# Patient Record
Sex: FEMALE | Race: WHITE | NOT HISPANIC OR LATINO | Employment: OTHER
[De-identification: names, ages, dates, MRNs, and addresses within clinical notes are randomized per-mention and may not be internally consistent; named-entity substitution may affect disease eponyms.]

---

## 2017-06-03 ENCOUNTER — HEALTH MAINTENANCE LETTER (OUTPATIENT)
Age: 37
End: 2017-06-03

## 2021-09-01 ENCOUNTER — HOSPITAL ENCOUNTER (EMERGENCY)
Facility: CLINIC | Age: 41
Discharge: HOME | End: 2021-09-01
Attending: EMERGENCY MEDICINE | Admitting: EMERGENCY MEDICINE
Payer: COMMERCIAL

## 2021-09-01 ENCOUNTER — APPOINTMENT (OUTPATIENT)
Dept: GENERAL RADIOLOGY | Facility: CLINIC | Age: 41
End: 2021-09-01
Attending: EMERGENCY MEDICINE
Payer: COMMERCIAL

## 2021-09-01 ENCOUNTER — APPOINTMENT (OUTPATIENT)
Dept: CT IMAGING | Facility: CLINIC | Age: 41
End: 2021-09-01
Attending: EMERGENCY MEDICINE
Payer: COMMERCIAL

## 2021-09-01 VITALS
OXYGEN SATURATION: 99 % | RESPIRATION RATE: 15 BRPM | SYSTOLIC BLOOD PRESSURE: 124 MMHG | DIASTOLIC BLOOD PRESSURE: 71 MMHG | HEART RATE: 64 BPM | TEMPERATURE: 98.1 F

## 2021-09-01 DIAGNOSIS — R51.9 ACUTE NONINTRACTABLE HEADACHE, UNSPECIFIED HEADACHE TYPE: ICD-10-CM

## 2021-09-01 DIAGNOSIS — R07.9 CHEST PAIN, UNSPECIFIED TYPE: ICD-10-CM

## 2021-09-01 LAB
ANION GAP SERPL CALCULATED.3IONS-SCNC: 4 MMOL/L (ref 3–14)
B-HCG FREE SERPL-ACNC: <5 IU/L (ref 0–5)
BASOPHILS # BLD AUTO: 0 10E3/UL (ref 0–0.2)
BASOPHILS NFR BLD AUTO: 0 %
BUN SERPL-MCNC: 9 MG/DL (ref 7–30)
CALCIUM SERPL-MCNC: 8.8 MG/DL (ref 8.5–10.1)
CHLORIDE BLD-SCNC: 110 MMOL/L (ref 94–109)
CO2 SERPL-SCNC: 26 MMOL/L (ref 20–32)
CREAT SERPL-MCNC: 0.93 MG/DL (ref 0.52–1.04)
D DIMER PPP FEU-MCNC: <0.27 UG/ML FEU (ref 0–0.5)
EOSINOPHIL # BLD AUTO: 0.2 10E3/UL (ref 0–0.7)
EOSINOPHIL NFR BLD AUTO: 2 %
ERYTHROCYTE [DISTWIDTH] IN BLOOD BY AUTOMATED COUNT: 15.9 % (ref 10–15)
GFR SERPL CREATININE-BSD FRML MDRD: 77 ML/MIN/1.73M2
GLUCOSE BLD-MCNC: 93 MG/DL (ref 70–99)
HCT VFR BLD AUTO: 40.8 % (ref 35–47)
HGB BLD-MCNC: 12.4 G/DL (ref 11.7–15.7)
IMM GRANULOCYTES # BLD: 0 10E3/UL
IMM GRANULOCYTES NFR BLD: 0 %
LYMPHOCYTES # BLD AUTO: 1.8 10E3/UL (ref 0.8–5.3)
LYMPHOCYTES NFR BLD AUTO: 17 %
MCH RBC QN AUTO: 25.9 PG (ref 26.5–33)
MCHC RBC AUTO-ENTMCNC: 30.4 G/DL (ref 31.5–36.5)
MCV RBC AUTO: 85 FL (ref 78–100)
MONOCYTES # BLD AUTO: 0.8 10E3/UL (ref 0–1.3)
MONOCYTES NFR BLD AUTO: 7 %
NEUTROPHILS # BLD AUTO: 7.9 10E3/UL (ref 1.6–8.3)
NEUTROPHILS NFR BLD AUTO: 74 %
NRBC # BLD AUTO: 0 10E3/UL
NRBC BLD AUTO-RTO: 0 /100
PLATELET # BLD AUTO: 269 10E3/UL (ref 150–450)
POTASSIUM BLD-SCNC: 3.6 MMOL/L (ref 3.4–5.3)
RBC # BLD AUTO: 4.79 10E6/UL (ref 3.8–5.2)
SODIUM SERPL-SCNC: 140 MMOL/L (ref 133–144)
TROPONIN I SERPL-MCNC: <0.015 UG/L (ref 0–0.04)
WBC # BLD AUTO: 10.8 10E3/UL (ref 4–11)

## 2021-09-01 PROCEDURE — 36415 COLL VENOUS BLD VENIPUNCTURE: CPT | Performed by: EMERGENCY MEDICINE

## 2021-09-01 PROCEDURE — 70450 CT HEAD/BRAIN W/O DYE: CPT

## 2021-09-01 PROCEDURE — 250N000011 HC RX IP 250 OP 636: Performed by: EMERGENCY MEDICINE

## 2021-09-01 PROCEDURE — 84484 ASSAY OF TROPONIN QUANT: CPT | Performed by: EMERGENCY MEDICINE

## 2021-09-01 PROCEDURE — 99285 EMERGENCY DEPT VISIT HI MDM: CPT | Mod: 25

## 2021-09-01 PROCEDURE — 96361 HYDRATE IV INFUSION ADD-ON: CPT

## 2021-09-01 PROCEDURE — 84702 CHORIONIC GONADOTROPIN TEST: CPT

## 2021-09-01 PROCEDURE — 96375 TX/PRO/DX INJ NEW DRUG ADDON: CPT

## 2021-09-01 PROCEDURE — 258N000003 HC RX IP 258 OP 636: Performed by: EMERGENCY MEDICINE

## 2021-09-01 PROCEDURE — 71046 X-RAY EXAM CHEST 2 VIEWS: CPT

## 2021-09-01 PROCEDURE — 93005 ELECTROCARDIOGRAM TRACING: CPT

## 2021-09-01 PROCEDURE — 85025 COMPLETE CBC W/AUTO DIFF WBC: CPT | Performed by: EMERGENCY MEDICINE

## 2021-09-01 PROCEDURE — 96374 THER/PROPH/DIAG INJ IV PUSH: CPT

## 2021-09-01 PROCEDURE — 85379 FIBRIN DEGRADATION QUANT: CPT | Performed by: EMERGENCY MEDICINE

## 2021-09-01 PROCEDURE — 80048 BASIC METABOLIC PNL TOTAL CA: CPT | Performed by: EMERGENCY MEDICINE

## 2021-09-01 RX ORDER — DIPHENHYDRAMINE HYDROCHLORIDE 50 MG/ML
25 INJECTION INTRAMUSCULAR; INTRAVENOUS ONCE
Status: COMPLETED | OUTPATIENT
Start: 2021-09-01 | End: 2021-09-01

## 2021-09-01 RX ORDER — CYCLOBENZAPRINE HCL 10 MG
10 TABLET ORAL 3 TIMES DAILY PRN
Qty: 10 TABLET | Refills: 0 | Status: SHIPPED | OUTPATIENT
Start: 2021-09-01 | End: 2021-11-23

## 2021-09-01 RX ORDER — TETRACAINE HYDROCHLORIDE 5 MG/ML
SOLUTION OPHTHALMIC
Status: DISCONTINUED
Start: 2021-09-01 | End: 2021-09-02 | Stop reason: HOSPADM

## 2021-09-01 RX ORDER — METOCLOPRAMIDE HYDROCHLORIDE 5 MG/ML
10 INJECTION INTRAMUSCULAR; INTRAVENOUS ONCE
Status: COMPLETED | OUTPATIENT
Start: 2021-09-01 | End: 2021-09-01

## 2021-09-01 RX ADMIN — METOCLOPRAMIDE HYDROCHLORIDE 10 MG: 5 INJECTION INTRAMUSCULAR; INTRAVENOUS at 19:29

## 2021-09-01 RX ADMIN — SODIUM CHLORIDE 1000 ML: 9 INJECTION, SOLUTION INTRAVENOUS at 19:28

## 2021-09-01 RX ADMIN — DIPHENHYDRAMINE HYDROCHLORIDE 25 MG: 50 INJECTION, SOLUTION INTRAMUSCULAR; INTRAVENOUS at 19:29

## 2021-09-01 ASSESSMENT — ENCOUNTER SYMPTOMS
HEADACHES: 1
NUMBNESS: 0
NAUSEA: 0
WEAKNESS: 0
VOMITING: 1
SHORTNESS OF BREATH: 1
SORE THROAT: 1
FEVER: 0

## 2021-09-01 NOTE — ED NOTES
Pt is a harder IV stick, placed hot packs on arms. Will check back soon.  in room, pt tearful. Call light within reach.

## 2021-09-01 NOTE — ED TRIAGE NOTES
Worked out at the gym yesterday and thinks she pushed it a little too hard because she developed neck pain and a headache. When she layed down to ice her neck, that is when her chest pain developed.Chest pain worse with breathing that started yesterday. Worse with movement or when she is laying flat. Also c/o sore throat.   ABCs intact.     Does have history of PEs.

## 2021-09-01 NOTE — ED PROVIDER NOTES
History   Chief Complaint:  Chest Pain     The history is provided by the patient.      Leah Yanez is a 41 year old female who presents with chest pain. Patient was at the gym yesterday doing a cardio exercise. She states she pushed herself too hard and developed a sharp frontal headache followed by transient nausea and vomiting. She rested the remainder of the day and took ibuprofen. Last night she developed the sharp pain in her forehead again which remains persistent but less significant than the chest pain she is now experiencing. During the night she developed chest pain that radiates up her throat and shoulders. She felt like she couldn't breath. She states it hurts to lay down and to take a deep breath along with certain position changes. She is no longer nauseous. She denies fever or coughing blood. No numbness or weakness. She has history of a PE with was due to a side effect of birth control. She is no longer on birth control. No tobacco use. No personal history of cancer. States she traveled to [place] in early August.    Review of Systems   Constitutional: Negative for fever.   HENT: Positive for sore throat.    Respiratory: Positive for shortness of breath.    Cardiovascular: Positive for chest pain.   Gastrointestinal: Positive for vomiting. Negative for nausea.   Neurological: Positive for headaches. Negative for weakness and numbness.   All other systems reviewed and are negative.    Allergies:  Azithromycin  Erythromycin    Medications:  The patient denies use of medications.     Past Medical History:    The patient denies past medical history.       Social History:  Presents to ED alone    Physical Exam     Patient Vitals for the past 24 hrs:   BP Temp Temp src Pulse Resp SpO2   09/01/21 2300 124/71 -- -- 64 15 99 %   09/01/21 2235 -- -- -- 66 -- 94 %   09/01/21 2230 129/68 -- -- 68 -- --   09/01/21 2130 112/66 -- -- 71 21 --   09/01/21 2000 111/65 -- -- 56 13 --   09/01/21 1935 102/61  -- -- 74 22 --   09/01/21 1502 123/77 98.1  F (36.7  C) Oral 74 18 99 %       Physical Exam    HEENT:   External ears are normal.     Temporal arteries are non-tender.      Oropharynx is moist, without lesions or trismus.  Eyes:   PERRL.  EOMs intact.      No corneal clouding.   NECK:   No meningismus but tenderness to the paraspinal musculature.       Negative Brudzinski's sign.  CV:    Regular rate and rhythm.    No murmurs, rubs or gallops.    No peripheral edema or unilateral leg swelling  PULM:   Clear to auscultation bilateral.      No respiratory distress.      No stridor or wheezing.    Tenderness to left upper chest  ABD:  Soft, non-tender, non-distended.      No rebound or guarding.  MSK:    No gross deformity to all four extremities.      No significant joint effusions.  LYMPH:  No cervical lymphadenopathy.  NEURO:  A & O x 3    CN II-XII intact, speech is clear with no aphasia.      Finger to nose within normal limits.  No pronator drift.      Strength is 5/5 in all 4 extremities.  Sensation is intact.      Normal muscular tone, no tremor.  SKIN:   Warm, dry and intact.    PSYCH:   Mood is good and affect is appropriate.      Emergency Department Course   ECG  ECG taken at 1515, ECG read at 1831  Normal sinus rhythm  Nonspecific T wave abnormality  Abnormal ECG   Rate 65 bpm. ID interval 150 ms. QRS duration 96 ms. QT/QTc 418/434 ms. P-R-T axes 42 72 48.     Imaging:  Head CT w/o contrast  1.  No acute intracranial process.  As per radiology.     Chest XR, PA & LAT  Heart size is normal. Minimal lingular atelectasis or scarring. Lungs otherwise clear. No pneumothorax. No pleural effusion.  As per radiology.     Laboratory:  iStat HCG Quantitative Pregnancy, POCT: <5.0  BMP: Chloride 110(H) o/w WNL (Creatinine 0.93)   Ddimer: <0.27  Troponin (Collected 1927): <0.015  CBC: WBC 10.8, HGB 12.4,      Emergency Department Course:    Reviewed:  I reviewed nursing notes, vitals, past medical history and  care everywhere    Assessments:   I obtained history and examined the patient as noted above.    I rechecked the patient and explained findings.     Interventions:   NS, 1L, IV   Benadryl, 25 mg, IV   Reglan, 10 mg, IV    Disposition:  The patient was discharged to home.       Impression & Plan     Medical Decision Makin-year-old female presented to the ED with primary complaints of chest pain since last night.  EKG without ischemic changes.  Troponin within normal limits despite greater than 8 hours of constant symptoms thus ruling out MI.  She has a prior history of pulmonary embolism on estrogen for which she is no longer on.  D-dimer within normal limits thus no indication for CT scan of the chest.  The remainder of her evaluation for chest pain was unremarkable.  She had tenderness to palpation to the upper chest raising suspicion for musculoskeletal source.  Differential diagnosis would also include atypical GERD, esophageal spasm, costochondritis or intercostal muscle strain.    She also reported a intermittent headache but is less concerning to her. She would not have presented to the ED if it were not for the chest pain.  CT scan done due to the atypical nature of headache.  Head CT unremarkable.  She has no features concerning for dural sinus thrombosis, meningitis, temporal arteritis, glaucoma.  Low suspicion for occult subarachnoid hemorrhage.  We discussed the risk and benefits of lumbar puncture to definitively rule out SAH but patient agrees on withholding any further investigations at this time.  Patient safe for discharge home with close follow-up with a primary care physician and return to the ED for any worsening symptoms.    Covid-19  Leah Yanez was evaluated during a global COVID-19 pandemic, which necessitated consideration that the patient might be at risk for infection with the SARS-CoV-2 virus that causes COVID-19.   Applicable protocols for evaluation  were followed during the patient's care.      Diagnosis:    ICD-10-CM    1. Acute nonintractable headache, unspecified headache type  R51.9    2. Chest pain, unspecified type  R07.9        Discharge Medications:  New Prescriptions    CYCLOBENZAPRINE (FLEXERIL) 10 MG TABLET    Take 1 tablet (10 mg) by mouth 3 times daily as needed for muscle spasms       Scribe Disclosure:  I, Mauricio Estrada, am serving as a scribe at 6:23 PM on 9/1/2021 to document services personally performed by Marbin Rogers MD based on my observations and the provider's statements to me.            Marbin Rogers MD  09/01/21 [id number]

## 2021-09-02 LAB
ATRIAL RATE - MUSE: 65 BPM
DIASTOLIC BLOOD PRESSURE - MUSE: NORMAL MMHG
INTERPRETATION ECG - MUSE: NORMAL
P AXIS - MUSE: 42 DEGREES
PR INTERVAL - MUSE: 150 MS
QRS DURATION - MUSE: 96 MS
QT - MUSE: 418 MS
QTC - MUSE: 434 MS
R AXIS - MUSE: 72 DEGREES
SYSTOLIC BLOOD PRESSURE - MUSE: NORMAL MMHG
T AXIS - MUSE: 48 DEGREES
VENTRICULAR RATE- MUSE: 65 BPM

## 2021-09-02 NOTE — DISCHARGE INSTRUCTIONS
Please make an appointment to follow up with your primary care provider in 2-3 days even if entirely better.    Discharge Instructions  Headache    You were seen today for a headache. Headaches may be caused by many different things such as muscle tension, sinus inflammation, anxiety and stress, having too little sleep, too much alcohol, some medical conditions or injury. You may have a migraine, which is caused by changes in the blood vessels in your head.  At this time your provider does not find that your headache is a sign of anything dangerous or life-threatening.  However, sometimes the signs of serious illness do not show up right away.      Generally, every Emergency Department visit should have a follow-up clinic visit with either a primary or a specialty clinic/provider. Please follow-up as instructed by your emergency provider today.    Return to the Emergency Department if:  You get a new fever of 100.4 F or higher.  Your headache gets much worse.  You get a stiff neck with your headache.  You get a new headache that is significantly different or worse than headaches you have had before.  You are vomiting (throwing up) and cannot keep food or water down.  You have blurry or double vision or other problems with your eyes.  You have a new weakness on one side of your body.  You have difficulty with balance which is new.  You or your family thinks you are confused.  You have a seizure.    What can I do to help myself?  Pain medications - You may take a pain medication such as Tylenol  (acetaminophen), Advil , Motrin  (ibuprofen) or Aleve  (naproxen).  Take a pain reliever as soon as you notice symptoms.  Starting medications as soon as you start to have symptoms may lessen the amount of pain you have.  Relaxing in a quiet, dark room may help.  Get enough sleep and eat meals regularly.  You may need to watch for certain foods or other things which may trigger your headaches.  Keeping a journal of your  headaches and possible triggers may help you and your primary provider to identify things which you should avoid which may be causing your headaches.  If you were given a prescription for medicine here today, be sure to read all of the information (including the package insert) that comes with your prescription.  This will include important information about the medicine, its side effects, and any warnings that you need to know about.  The pharmacist who fills the prescription can provide more information and answer questions you may have about the medicine.  If you have questions or concerns that the pharmacist cannot address, please call or return to the Emergency Department.   Remember that you can always come back to the Emergency Department if you are not able to see your regular provider in the amount of time listed above, if you get any new symptoms, or if there is anything that worries you.  Discharge Instructions  Chest Pain    You have been seen today for chest pain or discomfort.  At this time, your provider has found no signs that your chest pain is due to a serious or life-threatening condition, (or you have declined more testing and/or admission to the hospital). However, sometimes there is a serious problem that does not show up right away. Your evaluation today may not be complete and you may need further testing and evaluation.     Generally, every Emergency Department visit should have a follow-up clinic visit with either a primary or a specialty clinic/provider. Please follow-up as instructed by your emergency provider today.  Return to the Emergency Department if:  Your chest pain changes, gets worse, starts to happen more often, or comes with less activity.  You are newly short of breath.  You get very weak or tired.  You pass out or faint.  You have any new symptoms, like fever, cough, numb legs, or you cough up blood.  You have anything else that worries you.    Until you follow-up with your  regular provider, please do the following:  Take one aspirin daily unless you have an allergy or are told not to by your provider.  If a stress test appointment has been made, go to the appointment.  If you have questions, contact your regular provider.  Follow-up with your regular provider/clinic as directed; this is very important.    If you were given a prescription for medicine here today, be sure to read all of the information (including the package insert) that comes with your prescription.  This will include important information about the medicine, its side effects, and any warnings that you need to know about.  The pharmacist who fills the prescription can provide more information and answer questions you may have about the medicine.  If you have questions or concerns that the pharmacist cannot address, please call or return to the Emergency Department.       Remember that you can always come back to the Emergency Department if you are not able to see your regular provider in the amount of time listed above, if you get any new symptoms, or if there is anything that worries you.

## 2021-09-09 ENCOUNTER — HOSPITAL ENCOUNTER (EMERGENCY)
Facility: CLINIC | Age: 41
Discharge: HOME | End: 2021-09-09
Attending: EMERGENCY MEDICINE | Admitting: EMERGENCY MEDICINE
Payer: COMMERCIAL

## 2021-09-09 VITALS
HEIGHT: 72 IN | DIASTOLIC BLOOD PRESSURE: 84 MMHG | BODY MASS INDEX: 35.21 KG/M2 | WEIGHT: 260 LBS | SYSTOLIC BLOOD PRESSURE: 126 MMHG | OXYGEN SATURATION: 98 % | RESPIRATION RATE: 16 BRPM | HEART RATE: 70 BPM | TEMPERATURE: 97.8 F

## 2021-09-09 DIAGNOSIS — T18.128A FOOD IMPACTION OF ESOPHAGUS, INITIAL ENCOUNTER: ICD-10-CM
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
## 2021-09-09 PROCEDURE — 99283 EMERGENCY DEPT VISIT LOW MDM: CPT

## 2021-09-09 ASSESSMENT — MIFFLIN-ST. JEOR: SCORE: 1956.35

## 2021-09-09 ASSESSMENT — ENCOUNTER SYMPTOMS
NAUSEA: 1
VOMITING: 1
TROUBLE SWALLOWING: 1

## 2021-09-09 NOTE — ED TRIAGE NOTES
Pt presents for complaint of an episode of choking on a piece of chicken approx 2 hours ago. Pt states since then she has been unable to swallow her saliva and is feeling nauseated. Pt appears able to handle secretions during triage and is not actively vomiting. No signs of airway disturbance and respirations are regularly and unlabored. ABC intact, A&Ox4.

## 2021-09-09 NOTE — ED PROVIDER NOTES
History   Chief Complaint:  Throat Pain       The history is provided by the patient.      Leah Yanez is a 41 year old female with a history of gastroesophageal reflux disease who presents with difficulty swallowing after choking on a piece of chicken four hours ago.  She explained that after choking, she vomited most of the chicken up, but she has still been having difficulty swallowing, with vomiting following shortly after swallowing. She explained that she drank water and has been swallowing her saliva, but it has felt stuck. In the emergency department, she denied current nausea but noted she last vomited five minutes ago.  She reported that she has had problems swallowing at baseline but noted this is much worse. She used to take prilosec for reflux and has recently stopped taking it. Denied history of endoscopy. Of note, she had a tonsillectomy and adenoidectomy, and her father has Barretts disease.      Review of Systems   HENT: Positive for trouble swallowing.    Gastrointestinal: Positive for nausea and vomiting.   All other systems reviewed and are negative.      Allergies:  Azithromycin  Erythromycin    Medications:  The patient denies use of medications.    Past Medical History:    Depression    Past Surgical History:    T and A  Tubes in ears    Family History:    Father: MI, lipids, Griggs's  Mother: HTN, arthritis    Social History:  Presents with .    Physical Exam     Patient Vitals for the past 24 hrs:   BP Temp Pulse Resp SpO2 Height Weight   09/09/21 1854 126/84 -- 70 -- 98 % -- --   09/09/21 1656 (!) 130/91 97.8  F (36.6  C) 77 16 96 % 1.829 m (6') 117.9 kg (260 lb)       Physical Exam  Constitutional: Vital signs reviewed as above.   Head: No external signs of trauma noted.  Eyes: Pupils are equal, round, and reactive to light.   Oropharynx: MMM. Uvula midline  Neck: No JVD noted. No stridor noted.  Cardiovascular: Normal rate, regular rhythm and normal heart sounds.  No murmur  "heard. Equal B/L peripheral pulses.  Pulmonary/Chest: Effort normal and breath sounds normal. No respiratory distress. Patient has no wheezes. Patient has no rales.   Gastrointestinal: Soft. There is no tenderness.   Musculoskeletal/Extremities: No edema noted. Normal tone.  Neurological: Patient is alert and oriented to person, place, and time.   Skin: Skin is warm and dry. There is no diaphoresis noted.   Psychiatric: The patient appears calm.      Emergency Department Course     Emergency Department Course:  ED Course as of Sep 09 2212   Thu Sep 09, 2021   1835 rechecked        Reviewed:  I reviewed nursing notes, vitals, past medical history and care everywhere    Assessments:  1802 I obtained history and examined the patient as noted above.   1829 I rechecked the patient and explained findings.     Interventions:  1815 EZ Gas 1 pack PO    Disposition:  The patient was discharged to home.       Impression & Plan     CMS Diagnoses:       Medical Decision Making:  This 41-year-old female patient presents to the ED due to concerns for a food bolus in her esophagus.  Please see the HPI and exam for specifics.  Patient has remained stable in the ED.  She noted that she has vomited and feels that she is still spitting up phlegm.  She states that she seems to initially be able to swallow but after period of time she has to cough or spit things back up.    We trialed \"EZ gas\" with the patient which she tolerated well and noted resolution of symptoms.  I think the patient can be safely discharged.  She should restart a proton pump inhibitor and follow-up with GI for consideration of endoscopy.  Anticipatory guidance given prior to discharge.      Diagnosis:    ICD-10-CM    1. Food impaction of esophagus, initial encounter  T18.128A Primary Care Referral     GASTROENTEROLOGY ADULT REF CONSULT ONLY       Discharge Medications:  Discharge Medication List as of 9/9/2021  6:49 PM      START taking these medications    Details "   omeprazole (PRILOSEC) 20 MG DR capsule Take 2 capsules (40 mg) by mouth daily, Disp-60 capsule, R-0, E-Prescribe             Scribe Disclosure:  I, Yanet Barajas, am serving as a scribe at 6:02 PM on 9/9/2021 to document services personally performed by Eric Lewis DO based on my observations and the provider's statements to me.              Eric Lewis DO  09/09/21 [id number]

## 2021-09-09 NOTE — DISCHARGE INSTRUCTIONS
What do you do next:   Continue your home medications unless we have specifically changed them  Restart taking Prilosec.  I will write a prescription for this.  When you see the gastroenterologist in the clinic, they can discuss changes in dosing.  Make sure you cut food and very small pieces and chew it thoroughly.  Follow up as indicated below    When do you return: If you have trouble breathing, perception that food is stuck, inability to swallow saliva, any shortness of breath, or any other symptoms that concern you, please return to the ED for reevaluation.    Thank you for allowing us to care for you today.

## 2021-09-13 NOTE — TELEPHONE ENCOUNTER
REFERRAL INFORMATION:    Referring Provider:  Dr. Eric Lewis     Referring Clinic:  [facility]    Reason for Visit/Diagnosis: GERD or Reflux      FUTURE VISIT INFORMATION:    Appointment Date: 12/28/2021    Appointment Time: 10:20 AM      NOTES STATUS DETAILS   OFFICE NOTE from Referring Provider Internal 9/9/2021 Office visit with Dr. Lewis   OFFICE NOTE from Other Specialist Care Everywhere 9/9/2021 Office visit with Dr. Deni Bella ([facility] DISCHARGE SUMMARY/  ED VISITS Internal  9/9/2021 ([facility])   OPERATIVE REPORT N/A    MEDICATION LIST Internal         ENDOSCOPY  N/A    COLONOSCOPY N/A    ERCP N/A    EUS N/A    STOOL TESTING N/A    PERTINENT LABS Internal/ Care Everywhere    PATHOLOGY REPORTS (RELATED) N/A    IMAGING (CT, MRI, EGD, MRCP, Small Bowel Follow Through/SBT, MR/CT Enterography) N/A

## 2021-10-24 ENCOUNTER — HEALTH MAINTENANCE LETTER (OUTPATIENT)
Age: 41
End: 2021-10-24

## 2021-11-23 ENCOUNTER — VIRTUAL VISIT (OUTPATIENT)
Dept: GASTROENTEROLOGY | Facility: CLINIC | Age: 41
End: 2021-11-23
Attending: EMERGENCY MEDICINE
Payer: COMMERCIAL
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
## 2021-11-23 DIAGNOSIS — R12 HEARTBURN: ICD-10-CM

## 2021-11-23 DIAGNOSIS — R13.19 ESOPHAGEAL DYSPHAGIA: Primary | ICD-10-CM

## 2021-11-23 DIAGNOSIS — T18.128A FOOD IMPACTION OF ESOPHAGUS, INITIAL ENCOUNTER: ICD-10-CM

## 2021-11-23 PROCEDURE — 99205 OFFICE O/P NEW HI 60 MIN: CPT | Mod: 95 | Performed by: INTERNAL MEDICINE

## 2021-11-23 NOTE — PROGRESS NOTES
Leah is a 41 year old who is being evaluated via a billable video visit.      How would you like to obtain your AVS? MyChart  If the video visit is dropped, the invitation should be resent by: Text to cell phone: [phone]  Will anyone else be joining your video visit? No      Video Start Time: 9:00 AM  Video-Visit Details    Type of service:  Video Visit    Video End Time:9:34 AM    Originating Location (pt. Location): Home    Distant Location (provider location):  [facility] [place]     Platform used for Video Visit: [employer] Planet     Gastroenterology Visit for: Leah Yanez 1980   MRN: [id number]     Reason for Visit:  chief complaint    Referred by: Debbie  / [facility] [id number] / [place] [id number]  Patient Care Team:  No Ref-Primary, Physician as PCP - General  Ajay Cain MD Sloan, Joshua, DO as MD (Gastroenterology)  Eric Lewis DO as MD (Emergency Medicine)    History of Present Illness:   Leah Yanez is a 41 year old female with heartburn, dysphagia, food impactions who is presenting as a new patient in consultation at the request of Dr. Lewis with a chief complaint of dysphagia and food impaction.  ---------------------------------------------------------------  Leah Yanez states she has had 10 years of bad acid reflux. She was previously on omeprazole for 8 years. She wanted to get off the medication and felt that even the omeprazole wasn't doing the trick. She was able to target specific foods that trigger her reflux. She has taken pills to increase her acid rather than decrease. She takes tumeric at night.     She does state that she has a hard time swallowing-specifically food at least 5 years. She feels that there is a lump in her esophagus. She feels that she has dry mouth as well contributing. She has to drink water with every bite to help pass the bolus. She has noticed that this has become second nature.  Recently went to the ED because she had an impaction where food/water/saliva would not go down. States there is something wrong with her esophagus. Regurgitation once a month. Generally can force down the bolus with water.    Occasionally when drinking hot liquid she has a discomfort that is in her back.     Meats she will cut into tiny bites, chew extensively and drink with water.     Father and aunt with Griggs's esophagus .     Has never had an upper endoscopy.     Mild seasonal allergies in spring and fall- no meds required.      has EoE  ---------------------------------------------------------------     Leah Yanez denies odynophagia, nausea, vomiting, early satiety, abdominal pain, abdominal distension/bloating, diarrhea, constipation, hematochezia, or melena. No unintentional weight loss.     Family history of colon cancer: grandmother in her 80s.  Wt Readings from Last 5 Encounters:   09/09/21 117.9 kg (260 lb)   08/06/07 96.2 kg (212 lb)   06/26/06 106.6 kg (235 lb)   06/16/06 110.9 kg (244 lb 6.4 oz)   02/27/06 113.3 kg (249 lb 12.8 oz)        Esophageal Questionnaire(s)    BEDQ Questionnaire  BEDQ Questionnaire: How Often Have You Had the Following? 11/23/2021   Trouble eating solid food (meat, bread, vegetables) 5   Trouble eating soft foods (yogurt, jello, pudding) 0   Trouble swallowing liquids 0   Pain while swallowing 0   Coughing or choking while swallowing foods or liquids 0   Total Score: 5     BEDQ Questionnaire: Discomfort/Pain Ratings 11/23/2021   Eating solid food (meat, bread, vegetables) 4   Eating soft foods (yogurt, jello, pudding) 0   Drinking liquid 0   Total Score: 4       Eckardt Questionnaire  Eckardt Questionnaire 11/23/2021   Dysphagia 3   Regurgitation 1   Retrosternal Pain 0   Weight Loss (kg) 0   Total Score:  4       Promis 10 Questionnaire  PROMIS 10 FLOWSHEET DATA 11/23/2021   In general, would you say your health is: 3   In general, would you say your quality of  life is: 4   In general, how would you rate your physical health? 3   In general, how would you rate your mental health, including your mood and your ability to think? 3   In general, how would you rate your satisfaction with your social activities and relationships? 4   In general, please rate how well you carry out your usual social activities and roles. (This includes activities at home, at work and in your community, and responsibilities as a parent, child, spouse, employee, friend, etc.) 4   To what extent are you able to carry out your everyday physical activities such as walking, climbing stairs, carrying groceries, or moving a chair? 4   In the past 7 days, how often have you been bothered by emotional problems such as feeling anxious, depressed, or irritable? 3   In the past 7 days, how would you rate your fatigue on average? 3   In the past 7 days, how would you rate your pain on average, where 0 means no pain, and 10 means worst imaginable pain? 1   Mental health question re-calculation - no clinical value 3   Physical health question re-calculation - no clinical value 3   Pain question re-calculation - no clinical value 4   Global Mental Health Score 14   Global Physical Health Score 14   PROMIS TOTAL - SUBSCORES 28     STUDIES & PROCEDURES:    EGD:   Date:  Impression:  Pathology Report:    Colonoscopy:  Date:  Impression:  Pathology Report:     EndoFLIP directed at the UES or LES (8cm (EF-325) balloon length or 16cm (EF-322) balloon length):   Date:  8cm balloon  Balloon inflation Balloon pressure CSA (mm^2) DI (mm^2/mmHg) Dmin (mm) Compliance   20 (ladmark ID)        30        40        50           16cm balloon  Balloon inflation Balloon pressure CSA (mm^2) DI (mm^2/mmHg) Dmin (mm) Compliance   30 (ladmark ID)        40        50        60        70           High Resolution Manometry:  Date:  Impression:    PH/Impedance:  Date:  Impression:     Bravo:  48 or  96hr  Date:  Impression:    CT:  Date:  Impression:    Esophagram:  Date:  Impression:     Prior medical records were reviewed including, but not limited to, notes from referring providers, lab work, radiographic tests, and other diagnostic tests. Pertinent results were summarized above.     History     Past Medical History:   Diagnosis Date     Depressive disorder, not elsewhere classified 3/06    Resolved 8/07       Past Surgical History:   Procedure Laterality Date     TONSILLECTOMY & ADENOIDECTOMY       ZZC NONSPECIFIC PROCEDURE      T&A as a child     ZZC NONSPECIFIC PROCEDURE      Tubes in ears bilaterally       Social History     Socioeconomic History     Marital status:      Spouse name: Not on file     Number of children: Not on file     Years of education: Not on file     Highest education level: Not on file   Occupational History     Not on file   Tobacco Use     Smoking status: Never Smoker     Smokeless tobacco: Never Used   Substance and Sexual Activity     Alcohol use: Yes     Comment: Alcoholic Drinks/day: social     Drug use: No     Sexual activity: Not Currently   Other Topics Concern     Not on file   Social History Narrative     Not on file     Social Determinants of Health     Financial Resource Strain: Not on file   Food Insecurity: Not on file   Transportation Needs: Not on file   Physical Activity: Not on file   Stress: Not on file   Social Connections: Not on file   Intimate Partner Violence: Not on file   Housing Stability: Not on file       Family History   Problem Relation Age of Onset     Heart Disease Father         MI, Lipids     Hypertension Mother         arthritis     Hypertension Maternal Grandfather         arthritis, macular degeneration     Cancer - colorectal Maternal Grandmother         arthritis     Alzheimer Disease Paternal Grandfather      Acute Myocardial Infarction Father      Colon Cancer Paternal Grandmother      Family history reviewed and edited as  "appropriate    Medications and Allergies:     Outpatient Encounter Medications as of 11/23/2021   Medication Sig Dispense Refill     MULTIVITAMINS OR TABS 2 tablets bid       ANTIOXIDANTS OR CAPS 1 capsule bid       [DISCONTINUED] cyclobenzaprine (FLEXERIL) 10 MG tablet Take 1 tablet (10 mg) by mouth 3 times daily as needed for muscle spasms (Patient not taking: Reported on 11/23/2021) 10 tablet 0     No facility-administered encounter medications on file as of 11/23/2021.        Allergies   Allergen Reactions     Azithromycin      Erythromycin GI Disturbance     When \"very young\"        Review of systems:  A full 10 point review of systems was obtained and was negative except for the pertinent positives and negatives stated within the HPI.    Objective Findings:   Physical Exam:    Constitutional: There were no vitals taken for this visit.  General: Alert, cooperative, no distress, well-appearing  Head: Atraumatic, normocephalic, no obvious abnormalities   Eyes: EOMI, Sclera anicteric, no obvious conjunctival hemorrhage   Nose: Nares normal, no obvious malformation, no obvious rhinorrhea   Respiratory: normal appearing respirations  Musculoskeletal: Range of motion intact, no obvious strength deficit  Skin: No jaundice, no obvious rash  Neurologic: AAOx3, no obvious neurologic abnormality  Psychiatric: Normal Affect, appropriate mood  Extremities: No obvious edema, no obvious malformation     Labs, Radiology, Pathology     Lab Results   Component Value Date    WBC 10.8 09/01/2021    WBC 5.0 06/26/2006    HGB 12.4 09/01/2021    HGB 14.9 06/26/2006    HGB 13.6 06/16/2006     09/01/2021     06/26/2006     09/01/2021     06/16/2006     07/24/2003    BUN 9 09/01/2021    BUN 9 06/16/2006    BUN 14 07/24/2003    CO2 26 09/01/2021    CO2 22 06/16/2006    CO2 24 07/24/2003    TSH 0.85 07/24/2003        Liver Function Studies - No results for input(s): PROTTOTAL, ALBUMIN, BILITOTAL, ALKPHOS, " AST, ALT, BILIDIRECT in the last 02595 hours.       Patient Active Problem List    Diagnosis Date Noted     Food impaction of esophagus, initial encounter 11/23/2021     Priority: Medium     Esophageal dysphagia 11/23/2021     Priority: Medium     Heartburn 11/23/2021     Priority: Medium      Assessment and Plan   Assessment:    Leah Yanez is a 41 year old female with heartburn, dysphagia, food impactions who is presenting as a new patient in consultation at the request of Dr. Lewis with a chief complaint of dysphagia and food impaction.      The patient was seen in video telemedicine consultation regarding her symptoms of dysphagia and recent ED visit with food impaction that resolved without endoscopy.     The patient's symptoms are consistent with eosinophilic esophagitis however it is possible that she has erosive esophagitis or a peptic stricture. While a motility problem is possible, the most likely is a mechanical obstruction based on her symptoms.     I have asked that she hold off initiating any treatment until she is able to undergo upper endoscopy with possible dilation. This has been ordered.     She does have a grandmother with colon cancer in her 80s. She is not at increased risk based on this and should undergo colonoscopy at age 45.     1. Esophageal dysphagia    2. Food impaction of esophagus, initial encounter    3. Heartburn       Orders Placed This Encounter   Procedures     Adult Gastro Ref - Procedure Only      Plan:  1. Please schedule upper endoscopy to evaluate your difficulty swallowing and food impactions. It is possible you have eosinophilic esophagitis, reflux esophagitis, peptic stricture, or a motility problem  2. You will require colonoscopy at age 45  3. If you are diagnosed with eosinophilic esophagitis, please review the treatments below:  Eosinophilic Esophagitis Treatment options include:   - Proton Pump Inhibitors (PPI) twice daily  - Fluticasone 440 to 880mcg twice  daily  - Budesonide 1 to 2mg twice daily mixed with (Each 1mg mixed with 10 packets of splenda to create a slurry)  - Six food elimination diet: Avoid wheat, milk, egg, tree nuts/peanuts, seafood, and soy  - Following swallowed steroid do not eat or drink anything for at least 30 minutes    Follow up plan:   Return to clinic 3 months and as needed.    The risks and benefits of my recommendations, as well as other treatment options were discussed with the patient and any available family today. All questions were answered.     o Follow up: As planned above. Today, I personally spent 34 minutes in direct face to face time with the patient, of which greater than 50% of the time was spent in patient education and counseling as described above. Approximately 15 minutes were spent on indirect care associated with the patient's consultation including but not limited to review of: patient medical records to date, clinic visits, hospital records, lab results, imaging studies, procedural documentation, and coordinating care with other providers. The findings from this review are summarized in the above note.    The patient verbalized understanding of the plan and was appreciative for the time spent and information provided during the office visit.     Author:   Esteban Rose DO   of Medicine  Division of Gastroenterology, Hepatology, and Nutrition  [facility]     Documentation assisted by voice recognition and documentation system.

## 2021-11-23 NOTE — PATIENT INSTRUCTIONS
It was a pleasure taking care of you today.  I've included a brief summary of our discussion and care plan from today's visit below.  Please review this information with your primary care provider.  _______________________________________________________________________    My recommendations are summarized as follows:    1. Please schedule upper endoscopy to evaluate your difficulty swallowing and food impactions. It is possible you have eosinophilic esophagitis, reflux esophagitis, peptic stricture, or a motility problem  2. You will require colonoscopy at age 45  3. If you are diagnosed with eosinophilic esophagitis, please review the treatments below:  Eosinophilic Esophagitis Treatment options include:   - Proton Pump Inhibitors (PPI) twice daily  - Fluticasone 440 to 880mcg twice daily  - Budesonide 1 to 2mg twice daily mixed with (Each 1mg mixed with 10 packets of splenda to create a slurry)  - Six food elimination diet: Avoid wheat, milk, egg, tree nuts/peanuts, seafood, and soy  - Following swallowed steroid do not eat or drink anything for at least 30 minutes      To schedule endoscopic procedures you may call: [phone]  To schedule radiology tests you may call: [phone]  To schedule an ENT appointment you may call: [phone]    Please call my nurse Renee ([phone]), Miroslava ([phone]) with any questions or concerns.  If you were seen through the [facility] please feel free to reach out to Catalina at [phone]   --    Return to GI Clinic in 3 months to review your progress.    _______________________________________________________________________    Who do I call with any questions after my visit?  Please be in touch if there are any further questions that arise following today's visit.  There are multiple ways to contact your gastroenterology care team.        During business hours, you may reach a Gastroenterology nurse at [phone] and choose option 3.         To schedule or  reschedule an appointment, please call [phone].       You can always send a secure message through Jive Bike.  J[employer] messages are answered by your nurse or doctor typically within 24 hours.  Please allow extra time on weekends and holidays.        For urgent/emergent questions after business hours, you may reach the on-call GI Fellow by contacting the [facility]  at [phone].     How will I get the results of any tests ordered?    You will receive all of your results.  If you have signed up for [employer], any tests ordered at your visit will be available to you after your physician reviews them.  Typically this takes 1-2 weeks.  If there are urgent results that require a change in your care plan, your physician or nurse will call you to discuss the next steps.      What is Jive Bike?  J[employer] Bike is a secure way for you to access all of your healthcare records from the [facility].  It is a web based computer program, so you can sign on to it from any location.  It also allows you to send secure messages to your care team.  I recommend signing up for Jive Bike access if you have not already done so and are comfortable with using a computer.      How to I schedule a follow-up visit?  If you did not schedule a follow-up visit today, please call [phone] to schedule a follow-up office visit.        Sincerely,    Esteban Rose DO   of Medicine  Division of Gastroenterology, Hepatology, and Nutrition  [facility]

## 2021-11-23 NOTE — NURSING NOTE
Patient verified meds and allergies are correct via patients echeck-in.    Tammy Hernandez, Virtual Facilitator

## 2021-11-23 NOTE — LETTER
11/23/2021         RE: Leah Yanez  [address] [place] [address]        Dear Colleague,    Thank you for referring your patient, Leah Yanez, to the [facility] [place]. Please see a copy of my visit note below.    Gastroenterology Visit for: Leah Yanez 1980   MRN: [id number]     Reason for Visit:  chief complaint    Referred by: Debbie  / [facility] [id number] [place] [id number]  Patient Care Team:  No Ref-Primary, Physician as PCP - General  Ajay Cain MD Sloan, Joshua, DO as MD (Gastroenterology)  Eric Lewis DO as MD (Emergency Medicine)    History of Present Illness:   Leah Yanez is a 41 year old female with heartburn, dysphagia, food impactions who is presenting as a new patient in consultation at the request of Dr. Lewis with a chief complaint of dysphagia and food impaction.  ---------------------------------------------------------------  Leah Yanez states she has had 10 years of bad acid reflux. She was previously on omeprazole for 8 years. She wanted to get off the medication and felt that even the omeprazole wasn't doing the trick. She was able to target specific foods that trigger her reflux. She has taken pills to increase her acid rather than decrease. She takes tumeric at night.     She does state that she has a hard time swallowing-specifically food at least 5 years. She feels that there is a lump in her esophagus. She feels that she has dry mouth as well contributing. She has to drink water with every bite to help pass the bolus. She has noticed that this has become second nature. Recently went to the ED because she had an impaction where food/water/saliva would not go down. States there is something wrong with her esophagus. Regurgitation once a month. Generally can force down the bolus with water.    Occasionally when drinking hot liquid she has a discomfort that is in her back.      Meats she will cut into tiny bites, chew extensively and drink with water.     Father and aunt with Griggs's esophagus .     Has never had an upper endoscopy.     Mild seasonal allergies in spring and fall- no meds required.      has EoE  ---------------------------------------------------------------     Leah Yanez denies odynophagia, nausea, vomiting, early satiety, abdominal pain, abdominal distension/bloating, diarrhea, constipation, hematochezia, or melena. No unintentional weight loss.     Family history of colon cancer: grandmother in her 80s.  Wt Readings from Last 5 Encounters:   09/09/21 117.9 kg (260 lb)   08/06/07 96.2 kg (212 lb)   06/26/06 106.6 kg (235 lb)   06/16/06 110.9 kg (244 lb 6.4 oz)   02/27/06 113.3 kg (249 lb 12.8 oz)        Esophageal Questionnaire(s)    BEDQ Questionnaire  BEDQ Questionnaire: How Often Have You Had the Following? 11/23/2021   Trouble eating solid food (meat, bread, vegetables) 5   Trouble eating soft foods (yogurt, jello, pudding) 0   Trouble swallowing liquids 0   Pain while swallowing 0   Coughing or choking while swallowing foods or liquids 0   Total Score: 5     BEDQ Questionnaire: Discomfort/Pain Ratings 11/23/2021   Eating solid food (meat, bread, vegetables) 4   Eating soft foods (yogurt, jello, pudding) 0   Drinking liquid 0   Total Score: 4       Eckardt Questionnaire  Eckardt Questionnaire 11/23/2021   Dysphagia 3   Regurgitation 1   Retrosternal Pain 0   Weight Loss (kg) 0   Total Score:  4       Promis 10 Questionnaire  PROMIS 10 FLOWSHEET DATA 11/23/2021   In general, would you say your health is: 3   In general, would you say your quality of life is: 4   In general, how would you rate your physical health? 3   In general, how would you rate your mental health, including your mood and your ability to think? 3   In general, how would you rate your satisfaction with your social activities and relationships? 4   In general, please rate how well  you carry out your usual social activities and roles. (This includes activities at home, at work and in your community, and responsibilities as a parent, child, spouse, employee, friend, etc.) 4   To what extent are you able to carry out your everyday physical activities such as walking, climbing stairs, carrying groceries, or moving a chair? 4   In the past 7 days, how often have you been bothered by emotional problems such as feeling anxious, depressed, or irritable? 3   In the past 7 days, how would you rate your fatigue on average? 3   In the past 7 days, how would you rate your pain on average, where 0 means no pain, and 10 means worst imaginable pain? 1   Mental health question re-calculation - no clinical value 3   Physical health question re-calculation - no clinical value 3   Pain question re-calculation - no clinical value 4   Global Mental Health Score 14   Global Physical Health Score 14   PROMIS TOTAL - SUBSCORES 28     STUDIES & PROCEDURES:    EGD:   Date:  Impression:  Pathology Report:    Colonoscopy:  Date:  Impression:  Pathology Report:     EndoFLIP directed at the UES or LES (8cm (EF-325) balloon length or 16cm (EF-322) balloon length):   Date:  8cm balloon  Balloon inflation Balloon pressure CSA (mm^2) DI (mm^2/mmHg) Dmin (mm) Compliance   20 (ladmark ID)        30        40        50           16cm balloon  Balloon inflation Balloon pressure CSA (mm^2) DI (mm^2/mmHg) Dmin (mm) Compliance   30 (ladmark ID)        40        50        60        70           High Resolution Manometry:  Date:  Impression:    PH/Impedance:  Date:  Impression:     Bravo:  48 or 96hr  Date:  Impression:    CT:  Date:  Impression:    Esophagram:  Date:  Impression:     Prior medical records were reviewed including, but not limited to, notes from referring providers, lab work, radiographic tests, and other diagnostic tests. Pertinent results were summarized above.     History     Past Medical History:   Diagnosis Date      Depressive disorder, not elsewhere classified 3/06    Resolved 8/07       Past Surgical History:   Procedure Laterality Date     TONSILLECTOMY & ADENOIDECTOMY       ZZC NONSPECIFIC PROCEDURE      T&A as a child     ZZC NONSPECIFIC PROCEDURE      Tubes in ears bilaterally       Social History     Socioeconomic History     Marital status:      Spouse name: Not on file     Number of children: Not on file     Years of education: Not on file     Highest education level: Not on file   Occupational History     Not on file   Tobacco Use     Smoking status: Never Smoker     Smokeless tobacco: Never Used   Substance and Sexual Activity     Alcohol use: Yes     Comment: Alcoholic Drinks/day: social     Drug use: No     Sexual activity: Not Currently   Other Topics Concern     Not on file   Social History Narrative     Not on file     Social Determinants of Health     Financial Resource Strain: Not on file   Food Insecurity: Not on file   Transportation Needs: Not on file   Physical Activity: Not on file   Stress: Not on file   Social Connections: Not on file   Intimate Partner Violence: Not on file   Housing Stability: Not on file       Family History   Problem Relation Age of Onset     Heart Disease Father         MI, Lipids     Hypertension Mother         arthritis     Hypertension Maternal Grandfather         arthritis, macular degeneration     Cancer - colorectal Maternal Grandmother         arthritis     Alzheimer Disease Paternal Grandfather      Acute Myocardial Infarction Father      Colon Cancer Paternal Grandmother      Family history reviewed and edited as appropriate    Medications and Allergies:     Outpatient Encounter Medications as of 11/23/2021   Medication Sig Dispense Refill     MULTIVITAMINS OR TABS 2 tablets bid       ANTIOXIDANTS OR CAPS 1 capsule bid       [DISCONTINUED] cyclobenzaprine (FLEXERIL) 10 MG tablet Take 1 tablet (10 mg) by mouth 3 times daily as needed for muscle spasms (Patient  "not taking: Reported on 11/23/2021) 10 tablet 0     No facility-administered encounter medications on file as of 11/23/2021.        Allergies   Allergen Reactions     Azithromycin      Erythromycin GI Disturbance     When \"very young\"        Review of systems:  A full 10 point review of systems was obtained and was negative except for the pertinent positives and negatives stated within the HPI.    Objective Findings:   Physical Exam:    Constitutional: There were no vitals taken for this visit.  General: Alert, cooperative, no distress, well-appearing  Head: Atraumatic, normocephalic, no obvious abnormalities   Eyes: EOMI, Sclera anicteric, no obvious conjunctival hemorrhage   Nose: Nares normal, no obvious malformation, no obvious rhinorrhea   Respiratory: normal appearing respirations  Musculoskeletal: Range of motion intact, no obvious strength deficit  Skin: No jaundice, no obvious rash  Neurologic: AAOx3, no obvious neurologic abnormality  Psychiatric: Normal Affect, appropriate mood  Extremities: No obvious edema, no obvious malformation     Labs, Radiology, Pathology     Lab Results   Component Value Date    WBC 10.8 09/01/2021    WBC 5.0 06/26/2006    HGB 12.4 09/01/2021    HGB 14.9 06/26/2006    HGB 13.6 06/16/2006     09/01/2021     06/26/2006     09/01/2021     06/16/2006     07/24/2003    BUN 9 09/01/2021    BUN 9 06/16/2006    BUN 14 07/24/2003    CO2 26 09/01/2021    CO2 22 06/16/2006    CO2 24 07/24/2003    TSH 0.85 07/24/2003        Liver Function Studies - No results for input(s): PROTTOTAL, ALBUMIN, BILITOTAL, ALKPHOS, AST, ALT, BILIDIRECT in the last 60634 hours.       Patient Active Problem List    Diagnosis Date Noted     Food impaction of esophagus, initial encounter 11/23/2021     Priority: Medium     Esophageal dysphagia 11/23/2021     Priority: Medium     Heartburn 11/23/2021     Priority: Medium      Assessment and Plan   Assessment:    Leah Yanez is a " 41 year old female with heartburn, dysphagia, food impactions who is presenting as a new patient in consultation at the request of Dr. Lewis with a chief complaint of dysphagia and food impaction.      The patient was seen in video telemedicine consultation regarding her symptoms of dysphagia and recent ED visit with food impaction that resolved without endoscopy.     The patient's symptoms are consistent with eosinophilic esophagitis however it is possible that she has erosive esophagitis or a peptic stricture. While a motility problem is possible, the most likely is a mechanical obstruction based on her symptoms.     I have asked that she hold off initiating any treatment until she is able to undergo upper endoscopy with possible dilation. This has been ordered.     She does have a grandmother with colon cancer in her 80s. She is not at increased risk based on this and should undergo colonoscopy at age 45.     1. Esophageal dysphagia    2. Food impaction of esophagus, initial encounter    3. Heartburn       Orders Placed This Encounter   Procedures     Adult Gastro Ref - Procedure Only      Plan:  1. Please schedule upper endoscopy to evaluate your difficulty swallowing and food impactions. It is possible you have eosinophilic esophagitis, reflux esophagitis, peptic stricture, or a motility problem  2. You will require colonoscopy at age 45  3. If you are diagnosed with eosinophilic esophagitis, please review the treatments below:  Eosinophilic Esophagitis Treatment options include:   - Proton Pump Inhibitors (PPI) twice daily  - Fluticasone 440 to 880mcg twice daily  - Budesonide 1 to 2mg twice daily mixed with (Each 1mg mixed with 10 packets of splenda to create a slurry)  - Six food elimination diet: Avoid wheat, milk, egg, tree nuts/peanuts, seafood, and soy  - Following swallowed steroid do not eat or drink anything for at least 30 minutes    Follow up plan:   Return to clinic 3 months and as needed.    The  risks and benefits of my recommendations, as well as other treatment options were discussed with the patient and any available family today. All questions were answered.     o Follow up: As planned above. Today, I personally spent 34 minutes in direct face to face time with the patient, of which greater than 50% of the time was spent in patient education and counseling as described above. Approximately 15 minutes were spent on indirect care associated with the patient's consultation including but not limited to review of: patient medical records to date, clinic visits, hospital records, lab results, imaging studies, procedural documentation, and coordinating care with other providers. The findings from this review are summarized in the above note.    The patient verbalized understanding of the plan and was appreciative for the time spent and information provided during the office visit.     Author:   Esteban Rose DO   of Medicine  Division of Gastroenterology, Hepatology, and Nutrition  [facility]     Documentation assisted by voice recognition and documentation system.        Again, thank you for allowing me to participate in the care of your patient.      Sincerely,    Esteban Rose DO

## 2021-12-14 ENCOUNTER — TELEPHONE (OUTPATIENT)
Dept: GASTROENTEROLOGY | Facility: CLINIC | Age: 41
End: 2021-12-14
Payer: COMMERCIAL

## 2021-12-14 DIAGNOSIS — Z11.59 ENCOUNTER FOR SCREENING FOR OTHER VIRAL DISEASES: ICD-10-CM

## 2021-12-14 NOTE — TELEPHONE ENCOUNTER
Screening Questions  1. Are you active on mychart? Y    2. What insurance is in the chart? MEDICA    2.  Ordering/Referring Provider: Esteban Rose DO     3. BMI 35.3, If greater than 40 review exclusion criteria    4.  Respiratory Screening (If yes to any of the following Hospital setting only):     Do you use daily home oxygen? N  Do you have mod to severe Obstructive Sleep Apnea? N   Do you have Pulmonary Hypertension? N   Do you have UNCONTROLLED asthma? N    5. Have you had a heart or lung transplant (If yes, please review exclusion criteria) ? N    6. Are you currently on dialysis or have chronic kidney disease? N    7. Have you had a stroke or Transient ischemic attack (TIA) within 6 months? N    8. In the past 6 months, have you had any heart related issues including cardiomyopathy or heart attack? N                 If yes, did it require cardiac stenting or other implantable device?      9. Do you have any implantable devices in your body (pacemaker, defib, LVAD)? N    10. Do you take nitroglycerin? If yes, how often? N    11. Are you currently taking any blood thinners?N    12. Are you a diabetic? N    13. (Females) Are you currently pregnant? N  If yes, how many weeks?      15. Are you taking any prescription pain medications on a routine schedule? N If yes, MAC sedation.    16. Do you have any chemical dependencies such as alcohol, street drugs, or methadone? NIf yes, MAC sedation.    17. Do you have any history of post-traumatic stress syndrome, severe anxiety or history of psychosis? N    18. Do you transfer independently? Y    19.  Do you have any issues with constipation?     20. Preferred Pharmacy for Pre Prescription [facility]    Scheduling Details    Which Colonoscopy Prep was Sent?:   Procedure Scheduled: EGD  Surgeon: LIZZIE  Date of [place]: 12/19  [place] [facility] [place] (Please ask for phone number if not scheduled by patient):       Sedation Type: MAC  Conscious Sedation- Needs   for 6 hours after the procedure  MAC/General-Needs  for 24 hours after procedure    Pre-op Required at [facility] [place], [place] and OR for MAC sedation:   (if yes advise patient they will need a pre-op prior to procedure)      Is patient on blood thinners? -N (If yes- inform patient to follow up with PCP or provider for follow up instructions)     Informed patient they will need an adult  Y  Cannot take any type of public or medical transportation alone    Pre-Procedure Covid test to be completed at [facility] or Externally: 12/27      Confirmed Nurse will call to complete assessment Y    Additional comments:

## 2021-12-17 ENCOUNTER — TELEPHONE (OUTPATIENT)
Dept: GASTROENTEROLOGY | Facility: CLINIC | Age: 41
End: 2021-12-17
Payer: COMMERCIAL

## 2021-12-27 ENCOUNTER — LAB (OUTPATIENT)
Dept: LAB | Facility: CLINIC | Age: 41
End: 2021-12-27
Payer: COMMERCIAL

## 2021-12-27 DIAGNOSIS — Z11.59 ENCOUNTER FOR SCREENING FOR OTHER VIRAL DISEASES: ICD-10-CM

## 2021-12-27 LAB — SARS-COV-2 RNA RESP QL NAA+PROBE: NEGATIVE

## 2021-12-27 PROCEDURE — U0003 INFECTIOUS AGENT DETECTION BY NUCLEIC ACID (DNA OR RNA); SEVERE ACUTE RESPIRATORY SYNDROME CORONAVIRUS 2 (SARS-COV-2) (CORONAVIRUS DISEASE [COVID-19]), AMPLIFIED PROBE TECHNIQUE, MAKING USE OF HIGH THROUGHPUT TECHNOLOGIES AS DESCRIBED BY CMS-2020-01-R: HCPCS

## 2021-12-27 PROCEDURE — U0005 INFEC AGEN DETEC AMPLI PROBE: HCPCS

## 2021-12-28 ENCOUNTER — PRE VISIT (OUTPATIENT)
Dept: GASTROENTEROLOGY | Facility: CLINIC | Age: 41
End: 2021-12-28

## 2021-12-29 ENCOUNTER — ANESTHESIA (OUTPATIENT)
Dept: SURGERY | Facility: AMBULATORY SURGERY CENTER | Age: 41
End: 2021-12-29
Payer: COMMERCIAL

## 2021-12-29 ENCOUNTER — HOSPITAL ENCOUNTER (OUTPATIENT)
Facility: AMBULATORY SURGERY CENTER | Age: 41
End: 2021-12-29
Attending: INTERNAL MEDICINE
Payer: COMMERCIAL

## 2021-12-29 ENCOUNTER — TELEPHONE (OUTPATIENT)
Dept: MULTI SPECIALTY CLINIC | Facility: CLINIC | Age: 41
End: 2021-12-29

## 2021-12-29 ENCOUNTER — ANESTHESIA EVENT (OUTPATIENT)
Dept: SURGERY | Facility: AMBULATORY SURGERY CENTER | Age: 41
End: 2021-12-29
Payer: COMMERCIAL

## 2021-12-29 VITALS
WEIGHT: 270 LBS | HEART RATE: 68 BPM | BODY MASS INDEX: 36.57 KG/M2 | OXYGEN SATURATION: 97 % | SYSTOLIC BLOOD PRESSURE: 118 MMHG | TEMPERATURE: 97.4 F | RESPIRATION RATE: 14 BRPM | DIASTOLIC BLOOD PRESSURE: 75 MMHG | HEIGHT: 72 IN

## 2021-12-29 VITALS — HEART RATE: 81 BPM

## 2021-12-29 DIAGNOSIS — R13.19 ESOPHAGEAL DYSPHAGIA: Primary | ICD-10-CM

## 2021-12-29 DIAGNOSIS — K22.2 ESOPHAGEAL STRICTURE: ICD-10-CM

## 2021-12-29 LAB
HCG UR QL: NEGATIVE
INTERNAL QC OK POCT: NORMAL
POCT KIT EXPIRATION DATE: NORMAL
POCT KIT LOT NUMBER: NORMAL
UPPER GI ENDOSCOPY: NORMAL

## 2021-12-29 PROCEDURE — 88305 TISSUE EXAM BY PATHOLOGIST: CPT | Mod: 26 | Performed by: PATHOLOGY

## 2021-12-29 PROCEDURE — 43239 EGD BIOPSY SINGLE/MULTIPLE: CPT

## 2021-12-29 PROCEDURE — 88305 TISSUE EXAM BY PATHOLOGIST: CPT | Mod: TC | Performed by: INTERNAL MEDICINE

## 2021-12-29 PROCEDURE — 88342 IMHCHEM/IMCYTCHM 1ST ANTB: CPT | Mod: 26 | Performed by: PATHOLOGY

## 2021-12-29 RX ORDER — FLUMAZENIL 0.1 MG/ML
0.2 INJECTION, SOLUTION INTRAVENOUS
Status: DISCONTINUED | OUTPATIENT
Start: 2021-12-29 | End: 2021-12-30 | Stop reason: HOSPADM

## 2021-12-29 RX ORDER — ONDANSETRON 2 MG/ML
4 INJECTION INTRAMUSCULAR; INTRAVENOUS EVERY 6 HOURS PRN
Status: DISCONTINUED | OUTPATIENT
Start: 2021-12-29 | End: 2021-12-30 | Stop reason: HOSPADM

## 2021-12-29 RX ORDER — ONDANSETRON 4 MG/1
4 TABLET, ORALLY DISINTEGRATING ORAL EVERY 6 HOURS PRN
Status: DISCONTINUED | OUTPATIENT
Start: 2021-12-29 | End: 2021-12-30 | Stop reason: HOSPADM

## 2021-12-29 RX ORDER — NALOXONE HYDROCHLORIDE 0.4 MG/ML
0.2 INJECTION, SOLUTION INTRAMUSCULAR; INTRAVENOUS; SUBCUTANEOUS
Status: DISCONTINUED | OUTPATIENT
Start: 2021-12-29 | End: 2021-12-30 | Stop reason: HOSPADM

## 2021-12-29 RX ORDER — LIDOCAINE HYDROCHLORIDE 20 MG/ML
INJECTION, SOLUTION INFILTRATION; PERINEURAL PRN
Status: DISCONTINUED | OUTPATIENT
Start: 2021-12-29 | End: 2021-12-29

## 2021-12-29 RX ORDER — ONDANSETRON 2 MG/ML
4 INJECTION INTRAMUSCULAR; INTRAVENOUS
Status: DISCONTINUED | OUTPATIENT
Start: 2021-12-29 | End: 2021-12-29 | Stop reason: HOSPADM

## 2021-12-29 RX ORDER — NALOXONE HYDROCHLORIDE 0.4 MG/ML
0.4 INJECTION, SOLUTION INTRAMUSCULAR; INTRAVENOUS; SUBCUTANEOUS
Status: DISCONTINUED | OUTPATIENT
Start: 2021-12-29 | End: 2021-12-30 | Stop reason: HOSPADM

## 2021-12-29 RX ORDER — SODIUM CHLORIDE, SODIUM LACTATE, POTASSIUM CHLORIDE, CALCIUM CHLORIDE 600; 310; 30; 20 MG/100ML; MG/100ML; MG/100ML; MG/100ML
INJECTION, SOLUTION INTRAVENOUS CONTINUOUS PRN
Status: DISCONTINUED | OUTPATIENT
Start: 2021-12-29 | End: 2021-12-29

## 2021-12-29 RX ORDER — GLYCOPYRROLATE 0.2 MG/ML
INJECTION, SOLUTION INTRAMUSCULAR; INTRAVENOUS PRN
Status: DISCONTINUED | OUTPATIENT
Start: 2021-12-29 | End: 2021-12-29

## 2021-12-29 RX ORDER — LIDOCAINE HYDROCHLORIDE 20 MG/ML
15 SOLUTION OROPHARYNGEAL EVERY 8 HOURS PRN
Qty: 15 ML | Refills: 0 | Status: SHIPPED | OUTPATIENT
Start: 2021-12-29 | End: 2022-08-10

## 2021-12-29 RX ORDER — OMEPRAZOLE 40 MG/1
40 CAPSULE, DELAYED RELEASE ORAL 2 TIMES DAILY
Qty: 180 CAPSULE | Refills: 3 | Status: ON HOLD | OUTPATIENT
Start: 2021-12-29 | End: 2022-12-15

## 2021-12-29 RX ORDER — PROCHLORPERAZINE MALEATE 10 MG
10 TABLET ORAL EVERY 6 HOURS PRN
Status: DISCONTINUED | OUTPATIENT
Start: 2021-12-29 | End: 2021-12-30 | Stop reason: HOSPADM

## 2021-12-29 RX ORDER — LIDOCAINE 40 MG/G
CREAM TOPICAL
Status: DISCONTINUED | OUTPATIENT
Start: 2021-12-29 | End: 2021-12-29 | Stop reason: HOSPADM

## 2021-12-29 RX ORDER — PROPOFOL 10 MG/ML
INJECTION, EMULSION INTRAVENOUS PRN
Status: DISCONTINUED | OUTPATIENT
Start: 2021-12-29 | End: 2021-12-29

## 2021-12-29 RX ORDER — BUPROPION HYDROCHLORIDE 75 MG/1
150 TABLET ORAL EVERY MORNING
COMMUNITY
End: 2022-11-01

## 2021-12-29 RX ADMIN — LIDOCAINE HYDROCHLORIDE 100 MG: 20 INJECTION, SOLUTION INFILTRATION; PERINEURAL at 14:40

## 2021-12-29 RX ADMIN — GLYCOPYRROLATE 0.2 MG: 0.2 INJECTION, SOLUTION INTRAMUSCULAR; INTRAVENOUS at 14:37

## 2021-12-29 RX ADMIN — PROPOFOL 100 MG: 10 INJECTION, EMULSION INTRAVENOUS at 14:40

## 2021-12-29 RX ADMIN — SODIUM CHLORIDE, SODIUM LACTATE, POTASSIUM CHLORIDE, CALCIUM CHLORIDE: 600; 310; 30; 20 INJECTION, SOLUTION INTRAVENOUS at 14:36

## 2021-12-29 RX ADMIN — PROPOFOL 175 MCG/KG/MIN: 10 INJECTION, EMULSION INTRAVENOUS at 14:40

## 2021-12-29 ASSESSMENT — LIFESTYLE VARIABLES: TOBACCO_USE: 0

## 2021-12-29 ASSESSMENT — MIFFLIN-ST. JEOR: SCORE: 2001.71

## 2021-12-29 ASSESSMENT — ENCOUNTER SYMPTOMS: SEIZURES: 0

## 2021-12-29 ASSESSMENT — COPD QUESTIONNAIRES: COPD: 0

## 2021-12-29 NOTE — ANESTHESIA CARE TRANSFER NOTE
Patient: Leah Yanez    Procedure: Procedure(s):  ESOPHAGOGASTRODUODENOSCOPY, WITH BIOPSY       Diagnosis: Esophageal dysphagia [R13.19]  Diagnosis Additional Information: No value filed.    Anesthesia Type:   No value filed.     Note:      Level of Consciousness: awake  Oxygen Supplementation: room air    Independent Airway: airway patency satisfactory and stable        Patient transferred to: Phase II    Handoff Report: Identifed the Patient, Identified the Reponsible Provider, Reviewed the pertinent medical history, Discussed the surgical course, Reviewed Intra-OP anesthesia mangement and issues during anesthesia, Set expectations for post-procedure period and Allowed opportunity for questions and acknowledgement of understanding      Vitals:  Vitals Value Taken Time   BP     Temp     Pulse     Resp     SpO2         Electronically Signed By: ELISA Torres CRNA  December 29, 2021  3:12 PM

## 2021-12-29 NOTE — ANESTHESIA POSTPROCEDURE EVALUATION
Patient: Leah Yanez    Procedure: Procedure(s):  ESOPHAGOGASTRODUODENOSCOPY, WITH BIOPSY       Diagnosis:Esophageal dysphagia [R13.19]  Diagnosis Additional Information: No value filed.    Anesthesia Type:  MAC    Note:  Disposition: Outpatient   Postop Pain Control: Uneventful            Sign Out: Well controlled pain   PONV: No   Neuro/Psych: Uneventful            Sign Out: Acceptable/Baseline neuro status   Airway/Respiratory: Uneventful            Sign Out: Acceptable/Baseline resp. status   CV/Hemodynamics: Uneventful            Sign Out: Acceptable CV status; No obvious hypovolemia; No obvious fluid overload   Other NRE: NONE   DID A NON-ROUTINE EVENT OCCUR? No           Last vitals:  Vitals Value Taken Time   /61 12/29/21 1510   Temp 36.3  C (97.4  F) 12/29/21 1510   Pulse 83 12/29/21 1510   Resp 14 12/29/21 1510   SpO2 96 % 12/29/21 1510       Electronically Signed By: Johnathon Edward MD  December 29, 2021  3:17 PM

## 2021-12-29 NOTE — H&P
"Leah AVILES [place]  [id number]  female  41 year old      Reason for procedure/surgery: dysphagia solid>liquid for 5 years, gradually progressive.      Patient Active Problem List   Diagnosis     Food impaction of esophagus, initial encounter     Esophageal dysphagia     Heartburn       Past Surgical History:    Past Surgical History:   Procedure Laterality Date     TONSILLECTOMY & ADENOIDECTOMY       Z NONSPECIFIC PROCEDURE      T&A as a child     Z NONSPECIFIC PROCEDURE      Tubes in ears bilaterally       Past Medical History:   Past Medical History:   Diagnosis Date     Depressive disorder, not elsewhere classified 3/06    Resolved 8/07       Social History:   Social History     Tobacco Use     Smoking status: Never Smoker     Smokeless tobacco: Never Used   Substance Use Topics     Alcohol use: Yes     Comment: Alcoholic Drinks/day: social       Family History:   Family History   Problem Relation Age of Onset     Heart Disease Father         MI, Lipids     Hypertension Mother         arthritis     Hypertension Maternal Grandfather         arthritis, macular degeneration     Cancer - colorectal Maternal Grandmother         arthritis     Alzheimer Disease Paternal Grandfather      Acute Myocardial Infarction Father      Colon Cancer Paternal Grandmother        Allergies:   Allergies   Allergen Reactions     Azithromycin      Erythromycin GI Disturbance     When \"very young\"       Active Medications:   Current Outpatient Medications   Medication Sig Dispense Refill     ANTIOXIDANTS OR CAPS 1 capsule bid       buPROPion (WELLBUTRIN) 75 MG tablet Take 50 mg by mouth daily Pt not sure of dose       MULTIVITAMINS OR TABS 2 tablets bid         Systemic Review:   CONSTITUTIONAL: NEGATIVE for fever, chills, change in weight  ENT/MOUTH: NEGATIVE for ear, mouth and throat problems  RESP: NEGATIVE for significant cough or SOB  CV: NEGATIVE for chest pain, palpitations or peripheral edema    Physical Examination:   Vital " Signs: /86   Pulse 73   Temp 97.7  F (36.5  C) (Temporal)   Resp 16   Ht 1.829 m (6')   Wt 122.5 kg (270 lb)   BMI 36.62 kg/m    GENERAL: healthy, alert and no distress  NECK: no adenopathy, no asymmetry, masses, or scars  RESP: lungs clear to auscultation - no rales, rhonchi or wheezes  CV: regular rate and rhythm, normal S1 S2, no S3 or S4, no murmur, click or rub, no peripheral edema and peripheral pulses strong  ABDOMEN: soft, nontender, no hepatosplenomegaly, no masses and bowel sounds normal  MS: no gross musculoskeletal defects noted, no edema    Plan: Appropriate to proceed as scheduled.      Gaetano Ramírez MD  12/29/2021    PCP:  No Ref-Primary, Physician    Agree with above.   Esteban Rose DO   of Medicine  Program Head, Esophageal Disorders Program  Division of Gastroenterology, Hepatology, and Nutrition  [facility]

## 2021-12-29 NOTE — ANESTHESIA PREPROCEDURE EVALUATION
"Anesthesia Pre-Procedure Evaluation    Patient: Leah Yanez   MRN: [id number] : 1980        Preoperative Diagnosis: Esophageal dysphagia [R13.19]    Procedure : Procedure(s):  ESOPHAGOGASTRODUODENOSCOPY, WITH BIOPSY          Past Medical History:   Diagnosis Date     Depressive disorder, not elsewhere classified 3/06    Resolved       Past Surgical History:   Procedure Laterality Date     TONSILLECTOMY & ADENOIDECTOMY       ZZC NONSPECIFIC PROCEDURE      T&A as a child     ZZC NONSPECIFIC PROCEDURE      Tubes in ears bilaterally      Allergies   Allergen Reactions     Azithromycin      Erythromycin GI Disturbance     When \"very young\"      Social History     Tobacco Use     Smoking status: Never Smoker     Smokeless tobacco: Never Used   Substance Use Topics     Alcohol use: Yes     Comment: Alcoholic Drinks/day: social      Wt Readings from Last 1 Encounters:   21 122.5 kg (270 lb)        Anesthesia Evaluation   Pt has had prior anesthetic.     No history of anesthetic complications       ROS/MED HX  ENT/Pulmonary:    (-) tobacco use, asthma and COPD   Neurologic:    (-) no seizures and no CVA   Cardiovascular:       METS/Exercise Tolerance: >4 METS    Hematologic:       Musculoskeletal:       GI/Hepatic:     (+) GERD, Symptomatic,     Renal/Genitourinary:       Endo:     (+) Obesity,     Psychiatric/Substance Use:       Infectious Disease:       Malignancy:       Other:            Physical Exam    Airway  airway exam normal           Respiratory Devices and Support         Dental       (+) lower retainer      Cardiovascular   cardiovascular exam normal          Pulmonary   pulmonary exam normal                OUTSIDE LABS:  CBC:   Lab Results   Component Value Date    WBC 10.8 2021    WBC 5.0 2006    HGB 12.4 2021    HGB 14.9 2006    HCT 40.8 2021    HCT 44.6 2006     2021     2006     BMP:   Lab Results   Component Value Date    "  09/01/2021     06/16/2006    POTASSIUM 3.6 09/01/2021    POTASSIUM 3.5 06/16/2006    CHLORIDE 110 (H) 09/01/2021    CHLORIDE 107 06/16/2006    CO2 26 09/01/2021    CO2 22 06/16/2006    BUN 9 09/01/2021    BUN 9 06/16/2006    CR 0.93 09/01/2021    CR 1.00 06/16/2006    GLC 93 09/01/2021    GLC 87 06/16/2006     COAGS: No results found for: PTT, INR, FIBR  POC:   Lab Results   Component Value Date    HCG Negative 12/29/2021     HEPATIC: No results found for: ALBUMIN, PROTTOTAL, ALT, AST, GGT, ALKPHOS, BILITOTAL, BILIDIRECT, DANNY  OTHER:   Lab Results   Component Value Date    KIMBERLYN 8.8 09/01/2021    TSH 0.85 07/24/2003    SED 9 07/24/2003       Anesthesia Plan    ASA Status:  2   NPO Status:  NPO Appropriate    Anesthesia Type: MAC.     - Reason for MAC: straight local not clinically adequate   Induction: N/a.   Maintenance: N/A.        Consents    Anesthesia Plan(s) and associated risks, benefits, and realistic alternatives discussed. Questions answered and patient/representative(s) expressed understanding.    - Discussed:     - Discussed with:  Patient         Postoperative Care       PONV prophylaxis: Ondansetron (or other 5HT-3)     Comments:                Johnathon Edward MD

## 2021-12-30 NOTE — TELEPHONE ENCOUNTER
Received call as GI fellow on kathy from Ms. Yanez, who had an EGD performed today by Dr. Rose for acid reflux. Numerous esophageal biopsies were taken and are pending. She calls because she is experiencing some abdominal pressure/distention since the procedure which has worsened a bit over the past hour. Describes as in the epigastrium and radiating towards the right side of her abdomen. Her abdomen is soft when she presses on it. She denies fevers/chills. No nausea/vomiting, no melena/hematochezia.  She was also having some reflux type symptoms, for which she took a PPI and Pepcid and is now feeling better. She was able to eat a smoothie earlier this afternoon which went down just fine.    I reassured her that this sounds most like some residual distention related to insufflation during the procedure, and that it should dissipate over the next several hours. Alternatively it may be related to some of the biopsies, although she is not experiencing retrosternal discomfort. I recommended heating pad and Tylenol to help with the discomfort. She should also avoid spicy and acidic foods. If she develops fevers/chills, sudden or persistent worsening of her abdominal pain, she can call me back and if need be may need to present to the ED, however I suspect these symptoms will improve overnight. If not, she can call and talk to Dr. Rose's nurses tomorrow for further direction.     Lisandro Robertson MD  GI Fellow

## 2021-12-31 ENCOUNTER — TELEPHONE (OUTPATIENT)
Dept: GASTROENTEROLOGY | Facility: CLINIC | Age: 41
End: 2021-12-31
Payer: COMMERCIAL

## 2021-12-31 ENCOUNTER — HOSPITAL ENCOUNTER (OUTPATIENT)
Facility: AMBULATORY SURGERY CENTER | Age: 41
End: 2021-12-31
Attending: INTERNAL MEDICINE
Payer: COMMERCIAL

## 2021-12-31 DIAGNOSIS — K22.70 BARRETT'S ESOPHAGUS WITHOUT DYSPLASIA: Primary | ICD-10-CM

## 2021-12-31 DIAGNOSIS — K22.2 ESOPHAGEAL STRICTURE: ICD-10-CM

## 2021-12-31 NOTE — TELEPHONE ENCOUNTER
Screening Questions  1. Are you active on mychart? Y    2. What insurance is in the chart? [employer]    2.  Ordering/Referring Provider: Esteban Rose DO     3. BMI 35.3, If greater than 40 review exclusion criteria also will need EXTENDED PREP    4.  Respiratory Screening (If yes to any of the following Hospital setting only):     Do you use daily home oxygen? N  Do you have mod to severe Obstructive Sleep Apnea? N   Do you have Pulmonary Hypertension? N   Do you have UNCONTROLLED asthma? N    5. Have you had a heart or lung transplant (If yes, please review exclusion criteria) ? N    6. Are you currently on dialysis or have chronic kidney disease? N    7. Have you had a stroke or Transient ischemic attack (TIA) within 6 months? N    8. In the past 6 months, have you had any heart related issues including cardiomyopathy or heart attack? N                 If yes, did it require cardiac stenting or other implantable device?      9. Do you have any implantable devices in your body (pacemaker, defib, LVAD)? N    10. Do you take nitroglycerin? If yes, how often? N    11. Are you currently taking any blood thinners?N    12. Are you a diabetic? N    13. (Females) Are you currently pregnant? N  If yes, how many weeks?      15. Are you taking any prescription pain medications on a routine schedule? N If yes, MAC sedation and patient will need EXTENDED PREP.    16. Do you have any chemical dependencies such as alcohol, street drugs, or methadone? NIf yes, MAC sedation.    17. Do you have any history of post-traumatic stress syndrome, severe anxiety or history of psychosis? N    18. Do you transfer independently? Y    19.  Do you have any issues with constipation?    If yes, pt will need EXTENDED PREP     20. Preferred Pharmacy for Pre Prescription [facility]    Scheduling Details    Which Colonoscopy Prep was Sent?:   Type of Procedure Scheduled: EGD X 3  Surgeon: LIZZIE  Date of Procedure: 2/23, 3/10, 3/30  [place]  [facility] [place]er (Please ask for phone number if not scheduled by patient):       Sedation Type: MAC  Conscious Sedation- Needs  for 6 hours after the procedure  MAC/General-Needs  for 24 hours after procedure    Pre-op Required at [facility] [place] [place] and OR for MAC sedation: N  (if yes advise patient they will need a pre-op prior to procedure)      Is patient on blood thinners? -N (If yes- inform patient to follow up with PCP or provider for follow up instructions)     Informed patient they will need an adult  Y  Cannot take any type of public or medical transportation alone    Pre-Procedure Covid test to be completed at [facility] or Externally: 2/21 [facility] [place], 3/7 [place], 3/27 OX    Confirmed Nurse will call to complete assessment Y    Additional comments:

## 2022-02-05 DIAGNOSIS — Z11.59 ENCOUNTER FOR SCREENING FOR OTHER VIRAL DISEASES: Primary | ICD-10-CM

## 2022-02-09 DIAGNOSIS — Z11.59 ENCOUNTER FOR SCREENING FOR OTHER VIRAL DISEASES: Primary | ICD-10-CM

## 2022-02-16 ENCOUNTER — TELEPHONE (OUTPATIENT)
Dept: GASTROENTEROLOGY | Facility: CLINIC | Age: 42
End: 2022-02-16
Payer: COMMERCIAL

## 2022-02-17 ENCOUNTER — TELEPHONE (OUTPATIENT)
Dept: GASTROENTEROLOGY | Facility: CLINIC | Age: 42
End: 2022-02-17
Payer: COMMERCIAL

## 2022-02-17 NOTE — TELEPHONE ENCOUNTER
Attempted to contact patient regarding upcoming EGD procedure on 2.23.2022 for pre assessment questions. No answer.     Left message to return call to [phone] #2; True Office[employer] message sent.    Covid test scheduled: 2.21.2022    Arrival time: 1030    Facility location: [facility]    Sedation type: MAC    Indication for procedure: esophageal stricture; 1 of 3 scheduled EGDs    Rosey Vivas RN

## 2022-02-21 ENCOUNTER — LAB (OUTPATIENT)
Dept: URGENT CARE | Facility: URGENT CARE | Age: 42
End: 2022-02-21
Attending: INTERNAL MEDICINE
Payer: COMMERCIAL

## 2022-02-21 DIAGNOSIS — Z11.59 ENCOUNTER FOR SCREENING FOR OTHER VIRAL DISEASES: ICD-10-CM

## 2022-02-21 DIAGNOSIS — Z11.59 ENCOUNTER FOR SCREENING FOR OTHER VIRAL DISEASES: Primary | ICD-10-CM

## 2022-02-21 LAB — SARS-COV-2 RNA RESP QL NAA+PROBE: NEGATIVE

## 2022-02-21 PROCEDURE — U0005 INFEC AGEN DETEC AMPLI PROBE: HCPCS

## 2022-02-21 PROCEDURE — U0003 INFECTIOUS AGENT DETECTION BY NUCLEIC ACID (DNA OR RNA); SEVERE ACUTE RESPIRATORY SYNDROME CORONAVIRUS 2 (SARS-COV-2) (CORONAVIRUS DISEASE [COVID-19]), AMPLIFIED PROBE TECHNIQUE, MAKING USE OF HIGH THROUGHPUT TECHNOLOGIES AS DESCRIBED BY CMS-2020-01-R: HCPCS

## 2022-02-21 NOTE — TELEPHONE ENCOUNTER
Second attempt for pre-assessment prior to upcoming EGD.    No answer.  Left message to return call [phone] #2    Rosey Vivas RN

## 2022-02-21 NOTE — TELEPHONE ENCOUNTER
Patient returned call.     Pre assessment questions completed for upcoming EGD procedure scheduled on 2/23/22    COVID test scheduled 2/21/22 - in process    Reviewed procedural arrival time 1030 and facility location [facility].    Designated  policy reviewed. Instructed to have someone stay 24 hours post procedure.     Procedure indication: esophageal stricture    Reviewed EGD prep instructions with patient. NPO six hours prior to procedure.      Anticoagulation/blood thinners? no    Electronic implanted devices? no    Patient verbalized understanding and had no questions or concerns at this time.    Elizabeth Hutson RN

## 2022-02-23 ENCOUNTER — ANESTHESIA EVENT (OUTPATIENT)
Dept: SURGERY | Facility: AMBULATORY SURGERY CENTER | Age: 42
End: 2022-02-23
Payer: COMMERCIAL

## 2022-02-23 ENCOUNTER — ANESTHESIA (OUTPATIENT)
Dept: SURGERY | Facility: AMBULATORY SURGERY CENTER | Age: 42
End: 2022-02-23
Payer: COMMERCIAL

## 2022-02-23 ENCOUNTER — HOSPITAL ENCOUNTER (OUTPATIENT)
Facility: AMBULATORY SURGERY CENTER | Age: 42
End: 2022-02-23
Attending: INTERNAL MEDICINE
Payer: COMMERCIAL

## 2022-02-23 VITALS
DIASTOLIC BLOOD PRESSURE: 63 MMHG | HEART RATE: 85 BPM | BODY MASS INDEX: 36.57 KG/M2 | WEIGHT: 270 LBS | HEIGHT: 72 IN | SYSTOLIC BLOOD PRESSURE: 108 MMHG | RESPIRATION RATE: 14 BRPM | OXYGEN SATURATION: 96 % | TEMPERATURE: 97.5 F

## 2022-02-23 VITALS — HEART RATE: 70 BPM

## 2022-02-23 PROCEDURE — 43249 ESOPH EGD DILATION <30 MM: CPT

## 2022-02-23 RX ORDER — LIDOCAINE 40 MG/G
CREAM TOPICAL
Status: DISCONTINUED | OUTPATIENT
Start: 2022-02-23 | End: 2022-02-24 | Stop reason: HOSPADM

## 2022-02-23 RX ORDER — LIDOCAINE HYDROCHLORIDE 20 MG/ML
INJECTION, SOLUTION INFILTRATION; PERINEURAL PRN
Status: DISCONTINUED | OUTPATIENT
Start: 2022-02-23 | End: 2022-02-23

## 2022-02-23 RX ORDER — PROPOFOL 10 MG/ML
INJECTION, EMULSION INTRAVENOUS PRN
Status: DISCONTINUED | OUTPATIENT
Start: 2022-02-23 | End: 2022-02-23

## 2022-02-23 RX ORDER — ONDANSETRON 2 MG/ML
4 INJECTION INTRAMUSCULAR; INTRAVENOUS
Status: DISCONTINUED | OUTPATIENT
Start: 2022-02-23 | End: 2022-02-24 | Stop reason: HOSPADM

## 2022-02-23 RX ORDER — SIMETHICONE
LIQUID (ML) MISCELLANEOUS PRN
Status: DISCONTINUED | OUTPATIENT
Start: 2022-02-23 | End: 2022-02-23 | Stop reason: HOSPADM

## 2022-02-23 RX ORDER — PROPOFOL 10 MG/ML
INJECTION, EMULSION INTRAVENOUS CONTINUOUS PRN
Status: DISCONTINUED | OUTPATIENT
Start: 2022-02-23 | End: 2022-02-23

## 2022-02-23 RX ORDER — GLYCOPYRROLATE 0.2 MG/ML
INJECTION, SOLUTION INTRAMUSCULAR; INTRAVENOUS PRN
Status: DISCONTINUED | OUTPATIENT
Start: 2022-02-23 | End: 2022-02-23

## 2022-02-23 RX ORDER — SODIUM CHLORIDE, SODIUM LACTATE, POTASSIUM CHLORIDE, CALCIUM CHLORIDE 600; 310; 30; 20 MG/100ML; MG/100ML; MG/100ML; MG/100ML
INJECTION, SOLUTION INTRAVENOUS CONTINUOUS PRN
Status: DISCONTINUED | OUTPATIENT
Start: 2022-02-23 | End: 2022-02-23

## 2022-02-23 RX ADMIN — PROPOFOL 30 MG: 10 INJECTION, EMULSION INTRAVENOUS at 11:42

## 2022-02-23 RX ADMIN — PROPOFOL 125 MCG/KG/MIN: 10 INJECTION, EMULSION INTRAVENOUS at 11:33

## 2022-02-23 RX ADMIN — LIDOCAINE HYDROCHLORIDE 100 MG: 20 INJECTION, SOLUTION INFILTRATION; PERINEURAL at 11:31

## 2022-02-23 RX ADMIN — GLYCOPYRROLATE 0.2 MG: 0.2 INJECTION, SOLUTION INTRAMUSCULAR; INTRAVENOUS at 11:31

## 2022-02-23 RX ADMIN — PROPOFOL 30 MG: 10 INJECTION, EMULSION INTRAVENOUS at 11:36

## 2022-02-23 RX ADMIN — SODIUM CHLORIDE, SODIUM LACTATE, POTASSIUM CHLORIDE, CALCIUM CHLORIDE: 600; 310; 30; 20 INJECTION, SOLUTION INTRAVENOUS at 11:29

## 2022-02-23 RX ADMIN — PROPOFOL 30 MG: 10 INJECTION, EMULSION INTRAVENOUS at 11:37

## 2022-02-23 RX ADMIN — PROPOFOL 30 MG: 10 INJECTION, EMULSION INTRAVENOUS at 11:34

## 2022-02-23 RX ADMIN — PROPOFOL 30 MG: 10 INJECTION, EMULSION INTRAVENOUS at 11:48

## 2022-02-23 NOTE — ANESTHESIA PREPROCEDURE EVALUATION
"Anesthesia Pre-Procedure Evaluation    Patient: Leah Yanez   MRN: [id number] : 1980        Procedure : Procedure(s):  ESOPHAGOGASTRODUODENOSCOPY, WITH BALLOON DILATION OF LESS THAN 30 MILLIMETERS          Past Medical History:   Diagnosis Date     Depressive disorder, not elsewhere classified 3/06    Resolved       Past Surgical History:   Procedure Laterality Date     ESOPHAGOSCOPY, GASTROSCOPY, DUODENOSCOPY (EGD), COMBINED N/A 2021    Procedure: ESOPHAGOGASTRODUODENOSCOPY, WITH BIOPSY;  Surgeon: Esteban Rose DO;  Location: [facility] OR     TONSILLECTOMY & ADENOIDECTOMY       ZZC NONSPECIFIC PROCEDURE      T&A as a child     ZZC NONSPECIFIC PROCEDURE      Tubes in ears bilaterally      Allergies   Allergen Reactions     Azithromycin      Erythromycin GI Disturbance     When \"very young\"      Social History     Tobacco Use     Smoking status: Never Smoker     Smokeless tobacco: Never Used   Substance Use Topics     Alcohol use: Yes     Comment: Alcoholic Drinks/day: social      Wt Readings from Last 1 Encounters:   22 122.5 kg (270 lb)        Anesthesia Evaluation   Pt has had prior anesthetic. Type: General.        ROS/MED HX  ENT/Pulmonary:  - neg pulmonary ROS     Neurologic:  - neg neurologic ROS     Cardiovascular:  - neg cardiovascular ROS     METS/Exercise Tolerance:     Hematologic:  - neg hematologic  ROS     Musculoskeletal:  - neg musculoskeletal ROS     GI/Hepatic:  - neg GI/hepatic ROS     Renal/Genitourinary:  - neg Renal ROS     Endo:  - neg endo ROS     Psychiatric/Substance Use:  - neg psychiatric ROS     Infectious Disease:  - neg infectious disease ROS     Malignancy:       Other:            Physical Exam    Airway  airway exam normal           Respiratory Devices and Support         Dental  no notable dental history         Cardiovascular   cardiovascular exam normal          Pulmonary   pulmonary exam normal                OUTSIDE LABS:  CBC:   Lab Results   Component " Value Date    WBC 10.8 09/01/2021    WBC 5.0 06/26/2006    HGB 12.4 09/01/2021    HGB 14.9 06/26/2006    HCT 40.8 09/01/2021    HCT 44.6 06/26/2006     09/01/2021     06/26/2006     BMP:   Lab Results   Component Value Date     09/01/2021     06/16/2006    POTASSIUM 3.6 09/01/2021    POTASSIUM 3.5 06/16/2006    CHLORIDE 110 (H) 09/01/2021    CHLORIDE 107 06/16/2006    CO2 26 09/01/2021    CO2 22 06/16/2006    BUN 9 09/01/2021    BUN 9 06/16/2006    CR 0.93 09/01/2021    CR 1.00 06/16/2006    GLC 93 09/01/2021    GLC 87 06/16/2006     COAGS: No results found for: PTT, INR, FIBR  POC:   Lab Results   Component Value Date    HCG Negative 02/23/2022     HEPATIC: No results found for: ALBUMIN, PROTTOTAL, ALT, AST, GGT, ALKPHOS, BILITOTAL, BILIDIRECT, DANNY  OTHER:   Lab Results   Component Value Date    KIMBERLYN 8.8 09/01/2021    TSH 0.85 07/24/2003    SED 9 07/24/2003       Anesthesia Plan    ASA Status:  1   NPO Status:  NPO Appropriate    Anesthesia Type: MAC.     - Reason for MAC: straight local not clinically adequate   Induction: Intravenous.   Maintenance: TIVA.        Consents    Anesthesia Plan(s) and associated risks, benefits, and realistic alternatives discussed. Questions answered and patient/representative(s) expressed understanding.     - Discussed: Risks, Benefits and Alternatives for BOTH SEDATION and the PROCEDURE were discussed     - Discussed with:  Patient         Postoperative Care    Pain management: Oral pain medications.   PONV prophylaxis: Ondansetron (or other 5HT-3), Dexamethasone or Solumedrol     Comments:                Stevenson Mcarthur MD, MD

## 2022-02-23 NOTE — ANESTHESIA CARE TRANSFER NOTE
Patient: Leah Yanez    Procedure: Procedure(s):  ESOPHAGOGASTRODUODENOSCOPY, WITH BALLOON DILATION OF LESS THAN 30 MILLIMETERS       Diagnosis: Griggs's esophagus without dysplasia [K22.70]  Diagnosis Additional Information: No value filed.    Anesthesia Type:   No value filed.     Note:    Oropharynx: oropharynx clear of all foreign objects and spontaneously breathing  Level of Consciousness: awake  Oxygen Supplementation: room air    Independent Airway: airway patency satisfactory and stable  Dentition: dentition unchanged  Vital Signs Stable: post-procedure vital signs reviewed and stable  Report to RN Given: handoff report given  Patient transferred to: Phase II    Handoff Report: Identifed the Patient, Identified the Reponsible Provider, Reviewed the pertinent medical history, Discussed the surgical course, Reviewed Intra-OP anesthesia mangement and issues during anesthesia, Set expectations for post-procedure period and Allowed opportunity for questions and acknowledgement of understanding      Vitals:  Vitals Value Taken Time   /61    Temp 97.3    Pulse 82    Resp 16    SpO2 95        Electronically Signed By: ELISA Rogers CRNA  February 23, 2022  11:57 AM

## 2022-02-23 NOTE — H&P
"Leah AVILES [place]  [id number]  female  41 year old      Reason for procedure/surgery: egd, stricture dilation    Patient Active Problem List   Diagnosis     Food impaction of esophagus, initial encounter     Esophageal dysphagia     Heartburn       Past Surgical History:    Past Surgical History:   Procedure Laterality Date     ESOPHAGOSCOPY, GASTROSCOPY, DUODENOSCOPY (EGD), COMBINED N/A 12/29/2021    Procedure: ESOPHAGOGASTRODUODENOSCOPY, WITH BIOPSY;  Surgeon: Esteban Rose DO;  [place] [facility] [place]     TONSILLECTOMY & ADENOIDECTOMY       ZZ NONSPECIFIC PROCEDURE      T&A as a child     Z NONSPECIFIC PROCEDURE      Tubes in ears bilaterally       Past Medical History:   Past Medical History:   Diagnosis Date     Depressive disorder, not elsewhere classified 3/06    Resolved 8/07       Social History:   Social History     Tobacco Use     Smoking status: Never Smoker     Smokeless tobacco: Never Used   Substance Use Topics     Alcohol use: Yes     Comment: Alcoholic Drinks/day: social       Family History:   Family History   Problem Relation Age of Onset     Heart Disease Father         MI, Lipids     Hypertension Mother         arthritis     Hypertension Maternal Grandfather         arthritis, macular degeneration     Cancer - colorectal Maternal Grandmother         arthritis     Alzheimer Disease Paternal Grandfather      Acute Myocardial Infarction Father      Colon Cancer Paternal Grandmother        Allergies:   Allergies   Allergen Reactions     Azithromycin      Erythromycin GI Disturbance     When \"very young\"       Active Medications:   Current Outpatient Medications   Medication Sig Dispense Refill     buPROPion (WELLBUTRIN) 75 MG tablet Take 50 mg by mouth daily Pt not sure of dose       MULTIVITAMINS OR TABS 2 tablets bid       omeprazole (PRILOSEC) 40 MG DR capsule Take 1 capsule (40 mg) by mouth 2 times daily 180 capsule 3     ANTIOXIDANTS OR CAPS 1 capsule bid       lidocaine, viscous, (XYLOCAINE) 2 % " solution Swish and swallow 15 mLs in mouth every 8 hours as needed for moderate pain (post procedure pain on swallowing) ; Max 8 doses/24 hour period. 15 mL 0       Systemic Review:   CONSTITUTIONAL: NEGATIVE for fever, chills, change in weight  ENT/MOUTH: NEGATIVE for ear, mouth and throat problems  RESP: NEGATIVE for significant cough or SOB  CV: NEGATIVE for chest pain, palpitations or peripheral edema    Physical Examination:   Vital Signs: /80 (BP Location: Right arm)   Pulse 85   Temp 97.8  F (36.6  C) (Temporal)   Resp 18   Ht 1.829 m (6')   Wt 122.5 kg (270 lb)   SpO2 99%   BMI 36.62 kg/m    GENERAL: healthy, alert and no distress  NECK: no adenopathy, no asymmetry, masses, or scars  RESP: lungs clear to auscultation - no rales, rhonchi or wheezes  CV: regular rate and rhythm, normal S1 S2, no S3 or S4, no murmur, click or rub, no peripheral edema and peripheral pulses strong  ABDOMEN: soft, nontender, no hepatosplenomegaly, no masses and bowel sounds normal  MS: no gross musculoskeletal defects noted, no edema    Plan: Appropriate to proceed as scheduled.      Esteban Rose DO  2/23/2022    PCP:  No Ref-Primary, Physician

## 2022-03-05 ENCOUNTER — TELEPHONE (OUTPATIENT)
Dept: GASTROENTEROLOGY | Facility: CLINIC | Age: 42
End: 2022-03-05
Payer: COMMERCIAL

## 2022-03-05 NOTE — TELEPHONE ENCOUNTER
Attempted to contact patient regarding upcoming EGD procedure on 3/10/22 for pre assessment questions. No answer.     Left message to return call to [phone] #2    Covid test scheduled: 3/7/22    Arrival time: 0600    Facility location: [facility]    Sedation type: MAC    Indication for procedure: esophageal stricture (2/3)    Elizabeth Hutson RN

## 2022-03-07 ENCOUNTER — LAB (OUTPATIENT)
Dept: LAB | Facility: CLINIC | Age: 42
End: 2022-03-07
Payer: COMMERCIAL

## 2022-03-07 DIAGNOSIS — Z11.59 ENCOUNTER FOR SCREENING FOR OTHER VIRAL DISEASES: ICD-10-CM

## 2022-03-07 LAB — SARS-COV-2 RNA RESP QL NAA+PROBE: NEGATIVE

## 2022-03-07 PROCEDURE — U0005 INFEC AGEN DETEC AMPLI PROBE: HCPCS

## 2022-03-07 PROCEDURE — U0003 INFECTIOUS AGENT DETECTION BY NUCLEIC ACID (DNA OR RNA); SEVERE ACUTE RESPIRATORY SYNDROME CORONAVIRUS 2 (SARS-COV-2) (CORONAVIRUS DISEASE [COVID-19]), AMPLIFIED PROBE TECHNIQUE, MAKING USE OF HIGH THROUGHPUT TECHNOLOGIES AS DESCRIBED BY CMS-2020-01-R: HCPCS

## 2022-03-09 NOTE — TELEPHONE ENCOUNTER
Pre assessment questions completed for upcoming EGD procedure scheduled on 3.10.2022    COVID test 3.7.2022-negative    Reviewed procedural arrival time 0600 and facility location [facility].    Designated  policy reviewed. Instructed to have someone stay 24 hours post procedure.     Anticoagulation/blood thinners? no    Electronic implanted devices? no    Reviewed EGD prep instructions with patient.     Patient verbalized understanding and had no questions or concerns at this time.    Rosey Vivas RN

## 2022-03-10 ENCOUNTER — HOSPITAL ENCOUNTER (OUTPATIENT)
Facility: AMBULATORY SURGERY CENTER | Age: 42
Discharge: HOME | End: 2022-03-10
Attending: INTERNAL MEDICINE
Payer: COMMERCIAL

## 2022-03-10 ENCOUNTER — ANESTHESIA (OUTPATIENT)
Dept: SURGERY | Facility: AMBULATORY SURGERY CENTER | Age: 42
End: 2022-03-10
Payer: COMMERCIAL

## 2022-03-10 ENCOUNTER — ANESTHESIA EVENT (OUTPATIENT)
Dept: SURGERY | Facility: AMBULATORY SURGERY CENTER | Age: 42
End: 2022-03-10
Payer: COMMERCIAL

## 2022-03-10 VITALS
HEART RATE: 62 BPM | RESPIRATION RATE: 15 BRPM | DIASTOLIC BLOOD PRESSURE: 62 MMHG | SYSTOLIC BLOOD PRESSURE: 108 MMHG | WEIGHT: 269.84 LBS | OXYGEN SATURATION: 97 % | HEIGHT: 72 IN | BODY MASS INDEX: 36.55 KG/M2 | TEMPERATURE: 96.7 F

## 2022-03-10 VITALS — HEART RATE: 85 BPM

## 2022-03-10 LAB
HCG UR QL: NEGATIVE
INTERNAL QC OK POCT: NORMAL
POCT KIT EXPIRATION DATE: 2023
POCT KIT LOT NUMBER: NORMAL
UPPER GI ENDOSCOPY: NORMAL

## 2022-03-10 PROCEDURE — 88305 TISSUE EXAM BY PATHOLOGIST: CPT | Mod: TC | Performed by: INTERNAL MEDICINE

## 2022-03-10 PROCEDURE — 43239 EGD BIOPSY SINGLE/MULTIPLE: CPT | Mod: 59

## 2022-03-10 PROCEDURE — 43249 ESOPH EGD DILATION <30 MM: CPT

## 2022-03-10 RX ORDER — NALOXONE HYDROCHLORIDE 0.4 MG/ML
0.2 INJECTION, SOLUTION INTRAMUSCULAR; INTRAVENOUS; SUBCUTANEOUS
Status: CANCELLED | OUTPATIENT
Start: 2022-03-10

## 2022-03-10 RX ORDER — LIDOCAINE HYDROCHLORIDE 20 MG/ML
INJECTION, SOLUTION INFILTRATION; PERINEURAL PRN
Status: DISCONTINUED | OUTPATIENT
Start: 2022-03-10 | End: 2022-03-10

## 2022-03-10 RX ORDER — NALOXONE HYDROCHLORIDE 0.4 MG/ML
0.4 INJECTION, SOLUTION INTRAMUSCULAR; INTRAVENOUS; SUBCUTANEOUS
Status: CANCELLED | OUTPATIENT
Start: 2022-03-10

## 2022-03-10 RX ORDER — SODIUM CHLORIDE, SODIUM LACTATE, POTASSIUM CHLORIDE, CALCIUM CHLORIDE 600; 310; 30; 20 MG/100ML; MG/100ML; MG/100ML; MG/100ML
INJECTION, SOLUTION INTRAVENOUS CONTINUOUS PRN
Status: DISCONTINUED | OUTPATIENT
Start: 2022-03-10 | End: 2022-03-10

## 2022-03-10 RX ORDER — GLYCOPYRROLATE 0.2 MG/ML
INJECTION, SOLUTION INTRAMUSCULAR; INTRAVENOUS PRN
Status: DISCONTINUED | OUTPATIENT
Start: 2022-03-10 | End: 2022-03-10

## 2022-03-10 RX ORDER — ONDANSETRON 4 MG/1
4 TABLET, ORALLY DISINTEGRATING ORAL EVERY 6 HOURS PRN
Status: CANCELLED | OUTPATIENT
Start: 2022-03-10

## 2022-03-10 RX ORDER — PROPOFOL 10 MG/ML
INJECTION, EMULSION INTRAVENOUS CONTINUOUS PRN
Status: DISCONTINUED | OUTPATIENT
Start: 2022-03-10 | End: 2022-03-10

## 2022-03-10 RX ORDER — ONDANSETRON 2 MG/ML
4 INJECTION INTRAMUSCULAR; INTRAVENOUS
Status: DISCONTINUED | OUTPATIENT
Start: 2022-03-10 | End: 2022-03-11 | Stop reason: HOSPADM

## 2022-03-10 RX ORDER — FLUMAZENIL 0.1 MG/ML
0.2 INJECTION, SOLUTION INTRAVENOUS
Status: CANCELLED | OUTPATIENT
Start: 2022-03-10 | End: 2022-03-10

## 2022-03-10 RX ORDER — PROPOFOL 10 MG/ML
INJECTION, EMULSION INTRAVENOUS PRN
Status: DISCONTINUED | OUTPATIENT
Start: 2022-03-10 | End: 2022-03-10

## 2022-03-10 RX ORDER — PROCHLORPERAZINE MALEATE 10 MG
10 TABLET ORAL EVERY 6 HOURS PRN
Status: CANCELLED | OUTPATIENT
Start: 2022-03-10

## 2022-03-10 RX ORDER — ONDANSETRON 2 MG/ML
4 INJECTION INTRAMUSCULAR; INTRAVENOUS EVERY 6 HOURS PRN
Status: CANCELLED | OUTPATIENT
Start: 2022-03-10

## 2022-03-10 RX ORDER — LIDOCAINE 40 MG/G
CREAM TOPICAL
Status: DISCONTINUED | OUTPATIENT
Start: 2022-03-10 | End: 2022-03-11 | Stop reason: HOSPADM

## 2022-03-10 RX ORDER — SIMETHICONE
LIQUID (ML) MISCELLANEOUS PRN
Status: DISCONTINUED | OUTPATIENT
Start: 2022-03-10 | End: 2022-03-10 | Stop reason: HOSPADM

## 2022-03-10 RX ADMIN — PROPOFOL 50 MG: 10 INJECTION, EMULSION INTRAVENOUS at 07:37

## 2022-03-10 RX ADMIN — PROPOFOL 50 MG: 10 INJECTION, EMULSION INTRAVENOUS at 07:12

## 2022-03-10 RX ADMIN — PROPOFOL 50 MG: 10 INJECTION, EMULSION INTRAVENOUS at 07:17

## 2022-03-10 RX ADMIN — GLYCOPYRROLATE 0.4 MG: 0.2 INJECTION, SOLUTION INTRAMUSCULAR; INTRAVENOUS at 07:11

## 2022-03-10 RX ADMIN — PROPOFOL 30 MG: 10 INJECTION, EMULSION INTRAVENOUS at 07:20

## 2022-03-10 RX ADMIN — PROPOFOL 50 MG: 10 INJECTION, EMULSION INTRAVENOUS at 07:14

## 2022-03-10 RX ADMIN — SODIUM CHLORIDE, SODIUM LACTATE, POTASSIUM CHLORIDE, CALCIUM CHLORIDE: 600; 310; 30; 20 INJECTION, SOLUTION INTRAVENOUS at 06:20

## 2022-03-10 RX ADMIN — PROPOFOL 225 MCG/KG/MIN: 10 INJECTION, EMULSION INTRAVENOUS at 07:33

## 2022-03-10 RX ADMIN — LIDOCAINE HYDROCHLORIDE 60 MG: 20 INJECTION, SOLUTION INFILTRATION; PERINEURAL at 07:11

## 2022-03-10 RX ADMIN — PROPOFOL 150 MCG/KG/MIN: 10 INJECTION, EMULSION INTRAVENOUS at 07:12

## 2022-03-10 ASSESSMENT — LIFESTYLE VARIABLES: TOBACCO_USE: 0

## 2022-03-10 ASSESSMENT — COPD QUESTIONNAIRES: COPD: 0

## 2022-03-10 ASSESSMENT — ENCOUNTER SYMPTOMS: SEIZURES: 0

## 2022-03-10 NOTE — ANESTHESIA PREPROCEDURE EVALUATION
"Anesthesia Pre-Procedure Evaluation    Patient: Leah Yanez   MRN: [id number] : 1980        Procedure : Procedure(s):  ESOPHAGOGASTRODUODENOSCOPY, WITH BALLOON DILATION AND BIOPSY          Past Medical History:   Diagnosis Date     Depressive disorder, not elsewhere classified 3/06    Resolved       Past Surgical History:   Procedure Laterality Date     ESOPHAGOSCOPY, GASTROSCOPY, DUODENOSCOPY (EGD), COMBINED N/A 2021    Procedure: ESOPHAGOGASTRODUODENOSCOPY, WITH BIOPSY;  Surgeon: Esteban Rose DO;  [place]: [facility] [place]     TONSILLECTOMY & ADENOIDECTOMY       ZZC NONSPECIFIC PROCEDURE      T&A as a child     ZZC NONSPECIFIC PROCEDURE      Tubes in ears bilaterally      Allergies   Allergen Reactions     Azithromycin      Erythromycin GI Disturbance     When \"very young\"      Social History     Tobacco Use     Smoking status: Never Smoker     Smokeless tobacco: Never Used   Substance Use Topics     Alcohol use: Yes     Comment: Alcoholic Drinks/day: social      Wt Readings from Last 1 Encounters:   03/10/22 122.4 kg (269 lb 13.5 oz)        Anesthesia Evaluation   Pt has had prior anesthetic.     No history of anesthetic complications       ROS/MED HX  ENT/Pulmonary:    (-) tobacco use, asthma and COPD   Neurologic:    (-) no seizures and no CVA   Cardiovascular:       METS/Exercise Tolerance: >4 METS    Hematologic:       Musculoskeletal:       GI/Hepatic:     (+) GERD, Symptomatic,     Renal/Genitourinary:       Endo:     (+) Obesity,     Psychiatric/Substance Use:       Infectious Disease:       Malignancy:       Other:            Physical Exam    Airway  airway exam normal           Respiratory Devices and Support         Dental  no notable dental history         Cardiovascular   cardiovascular exam normal          Pulmonary   pulmonary exam normal                OUTSIDE LABS:  CBC:   Lab Results   Component Value Date    WBC 10.8 2021    WBC 5.0 2006    HGB 12.4 2021    HGB " 14.9 06/26/2006    HCT 40.8 09/01/2021    HCT 44.6 06/26/2006     09/01/2021     06/26/2006     BMP:   Lab Results   Component Value Date     09/01/2021     06/16/2006    POTASSIUM 3.6 09/01/2021    POTASSIUM 3.5 06/16/2006    CHLORIDE 110 (H) 09/01/2021    CHLORIDE 107 06/16/2006    CO2 26 09/01/2021    CO2 22 06/16/2006    BUN 9 09/01/2021    BUN 9 06/16/2006    CR 0.93 09/01/2021    CR 1.00 06/16/2006    GLC 93 09/01/2021    GLC 87 06/16/2006     COAGS: No results found for: PTT, INR, FIBR  POC:   Lab Results   Component Value Date    HCG Negative 03/10/2022     HEPATIC: No results found for: ALBUMIN, PROTTOTAL, ALT, AST, GGT, ALKPHOS, BILITOTAL, BILIDIRECT, DANNY  OTHER:   Lab Results   Component Value Date    KIMBERLYN 8.8 09/01/2021    TSH 0.85 07/24/2003    SED 9 07/24/2003       Anesthesia Plan    ASA Status:  2   NPO Status:  NPO Appropriate    Anesthesia Type: MAC.     - Reason for MAC: straight local not clinically adequate   Induction: Intravenous.   Maintenance: TIVA.        Consents    Anesthesia Plan(s) and associated risks, benefits, and realistic alternatives discussed. Questions answered and patient/representative(s) expressed understanding.    - Discussed:     - Discussed with:  Patient      - Extended Intubation/Ventilatory Support Discussed: No.      - Patient is DNR/DNI Status: No    Use of blood products discussed: No .     Postoperative Care    Pain management: Multi-modal analgesia.   PONV prophylaxis: Ondansetron (or other 5HT-3), Background Propofol Infusion     Comments:           H&P reviewed: Unable to attach H&P to encounter due to EHR limitations. H&P Update: appropriate H&P reviewed, patient examined. No interval changes since H&P (within 30 days).         Kelvin Parker MD

## 2022-03-10 NOTE — H&P
"Leah AVILES [place]  [id number]  female  41 year old      Reason for procedure/surgery: egd dysphagia, barretts    Patient Active Problem List   Diagnosis     Food impaction of esophagus, initial encounter     Esophageal dysphagia     Heartburn       Past Surgical History:    Past Surgical History:   Procedure Laterality Date     ESOPHAGOSCOPY, GASTROSCOPY, DUODENOSCOPY (EGD), COMBINED N/A 12/29/2021    Procedure: ESOPHAGOGASTRODUODENOSCOPY, WITH BIOPSY;  Surgeon: Esteban Rose DO;  Location: [facility] OR     TONSILLECTOMY & ADENOIDECTOMY       ZZ NONSPECIFIC PROCEDURE      T&A as a child     Z NONSPECIFIC PROCEDURE      Tubes in ears bilaterally       Past Medical History:   Past Medical History:   Diagnosis Date     Depressive disorder, not elsewhere classified 3/06    Resolved 8/07       Social History:   Social History     Tobacco Use     Smoking status: Never Smoker     Smokeless tobacco: Never Used   Substance Use Topics     Alcohol use: Yes     Comment: Alcoholic Drinks/day: social       Family History:   Family History   Problem Relation Age of Onset     Heart Disease Father         MI, Lipids     Hypertension Mother         arthritis     Hypertension Maternal Grandfather         arthritis, macular degeneration     Cancer - colorectal Maternal Grandmother         arthritis     Alzheimer Disease Paternal Grandfather      Acute Myocardial Infarction Father      Colon Cancer Paternal Grandmother        Allergies:   Allergies   Allergen Reactions     Azithromycin      Erythromycin GI Disturbance     When \"very young\"       Active Medications:   Current Outpatient Medications   Medication Sig Dispense Refill     ANTIOXIDANTS OR CAPS 1 capsule bid       buPROPion (WELLBUTRIN) 75 MG tablet Take 50 mg by mouth daily Pt not sure of dose       MULTIVITAMINS OR TABS 2 tablets bid       omeprazole (PRILOSEC) 40 MG DR capsule Take 1 capsule (40 mg) by mouth 2 times daily 180 capsule 3     lidocaine, viscous, (XYLOCAINE) 2 % " "solution Swish and swallow 15 mLs in mouth every 8 hours as needed for moderate pain (post procedure pain on swallowing) ; Max 8 doses/24 hour period. 15 mL 0       Systemic Review:   CONSTITUTIONAL: NEGATIVE for fever, chills, change in weight  ENT/MOUTH: NEGATIVE for ear, mouth and throat problems  RESP: NEGATIVE for significant cough or SOB  CV: NEGATIVE for chest pain, palpitations or peripheral edema    Physical Examination:   Vital Signs: /70   Pulse 62   Temp 97  F (36.1  C) (Temporal)   Resp 16   Ht 1.829 m (6' 0.01\")   Wt 122.4 kg (269 lb 13.5 oz)   SpO2 99%   BMI 36.59 kg/m    GENERAL: healthy, alert and no distress  NECK: no adenopathy, no asymmetry, masses, or scars  RESP: lungs clear to auscultation - no rales, rhonchi or wheezes  CV: regular rate and rhythm, normal S1 S2, no S3 or S4, no murmur, click or rub, no peripheral edema and peripheral pulses strong  ABDOMEN: soft, nontender, no hepatosplenomegaly, no masses and bowel sounds normal  MS: no gross musculoskeletal defects noted, no edema    Plan: Appropriate to proceed as scheduled.      Esteban Rose DO  3/10/2022    PCP:  No Ref-Primary, Physician    "

## 2022-03-10 NOTE — ANESTHESIA POSTPROCEDURE EVALUATION
Patient: Leah Yanez    Procedure: Procedure(s):  ESOPHAGOGASTRODUODENOSCOPY, WITH BALLOON DILATION AND BIOPSY       Anesthesia Type:  MAC    Note:  Disposition: Outpatient   Postop Pain Control: Uneventful            Sign Out: Well controlled pain   PONV: No   Neuro/Psych: Uneventful            Sign Out: Acceptable/Baseline neuro status   Airway/Respiratory: Uneventful            Sign Out: Acceptable/Baseline resp. status   CV/Hemodynamics: Uneventful            Sign Out: Acceptable CV status; No obvious hypovolemia; No obvious fluid overload   Other NRE: NONE   DID A NON-ROUTINE EVENT OCCUR? No           Last vitals:  Vitals Value Taken Time   /62 03/10/22 0757   Temp 35.9  C (96.7  F) 03/10/22 0757   Pulse     Resp 15 03/10/22 0757   SpO2 97 % 03/10/22 0757       Electronically Signed By: Kelvin Parker MD  March 10, 2022  8:14 AM

## 2022-03-10 NOTE — ANESTHESIA CARE TRANSFER NOTE
Patient: Leah Yanez    Procedure: Procedure(s):  ESOPHAGOGASTRODUODENOSCOPY, WITH BALLOON DILATION AND BIOPSY       Diagnosis: Esophageal stricture [K22.2]  Diagnosis Additional Information: No value filed.    Anesthesia Type:   No value filed.     Note:    Oropharynx: oropharynx clear of all foreign objects and spontaneously breathing  Level of Consciousness: awake and drowsy  Oxygen Supplementation: room air    Independent Airway: airway patency satisfactory and stable  Dentition: dentition unchanged  Vital Signs Stable: post-procedure vital signs reviewed and stable  Report to RN Given: handoff report given  Patient transferred to: Phase II    Handoff Report: Identifed the Patient, Identified the Reponsible Provider, Reviewed the pertinent medical history, Discussed the surgical course, Reviewed Intra-OP anesthesia mangement and issues during anesthesia, Set expectations for post-procedure period and Allowed opportunity for questions and acknowledgement of understanding      Vitals:  Vitals Value Taken Time   BP     Temp     Pulse 85 03/10/22 0740   Resp     SpO2         Electronically Signed By: ELISA Kim CRNA  March 10, 2022  7:44 AM

## 2022-03-13 LAB
PATH REPORT.COMMENTS IMP SPEC: NORMAL
PATH REPORT.COMMENTS IMP SPEC: NORMAL
PATH REPORT.FINAL DX SPEC: NORMAL
PATH REPORT.GROSS SPEC: NORMAL
PATH REPORT.MICROSCOPIC SPEC OTHER STN: NORMAL
PATH REPORT.RELEVANT HX SPEC: NORMAL
PHOTO IMAGE: NORMAL

## 2022-03-13 PROCEDURE — 88305 TISSUE EXAM BY PATHOLOGIST: CPT | Mod: 26 | Performed by: PATHOLOGY

## 2022-03-22 ENCOUNTER — TELEPHONE (OUTPATIENT)
Dept: GASTROENTEROLOGY | Facility: CLINIC | Age: 42
End: 2022-03-22
Payer: COMMERCIAL

## 2022-03-22 NOTE — TELEPHONE ENCOUNTER
"Caller: To Leah    Procedure: EGD    Date, Location, and Surgeon of Procedure Cancelled: 3/30, LUCIEN Rose    Ordering Provider: Esteban Rose, DO     Reason for cancel (please be detailed, any staff messages or encounters to note?): See staff message below:    From: Rosey Vivas RN    To: P Endoscopy Scheduling Pool  Date/time: 3/22 1:49 PM  Subject: CXL 3/30 EGD    \"Endo sched,     Please cxl pt's EGD for 3/30.  Per pt Dr. Rose told her she does not need to have another EGD for 3 years.     Thank you,   Rosey Vivas RN   Pre-Assessment Endoscopy\"              Rescheduled: N     If rescheduled:    Date:    Location:    Note any change or update to original order/sedation:                  "

## 2022-03-23 ENCOUNTER — PATIENT OUTREACH (OUTPATIENT)
Dept: GASTROENTEROLOGY | Facility: CLINIC | Age: 42
End: 2022-03-23
Payer: COMMERCIAL

## 2022-03-23 ENCOUNTER — TELEPHONE (OUTPATIENT)
Dept: GASTROENTEROLOGY | Facility: CLINIC | Age: 42
End: 2022-03-23
Payer: COMMERCIAL

## 2022-03-23 DIAGNOSIS — K22.89 ESOPHAGEAL PAIN: Primary | ICD-10-CM

## 2022-03-23 NOTE — TELEPHONE ENCOUNTER
"Following both dilations the patient felt that there was some increased dysphagia which has now morphed into pain at the sternal notch. Different positions hurt. She no longer has difficulty with swallowing. \"It almost feels as if it is out of place.\" It is made worse by movement. The pain is in the chest to the sternal notch. It does not feel like it is in the esophagus. She feels that it has not felt \"right\" since the first endoscopy on 2/23/22. This has been an unchanged presence. In the last three to four days there is a \"pain\".  There was no abnormality noted on the cope 3/10/22 and symptoms have persisted.     This is an atypical symptom post procedure and this far out from procedure. It is possible that dilation resulted in some pain which then became amplified because she is focusing on it. Because of the symptoms I will order a CT neck and chest to evaluate for post procedural pain nearly 2 weeks from the second endoscopy. If these are normal we will target the pain with neuromodulation.     Esteban Rose DO   of Medicine  Director, Esophageal Disorders Program  Division of Gastroenterology, Hepatology, and Nutrition  [facility]    "

## 2022-03-29 ENCOUNTER — TRANSCRIBE ORDERS (OUTPATIENT)
Dept: OTHER | Age: 42
End: 2022-03-29
Payer: COMMERCIAL

## 2022-03-29 ENCOUNTER — DOCUMENTATION ONLY (OUTPATIENT)
Dept: ONCOLOGY | Facility: CLINIC | Age: 42
End: 2022-03-29
Payer: COMMERCIAL

## 2022-03-29 ENCOUNTER — TELEPHONE (OUTPATIENT)
Dept: GASTROENTEROLOGY | Facility: CLINIC | Age: 42
End: 2022-03-29
Payer: COMMERCIAL

## 2022-03-29 DIAGNOSIS — K44.9 HIATAL HERNIA: ICD-10-CM

## 2022-03-29 DIAGNOSIS — K22.70 BARRETT'S ESOPHAGUS: Primary | ICD-10-CM

## 2022-03-29 NOTE — TELEPHONE ENCOUNTER
"RN spoke to pt on 3.22.2022.  \"Message to endo scheduling to cancel per 3.10.2022 surg path Dr. Rose \"Below are your results. Your biopsies confirmed non-dysplastic cavanaugh's esophagus and will require a repeat endoscopy in 3 years.\"  Pt also confirmed at this time she did not need to have the EGD scheduled on 3.30.2022.    Pt is still on the schedule as of today for an EGD on 3.30.2022.  RN attempted to reach out to pt to inquire if pt is planning on having EGD 3.30.2022.  No COVID test within 4 days of procedure.  Per 3.23.2022 pt has f/u with Dr. Rose who has ordered a CT neck and chest which is scheduled for 3.30.2022.    Left message to return call [phone] #2    Rosey Vivas RN  "

## 2022-03-29 NOTE — TELEPHONE ENCOUNTER
Caller: ASIYA    Procedure: EGD    Date, Location, and Surgeon of Procedure Cancelled: 3/30 LUCIEN SAMUEL    Ordering Provider:LIZZIE  Reason for cancel (please be detailed, any staff messages or encounters to note?): NOT NEEDED...  Rosey Vivas, RN   Registered Nurse      Telephone Encounter   Signed   Encounter Date:  3/5/2022                             Rescheduled: NO

## 2022-03-30 DIAGNOSIS — K44.9 HIATAL HERNIA: Primary | ICD-10-CM

## 2022-04-11 ENCOUNTER — ANCILLARY PROCEDURE (OUTPATIENT)
Dept: CT IMAGING | Facility: CLINIC | Age: 42
End: 2022-04-11
Attending: CLINICAL NURSE SPECIALIST
Payer: COMMERCIAL

## 2022-04-11 DIAGNOSIS — K44.9 HIATAL HERNIA: ICD-10-CM

## 2022-04-11 PROCEDURE — 71250 CT THORAX DX C-: CPT | Performed by: RADIOLOGY

## 2022-04-11 PROCEDURE — 74176 CT ABD & PELVIS W/O CONTRAST: CPT | Performed by: RADIOLOGY

## 2022-04-12 NOTE — PROGRESS NOTES
THORACIC SURGERY - NEW PATIENT OFFICE VISIT        I saw Leah Yanez at Dr. 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 She has chronic heartburn that has responded to PPI and regurgitation. Additionally, she required a dilation for a benign peptic stricture.    Previsit Tests   CT chest (4/11/2022):          Upper endoscopy (3/10/2022): 4 cm hiatal hernia, esophageal mucosal changes secondary to established long-segment Griggs's disease (C9M10), benign appearing esophageal stenosis at 30 cm, z-line at 28 cm, TGF at 38 cm, hiatus at 42 cm, dilated. Normal stomach, normal duodenal bulb.     Esophageal biopsy (3/10/2022): Griggs's esophagus (28 cm to 38 cm, q 2 cm, 4-quadrant), negative for dysplasia.     PMH  Reviewed, as below    Past Medical History:   Diagnosis Date     Depressive disorder, not elsewhere classified 3/06    Resolved 8/07        PSH  Reviewed, as below    Past Surgical History:   Procedure Laterality Date     ESOPHAGOSCOPY, GASTROSCOPY, DUODENOSCOPY (EGD), COMBINED N/A 12/29/2021    Procedure: ESOPHAGOGASTRODUODENOSCOPY, WITH BIOPSY;  Surgeon: Esteban Rose DO;  [place] [facility] [place]     TONSILLECTOMY & ADENOIDECTOMY       [facility] NONSPECIFIC PROCEDURE      T&A as a child     [facility] NONSPECIFIC PROCEDURE      Tubes in ears bilaterally        Current Outpatient Medications   Medication Sig Dispense Refill     ANTIOXIDANTS OR CAPS 1 capsule bid         buPROPion (WELLBUTRIN) 75 MG tablet Take 50 mg by mouth daily Pt not sure of dose         MULTIVITAMINS OR TABS 2 tablets bid         omeprazole (PRILOSEC) 40 MG DR capsule Take 1 capsule (40 mg) by mouth 2 times daily 180 capsule 3     lidocaine, viscous, (XYLOCAINE) 2 % solution Swish and swallow 15 mLs in mouth every 8 hours as needed for  "moderate pain (post procedure pain on swallowing) ; Max 8 doses/24 hour period. 15 m        ETOH occasional  TOB negative    Physical examination  /84 (BP Location: Right arm, Patient Position: Sitting, Cuff Size: Adult Large)   Pulse 84   Temp 97.8  F (36.6  C) (Oral)   Ht 1.831 m (6' 0.07\")   Wt 128 kg (282 lb 3.2 oz)   LMP 04/04/2022 (Exact Date)   SpO2 98%   Breastfeeding No   BMI 38.20 kg/m      Non-contributory.    From a personal perspective, she lives at home with her family.    IMPRESSION   (K44.9) Hiatal hernia  (primary encounter diagnosis)  (K21.9) Gastroesophageal reflux disease without esophagitis  (K22.70) Griggs's esophagus without dysplasia  (K22.2) Benign esophageal stricture    This person is a 41 year old female with long-segment non-dysplastic Griggs's esophagus (C9M10) and a 4 cm hiatal hernia  Benign esophageal stricture    PLAN  I spent 30 min on the date of the encounter in chart review, patient visit, review of tests, documentation and/or discussion with other providers about the issues documented above. I reviewed the plan as follows:    We had a long discussion about her complicated GERD. She has indications for surgery (hiatal hernia, long-segment non-dysplastic Griggs's esophagus, and a esophageal peptic stricture) in combination with morbid obesity. I brought up the option of a Nissen fundoplication and of a Didier-en-Y gastric bypass, and reviewed in broad terms the pros and cons of each. She is interested to hear more about gastric bypass, and we have referred her to see Dr. Mike Aragon.     I appreciate the opportunity to participate in the care of your patient and will keep you updated.  Sincerely,      "

## 2022-04-13 ENCOUNTER — OFFICE VISIT (OUTPATIENT)
Dept: SURGERY | Facility: CLINIC | Age: 42
End: 2022-04-13
Attending: INTERNAL MEDICINE
Payer: COMMERCIAL

## 2022-04-13 VITALS
HEART RATE: 84 BPM | TEMPERATURE: 97.8 F | HEIGHT: 72 IN | BODY MASS INDEX: 38.22 KG/M2 | OXYGEN SATURATION: 98 % | WEIGHT: 282.2 LBS | SYSTOLIC BLOOD PRESSURE: 134 MMHG | DIASTOLIC BLOOD PRESSURE: 84 MMHG

## 2022-04-13 DIAGNOSIS — K22.2 BENIGN ESOPHAGEAL STRICTURE: ICD-10-CM

## 2022-04-13 DIAGNOSIS — K21.9 GASTROESOPHAGEAL REFLUX DISEASE WITHOUT ESOPHAGITIS: ICD-10-CM

## 2022-04-13 DIAGNOSIS — K44.9 HIATAL HERNIA: Primary | ICD-10-CM

## 2022-04-13 DIAGNOSIS — K22.70 BARRETT'S ESOPHAGUS WITHOUT DYSPLASIA: ICD-10-CM

## 2022-04-13 PROCEDURE — 99203 OFFICE O/P NEW LOW 30 MIN: CPT | Performed by: THORACIC SURGERY (CARDIOTHORACIC VASCULAR SURGERY)

## 2022-04-13 PROCEDURE — G0463 HOSPITAL OUTPT CLINIC VISIT: HCPCS

## 2022-04-13 ASSESSMENT — PAIN SCALES - GENERAL: PAINLEVEL: NO PAIN (0)

## 2022-04-13 NOTE — LETTER
4/13/2022         RE: Leah Yanez  [address] [address]        Dear Colleague,    Thank you for referring your patient, Leah Yanez, to the [facility]. Please see a copy of my visit note below.    THORACIC SURGERY - NEW PATIENT OFFICE VISIT        I saw Leah Yanez at Dr. 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 She has chronic heartburn that has responded to PPI and regurgitation. Additionally, she required a dilation for a benign peptic stricture.    Previsit Tests   CT chest (4/11/2022):          Upper endoscopy (3/10/2022): 4 cm hiatal hernia, esophageal mucosal changes secondary to established long-segment Griggs's disease (C9M10), benign appearing esophageal stenosis at 30 cm, z-line at 28 cm, TGF at 38 cm, hiatus at 42 cm, dilated. Normal stomach, normal duodenal bulb.     Esophageal biopsy (3/10/2022): Griggs's esophagus (28 cm to 38 cm, q 2 cm, 4-quadrant), negative for dysplasia.     PMH  Reviewed, as below    Past Medical History:   Diagnosis Date     Depressive disorder, not elsewhere classified 3/06    Resolved 8/07        PSH  Reviewed, as below    Past Surgical History:   Procedure Laterality Date     ESOPHAGOSCOPY, GASTROSCOPY, DUODENOSCOPY (EGD), COMBINED N/A 12/29/2021    Procedure: ESOPHAGOGASTRODUODENOSCOPY, WITH BIOPSY;  Surgeon: Esteban Rose DO;  [place] [facility] [place]     TONSILLECTOMY & ADENOIDECTOMY       [facility] NONSPECIFIC PROCEDURE      T&A as a child     Z NONSPECIFIC PROCEDURE      Tubes in ears bilaterally        Current Outpatient Medications   Medication Sig Dispense Refill     ANTIOXIDANTS OR CAPS 1 capsule bid         buPROPion (WELLBUTRIN) 75 MG tablet Take 50 mg by mouth daily Pt not sure of dose          "MULTIVITAMINS OR TABS 2 tablets bid         omeprazole (PRILOSEC) 40 MG DR capsule Take 1 capsule (40 mg) by mouth 2 times daily 180 capsule 3     lidocaine, viscous, (XYLOCAINE) 2 % solution Swish and swallow 15 mLs in mouth every 8 hours as needed for moderate pain (post procedure pain on swallowing) ; Max 8 doses/24 hour period. 15 m        ETOH occasional  TOB negative    Physical examination  /84 (BP Location: Right arm, Patient Position: Sitting, Cuff Size: Adult Large)   Pulse 84   Temp 97.8  F (36.6  C) (Oral)   Ht 1.831 m (6' 0.07\")   Wt 128 kg (282 lb 3.2 oz)   LMP 04/04/2022 (Exact Date)   SpO2 98%   Breastfeeding No   BMI 38.20 kg/m      Non-contributory.    From a personal perspective, she lives at home with her family.    IMPRESSION   (K44.9) Hiatal hernia  (primary encounter diagnosis)  (K21.9) Gastroesophageal reflux disease without esophagitis  (K22.70) Griggs's esophagus without dysplasia  (K22.2) Benign esophageal stricture    This person is a 41 year old female with long-segment non-dysplastic Griggs's esophagus (C9M10) and a 4 cm hiatal hernia  Benign esophageal stricture    PLAN  I spent 30 min on the date of the encounter in chart review, patient visit, review of tests, documentation and/or discussion with other providers about the issues documented above. I reviewed the plan as follows:    We had a long discussion about her complicated GERD. She has indications for surgery (hiatal hernia, long-segment non-dysplastic Griggs's esophagus, and a esophageal peptic stricture) in combination with morbid obesity. I brought up the option of a Nissen fundoplication and of a Didier-en-Y gastric bypass, and reviewed in broad terms the pros and cons of each. She is interested to hear more about gastric bypass, and we have referred her to see Dr. Mike Aragon.     I appreciate the opportunity to participate in the care of your patient and will keep you updated.        Again, thank you for " allowing me to participate in the care of your patient.      Sincerely,    Luis Alfredo White MD

## 2022-04-13 NOTE — NURSING NOTE
"Oncology Rooming Note    April 13, 2022 2:41 PM   Leah Yanez is a 41 year old female who presents for:    Chief Complaint   Patient presents with     Oncology Clinic Visit     Hiatal hernia, Griggs's esophagus      Initial Vitals: /84 (BP Location: Right arm, Patient Position: Sitting, Cuff Size: Adult Large)   Pulse 84   Temp 97.8  F (36.6  C) (Oral)   Ht 1.831 m (6' 0.07\")   Wt 128 kg (282 lb 3.2 oz)   LMP 04/04/2022 (Exact Date)   SpO2 98%   Breastfeeding No   BMI 38.20 kg/m   Estimated body mass index is 38.2 kg/m  as calculated from the following:    Height as of this encounter: 1.831 m (6' 0.07\").    Weight as of this encounter: 128 kg (282 lb 3.2 oz). Body surface area is 2.55 meters squared.  No Pain (0) Comment: Data Unavailable   Patient's last menstrual period was 04/04/2022 (exact date).  Allergies reviewed: Yes  Medications reviewed: Yes    Medications: Medication refills not needed today.  Pharmacy name entered into [employer]:    [id number] - [place], [place] - [address]  [id number] - [place], [place] - [address] AT [address]    Clinical concerns: None    Misael Walker            "

## 2022-04-21 ENCOUNTER — PATIENT OUTREACH (OUTPATIENT)
Dept: SURGERY | Facility: CLINIC | Age: 42
End: 2022-04-21
Payer: COMMERCIAL

## 2022-04-21 DIAGNOSIS — K44.9 HIATAL HERNIA: Primary | ICD-10-CM

## 2022-04-21 NOTE — PROGRESS NOTES
Received voicemail from patient inquiring about the Bariatric referral Dr White had spoken about at her most recent clinic visit with him. Dr White's clinic note not complete. Spoke with Dr White. Bariatric referral placed including a request for patient to see Dr Aragon. Dr White verbalized that he will be reaching out to Dr Aragon to discuss patient's case in more detail with him.  Epic message sent to Dr Aragon's RN-CC.   Called patient to inform her that a referral was placed and someone from their department should be contacting her to schedule an appointment. Will communicate with her via [employer] if I receive any additional updates on the status of the referral. Patient appreciated the call.

## 2022-05-05 ENCOUNTER — PATIENT OUTREACH (OUTPATIENT)
Dept: SURGERY | Facility: CLINIC | Age: 42
End: 2022-05-05
Payer: COMMERCIAL

## 2022-05-05 NOTE — PROGRESS NOTES
Called patient to confirm that she was aware of her appointment with Dr Mike Aragon, within the Bariatric Department, scheduled for May 19th. Patient verbalized that she was and appreciated writer's call.

## 2022-05-19 ENCOUNTER — OFFICE VISIT (OUTPATIENT)
Dept: ENDOCRINOLOGY | Facility: CLINIC | Age: 42
End: 2022-05-19
Payer: COMMERCIAL

## 2022-05-19 VITALS
SYSTOLIC BLOOD PRESSURE: 142 MMHG | HEART RATE: 74 BPM | DIASTOLIC BLOOD PRESSURE: 80 MMHG | OXYGEN SATURATION: 98 % | WEIGHT: 284.3 LBS | BODY MASS INDEX: 38.51 KG/M2 | HEIGHT: 72 IN

## 2022-05-19 DIAGNOSIS — E66.01 MORBID OBESITY (H): Primary | ICD-10-CM

## 2022-05-19 PROCEDURE — 99024 POSTOP FOLLOW-UP VISIT: CPT | Performed by: SURGERY

## 2022-05-19 ASSESSMENT — PAIN SCALES - GENERAL: PAINLEVEL: NO PAIN (0)

## 2022-05-19 NOTE — LETTER
5/19/2022       RE: Leah Yanez  [address] [address]     Dear Colleague,    Thank you for referring your patient, Leah Yanez, to the [facility]. Please see a copy of my visit note below.    Erroneous encounter      Again, thank you for allowing me to participate in the care of your patient.      Sincerely,    Daniel Aragon MD

## 2022-05-19 NOTE — NURSING NOTE
Chief Complaint   Patient presents with     Consult     Gastric bypass vs hiatal hernia       Vitals:    05/19/22 1028   BP: (!) 142/80   Pulse: 74   SpO2: 98%   Weight: 129 kg (284 lb 4.8 oz)   Height: 1.829 m (6')       Body mass index is 38.56 kg/m .          [facility] EMT

## 2022-05-19 NOTE — PROGRESS NOTES
"  Leah Yanez is a 42 year old female who is being evaluated via a billable video visit.      The patient has been notified of following:     \"This video visit will be conducted via a call between you and your physician/provider. We have found that certain health care needs can be provided without the need for an in-person physical exam.  This service lets us provide the care you need with a video conversation.  If a prescription is necessary we can send it directly to your pharmacy.  If lab work is needed we can place an order for that and you can then stop by our lab to have the test done at a later time.    Video visits are billed at different rates depending on your insurance coverage.  Please reach out to your insurance provider with any questions.    If during the course of the call the physician/provider feels a video visit is not appropriate, you will not be charged for this service.\"    Patient has given verbal consent for Video visit? Yes  How would you like to obtain your AVS? MyChart  If you are dropped from the video visit, the video invite should be resent to: Send to e-mail at: [email or web address]  Will anyone else be joining your video visit? No  {If patient encounters technical issues they should call [phone]      Video-Visit Details    Type of service:  Video Visit    Video Start Time: 12:00 PM  Video End Time: 12:23 PM    Originating Location (pt. Location): Home    Distant Location (provider location):  [facility] [place]     Platform used for Video Visit: [employer]    During this virtual visit the patient is located in [place], patient verifies this as the location during the entirety of this visit.     New Weight Management Nutrition Consultation    Leah Yanez is a 42 year old female presents today for new weight management nutrition consultation.  Patient referred by Dr. Aragon on May 19, 2022.    Patient with Co-morbidities of obesity " including:  Type II DM no  Renal Failure no  Sleep apnea no  Hypertension yes   Dyslipidemia no  Joint pain no  Back pain no  GERD no     Anthropometrics:  Estimated body mass index is 38.56 kg/m  as calculated from the following:    Height as of 5/19/22: 1.829 m (6').    Weight as of 5/19/22: 129 kg (284 lb 4.8 oz).     Current weight (5/20/22): 284 lbs per pt    Medications for Weight Loss:  none    NUTRITION HISTORY  Food allergies: none  Food intolerances: none  Supplements: MVI, vitamin b12, vitamin D  Previous methods of diet modification for weight loss: WW, keto, atkins, calorie reduction    Pt saw Dr. Aragon d/t hiatal hernia and for possible weight loss surgery. Pt has been trying to lose weight since high school. Has been trying to practice intuitive eating. Feels overwhelmed when on a diet so prefers to focus on hunger cues and balance. Does not feel hunger cues and sometimes feels overwhelmed when deciding what to eat. Snacks often at night.     Recent food recall:  Breakfast: usually skips  Lunch: salad or sandwich  Dinner: HelloFresh, EveryPlate meal   Snacks: pretzels, apple with cheese or pb, mangoes, grapes  Beverages: water, tea  Alcohol: rarely- social drinking  Dining out: occasionally     Physical Activity:  Volleyball once per week (in Fall and Winter)  Walks dog     Nutrition Prescription  Recommended energy/nutrient modification.    Nutrition Diagnosis  Obesity r/t long history of positive energy balance aeb BMI >30.    Nutrition Intervention  Materials/education provided on hypocaloric diet for weight loss. Discussed Volumetric eating to help satiety level on fewer calories; portion control and healthy food choices (Plate Method and Volumetrics handouts), 100 calorie snack choices, meal and snack planning and websites, sample meal plans     Patient demonstrates understanding.    Expected Engagement: good    Nutrition Goals  1) Review intuitive eating  "resources   -https://www.intuitiveeating.org/10-principles-of-intuitive-eating/  2) See hunger scale: [email or web address].[facility]/sites/default/files/wellness-hungersatietyscale.pdf  3) 9\" Plate method (1/2 plate non-starchy vegetables/fruit, 1/4 plate lean protein, 1/4 plate whole grain starch - no more than 1/2 cup carb/meal)  4) Eat 3 meals per day   5) Find easy options for quick, healthy meals  Low Calorie Frozen Meal:  Healthy Choice Power Bowls  Lean Cuisine  Smart Ones  [place]n Mello  [employer] Family Meals    6) Meal prep tips: https://www.6fusion.com/healthyeats/healthy-tips/2019/10/meal-prep-tips-hacks-experts  7) Avoid snacking as able. If snack is needed use lean protein and/or fruit/vegetable. Examples:   - 2 cup popcorn   - 1 cup mixed berries   - 15 almonds, walnuts, cashews   - carrot/celery sticks and 2 tbsp low-fat ranch   - 1 hard boiled egg   - Part-skim mozzarella cheese stick   - Low-fat, low-sugar greek yogurt with 1/2 cup berries   - Med apple or pear   - sliced bell peppers with 1/2 cup salsa   - 1/2 cup roasted chickpeas   - sliced cucumbers with vinegar    100 calorie sweets: Smart Sweets, Fiber One desserts, Fit and Active 100 calorie snack sweets at [employer]; [employer] 100 calorie pre-portioned cookies, sugar-free pudding, sugar-free jello.    Snack Recipes:  - Banana and creamy PB dip (https://www.diabetesfoodhub.org/recipes/sweet-peanut-buttery-dip.html?home-category_id=23)  - Roasted chickpeas (https://www.diabetesfoodhub.org/recipes/roasted-and-spiced-chickpeas.html?home-category_id=23)  - Lemon Raspberry eliazar seed pudding (https://www.diabetesfoodhub.org/recipes/lemon-raspberry- eliazar-seed-pudding.html?home-category_id=23)  - Black bean hummus with carrot and celery sticks (https://www.diabetesfoodhub.org/recipes/black-bean-hummus.html?home-category_id=23)  - Greek yogurt chocolate mouse " (https://www.diabetesfoodhub.org/recipes/greek-yogurt-chocolate-mousse.html?home-category_id=23)   - Broccoli Cheese Bites  (https://www.diabetesfoodDubMeNow.org/recipes/broccoli-cheese-bites.html?home-category_id=20)  - Chicken Satay with peanut sauce  (https://www.diabetesfoProteopure.org/recipes/blueberry-almond-chicken-salad-lettuce-wraps.html?home-category_id=20)  - Deviled Eggs  (https://www.[employer]i.org/recipes/devilled-eggs.html?home-category_id=20)      Protein Sources   http://[employer]/668349.pdf     Carbohydrates  http://[email or web address].pdf     Mindful Eating  http://Second Genome/350447.pdf     Summary of Volumetrics Eating Plan  http://[email or web address]     Follow-Up:  1 month, PRN    Time spent with patient: 23 minutes.  ANGELIKA AKERS RD, LD

## 2022-05-20 ENCOUNTER — VIRTUAL VISIT (OUTPATIENT)
Dept: ENDOCRINOLOGY | Facility: CLINIC | Age: 42
End: 2022-05-20
Payer: COMMERCIAL

## 2022-05-20 DIAGNOSIS — Z71.3 NUTRITIONAL COUNSELING: Primary | ICD-10-CM

## 2022-05-20 DIAGNOSIS — E66.9 OBESITY: ICD-10-CM

## 2022-05-20 PROCEDURE — 97802 MEDICAL NUTRITION INDIV IN: CPT | Mod: GT | Performed by: DIETITIAN, REGISTERED

## 2022-05-20 NOTE — LETTER
"5/20/2022       RE: Leah Yanez  [address] [place] [address]     Dear Colleague,    Thank you for referring your patient, Leah Yanez, to the [facility] [place] at [facility]. Please see a copy of my visit note below.      Leah Yanez is a 42 year old female who is being evaluated via a billable video visit.      The patient has been notified of following:     \"This video visit will be conducted via a call between you and your physician/provider. We have found that certain health care needs can be provided without the need for an in-person physical exam.  This service lets us provide the care you need with a video conversation.  If a prescription is necessary we can send it directly to your pharmacy.  If lab work is needed we can place an order for that and you can then stop by our lab to have the test done at a later time.    Video visits are billed at different rates depending on your insurance coverage.  Please reach out to your insurance provider with any questions.    If during the course of the call the physician/provider feels a video visit is not appropriate, you will not be charged for this service.\"    Patient has given verbal consent for Video visit? Yes  How would you like to obtain your AVS? MyChart  If you are dropped from the video visit, the video invite should be resent to: Send to e-mail at: [email or web address]  Will anyone else be joining your video visit? No  {If patient encounters technical issues they should call [phone]      Video-Visit Details    Type of service:  Video Visit    Video Start Time: 12:00 PM  Video End Time: 12:23 PM    Originating Location (pt. Location): Home    Distant Location (provider location):  [facility]     Platform used for Video Visit: [employer]    During this virtual visit the patient is located in [place], patient verifies " this as the location during the entirety of this visit.     New Weight Management Nutrition Consultation    Leah Yanez is a 42 year old female presents today for new weight management nutrition consultation.  Patient referred by Dr. Aragon on May 19, 2022.    Patient with Co-morbidities of obesity including:  Type II DM no  Renal Failure no  Sleep apnea no  Hypertension yes   Dyslipidemia no  Joint pain no  Back pain no  GERD no     Anthropometrics:  Estimated body mass index is 38.56 kg/m  as calculated from the following:    Height as of 5/19/22: 1.829 m (6').    Weight as of 5/19/22: 129 kg (284 lb 4.8 oz).     Current weight (5/20/22): 284 lbs per pt    Medications for Weight Loss:  none    NUTRITION HISTORY  Food allergies: none  Food intolerances: none  Supplements: MVI, vitamin b12, vitamin D  Previous methods of diet modification for weight loss: WW, keto, atkins, calorie reduction    Pt saw Dr. Aragon d/t hiatal hernia and for possible weight loss surgery. Pt has been trying to lose weight since high school. Has been trying to practice intuitive eating. Feels overwhelmed when on a diet so prefers to focus on hunger cues and balance. Does not feel hunger cues and sometimes feels overwhelmed when deciding what to eat. Snacks often at night.     Recent food recall:  Breakfast: usually skips  Lunch: salad or sandwich  Dinner: HelloFresh, EveryPlate meal   Snacks: pretzels, apple with cheese or pb, mangoes, grapes  Beverages: water, tea  Alcohol: rarely- social drinking  Dining out: occasionally     Physical Activity:  Volleyball once per week (in Fall and Winter)  Walks dog     Nutrition Prescription  Recommended energy/nutrient modification.    Nutrition Diagnosis  Obesity r/t long history of positive energy balance aeb BMI >30.    Nutrition Intervention  Materials/education provided on hypocaloric diet for weight loss. Discussed Volumetric eating to help satiety level on fewer calories; portion control  "and healthy food choices (Plate Method and Volumetrics handouts), 100 calorie snack choices, meal and snack planning and websites, sample meal plans     Patient demonstrates understanding.    Expected Engagement: good    Nutrition Goals  1) Review intuitive eating resources   -https://www.intuitiveeating.org/10-principles-of-intuitive-eating/  2) See hunger scale: https://[facility]/sites/default/files/wellness-hungersatietyscale.pdf  3) 9\" Plate method (1/2 plate non-starchy vegetables/fruit, 1/4 plate lean protein, 1/4 plate whole grain starch - no more than 1/2 cup carb/meal)  4) Eat 3 meals per day   5) Find easy options for quick, healthy meals  Low Calorie Frozen Meal:  Healthy Choice Power Bowls  Lean Cuisine  Smart Ones  Navarro Baljinder Mello  Hythiam Family Meals    6) Meal prep tips: https://www.foodAurora [employer] & Accessories.Fannect/healthyeats/healthy-tips/2019/10/meal-prep-tips-hacks-experts  7) Avoid snacking as able. If snack is needed use lean protein and/or fruit/vegetable. Examples:   - 2 cup popcorn   - 1 cup mixed berries   - 15 almonds, walnuts, cashews   - carrot/celery sticks and 2 tbsp low-fat ranch   - 1 hard boiled egg   - Part-skim mozzarella cheese stick   - Low-fat, low-sugar greek yogurt with 1/2 cup berries   - Med apple or pear   - sliced bell peppers with 1/2 cup salsa   - 1/2 cup roasted chickpeas   - sliced cucumbers with vinegar    100 calorie sweets: Smart Sweets, Fiber One desserts, Fit and Active 100 calorie snack sweets at [employer]; Nabisco 100 calorie pre-portioned cookies, sugar-free pudding, sugar-free jello.    Snack Recipes:  - Banana and creamy PB dip (https://www.diabetesfoodhub.org/recipes/sweet-peanut-buttery-dip.html?home-category_id=23)  - Roasted chickpeas (https://www.diabetesfoodhub.org/recipes/roasted-and-spiced-chickpeas.html?home-category_id=23)  - Lemon Raspberry eliazar seed pudding (https://www.diabetesfoodhub.org/recipes/lemon-raspberry- " eliazar-seed-pudding.html?home-category_id=23)  - Black bean hummus with carrot and celery sticks (https://www.diabetesfoMobile Health Consumer.org/recipes/black-bean-hummus.html?home-category_id=23)  - Greek yogurt chocolate mouse (https://www."eConscribi[employer] Inc."foMobile Health Consumer.org/recipes/greek-yogurt-chocolate-mousse.html?home-category_id=23)   - Broccoli Cheese Bites  (https://www.[employer].org/recipes/broccoli-cheese-bites.html?home-category_id=20)  - Chicken Satay with peanut sauce  (https://www.[employer].org/recipes/blueberry-almond-chicken-salad-lettuce-wraps.html?home-category_id=20)  - Deviled Eggs  (https://www.[employer].org/recipes/devilled-eggs.html?home-category_id=20)      Protein Sources   http://Seclore/[email or web address]     Carbohydrates  http://[email or web address]     Mindful Eating  http://Seclore/[email or web address]     Summary of Volumetrics Eating Plan  [email or web address]     Follow-Up:  1 month, PRN    Time spent with patient: 23 minutes.  ANGELIKA AKERS RD, LD

## 2022-05-20 NOTE — PATIENT INSTRUCTIONS
"Hi Leah!    Follow-up with RD in 1 month    Thank you,    Vika Mar, ALEXANDER, LD  If you would like to schedule or reschedule an appointment with the RD, please call [phone]    Nutrition Goals  1) Review intuitive eating resources   -https://www.intuitiveeating.org/10-principles-of-intuitive-eating/  2) See hunger scale: https://[facility].[employer].[facility]/sites/default/files/wellness-hungersatietyscale.pdf  3) 9\" Plate method (1/2 plate non-starchy vegetables/fruit, 1/4 plate lean protein, 1/4 plate whole grain starch - no more than 1/2 cup carb/meal)  4) Eat 3 meals per day   5) Find easy options for quick, healthy meals  Low Calorie Frozen Meal:  Healthy Choice Power Bowls  Lean Cuisine  Smart Ones  Navarro[employer]ca Salinasn Mello  SnapLayout Family Meals    6) Meal prep tips: https://www.foodHealth Global Connect.com/healthyeats/healthy-tips/2019/10/meal-prep-tips-hacks-experts  7) Avoid snacking as able. If snack is needed use lean protein and/or fruit/vegetable. Examples:   - 2 cup popcorn   - 1 cup mixed berries   - 15 almonds, walnuts, cashews   - carrot/celery sticks and 2 tbsp low-fat ranch   - 1 hard boiled egg   - Part-skim mozzarella cheese stick   - Low-fat, low-sugar greek yogurt with 1/2 cup berries   - Med apple or pear   - sliced bell peppers with 1/2 cup salsa   - 1/2 cup roasted chickpeas   - sliced cucumbers with vinegar    100 calorie sweets: Smart Sweets, Fiber One desserts, Fit and Active 100 calorie snack sweets at [employer]; Nabis[employer] 100 calorie pre-portioned cookies, sugar-free pudding, sugar-free jello.    Snack Recipes:  - Banana and creamy PB dip (https://www.diabetesfoodhub.org/recipes/sweet-peanut-buttery-dip.html?home-category_id=23)  - Roasted chickpeas (https://www.diabetesfoodhub.org/recipes/roasted-and-spiced-chickpeas.html?home-category_id=23)  - Lemon Raspberry eliazar seed pudding (https://www.diabetesfoodhub.org/recipes/lemon-raspberry- eliazar-seed-pudding.html?home-category_id=23)  - Black bean hummus with carrot and " celery sticks (https://www.ConnectSolutions.org/recipes/black-bean-hummus.html?home-category_id=23)  - Greek yogurt chocolate mouse (https://www.ConnectSolutions.org/recipes/greek-yogurt-chocolate-mousse.html?home-category_id=23)   - Broccoli Cheese Bites  (https://www.ConnectSolutions.org/recipes/broccoli-cheese-bites.html?home-category_id=20)  - Chicken Satay with peanut sauce  (https://www.ConnectSolutions.Pymetrics/recipes/blueberry-almond-chicken-salad-lettuce-wraps.html?home-category_id=20)  - Deviled Eggs  (https://www.Redfin/recipes/devilled-eggs.html?home-category_id=20)      Protein Sources   http://Foundation Software/214218.pdf     Carbohydrates  http://[email or web address]     Mindful Eating  http://Foundation Software/545735.pdf     Summary of Volumetrics Eating Plan  http://[email or web address]     Interested in working with a health ? Health coaches work with you to improve your overall health and wellbeing. They look at the whole person, and may involve discussion of different areas of life, including, but not limited to the four pillars of health (sleep, exercise, nutrition, and stress management). Discuss with your care team if you would like to start working a health .    Health Coaching-3 Pack:    $99 for three health coaching visits    Visits may be done in person or via phone    Coaching is a partnership between the  and the client; Coaches do not prescribe or diagnose    Coaching helps inspire the client to reach his/her personal goals    COMPREHENSIVE WEIGHT MANAGEMENT PROGRAM  VIRTUAL SUPPORT GROUPS    For Support Group Information:      We offer support groups for patients who are working on weight loss and considering, preparing for or have had weight loss surgery.   There is no cost for this opportunity.  You are invited to attend the?Virtual Support Groups?provided by any of the following locations:    [facility] via Microsoft Teams with Jen Herrmann RN  2.   [place] via  "Jotvine.com Teams with Jeovanny Cunningham, PhD, LP  3.   [place] via Jotvine.com Teams with Vanna Hernandez RN  4.   [facility] via Jotvine.com Teams with Vanna Mcdonnell [facility]    The following Support Group information can also be found on our website:  https://www.[facility].org/treatments/weight-loss-surgery-support-groups      [facility] Weight Loss Surgery Support Group    [facility] Weight Loss Surgery Support Group  The support group is a patient-lead forum that meets monthly to share experiences, encouragement and education. It is open to those who have had weight loss surgery, are scheduled for surgery, and those who are considering surgery.   WHEN: This group meets on the 3rd Wednesday of each month from 5:00PM - 6:00PM virtually using Microsoft Teams.   FACILITATOR: Led by Jen Herrmann RD, LD, RN, the program's Clinical Coordinator.   TO REGISTER: Please contact the clinic via Warply or call the nurse line directly at [phone] to inform our staff that you would like an invite sent to you and to let us know the email you would like the invite sent to. Prior to the meeting, a link with directions on how to join the meeting will be sent to you.    2022 Meetings  January 19: \"Let's Talk\" a time for the group to share.  February 16: \"Let's Talk\" a time for the group to share.  March 16: Guest Speakers: Psychologists, Lamar Samaniego, PhD,LP and Baylee Lind PsKathi,LP  April 20: Guest Speaker: Health Annalee, [facility],CHES, CPT  May 18: Guest Speaker: DietitianCarlo, ALEXANDER, LP  Vangie 15: \"Let's Talk\" a time for the group to share.  July 20: \"Let's Talk\" a time for the group to share.  August 17: TBA  September 21: TBA  October 19: Guest Speaker: Dr Celso West MD Pulmonologist and Sleep Medicine Physician, \"Getting a Good Night's Sleep\".  November 16: TBA  December 21: TBA    [facility] Support Groups    Connections: " "Bariatric Care Support Group?  This is open to all [facility] (and those external to this program) pre- and post- operative bariatric surgery patients as well as their support system.   WHEN: This group meets the 2nd Tuesday of each month from 6:30 PM - 8:00 PM virtually using Microsoft Teams.   FACILITATOR: Led by Jeovanny Cunningham, Ph.D who is a Licensed Psychologist with the [facility] Comprehensive Weight Management Program.   TO REGISTER: Please send an email to Jeovanny Cunningham, Ph.D., LP at?[email or web address]?if you would like an invitation to the group and to learn about using Microsoft Teams.    2022 Meetings  January 11: Christianne Matias, PharmD, Pharmacy Resident at [facility], \"Medications and Bariatric Surgery\".  February 8: Open Forum  March 8  April 12  May 10  Vangie 14    Connections: Post-Operative Bariatric Surgery Support Group  This is a support group for [facility] bariatric patients (and those external to [facility]) who have had bariatric surgery and are at least 3 months post-surgery.  WHEN: This support group meets the 4th Wednesday of the month from 11:00 AM - 12:00 PM virtually using Microsoft Teams.   FACILITATOR: Led by Certified Bariatric Nurse, Vanna Hernandez RN.   TO REGISTER: Please send an email to Vanna at [email or web address] if you would like an invitation to the group and to learn about using Microsoft Teams.    2022 Meetings  January 26  February 23  March 23  April 27  May 25  Vangie 22    [facility] Healthy Lifestyle Virtual Support Group    Healthy Lifestyle Virtual Support Group?  This is 60 minutes of small group guided discussion, support and resources. All are welcome who want a healthy lifestyle.  WHEN: This group meets monthly on a Friday from 12:30 PM - 1:30 PM virtually using Microsoft Teams.   FACILITATOR: Led by National Board Certified Health and , STACY [facility].   TO " "REGISTER: Please send an email to Vanna [email or web address] to receive monthly invites to the group or if you have any questions about having a health .  Prior to the meeting, a link with directions on how to join the meeting will be sent to you.    2022 Meetings  January 21: Huma Cardoza MS, RN, CIC, CBN, \"Healthy Habits\"  February 25: Open Forum  March 18: \"Setting Limits and Boundaries\"  April 29: Heather Heredia RD, \"Meal Planning Made Easy\"  May 20: Open Forum  June: To be determined                "

## 2022-06-17 NOTE — PROGRESS NOTES
"  Leah Yanez is a 42 year old female who is being evaluated via a billable video visit.      The patient has been notified of following:     \"This video visit will be conducted via a call between you and your physician/provider. We have found that certain health care needs can be provided without the need for an in-person physical exam.  This service lets us provide the care you need with a video conversation.  If a prescription is necessary we can send it directly to your pharmacy.  If lab work is needed we can place an order for that and you can then stop by our lab to have the test done at a later time.    Video visits are billed at different rates depending on your insurance coverage.  Please reach out to your insurance provider with any questions.    If during the course of the call the physician/provider feels a video visit is not appropriate, you will not be charged for this service.\"    Patient has given verbal consent for Video visit? Yes  How would you like to obtain your AVS? MyChart  If you are dropped from the video visit, the video invite should be resent to: Send to e-mail at: [email or web address]  Will anyone else be joining your video visit? No  {If patient encounters technical issues they should call [phone]      Video-Visit Details    Type of service:  Video Visit    Video Start Time: 10:30 AM  Video End Time: 10:48 PM    Originating Location (pt. Location): Home    Distant Location (provider location):  [facility] [place]     Platform used for Video Visit: [employer]    During this virtual visit the patient is located in [place], patient verifies this as the location during the entirety of this visit.     New Bariatric Nutrition Consultation Note    Reason For Visit: Nutrition Assessment    Leah Yanez is a 42 year old presenting today for new bariatric nutrition consult.   Pt is interested in laparoscopic sleeve gastrectomy with Dr. Aragon expected " surgery in TBD.  Patient is accompanied by self.  This is pt's second of 3 required nutrition visits prior to surgery. Pt was previously seen as a [facility] patient.    Pt referred by RAIMUNDO Lovelace on June 21, 2022.  CO-MORBIDITIES OF OBESITY INCLUDE:       6/20/2022   I have the following health issues associated with obesity: Infertility, GERD (Reflux)       SUPPORT:  Support System Reviewed With Patient 6/20/2022   Who do you have in your support network that can be available to help you for the first 2 weeks after surgery?    Who can you count on for support throughout your weight loss surgery journey? , family       ANTHROPOMETRICS:  Estimated body mass index is 38.92 kg/m  as calculated from the following:    Height as of 6/20/22: 1.829 m (6').    Weight as of 6/20/22: 130.2 kg (287 lb).    Required weight loss goal pre-op: -10 lbs from initial consult weight (goal weight 277 lbs or less before surgery)       6/20/2022   I have tried the following methods to lose weight Watching portions or calories, Exercise, Weight Watchers, Atkins type diet (low carb/high protein), Teena Seun, Pre packaged meals ex: Nutrisystem, Slimfast, OTC Medications       Weight Loss Questions Reviewed With Patient 6/20/2022   How long have you been overweight? Since late 20's to early 40's       SUPPLEMENT INFORMATION:  MVI, vitamin b12, vitamin D    NUTRITION HISTORY:  Food allergies: none  Food intolerances: none  Previous methods of diet modification for weight loss: WW, keto, atkins, calorie reduction    Pt saw Dr. Aragon d/t hiatal hernia and for possible weight loss surgery. Pt has been trying to lose weight since high school. Has been trying to practice intuitive eating. Feels overwhelmed when on a diet so prefers to focus on hunger cues and balance. Does not feel hunger cues and sometimes feels overwhelmed when deciding what to eat. Snacks often at night.     6/21/22: Pt has decided that she would like to pursue  weight loss surgery. Has not weighed herself recently but feels like she is doing well with mindful eating and balancing meals.    Recent food recall:  Breakfast: usually skips  Lunch: salad or sandwich  Dinner: HelloFresh, EveryPlate meal   Snacks: pretzels, apple with cheese or pb, mangoes, grapes  Beverages: water, tea  Alcohol: rarely- social drinking  Dining out: occasionally       Recall Diet Questions Reviewed With Patient 6/20/2022   Describe what you typically consume for breakfast (typical or most recent): Nothing, protein shake, eggs, bagel   Describe what you typically consume for lunch (typical or most recent): Salad, sandwich, eggs   Describe what you typically consume for supper (typical or most recent): Varies   Describe what you typically consume as snacks (typical or most recent): Apple, pretzels, cookie, banana, grapes, chips, applesauce, cheese   How many ounces of water, or other low calorie drinks, do you drink daily (8 oz=1 glass)? 64 oz or more   How many ounces of caffeine (coffee, tea, pop) do you drink daily (8 oz=1 glass)? 0 oz   How many ounces of carbonated (pop, beer, sparkling water) drinks do you drinky daily (8 oz=1 glass)? 0 oz   How many ounces of juice, pop, sweet tea, sports drinks, protein drinks, other sweetened drinks, do you drink daily (8 oz=1 glass)? 0 oz   How many ounces of milk do you drink daily (8 oz=1 glass) 0 oz   How often do you drink alcohol? 2-4 times a month   If you do drink alcohol, how many drinks might you have in a day? (one drink = 5 oz. wine, 1 can/bottle of beer, 1 shot liquor) 1 or 2       Eating Habits 6/20/2022   Do you have any dietary restrictions? No   Do you currently binge eat (eat a large amount of food in a short time)? No   Are you an emotional eater? Yes   Do you get up to eat after falling asleep? No   What foods do you crave? Sweets       Dining Out History Reviewed With Patient 6/20/2022   How often do you dine out? Around once a week.  "  Where do you dine out? (select all that apply) sit-down restaurants, fast food chains, take out   What types of food do you order when you dine out? Hamburger, sandwich, salad         EXERCISE:       6/20/2022   How often do you exercise? 3 to 4 times per week   What is the duration of your exercise (in minutes)? 30 Minutes   What types of exercise do you do? walking, swimming, home gym   What keeps you from being more active?  Shortness of breath, Lack of Time, Too tired     Progress on Previous Goals   1) Review intuitive eating resources   -https://www.intuitiveeating.org/10-principles-of-intuitive-eating/  2) See hunger scale: https://s.Carlos.[facility]/sites/default/files/wellness-hungersatietyscale.pdf  3) 9\" Plate method (1/2 plate non-starchy vegetables/fruit, 1/4 plate lean protein, 1/4 plate whole grain starch - no more than 1/2 cup carb/meal)  4) Eat 3 meals per day   5) Find easy options for quick, healthy meals  Low Calorie Frozen Meal:  Healthy Choice Power Bowls  Lean Cuisine  Smart Ones  Navarro Baljinder Mello  Plasmonixe Family Meals    6) Meal prep tips: https://www.foodGeron.com/healthyeats/healthy-tips/2019/10/meal-prep-tips-hacks-experts  7) Avoid snacking as able. If snack is needed use lean protein and/or fruit/vegetable. Examples:   - 2 cup popcorn   - 1 cup mixed berries   - 15 almonds, walnuts, cashews   - carrot/celery sticks and 2 tbsp low-fat ranch   - 1 hard boiled egg   - Part-skim mozzarella cheese stick   - Low-fat, low-sugar greek yogurt with 1/2 cup berries   - Med apple or pear   - sliced bell peppers with 1/2 cup salsa   - 1/2 cup roasted chickpeas   - sliced cucumbers with vinegar    100 calorie sweets: Smart Sweets, Fiber One desserts, Fit and Active 100 calorie snack sweets at [employer]; [employer] 100 calorie pre-portioned cookies, sugar-free pudding, sugar-free jello.    Snack Recipes:  - Banana and creamy PB dip " "(https://www.diabetesfoodInterface Security Systemsb.org/recipes/sweet-peanut-buttery-dip.html?home-category_id=23)  - Roasted chickpeas (https://www.diabetesfoodU Grok It - Smartphone RFID.org/recipes/roasted-and-spiced-chickpeas.html?home-category_id=23)  - Lemon Raspberry eliazar seed pudding (https://www.diabetesfoodU Grok It - Smartphone RFID.org/recipes/lemon-raspberry- eliazar-seed-pudding.html?home-category_id=23)  - Black bean hummus with carrot and celery sticks (https://www.diabetesfoodU Grok It - Smartphone RFID.org/recipes/black-bean-hummus.html?home-category_id=23)  - Greek yogurt chocolate mouse (https://www.diabetesfoOslo Software.org/recipes/greek-yogurt-chocolate-mousse.html?home-category_id=23)   - Broccoli Cheese Bites  (https://www.diabetesfoodU Grok It - Smartphone RFID.org/recipes/broccoli-cheese-bites.html?home-category_id=20)  - Chicken Satay with peanut sauce  (https://www.diabetesfoOslo Software.org/recipes/blueberry-almond-chicken-salad-lettuce-wraps.html?home-category_id=20)  - Deviled Eggs  (https://www.diabetesfoOslo Software.org/recipes/devilled-eggs.html?home-category_id=20)      NUTRITION DIAGNOSIS:  Obesity r/t long history of positive energy balance aeb BMI >30 kg/m2.    INTERVENTION:  Intervention Provided/Education Provided on post-op diet guidelines, vitamins/minerals essential post-operatively, GI anatomy of bariatric surgeries, ways to help prepare for post-op diet guidelines pre-operatively, portion/calorie-control, mindful eating and sources of protein.  Patient demonstrates understanding. Provided pt with list of goals RD contact information.      Questions Reviewed With Patient 2022   How ready are you to make changes regarding your weight? Number 1 = Not ready at all to make changes up to 10 = very ready. 6   How confident are you that you can change? 1 = Not confident that you will be successful making changes up to 10 = very confident. 5       Expected Engagement: good    GOALS:  Relating To Eatin) Eat slowly (20-30 minutes per meal), chewing foods well (25 chews per bite/applesauce consistency)  2) 9\" " Plate method (1/2 plate non-starchy vegetables/fruit, 1/4 plate lean protein, 1/4 plate whole grain starch - no more than 1/2 cup carb/meal)  3) Eat 3 meals per day   4) Review handouts below    Relating to beverages:  1) Separate fluids from meals by 30 minutes before, during, and after eating  2) Drink 48-64 ounces of fluid per day    Relating to activity:  1) Continue activity as able    Surgery Related Nutrition Handouts:    Diet Guidelines after Weight Loss Surgery  [email or web address]     Lifestyle Changes Before and After Weight Loss Surgery: [place] for Success  [email or web address]     Modified Liquid Diet (2 liquid meals, 1 meal, 2 snacks)  [email or web address]     Your Stage 1 Diet: Clear Liquids  [email or web address]     Your Stage 2 Diet: Low-fat Full Liquids  [email or web address]     Your Stage 3 Diet: Pureed Foods  [email or web address]     Pureed Pleasures  [email or web address]    Your Stage 4 Diet: Soft Foods  [email or web address]    Your Stage 5 Diet: Regular Foods  [email or web address]    Supplements after Weight Loss Surgery  [email or web address]       The Plate Method  [email or web address]    Protein Sources for Weight Loss  [email or web address]     Carbohydrates  [email or web address]     Mindful Eating  [email or web address]     Summary of Volumetrics Eating Plan  [email or web address]     Diet Guidelines after Weight Loss Surgery  [email or web address]     Seated Exercises for Arms and Legs (can be done before or after surgery)  [email or web address]      Follow up: 1 month, prn    Time spent with patient: 18 minutes.  ANGELIKA AKERS RD, LD

## 2022-06-20 ENCOUNTER — OFFICE VISIT (OUTPATIENT)
Dept: ENDOCRINOLOGY | Facility: CLINIC | Age: 42
End: 2022-06-20
Payer: COMMERCIAL

## 2022-06-20 VITALS
OXYGEN SATURATION: 96 % | HEART RATE: 72 BPM | WEIGHT: 287 LBS | TEMPERATURE: 98.2 F | BODY MASS INDEX: 38.87 KG/M2 | HEIGHT: 72 IN | SYSTOLIC BLOOD PRESSURE: 124 MMHG | DIASTOLIC BLOOD PRESSURE: 78 MMHG

## 2022-06-20 DIAGNOSIS — K44.9 HIATAL HERNIA: ICD-10-CM

## 2022-06-20 DIAGNOSIS — R13.19 ESOPHAGEAL DYSPHAGIA: Primary | ICD-10-CM

## 2022-06-20 DIAGNOSIS — E66.812 CLASS 2 SEVERE OBESITY WITH SERIOUS COMORBIDITY AND BODY MASS INDEX (BMI) OF 38.0 TO 38.9 IN ADULT, UNSPECIFIED OBESITY TYPE (H): ICD-10-CM

## 2022-06-20 DIAGNOSIS — K22.70 BARRETT'S ESOPHAGUS WITHOUT DYSPLASIA: ICD-10-CM

## 2022-06-20 DIAGNOSIS — K21.00 GASTROESOPHAGEAL REFLUX DISEASE WITH ESOPHAGITIS WITHOUT HEMORRHAGE: ICD-10-CM

## 2022-06-20 DIAGNOSIS — Z86.711 HISTORY OF PULMONARY EMBOLISM: ICD-10-CM

## 2022-06-20 DIAGNOSIS — E66.01 CLASS 2 SEVERE OBESITY WITH SERIOUS COMORBIDITY AND BODY MASS INDEX (BMI) OF 38.0 TO 38.9 IN ADULT, UNSPECIFIED OBESITY TYPE (H): ICD-10-CM

## 2022-06-20 PROBLEM — R76.0 ANTI-CARDIOLIPIN ANTIBODY POSITIVE: Status: ACTIVE | Noted: 2022-06-20

## 2022-06-20 PROCEDURE — 99215 OFFICE O/P EST HI 40 MIN: CPT | Performed by: PHYSICIAN ASSISTANT

## 2022-06-20 RX ORDER — CHOLECALCIFEROL (VITAMIN D3) 50 MCG
1 TABLET ORAL EVERY MORNING
Status: ON HOLD | COMMUNITY
End: 2022-10-26

## 2022-06-20 RX ORDER — CHLORAL HYDRATE 500 MG
2 CAPSULE ORAL EVERY MORNING
Status: ON HOLD | COMMUNITY
End: 2022-12-11

## 2022-06-20 RX ORDER — UBIDECARENONE 75 MG
100 CAPSULE ORAL EVERY MORNING
Status: ON HOLD | COMMUNITY
End: 2022-10-26

## 2022-06-20 ASSESSMENT — PAIN SCALES - GENERAL: PAINLEVEL: NO PAIN (0)

## 2022-06-20 NOTE — LETTER
2022    Dear Psychiatrist/Psychologist/Therapist:    We have a mutual patient, Leah Yanez, : 1980, with morbid obesity who is considering undergoing bariatric surgery.  A psychological evaluation is being done to see if this patient is cleared from a psychological perspective to undergo the elective surgery.  However, we need your assistance with a letter of support as outlined below. Your input is valuable and will affect our decision to hold or proceed with bariatric surgery.    This letter of support should outline:  1. Summary of treatment to date.  2. Treatment goals for before and after surgery that indicate mental health support and continuation of care.  3. Any concerns regarding this patient s ability to follow through with treatment recommendations.    4. If known, documentation of cessation of illicit drug use (our policy requires patients to be drug free of illicit drugs for at least one year prior to surgery).   5.        A statement that indicates if you support or do not support this patient for weight loss surgery.    If you have any questions, feel free to contact us via our Call Center at [phone].  Please fax the evaluation to [phone] or route to Huma Cardoza RN via Epic.    Sincerely,    MD Ramone Werner MD Eric Wise, MD Kristi Kopacz, PA-C Kayla Gish, CNP    Mailing Address:  [facility]  [address], [facility] [address], [place] [address]  [email or web address]

## 2022-06-20 NOTE — PROGRESS NOTES
"40 minutes spent on the date of the encounter doing chart review, history and exam, documentation and further activities per the note    New Bariatric Surgery Consultation Note    2022    RE: Leah Yanez  MR#: [id number]  : 1980      Referring provider:       2022   Who referred you? Dr Aragon       Chief Complaint/Reason for visit: evaluation for possible weight loss surgery    Dear PCP,    I had the pleasure of seeing your patient, Leah Yanez, to evaluate her obesity and consider her for possible weight loss surgery. As you know, Leah Yanez is 42 year old.  She has a height of 6' 0\", a weight of 287 lbs 0 oz, and calculated Body mass index is 38.92 kg/m .     Referred by Dr White for possible bariatric surgery. \"We had a long discussion about her complicated GERD. She has indications for surgery (hiatal hernia, long-segment non-dysplastic Griggs's esophagus, and a esophageal peptic stricture) in combination with morbid obesity. I brought up the option of a Nissen fundoplication and of a Didier-en-Y gastric bypass, and reviewed in broad terms the pros and cons of each. She is interested to hear more about gastric bypass, and we have referred her to see Dr. Mike Aragon. \"    Saw Dr Aragon 22 and discussed sleeve and RYGB    Phentermine/topiramate discussed and agreed to avoid due to already having severe dry mouth  GLP1 Saxenda or Wegovy discussed and side effect risk of GI side effects. No hx of pancreatitis.  She is not interested in weight loss medication at this time due to side effect profile.    Hx of PE  when taking birth control, Hcg for weight loss    PreDM 5.8    Very active. Depression has affected some lack of motivation. Stress with mom with Alzheimers     Assessment & Plan   Problem List Items Addressed This Visit        Endocrine Diagnoses    Class 2 severe obesity with serious comorbidity and body mass index (BMI) of 38.0 to 38.9 in adult (H)       " Other    Esophageal dysphagia - Primary    Gastroesophageal reflux disease with esophagitis    Hiatal hernia    Griggs's esophagus        Plan for possible bariatric surgery sleeve gastrectomy  Tasklist given today  Follow up RD monthly  Follow up Dr Aragon again to discuss if planning for surgery.        HISTORY OF PRESENT ILLNESS:  Weight Loss History Reviewed with Patient 6/20/2022   How long have you been overweight? Since late 20's to early 40's   What is the most that you have ever weighed? 285   What is the most weight you have lost? 50   I have tried the following methods to lose weight Watching portions or calories, Exercise, Weight Watchers, Atkins type diet (low carb/high protein), Teena Seun, Pre packaged meals ex: Nutrisystem, Slimfast, OTC Medications   I have tried the following weight loss medications? (Check all that apply) None   Have you ever had weight loss surgery? No       CO-MORBIDITIES OF OBESITY INCLUDE:     6/20/2022   I have the following health issues associated with obesity: Infertility, GERD (Reflux)       PAST MEDICAL HISTORY:  Past Medical History:   Diagnosis Date     Depressive disorder, not elsewhere classified 3/06    Resolved 8/07       PAST SURGICAL HISTORY:  Past Surgical History:   Procedure Laterality Date     ESOPHAGOSCOPY, GASTROSCOPY, DUODENOSCOPY (EGD), COMBINED N/A 12/29/2021    Procedure: ESOPHAGOGASTRODUODENOSCOPY, WITH BIOPSY;  Surgeon: Esteban Rose DO;  [place] [facility] [place]     TONSILLECTOMY & ADENOIDECTOMY       Z NONSPECIFIC PROCEDURE      T&A as a child     ZZ NONSPECIFIC PROCEDURE      Tubes in ears bilaterally       FAMILY HISTORY:   Family History   Problem Relation Age of Onset     Heart Disease Father         MI, Lipids     Hypertension Mother         arthritis     Hypertension Maternal Grandfather         arthritis, macular degeneration     Cancer - colorectal Maternal Grandmother         arthritis     Alzheimer Disease Paternal Grandfather      Acute  Myocardial Infarction Father      Colon Cancer Paternal Grandmother        SOCIAL HISTORY:   Social History Questions Reviewed With Patient 6/20/2022   Which best describes your employment status (select all that apply) I work full-time   If you work, what is your occupation?    Which best describes your marital status:    Do you have children? No   Who do you have in your support network that can be available to help you for the first 2 weeks after surgery?    Who can you count on for support throughout your weight loss surgery journey? , family   Can you afford 3 meals a day?  Yes   Can you afford 50-60 dollars a month for vitamins? Yes       HABITS:     6/20/2022   How often do you drink alcohol? 2-4 times a month   If you do drink alcohol, how many drinks might you have in a day? (one drink = 5 oz. wine, 1 can/bottle of beer, 1 shot liquor) 1 or 2   Have you ever used any of the following nicotine products? No   Have you or are you currently using street drugs or prescription strength medication for which you do not have a prescription for? No   Do you have a history of chemical dependency (alcohol or drug abuse)? No     Currently taking narcotic/opioids No    PSYCHOLOGICAL HISTORY:   Psychological History Reviewed With Patient 6/20/2022   Have you ever attempted suicide? Never.   Have you had thoughts of suicide in the past year? No   Have you ever been hospitalized for mental illness or a suicide attempt? Never.   Do you have a history of chronic pain? No   Have you ever been diagnosed with fibromyalgia? No   Are you currently being treated for any of the following? (select all that apply) Depression   Are you currently seeing a therapist or counselor?  Yes   Are you currently seeing a psychiatrist? No       ROS:     6/20/2022   Skin:  None of the above   HEENT: None of these   Musculoskeletal: Joint Pain, Back pain   Cardiovascular: Shortness of breath with activity  "  Pulmonary: None of the above   Gastrointestinal: Heartburn, Reflux, Difficulty swallowing (food gets stuck)   Genitourinary: Stress incontinence (losing urine when coughing, sneezing, etc.)   Hematological: Pulmonary embolism (PE)   Neurological: None of the above   Female only: Irregular menstrual cycles       EATING BEHAVIORS:     6/20/2022   Have you or anyone else thought that you had an eating disorder? No   Do you currently binge eat (eat a large amount of food in a short time)? No   Are you an emotional eater? Yes   Do you get up to eat after falling asleep? No       EXERCISE:     6/20/2022   How often do you exercise? 3 to 4 times per week   What is the duration of your exercise (in minutes)? 30 Minutes   What types of exercise do you do? walking, swimming, home gym   What keeps you from being more active?  Shortness of breath, Lack of Time, Too tired       MEDICATIONS:  Current Outpatient Medications   Medication Sig Dispense Refill     buPROPion (WELLBUTRIN) 75 MG tablet Take 50 mg by mouth daily Pt not sure of dose       cyanocobalamin (VITAMIN B-12) 100 MCG tablet Take 100 mcg by mouth daily       fish oil-omega-3 fatty acids 1000 MG capsule Take 2 g by mouth daily       MULTIVITAMINS OR TABS 2 tablets bid       omeprazole (PRILOSEC) 40 MG DR capsule Take 1 capsule (40 mg) by mouth 2 times daily 180 capsule 3     vitamin D3 (CHOLECALCIFEROL) 50 mcg (2000 units) tablet Take 1 tablet by mouth daily       ANTIOXIDANTS OR CAPS 1 capsule bid (Patient not taking: No sig reported)       lidocaine, viscous, (XYLOCAINE) 2 % solution Swish and swallow 15 mLs in mouth every 8 hours as needed for moderate pain (post procedure pain on swallowing) ; Max 8 doses/24 hour period. (Patient not taking: No sig reported) 15 mL 0       ALLERGIES:  Allergies   Allergen Reactions     Azithromycin      Erythromycin GI Disturbance     When \"very young\"        Upper endoscopy (3/10/2022): 4 cm hiatal hernia, esophageal mucosal " changes secondary to established long-segment Griggs's disease (C9M10), benign appearing esophageal stenosis at 30 cm, z-line at 28 cm, TGF at 38 cm, hiatus at 42 cm, dilated. Normal stomach, normal duodenal bulb.      Esophageal biopsy (3/10/2022): Griggs's esophagus (28 cm to 38 cm, q 2 cm, 4-quadrant), negative for dysplasia      PHYSICAL EXAM:  Objective    /78 (BP Location: Left arm, Patient Position: Chair, Cuff Size: Adult Large)   Pulse 72   Temp 98.2  F (36.8  C) (Oral)   Ht 1.829 m (6')   Wt 130.2 kg (287 lb)   SpO2 96%   BMI 38.92 kg/m    Body mass index is 38.92 kg/m .  Physical Exam   GENERAL: Healthy, alert and no distress  EYES: Eyes grossly normal to inspection.  No discharge or erythema, or obvious scleral/conjunctival abnormalities.  RESP: No audible wheeze, cough, or visible cyanosis.  No visible retractions or increased work of breathing.    SKIN: Visible skin clear. No significant rash, abnormal pigmentation or lesions.  NEURO: Cranial nerves grossly intact.  Mentation and speech appropriate for age.  PSYCH: Mentation appears normal, affect normal/bright, judgement and insight intact, normal speech and appearance well-groomed.      In summary, Leah Yanez has Class III obesity with a body mass index of Body mass index is 38.92 kg/m . kg/m2 and the comorbidities stated above. She completed an informational seminar and is a possible candidate for the laparoscopic gastric sleeve.  She will have to complete the following pre-requisites:    If you have not already watched our online seminar please go to [email or web address]    Weight loss requirement: 10 lbs prior to surgery. Will have final weight check 2-3 weeks prior to surgery at anesthesia or nurse pre-op teaching visit.    -Need current weight confirmation at primary clinic or weight management clinic No    Bariatric labs ordered, call for a lab only appointment at any [facility] lab. To find a lab location  near you, please call [phone]. Please let us know if orders need to be faxed to a [facility] lab.    Schedule bariatric psych eval as soon as possible.  List of psychologists will be sent to you via moneymeets or given to you in clinic.     Call Geovanny Noriega at [phone] to discuss insurance coverage for bariatric surgery.  Please check with your insurance regarding bariatric surgery coverage also. Geovanny can also help you with scheduling psych eval if you are having difficulties.    The following clearance letters are needed: Letters will be sent to you via moneymeets or given to you in clinic  - Letter of support from primary care provider. Provider can submit through electronic medical record or fax to [phone]  - hematology clearance due to hx of PE  - letter of clearance from therapist    Smoking cessation and nicotine test needed: No    Birth control after surgery discussed. Patient instructed that 2 forms of birth control required after surgery and to avoid pregnancy for at least 18 months after surgery: N/A    NEXT VISITS: A  should reach out to you to schedule the following appointments.  If they do not reach you please call [phone] to schedule the following appointments:    -See dietitian in 1 month and monthly for 3 months    -See Dr Aragon by video prior to surgery to discuss again.    Today in the office we discussed gastric sleeve surgery. Preoperative, perioperative, and postoperative processes, management, and follow up were addressed.  Risks and benefits were outlined including the risk of death, staple line leak (1-2%), PE, DVT, ulcer, worsening GERD, N/V, stricture, hernia, wound infection, weight regain, and vitamin deficiencies. I emphasized exercise and activity along with appropriate food choice as the main foundation for weight loss with surgery providing surgical reinforcement of this.  All questions were answered.  A goal sheet and support group handout were  given to the patient.      Once the patient has completed the requirements in their task list and there are no further recommendations, the pt will be allowed to see the surgeon of her choice for consultation on the laparoscopic gastric sleeve surgery. Patient verbalizes understanding of the process to surgery and expectations for the postoperative period including the need for lifelong lifestyle changes, vitamin supplementation, and laboratory monitoring.    Sincerely,     Vanessa Agustin PA-C

## 2022-06-20 NOTE — LETTER
2022      Dear Hematologist:    Thank you for taking the time to see our mutual patient, Leah Yanez, : 1980, for a pre-operative hematology clearance evaluation for bariatric surgery.  Please assist us with the following during your evaluation of our mutual patient:      Initial patient evaluation    Arrange and expedite necessary testing    Guide and facilitate follow-up treatment    Provide a letter stating that the patient is cleared to proceed with bariatric surgery from a hematology standpoint    Indicate what treatment is needed pre-surgery (if any), during inpatient hospitalization and after surgery    The evaluation must confirm that the patient is stable from a hematology standpoint to proceed with the surgery. If you have any questions, feel free to contact us via our Call Center at [phone].  Please fax the evaluation to [phone] or route to Huma Cardoza RN via Epic.    Sincerely,    MD Ramone Werner MD Eric Wise, MD Kristi Kopacz, MICHEAL Phipps, CNP    Mailing Address:  [facility]  [address], [facility] [address], [place] [address]  [email or web address]

## 2022-06-20 NOTE — NURSING NOTE
Chief Complaint   Patient presents with     New Patient     Leah, is being seen today for a consult.       Vitals:    06/20/22 1051   BP: 124/78   BP Location: Left arm   Patient Position: Chair   Cuff Size: Adult Large   Pulse: 72   Temp: 98.2  F (36.8  C)   TempSrc: Oral   SpO2: 96%   Weight: 130.2 kg (287 lb)   Height: 1.829 m (6')       Body mass index is 38.92 kg/m .      Diana Rush LPN

## 2022-06-20 NOTE — LETTER
"2022       RE: Leah Yanez  [address] [facility] [address]     Dear Colleague,    Thank you for referring your patient, Leah Yanez, to the [facility] [place] [facility]. Please see a copy of my visit note below.    40 minutes spent on the date of the encounter doing chart review, history and exam, documentation and further activities per the note    New Bariatric Surgery Consultation Note    2022    RE: Leah Yanez  MR#: [id number]  : 1980      Referring provider:       2022   Who referred you? Dr Aragon       Chief Complaint/Reason for visit: evaluation for possible weight loss surgery    Dear PCP,    I had the pleasure of seeing your patient, Leah Yanez, to evaluate her obesity and consider her for possible weight loss surgery. As you know, Leah Yanez is 42 year old.  She has a height of 6' 0\", a weight of 287 lbs 0 oz, and calculated Body mass index is 38.92 kg/m .     Referred by Dr White for possible bariatric surgery. \"We had a long discussion about her complicated GERD. She has indications for surgery (hiatal hernia, long-segment non-dysplastic Griggs's esophagus, and a esophageal peptic stricture) in combination with morbid obesity. I brought up the option of a Nissen fundoplication and of a Didier-en-Y gastric bypass, and reviewed in broad terms the pros and cons of each. She is interested to hear more about gastric bypass, and we have referred her to see Dr. Mike Aragon. \"    Saw Dr Aragon 22 and discussed sleeve and RYGB    Phentermine/topiramate discussed and agreed to avoid due to already having severe dry mouth  GLP1 Saxenda or Wegovy discussed and side effect risk of GI side effects. No hx of pancreatitis.  She is not interested in weight loss medication at this time due to side effect profile.    Hx of PE  when taking birth control, Hcg for weight " loss    PreDM 5.8    Very active. Depression has affected some lack of motivation. Stress with mom with Alzheimers     Assessment & Plan   Problem List Items Addressed This Visit        Endocrine Diagnoses    Class 2 severe obesity with serious comorbidity and body mass index (BMI) of 38.0 to 38.9 in adult (H)       Other    Esophageal dysphagia - Primary    Gastroesophageal reflux disease with esophagitis    Hiatal hernia    Griggs's esophagus        Plan for possible bariatric surgery sleeve gastrectomy  Tasklist given today  Follow up RD monthly  Follow up Dr Aragon again to discuss if planning for surgery.        HISTORY OF PRESENT ILLNESS:  Weight Loss History Reviewed with Patient 6/20/2022   How long have you been overweight? Since late 20's to early 40's   What is the most that you have ever weighed? 285   What is the most weight you have lost? 50   I have tried the following methods to lose weight Watching portions or calories, Exercise, Weight Watchers, Atkins type diet (low carb/high protein), Teena Seun, Pre packaged meals ex: Nutrisystem, Slimfast, OTC Medications   I have tried the following weight loss medications? (Check all that apply) None   Have you ever had weight loss surgery? No       CO-MORBIDITIES OF OBESITY INCLUDE:     6/20/2022   I have the following health issues associated with obesity: Infertility, GERD (Reflux)       PAST MEDICAL HISTORY:  Past Medical History:   Diagnosis Date     Depressive disorder, not elsewhere classified 3/06    Resolved 8/07       PAST SURGICAL HISTORY:  Past Surgical History:   Procedure Laterality Date     ESOPHAGOSCOPY, GASTROSCOPY, DUODENOSCOPY (EGD), COMBINED N/A 12/29/2021    Procedure: ESOPHAGOGASTRODUODENOSCOPY, WITH BIOPSY;  Surgeon: Esteban Rose DO;  Location: [facility] [place] OR     TONSILLECTOMY & ADENOIDECTOMY       Z NONSPECIFIC PROCEDURE      T&A as a child     ZZC NONSPECIFIC PROCEDURE      Tubes in ears bilaterally       FAMILY HISTORY:   Family  History   Problem Relation Age of Onset     Heart Disease Father         MI, Lipids     Hypertension Mother         arthritis     Hypertension Maternal Grandfather         arthritis, macular degeneration     Cancer - colorectal Maternal Grandmother         arthritis     Alzheimer Disease Paternal Grandfather      Acute Myocardial Infarction Father      Colon Cancer Paternal Grandmother        SOCIAL HISTORY:   Social History Questions Reviewed With Patient 6/20/2022   Which best describes your employment status (select all that apply) I work full-time   If you work, what is your occupation?    Which best describes your marital status:    Do you have children? No   Who do you have in your support network that can be available to help you for the first 2 weeks after surgery?    Who can you count on for support throughout your weight loss surgery journey? , family   Can you afford 3 meals a day?  Yes   Can you afford 50-60 dollars a month for vitamins? Yes       HABITS:     6/20/2022   How often do you drink alcohol? 2-4 times a month   If you do drink alcohol, how many drinks might you have in a day? (one drink = 5 oz. wine, 1 can/bottle of beer, 1 shot liquor) 1 or 2   Have you ever used any of the following nicotine products? No   Have you or are you currently using street drugs or prescription strength medication for which you do not have a prescription for? No   Do you have a history of chemical dependency (alcohol or drug abuse)? No     Currently taking narcotic/opioids No    PSYCHOLOGICAL HISTORY:   Psychological History Reviewed With Patient 6/20/2022   Have you ever attempted suicide? Never.   Have you had thoughts of suicide in the past year? No   Have you ever been hospitalized for mental illness or a suicide attempt? Never.   Do you have a history of chronic pain? No   Have you ever been diagnosed with fibromyalgia? No   Are you currently being treated for any of the  following? (select all that apply) Depression   Are you currently seeing a therapist or counselor?  Yes   Are you currently seeing a psychiatrist? No       ROS:     6/20/2022   Skin:  None of the above   HEENT: None of these   Musculoskeletal: Joint Pain, Back pain   Cardiovascular: Shortness of breath with activity   Pulmonary: None of the above   Gastrointestinal: Heartburn, Reflux, Difficulty swallowing (food gets stuck)   Genitourinary: Stress incontinence (losing urine when coughing, sneezing, etc.)   Hematological: Pulmonary embolism (PE)   Neurological: None of the above   Female only: Irregular menstrual cycles       EATING BEHAVIORS:     6/20/2022   Have you or anyone else thought that you had an eating disorder? No   Do you currently binge eat (eat a large amount of food in a short time)? No   Are you an emotional eater? Yes   Do you get up to eat after falling asleep? No       EXERCISE:     6/20/2022   How often do you exercise? 3 to 4 times per week   What is the duration of your exercise (in minutes)? 30 Minutes   What types of exercise do you do? walking, swimming, home gym   What keeps you from being more active?  Shortness of breath, Lack of Time, Too tired       MEDICATIONS:  Current Outpatient Medications   Medication Sig Dispense Refill     buPROPion (WELLBUTRIN) 75 MG tablet Take 50 mg by mouth daily Pt not sure of dose       cyanocobalamin (VITAMIN B-12) 100 MCG tablet Take 100 mcg by mouth daily       fish oil-omega-3 fatty acids 1000 MG capsule Take 2 g by mouth daily       MULTIVITAMINS OR TABS 2 tablets bid       omeprazole (PRILOSEC) 40 MG DR capsule Take 1 capsule (40 mg) by mouth 2 times daily 180 capsule 3     vitamin D3 (CHOLECALCIFEROL) 50 mcg (2000 units) tablet Take 1 tablet by mouth daily       ANTIOXIDANTS OR CAPS 1 capsule bid (Patient not taking: No sig reported)       lidocaine, viscous, (XYLOCAINE) 2 % solution Swish and swallow 15 mLs in mouth every 8 hours as needed for  "moderate pain (post procedure pain on swallowing) ; Max 8 doses/24 hour period. (Patient not taking: No sig reported) 15 mL 0       ALLERGIES:  Allergies   Allergen Reactions     Azithromycin      Erythromycin GI Disturbance     When \"very young\"        Upper endoscopy (3/10/2022): 4 cm hiatal hernia, esophageal mucosal changes secondary to established long-segment Griggs's disease (C9M10), benign appearing esophageal stenosis at 30 cm, z-line at 28 cm, TGF at 38 cm, hiatus at 42 cm, dilated. Normal stomach, normal duodenal bulb.      Esophageal biopsy (3/10/2022): Griggs's esophagus (28 cm to 38 cm, q 2 cm, 4-quadrant), negative for dysplasia      PHYSICAL EXAM:  Objective    /78 (BP Location: Left arm, Patient Position: Chair, Cuff Size: Adult Large)   Pulse 72   Temp 98.2  F (36.8  C) (Oral)   Ht 1.829 m (6')   Wt 130.2 kg (287 lb)   SpO2 96%   BMI 38.92 kg/m    Body mass index is 38.92 kg/m .  Physical Exam   GENERAL: Healthy, alert and no distress  EYES: Eyes grossly normal to inspection.  No discharge or erythema, or obvious scleral/conjunctival abnormalities.  RESP: No audible wheeze, cough, or visible cyanosis.  No visible retractions or increased work of breathing.    SKIN: Visible skin clear. No significant rash, abnormal pigmentation or lesions.  NEURO: Cranial nerves grossly intact.  Mentation and speech appropriate for age.  PSYCH: Mentation appears normal, affect normal/bright, judgement and insight intact, normal speech and appearance well-groomed.      In summary, eLah Yanez has Class III obesity with a body mass index of Body mass index is 38.92 kg/m . kg/m2 and the comorbidities stated above. She completed an informational seminar and is a possible candidate for the laparoscopic gastric sleeve.  She will have to complete the following pre-requisites:    If you have not already watched our online seminar please go to [email or web address]    Weight loss requirement: 10 " lbs prior to surgery. Will have final weight check 2-3 weeks prior to surgery at anesthesia or nurse pre-op teaching visit.    -Need current weight confirmation at primary clinic or weight management clinic No    Bariatric labs ordered, call for a lab only appointment at any [facility] lab. To find a lab location near you, please call [phone]. Please let us know if orders need to be faxed to a non [facility] lab.    Schedule bariatric psych eval as soon as possible.  List of psychologists will be sent to you via [employer] or given to you in clinic.     Call Geovanny Noriega at [phone] to discuss insurance coverage for bariatric surgery.  Please check with your insurance regarding bariatric surgery coverage also. Geovanny can also help you with scheduling psych eval if you are having difficulties.    The following clearance letters are needed: Letters will be sent to you via [employer] or given to you in clinic  - Letter of support from primary care provider. Provider can submit through electronic medical record or fax to [phone]  - hematology clearance due to hx of PE  - letter of clearance from therapist    Smoking cessation and nicotine test needed: No    Birth control after surgery discussed. Patient instructed that 2 forms of birth control required after surgery and to avoid pregnancy for at least 18 months after surgery: N/A    NEXT VISITS: A  should reach out to you to schedule the following appointments.  If they do not reach you please call [phone] to schedule the following appointments:    -See dietitian in 1 month and monthly for 3 months    -See Dr Aragon by video prior to surgery to discuss again.    Today in the office we discussed gastric sleeve surgery. Preoperative, perioperative, and postoperative processes, management, and follow up were addressed.  Risks and benefits were outlined including the risk of death, staple line leak (1-2%), PE, DVT, ulcer, worsening GERD,  N/V, stricture, hernia, wound infection, weight regain, and vitamin deficiencies. I emphasized exercise and activity along with appropriate food choice as the main foundation for weight loss with surgery providing surgical reinforcement of this.  All questions were answered.  A goal sheet and support group handout were given to the patient.      Once the patient has completed the requirements in their task list and there are no further recommendations, the pt will be allowed to see the surgeon of her choice for consultation on the laparoscopic gastric sleeve surgery. Patient verbalizes understanding of the process to surgery and expectations for the postoperative period including the need for lifelong lifestyle changes, vitamin supplementation, and laboratory monitoring.    Sincerely,     Vanessa Agustin PA-C

## 2022-06-20 NOTE — PATIENT INSTRUCTIONS
If you have not already watched our online seminar please go to [email or web address]    Weight loss requirement: 10 lbs prior to surgery. Will have final weight check 2-3 weeks prior to surgery at anesthesia or nurse pre-op teaching visit.    -Need current weight confirmation at primary clinic or weight management clinic No    Bariatric labs ordered, call for a lab only appointment at any [facility] lab. To find a lab location near you, please call [phone]. Please let us know if orders need to be faxed to a non [facility] lab.    Schedule bariatric psych eval as soon as possible.  List of psychologists will be sent to you via [employer] or given to you in clinic.     Call Geovanny Noriega at [phone] to discuss insurance coverage for bariatric surgery.  Please check with your insurance regarding bariatric surgery coverage also. Geovanny can also help you with scheduling psych eval if you are having difficulties.    The following clearance letters are needed: Letters will be sent to you via [employer] or given to you in clinic  - Letter of support from primary care provider. Provider can submit through electronic medical record or fax to [phone]  - hematology clearance due to hx of PE  - letter of clearance from therapist    Smoking cessation and nicotine test needed: No    Birth control after surgery discussed. Patient instructed that 2 forms of birth control required after surgery and to avoid pregnancy for at least 18 months after surgery: N/A    NEXT VISITS: A  should reach out to you to schedule the following appointments.  If they do not reach you please call [phone] to schedule the following appointments:    -See dietitian in 1 month and monthly for 3 months    -See Dr Aragon by video prior to surgery to discuss again.    SAXENDA (liralutide)    We are considering starting a GLP-1 (Glucagon-like Peptide-1) medication called Saxenda. One of the ways it works is by slowing  down the rate that food leaves your stomach. You feel soto and will eat less. It also helps regulate hormones that can help improve your blood sugars.    If you are a patient that checks blood sugars, continue to check as instructed by your doctor. Low blood sugars are rare but can happen if patients are on insulin or other oral agents. If you notice consistent low sugars or high sugars, your medication may need to be adjusted after your appointment. If this is the case, please call RN and provide her your blood sugar record from the last 3-4 days. The RN will get in touch with the doctor and call you back/Mychart message with recommendations. We tolerate high sugars for a bit, so if sugars are running 180-200, this is ok. As weight starts dropping the blood sugars should too. If readings are consistently over 200 for 1-2 weeks, then you should call the doctor/nurse.    Dosing for this medication:   Week 1- Inject 0.6 mg daily  Week 2- Inject 1.2 mg daily  Week 3- Inject 1.8 mg daily  Week 4- Inject 2.4 mg daily  Week 5 and thereafter- Inject 3.0 mg daily    Side effects of GLP- Medications include: The most common side effects are all GI related and consist of: nausea, constipation, diarrhea, burping, or gassiness. Patients are advised to eat slowly and less, and nausea typically passes if people can stick it out.     The risk of pancreatitis (inflammation of the pancreas) has been associated with this type of medication, but is very rare.  If you have had pancreatitis in the past, this medication may not be for you. Please let us know about any past history of pancreas problems.    Symptoms of pancreatitis include: Pain in your upper stomach area which may travel to your back and be worse after eating. Your stomach area may be tender to the touch.  You may have vomiting or nausea and/or have a fever. If you should develop any of these symptoms, stop the medication and contact your primary care doctor. They will  do a blood test to check for pancreatitis.         There is a small chance you may have some low blood sugar after taking the medication.   The signs of low blood sugar are:  Weakness  Shaky   Hungry  Sweating  Confusion      See below for ways to treat low blood sugar without adding in lots of extra calories.      Treating Low Blood Sugar    If you have symptoms of low blood sugar (sweating, shaking, dizzy, confused) eat 15 grams of carbs and wait 15 minutes:    Glucose Tabs are best for sugars under 70 -  Dex4 or BD Glucose tablets are good, you will need to take 3-4 of these to equal 15 grams.     One small box of raisins  4 oz fruit juice box or   cup fruit juice  1 small apple  1 small banana    cup canned fruit in water    English muffin or a slice of bread with jelly   1 low fat frozen waffle with sugar-free syrup    cup cottage cheese with   cup frozen or fresh blueberries  1 cup skim or low-fat milk    cup whole grain cereal  4-6 crackers such as Triscuits      This medication is usually not covered by insurance and can be quite expensive. Sometimes a prior authorization is required, which may take up to 1-2 weeks for an insurance company to make a decision if they will cover the medication. Please be patient, you will be notified after a decision has been made.    For any questions or concerns please send a Goodoc message to our team or call our weight management call center at [phone] during regular business hours. For questions during evenings or weekends your messages will be addressed during the next business day.  For emergencies please call 911 or seek immediate medical care.      (Do not stop taking it if you don't think it's working. For some people it works without them knowing it.)     In order to get refills of this or any medication we prescribe you must be seen in the medical weight mgmt clinic every 2-4 months. Please have your pharmacy fax a refill request to [phone].      NOE  (semaglutide)    What is Wegovy?    Wegovy (semaglutide) injection 2.4 mg is an injectable prescription medicine used for adults with obesity (BMI ?30) or overweight (excess weight) (BMI ?27) who also have weight-related medical problems to help them lose weight and keep the weight off.    1.  Start Wegovy (semaglutide) 0.25 mg once weekly for 4 weeks, then if tolerating increase to 0.5 mg weekly for 4 weeks, then if tolerating increase to 1 mg weekly for 4 weeks, then if tolerating increase to 1.7 mg weekly for 4 weeks, then if tolerating increase to 2.4 mg weekly thereafter.    -Each Wegovy pen is a once weekly single-dose prefilled pen with a pen injector already built within the pen. Discard the Wegovy pen after use in sharps container.     2. Storage: make sure that when you get the prescription that you store the prescription in the refrigerator until it is time to use the Wegovy pen.  Once it is time to use the Wegovy pen, you can keep the pen at room temperature and it is good for up to 28 days at room temperature.     3.  Potential common side effects: nausea, headache, diarrhea, stomach upset.  If these become unmanageable or concerning symptoms, please make sure to call or Berry Whitehart.      Go to site: Wegovy video to learn more and watch instruction videos.      For any questions or concerns please send a [employer] message to our team or call our weight management call center at [phone] during regular business hours. For questions during evenings or weekends your messages will be addressed during the next business day.  For emergencies please call 911 or seek immediate medical care.     Bariatric Task List    Fax:  Please fax all paperwork to: [phone] -     Status:  Is patient a candidate for bariatric surgery?:    -     Cleared to schedule surgeon consult?:    -     Status:  surgery evaluation in process -     Surgeon: Margo -     Tentative surgery month/year: TBD -        Insurance: Insurance:   [facility] -      Contact insurance to discuss coverage: Needed -       Cigna: PCP Recommendation and Medical Clearance:    -     HP Referral:    -      Advanced beneficiary notification (ABN) for Medicare patients for RD visits   and surgery:   -      Weight history:   -     Other:    -        Patient Info: Initial Weight:  287 -     Date of Initial Weight/Height:  6/20/2022 -     Goal Weight (lbs):  277 -     Required Weight Loss:  10 -     Surgery Type:  sleeve gastrectomy -     Multidisciplinary Meeting:    -        Dietician Visits: Structured weight loss required by insurance?:  structured weight loss required -     Dietician Visit 1:  Completed -     Dietician Visit 2:  Needed -     Dietician Visit 3:  Needed -     Dietician Visit 4:    -     Dietician Visit 5:    -     Dietician Visit 6:    -     Dietician Visit additional:    -     Clearance from dietician to see surgeon?:    -     Dietician Notes:    -        Psychological Evaluation: Psych eval:  Needed -     Therapist letter of support:  Needed -     Psychiatrist letter of support:    -     Establish care with therapist:    -     Complete eating disorder evaluation:    -     Letter of clearance from therapist/eating disorder program:    -     Other:    -        Lab Work: Complete Blood Count:    -     Comprehensive Metabolic Panel:    -     Vitamin D:    -     PTH:    -     Hgb A1c:    -      Lipids:   -      TSH ([facility], [facility], [place]):   -       Ferritin:   -       Folate:   -       Testosterone, Total and Free:   -     Thiamine:   -     Vitamin A:   -     Vitamin B12:   -     Zinc:   -     C-peptide:   -     H. pylori:    -     MRSA (2 swabs, minimum 48 hours apart):   -     Nicotine Testing:    -     Recheck Vitamin D:   -     Other:    -        Consults/ Clearance Sleep Medicine:    -     Cardiac:    -     Pain:   -     Dental:    -     Endocrine:    -     Gastroenterology:    -     Vascular Medicine:    -     Hematology:  Needed - hx of PE   Medical Weight  "Management:   -     Physical Therapy/Exercise:    -     Nephrology:    -     Neurology:    -     Pulmonology:    -     Rheumatology:    -     Other:    -     Other:    -     Other:    -        Testing: UGI:    -     EGD:    -     Sleep Study:   -     Other:   -     Other:    -        PCP: Establish care with PCP:    -     Follow up with PCP:    -     PCP letter of support:    -        Stopping Smoking/ Alcohol Use: Quit tobacco use (3 months smoke free)?:    -     Quit date:    -     Quit alcohol use:   -     Quit date:   -     Other:   -     Quit date:   -        Patient Education:  Information Session:    -     Given \"Making your decision\" handout?:    -     Given \"A Roadmap to you Weight Loss Surgery\" handout?:   -     Given \"Get Well Loop\" information?:   -     Given support group information?:    -     Attended support group?:    -     Support plan in place?:    -     Research consents signed?:    -     Avoid NSAIDS/ Alternate Plan for Pain:   -        Additional Surgery Requirements: Review Coag plan:    -     HgA1c <8:    -     Inpatient pain consult:    -     Final nicotine screen:    -     Dental work complete:    -     Birth control plan:    -     Gallstone prevention plan (Actigall for 6 months postop):   -     Other:   -     Other:   -        Final Tasks:  Before surgery online class:    -     Before surgery online class website link:  [email or web address]   After surgery online class:    -     After surgery online class website link:  [email or web address]   Nurse visit per clinic:    -     History and Physical per clinic:   -     Final labs per clinic:   -     Chest xray per clinic:   -     Electrocardiogram (ECG) per clinic:   -     Other:   -        Notes:   -            "

## 2022-06-21 ENCOUNTER — TELEPHONE (OUTPATIENT)
Dept: ENDOCRINOLOGY | Facility: CLINIC | Age: 42
End: 2022-06-21
Payer: COMMERCIAL

## 2022-06-21 ENCOUNTER — VIRTUAL VISIT (OUTPATIENT)
Dept: ENDOCRINOLOGY | Facility: CLINIC | Age: 42
End: 2022-06-21
Payer: COMMERCIAL

## 2022-06-21 DIAGNOSIS — E66.01 CLASS 2 SEVERE OBESITY WITH SERIOUS COMORBIDITY AND BODY MASS INDEX (BMI) OF 38.0 TO 38.9 IN ADULT, UNSPECIFIED OBESITY TYPE (H): ICD-10-CM

## 2022-06-21 DIAGNOSIS — E66.812 CLASS 2 SEVERE OBESITY WITH SERIOUS COMORBIDITY AND BODY MASS INDEX (BMI) OF 38.0 TO 38.9 IN ADULT, UNSPECIFIED OBESITY TYPE (H): ICD-10-CM

## 2022-06-21 DIAGNOSIS — Z71.3 NUTRITIONAL COUNSELING: Primary | ICD-10-CM

## 2022-06-21 PROCEDURE — 97803 MED NUTRITION INDIV SUBSEQ: CPT | Mod: GT | Performed by: DIETITIAN, REGISTERED

## 2022-06-21 NOTE — TELEPHONE ENCOUNTER
Patient called requesting I check with her insurance. She has [facility], her  works for [employer]. Will call.   Discussed scheduling psychological evaluation sooner than later. Scheduled with Dr. Romano on September 19 at 8:30 a.m. for a video visit, plan on 2 hours to complete interview followed by MMPI testing.

## 2022-06-21 NOTE — LETTER
"6/21/2022       RE: Leah Yanez  [address]  [place] [address]     Dear Colleague,    Thank you for referring your patient, Leah Yanez, to the [facility]. Please see a copy of my visit note below.      Leah Yanez is a 42 year old female who is being evaluated via a billable video visit.      The patient has been notified of following:     \"This video visit will be conducted via a call between you and your physician/provider. We have found that certain health care needs can be provided without the need for an in-person physical exam.  This service lets us provide the care you need with a video conversation.  If a prescription is necessary we can send it directly to your pharmacy.  If lab work is needed we can place an order for that and you can then stop by our lab to have the test done at a later time.    Video visits are billed at different rates depending on your insurance coverage.  Please reach out to your insurance provider with any questions.    If during the course of the call the physician/provider feels a video visit is not appropriate, you will not be charged for this service.\"    Patient has given verbal consent for Video visit? Yes  How would you like to obtain your AVS? MyChart  If you are dropped from the video visit, the video invite should be resent to: Send to e-mail at: [email or web address]  Will anyone else be joining your video visit? No  {If patient encounters technical issues they should call [phone]      Video-Visit Details    Type of service:  Video Visit    Video Start Time: 10:30 AM  Video End Time: 10:48 PM    Originating Location (pt. Location): Home    Distant Location (provider location):  [facility]     Platform used for Video Visit: GENELINK    During this virtual visit the patient is located in [place], patient verifies " this as the location during the entirety of this visit.     New Bariatric Nutrition Consultation Note    Reason For Visit: Nutrition Assessment    Leah Yanez is a 42 year old presenting today for new bariatric nutrition consult.   Pt is interested in laparoscopic sleeve gastrectomy with Dr. Aragon expected surgery in [facility].  Patient is accompanied by self.  This is pt's second of 3 required nutrition visits prior to surgery. Pt was previously seen as a [facility] patient.    Pt referred by RAIMUNDO Lovelace on June 21, 2022.  CO-MORBIDITIES OF OBESITY INCLUDE:       6/20/2022   I have the following health issues associated with obesity: Infertility, GERD (Reflux)       SUPPORT:  Support System Reviewed With Patient 6/20/2022   Who do you have in your support network that can be available to help you for the first 2 weeks after surgery?    Who can you count on for support throughout your weight loss surgery journey? , family       ANTHROPOMETRICS:  Estimated body mass index is 38.92 kg/m  as calculated from the following:    Height as of 6/20/22: 1.829 m (6').    Weight as of 6/20/22: 130.2 kg (287 lb).    Required weight loss goal pre-op: -10 lbs from initial consult weight (goal weight 277 lbs or less before surgery)       6/20/2022   I have tried the following methods to lose weight Watching portions or calories, Exercise, Weight Watchers, Atkins type diet (low carb/high protein), Teena Seun, Pre packaged meals ex: Nutrisystem, Slimfast, OTC Medications       Weight Loss Questions Reviewed With Patient 6/20/2022   How long have you been overweight? Since late 20's to early 40's       SUPPLEMENT INFORMATION:  MVI, vitamin b12, vitamin D    NUTRITION HISTORY:  Food allergies: none  Food intolerances: none  Previous methods of diet modification for weight loss: WW, keto, atkins, calorie reduction    Pt saw Dr. Aragon d/t hiatal hernia and for possible weight loss surgery. Pt has been trying to lose weight  since high school. Has been trying to practice intuitive eating. Feels overwhelmed when on a diet so prefers to focus on hunger cues and balance. Does not feel hunger cues and sometimes feels overwhelmed when deciding what to eat. Snacks often at night.     6/21/22: Pt has decided that she would like to pursue weight loss surgery. Has not weighed herself recently but feels like she is doing well with mindful eating and balancing meals.    Recent food recall:  Breakfast: usually skips  Lunch: salad or sandwich  Dinner: HelloFresh, EveryPlate meal   Snacks: pretzels, apple with cheese or pb, mangoes, grapes  Beverages: water, tea  Alcohol: rarely- social drinking  Dining out: occasionally       Recall Diet Questions Reviewed With Patient 6/20/2022   Describe what you typically consume for breakfast (typical or most recent): Nothing, protein shake, eggs, bagel   Describe what you typically consume for lunch (typical or most recent): Salad, sandwich, eggs   Describe what you typically consume for supper (typical or most recent): Varies   Describe what you typically consume as snacks (typical or most recent): Apple, pretzels, cookie, banana, grapes, chips, applesauce, cheese   How many ounces of water, or other low calorie drinks, do you drink daily (8 oz=1 glass)? 64 oz or more   How many ounces of caffeine (coffee, tea, pop) do you drink daily (8 oz=1 glass)? 0 oz   How many ounces of carbonated (pop, beer, sparkling water) drinks do you drinky daily (8 oz=1 glass)? 0 oz   How many ounces of juice, pop, sweet tea, sports drinks, protein drinks, other sweetened drinks, do you drink daily (8 oz=1 glass)? 0 oz   How many ounces of milk do you drink daily (8 oz=1 glass) 0 oz   How often do you drink alcohol? 2-4 times a month   If you do drink alcohol, how many drinks might you have in a day? (one drink = 5 oz. wine, 1 can/bottle of beer, 1 shot liquor) 1 or 2       Eating Habits 6/20/2022   Do you have any dietary  "restrictions? No   Do you currently binge eat (eat a large amount of food in a short time)? No   Are you an emotional eater? Yes   Do you get up to eat after falling asleep? No   What foods do you crave? Sweets       Dining Out History Reviewed With Patient 6/20/2022   How often do you dine out? Around once a week.   Where do you dine out? (select all that apply) sit-down restaurants, fast food chains, take out   What types of food do you order when you dine out? Hamburger, sandwich, salad         EXERCISE:       6/20/2022   How often do you exercise? 3 to 4 times per week   What is the duration of your exercise (in minutes)? 30 Minutes   What types of exercise do you do? walking, swimming, home gym   What keeps you from being more active?  Shortness of breath, Lack of Time, Too tired     Progress on Previous Goals   1) Review intuitive eating resources   -https://www.intuitiveeating.org/10-principles-of-intuitive-eating/  2) See hunger scale: https://s.[facility]oster.[facility]/sites/default/files/wellness-hungersatietyscale.pdf  3) 9\" Plate method (1/2 plate non-starchy vegetables/fruit, 1/4 plate lean protein, 1/4 plate whole grain starch - no more than 1/2 cup carb/meal)  4) Eat 3 meals per day   5) Find easy options for quick, healthy meals  Low Calorie Frozen Meal:  Healthy Choice Power Bowls  Lean Cuisine  Smart Ones  Navarro Sarkar  Loda Family Meals    6) Meal prep tips: https://www.foodnetwork.com/healthyeats/healthy-tips/2019/10/meal-prep-tips-hacks-experts  7) Avoid snacking as able. If snack is needed use lean protein and/or fruit/vegetable. Examples:   - 2 cup popcorn   - 1 cup mixed berries   - 15 almonds, walnuts, cashews   - carrot/celery sticks and 2 tbsp low-fat ranch   - 1 hard boiled egg   - Part-skim mozzarella cheese stick   - Low-fat, low-sugar greek yogurt with 1/2 cup berries   - Med apple or pear   - sliced bell peppers with 1/2 cup salsa   - 1/2 cup roasted chickpeas   - sliced cucumbers " with vinegar    100 calorie sweets: Smart Sweets, Fiber One desserts, Fit and Active 100 calorie snack sweets at [employer]; N[employer] 100 calorie pre-portioned cookies, sugar-free pudding, sugar-free jello.    Snack Recipes:  - Banana and creamy PB dip (https://www.diabetesfoodhub.org/recipes/sweet-peanut-buttery-dip.html?home-category_id=23)  - Roasted chickpeas (https://www.diabetesfoodOne Inc.b.org/recipes/roasted-and-spiced-chickpeas.html?home-category_id=23)  - Lemon Raspberry eliazar seed pudding (https://www.diabetesfoodVibeSec.org/recipes/lemon-raspberry- eliazar-seed-pudding.html?home-category_id=23)  - Black bean hummus with carrot and celery sticks (https://www.diabetesfoodOne Inc.b.org/recipes/black-bean-hummus.html?home-category_id=23)  - Greek yogurt chocolate mouse (https://www.diabetesfoodOne Inc.b.org/recipes/greek-yogurt-chocolate-mousse.html?home-category_id=23)   - Broccoli Cheese Bites  (https://www.diabetesfoodOne Inc.b.org/recipes/broccoli-cheese-bites.html?home-category_id=20)  - Chicken Satay with peanut sauce  (https://www.diabetesfoodOne Inc.b.org/recipes/blueberry-almond-chicken-salad-lettuce-wraps.html?home-category_id=20)  - Deviled Eggs  (https://www.diabetesfoodOne Inc.b.org/recipes/devilled-eggs.html?home-category_id=20)      NUTRITION DIAGNOSIS:  Obesity r/t long history of positive energy balance aeb BMI >30 kg/m2.    INTERVENTION:  Intervention Provided/Education Provided on post-op diet guidelines, vitamins/minerals essential post-operatively, GI anatomy of bariatric surgeries, ways to help prepare for post-op diet guidelines pre-operatively, portion/calorie-control, mindful eating and sources of protein.  Patient demonstrates understanding. Provided pt with list of goals RD contact information.      Questions Reviewed With Patient 6/20/2022   How ready are you to make changes regarding your weight? Number 1 = Not ready at all to make changes up to 10 = very ready. 6   How confident are you that you can change? 1 = Not confident  "that you will be successful making changes up to 10 = very confident. 5       Expected Engagement: good    GOALS:  Relating To Eatin) Eat slowly (20-30 minutes per meal), chewing foods well (25 chews per bite/applesauce consistency)  2) 9\" Plate method (1/2 plate non-starchy vegetables/fruit, 1/4 plate lean protein, 1/4 plate whole grain starch - no more than 1/2 cup carb/meal)  3) Eat 3 meals per day   4) Review handouts below    Relating to beverages:  1) Separate fluids from meals by 30 minutes before, during, and after eating  2) Drink 48-64 ounces of fluid per day    Relating to activity:  1) Continue activity as able    Surgery Related Nutrition Handouts:    Diet Guidelines after Weight Loss Surgery  [email or web address]     Lifestyle Changes Before and After Weight Loss Surgery: [place] for Success  [email or web address]     Modified Liquid Diet (2 liquid meals, 1 meal, 2 snacks)  [email or web address]     Your Stage 1 Diet: Clear Liquids  [email or web address]     Your Stage 2 Diet: Low-fat Full Liquids  [email or web address]     Your Stage 3 Diet: Pureed Foods  [email or web address]     Pureed Pleasures  [email or web address]    Your Stage 4 Diet: Soft Foods  [email or web address]    Your Stage 5 Diet: Regular Foods  [email or web address]    Supplements after Weight Loss Surgery  [email or web address]       The Plate Method  [email or web address]    Protein Sources for Weight Loss  [email or web address]     Carbohydrates  [email or web address]     Mindful Eating  [email or web address]     Summary of Volumetrics Eating Plan  [email or web address]     Diet Guidelines after Weight Loss Surgery  [email or web address]     Seated Exercises for Arms and Legs (can be done before or after surgery)  [email or web address]      Follow up: 1 month, prn    Time spent with " patient: 18 minutes.  ANGELIKA AKERS RD, LD      Again, thank you for allowing me to participate in the care of your patient.      Sincerely,    ANGELIKA AKERS RD

## 2022-06-21 NOTE — PATIENT INSTRUCTIONS
"Hi Leah!    Follow-up with RD in 1 month    Thank you,    Vika Mar, ALEXANDER, LD  If you would like to schedule or reschedule an appointment with the RD, please call [phone]    Nutrition Goals  Relating To Eatin) Eat slowly (20-30 minutes per meal), chewing foods well (25 chews per bite/applesauce consistency)  2) 9\" Plate method (1/2 plate non-starchy vegetables/fruit, 1/4 plate lean protein, 1/4 plate whole grain starch - no more than 1/2 cup carb/meal)  3) Eat 3 meals per day   4) Review handouts below    Relating to beverages:  1) Separate fluids from meals by 30 minutes before, during, and after eating  2) Drink 48-64 ounces of fluid per day    Relating to activity:  1) Continue activity as able    Surgery Related Nutrition Handouts:    Diet Guidelines after Weight Loss Surgery  [email or web address]     Lifestyle Changes Before and After Weight Loss Surgery: [place] for Success  [email or web address]     Modified Liquid Diet (2 liquid meals, 1 meal, 2 snacks)  [email or web address]     Your Stage 1 Diet: Clear Liquids  [email or web address]     Your Stage 2 Diet: Low-fat Full Liquids  [email or web address]     Your Stage 3 Diet: Pureed Foods  [email or web address]     Pureed Pleasures  [email or web address]://[email or web address]    Your Stage 4 Diet: Soft Foods  [email or web address]    Your Stage 5 Diet: Regular Foods  [email or web address]    Supplements after Weight Loss Surgery  [email or web address]       The Plate Method  [email or web address]    Protein Sources for Weight Loss  [email or web address]     Carbohydrates  [email or web address]     Mindful Eating  [email or web address]     Summary of Volumetrics Eating Plan  [email or web address]     Diet Guidelines after Weight Loss Surgery  [email or web address]     Seated Exercises for Arms and Legs (can be done before or after " surgery)  [email or web address]      Interested in working with a health ? Health coaches work with you to improve your overall health and wellbeing. They look at the whole person, and may involve discussion of different areas of life, including, but not limited to the four pillars of health (sleep, exercise, nutrition, and stress management). Discuss with your care team if you would like to start working a health .    Health Coaching-3 Pack:    $99 for three health coaching visits    Visits may be done in person or via phone    Coaching is a partnership between the  and the client; Coaches do not prescribe or diagnose    Coaching helps inspire the client to reach his/her personal goals    COMPREHENSIVE WEIGHT MANAGEMENT PROGRAM  VIRTUAL SUPPORT GROUPS    For Support Group Information:      We offer support groups for patients who are working on weight loss and considering, preparing for or have had weight loss surgery.   There is no cost for this opportunity.  You are invited to attend the?Virtual Support Groups?provided by any of the following locations:    [facility] via [employer] Teams with eJn Herrmann RN  2.   [place] via [employer] Teams with Jeovanny Cunningham, PhD, LP  3.   [place] [employer] with Vanna Hernandez RN  4.   [facility] via [employer] Teams with Vanna Mcdonnell [facility]    The following Support Group information can also be found on our website:  [email or web address]      [facility] Weight Loss Surgery Support Group    [facility] Weight Loss Surgery Support Group  The support group is a patient-lead forum that meets monthly to share experiences, encouragement and education. It is open to those who have had weight loss surgery, are scheduled for surgery, and those who are considering surgery.   WHEN: This group meets on the 3rd Wednesday of each month from 5:00PM - 6:00PM  "virtually using Microsoft Teams.   FACILITATOR: Led by Jen Herrmann, ALEXANDER, LD, RN, the program's Clinical Coordinator.   TO REGISTER: Please contact the clinic via FiberLightt or call the nurse line directly at [phone] to inform our staff that you would like an invite sent to you and to let us know the email you would like the invite sent to. Prior to the meeting, a link with directions on how to join the meeting will be sent to you.    2022 Meetings  January 19: \"Let's Talk\" a time for the group to share.  February 16: \"Let's Talk\" a time for the group to share.  March 16: Guest Speakers: Psychologists, Lamar Samaniego, PhD,LP and Baylee Lind PsyD,  April 20: Guest Speaker: Health , Annalee Blanco, [facility], [facility]  May 18: Guest Speaker: Dietitian, Carlo Carreon, ALEXANDER, LP  Vangie 15: \"Let's Talk\" a time for the group to share.  July 20: \"Let's Talk\" a time for the group to share.  August 17: TBA  September 21: TBA  October 19: Guest Speaker: Dr Celso West MD Pulmonologist and Sleep Medicine Physician, \"Getting a Good Night's Sleep\".  November 16: TBA  December 21: TBA    [facility] Support Groups    Connections: Bariatric Care Support Group?  This is open to all [facility] (and those external to this program) pre- and post- operative bariatric surgery patients as well as their support system.   WHEN: This group meets the 2nd Tuesday of each month from 6:30 PM - 8:00 PM virtually using Microsoft Teams.   FACILITATOR: Led by Jeovanny Cunningham, Ph.D who is a Licensed Psychologist with the [facility] Comprehensive Weight Management Program.   TO REGISTER: Please send an email to Jeovanny Cunningham, Ph.D., LP at?[email or web address]?if you would like an invitation to the group and to learn about using Microsoft Teams.    2022 Meetings  January 11: Christianne Matias, Cinthia, Pharmacy Resident at [facility], \"Medications and Bariatric Surgery\".  February 8: Open " "Forum  March 8  April 12  May 10  Vangie 14    Connections: Post-Operative Bariatric Surgery Support Group  This is a support group for [facility] bariatric patients (and those external to [facility]) who have had bariatric surgery and are at least 3 months post-surgery.  WHEN: This support group meets the 4th Wednesday of the month from 11:00 AM - 12:00 PM virtually using Microsoft Teams.   FACILITATOR: Led by Certified Bariatric Nurse, Vanna Hernandez RN.   TO REGISTER: Please send an email to Vanna at [email or web address] if you would like an invitation to the group and to learn about using Microsoft Teams.    2022 Meetings  January 26  February 23  March 23  April 27  May 25  Vangie 22    [facility] Healthy Lifestyle Virtual Support Group    Healthy Lifestyle Virtual Support Group?  This is 60 minutes of small group guided discussion, support and resources. All are welcome who want a healthy lifestyle.  WHEN: This group meets monthly on a Friday from 12:30 PM - 1:30 PM virtually using Microsoft Teams.   FACILITATOR: Led by National Board Certified Health and , Vanna Mcdonnell [facility].   TO REGISTER: Please send an email to Vanna at?[email or web address] to receive monthly invites to the group or if you have any questions about having a health .  Prior to the meeting, a link with directions on how to join the meeting will be sent to you.    2022 Meetings  January 21: Huma Cardoza MS, RN, CIC, CBN, \"Healthy Habits\"  February 25: Open Forum  March 18: \"Setting Limits and Boundaries\"  April 29: Heather Heredia RD, \"Meal Planning Made Easy\"  May 20: Open Forum  June: To be determined                "

## 2022-06-21 NOTE — LETTER
Date:June 22, 2022      Provider requested that no letter be sent. Do not send.       [facility]

## 2022-06-27 ENCOUNTER — TELEPHONE (OUTPATIENT)
Dept: ENDOCRINOLOGY | Facility: CLINIC | Age: 42
End: 2022-06-27

## 2022-06-27 NOTE — TELEPHONE ENCOUNTER
Called [facility] to check if policy has coverage for bariatric surgery, it does. Patient needs to register with [facility] ([facility]) [phone] option 5, patient notified.

## 2022-07-21 NOTE — PROGRESS NOTES
"  Leah Yanez is a 42 year old female who is being evaluated via a billable video visit.      The patient has been notified of following:     \"This video visit will be conducted via a call between you and your physician/provider. We have found that certain health care needs can be provided without the need for an in-person physical exam.  This service lets us provide the care you need with a video conversation.  If a prescription is necessary we can send it directly to your pharmacy.  If lab work is needed we can place an order for that and you can then stop by our lab to have the test done at a later time.    Video visits are billed at different rates depending on your insurance coverage.  Please reach out to your insurance provider with any questions.    If during the course of the call the physician/provider feels a video visit is not appropriate, you will not be charged for this service.\"    Patient has given verbal consent for Video visit? Yes  How would you like to obtain your AVS? MyChart  If you are dropped from the video visit, the video invite should be resent to: Send to e-mail at: [email or web address]  Will anyone else be joining your video visit? No  {If patient encounters technical issues they should call [phone]      Video-Visit Details    Type of service:  Video Visit    Video Start Time: 9:30 AM  Video End Time: 9:43 AM    Originating Location (pt. Location): Home    Distant Location (provider location):  [facility] [place]     Platform used for Video Visit: Mobile System 7    During this virtual visit the patient is located in [place], patient verifies this as the location during the entirety of this visit.     Return Bariatric Nutrition Consultation Note    Reason For Visit: Nutrition Assessment    Leah Yanez is a 42 year old presenting today for a return bariatric nutrition consult.   Pt is interested in laparoscopic sleeve gastrectomy with Dr. Aragon expected " surgery in TBD.  Patient is accompanied by self.  This is pt's second of 3 required nutrition visits prior to surgery. Pt was previously seen as a [facility] patient.    Pt referred by RAIMUNDO Lovelace on June 21, 2022.  CO-MORBIDITIES OF OBESITY INCLUDE:       6/20/2022   I have the following health issues associated with obesity: Infertility, GERD (Reflux)       SUPPORT:  Support System Reviewed With Patient 6/20/2022   Who do you have in your support network that can be available to help you for the first 2 weeks after surgery?    Who can you count on for support throughout your weight loss surgery journey? , family       ANTHROPOMETRICS:  Estimated body mass index is 38.92 kg/m  as calculated from the following:    Height as of 6/20/22: 1.829 m (6').    Weight as of 6/20/22: 130.2 kg (287 lb).    Current weight: 274 lbs per pt     Required weight loss goal pre-op: -10 lbs from initial consult weight (goal weight 277 lbs or less before surgery)       6/20/2022   I have tried the following methods to lose weight Watching portions or calories, Exercise, Weight Watchers, Atkins type diet (low carb/high protein), Teena Seun, Pre packaged meals ex: Nutrisystem, Slimfast, OTC Medications       Weight Loss Questions Reviewed With Patient 6/20/2022   How long have you been overweight? Since late 20's to early 40's       SUPPLEMENT INFORMATION:  MVI, vitamin b12, vitamin D    NUTRITION HISTORY:  Food allergies: none  Food intolerances: none  Previous methods of diet modification for weight loss: WW, keto, atkins, calorie reduction    Pt saw Dr. Aragon d/t hiatal hernia and for possible weight loss surgery. Pt has been trying to lose weight since high school. Has been trying to practice intuitive eating. Feels overwhelmed when on a diet so prefers to focus on hunger cues and balance. Does not feel hunger cues and sometimes feels overwhelmed when deciding what to eat. Snacks often at night.     6/21/22: Pt has  decided that she would like to pursue weight loss surgery. Has not weighed herself recently but feels like she is doing well with mindful eating and balancing meals.    7/21/22: Pt was recently on vacation in [place]. Was able to focus on portion sizes and had more healthy meals available to her. Looking forward to continue focus on whole foods and exercise.    Recent food recall:  Breakfast: usually skips  Lunch: salad or sandwich  Dinner: HelloFresh, EveryPlate meal   Snacks: pretzels, apple with cheese or pb, mangoes, grapes  Beverages: water, tea  Alcohol: rarely- social drinking  Dining out: occasionally       Recall Diet Questions Reviewed With Patient 6/20/2022   Describe what you typically consume for breakfast (typical or most recent): Nothing, protein shake, eggs, bagel   Describe what you typically consume for lunch (typical or most recent): Salad, sandwich, eggs   Describe what you typically consume for supper (typical or most recent): Varies   Describe what you typically consume as snacks (typical or most recent): Apple, pretzels, cookie, banana, grapes, chips, applesauce, cheese   How many ounces of water, or other low calorie drinks, do you drink daily (8 oz=1 glass)? 64 oz or more   How many ounces of caffeine (coffee, tea, pop) do you drink daily (8 oz=1 glass)? 0 oz   How many ounces of carbonated (pop, beer, sparkling water) drinks do you drinky daily (8 oz=1 glass)? 0 oz   How many ounces of juice, pop, sweet tea, sports drinks, protein drinks, other sweetened drinks, do you drink daily (8 oz=1 glass)? 0 oz   How many ounces of milk do you drink daily (8 oz=1 glass) 0 oz   How often do you drink alcohol? 2-4 times a month   If you do drink alcohol, how many drinks might you have in a day? (one drink = 5 oz. wine, 1 can/bottle of beer, 1 shot liquor) 1 or 2       Eating Habits 6/20/2022   Do you have any dietary restrictions? No   Do you currently binge eat (eat a large amount of food in a  "short time)? No   Are you an emotional eater? Yes   Do you get up to eat after falling asleep? No   What foods do you crave? Sweets       Dining Out History Reviewed With Patient 2022   How often do you dine out? Around once a week.   Where do you dine out? (select all that apply) sit-down restaurants, fast food chains, take out   What types of food do you order when you dine out? Hamburger, sandwich, salad         EXERCISE:       2022   How often do you exercise? 3 to 4 times per week   What is the duration of your exercise (in minutes)? 30 Minutes   What types of exercise do you do? walking, swimming, home gym   What keeps you from being more active?  Shortness of breath, Lack of Time, Too tired     Progress on Previous Goals   Relating To Eatin) Eat slowly (20-30 minutes per meal), chewing foods well (25 chews per bite/applesauce consistency) improving, continues  2) 9\" Plate method (1/2 plate non-starchy vegetables/fruit, 1/4 plate lean protein, 1/4 plate whole grain starch - no more than 1/2 cup carb/meal) met, continues  3) Eat 3 meals per day met, continues  4) Review handouts below met    Relating to beverages:  1) Separate fluids from meals by 30 minutes before, during, and after eating improving, continues  2) Drink 48-64 ounces of fluid per day met, continues    Relating to activity:  1) Continue activity as able met, continues      NUTRITION DIAGNOSIS:  Obesity r/t long history of positive energy balance aeb BMI >30 kg/m2.    INTERVENTION:  Intervention Provided/Education Provided on post-op diet guidelines, vitamins/minerals essential post-operatively, GI anatomy of bariatric surgeries, ways to help prepare for post-op diet guidelines pre-operatively, portion/calorie-control, mindful eating and sources of protein.  Patient demonstrates understanding. Provided pt with list of goals RD contact information.      Questions Reviewed With Patient 2022   How ready are you to make changes " regarding your weight? Number 1 = Not ready at all to make changes up to 10 = very ready. 6   How confident are you that you can change? 1 = Not confident that you will be successful making changes up to 10 = very confident. 5       Expected Engagement: good    GOALS:  1) Continue physical activity as able  2) Continue focusing on lean proteins and non-starchy veggies    Surgery Related Nutrition Handouts:    Diet Guidelines after Weight Loss Surgery  [email or web address]     Lifestyle Changes Before and After Weight Loss Surgery: [place] for Success  [email or web address]     Modified Liquid Diet (2 liquid meals, 1 meal, 2 snacks)  [email or web address]     Your Stage 1 Diet: Clear Liquids  [email or web address]     Your Stage 2 Diet: Low-fat Full Liquids  [email or web address]     Your Stage 3 Diet: Pureed Foods  [email or web address]     Pureed Pleasures  [email or web address]    Your Stage 4 Diet: Soft Foods  [email or web address]://[email or web address]    Your Stage 5 Diet: Regular Foods  [email or web address]    Supplements after Weight Loss Surgery  [email or web address]     The Plate Method  [email or web address]    Protein Sources for Weight Loss  [email or web address]     Carbohydrates  [email or web address]     Mindful Eating  [email or web address]://[email or web address]/[email or web address]     Summary of Volumetrics Eating Plan  [email or web address]     Diet Guidelines after Weight Loss Surgery  [email or web address]     Seated Exercises for Arms and Legs (can be done before or after surgery)  [email or web address]      Follow up: 1 month, prn    Time spent with patient: 13 minutes.  ANGELIKA AKERS RD, STEFANY

## 2022-07-22 ENCOUNTER — VIRTUAL VISIT (OUTPATIENT)
Dept: ENDOCRINOLOGY | Facility: CLINIC | Age: 42
End: 2022-07-22
Payer: COMMERCIAL

## 2022-07-22 DIAGNOSIS — E66.01 CLASS 2 SEVERE OBESITY WITH SERIOUS COMORBIDITY AND BODY MASS INDEX (BMI) OF 38.0 TO 38.9 IN ADULT, UNSPECIFIED OBESITY TYPE (H): ICD-10-CM

## 2022-07-22 DIAGNOSIS — E66.812 CLASS 2 SEVERE OBESITY WITH SERIOUS COMORBIDITY AND BODY MASS INDEX (BMI) OF 38.0 TO 38.9 IN ADULT, UNSPECIFIED OBESITY TYPE (H): ICD-10-CM

## 2022-07-22 DIAGNOSIS — Z71.3 NUTRITIONAL COUNSELING: Primary | ICD-10-CM

## 2022-07-22 PROCEDURE — 97803 MED NUTRITION INDIV SUBSEQ: CPT | Mod: GT | Performed by: DIETITIAN, REGISTERED

## 2022-07-22 NOTE — PATIENT INSTRUCTIONS
Tom Lynn!    Follow-up with RD in 1 month    Thank you,    Vika Mar, ALEXANDER, LD  If you would like to schedule or reschedule an appointment with the RD, please call [phone]    Nutrition Goals  1) Continue physical activity as able  2) Continue focusing on lean proteins and non-starchy veggies    Interested in working with a health ? Health coaches work with you to improve your overall health and wellbeing. They look at the whole person, and may involve discussion of different areas of life, including, but not limited to the four pillars of health (sleep, exercise, nutrition, and stress management). Discuss with your care team if you would like to start working a health .    Health Coaching-3 Pack:    $99 for three health coaching visits    Visits may be done in person or via phone    Coaching is a partnership between the  and the client; Coaches do not prescribe or diagnose    Coaching helps inspire the client to reach his/her personal goals    COMPREHENSIVE WEIGHT MANAGEMENT PROGRAM  VIRTUAL SUPPORT GROUPS    For Support Group Information:      We offer support groups for patients who are working on weight loss and considering, preparing for or have had weight loss surgery.   There is no cost for this opportunity.  You are invited to attend the?Virtual Support Groups?provided by any of the following locations:    [facility] via [employer] Teams with Jen Herrmann RN  2.   [place] via [employer] with Jeovanny Cunningham, PhD, LP  3.   [place] via [employer] with Vanna Hernandez RN  4.   [facility] via [employer] Teams with Vanna Mcdonnell, [facility]    The following Support Group information can also be found on our website:  [email or web address]      [facility] Weight Loss Surgery Support Group    [facility] Weight Loss Surgery Support Group  The support group is a patient-lead forum that meets monthly to  "share experiences, encouragement and education. It is open to those who have had weight loss surgery, are scheduled for surgery, and those who are considering surgery.   WHEN: This group meets on the 3rd Wednesday of each month from 5:00PM - 6:00PM virtually using Microsoft Teams.   FACILITATOR: Led by Jen Herrmann RD, LD, RN, the program's Clinical Coordinator.   TO REGISTER: Please contact the clinic via M2TECH or call the nurse line directly at [phone] to inform our staff that you would like an invite sent to you and to let us know the email you would like the invite sent to. Prior to the meeting, a link with directions on how to join the meeting will be sent to you.    2022 Meetings  January 19: \"Let's Talk\" a time for the group to share.  February 16: \"Let's Talk\" a time for the group to share.  March 16: Guest Speakers: Psychologists, Lamar Samaniego, PhD,LP and Baylee Lind PsyD,  April 20: Guest Speaker: Health , Annalee Blanco, [facility],CHES, CPT  May 18: Guest Speaker: Dietitian, Carlo Carreon RD, LP  Vangie 15: \"Let's Talk\" a time for the group to share.  July 20: \"Let's Talk\" a time for the group to share.  August 17: TBA  September 21: TBA  October 19: Guest Speaker: Dr Celso West MD Pulmonologist and Sleep Medicine Physician, \"Getting a Good Night's Sleep\".  November 16: TBA  December 21: TBA    [facility] Support Groups    Connections: Bariatric Care Support Group?  This is open to all [facility] (and those external to this program) pre- and post- operative bariatric surgery patients as well as their support system.   WHEN: This group meets the 2nd Tuesday of each month from 6:30 PM - 8:00 PM virtually using Microsoft Teams.   FACILITATOR: Led by Jeovanny Cunningham, Ph.D who is a Licensed Psychologist with the [facility] Comprehensive Weight Management Program.   TO REGISTER: Please send an email to Jeovanny Cunningham, Ph.D., LP " "[email or web address]?if you would like an invitation to the group and to learn about using Microsoft Teams.    2022 Meetings January 11: Christianne Matias, PharmD, Pharmacy Resident at [facility], \"Medications and Bariatric Surgery\".  February 8: Open Forum  March 8  April 12  May 10  Vangie 14    Connections: Post-Operative Bariatric Surgery Support Group  This is a support group for [facility] bariatric patients (and those external to [facility]) who have had bariatric surgery and are at least 3 months post-surgery.  WHEN: This support group meets the 4th Wednesday of the month from 11:00 AM - 12:00 PM virtually using Microsoft Teams.   FACILITATOR: Led by Certified Bariatric Nurse, Vanna Hernandez RN.   TO REGISTER: Please send an email to Vanna at [email or web address] if you would like an invitation to the group and to learn about using Microsoft Teams.    2022 Meetings  January 26  February 23  March 23  April 27  May 25  Vangie 22    [facility] Healthy Lifestyle Virtual Support Group    Healthy Lifestyle Virtual Support Group?  This is 60 minutes of small group guided discussion, support and resources. All are welcome who want a healthy lifestyle.  WHEN: This group meets monthly on a Friday from 12:30 PM - 1:30 PM virtually using Microsoft Teams.   FACILITATOR: Led by National Board Certified Health and , Vanna Mcdonnell, [facility].   TO REGISTER: Please send an email to Vanna [email or web address] to receive monthly invites to the group or if you have any questions about having a health .  Prior to the meeting, a link with directions on how to join the meeting will be sent to you.    2022 Meetings January 21: Huma Cardoza MS, RN, CIC, CBN, \"Healthy Habits\"  February 25: Open Forum  March 18: \"Setting Limits and Boundaries\"  April 29: Heather Heredia RD, \"Meal Planning Made Easy\"  May 20: Open Forum  June: To be " determined

## 2022-07-22 NOTE — LETTER
Date:July 25, 2022      Patient was self referred, no letter generated. Do not send.        [facility] Information

## 2022-07-22 NOTE — LETTER
"7/22/2022       RE: Leah Yanez  [address]  [place] [address]     Dear Colleague,    Thank you for referring your patient, Leah Yanez, to the [facility]. Please see a copy of my visit note below.      Leah Yanez is a 42 year old female who is being evaluated via a billable video visit.      The patient has been notified of following:     \"This video visit will be conducted via a call between you and your physician/provider. We have found that certain health care needs can be provided without the need for an in-person physical exam.  This service lets us provide the care you need with a video conversation.  If a prescription is necessary we can send it directly to your pharmacy.  If lab work is needed we can place an order for that and you can then stop by our lab to have the test done at a later time.    Video visits are billed at different rates depending on your insurance coverage.  Please reach out to your insurance provider with any questions.    If during the course of the call the physician/provider feels a video visit is not appropriate, you will not be charged for this service.\"    Patient has given verbal consent for Video visit? Yes  How would you like to obtain your AVS? MyChart  If you are dropped from the video visit, the video invite should be resent to: Send to e-mail at: [email or web address]  Will anyone else be joining your video visit? No  {If patient encounters technical issues they should call [phone]      Video-Visit Details    Type of service:  Video Visit    Video Start Time: 9:30 AM  Video End Time: 9:43 AM    Originating Location (pt. Location): Home    Distant Location (provider location):  [facility]     Platform used for Video Visit: [employer]    During this virtual visit the patient is located in [place], patient verifies " this as the location during the entirety of this visit.     Return Bariatric Nutrition Consultation Note    Reason For Visit: Nutrition Assessment    Leah Yanez is a 42 year old presenting today for a return bariatric nutrition consult.   Pt is interested in laparoscopic sleeve gastrectomy with Dr. Aragon expected surgery in [facility].  Patient is accompanied by self.  This is pt's second of 3 required nutrition visits prior to surgery. Pt was previously seen as a [facility] patient.    Pt referred by RAIMUNDO Lovelace on June 21, 2022.  CO-MORBIDITIES OF OBESITY INCLUDE:       6/20/2022   I have the following health issues associated with obesity: Infertility, GERD (Reflux)       SUPPORT:  Support System Reviewed With Patient 6/20/2022   Who do you have in your support network that can be available to help you for the first 2 weeks after surgery?    Who can you count on for support throughout your weight loss surgery journey? , family       ANTHROPOMETRICS:  Estimated body mass index is 38.92 kg/m  as calculated from the following:    Height as of 6/20/22: 1.829 m (6').    Weight as of 6/20/22: 130.2 kg (287 lb).    Current weight: 274 lbs per pt     Required weight loss goal pre-op: -10 lbs from initial consult weight (goal weight 277 lbs or less before surgery)       6/20/2022   I have tried the following methods to lose weight Watching portions or calories, Exercise, Weight Watchers, Atkins type diet (low carb/high protein), Teena Seun, Pre packaged meals ex: Nutrisystem, Slimfast, OTC Medications       Weight Loss Questions Reviewed With Patient 6/20/2022   How long have you been overweight? Since late 20's to early 40's       SUPPLEMENT INFORMATION:  MVI, vitamin b12, vitamin D    NUTRITION HISTORY:  Food allergies: none  Food intolerances: none  Previous methods of diet modification for weight loss: KYA, keto, atkins, calorie reduction    Pt saw Dr. Aragon d/t hiatal hernia and for possible weight loss  surgery. Pt has been trying to lose weight since high school. Has been trying to practice intuitive eating. Feels overwhelmed when on a diet so prefers to focus on hunger cues and balance. Does not feel hunger cues and sometimes feels overwhelmed when deciding what to eat. Snacks often at night.     6/21/22: Pt has decided that she would like to pursue weight loss surgery. Has not weighed herself recently but feels like she is doing well with mindful eating and balancing meals.    7/21/22: Pt was recently on vacation in [place]. Was able to focus on portion sizes and had more healthy meals available to her. Looking forward to continue focus on whole foods and exercise.    Recent food recall:  Breakfast: usually skips  Lunch: salad or sandwich  Dinner: HelloFresh, EveryPlate meal   Snacks: pretzels, apple with cheese or pb, mangoes, grapes  Beverages: water, tea  Alcohol: rarely- social drinking  Dining out: occasionally       Recall Diet Questions Reviewed With Patient 6/20/2022   Describe what you typically consume for breakfast (typical or most recent): Nothing, protein shake, eggs, bagel   Describe what you typically consume for lunch (typical or most recent): Salad, sandwich, eggs   Describe what you typically consume for supper (typical or most recent): Varies   Describe what you typically consume as snacks (typical or most recent): Apple, pretzels, cookie, banana, grapes, chips, applesauce, cheese   How many ounces of water, or other low calorie drinks, do you drink daily (8 oz=1 glass)? 64 oz or more   How many ounces of caffeine (coffee, tea, pop) do you drink daily (8 oz=1 glass)? 0 oz   How many ounces of carbonated (pop, beer, sparkling water) drinks do you drinky daily (8 oz=1 glass)? 0 oz   How many ounces of juice, pop, sweet tea, sports drinks, protein drinks, other sweetened drinks, do you drink daily (8 oz=1 glass)? 0 oz   How many ounces of milk do you drink daily (8 oz=1 glass) 0 oz   How often  "do you drink alcohol? 2-4 times a month   If you do drink alcohol, how many drinks might you have in a day? (one drink = 5 oz. wine, 1 can/bottle of beer, 1 shot liquor) 1 or 2       Eating Habits 2022   Do you have any dietary restrictions? No   Do you currently binge eat (eat a large amount of food in a short time)? No   Are you an emotional eater? Yes   Do you get up to eat after falling asleep? No   What foods do you crave? Sweets       Dining Out History Reviewed With Patient 2022   How often do you dine out? Around once a week.   Where do you dine out? (select all that apply) sit-down restaurants, fast food chains, take out   What types of food do you order when you dine out? Hamburger, sandwich, salad         EXERCISE:       2022   How often do you exercise? 3 to 4 times per week   What is the duration of your exercise (in minutes)? 30 Minutes   What types of exercise do you do? walking, swimming, home gym   What keeps you from being more active?  Shortness of breath, Lack of Time, Too tired     Progress on Previous Goals   Relating To Eatin) Eat slowly (20-30 minutes per meal), chewing foods well (25 chews per bite/applesauce consistency) improving, continues  2) 9\" Plate method (1/2 plate non-starchy vegetables/fruit, 1/4 plate lean protein, 1/4 plate whole grain starch - no more than 1/2 cup carb/meal) met, continues  3) Eat 3 meals per day met, continues  4) Review handouts below met    Relating to beverages:  1) Separate fluids from meals by 30 minutes before, during, and after eating improving, continues  2) Drink 48-64 ounces of fluid per day met, continues    Relating to activity:  1) Continue activity as able met, continues      NUTRITION DIAGNOSIS:  Obesity r/t long history of positive energy balance aeb BMI >30 kg/m2.    INTERVENTION:  Intervention Provided/Education Provided on post-op diet guidelines, vitamins/minerals essential post-operatively, GI anatomy of bariatric " surgeries, ways to help prepare for post-op diet guidelines pre-operatively, portion/calorie-control, mindful eating and sources of protein.  Patient demonstrates understanding. Provided pt with list of goals RD contact information.      Questions Reviewed With Patient 6/20/2022   How ready are you to make changes regarding your weight? Number 1 = Not ready at all to make changes up to 10 = very ready. 6   How confident are you that you can change? 1 = Not confident that you will be successful making changes up to 10 = very confident. 5       Expected Engagement: good    GOALS:  1) Continue physical activity as able  2) Continue focusing on lean proteins and non-starchy veggies    Surgery Related Nutrition Handouts:    Diet Guidelines after Weight Loss Surgery  [email or web address]     Lifestyle Changes Before and After Weight Loss Surgery: [employer] for Success  [email or web address]     Modified Liquid Diet (2 liquid meals, 1 meal, 2 snacks)  [email or web address]     Your Stage 1 Diet: Clear Liquids  [email or web address]     Your Stage 2 Diet: Low-fat Full Liquids  [email or web address]     Your Stage 3 Diet: Pureed Foods  [email or web address]     Pureed Pleasures  [email or web address]    Your Stage 4 Diet: Soft Foods  [email or web address]    Your Stage 5 Diet: Regular Foods  [email or web address]    Supplements after Weight Loss Surgery  [email or web address]     The Plate Method  [email or web address]    Protein Sources for Weight Loss  [email or web address]     Carbohydrates  [email or web address]     Mindful Eating  [email or web address]     Summary of Volumetrics Eating Plan  [email or web address]     Diet Guidelines after Weight Loss Surgery  [email or web address]     Seated Exercises for Arms and Legs (can be done before or after  surgery)  [email or web address]      Follow up: 1 month, prn    Time spent with patient: 13 minutes.  ANGELIKA AKERS RD, LD      Again, thank you for allowing me to participate in the care of your patient.      Sincerely,    ANGELIKA AKERS RD

## 2022-08-09 NOTE — PROGRESS NOTES
[facility]  [facility] [place] [address]  Phone: [phone], Fax: [phone]    Outpatient Visit Note:    Patient: Leah Yanez  MRN: [id number]  : 1980  JEN: Aug 10, 2022    Reason for Consultation:  Leah Yanez is a referred by RAIMUNDO Agustin for history of PE, perioperative management prior to weight loss surgery.    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 She did have weak positive anticardiolipin antibody that was transient with repeat testing that was negative. Remainder of hypercoagulable work up was negative. She has not had any recurrent thrombosis. No family history of venous thromboembolism. She is planning laparoscopic sleeve gastrectomy and hiatal hernia repair with Dr. Aragon. Date has not yet been determined. She has not had any prior surgical challenges but has tolerated long distance travel without any recurrent venous thromboembolism.     Plan:  Majority of today's visit was spent counseling the patient regarding estrogen provoked venous thromboembolism with pulmonary infarct. We discussed that she is likely low risk for recurrent venous thromboembolism as she is no longer on estrogen therapy. We did discuss option for prophylactic enoxaparin postoperatively out of an abundance of caution as she has not had any other surgical challenges. Gastric bypass procedure is considered higher risk procedure for venous thromboembolism. She is of low bleeding risk. She has elected to proceed with postoperative venous thromboembolism prophylaxis. I recommend discharge supply of enoxaparin 40mg once daily x 3 weeks as long as no bleeding complications. She will call when she has a date for the procedure.     The patient is given our center's contact information and is instructed to call if she should have  any further questions or concerns.  Otherwise, we will plan on seeing her back as needed.    Patient understands and agrees with the above plan and recommendation.      Kiana Adams, MPH, PA-C  [facility]    45 minutes spent on the date of the encounter doing chart review, review of outside records, review of test results, interpretation of tests, patient visit and documentation     ----------------------------------------------------------------------------------------------------------------------  History of Present Illness:  Leah Yanez is a 42 year old female with past medical history significant for GERD, Griggs's Raynaud's and PE on OCPs.     In July 2014, Leah developed right flank pain. It persisted for several days thus she was seen in the ER for evaluation. CT abdomen and pelvis showed inferior right lobe pulmonary infarct. CT PE showed acute PE of right lower lobe posterior basal segmental pulmonary artery extending into the segmental branches with small nonocclusive embolus in the left lower lobe posterior basal segment. There was evidence of pulmonary infarct of right lower lobe. She was admitted and started on anticoagulation. This was felt to be an estrogen provoked PE 7/31/2014 while on oral contraceptive pills. She had been on OCPs for four years at that point in time. Her OCPs were discontinued. Warfarin was discontinued in 1/2015 after 6 months of therapy.     Hypercoagulable work up at that time showed negative lupus inhibitor, beta 2 glycoprotein antibodies. Her anticardiolipin IgM antibodies were weakly positive at 13.5 which did not reach value of clinical significance (>40). Her protein C, protein S and antithrombin III levels were normal. She was negative for factor V leiden and prothrombin genetic mutations. She had repeat lupus inhibitor, beta 2 glycoprotein and cardiolipin antibody testing in 2/2019 that was  "negative.     Leah denies any other episodes of venous thromboembolism. She has had occasional symptoms of dyspnea or RUQ pain that she was evaluated for due to concern of possible venous thromboembolism but has not had documented recurrence. She has traveled internationally and not had any venous thromboembolism symptoms. She has not had any surgically challenges however. She does not smoke. She tolerated enoxaparin and warfarin in the past without any bleeding issues.       Past Medical History:  Past Medical History:   Diagnosis Date     Depressive disorder, not elsewhere classified 3/06    Resolved 8/07       Past Surgical History:  Past Surgical History:   Procedure Laterality Date     ESOPHAGOSCOPY, GASTROSCOPY, DUODENOSCOPY (EGD), COMBINED N/A 12/29/2021    Procedure: ESOPHAGOGASTRODUODENOSCOPY, WITH BIOPSY;  Surgeon: Esteban Rose DO;  Location: [facility] OR     TONSILLECTOMY & ADENOIDECTOMY       ZZ NONSPECIFIC PROCEDURE      T&A as a child     ZZ NONSPECIFIC PROCEDURE      Tubes in ears bilaterally       Medications:  Current Outpatient Medications   Medication Sig Dispense Refill     buPROPion (WELLBUTRIN) 75 MG tablet Take 50 mg by mouth daily Pt not sure of dose       cyanocobalamin (VITAMIN B-12) 100 MCG tablet Take 100 mcg by mouth daily       fish oil-omega-3 fatty acids 1000 MG capsule Take 2 g by mouth daily       MULTIVITAMINS OR TABS 2 tablets bid       omeprazole (PRILOSEC) 40 MG DR capsule Take 1 capsule (40 mg) by mouth 2 times daily 180 capsule 3     vitamin D3 (CHOLECALCIFEROL) 50 mcg (2000 units) tablet Take 1 tablet by mouth daily          Allergies:  Allergies   Allergen Reactions     Azithromycin      Erythromycin GI Disturbance     When \"very young\"       ROS:  Remainder of a comprehensive 14 point ROS is negative unless noted above.    Social History:  Denies any tobacco use. No significant alcohol use. Denies any illicit drug use.     Family History:  Denies any family history of " bleeding or clotting disorders.     Objectives:  Vitals: /85 (BP Location: Right arm, Patient Position: Sitting, Cuff Size: Adult Large)   Pulse 66   Temp 97.9  F (36.6  C) (Oral)   Ht 1.829 m (6')   Wt 127.8 kg (281 lb 11.2 oz)   SpO2 97%   BMI 38.21 kg/m     Exam:   Constitutional: Appears well, no distress  HEENT: Pupils equal and round. No scleral icterus or hemorrhage.   Respiratory: no increased work of breathing.   Mus/Skele: no edema of lower extremities  Skin: no petechiae, no ecchymosis on exposed dermis.  Neuro: CN II-XII intact. Normal gait. AOx3      Labs:  CBC RESULTS:   Recent Labs   Lab Test 09/01/21 1927   WBC 10.8   RBC 4.79   HGB 12.4   HCT 40.8   MCV 85   MCH 25.9*   MCHC 30.4*   RDW 15.9*        Last Comprehensive Metabolic Panel:  Sodium   Date Value Ref Range Status   09/01/2021 140 133 - 144 mmol/L Final   06/16/2006 140 133 - 144 mmol/L Final     Potassium   Date Value Ref Range Status   09/01/2021 3.6 3.4 - 5.3 mmol/L Final   06/16/2006 3.5 3.4 - 5.3 mmol/L Final     Chloride   Date Value Ref Range Status   09/01/2021 110 (H) 94 - 109 mmol/L Final   06/16/2006 107 94 - 109 mmol/L Final     Carbon Dioxide   Date Value Ref Range Status   06/16/2006 22 20 - 32 mmol/L Final     Carbon Dioxide (CO2)   Date Value Ref Range Status   09/01/2021 26 20 - 32 mmol/L Final     Anion Gap   Date Value Ref Range Status   09/01/2021 4 3 - 14 mmol/L Final   06/16/2006 11 6 - 17 mmol/L Final     Glucose   Date Value Ref Range Status   09/01/2021 93 70 - 99 mg/dL Final   06/16/2006 87 60 - 110 mg/dL Final     Urea Nitrogen   Date Value Ref Range Status   09/01/2021 9 7 - 30 mg/dL Final   06/16/2006 9 5 - 24 mg/dL Final     Creatinine   Date Value Ref Range Status   09/01/2021 0.93 0.52 - 1.04 mg/dL Final   06/16/2006 1.00 0.60 - 1.30 mg/dL Final     GFR Estimate   Date Value Ref Range Status   09/01/2021 77 >60 mL/min/1.73m2 Final     Comment:     As of July 11, 2021, eGFR is calculated by  the CKD-EPI creatinine equation, without race adjustment. eGFR can be influenced by muscle mass, exercise, and diet. The reported eGFR is an estimation only and is only applicable if the renal function is stable.   2006 71 >60 mL/min/1.7m2 Final     Calcium   Date Value Ref Range Status   2021 8.8 8.5 - 10.1 mg/dL Final   2006 9.1 8.5 - 10.4 mg/dL Final     Hyperocoa/2014: negative lupus inhibitor, beta 2 glycoprotein antibodies. Anticardiolipin IgM antibodies were weakly positive at 13.5  Protein C, protein S and antithrombin III levels were normal. Negative for factor V leiden and prothrombin genetic mutations.     2019: lupus inhibitor, beta 2 glycoprotein and cardiolipin antibody testing all negative.       Imagin/2017 Chest CT PE   CONCLUSION:   1.  Negative for PE   2.  Mild mosaic attenuation of the pulmonary parenchyma has developed. This is a nonspecific finding which typically reflects small airways disease such as asthma or bronchitis, though can be seen in pulmonary venoocclusive disease. If clinically necessary, expiratory imaging can clarify.    2014 Chest CT PE   There is acute pulmonary embolus within the right lower lobe posterior basal segmental pulmonary artery extending into subsegmental branches, with a small nonocclusive embolus also present within the left lower lobe posterior basal segment series 3 slice 137. No additional pulmonary emboli. No thoracic aortic aneurysm or dissection.     2014 CT abdomen/pelvis  CONCLUSION:   1.  Somewhat wedge-shaped consolidation posteriorly at the right lung base. Infiltrate/infection could have this appearance. Differential includes pulmonary infarct.   2.  Nonenhanced CT of the abdomen and pelvis is otherwise negative.

## 2022-08-10 ENCOUNTER — OFFICE VISIT (OUTPATIENT)
Dept: HEMATOLOGY | Facility: CLINIC | Age: 42
End: 2022-08-10
Attending: PHYSICIAN ASSISTANT
Payer: COMMERCIAL

## 2022-08-10 VITALS
HEIGHT: 72 IN | HEART RATE: 66 BPM | BODY MASS INDEX: 38.16 KG/M2 | SYSTOLIC BLOOD PRESSURE: 124 MMHG | DIASTOLIC BLOOD PRESSURE: 85 MMHG | OXYGEN SATURATION: 97 % | WEIGHT: 281.7 LBS | TEMPERATURE: 97.9 F

## 2022-08-10 DIAGNOSIS — Z86.711 HISTORY OF PULMONARY EMBOLISM: Primary | ICD-10-CM

## 2022-08-10 DIAGNOSIS — K21.00 GASTROESOPHAGEAL REFLUX DISEASE WITH ESOPHAGITIS WITHOUT HEMORRHAGE: ICD-10-CM

## 2022-08-10 DIAGNOSIS — E66.01 CLASS 2 SEVERE OBESITY WITH SERIOUS COMORBIDITY AND BODY MASS INDEX (BMI) OF 38.0 TO 38.9 IN ADULT, UNSPECIFIED OBESITY TYPE (H): ICD-10-CM

## 2022-08-10 DIAGNOSIS — E66.812 CLASS 2 SEVERE OBESITY WITH SERIOUS COMORBIDITY AND BODY MASS INDEX (BMI) OF 38.0 TO 38.9 IN ADULT, UNSPECIFIED OBESITY TYPE (H): ICD-10-CM

## 2022-08-10 LAB
ANION GAP SERPL CALCULATED.3IONS-SCNC: 6 MMOL/L (ref 3–14)
BUN SERPL-MCNC: 11 MG/DL (ref 7–30)
CALCIUM SERPL-MCNC: 9.6 MG/DL (ref 8.5–10.1)
CHLORIDE BLD-SCNC: 111 MMOL/L (ref 94–109)
CO2 SERPL-SCNC: 25 MMOL/L (ref 20–32)
CREAT SERPL-MCNC: 0.91 MG/DL (ref 0.52–1.04)
GFR SERPL CREATININE-BSD FRML MDRD: 80 ML/MIN/1.73M2
GLUCOSE BLD-MCNC: 92 MG/DL (ref 70–99)
POTASSIUM BLD-SCNC: 4.2 MMOL/L (ref 3.4–5.3)
SODIUM SERPL-SCNC: 142 MMOL/L (ref 133–144)

## 2022-08-10 PROCEDURE — 82310 ASSAY OF CALCIUM: CPT | Performed by: PHYSICIAN ASSISTANT

## 2022-08-10 PROCEDURE — G0463 HOSPITAL OUTPT CLINIC VISIT: HCPCS

## 2022-08-10 PROCEDURE — 99204 OFFICE O/P NEW MOD 45 MIN: CPT | Performed by: PHYSICIAN ASSISTANT

## 2022-08-10 PROCEDURE — 36415 COLL VENOUS BLD VENIPUNCTURE: CPT | Performed by: PHYSICIAN ASSISTANT

## 2022-08-10 NOTE — NURSING NOTE
Completed vital signs, reviewed allergies and medications for Leah Yanez.  Contact card provided for follow up questions or concerns.    Gina SCHROEDERN, RN   Nurse Clinician  [facility]  [facility]  Office: [phone]  Main Office: [phone] (ask to speak with a nurse)  Fax: [phone]

## 2022-08-18 ENCOUNTER — VIRTUAL VISIT (OUTPATIENT)
Dept: ENDOCRINOLOGY | Facility: CLINIC | Age: 42
End: 2022-08-18
Payer: COMMERCIAL

## 2022-08-18 VITALS — BODY MASS INDEX: 37.65 KG/M2 | WEIGHT: 278 LBS | HEIGHT: 72 IN

## 2022-08-18 DIAGNOSIS — E66.01 CLASS 2 SEVERE OBESITY WITH SERIOUS COMORBIDITY AND BODY MASS INDEX (BMI) OF 38.0 TO 38.9 IN ADULT, UNSPECIFIED OBESITY TYPE (H): Primary | ICD-10-CM

## 2022-08-18 DIAGNOSIS — E66.812 CLASS 2 SEVERE OBESITY WITH SERIOUS COMORBIDITY AND BODY MASS INDEX (BMI) OF 38.0 TO 38.9 IN ADULT, UNSPECIFIED OBESITY TYPE (H): Primary | ICD-10-CM

## 2022-08-18 PROCEDURE — 99214 OFFICE O/P EST MOD 30 MIN: CPT | Mod: GT | Performed by: SURGERY

## 2022-08-18 ASSESSMENT — PAIN SCALES - GENERAL: PAINLEVEL: NO PAIN (0)

## 2022-08-18 NOTE — NURSING NOTE
(   Chief Complaint   Patient presents with     RECHECK     Meet and greet    )    ( Weight: 126.1 kg (278 lb) (Patient reported) )  ( Height: 182.9 cm (6') )  ( BMI (Calculated): 37.7 )  (   )  (   )  (   )  (   )  (   )  (   )    (   )  (   )  (   )  (   )  (   )  (   )  (   )    (   Patient Active Problem List   Diagnosis     Food impaction of esophagus, initial encounter     Esophageal dysphagia     Heartburn     History of pulmonary embolism     Anti-cardiolipin antibody positive     Raynaud's phenomenon     Gastroesophageal reflux disease with esophagitis     Hiatal hernia     Griggs's esophagus     Class 2 severe obesity with serious comorbidity and body mass index (BMI) of 38.0 to 38.9 in adult (H)    )  (   Current Outpatient Medications   Medication Sig Dispense Refill     buPROPion (WELLBUTRIN) 75 MG tablet Take 50 mg by mouth daily Pt not sure of dose       cyanocobalamin (VITAMIN B-12) 100 MCG tablet Take 100 mcg by mouth daily       fish oil-omega-3 fatty acids 1000 MG capsule Take 2 g by mouth daily       MULTIVITAMINS OR TABS 2 tablets bid       omeprazole (PRILOSEC) 40 MG DR capsule Take 1 capsule (40 mg) by mouth 2 times daily 180 capsule 3     vitamin D3 (CHOLECALCIFEROL) 50 mcg (2000 units) tablet Take 1 tablet by mouth daily      )  ( Diabetes Eval:    )    ( Pain Eval:  No Pain (0) )    ( Wound Eval:       )    (   History   Smoking Status     Never Smoker   Smokeless Tobacco     Never Used    )    ( Signed By:  Kimi Motley, EMT; August 18, 2022; 10:54 AM )

## 2022-08-18 NOTE — LETTER
"8/18/2022       RE: Leah Yanez  [address] [facility] [address]     Dear Colleague,    Thank you for referring your patient, Leah Yanez, to the [facility]NAGEMENT [facility]INIC [place] at [facility]. Please see a copy of my visit note below.    Leah is a 42 year old who is being evaluated via a billable video visit.      How would you like to obtain your AVS? MyChart  If the video visit is dropped, the invitation should be resent by: [phone]  Will anyone else be joining your video visit? No  During this virtual visit the patient is located in [place], patient verifies this as the location during the entirety of this visit.     Video-Visit Details    Video Start Time: 1241    Type of service:  Video Visit    Video End Time:1:12 PM    Originating Location (pt. Location): Home    Distant Location (provider location):  [facility] WEIGHT MANAGEMENT [facility]     Platform used for Video Visit: Zaira         I had the pleasure of meeting with your patient Leah Yanez in our weight loss surgery office.  This patient is a 42 year old female who has been undergoing our thorough preoperative screening process in anticipation of potential bariatric surgery.    I first met Leah as a consult to consider treating a very large hiatal hernia.  She has had associated esophagitis and even esophageal narrowing which has required dilatation.    I spoke with her at some length about options for treating this.  There are no perfect options; however, performing the hiatal hernia repair as part of a weight loss operation makes a lot of sense given her history of     At initial evaluation we recorded Leah Yanez's height of 6' 0\", a weight of 287 lbs 0 oz, and calculated Body mass index is 38.92 kg/m .     Associated with this Leah has GERD, hiatal hernia, and some joint pains; she has also had infertility.    The patient has been " unsuccessful with other methods of permanent weight loss and suffers from multiple weight related medical conditions.  Due to lack of success in achieving weight loss through other methods, she is interested in undergoing bariatric surgery to also treat her large hiatal hernia.    PREVIOUS WEIGHT LOSS ATTEMPTS:     6/20/2022   I have tried the following methods to lose weight Watching portions or calories, Exercise, Weight Watchers, Atkins type diet (low carb/high protein), Teena Seun, Pre packaged meals ex: Nutrisystem, Slimfast, OTC Medications       CO-MORBIDITIES OF OBESITY INCLUDE:     6/20/2022   I have the following health issues associated with obesity: Infertility, GERD (Reflux)       VITALS:  Ht 1.829 m (6')   Wt 126.1 kg (278 lb)   BMI 37.70 kg/m      PE:  GENERAL: Alert and oriented x3. NAD    RESPIRATORY: Breathing unlabored      In summary, she has undergone an appropriate medical evaluation, dietitian evaluation, as well as psychologic screening. The patient appears to be an appropriate candidate for bariatric surgery.    In the office today, I discussed the laparoscopic gastric sleeve surgery.  Risks, benefits and anticipated outcomes were outlined including the risk of death, staple line leak (1-2%), PE, DVT, ulcer, worsening GERD, N/V, stricture, hernia, wound infection, weight regain, and vitamin deficiencies. This patient has a good chance of sustaining permanent weight loss due to this procedure.  This should also allow improvement if not resolution of his/her weight related medical conditions.    At present we are going to present your patient's file for prior authorization to insurance.  Pending prior authorization, I anticipate a surgical date in the near future.  We will keep you updated on any progress.  If you have questions regarding care please feel free to contact me.  Total time spent in the clinic was 30 minutes with greater than 50% in face-to-face consultation.    Has had PE and  will require anticoagulation after surgery to prevent DVT/PE.    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 We discussed that she is likely low risk for recurrent venous thromboembolism as she is no longer on estrogen therapy. We did discuss option for prophylactic enoxaparin postoperatively out of an abundance of caution as she has not had any other surgical challenges. Gastric bypass procedure is considered higher risk procedure for venous thromboembolism. She is of low bleeding risk. She has elected to proceed with postoperative venous thromboembolism prophylaxis. I recommend discharge supply of enoxaparin 40mg once daily x 3 weeks as long as no bleeding complications. She will call when she has a date for the procedure.     Patient will call Geovanny for approximate surgery date, etc.    Call Geovanny at [phone] for scheduling.        Sincerely,    Daniel Aragon MD    Please route or send letter to:  Primary Care Provider (PCP) and Referring Provider      Again, thank you for allowing me to participate in the care of your patient.      Sincerely,    Daniel Aragon MD

## 2022-08-18 NOTE — PROGRESS NOTES
"Leah is a 42 year old who is being evaluated via a billable video visit.      How would you like to obtain your AVS? MyChart  If the video visit is dropped, the invitation should be resent by: [phone]  Will anyone else be joining your video visit? No  During this virtual visit the patient is located in [place], patient verifies this as the location during the entirety of this visit.     Video-Visit Details    Video Start Time: 1241    Type of service:  Video Visit    Video End Time:1:12 PM    Originating Location (pt. Location): Home    Distant Location (provider location):  [facility] WEIGHT MANAGEMENT CLINIC [place]     Platform used for Video Visit: MedPlexus         I had the pleasure of meeting with your patient Leah Yanez in our weight loss surgery office.  This patient is a 42 year old female who has been undergoing our thorough preoperative screening process in anticipation of potential bariatric surgery.    I first met Leah as a consult to consider treating a very large hiatal hernia.  She has had associated esophagitis and even esophageal narrowing which has required dilatation.    I spoke with her at some length about options for treating this.  There are no perfect options; however, performing the hiatal hernia repair as part of a weight loss operation makes a lot of sense given her history of     At initial evaluation we recorded Leah Yanez's height of 6' 0\", a weight of 287 lbs 0 oz, and calculated Body mass index is 38.92 kg/m .     Associated with this Leah has GERD, hiatal hernia, and some joint pains; she has also had infertility.    The patient has been unsuccessful with other methods of permanent weight loss and suffers from multiple weight related medical conditions.  Due to lack of success in achieving weight loss through other methods, she is interested in undergoing bariatric surgery to also treat her large hiatal hernia.    PREVIOUS WEIGHT LOSS ATTEMPTS:     6/20/2022 "   I have tried the following methods to lose weight Watching portions or calories, Exercise, Weight Watchers, Atkins type diet (low carb/high protein), Teena Kohli, Pre packaged meals ex: Nutrisystem, Slimfast, OTC Medications       CO-MORBIDITIES OF OBESITY INCLUDE:     6/20/2022   I have the following health issues associated with obesity: Infertility, GERD (Reflux)       VITALS:  Ht 1.829 m (6')   Wt 126.1 kg (278 lb)   BMI 37.70 kg/m      PE:  GENERAL: Alert and oriented x3. NAD    RESPIRATORY: Breathing unlabored      In summary, she has undergone an appropriate medical evaluation, dietitian evaluation, as well as psychologic screening. The patient appears to be an appropriate candidate for bariatric surgery.    In the office today, I discussed the laparoscopic gastric sleeve surgery.  Risks, benefits and anticipated outcomes were outlined including the risk of death, staple line leak (1-2%), PE, DVT, ulcer, worsening GERD, N/V, stricture, hernia, wound infection, weight regain, and vitamin deficiencies. This patient has a good chance of sustaining permanent weight loss due to this procedure.  This should also allow improvement if not resolution of his/her weight related medical conditions.    At present we are going to present your patient's file for prior authorization to insurance.  Pending prior authorization, I anticipate a surgical date in the near future.  We will keep you updated on any progress.  If you have questions regarding care please feel free to contact me.  Total time spent in the clinic was 30 minutes with greater than 50% in face-to-face consultation.    Has had PE and will require anticoagulation after surgery to prevent DVT/PE.    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 We discussed that she is likely low risk for recurrent venous  thromboembolism as she is no longer on estrogen therapy. We did discuss option for prophylactic enoxaparin postoperatively out of an abundance of caution as she has not had any other surgical challenges. Gastric bypass procedure is considered higher risk procedure for venous thromboembolism. She is of low bleeding risk. She has elected to proceed with postoperative venous thromboembolism prophylaxis. I recommend discharge supply of enoxaparin 40mg once daily x 3 weeks as long as no bleeding complications. She will call when she has a date for the procedure.     Patient will call Geovanny for approximate surgery date, etc.    Call Geovanny at [phone] for scheduling.        Sincerely,    Daniel Aragon MD    Please route or send letter to:  Primary Care Provider (PCP) and Referring Provider

## 2022-08-18 NOTE — LETTER
Date:August 19, 2022      Patient was self referred, no letter generated. Do not send.        [facility] [place] Health Information

## 2022-08-22 ENCOUNTER — TELEPHONE (OUTPATIENT)
Dept: ENDOCRINOLOGY | Facility: CLINIC | Age: 42
End: 2022-08-22

## 2022-08-22 NOTE — TELEPHONE ENCOUNTER
Called [facility] to check if policy has coverage for bariatric surgery, it does.  holds the policy and he works for [employer].

## 2022-08-25 ENCOUNTER — VIRTUAL VISIT (OUTPATIENT)
Dept: ENDOCRINOLOGY | Facility: CLINIC | Age: 42
End: 2022-08-25
Payer: COMMERCIAL

## 2022-08-25 DIAGNOSIS — E66.01 CLASS 2 SEVERE OBESITY WITH SERIOUS COMORBIDITY AND BODY MASS INDEX (BMI) OF 38.0 TO 38.9 IN ADULT, UNSPECIFIED OBESITY TYPE (H): ICD-10-CM

## 2022-08-25 DIAGNOSIS — E66.812 CLASS 2 SEVERE OBESITY WITH SERIOUS COMORBIDITY AND BODY MASS INDEX (BMI) OF 38.0 TO 38.9 IN ADULT, UNSPECIFIED OBESITY TYPE (H): ICD-10-CM

## 2022-08-25 DIAGNOSIS — Z71.3 NUTRITIONAL COUNSELING: Primary | ICD-10-CM

## 2022-08-25 PROCEDURE — 97803 MED NUTRITION INDIV SUBSEQ: CPT | Mod: GT | Performed by: DIETITIAN, REGISTERED

## 2022-08-25 NOTE — PROGRESS NOTES
"  Leah Yanez is a 42 year old female who is being evaluated via a billable video visit.      The patient has been notified of following:     \"This video visit will be conducted via a call between you and your physician/provider. We have found that certain health care needs can be provided without the need for an in-person physical exam.  This service lets us provide the care you need with a video conversation.  If a prescription is necessary we can send it directly to your pharmacy.  If lab work is needed we can place an order for that and you can then stop by our lab to have the test done at a later time.    Video visits are billed at different rates depending on your insurance coverage.  Please reach out to your insurance provider with any questions.    If during the course of the call the physician/provider feels a video visit is not appropriate, you will not be charged for this service.\"    Patient has given verbal consent for Video visit? Yes  How would you like to obtain your AVS? MyChart  If you are dropped from the video visit, the video invite should be resent to: Send to e-mail at: [email or web address]  Will anyone else be joining your video visit? No  {If patient encounters technical issues they should call [phone]      Video-Visit Details    Type of service:  Video Visit    Video Start Time: 10:30 AM  Video End Time: 10:50 AM    Originating Location (pt. Location): Home    Distant Location (provider location):  [facility] WE[facility] MANAGEMENT CLINIC [place]     Platform used for Video Visit: BrandBeau    During this virtual visit the patient is located in [place], patient verifies this as the location during the entirety of this visit.     Return Bariatric Nutrition Consultation Note    Reason For Visit: Nutrition Assessment    Leah Yanez is a 42 year old presenting today for a return bariatric nutrition consult.   Pt is interested in laparoscopic sleeve gastrectomy with Dr. Aragon " expected surgery in TBD.  Patient is accompanied by self.  This is pt's third of 3 required nutrition visits prior to surgery. Pt was previously seen as a [facility] patient.    Pt referred by RAIMUNDO Lovelace on June 21, 2022.  CO-MORBIDITIES OF OBESITY INCLUDE:       6/20/2022   I have the following health issues associated with obesity: Infertility, GERD (Reflux)       SUPPORT:  Support System Reviewed With Patient 6/20/2022   Who do you have in your support network that can be available to help you for the first 2 weeks after surgery?    Who can you count on for support throughout your weight loss surgery journey? , family       ANTHROPOMETRICS:  Estimated body mass index is 37.7 kg/m  as calculated from the following:    Height as of 8/18/22: 1.829 m (6').    Weight as of 8/18/22: 126.1 kg (278 lb).    Current weight: 274 lbs per pt     Required weight loss goal pre-op: -10 lbs from initial consult weight (goal weight 277 lbs or less before surgery)       6/20/2022   I have tried the following methods to lose weight Watching portions or calories, Exercise, Weight Watchers, Atkins type diet (low carb/high protein), Teena Seun, Pre packaged meals ex: Nutrisystem, Slimfast, OTC Medications       Weight Loss Questions Reviewed With Patient 6/20/2022   How long have you been overweight? Since late 20's to early 40's       SUPPLEMENT INFORMATION:  MVI, vitamin b12, vitamin D    NUTRITION HISTORY:  Food allergies: none  Food intolerances: none  Previous methods of diet modification for weight loss: KYA, keto, atkins, calorie reduction    Pt saw Dr. Aragon d/t hiatal hernia and for possible weight loss surgery. Pt has been trying to lose weight since high school. Has been trying to practice intuitive eating. Feels overwhelmed when on a diet so prefers to focus on hunger cues and balance. Does not feel hunger cues and sometimes feels overwhelmed when deciding what to eat. Snacks often at night.     6/21/22: Pt  has decided that she would like to pursue weight loss surgery. Has not weighed herself recently but feels like she is doing well with mindful eating and balancing meals.    7/21/22: Pt was recently on vacation in [place]. Was able to focus on portion sizes and had more healthy meals available to her. Looking forward to continue focus on whole foods and exercise.    8/25/22: Continues to practice mindful eating and re-learning hunger cues. Has been walking daily. Tentative surgery date for October.     Recent food recall:  Breakfast: usually skips  Lunch: salad or sandwich  Dinner: HelloFresh, EveryPlate meal   Snacks: pretzels, apple with cheese or pb, mangoes, grapes  Beverages: water, tea  Alcohol: rarely- social drinking  Dining out: occasionally       Recall Diet Questions Reviewed With Patient 6/20/2022   Describe what you typically consume for breakfast (typical or most recent): Nothing, protein shake, eggs, bagel   Describe what you typically consume for lunch (typical or most recent): Salad, sandwich, eggs   Describe what you typically consume for supper (typical or most recent): Varies   Describe what you typically consume as snacks (typical or most recent): Apple, pretzels, cookie, banana, grapes, chips, applesauce, cheese   How many ounces of water, or other low calorie drinks, do you drink daily (8 oz=1 glass)? 64 oz or more   How many ounces of caffeine (coffee, tea, pop) do you drink daily (8 oz=1 glass)? 0 oz   How many ounces of carbonated (pop, beer, sparkling water) drinks do you drinky daily (8 oz=1 glass)? 0 oz   How many ounces of juice, pop, sweet tea, sports drinks, protein drinks, other sweetened drinks, do you drink daily (8 oz=1 glass)? 0 oz   How many ounces of milk do you drink daily (8 oz=1 glass) 0 oz   How often do you drink alcohol? 2-4 times a month   If you do drink alcohol, how many drinks might you have in a day? (one drink = 5 oz. wine, 1 can/bottle of beer, 1 shot liquor) 1  or 2       Eating Habits 6/20/2022   Do you have any dietary restrictions? No   Do you currently binge eat (eat a large amount of food in a short time)? No   Are you an emotional eater? Yes   Do you get up to eat after falling asleep? No   What foods do you crave? Sweets       Dining Out History Reviewed With Patient 6/20/2022   How often do you dine out? Around once a week.   Where do you dine out? (select all that apply) sit-down restaurants, fast food chains, take out   What types of food do you order when you dine out? Hamburger, sandwich, salad         EXERCISE:     6/20/2022   How often do you exercise? 3 to 4 times per week   What is the duration of your exercise (in minutes)? 30 Minutes   What types of exercise do you do? walking, swimming, home gym   What keeps you from being more active?  Shortness of breath, Lack of Time, Too tired     Progress on Previous Goals   1) Continue physical activity as able met, continues- walking daily  2) Continue focusing on lean proteins and non-starchy veggies met, continues    NUTRITION DIAGNOSIS:  Obesity r/t long history of positive energy balance aeb BMI >30 kg/m2.    INTERVENTION:  Intervention Provided/Education Provided on post-op diet guidelines, vitamins/minerals essential post-operatively, GI anatomy of bariatric surgeries, ways to help prepare for post-op diet guidelines pre-operatively, portion/calorie-control, mindful eating and sources of protein.  Patient demonstrates understanding. Provided pt with list of goals RD contact information.      Questions Reviewed With Patient 6/20/2022   How ready are you to make changes regarding your weight? Number 1 = Not ready at all to make changes up to 10 = very ready. 6   How confident are you that you can change? 1 = Not confident that you will be successful making changes up to 10 = very confident. 5       Expected Engagement: good    GOALS:  1) Continue focusing on mindful eating and hunger cues  2) Continue physical  activity as able  3) Review supplements below    Take the following after a Sleeve Gastrectomy:  - Multivitamin/minerals: adult dose 1-2 times daily  Ideally want one that provides:   5,000 - 10,000 international units vitamin A daily   800 mg oral folate daily  8 - 22 mg zinc and 1 - 2 mg copper daily  12 mg Thiamin  - Iron: 45-60 mg elemental (18-36 mg if low risk) - may partly or fully be covered in multivitamin   - Calcium Citrate containing vitamin D: 500 mg 3 times daily or 600 mg 2 times daily     - Separate the calcium from your multivitamin or iron by at least 2 hours.     - Must be a chewable calcium citrate until post-op 3 months     - Options for calcium citrate: Velasquez calcium citrate chewable, bariatric advantage calcium citrate chewable, Celebrate vitamins calcium citrate chewable, Bariatric Fusion calcium citrate chewable  - Vitamin D - at least 3,000 international units/day between all supplements  - Vitamin B12: sublingual form of at least 500 mcg daily or injection of 1000 mcg monthly       Chewable Multivitamin Options for After Surgery:  Bariatric Advantage Advanced Multi EA chewable: https://www.RemitPro.[employer]/item/chewable-advanced-multi-ea  Take 2 chews daily. Additional calcium citrate supplement needed.  - Discount code for Bariatric Advantage vitamin/mineral supplements: UOFMINN (for 15% off order)     Celebrate Multi Complete 45: https://[email or web address]/products/hjggq-irmvobyr-12?[email or web address]  Take 2 chews daily. Additional calcium citrate supplement needed.    Laconia's Complete, or generic (with 10 mg iron per chew)   (https://www.Shipu.[employer]/p/kids-39-complete-multivitamin-chewable-tablets-orange-grape-38-cherry-150ct-up-38-up-8482/-/[email or web address]#lnk=sametab)   Take 2 chews per day. Additional B12 and calcium citrate supplements needed. Potential need for additional iron supplement (if needing more than 20 mg iron per day)    Bariatric Fusion:  https://www.bariatricfusion.com/collections/bariatric-multivitamins    Take 2 chews twice per day.    Optifast Bariatric Multivitamin  https://www.[employer].[place]/optifastr-post-bariatric-chewable-vitamin-and-mineral-supplement.html  Take 2 chews twice per day. Additional iron supplement needed (take at separate time from multivitamins)       Surgery Related Nutrition Handouts:    Diet Guidelines after Weight Loss Surgery  [email or web address]     Lifestyle Changes Before and After Weight Loss Surgery: Vista for Success  [email or web address]     Modified Liquid Diet (2 liquid meals, 1 meal, 2 snacks)  [email or web address]     Your Stage 1 Diet: Clear Liquids  [email or web address]     Your Stage 2 Diet: Low-fat Full Liquids  [email or web address]     Your Stage 3 Diet: Pureed Foods  [email or web address]     Pureed Pleasures  [email or web address]    Your Stage 4 Diet: Soft Foods  [email or web address]    Your Stage 5 Diet: Regular Foods  [email or web address]    Supplements after Weight Loss Surgery  [email or web address]     The Plate Method  [email or web address]    Protein Sources for Weight Loss  [email or web address]     Carbohydrates  [email or web address]     Mindful Eating  [email or web address]     Summary of Volumetrics Eating Plan  [email or web address]     Diet Guidelines after Weight Loss Surgery  [email or web address]     Seated Exercises for Arms and Legs (can be done before or after surgery)  [email or web address]      Follow up: 1 month, prn    Time spent with patient: 20 minutes.  ANGELIKA AKERS RD, STEFANY

## 2022-08-25 NOTE — PATIENT INSTRUCTIONS
Tom Lynn!    Follow-up with RD in 1 month    Thank you,    Vika Mar, RD, LD  If you would like to schedule or reschedule an appointment with the RD, please call [phone]    Nutrition Goals  1) Continue focusing on mindful eating and hunger cues  2) Continue physical activity as able  3) Review supplements below    Take the following after a Sleeve Gastrectomy:  - Multivitamin/minerals: adult dose 1-2 times daily  Ideally want one that provides:   5,000 - 10,000 international units vitamin A daily   800 mg oral folate daily  8 - 22 mg zinc and 1 - 2 mg copper daily  12 mg Thiamin  - Iron: 45-60 mg elemental (18-36 mg if low risk) - may partly or fully be covered in multivitamin   - Calcium Citrate containing vitamin D: 500 mg 3 times daily or 600 mg 2 times daily     - Separate the calcium from your multivitamin or iron by at least 2 hours.     - Must be a chewable calcium citrate until post-op 3 months     - Options for calcium citrate: Velasquez calcium citrate chewable, bariatric advantage calcium citrate chewable, Celebrate vitamins calcium citrate chewable, Bariatric Fusion calcium citrate chewable  - Vitamin D - at least 3,000 international units/day between all supplements  - Vitamin B12: sublingual form of at least 500 mcg daily or injection of 1000 mcg monthly       Chewable Multivitamin Options for After Surgery:  Bariatric Advantage Advanced Multi EA chewable: https://www.[place]the [employer].ImpulseSave/item/chewable-advanced-multi-ea  Take 2 chews daily. Additional calcium citrate supplement needed.  - Discount code for Bariatric Advantage vitamin/mineral supplements: UOFMINN (for 15% off order)     Celebrate Multi Complete 45: https://Walker [employer] Brands.ImpulseSave/products/jpodc-pwjhxtoh-65?cyuiwfa=[email or web address]  Take 2 chews daily. Additional calcium citrate supplement needed.    Wayland's Complete, or generic (with 10 mg iron per chew)    (https://www.MailMeNetwork.com/p/kids-39-complete-multivitamin-chewable-tablets-orange-grape-38-cherry-150ct-up-38-up-8482/-/A-38956495#lnk=sametab)   Take 2 chews per day. Additional B12 and calcium citrate supplements needed. Potential need for additional iron supplement (if needing more than 20 mg iron per day)    Bariatric Fusion: https://www.bariatricfusion.com/collections/bariatric-multivitamins    Take 2 chews twice per day.    Optifast Bariatric Multivitamin  https://www.[employer]pects Software.Fruition Partners/optifastr-post-bariatric-chewable-vitamin-and-mineral-supplement.html  Take 2 chews twice per day. Additional iron supplement needed (take at separate time from multivitamins)       Interested in working with a health ? Health coaches work with you to improve your overall health and wellbeing. They look at the whole person, and may involve discussion of different areas of life, including, but not limited to the four pillars of health (sleep, exercise, nutrition, and stress management). Discuss with your care team if you would like to start working a health .    Health Coaching-3 Pack:    $99 for three health coaching visits    Visits may be done in person or via phone    Coaching is a partnership between the  and the client; Coaches do not prescribe or diagnose    Coaching helps inspire the client to reach his/her personal goals    COMPREHENSIVE WEIGHT MANAGEMENT PROGRAM  VIRTUAL SUPPORT GROUPS    For Support Group Information:      We offer support groups for patients who are working on weight loss and considering, preparing for or have had weight loss surgery.   There is no cost for this opportunity.  You are invited to attend the?Virtual Support Groups?provided by any of the following locations:    [facility] via [employer] Teams with Jen Herrmann RN  2.   [place] via [place] with Jeovanny Cunningham, PhD, LP  3.   [place] via [place] with Vanna Hernandez RN  4.   [facility] via  "Microsoft Teams with Vanna Mcdonnell [facility]    The following Support Group information can also be found on our website:  [email or web address]      [facility] Weight Loss Surgery Support Group    [facility] Weight Loss Surgery Support Group  The support group is a patient-lead forum that meets monthly to share experiences, encouragement and education. It is open to those who have had weight loss surgery, are scheduled for surgery, and those who are considering surgery.   WHEN: This group meets on the 3rd Wednesday of each month from 5:00PM - 6:00PM virtually using Microsoft Teams.   FACILITATOR: Led by Jen Herrmann RD, LD, RN, the program's Clinical Coordinator.   TO REGISTER: Please contact the clinic via Eximia or call the nurse line directly at [phone] to inform our staff that you would like an invite sent to you and to let us know the email you would like the invite sent to. Prior to the meeting, a link with directions on how to join the meeting will be sent to you.    2022 Meetings  January 19: \"Let's Talk\" a time for the group to share.  February 16: \"Let's Talk\" a time for the group to share.  March 16: Guest Speakers: Psychologists, Lamar Samaniego, PhD,LP and Baylee Lind, PsKathi,  April 20: Guest Speaker: Health , Annalee Blanco, [facility],CHES, CPT  May 18: Guest Speaker: DietitianCarlo, ALEXANDER, LP  Vangie 15: \"Let's Talk\" a time for the group to share.  July 20: \"Let's Talk\" a time for the group to share.  August 17: TBA  September 21: TBA  October 19: Guest Speaker: Dr Celso West MD Pulmonologist and Sleep Medicine Physician, \"Getting a Good Night's Sleep\".  November 16: TBA  December 21: TBA    [facility] Support Groups    Connections: Bariatric Care Support Group?  This is open to all [facility] (and those external to this program) pre- and post- operative " "bariatric surgery patients as well as their support system.   WHEN: This group meets the 2nd Tuesday of each month from 6:30 PM - 8:00 PM virtually using Microsoft Teams.   FACILITATOR: Led by Jeovanny Cunningham, Ph.D who is a Licensed Psychologist with the [facility] Comprehensive Weight Management Program.   TO REGISTER: Please send an email to Jeovanny Cunningham, Ph.D., LP at?[email or web address]?if you would like an invitation to the group and to learn about using Microsoft Teams.    2022 Meetings  January 11: Christianne Matias, PharmD, Pharmacy Resident at [facility], \"Medications and Bariatric Surgery\".  February 8: Open Forum  March 8  April 12  May 10  Vangie 14    Connections: Post-Operative Bariatric Surgery Support Group  This is a support group for [facility] bariatric patients (and those external to [facility]) who have had bariatric surgery and are at least 3 months post-surgery.  WHEN: This support group meets the 4th Wednesday of the month from 11:00 AM - 12:00 PM virtually using Microsoft Teams.   FACILITATOR: Led by Certified Bariatric Nurse, Vanna Hernandez RN.   TO REGISTER: Please send an email to Vanna at [email or web address] if you would like an invitation to the group and to learn about using Microsoft Teams.    2022 Meetings  January 26  February 23  March 23  April 27  May 25  Vangie 22    [facility] Healthy Lifestyle Virtual Support Group    Healthy Lifestyle Virtual Support Group?  This is 60 minutes of small group guided discussion, support and resources. All are welcome who want a healthy lifestyle.  WHEN: This group meets monthly on a Friday from 12:30 PM - 1:30 PM virtually using Microsoft Teams.   FACILITATOR: Led by National Board Certified Health and , Vanna Mcdonnell [facility].   TO REGISTER: Please send an email to Vanna [email or web address] to receive monthly invites to the group or if you have any " "questions about having a health .  Prior to the meeting, a link with directions on how to join the meeting will be sent to you.    2022 Meetings  January 21: Huma Cardoza MS, RN, CIC, CBN, \"Healthy Habits\"  February 25: Open Forum  March 18: \"Setting Limits and Boundaries\"  April 29: Heather Heredia RD, \"Meal Planning Made Easy\"  May 20: Open Forum  June: To be determined                "

## 2022-08-25 NOTE — LETTER
"8/25/2022       RE: Leah Yanez  [address]  [place] [address]     Dear Colleague,    Thank you for referring your patient, Leah Yanez, to the [facility] WEIGHT MANAGEMENT CLINIC [place] at [facility]. Please see a copy of my visit note below.      Leah Yanez is a 42 year old female who is being evaluated via a billable video visit.      The patient has been notified of following:     \"This video visit will be conducted via a call between you and your physician/provider. We have found that certain health care needs can be provided without the need for an in-person physical exam.  This service lets us provide the care you need with a video conversation.  If a prescription is necessary we can send it directly to your pharmacy.  If lab work is needed we can place an order for that and you can then stop by our lab to have the test done at a later time.    Video visits are billed at different rates depending on your insurance coverage.  Please reach out to your insurance provider with any questions.    If during the course of the call the physician/provider feels a video visit is not appropriate, you will not be charged for this service.\"    Patient has given verbal consent for Video visit? Yes  How would you like to obtain your AVS? MyChart  If you are dropped from the video visit, the video invite should be resent to: Send to e-mail at: [email or web address]  Will anyone else be joining your video visit? No  {If patient encounters technical issues they should call [phone]      Video-Visit Details    Type of service:  Video Visit    Video Start Time: 10:30 AM  Video End Time: 10:50 AM    Originating Location (pt. Location): Home    Distant Location (provider location):  [facility] WEIGHT MANAGEMENT [facility]     Platform used for Video Visit: Insight Direct (ServiceCEO)    During this virtual visit the patient is located in [place], patient verifies " this as the location during the entirety of this visit.     Return Bariatric Nutrition Consultation Note    Reason For Visit: Nutrition Assessment    Leah Yanez is a 42 year old presenting today for a return bariatric nutrition consult.   Pt is interested in laparoscopic sleeve gastrectomy with Dr. Aragon expected surgery in [facility].  Patient is accompanied by self.  This is pt's third of 3 required nutrition visits prior to surgery. Pt was previously seen as a [facility] patient.    Pt referred by RAIMUNDO Lovelace on June 21, 2022.  CO-MORBIDITIES OF OBESITY INCLUDE:       6/20/2022   I have the following health issues associated with obesity: Infertility, GERD (Reflux)       SUPPORT:  Support System Reviewed With Patient 6/20/2022   Who do you have in your support network that can be available to help you for the first 2 weeks after surgery?    Who can you count on for support throughout your weight loss surgery journey? , family       ANTHROPOMETRICS:  Estimated body mass index is 37.7 kg/m  as calculated from the following:    Height as of 8/18/22: 1.829 m (6').    Weight as of 8/18/22: 126.1 kg (278 lb).    Current weight: 274 lbs per pt     Required weight loss goal pre-op: -10 lbs from initial consult weight (goal weight 277 lbs or less before surgery)       6/20/2022   I have tried the following methods to lose weight Watching portions or calories, Exercise, Weight Watchers, Atkins type diet (low carb/high protein), Teena Seun, Pre packaged meals ex: Nutrisystem, Slimfast, OTC Medications       Weight Loss Questions Reviewed With Patient 6/20/2022   How long have you been overweight? Since late 20's to early 40's       SUPPLEMENT INFORMATION:  MVI, vitamin b12, vitamin D    NUTRITION HISTORY:  Food allergies: none  Food intolerances: none  Previous methods of diet modification for weight loss: KYA, keto, atkins, calorie reduction    Pt saw Dr. Aragon d/t hiatal hernia and for possible weight loss  surgery. Pt has been trying to lose weight since high school. Has been trying to practice intuitive eating. Feels overwhelmed when on a diet so prefers to focus on hunger cues and balance. Does not feel hunger cues and sometimes feels overwhelmed when deciding what to eat. Snacks often at night.     6/21/22: Pt has decided that she would like to pursue weight loss surgery. Has not weighed herself recently but feels like she is doing well with mindful eating and balancing meals.    7/21/22: Pt was recently on vacation in [place]. Was able to focus on portion sizes and had more healthy meals available to her. Looking forward to continue focus on whole foods and exercise.    8/25/22: Continues to practice mindful eating and re-learning hunger cues. Has been walking daily. Tentative surgery date for October.     Recent food recall:  Breakfast: usually skips  Lunch: salad or sandwich  Dinner: HelloFresh, EveryPlate meal   Snacks: pretzels, apple with cheese or pb, mangoes, grapes  Beverages: water, tea  Alcohol: rarely- social drinking  Dining out: occasionally       Recall Diet Questions Reviewed With Patient 6/20/2022   Describe what you typically consume for breakfast (typical or most recent): Nothing, protein shake, eggs, bagel   Describe what you typically consume for lunch (typical or most recent): Salad, sandwich, eggs   Describe what you typically consume for supper (typical or most recent): Varies   Describe what you typically consume as snacks (typical or most recent): Apple, pretzels, cookie, banana, grapes, chips, applesauce, cheese   How many ounces of water, or other low calorie drinks, do you drink daily (8 oz=1 glass)? 64 oz or more   How many ounces of caffeine (coffee, tea, pop) do you drink daily (8 oz=1 glass)? 0 oz   How many ounces of carbonated (pop, beer, sparkling water) drinks do you drinky daily (8 oz=1 glass)? 0 oz   How many ounces of juice, pop, sweet tea, sports drinks, protein drinks,  other sweetened drinks, do you drink daily (8 oz=1 glass)? 0 oz   How many ounces of milk do you drink daily (8 oz=1 glass) 0 oz   How often do you drink alcohol? 2-4 times a month   If you do drink alcohol, how many drinks might you have in a day? (one drink = 5 oz. wine, 1 can/bottle of beer, 1 shot liquor) 1 or 2       Eating Habits 6/20/2022   Do you have any dietary restrictions? No   Do you currently binge eat (eat a large amount of food in a short time)? No   Are you an emotional eater? Yes   Do you get up to eat after falling asleep? No   What foods do you crave? Sweets       Dining Out History Reviewed With Patient 6/20/2022   How often do you dine out? Around once a week.   Where do you dine out? (select all that apply) sit-down restaurants, fast food chains, take out   What types of food do you order when you dine out? Hamburger, sandwich, salad         EXERCISE:     6/20/2022   How often do you exercise? 3 to 4 times per week   What is the duration of your exercise (in minutes)? 30 Minutes   What types of exercise do you do? walking, swimming, home gym   What keeps you from being more active?  Shortness of breath, Lack of Time, Too tired     Progress on Previous Goals   1) Continue physical activity as able met, continues- walking daily  2) Continue focusing on lean proteins and non-starchy veggies met, continues    NUTRITION DIAGNOSIS:  Obesity r/t long history of positive energy balance aeb BMI >30 kg/m2.    INTERVENTION:  Intervention Provided/Education Provided on post-op diet guidelines, vitamins/minerals essential post-operatively, GI anatomy of bariatric surgeries, ways to help prepare for post-op diet guidelines pre-operatively, portion/calorie-control, mindful eating and sources of protein.  Patient demonstrates understanding. Provided pt with list of goals RD contact information.      Questions Reviewed With Patient 6/20/2022   How ready are you to make changes regarding your weight? Number 1 =  Not ready at all to make changes up to 10 = very ready. 6   How confident are you that you can change? 1 = Not confident that you will be successful making changes up to 10 = very confident. 5       Expected Engagement: good    GOALS:  1) Continue focusing on mindful eating and hunger cues  2) Continue physical activity as able  3) Review supplements below    Take the following after a Sleeve Gastrectomy:  - Multivitamin/minerals: adult dose 1-2 times daily  Ideally want one that provides:   5,000 - 10,000 international units vitamin A daily   800 mg oral folate daily  8 - 22 mg zinc and 1 - 2 mg copper daily  12 mg Thiamin  - Iron: 45-60 mg elemental (18-36 mg if low risk) - may partly or fully be covered in multivitamin   - Calcium Citrate containing vitamin D: 500 mg 3 times daily or 600 mg 2 times daily     - Separate the calcium from your multivitamin or iron by at least 2 hours.     - Must be a chewable calcium citrate until post-op 3 months     - Options for calcium citrate: Velasquez calcium citrate chewable, bariatric advantage calcium citrate chewable, Celebrate vitamins calcium citrate chewable, Bariatric Fusion calcium citrate chewable  - Vitamin D - at least 3,000 international units/day between all supplements  - Vitamin B12: sublingual form of at least 500 mcg daily or injection of 1000 mcg monthly       Chewable Multivitamin Options for After Surgery:  Bariatric Advantage Advanced Multi EA chewable: https://www.Xtellus.Criptext/item/chewable-advanced-multi-ea  Take 2 chews daily. Additional calcium citrate supplement needed.  - Discount code for Bariatric Advantage vitamin/mineral supplements: UOFMINN (for 15% off order)     Nefsiste Multi Complete 45: https://Night Up/products/bzbuh-tnnvorkl-43?[email or web address]  Take 2 chews daily. Additional calcium citrate supplement needed.    Many's Complete, or generic (with 10 mg iron per chew)    (https://www.Second Genome.com/p/kids-39-complete-multivitamin-chewable-tablets-orange-grape-38-cherry-150ct-up-38-up-8482/-/A-[id number]#lnk=sametab)   Take 2 chews per day. Additional B12 and calcium citrate supplements needed. Potential need for additional iron supplement (if needing more than 20 mg iron per day)    Bariatric Fusion: https://www.bariatricfusion.com/collections/bariatric-multivitamins    Take 2 chews twice per day.    Optifast Bariatric Multivitamin  https://www.Hackermeter/optifastr-post-bariatric-chewable-vitamin-and-mineral-supplement.html  Take 2 chews twice per day. Additional iron supplement needed (take at separate time from multivitamins)       Surgery Related Nutrition Handouts:    Diet Guidelines after Weight Loss Surgery  [email or web address]     Lifestyle Changes Before and After Weight Loss Surgery: [place] for Success  [email or web address]://[email or web address]     Modified Liquid Diet (2 liquid meals, 1 meal, 2 snacks)  [email or web address]://[email or web address]     Your Stage 1 Diet: Clear Liquids  [email or web address]://[email or web address]     Your Stage 2 Diet: Low-fat Full Liquids  [email or web address]://[email or web address]     Your Stage 3 Diet: Pureed Foods  [email or web address]://[email or web address]     Pureed Pleasures  http://Atrum Coal/711069.pdf    Your Stage 4 Diet: Soft Foods  [email or web address]://[email or web address]/[email or web address]    Your Stage 5 Diet: Regular Foods  [email or web address]://[email or web address]/[email or web address]    Supplements after Weight Loss Surgery  http://A[employer]/553516.pdf     The Plate Method  [email or web address]://[email or web address]    Protein Sources for Weight Loss  [email or web address]://[email or web address]     Carbohydrates  [email or web address]://[email or web address]     Mindful Eating  http://A[employer]um [employer]/372560.pdf     Summary of Volumetrics Eating Plan  [email or web address]://[email or web address]     Diet Guidelines after Weight Loss Surgery  [email or web address]://[email or web address]     Seated Exercises for Arms and Legs (can be done before or after  surgery)  [email or web address]://www.[employer]/[email or web address]      Follow up: 1 month, prn    Time spent with patient: 20 minutes.  ANGELIKA AKERS RD, LD          Again, thank you for allowing me to participate in the care of your patient.      Sincerely,    ANGELIKA AKERS RD

## 2022-08-25 NOTE — LETTER
Date:August 25, 2022      Provider requested that no letter be sent. Do not send.       [facility]

## 2022-08-29 ENCOUNTER — CARE COORDINATION (OUTPATIENT)
Dept: ENDOCRINOLOGY | Facility: CLINIC | Age: 42
End: 2022-08-29

## 2022-08-29 NOTE — PROGRESS NOTES
"Tasklist updated and sent to patient via Done..    Bariatric Task List  Fax:  Please fax all paperwork to: [phone] -     Status:  Is patient a candidate for bariatric surgery?:    -     Cleared to schedule surgeon consult?:    -     Status:  surgery evaluation in process -     Surgeon: Margo -     Tentative surgery month/year: 10/25/22 -        Insurance: Insurance:  [facility] -      Contact insurance to discuss coverage: Needed -          Patient Info: Initial Weight:  287 -     Date of Initial Weight/Height:  6/20/2022 -     Goal Weight (lbs):  277 -     Required Weight Loss:  10 -     Surgery Type:  sleeve gastrectomy -        Dietician Visits: Structured weight loss required by insurance?:  structured weight loss required -     Dietician Visit 1:  Completed - 5/20/22 done. [facility]   Dietician Visit 2:  Completed - 6/21/22 appt. s   Dietician Visit 3:  Completed - 7/22/22 appt. [facility]   Dietician Visit 4:  Completed - 8/25/22 done. [facility]   Dietician Visit 5:    -     Dietician Visit 6:    -     Dietician Visit additional:    -  Monthly until surgery. [facility]      Psychological Evaluation: Psych eval:  Needed - 9/19/22 Dr Romano appt. [facility]   Therapist letter of support:  Completed - 8/25/22 SONG Costello ltr attached to [employer] message. [facility]      Lab Work: Complete Blood Count:  Needed -     Comprehensive Metabolic Panel:  Needed -     Vitamin D:  Needed -     PTH:  Needed -     Hgb A1c:  Needed -      Lipids: Needed -        Consults/ Clearance Hematology:  Completed - hx of PE; 8/10/22 Kiana Adams[facility] M[facility] cleared. [facility]      PCP: PCP letter of support:  Completed - 8/29/22 HAYDEE Nye -letter attached to Done. message. [facility]      Patient Education:  Information Session:  Needed -     Given \"Making your decision\" handout?:  Yes -     Given \"A Roadmap to you Weight Loss Surgery\" handout?: Yes -     Given \"Get Well Loop\" information?: Yes -     Given support group information?:    -     Attended support group?: "  Needed -     Support plan in place?:  Needed -        Additional Surgery Requirements: Other: covid test 4 days before surgery date -     Other: Call Center to schedule the 1 week and 31+ days postop appts. -        Final Tasks:  Before surgery online class:  Needed -     Before surgery online class website link:  [email or web address]   After surgery online class:  Needed -     After surgery online class website link:  [email or web address]   Nurse visit per clinic:  Needed -     History and Physical per clinic:   -  Pre-Assessment Clinic   Final labs per clinic: Needed -        Notes: Please register for the Sleeve Gastrectomy Get Well Loop when you get an email invitation after you get a surgery date.   The Get Well Loop will give you information via email or text messages that can help you be more successful before and after surgery for up to one year after surgery.  It can also help answer any questions you may have.   Get Well Loop Handout - [email or web address]    -

## 2022-09-01 ENCOUNTER — LAB (OUTPATIENT)
Dept: LAB | Facility: CLINIC | Age: 42
End: 2022-09-01
Payer: COMMERCIAL

## 2022-09-01 DIAGNOSIS — E66.01 CLASS 2 SEVERE OBESITY WITH SERIOUS COMORBIDITY AND BODY MASS INDEX (BMI) OF 38.0 TO 38.9 IN ADULT, UNSPECIFIED OBESITY TYPE (H): ICD-10-CM

## 2022-09-01 DIAGNOSIS — E66.812 CLASS 2 SEVERE OBESITY WITH SERIOUS COMORBIDITY AND BODY MASS INDEX (BMI) OF 38.0 TO 38.9 IN ADULT, UNSPECIFIED OBESITY TYPE (H): ICD-10-CM

## 2022-09-01 LAB
ERYTHROCYTE [DISTWIDTH] IN BLOOD BY AUTOMATED COUNT: 16.1 % (ref 10–15)
HBA1C MFR BLD: 5.7 % (ref 0–5.6)
HCT VFR BLD AUTO: 40.4 % (ref 35–47)
HGB BLD-MCNC: 12.5 G/DL (ref 11.7–15.7)
MCH RBC QN AUTO: 25.2 PG (ref 26.5–33)
MCHC RBC AUTO-ENTMCNC: 30.9 G/DL (ref 31.5–36.5)
MCV RBC AUTO: 82 FL (ref 78–100)
PLATELET # BLD AUTO: 256 10E3/UL (ref 150–450)
PTH-INTACT SERPL-MCNC: 34 PG/ML (ref 15–65)
RBC # BLD AUTO: 4.96 10E6/UL (ref 3.8–5.2)
WBC # BLD AUTO: 6.8 10E3/UL (ref 4–11)

## 2022-09-01 PROCEDURE — 36415 COLL VENOUS BLD VENIPUNCTURE: CPT

## 2022-09-01 PROCEDURE — 83036 HEMOGLOBIN GLYCOSYLATED A1C: CPT

## 2022-09-01 PROCEDURE — 80061 LIPID PANEL: CPT

## 2022-09-01 PROCEDURE — 80053 COMPREHEN METABOLIC PANEL: CPT

## 2022-09-01 PROCEDURE — 82306 VITAMIN D 25 HYDROXY: CPT

## 2022-09-01 PROCEDURE — 85027 COMPLETE CBC AUTOMATED: CPT

## 2022-09-01 PROCEDURE — 83970 ASSAY OF PARATHORMONE: CPT

## 2022-09-02 ENCOUNTER — MYC MEDICAL ADVICE (OUTPATIENT)
Dept: ENDOCRINOLOGY | Facility: CLINIC | Age: 42
End: 2022-09-02

## 2022-09-02 ENCOUNTER — PREP FOR PROCEDURE (OUTPATIENT)
Dept: ENDOCRINOLOGY | Facility: CLINIC | Age: 42
End: 2022-09-02

## 2022-09-02 DIAGNOSIS — Z01.818 PREOPERATIVE TESTING: ICD-10-CM

## 2022-09-02 DIAGNOSIS — E66.01 MORBID OBESITY (H): Primary | ICD-10-CM

## 2022-09-02 LAB
ALBUMIN SERPL-MCNC: 3.4 G/DL (ref 3.4–5)
ALP SERPL-CCNC: 105 U/L (ref 40–150)
ALT SERPL W P-5'-P-CCNC: 24 U/L (ref 0–50)
ANION GAP SERPL CALCULATED.3IONS-SCNC: 10 MMOL/L (ref 3–14)
AST SERPL W P-5'-P-CCNC: 21 U/L (ref 0–45)
BILIRUB SERPL-MCNC: 0.3 MG/DL (ref 0.2–1.3)
BUN SERPL-MCNC: 11 MG/DL (ref 7–30)
CALCIUM SERPL-MCNC: 9.3 MG/DL (ref 8.5–10.1)
CHLORIDE BLD-SCNC: 108 MMOL/L (ref 94–109)
CHOLEST SERPL-MCNC: 186 MG/DL
CO2 SERPL-SCNC: 20 MMOL/L (ref 20–32)
CREAT SERPL-MCNC: 0.86 MG/DL (ref 0.52–1.04)
DEPRECATED CALCIDIOL+CALCIFEROL SERPL-MC: 34 UG/L (ref 20–75)
FASTING STATUS PATIENT QL REPORTED: ABNORMAL
GFR SERPL CREATININE-BSD FRML MDRD: 86 ML/MIN/1.73M2
GLUCOSE BLD-MCNC: 103 MG/DL (ref 70–99)
HDLC SERPL-MCNC: 37 MG/DL
LDLC SERPL CALC-MCNC: 106 MG/DL
NONHDLC SERPL-MCNC: 149 MG/DL
POTASSIUM BLD-SCNC: 3.8 MMOL/L (ref 3.4–5.3)
PROT SERPL-MCNC: 6.8 G/DL (ref 6.8–8.8)
SODIUM SERPL-SCNC: 138 MMOL/L (ref 133–144)
TRIGL SERPL-MCNC: 216 MG/DL

## 2022-09-02 RX ORDER — CEFAZOLIN SODIUM IN 0.9 % NACL 3 G/100 ML
3 INTRAVENOUS SOLUTION, PIGGYBACK (ML) INTRAVENOUS
Status: CANCELLED | OUTPATIENT
Start: 2022-09-02

## 2022-09-02 RX ORDER — ONDANSETRON 2 MG/ML
4 INJECTION INTRAMUSCULAR; INTRAVENOUS
Status: CANCELLED | OUTPATIENT
Start: 2022-09-02

## 2022-09-02 RX ORDER — ENOXAPARIN SODIUM 100 MG/ML
40 INJECTION SUBCUTANEOUS
Status: CANCELLED | OUTPATIENT
Start: 2022-09-02

## 2022-09-02 RX ORDER — CEFAZOLIN SODIUM IN 0.9 % NACL 3 G/100 ML
3 INTRAVENOUS SOLUTION, PIGGYBACK (ML) INTRAVENOUS SEE ADMIN INSTRUCTIONS
Status: CANCELLED | OUTPATIENT
Start: 2022-09-02

## 2022-09-02 RX ORDER — ACETAMINOPHEN 325 MG/1
975 TABLET ORAL ONCE
Status: CANCELLED | OUTPATIENT
Start: 2022-09-02 | End: 2022-09-02

## 2022-09-02 NOTE — TELEPHONE ENCOUNTER
"----- Message from Huma Cardoza RN sent at 2022  9:58 AM CDT -----  Regarding: Need appts  Hi AgentsLeah is having a sleeve gastrectomy with Dr Aragon on 10/25/22.    Please call to schedule:     __Post-op appointments with their dietitian for 1 week and 31+ days after surgery.  __Post-op appointments for 1 week and 31+ days after surgery with a provider.  Please schedule the 1 week provider appointment as an in-person visit with Rosanna Phipps CNP or Vanessa Agustin PA-C.  If they request a video visit (due to distance), they must have a weight check and vital signs checked at their PCP clinic and faxed to us at [phone] prior to the visit.    PLEASE CONFIRM with patient over the phone.   We are getting complaints that patients are having their postop appts scheduled without them knowing about them.    Feel free to contact me if you have any questions or updates.    Sincerely,  BENY Woo, MS, RN  Surgical Weight Management Nurse Coordinator   MyChart messages to \"[facility] - \"  Fax: [phone]  Contact Center Nurse Line and Clinic [phone]        "

## 2022-09-12 NOTE — TELEPHONE ENCOUNTER
FUTURE VISIT INFORMATION      SURGERY INFORMATION:    Date: 10/25/22    Location: [facility] or    Surgeon:  Daniel Aragon MD    Anesthesia Type:  general    Procedure: GASTRECTOMY, SLEEVE, LAPAROSCOPIC HERNIORRHAPHY, HIATAL, LAPAROSCOPIC    Consult: virtual visit     RECORDS REQUESTED FROM:       Primary Care Provider: [facility]    Most recent EKG+ Tracin21

## 2022-09-16 ENCOUNTER — TELEPHONE (OUTPATIENT)
Dept: HEMATOLOGY | Facility: CLINIC | Age: 42
End: 2022-09-16

## 2022-09-19 ENCOUNTER — VIRTUAL VISIT (OUTPATIENT)
Dept: NEUROPSYCHOLOGY | Facility: CLINIC | Age: 42
End: 2022-09-19
Payer: COMMERCIAL

## 2022-09-19 DIAGNOSIS — E66.01 MORBID OBESITY (H): ICD-10-CM

## 2022-09-19 DIAGNOSIS — Z01.818 PREPROCEDURAL EXAMINATION: Primary | ICD-10-CM

## 2022-09-19 DIAGNOSIS — F54 PSYCHOLOGICAL FACTORS AFFECTING MEDICAL CONDITION: ICD-10-CM

## 2022-09-19 PROCEDURE — 96130 PSYCL TST EVAL PHYS/QHP 1ST: CPT | Mod: GT

## 2022-09-19 PROCEDURE — 96138 PSYCL/NRPSYC TECH 1ST: CPT | Mod: GT

## 2022-09-19 PROCEDURE — 90791 PSYCH DIAGNOSTIC EVALUATION: CPT | Mod: GT

## 2022-09-19 PROCEDURE — 96139 PSYCL/NRPSYC TST TECH EA: CPT | Mod: GT

## 2022-09-19 NOTE — LETTER
9/19/2022      RE: Leah Yanez  [address] [facility] [address]       Leah Yanez is a 42 year old who is being evaluated via a billable video visit.      How would you like to obtain your AVS? MyChart  If the video visit is dropped, the invitation should be resent by: Send to e-mail at: [email or web address]  Will anyone else be joining your video visit? No    PSYCHOLOGICAL EVALUATION    RELEVANT HISTORY AND REASON FOR REFERRAL    Leah Yanez is a 42 year old . Information was obtained via video interview with the patient and review of her medical records. Ms. Yanez has a history of a large hiatal hernia, associated esophagitis, and esophageal narrowing, as well as obesity. She is interested in hiatal hernia repair as part of a weight loss operation. This psychological evaluation was undertaken at the request of Daniel Aragon M.D., as part of the presurgical protocol, to assess personality traits and emotional functioning, as they pertain to her ability to make well-reasoned medical decisions and follow through with treatment recommendations.     A letter of support from her psychotherapist, PÉREZ De La Fuente dated August 25, 2022, was reviewed. Ms. Ornelas indicated that she had been working with Ms. Yanez since June 2021, for help related to depression and body image concerns. She stated that she has demonstrated regular and consistent follow through with treatment recommendations and has made significant improvement in symptoms in the past year. Ms. Ornelas has no concerns about her ability to follow through on treatment before or after weight loss surgery, and will continue to provide mental health care for her for as long as needed.     Precautions are in place related to COVID-19. The entire evaluation took place over the [employer] platform, including proctoring of the MMPI-3 and PHQ-9.    EVALUATION FINDINGS    Behavioral observations were limited as the evaluation was conducted  over video. Mood was euthymic. Speech was fluent, with normal articulation, volume, and rate. She presented her thoughts in a clear, logical manner. Memory and attention appeared to be adequate. Judgment and insight appeared to be good.    Upon interview, Ms. Yanez stated that she had not considered bariatric surgery until meeting with her surgeon to discuss repair of her hiatal hernia. She has Griggs's esophagus, and she understands that the typical surgical treatment would not work for her because she is overweight. Her surgeon suggested a gastric sleeve at the time of hernia repair. Initially, this was disconcerting for her, as she had never thought about weight loss surgery. In fact, over the last two years in particular, she has been working hard on her self-image, and learning intuitive eating.  She stated that 10 years ago she would have done anything to be thinner.  Now, however, she understands how the diet culture has treated her mentally.  She has made changes such as not having a scale in her house, focusing on hunger cues, and being more aware of her body.  She stated that she has been in that place for the last couple of years, so the idea of surgery was going against what she had been thinking about.  Over the last 6 months, however, she has been able to give that a lot of thought and feels that surgery is the right decision for her because of her health and she feels mentally good about it.  She understands that there is a risk of leakage, that it may not heal well, and that there could be blood clots.  She does have a history of a blood clot in her lung so she understands that she will need to take blood thinners for a while after the surgery.  She has spoken with her family and friends about the procedure and they are supportive.  She lives with her  and her 2 stepchildren, ages 5 and 11, live with them half of the time.  Her  will be able to assist with her cares following the  surgery as necessary.    Ms. Yanez stated that she has struggled with her weight since childhood.  Currently, she weighs 275 pounds.  The most she ever weighed was about 286 pounds.  She was also asked to lose 10 pounds in preparation for surgery, and has lost at least 7 or 8 pounds so far by making some changes in her diet and lifestyle.  She first tried to lose weight when she was in the fourth grade.  She remembers lying in her bed and feeling that she was fat.  She told her mother who got her on a diet.  She has tried Weight Watchers, a keto diet, [place] diet, Atkins, and many different diets.  She has always been able to lose weight, but then she tends to gain more than that back.  On a typical day now, for breakfast she may have a shake made of banana, protein powder, and almond milk, or Greek yogurt with granola and banana, or sometimes she has toast, eggs, and avocado.  For lunch she may have a sandwich or salad, or leftovers.  For dinner, when her stepchildren are there half of the time they have planned meals that include a protein, starch, and vegetable.  The other half of the week they do something simple, or they each make something of their own.  On those nights she is likely to have eggs and avocado toast, leftovers, sandwich or salad, or a bowl for dinner.  She endorsed overeating at times but denied gentry binging, and denied any history of purging.  She has never been diagnosed with an eating disorder.  She drinks soda only on rare occasions and does not drink coffee.  For exercise, she walks daily.  She is training for a hike to the [employer] next year, and she uses hand weights and kettle balls, and also plays volleyball in the league.    Ms. Yanez reported a history of depression.  In 2006 she had lived in [place] and had just moved back to [place].  She was trying to figure out her life and she talk with her doctor and was prescribed Wellbutrin.  She then moved and  went to [employer] school and made new friends, and ultimately was able to discontinue the Wellbutrin after about a year and a half.  In the last couple of years she has had some depression after struggling with a variety of life circumstances including infertility, IVF which did not work, and a mother who has Alzheimer's disease.  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  About a year ago she started therapy and continues to work with that person.  She chose her therapist because she is knowledgeable about intuitive eating, and they have been addressing these issues in therapy.  Then in December she talk with her doctor about going back on Wellbutrin, which has been very helpful.    When asked to describe her mood, Ms. Yanez stated that she is not in a dark place and generally is feeling better.  She has more days with normal mood, and does not feel like she has a weight of the world on her.  She stated that 85% of the time she feels good.  She rated her level of stress as a 4 on a scale of 1-10.  In order to manage stress she goes on a walk in the morning, she talks with her , friends, and therapist, and she maintains a connection with her friends all over the world using the delores [employer], which allows her to film videos talking to her friends, and is a helpful way for her to process things.  She has not been sleeping as well since she turned 40.  She does have a history of narcolepsy that was diagnosed years ago and was treated briefly with Ritalin although she did not feel that it helps.  She notices now that if she wakes up at night she has trouble falling back to sleep.  She sleeps 7 to 9 hours a night.  She will occasionally nap on weekends but does not feel very good after a nap so she tries to just rest.  Her interest level and motivation are good.  She denied suicidal ideation or any history of suicide attempts.  She reported no childhood history of physical or  sexual abuse, but stated that she had a bad experience on a date and struggles with knowing what to call that experience.    Ms. Yanez consumes 1 or 2 alcoholic beverages on rare occasions, usually before playing volleyball socially.  Her score on the CAGE questionnaire was 0.  She denied illicit drug use or tobacco use and has never been in any chemical dependency treatment programs.  She denied gambling or excessive spending or shopping.  She has never been arrested.    In school, Ms. Yanez was never in any special education classes, nor was she held back any grades.  She graduated from high school.  She moved to [place] and worked for a [employer] and did very a 6-month courses through that job.  She also completed a one year photography school program.  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  She has been  for over 4 years.    Ms. Yanez reported a history of a head injury when she was thrown from a horse.  She was diagnosed with a concussion and has recovered from that.  She is not bothered by headaches.  She sometimes has some pain in her neck.  She was last in the emergency department about a year ago when she choked on chicken.  She was able to breathe but the chicken was stuck in her throat and she required an endoscopy, ultimately leading to the diagnosis of Griggs's esophagus and the ultimate recommendation that she undergo bariatric surgery as well as hiatal hernia repair.    As noted, the MMPI-3, a self-report measure of mood and personality, was administered remotely using  [employer] and video proctoring.  She responded to the items in a consistent and valid manner.  Her level of emotional distress was low. She denied significant psychopathology, including depressed mood or ideation, anxiety, or psychosis. On the PHQ-9, she did not endorse significant depressive symptomatology.    PAST MEDICAL HISTORY: Medical records indicate a history of anti-cardiolipin antibody  positive, gastroesophageal reflux disease with esophagitis, hiatal hernia, Griggs's esophagus, class 2 severe obesity, history of pulmonary embolism, Raynaud's phenomenon.    CURRENT MEDICATIONS:  Include bupropion, cyanocobalamin, fish oil-omega-3 fatty acids, multivitamins, omeprazole, vitamin D3.    FAMILY MEDICAL HISTORY:  Significant for a mother with depression and anxiety who now has Alzheimer's disease.  She has no reported family history of obesity.    CONCLUSIONS    Leah Yanez is a 42 year old woman with a history of obesity, who is interested in undergoing bariatric surgery. Due to precautions related to COVID-19, this evaluation was undertaken remotely, using a video platform. She appears to be capable of comprehending medical information and making well reasoned decisions for herself. She has a good understanding of the surgical procedure and the risks involved.    Ms. Yanez reports a history of depression which was first treated with medications around 2006 when she was going through some life changes.  In the last couple of years she has had a number of stressors related to infertility and illness in her family, and she has had depression during that time.  She began psychotherapy about a year ago, and they have addressed eating behaviors. She continues to work with her psychotherapist, who has provided a letter of support.  She was started again on Wellbutrin in December, and feels that it has been helpful.  Assessment of mood and personality falls within normal limits.  She does not appear to be experiencing emotional problems that might interfere with her judgment or ability to follow through treatment recommendations, and she has demonstrated a consistent ability to seek support when needed.  She denied disordered eating behaviors such as binging or purging.  She had not been considering bariatric surgery for weight loss, and in fact in the last few years has been focusing on intuitive  eating and being aware of her hunger cues rather than any sort of formal dieting or weight loss expectations.  She initially saw her surgeon for recommendations regarding hernia repair, but understands that a combination of sleeve gastrectomy and hernia repair is being recommended for her based on her weight.  She is feeling prepared to proceed.  She was asked to lose 10 pounds in preparation for surgery, and by her report, has lost 7 or 8 pounds so far by making changes in her diet and lifestyle.  She has a very good support system in her family, friends, and psychotherapist.  She will likely be able to tolerate the emotional stress and physical discomfort associated with the surgery, as well as the changes in her lifestyle once the surgery is complete. There are no psychosocial contraindications to her undergoing bariatric surgery.    Kamilah Romano, Ph.D., ABPP  Licensed Psychologist, LP [id number]  Board Certified in Clinical Neuropsychology    Time spent:  One hour professional time, including interview and biopsychosocial assessment (CPT 91880); One hour psychological testing evaluation services by a licensed and board-certified neuropsychologist, including integration of patient data, interpretation of standardized test results and clinical data, clinical decision making, treatment planning and report, first hour (CPT 64837); 1 unit psychological test administration and scoring by technician (CPT 78194). An additional 20 minutes (1 unit)  psychological test administration and scoring by technician (CPT 85089). ICD-10 diagnosis: Z01.818; E66.01; F54.          Video-Visit Details    Video Start Time: 8:36 AM    Type of service:  Video Visit    Video End Time:9:01 AM    Originating Location (pt. Location): Home    Distant Location (provider location):  [facility] [place]     Platform used for Video Visit: [employer]

## 2022-09-19 NOTE — PROGRESS NOTES
Leah Yanez is a 42 year old who is being evaluated via a billable video visit.      How would you like to obtain your AVS? MyChart  If the video visit is dropped, the invitation should be resent by: Send to e-mail at: [email or web address]  Will anyone else be joining your video visit? No    PSYCHOLOGICAL EVALUATION    RELEVANT HISTORY AND REASON FOR REFERRAL    Leah Yanez is a 42 year old . Information was obtained via video interview with the patient and review of her medical records. Ms. Yanez has a history of a large hiatal hernia, associated esophagitis, and esophageal narrowing, as well as obesity. She is interested in hiatal hernia repair as part of a weight loss operation. This psychological evaluation was undertaken at the request of Daniel Aragon M.D., as part of the presurgical protocol, to assess personality traits and emotional functioning, as they pertain to her ability to make well-reasoned medical decisions and follow through with treatment recommendations.     A letter of support from her psychotherapist, PÉREZ De La Fuente dated August 25, 2022, was reviewed. Ms. Ornelas indicated that she had been working with Ms. Yanez since June 2021, for help related to depression and body image concerns. She stated that she has demonstrated regular and consistent follow through with treatment recommendations and has made significant improvement in symptoms in the past year. Ms. Ornelas has no concerns about her ability to follow through on treatment before or after weight loss surgery, and will continue to provide mental health care for her for as long as needed.     Precautions are in place related to COVID-19. The entire evaluation took place over the [employer] platform, including proctoring of the MMPI-3 and PHQ-9.    EVALUATION FINDINGS    Behavioral observations were limited as the evaluation was conducted over video. Mood was euthymic. Speech was fluent, with normal articulation,  volume, and rate. She presented her thoughts in a clear, logical manner. Memory and attention appeared to be adequate. Judgment and insight appeared to be good.    Upon interview, Ms. Yanez stated that she had not considered bariatric surgery until meeting with her surgeon to discuss repair of her hiatal hernia. She has Griggs's esophagus, and she understands that the typical surgical treatment would not work for her because she is overweight. Her surgeon suggested a gastric sleeve at the time of hernia repair. Initially, this was disconcerting for her, as she had never thought about weight loss surgery. In fact, over the last two years in particular, she has been working hard on her self-image, and learning intuitive eating.  She stated that 10 years ago she would have done anything to be thinner.  Now, however, she understands how the diet culture has treated her mentally.  She has made changes such as not having a scale in her house, focusing on hunger cues, and being more aware of her body.  She stated that she has been in that place for the last couple of years, so the idea of surgery was going against what she had been thinking about.  Over the last 6 months, however, she has been able to give that a lot of thought and feels that surgery is the right decision for her because of her health and she feels mentally good about it.  She understands that there is a risk of leakage, that it may not heal well, and that there could be blood clots.  She does have a history of a blood clot in her lung so she understands that she will need to take blood thinners for a while after the surgery.  She has spoken with her family and friends about the procedure and they are supportive.  She lives with her  and her 2 stepchildren, ages 5 and 11, live with them half of the time.  Her  will be able to assist with her cares following the surgery as necessary.    Ms. Yanez stated that she has struggled with her  weight since childhood.  Currently, she weighs 275 pounds.  The most she ever weighed was about 286 pounds.  She was also asked to lose 10 pounds in preparation for surgery, and has lost at least 7 or 8 pounds so far by making some changes in her diet and lifestyle.  She first tried to lose weight when she was in the fourth grade.  She remembers lying in her bed and feeling that she was fat.  She told her mother who got her on a diet.  She has tried Weight Watchers, a keto diet, [place] diet, Atkins, and many different diets.  She has always been able to lose weight, but then she tends to gain more than that back.  On a typical day now, for breakfast she may have a shake made of banana, protein powder, and almond milk, or Greek yogurt with granola and banana, or sometimes she has toast, eggs, and avocado.  For lunch she may have a sandwich or salad, or leftovers.  For dinner, when her stepchildren are there half of the time they have planned meals that include a protein, starch, and vegetable.  The other half of the week they do something simple, or they each make something of their own.  On those nights she is likely to have eggs and avocado toast, leftovers, sandwich or salad, or a bowl for dinner.  She endorsed overeating at times but denied gentry binging, and denied any history of purging.  She has never been diagnosed with an eating disorder.  She drinks soda only on rare occasions and does not drink coffee.  For exercise, she walks daily.  She is training for a hike to the [employer] next year, and she uses hand weights and kettle balls, and also plays volleyball in the league.    Ms. Yanez reported a history of depression.  In 2006 she had lived in [place] and had just moved back to [place].  She was trying to figure out her life and she talk with her doctor and was prescribed Wellbutrin.  She then moved and went to [employer] school and made new friends, and ultimately was able to  discontinue the Wellbutrin after about a year and a half.  In the last couple of years she has had some depression after struggling with a variety of life circumstances including infertility, IVF which did not work, and a mother who has Alzheimer's disease.  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  About a year ago she started therapy and continues to work with that person.  She chose her therapist because she is knowledgeable about intuitive eating, and they have been addressing these issues in therapy.  Then in December she talk with her doctor about going back on Wellbutrin, which has been very helpful.    When asked to describe her mood, Ms. Yanez stated that she is not in a dark place and generally is feeling better.  She has more days with normal mood, and does not feel like she has a weight of the world on her.  She stated that 85% of the time she feels good.  She rated her level of stress as a 4 on a scale of 1-10.  In order to manage stress she goes on a walk in the morning, she talks with her , friends, and therapist, and she maintains a connection with her friends all over the world using the delores [employer], which allows her to film videos talking to her friends, and is a helpful way for her to process things.  She has not been sleeping as well since she turned 40.  She does have a history of narcolepsy that was diagnosed years ago and was treated briefly with Ritalin although she did not feel that it helps.  She notices now that if she wakes up at night she has trouble falling back to sleep.  She sleeps 7 to 9 hours a night.  She will occasionally nap on weekends but does not feel very good after a nap so she tries to just rest.  Her interest level and motivation are good.  She denied suicidal ideation or any history of suicide attempts.  She reported no childhood history of physical or sexual abuse, but stated that she had a bad experience on a date and  struggles with knowing what to call that experience.    Ms. Yanez consumes 1 or 2 alcoholic beverages on rare occasions, usually before playing volleyball socially.  Her score on the CAGE questionnaire was 0.  She denied illicit drug use or tobacco use and has never been in any chemical dependency treatment programs.  She denied gambling or excessive spending or shopping.  She has never been arrested.    In school, Ms. Yanez was never in any special education classes, nor was she held back any grades.  She graduated from high school.  She moved to [place] and worked for a [employer] organization and did very a 6-month courses through that job.  She also completed a one year photography school program.  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  She has been  for over 4 years.    Ms. Yanez reported a history of a head injury when she was thrown from a horse.  She was diagnosed with a concussion and has recovered from that.  She is not bothered by headaches.  She sometimes has some pain in her neck.  She was last in the emergency department about a year ago when she choked on chicken.  She was able to breathe but the chicken was stuck in her throat and she required an endoscopy, ultimately leading to the diagnosis of Griggs's esophagus and the ultimate recommendation that she undergo bariatric surgery as well as hiatal hernia repair.    As noted, the MMPI-3, a self-report measure of mood and personality, was administered remotely using  [employer] and video proctoring.  She responded to the items in a consistent and valid manner.  Her level of emotional distress was low. She denied significant psychopathology, including depressed mood or ideation, anxiety, or psychosis. On the PHQ-9, she did not endorse significant depressive symptomatology.    PAST MEDICAL HISTORY: Medical records indicate a history of anti-cardiolipin antibody positive, gastroesophageal reflux disease with esophagitis, hiatal hernia,  Griggs's esophagus, class 2 severe obesity, history of pulmonary embolism, Raynaud's phenomenon.    CURRENT MEDICATIONS:  Include bupropion, cyanocobalamin, fish oil-omega-3 fatty acids, multivitamins, omeprazole, vitamin D3.    FAMILY MEDICAL HISTORY:  Significant for a mother with depression and anxiety who now has Alzheimer's disease.  She has no reported family history of obesity.    CONCLUSIONS    Leah Yanez is a 42 year old woman with a history of obesity, who is interested in undergoing bariatric surgery. Due to precautions related to COVID-19, this evaluation was undertaken remotely, using a video platform. She appears to be capable of comprehending medical information and making well reasoned decisions for herself. She has a good understanding of the surgical procedure and the risks involved.    Ms. Yanez reports a history of depression which was first treated with medications around 2006 when she was going through some life changes.  In the last couple of years she has had a number of stressors related to infertility and illness in her family, and she has had depression during that time.  She began psychotherapy about a year ago, and they have addressed eating behaviors. She continues to work with her psychotherapist, who has provided a letter of support.  She was started again on Wellbutrin in December, and feels that it has been helpful.  Assessment of mood and personality falls within normal limits.  She does not appear to be experiencing emotional problems that might interfere with her judgment or ability to follow through treatment recommendations, and she has demonstrated a consistent ability to seek support when needed.  She denied disordered eating behaviors such as binging or purging.  She had not been considering bariatric surgery for weight loss, and in fact in the last few years has been focusing on intuitive eating and being aware of her hunger cues rather than any sort of formal  dieting or weight loss expectations.  She initially saw her surgeon for recommendations regarding hernia repair, but understands that a combination of sleeve gastrectomy and hernia repair is being recommended for her based on her weight.  She is feeling prepared to proceed.  She was asked to lose 10 pounds in preparation for surgery, and by her report, has lost 7 or 8 pounds so far by making changes in her diet and lifestyle.  She has a very good support system in her family, friends, and psychotherapist.  She will likely be able to tolerate the emotional stress and physical discomfort associated with the surgery, as well as the changes in her lifestyle once the surgery is complete. There are no psychosocial contraindications to her undergoing bariatric surgery.    Kamilah Romano, Ph.D., ABPP  Licensed Psychologist, LP [id number]  Board Certified in Clinical Neuropsychology    Time spent:  One hour professional time, including interview and biopsychosocial assessment (CPT 26498); One hour psychological testing evaluation services by a licensed and board-certified neuropsychologist, including integration of patient data, interpretation of standardized test results and clinical data, clinical decision making, treatment planning and report, first hour (CPT 36760); 1 unit psychological test administration and scoring by technician (CPT 96587). An additional 20 minutes (1 unit)  psychological test administration and scoring by technician (CPT 84329). ICD-10 diagnosis: Z01.818; E66.01; F54.          Video-Visit Details    Video Start Time: 8:36 AM    Type of service:  Video Visit    Video End Time:9:01 AM    Originating Location (pt. Location): Home    Distant Location (provider location):  [facility] [place]     Platform used for Video Visit: [employer]

## 2022-09-19 NOTE — NURSING NOTE
The patient was seen for neuropsychological evaluation via telehealth at the request of Vanessa Agustin PA-C for the purposes of diagnostic clarification and treatment planning. 50 minutes of video test administration and scoring were provided by this writer. Please see Dr. Kamilah Romano's report for a full interpretation of the findings.    Video Start Time 1: 8:26  Video End Time 1: 8:28    Video Start Time 2: 9:19  Video End Time 2: 10:04    Arabella Suero  Psychometrist

## 2022-09-21 NOTE — TELEPHONE ENCOUNTER
RN received a phone call from Leah regarding upcoming surgery date- she is scheduled for a gastric sleeve on 10/25. For her surgery, Kiana is recommending enoxaparin 40mg once daily x 3 weeks. RN has communicated this to Leah and has asked that she call if she has any difficulty obtaining the medication. She is added to our surgery and procedure list for us to track for any bleeding issues.     Verónica DICKSON, RN, PHN  [facility]  Office: [phone]  Fax: [phone]

## 2022-09-26 ENCOUNTER — CARE COORDINATION (OUTPATIENT)
Dept: ENDOCRINOLOGY | Facility: CLINIC | Age: 42
End: 2022-09-26

## 2022-09-26 NOTE — PROGRESS NOTES
Called patient to schedule a pre-op nurse visit with Elissa. She has a laparoscopic sleeve gastrectomy on 10/25/22. Her goal weight is 277 pounds. Her current weight is 276 pounds. Her pre-op visit is on 10/11/22 at 10:00 am. She is interested in the study.    Kimi Motley, EMT

## 2022-09-26 NOTE — PROGRESS NOTES
"Leah Yanez is a 42 year old female who is being evaluated via a billable video visit.      The patient has been notified of following:     \"This video visit will be conducted via a call between you and your physician/provider. We have found that certain health care needs can be provided without the need for an in-person physical exam.  This service lets us provide the care you need with a video conversation.  If a prescription is necessary we can send it directly to your pharmacy.  If lab work is needed we can place an order for that and you can then stop by our lab to have the test done at a later time.    Video visits are billed at different rates depending on your insurance coverage.  Please reach out to your insurance provider with any questions.    If during the course of the call the physician/provider feels a video visit is not appropriate, you will not be charged for this service.\"    Patient has given verbal consent for Video visit? Yes  How would you like to obtain your AVS? MyChart  If you are dropped from the video visit, the video invite should be resent to: Send to e-mail at: [email or web address]  Will anyone else be joining your video visit? No  {If patient encounters technical issues they should call [phone]      Video-Visit Details    Type of service:  Video Visit    Video Start Time: 10:00 PM  Video End Time: 10:23 PM    Originating Location (pt. Location): Home    Distant Location (provider location):  [facility] [place]     Platform used for Video Visit: [employer]    During this virtual visit the patient is located in [place], patient verifies this as the location during the entirety of this visit.       Bariatric Nutrition Consultation Note    Reason For Visit: Nutrition Assessment    Leah Yanez is a 42 year old presenting today for bariatric nutrition consult and nutrition education regarding clear and low-fat full liquid diet for post-bariatric " surgery (10/25/22).   Pt is interested in laparoscopic sleeve gastrectomy with Dr. Aragon.  Patient is accompanied by self.  Pt has met required nutrition visits prior to surgery. Pt referred by RAIMUNDO Lovelace.     Patient with Co-morbidities of obesity including:  GERD    ANTHROPOMETRICS:  Initial Consult Weight: 287 lbs   Current Weight: 280 lbs per pt    Required weight loss goal pre-op: -10 lbs from initial consult weight (goal weight 277 lbs or less before surgery)    Diet History:  Pt reports no history of receiving clear and low-fat full liquid diet education after bariatric surgery in the past. Feels she has not been as thoughtful around her diet as she usually has. Has been walking or playing volleyball daily.  SUPPLEMENT INFORMATION:  MVI, vitamin b12, vitamin D, fish oil, iron    PROGRESS WITH PREVIOUS GOALS:  1) Continue focusing on mindful eating and hunger cues  2) Continue physical activity as able  3) Review supplements below    NUTRITION DIAGNOSIS:  Obesity r/t positive energy balance aeb BMI >30 kg/m2.  And  Food- and Nutrition-related knowledge deficit r/t lack of prior exposure to information AEB pt scheduled for upcoming bariatric surgery and pt interest in diet education/review    INTERVENTION:  Intervention Provided/Education Provided/Reviewed previous goals and encouraged patient to continue goals prior to surgery.     Provided instruction on bariatric clear and low-fat full liquid diets.  Provided the following handouts: Diet Guidelines for Bariatric Surgery, Your Stage 1-5 Diet, Keeping Track of Your Fluids, list of recommended vitamin/mineral supplementation after sleeve gastrectomy surgery and RD contact information.       Patient Engagement: good    Goals:  1) Practice chewing thoroughly and taking 20-30 minutes per meal  2) Practice  fluids from meals and for 30 minutes after    Goals after surgery:   1. Follow the bariatric post-op diet advancement schedule (see below)  2.  Sip on 48-64 oz (or greater) fluids daily, recording intake to help stay on-track.  - Drink at least 1-2 oz of fluid every 15-30 min.  3. Stop vitamins/minerals 1 week before surgery. We will discuss starting a chewable multivitamin at the one week post-op visit.   4. Work towards consuming 60 gm protein daily.   Chewable Multivitamin Options for After Surgery:  Bariatric Advantage Advanced Multi EA chewable: https://www.bariatricEndoStim.SimplyCast/item/chewable-advanced-multi-ea  Take 2 chews daily. Additional calcium citrate supplement needed.  - Discount code for Bariatric Advantage vitamin/mineral supplements: UOFMINN (for 15% off order)     Reunion.com Multi Complete 45: [email or web address]/products/akdal-smjalovd-39?[email or web address]  Take 2 chews daily. Additional calcium citrate supplement needed.    Parmelee's Complete, or generic (with 10 mg iron per chew)   (https://www.[employer] Giant.SimplyCast/p/kids-39-complete-multivitamin-chewable-tablets-orange-grape-38-cherry-150ct-up-38-up-8482/-/[email or web address]#lnk=sametab)   Take 2 chews per day. Additional B12 and calcium citrate supplements needed. Potential need for additional iron supplement (if needing more than 20 mg iron per day)    Bariatric Fusion: https://www.bariatricfusion.com/collections/bariatric-multivitamins    Take 2 chews twice per day.    Optifast Bariatric Multivitamin  https://[email or web address].SimplyCast/optifastr-post-bariatric-chewable-vitamin-and-mineral-supplement.html  Take 2 chews twice per day. Additional iron supplement needed (take at separate time from multivitamins)     Post-op Diet Advancement Schedule:  Clear Liquid Diet (stage 1): 10/24-10/25  Low-Fat Full Liquid Diet (stage 2): 10/26-11/9  Pureed Diet (stage 3): 11/10-11/24  Soft Diet (stage 4): 11/25-12/23  Regular Diet (stage 5): 12/24     Post-op Diet Handouts:  Diet Guidelines after Weight-loss Surgery  [email or web address]     Your Stage 1 Diet: Clear  Liquids  [email or web address]     Your Stage 2 Diet: Low-fat Full Liquids  [email or web address]     Your Stage 3 Diet: Pureed Foods  [email or web address]     Pureed Recipes  [email or web address]    Your Stage 4 Diet: Soft Foods  [email or web address]    Your Stage 5 Diet: Regular Foods  [email or web address]    Supplements after Sleeve Gastrectomy, Gastric Bypass or Single Anastomosis Duodenal Switch  [email or web address]    Keeping Track of Fluids  [email or web address]    Follow Up: 1 week post op, prn    Time spent with patient: 23 minutes.  ANGELIKA AKERS RD, LD

## 2022-09-27 ENCOUNTER — VIRTUAL VISIT (OUTPATIENT)
Dept: ENDOCRINOLOGY | Facility: CLINIC | Age: 42
End: 2022-09-27

## 2022-09-27 DIAGNOSIS — E66.01 CLASS 2 SEVERE OBESITY WITH SERIOUS COMORBIDITY AND BODY MASS INDEX (BMI) OF 38.0 TO 38.9 IN ADULT, UNSPECIFIED OBESITY TYPE (H): ICD-10-CM

## 2022-09-27 DIAGNOSIS — Z71.3 NUTRITIONAL COUNSELING: Primary | ICD-10-CM

## 2022-09-27 DIAGNOSIS — E66.812 CLASS 2 SEVERE OBESITY WITH SERIOUS COMORBIDITY AND BODY MASS INDEX (BMI) OF 38.0 TO 38.9 IN ADULT, UNSPECIFIED OBESITY TYPE (H): ICD-10-CM

## 2022-09-27 PROCEDURE — 99207 PR NO CHARGE LOS: CPT | Mod: GT | Performed by: DIETITIAN, REGISTERED

## 2022-09-27 PROCEDURE — 97803 MED NUTRITION INDIV SUBSEQ: CPT | Mod: GT | Performed by: DIETITIAN, REGISTERED

## 2022-09-27 NOTE — PATIENT INSTRUCTIONS
Tom Lynn!    Follow-up with RD 1 week post op    Thank you,    Vika Mar, RD, LD  If you would like to schedule or reschedule an appointment with the RD, please call [phone]    Nutrition Goals  1) Practice chewing thoroughly and taking 20-30 minutes per meal  2) Practice  fluids from meals and for 30 minutes after    Goals after surgery:   1. Follow the bariatric post-op diet advancement schedule (see below)  2. Sip on 48-64 oz (or greater) fluids daily, recording intake to help stay on-track.  - Drink at least 1-2 oz of fluid every 15-30 min.  3. Stop vitamins/minerals 1 week before surgery. We will discuss starting a chewable multivitamin at the one week post-op visit.   4. Work towards consuming 60 gm protein daily.   Chewable Multivitamin Options for After Surgery:  Bariatric Advantage Advanced Multi EA chewable: https://www.Dizzywood.Anavex/item/chewable-advanced-multi-ea  Take 2 chews daily. Additional calcium citrate supplement needed.  - Discount code for Bariatric Advantage vitamin/mineral supplements: UOFMINN (for 15% off order)     Photometics Multi Complete 45: https://MyLorry/products/fwlwh-dfenwldr-71?[id number]  Take 2 chews daily. Additional calcium citrate supplement needed.    [place]'s Complete, or generic (with 10 mg iron per chew)   (https://www.Bracketz.Anavex/p/kids-39-complete-multivitamin-chewable-tablets-orange-grape-38-cherry-150ct-up-38-up-8482/-/A-61488427#lnk=sametab)   Take 2 chews per day. Additional B12 and calcium citrate supplements needed. Potential need for additional iron supplement (if needing more than 20 mg iron per day)    Bariatric Fusion: https://www.bariatricfusion.com/collections/bariatric-multivitamins    Take 2 chews twice per day.    Optifast Bariatric Multivitamin  https://www.ReferMe.Anavex/optifastr-post-bariatric-chewable-vitamin-and-mineral-supplement.html  Take 2 chews twice per day. Additional iron  supplement needed (take at separate time from multivitamins)     Post-op Diet Advancement Schedule:  Clear Liquid Diet (stage 1): 10/24-10/25  Low-Fat Full Liquid Diet (stage 2): 10/26-11/9  Pureed Diet (stage 3): 11/10-11/24  Soft Diet (stage 4): 11/25-12/23  Regular Diet (stage 5): 12/24     Post-op Diet Handouts:  Diet Guidelines after Weight-loss Surgery  [email or web address]     Your Stage 1 Diet: Clear Liquids  [email or web address]://[email or web address]     Your Stage 2 Diet: Low-fat Full Liquids  [email or web address]://[email or web address]     Your Stage 3 Diet: Pureed Foods  [email or web address]://[email or web address]     Pureed Recipes  [email or web address]://[email or web address]    Your Stage 4 Diet: Soft Foods  [email or web address]://[email or web address]/598946.[email or web address]    Your Stage 5 Diet: Regular Foods  [email or web address]://[email or web address]/[email or web address]    Supplements after Sleeve Gastrectomy, Gastric Bypass or Single Anastomosis Duodenal Switch  https://[employer]/868477.pdf    Keeping Track of Fluids  [email or web address]://www.[email or web address].[email or web address]      Interested in working with a health ? Health coaches work with you to improve your overall health and wellbeing. They look at the whole person, and may involve discussion of different areas of life, including, but not limited to the four pillars of health (sleep, exercise, nutrition, and stress management). Discuss with your care team if you would like to start working a health .    Health Coaching-3 Pack:    $99 for three health coaching visits    Visits may be done in person or via phone    Coaching is a partnership between the  and the client; Coaches do not prescribe or diagnose    Coaching helps inspire the client to reach his/her personal goals    COMPREHENSIVE WEIGHT MANAGEMENT PROGRAM  VIRTUAL SUPPORT GROUPS    For Support Group Information:      We offer support groups for patients who are working on weight loss and considering, preparing for or have had weight loss surgery.   There is no cost for this opportunity.  You are invited to  "attend the?Virtual Support Groups?provided by any of the following locations:    [facility] via HooftyMatch Teams with Jen Herrmann RN  2.   [place] via Financeit with Jeovanny Cunningham, PhD, LP  3.   [place] via HooftyMatch Teams with Vanna Hernandez RN  4.   [facility] via HooftyMatch Teams with Vanna Mcdonnell [facility]    The following Support Group information can also be found on our website:  [email or web address]      [facility] Weight Loss Surgery Support Group    [facility] Weight Loss Surgery Support Group  The support group is a patient-lead forum that meets monthly to share experiences, encouragement and education. It is open to those who have had weight loss surgery, are scheduled for surgery, and those who are considering surgery.   WHEN: This group meets on the 3rd Wednesday of each month from 5:00PM - 6:00PM virtually using Microsoft Teams.   FACILITATOR: Led by Jen Herrmann RD, STEFANY, RN, the program's Clinical Coordinator.   TO REGISTER: Please contact the clinic via Dermira or call the nurse line directly at [phone] to inform our staff that you would like an invite sent to you and to let us know the email you would like the invite sent to. Prior to the meeting, a link with directions on how to join the meeting will be sent to you.    2022 Meetings  January 19: \"Let's Talk\" a time for the group to share.  February 16: \"Let's Talk\" a time for the group to share.  March 16: Guest Speakers: Psychologists, Lamar Samaniego, PhD,LP and Baylee Lind, PsyD,LP  April 20: Guest Speaker: Health , Annalee Blanco, CHC,CHES, CPT  May 18: Guest Speaker: DietitianCarlo, ALEXANDER, LP  Vangie 15: \"Let's Talk\" a time for the group to share.  July 20: \"Let's Talk\" a time for the group to share.  August 17: TBA  September 21: TBA  October 19: Guest Speaker: Dr Celso West MD Pulmonologist and Sleep Medicine Physician, \"Getting a " "Good Night's Sleep\".  November 16: TBA  December 21: TBA    [facility] Support Groups    Connections: Bariatric Care Support Group?  This is open to all [facility] (and those external to this program) pre- and post- operative bariatric surgery patients as well as their support system.   WHEN: This group meets the 2nd Tuesday of each month from 6:30 PM - 8:00 PM virtually using Microsoft Teams.   FACILITATOR: Led by Jeovanny Cunningham, Ph.D who is a Licensed Psychologist with the [facility] Comprehensive Weight Management Program.   TO REGISTER: Please send an email to Jeovanny Cunningham, Ph.D., LP at?[email or web address]?if you would like an invitation to the group and to learn about using Microsoft Teams.    2022 Meetings  January 11: Christianne Matias, PharmD, Pharmacy Resident at [facility], \"Medications and Bariatric Surgery\".  February 8: Open Forum  March 8  April 12  May 10  Vangie 14    Connections: Post-Operative Bariatric Surgery Support Group  This is a support group for [facility] bariatric patients (and those external to [facility]) who have had bariatric surgery and are at least 3 months post-surgery.  WHEN: This support group meets the 4th Wednesday of the month from 11:00 AM - 12:00 PM virtually using Microsoft Teams.   FACILITATOR: Led by Certified Bariatric Nurse, Vanna Hernnadez RN.   TO REGISTER: Please send an email to Vanna at [email or web address] if you would like an invitation to the group and to learn about using Microsoft Teams.    2022 Meetings  January 26  February 23  March 23  April 27  May 25  Vangie 22    [facility] Healthy Lifestyle Virtual Support Group    Healthy Lifestyle Virtual Support Group?  This is 60 minutes of small group guided discussion, support and resources. All are welcome who want a healthy lifestyle.  WHEN: This group meets monthly on a Friday from 12:30 " "PM - 1:30 PM virtually using Microsoft Teams.   FACILITATOR: Led by National Board Certified Health and , Vanna Mcdonnell, [facility].   TO REGISTER: Please send an email to Vanna [email or web address] to receive monthly invites to the group or if you have any questions about having a health .  Prior to the meeting, a link with directions on how to join the meeting will be sent to you.    2022 Meetings  January 21: Huma Cardoza MS, RN, CIC, CBN, \"Healthy Habits\"  February 25: Open Forum  March 18: \"Setting Limits and Boundaries\"  April 29: Heather Heredia RD, \"Meal Planning Made Easy\"  May 20: Open Forum  June: To be determined                "

## 2022-09-27 NOTE — LETTER
Date:September 28, 2022      Patient was self referred, no letter generated. Do not send.        [facility] Health Information

## 2022-09-27 NOTE — LETTER
"9/27/2022       RE: Leah Yanez  [address] [place] [address]     Dear Colleague,    Thank you for referring your patient, Leah Yanez, to the [facility] [place] at [facility]. Please see a copy of my visit note below.    Leah Yanez is a 42 year old female who is being evaluated via a billable video visit.      The patient has been notified of following:     \"This video visit will be conducted via a call between you and your physician/provider. We have found that certain health care needs can be provided without the need for an in-person physical exam.  This service lets us provide the care you need with a video conversation.  If a prescription is necessary we can send it directly to your pharmacy.  If lab work is needed we can place an order for that and you can then stop by our lab to have the test done at a later time.    Video visits are billed at different rates depending on your insurance coverage.  Please reach out to your insurance provider with any questions.    If during the course of the call the physician/provider feels a video visit is not appropriate, you will not be charged for this service.\"    Patient has given verbal consent for Video visit? Yes  How would you like to obtain your AVS? MyChart  If you are dropped from the video visit, the video invite should be resent to: Send to e-mail at: [email or web address]  Will anyone else be joining your video visit? No  {If patient encounters technical issues they should call [phone]      Video-Visit Details    Type of service:  Video Visit    Video Start Time: 10:00 PM  Video End Time: 10:23 PM    Originating Location (pt. Location): Home    Distant Location (provider location):  [facility]     Platform used for Video Visit: The Rounds    During this virtual visit the patient is located in [place], patient verifies " this as the location during the entirety of this visit.       Bariatric Nutrition Consultation Note    Reason For Visit: Nutrition Assessment    Leah Yanez is a 42 year old presenting today for bariatric nutrition consult and nutrition education regarding clear and low-fat full liquid diet for post-bariatric surgery (10/25/22).   Pt is interested in laparoscopic sleeve gastrectomy with Dr. Aragon.  Patient is accompanied by self.  Pt has met required nutrition visits prior to surgery. Pt referred by RAIMUNDO Lovelace.     Patient with Co-morbidities of obesity including:  GERD    ANTHROPOMETRICS:  Initial Consult Weight: 287 lbs   Current Weight: 280 lbs per pt    Required weight loss goal pre-op: -10 lbs from initial consult weight (goal weight 277 lbs or less before surgery)    Diet History:  Pt reports no history of receiving clear and low-fat full liquid diet education after bariatric surgery in the past. Feels she has not been as thoughtful around her diet as she usually has. Has been walking or playing volleyball daily.  SUPPLEMENT INFORMATION:  MVI, vitamin b12, vitamin D, fish oil, iron    PROGRESS WITH PREVIOUS GOALS:  1) Continue focusing on mindful eating and hunger cues  2) Continue physical activity as able  3) Review supplements below    NUTRITION DIAGNOSIS:  Obesity r/t positive energy balance aeb BMI >30 kg/m2.  And  Food- and Nutrition-related knowledge deficit r/t lack of prior exposure to information AEB pt scheduled for upcoming bariatric surgery and pt interest in diet education/review    INTERVENTION:  Intervention Provided/Education Provided/Reviewed previous goals and encouraged patient to continue goals prior to surgery.     Provided instruction on bariatric clear and low-fat full liquid diets.  Provided the following handouts: Diet Guidelines for Bariatric Surgery, Your Stage 1-5 Diet, Keeping Track of Your Fluids, list of recommended vitamin/mineral supplementation after sleeve  gastrectomy surgery and RD contact information.       Patient Engagement: good    Goals:  1) Practice chewing thoroughly and taking 20-30 minutes per meal  2) Practice  fluids from meals and for 30 minutes after    Goals after surgery:   1. Follow the bariatric post-op diet advancement schedule (see below)  2. Sip on 48-64 oz (or greater) fluids daily, recording intake to help stay on-track.  - Drink at least 1-2 oz of fluid every 15-30 min.  3. Stop vitamins/minerals 1 week before surgery. We will discuss starting a chewable multivitamin at the one week post-op visit.   4. Work towards consuming 60 gm protein daily.   Chewable Multivitamin Options for After Surgery:  Bariatric Advantage Advanced Multi EA chewable: https://www.Shoptiques.Liztic/item/chewable-advanced-multi-ea  Take 2 chews daily. Additional calcium citrate supplement needed.  - Discount code for Bariatric Advantage vitamin/mineral supplements: UOFMINN (for 15% off order)     [place] Multi Complete 45: https://Covercake/products/pvlpp-wzymkfcn-94?[id number]  Take 2 chews daily. Additional calcium citrate supplement needed.    [place]'s Complete, or generic (with 10 mg iron per chew)   (https://www.3Play Media.Liztic/p/kids-39-complete-multivitamin-chewable-tablets-orange-grape-38-cherry-150ct-up-38-up-8482/-/A-[id number]#lnk=sametab)   Take 2 chews per day. Additional B12 and calcium citrate supplements needed. Potential need for additional iron supplement (if needing more than 20 mg iron per day)    Bariatric Fusion: https://www.bariatricfusion.com/collections/bariatric-multivitamins    Take 2 chews twice per day.    Optifast Bariatric Multivitamin  https://www.[employer].Liztic/optifastr-post-bariatric-chewable-vitamin-and-mineral-supplement.html  Take 2 chews twice per day. Additional iron supplement needed (take at separate time from multivitamins)     Post-op Diet Advancement Schedule:  Clear Liquid Diet  (stage 1): 10/24-10/25  Low-Fat Full Liquid Diet (stage 2): 10/26-11/9  Pureed Diet (stage 3): 11/10-11/24  Soft Diet (stage 4): 11/25-12/23  Regular Diet (stage 5): 12/24     Post-op Diet Handouts:  Diet Guidelines after Weight-loss Surgery  [email or web address]://[email or web address]     Your Stage 1 Diet: Clear Liquids  [email or web address]://[email or web address]/752889.[email or web address]     Your Stage 2 Diet: Low-fat Full Liquids  [email or web address]://[email or web address]/598942.[email or web address]     Your Stage 3 Diet: Pureed Foods  [email or web address]://[email or web address]/866926.[email or web address]     Pureed Recipes  [email or web address]://[email or web address]/586987.[email or web address]    Your Stage 4 Diet: Soft Foods  [email or web address]://[email or web address]/[email or web address]    Your Stage 5 Diet: Regular Foods  [email or web address]://[email or web address].[email or web address]    Supplements after Sleeve Gastrectomy, Gastric Bypass or Single Anastomosis Duodenal Switch  https://[employer]/546016.pdf    Keeping Track of Fluids  [email or web address]://www.[email or web address]    Follow Up: 1 week post op, prn    Time spent with patient: 23 minutes.  ANGELIKA AKERS RD, LD          Again, thank you for allowing me to participate in the care of your patient.      Sincerely,    ANGELIKA AKERS RD

## 2022-10-03 ENCOUNTER — ALLIED HEALTH/NURSE VISIT (OUTPATIENT)
Dept: RESEARCH | Facility: CLINIC | Age: 42
End: 2022-10-03
Payer: COMMERCIAL

## 2022-10-03 DIAGNOSIS — Z00.6 EXAMINATION OF PARTICIPANT IN CLINICAL TRIAL: Primary | ICD-10-CM

## 2022-10-03 NOTE — PROGRESS NOTES
Pt Name: Leah REDMOND Beau  Surgical Clinical Trials Office Informed Consent Process    Study Name: Liver Collection Study, IRB [id number]  Version: 2.0  IRB Approval date: 8/12/2022    Per our IRB approved recruitment process, the patient was approached in clinic on 09/29/2022 by clinic staff. Patient reviewed the consent form with coordinator and agreed to participate in the study.    Date Consent was Signed:   10/03/2022    The subject was screened and meets all of the inclusion criteria and none of the exclusion criteria is met.    The subject was told:  -that the study involves research   -the purpose of the research study  -the expected duration of the study and the approximate number of subject sought  -of procedures that are identified as experimental  -of reasonably foreseeable risks or discomforts to the subject  -of any benefits to the subject or others that may be expected from the research  -of alternative procedures and/or treatment  -how the confidentiality of records would be maintained  -whether or not compensation and medical treatments are available should injury occur as a result of the study  -who to contact if they have questions related to the research study or questions regarding research subjects' rights  -that participation is completely voluntary and that their decision to or not to participate will have no impact on their relationships with the N and the staff    No study procedures were completed prior to the consent being obtained.  The use of historical information (lab or assessments) used for the purpose of the study was approved by subject.  The subject was fully aware that we would be reviewing their medical record for the study.  The subject demonstrated an understanding of what the study involved.  Specifically, how this study differed from standard of care at our center and what was required of the subject as part of the study.  The subject reviewed the consent form and was  given the opportunity to ask questions before signing.  Questions and concerns were answered by the study staff and/or study physician.  A copy of the signed informed consent document was provided to the subject.  [x] Yes [] No  The subject was offered a copy of the signed informed consent but declined. [] Yes [x] No  The consent require the use of a :   [] Yes [x]  No     A 'short form' consent was used:     [] Yes [x] No         If yes, provide name of witness:   The subject required a legally-authorized representative (LAR) to sign on their behalf:                                                    [] Yes  [x] No   If yes, please record name of LAR:     Comprehension Questions for Consent:    [x] What concerns do you have regarding participation in this study?  [x] Please explain the risks of participation in this study?  [x] Please describe how you participate in this study?      Notes:  Pt verbalized understanding.  All questions were answered.  Consent was conducted remotely over the phone and via REDCap e-consent.      Signature of Consenter:   Clinical Research Coordinator:   Bella Tolliver, MS, ALEXANDERN, LDN, CLT, CCRP  office phone: [phone]

## 2022-10-03 NOTE — PROGRESS NOTES
Pt. Name:   Leah Yanez  Informed Consent Process Documentation  Study Name and Protocol Number:  Profiling of Adipose Tissue Depots and Immune Correlates  [id number]  ICF Version Date / IRB Effective Date: Version: 1.5  /  IRB Effective Date: 12/10/2020    Per our IRB approved recruitment process, the patient was approached in clinic on 9/29 by clinic staff. Patient reviewed the consent form with coordinator and agreed to participate in the study.    Date Consent was Signed:   10/03/2022    The subject was screened and meets all of the inclusion criteria and none of the exclusion criteria is met.    The subject was told:  -that the study involves research   -the purpose of the research study  -the expected duration of the study and the approximate number of subject sought  -of procedures that are identified as experimental  -of reasonably foreseeable risks or discomforts to the subject  -of any benefits to the subject or others that may be expected from the research  -of alternative procedures and/or treatment  -how the confidentiality of records would be maintained  -whether or not compensation and medical treatments are available should injury occur as a result of the study  -who to contact if they have questions related to the research study or questions regarding research subjects' rights  -that participation is completely voluntary and that their decision to or not to participate will have no impact on their relationships with the [facility] and the staff    No study procedures were completed prior to the consent being obtained.  The use of historical information (lab or assessments) used for the purpose of the study was approved by subject.  The subject was fully aware that we would be reviewing their medical record for the study.  The subject demonstrated an understanding of what the study involved.  Specifically, how this study differed  from standard of care at our center and what was required of the subject as part of the study.  The subject reviewed the consent form and was given the opportunity to ask questions before signing.  Questions and concerns were answered by the study staff and/or study physician.  A copy of the signed informed consent document was provided to the subject.  [x] Yes [] No  The subject was offered a copy of the signed informed consent but declined. [] Yes [x] No  The consent require the use of a :   [] Yes [x]  No     A 'short form' consent was used:     [] Yes [x] No         If yes, provide name of witness:   The subject required a legally-authorized representative (LAR) to sign on their behalf:                                                    [] Yes  [x] No   If yes, please record name of LAR:     Comprehension Questions for Consent:    [x] What concerns do you have regarding participation in this study?  [x] Please explain the risks of participation in this study?  [x] Please describe how you participate in this study?      Notes:   Consent was conducted remotely over the phone, utilizing Duolingo e-consent.  All questions were answered.  Pt verbalized understanding.      Signature of Consenter:   Clinical Research Coordinator:   Bella Tolliver, MS, RDN, LDN, CLT, CCRP  office phone: [phone]

## 2022-10-06 ENCOUNTER — VIRTUAL VISIT (OUTPATIENT)
Dept: SURGERY | Facility: CLINIC | Age: 42
End: 2022-10-06
Payer: COMMERCIAL

## 2022-10-06 ENCOUNTER — PRE VISIT (OUTPATIENT)
Dept: SURGERY | Facility: CLINIC | Age: 42
End: 2022-10-06

## 2022-10-06 ENCOUNTER — ANESTHESIA EVENT (OUTPATIENT)
Dept: SURGERY | Facility: CLINIC | Age: 42
End: 2022-10-06

## 2022-10-06 DIAGNOSIS — E66.01 MORBID OBESITY (H): ICD-10-CM

## 2022-10-06 DIAGNOSIS — K44.9 HIATAL HERNIA: ICD-10-CM

## 2022-10-06 DIAGNOSIS — Z01.818 PREOP EXAMINATION: Primary | ICD-10-CM

## 2022-10-06 DIAGNOSIS — Z98.84 BARIATRIC SURGERY STATUS: ICD-10-CM

## 2022-10-06 PROCEDURE — 99204 OFFICE O/P NEW MOD 45 MIN: CPT | Mod: GT | Performed by: CLINICAL NURSE SPECIALIST

## 2022-10-06 RX ORDER — ACETAMINOPHEN 325 MG/1
325-650 TABLET ORAL EVERY 6 HOURS PRN
Status: ON HOLD | COMMUNITY
End: 2022-10-26

## 2022-10-06 ASSESSMENT — ENCOUNTER SYMPTOMS
DYSRHYTHMIAS: 0
SEIZURES: 0

## 2022-10-06 ASSESSMENT — PAIN SCALES - GENERAL: PAINLEVEL: MODERATE PAIN (4)

## 2022-10-06 ASSESSMENT — LIFESTYLE VARIABLES: TOBACCO_USE: 0

## 2022-10-06 NOTE — H&P
"  Pre-Operative H & P     CC:  Preoperative exam to assess for increased cardiopulmonary risk while undergoing surgery and anesthesia.    Date of Encounter: 10/6/2022  Primary Care Physician:  No Ref-Primary, Physician     Reason for visit:   Encounter Diagnoses   Name Primary?     Bariatric surgery status      Preop examination Yes     Hiatal hernia      Morbid obesity (H)        ILIR Yanez is a 42 year old female who presents for pre-operative H & P in preparation for  Procedure Information     Date/Time: 10/25/22     Procedure: GASTRECTOMY, SLEEVE, LAPAROSCOPIC, HERNIORRHAPHY, HIATAL, LAPAROSCOPIC    Anesthesia type: General     Pre-op diagnosis: Morbid obesity; Large hiatal hernia    Location: [facility]    Providers: Dr. Aragon        History is obtained from the patient and chart review    Patient who has been recently followed by Dr. Aragon and the bariatric team in consideration for weight loss surgery. She also has a very large hiatal hernia that has been symptomatic with esophagitis and even esophageal narrowing which has required dilatation. She also has Griggs's esophagus being followed. She has been counseled for above procedures including the hiatal hernia repair.     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 She was followed by [facility], last seen on 8/10/22 with notes:    \"She did have weak positive anticardiolipin antibody that was transient with repeat testing that was negative. Remainder of hypercoagulable work up was negative. She has not had any recurrent thrombosis. No family history of venous thromboembolism.     Likely low risk for recurrent venous thromboembolism as she is no longer on estrogen therapy. We did discuss option for prophylactic enoxaparin postoperatively out of an abundance of caution as she has not had any other surgical challenges. Gastric bypass procedure is considered higher risk " "procedure for venous thromboembolism. She is of low bleeding risk. She has elected to proceed with postoperative venous thromboembolism prophylaxis. I recommend discharge supply of enoxaparin 40mg once daily x 3 weeks as long as no bleeding complications.\" Plan for prescription provided by [facility].    She has history of negative sleep study, concussion in 2016 with resolution of symptoms and significant Raynauds primarily involving fingers. In 2004 she had an admission for gangrene of one finger related to this which was treated and resolved. She does not take any medications but uses strategies to keep fingers, toes and core warm.      Hx of abnormal bleeding or anti-platelet use: Denies.     Menstrual history: Patient's last menstrual period was 09/04/2022.     Past Medical History  Past Medical History:   Diagnosis Date     Anti-cardiolipin antibody positive      Griggs esophagus      Depressive disorder, not elsewhere classified 03/01/2006    Resolved 8/07     Gastroesophageal reflux disease with esophagitis      Hiatal hernia      History of pulmonary embolism      Obesity      Raynaud's syndrome        Past Surgical History  Past Surgical History:   Procedure Laterality Date     ESOPHAGOSCOPY, GASTROSCOPY, DUODENOSCOPY (EGD), COMBINED N/A 12/29/2021    Procedure: ESOPHAGOGASTRODUODENOSCOPY, WITH BIOPSY;  Surgeon: Esteban Rose DO;  Location: [facility] OR     TONSILLECTOMY & ADENOIDECTOMY       Z NONSPECIFIC PROCEDURE      T&A as a child     ZZ NONSPECIFIC PROCEDURE      Tubes in ears bilaterally       Prior to Admission Medications  Current Outpatient Medications   Medication Sig Dispense Refill     acetaminophen (TYLENOL) 325 MG tablet Take 325-650 mg by mouth every 6 hours as needed for mild pain       buPROPion (WELLBUTRIN) 75 MG tablet Take 150 mg by mouth every morning Pt not sure of dose       cyanocobalamin (VITAMIN B-12) 100 MCG tablet Take 100 mcg by mouth every morning       fish oil-omega-3 fatty acids " "1000 MG capsule Take 2 g by mouth every morning       Iron-Vitamin C (IRON 100/C PO) 1 tablet daily before breakfast       MULTIVITAMINS OR TABS Take 1 tablet by mouth every morning       omeprazole (PRILOSEC) 40 MG DR capsule Take 1 capsule (40 mg) by mouth 2 times daily 180 capsule 3     vitamin D3 (CHOLECALCIFEROL) 50 mcg (2000 units) tablet Take 1 tablet by mouth every morning         Allergies  Allergies   Allergen Reactions     Azithromycin      Erythromycin GI Disturbance     When \"very young\"       Social History  Social History     Socioeconomic History     Marital status:      Spouse name: Not on file     Number of children: Not on file     Years of education: Not on file     Highest education level: Not on file   Occupational History     Not on file   Tobacco Use     Smoking status: Never Smoker     Smokeless tobacco: Never Used   Substance and Sexual Activity     Alcohol use: Yes     Comment: Alcoholic Drinks/day: social     Drug use: No     Sexual activity: Not Currently   Other Topics Concern     Not on file   Social History Narrative     Not on file     Social Determinants of Health     Financial Resource Strain: Not on file   Food Insecurity: Not on file   Transportation Needs: Not on file   Physical Activity: Not on file   Stress: Not on file   Social Connections: Not on file   Intimate Partner Violence: Not on file   Housing Stability: Not on file       Family History  Family History   Problem Relation Age of Onset     Heart Disease Father         MI, Lipids     Hypertension Mother         arthritis     Hypertension Maternal Grandfather         arthritis, macular degeneration     Cancer - colorectal Maternal Grandmother         arthritis     Alzheimer Disease Paternal Grandfather      Acute Myocardial Infarction Father      Colon Cancer Paternal Grandmother        Review of Systems  The complete review of systems is negative other than noted in the HPI or here.   Anesthesia Evaluation   Pt " has had prior anesthetic. Type: General and MAC.    No history of anesthetic complications       ROS/MED HX  ENT/Pulmonary: Comment: History of neg sleep study    (+) LOUIS risk factors, obese,  (-) tobacco use and recent URI   Neurologic: Comment: Past history of concussion after falling off horse 2016. Symptoms resolved   (-) no seizures   Cardiovascular: Comment: Significant history of Raynauds with past admission for gangrene of one finger. Manages by keeping hands and core warm. No meds. Fingers turn white and then black.    (+) -----Previous cardiac testing   Echo: Date: Results:    Stress Test: Date: Results:    ECG Reviewed: Date: 9/1/21 Results:  SR, nonspecific T wave abnormality  Cath: Date: Results:   (-) taking anticoagulants/antiplatelets, TONY and arrhythmias   METS/Exercise Tolerance: >4 METS    Hematologic: Comments: Estrogen provoked PE while on OCPs  Plan for enoxaparin for 3 weeks after surgery    (+) History of blood clots, pt is not anticoagulated,  (-) history of blood transfusion   Musculoskeletal:  - neg musculoskeletal ROS     GI/Hepatic: Comment: History of dilation   Large hiatal hernia  Some dysphagia    (+) GERD, Symptomatic, esophageal disease, hiatal hernia,     Renal/Genitourinary:  - neg Renal ROS     Endo:     (+) Obesity,     Psychiatric/Substance Use:     (+) psychiatric history depression     Infectious Disease:  - neg infectious disease ROS     Malignancy:  - neg malignancy ROS     Other:  - neg other ROS          Virtual visit -  No vitals were obtained    Physical Exam  Constitutional: Awake, alert, no apparent distress, and appears stated age.  HENT: Normocephalic  Respiratory: non labored breathing; no cough   Neurologic: Oriented to name, place and time.   Neuropsychiatric: Calm, cooperative. Normal affect.      Prior Labs/Diagnostic Studies   All labs and imaging personally reviewed   Lab Results   Component Value Date    WBC 6.8 09/01/2022    WBC 5.0 06/26/2006     Lab  Results   Component Value Date    RBC 4.96 09/01/2022    RBC 5.30 06/26/2006     Lab Results   Component Value Date    HGB 12.5 09/01/2022    HGB 14.9 06/26/2006     Lab Results   Component Value Date    HCT 40.4 09/01/2022    HCT 44.6 06/26/2006     Lab Results   Component Value Date    MCV 82 09/01/2022    MCV 84 06/26/2006     Lab Results   Component Value Date    MCH 25.2 09/01/2022    MCH 28.0 06/26/2006     Lab Results   Component Value Date    MCHC 30.9 09/01/2022    MCHC 33.4 06/26/2006     Lab Results   Component Value Date    RDW 16.1 09/01/2022    RDW 12.7 06/26/2006     Lab Results   Component Value Date     09/01/2022     06/26/2006     Last Comprehensive Metabolic Panel:  Sodium   Date Value Ref Range Status   09/01/2022 138 133 - 144 mmol/L Final   06/16/2006 140 133 - 144 mmol/L Final     Potassium   Date Value Ref Range Status   09/01/2022 3.8 3.4 - 5.3 mmol/L Final   06/16/2006 3.5 3.4 - 5.3 mmol/L Final     Chloride   Date Value Ref Range Status   09/01/2022 108 94 - 109 mmol/L Final   06/16/2006 107 94 - 109 mmol/L Final     Carbon Dioxide   Date Value Ref Range Status   06/16/2006 22 20 - 32 mmol/L Final     Carbon Dioxide (CO2)   Date Value Ref Range Status   09/01/2022 20 20 - 32 mmol/L Final     Anion Gap   Date Value Ref Range Status   09/01/2022 10 3 - 14 mmol/L Final   06/16/2006 11 6 - 17 mmol/L Final     Glucose   Date Value Ref Range Status   09/01/2022 103 (H) 70 - 99 mg/dL Final   06/16/2006 87 60 - 110 mg/dL Final     Urea Nitrogen   Date Value Ref Range Status   09/01/2022 11 7 - 30 mg/dL Final   06/16/2006 9 5 - 24 mg/dL Final     Creatinine   Date Value Ref Range Status   09/01/2022 0.86 0.52 - 1.04 mg/dL Final   06/16/2006 1.00 0.60 - 1.30 mg/dL Final     GFR Estimate   Date Value Ref Range Status   09/01/2022 86 >60 mL/min/1.73m2 Final     Comment:     Effective December 21, 2021 eGFRcr in adults is calculated using the 2021 CKD-EPI creatinine equation which  includes age and gender (Rohan clay al., NEJ, DOI: 10.1056/TWUCnv2710086)   2006 71 >60 mL/min/1.7m2 Final     Calcium   Date Value Ref Range Status   2022 9.3 8.5 - 10.1 mg/dL Final   2006 9.1 8.5 - 10.4 mg/dL Final     Bilirubin Total   Date Value Ref Range Status   2022 0.3 0.2 - 1.3 mg/dL Final     Alkaline Phosphatase   Date Value Ref Range Status   2022 105 40 - 150 U/L Final     ALT   Date Value Ref Range Status   2022 24 0 - 50 U/L Final     AST   Date Value Ref Range Status   2022 21 0 - 45 U/L Final       A1C 5.7    EK21 Sinus rhythm, non specific T wave abnormality    CT Chest/abdomen 22                                                       IMPRESSION:  1.  No acute findings in the chest, abdomen and pelvis.  2.  Mild right lower lobe groundglass and patchy opacity, likely  atelectasis although this may be infectious or inflammatory. Small  left upper lobe groundglass nodule measuring 5 mm, also may be  inflammatory. A follow-up chest CT in 3 months can be considered.  3.  Small hiatal hernia.    The patient's records and results personally reviewed by this provider.     Outside records reviewed from: Care Everywhere      Assessment      Leah Yanez is a 42 year old female seen as a PAC referral for risk assessment and optimization for anesthesia.    Plan/Recommendations  Pt will be optimized for the proposed procedure.  See below for details on the assessment, risk, and preoperative recommendations    NEUROLOGY  - No history of TIA, CVA or seizure   - History of concussion after fall from a horse in 2016. No residual.   -Post Op delirium risk factors:  No risk identified    ENT  - No current airway concerns.  Will need to be reassessed day of surgery.  Mallampati: Unable to assess  TM: Unable to assess   Lower permanent retainer    CARDIAC  No cardiac history, symptoms or meds. BP range in clinic has been 120-140s/ 70-80s. Good activity tolerance.      Significant history of Raynauds with gangrene of finger in 2004. No meds. Is managed by keeping fingers, toes and core warm. She reports that fingers turn white and then black.    - METS (Metabolic Equivalents)>4    RCRI: 0.9% risk of serious cardiac events    PULMONARY  Denies asthma, cough or shortness of breath.     Low risk for LOUIS. History of negative sleep study.    - Tobacco History      History   Smoking Status     Never Smoker   Smokeless Tobacco     Never Used       GI: Large hiatal hernia with complications of heartburn, reflux, esophagitis, narrowing requiring dilation, dysphagia, and Griggs's. She will take omeprazole on DOS.     PONV Medium Risk  Total Score: 2           1 AN PONV: Pt is Female    1 AN PONV: Patient is not a current smoker        /RENAL  - Baseline Creatinine 0.86    ENDOCRINE  - BMI: Estimated body mass index is 37.7 kg/m  as calculated from the following:    Height as of 8/18/22: 1.829 m (6').    Weight as of 8/18/22: 126.1 kg (278 lb).  Obesity (BMI >30)  - No history of Diabetes Mellitus A1C 5.7    HEME: VTE risk: 1.8%.   History of PE while on BCPs, followed by Heme with plan for enoxaparin for 3 weeks after surgery.   Denies history of blood transfusion.    PSYCH  - Depression. Will take bupropion on DOS.     The patient is optimized for their procedure. AVS with information on surgery time/arrival time, meds and NPO status given by nursing staff. No further diagnostic testing indicated.    Please refer to the physical examination documented by the anesthesiologist in the anesthesia record on the day of surgery.    Video-Visit Details    Type of service:  Video Visit    Patient verbally consented to video service today: YES    Video Start Time:  11:21am   Video End Time (time video stopped): 11:35am     Originating Location (pt. Location): Home    Distant Location (provider location):  [facility] PREOPERATIVE ASSESSMENT [facility]ENTER     Mode of Communication:  Video Conference via  [employer] after unable to connect with [email or web address]  On the day of service:     Prep time: 20 minutes  Visit time: 14 minutes  Documentation time: 23 minutes  ------------------------------------------  Total time: 57 minutes      ELISA Simmons CNS  Preoperative Assessment Center  [facility]  Clinic and Surgery Center  Phone: [phone]  Fax: [phone]

## 2022-10-06 NOTE — PATIENT INSTRUCTIONS
Preparing for Your Surgery      Name:  Leah Yanez   MRN:  [id number]   :  1980   Today's Date:  10/6/2022       Arriving for surgery:  Surgery date:  10/25/22  Arrival time:  6:50 am     Surgeries and procedures: Adult patients can have 2 visitors all through the surgery process.     Visiting hours: 8 a.m. to 8:30 p.m.     Hospital: Adult patients and children under age 18 can have 4 visitor at a time     No visitors under the age of 5 are allowed for hospital patients.  Double occupancy rooms: Patients can have only two visitors at a time.     Patients with disabilities: Can have a support person with them (family member, service provider     Or someone well informed about their needs) plus the allowed number of visitors     Patients confirmed or suspected to have symptoms of COVID 19 or flu:     No visitors allowed for adult patients.   Children (under age 18) can have 1 named visitor.     People who are sick or showing symptoms of COVID 19 or flu:    Are not allowed to visit patients--we can only make exceptions in special situations.       Please follow these guidelines for your visit:   Arrive wearing a mask over your mouth and nose; we will give you a medical mask to wear    If you arrive wearing a cloth mask.   Keep it on during your entire visit, even when in patient's room.   If you don't wear a mask we'll ask you to leave.     Clean your hands with alcohol hand . Do this when you arrive at and leave the building and patient room,    And again after you touch your mask or anything in the room.     You can t visit if you have a fever, cough, shortness of breath, muscle aches, headaches, sore throat    Or diarrhea      Stay 6 feet away from others during your visit and between visits     Go directly to and from the room you are visiting.     Stay in the patient s room during your visit. Limit going to other places in the hospital as much as possible     Leave bags and jackets at home or  in the car.     For everyone s health, please don t come and go during your visit. That includes for smoking   during your visit.     Please come to:     [facility] [address]  [address] [place]  [address]    -  Parking is available at the Patient Visitor Ramp on [address] and [facility].    -  When entering the hospital, you will be asked some Covid screening questions and directed to Patient Registration. Patient Registration will then direct you to the 3rd floor Surgery Waiting Room.  [phone]?     - ?If you are in need of directions, wheelchair or escort please stop at the Information Desk in the lobby.  Inform the information person that you are here for surgery; a wheelchair and escort to [address] will be provided.?     What can I eat or drink?  -  Follow Bariatric Clinic instructions for starting Clear Liquid prep.  -  You may have clear liquids until 12 midnight, the day of surgery.  -  Sips of water only with medications from 12 midnight until 5:50 am, the day of surgery.  -  Nothing by mouth after 5:50 am, the day of surgery.    Examples of clear liquids:  Water  Clear broth  Juices (apple, white grape, white cranberry  and cider) without pulp  Noncarbonated, powder based beverages  (lemonade and Ryne-Aid)  Coffee or tea (without milk or cream)  Gatorade- no red or purple    -  No Alcohol for at least 24 hours before surgery.     Which medicines can I take?  Hold Aspirin for 7 days before surgery.   Hold Multivitamins for 7 days before surgery.  Hold Supplements for 7 days before surgery. (Fish Oil-Omega 3 Fatty Acids)  Hold Ibuprofen (Advil, Motrin) for 1 day before surgery--unless otherwise directed by surgeon.  Hold Naproxen (Aleve) for 4 days before surgery.    -  DO NOT take these medications the day of surgery:  Iron with Vitamin C, Vitamin D3, Vitamin B-12,     -  PLEASE TAKE these medications the day of surgery:  Bupropion  (Wellbutrin), Omeprazole (Prilosec),  Acetaminophen (Tylenol) if needed    How do I prepare myself?  - Please take 2 showers before surgery using Scrubcare or Hibiclens soap.    Use this soap only from the neck to your toes.     Leave the soap on your skin for one minute--then rinse thoroughly.      You may use your own shampoo and conditioner. No other hair products.   - Please remove all jewelry and body piercings.  - No lotions, deodorants or fragrance.  - No makeup or fingernail polish.   - Bring your ID and insurance card.    -If you have a Deep Brain Stimulator, Spinal Cord Stimulator, or any Neuro Stimulator device---you must bring the remote control to the hospital.      ALL PATIENTS GOING HOME THE SAME DAY OF SURGERY ARE REQUIRED TO HAVE A RESPONSIBLE ADULT TO DRIVE AND BE IN ATTENDANCE WITH THEM FOR 24 HOURS FOLLOWING SURGERY.      Questions or Concerns:    - For any questions regarding the day of surgery or your hospital stay, please contact the Pre Admission Nursing Office at [phone].       - If you have health changes between today and your surgery, please call your surgeon.       - For questions after surgery, please call your surgeons office.

## 2022-10-06 NOTE — PROGRESS NOTES
Laeh is a 42 year old who is being evaluated via a billable video visit.      How would you like to obtain your AVS? MyChart    HPI         Review of Systems         Objective    Vitals - Patient Reported  Pain Score: Moderate Pain (4)        Physical Exam       SHINE New LPN

## 2022-10-14 ENCOUNTER — TELEPHONE (OUTPATIENT)
Dept: ENDOCRINOLOGY | Facility: CLINIC | Age: 42
End: 2022-10-14

## 2022-10-14 NOTE — TELEPHONE ENCOUNTER
Received the prior authorization from [facility] for a sleeve gastrectomy scheduled 10/25/22 with Dr. Aragon. Authorization # [id number] per fax dated 10/14/2022.

## 2022-10-15 ENCOUNTER — HEALTH MAINTENANCE LETTER (OUTPATIENT)
Age: 42
End: 2022-10-15

## 2022-10-19 ENCOUNTER — TRANSFERRED RECORDS (OUTPATIENT)
Dept: ENDOCRINOLOGY | Facility: CLINIC | Age: 42
End: 2022-10-19

## 2022-10-19 ENCOUNTER — ALLIED HEALTH/NURSE VISIT (OUTPATIENT)
Dept: ENDOCRINOLOGY | Facility: CLINIC | Age: 42
End: 2022-10-19
Payer: COMMERCIAL

## 2022-10-19 ENCOUNTER — DOCUMENTATION ONLY (OUTPATIENT)
Dept: ENDOCRINOLOGY | Facility: CLINIC | Age: 42
End: 2022-10-19

## 2022-10-19 VITALS
OXYGEN SATURATION: 98 % | BODY MASS INDEX: 37 KG/M2 | DIASTOLIC BLOOD PRESSURE: 91 MMHG | WEIGHT: 272.8 LBS | HEART RATE: 71 BPM | SYSTOLIC BLOOD PRESSURE: 126 MMHG

## 2022-10-19 DIAGNOSIS — E66.812 CLASS 2 SEVERE OBESITY WITH SERIOUS COMORBIDITY AND BODY MASS INDEX (BMI) OF 38.0 TO 38.9 IN ADULT, UNSPECIFIED OBESITY TYPE (H): Primary | ICD-10-CM

## 2022-10-19 DIAGNOSIS — E66.01 CLASS 2 SEVERE OBESITY WITH SERIOUS COMORBIDITY AND BODY MASS INDEX (BMI) OF 38.0 TO 38.9 IN ADULT, UNSPECIFIED OBESITY TYPE (H): Primary | ICD-10-CM

## 2022-10-19 DIAGNOSIS — Z91.89 AT HIGH RISK FOR POSTOPERATIVE COMPLICATIONS: ICD-10-CM

## 2022-10-19 PROCEDURE — 91200 LIVER ELASTOGRAPHY: CPT | Mod: 26 | Performed by: NURSE PRACTITIONER

## 2022-10-19 PROCEDURE — 99207 PR NO CHARGE NURSE ONLY: CPT

## 2022-10-19 RX ORDER — HYOSCYAMINE SULFATE 0.125 MG
0.12 TABLET ORAL EVERY 4 HOURS PRN
Qty: 30 TABLET | Refills: 1 | Status: ON HOLD | OUTPATIENT
Start: 2022-10-19 | End: 2022-11-07

## 2022-10-19 RX ORDER — ONDANSETRON 4 MG/1
4 TABLET, ORALLY DISINTEGRATING ORAL EVERY 8 HOURS PRN
Qty: 15 TABLET | Refills: 0 | Status: ON HOLD | OUTPATIENT
Start: 2022-10-19 | End: 2022-11-07

## 2022-10-19 RX ORDER — AMOXICILLIN 250 MG
2 CAPSULE ORAL DAILY PRN
Qty: 30 TABLET | Refills: 1 | Status: SHIPPED | OUTPATIENT
Start: 2022-10-19 | End: 2023-03-10

## 2022-10-19 NOTE — PATIENT INSTRUCTIONS
Leah Yanez,    Below is a recap of our conversation regarding your upcoming Sleeve Gastrectomy on 10/25/22  at 11:00 AM with Dr. Mike Aragon.    SURGERY CANCELLATION  If something occurs in which you need to cancel your surgery, please contact [facility] at [phone], as soon as possible.        BEFORE SURGERY    DAY BEFORE YOUR SURGERY  Starting in the morning, follow a clear liquid diet.  Stay away from red liquids and liquids with pulp.  Ensure you are well hydrated all day!  Strive for at least 64 ounces.  Nothing after midnight.  You may have sips of water up to 3 hours before your surgery.   Take your medications as directed from the PAC clinic.    SHOWER  You will take a shower the night before surgery and a shower the morning of surgery.  Follow instructions for pre-op shower using the required soap (4% CHG, Hibiclens, Exidine, chlorhexidine).  Let the soap sit on your skin for a minute and then rinse.  After your evening shower, dry off with a clean towel, put on clean pajamas and get into a bed with clean sheets.  After your morning shower, dry off with a clean towel and put on clean comfortable clothes.  The soap can be very drying.  Do not put any deodorant, lotion or powder on after your shower.    TRANSPORTATION & POSTOP CARE  You will need a  to take you to the hospital the day of surgery.  You will need a  to pick you up from the hospital the day of discharge (usually the afternoon/evening the day after surgery).  You will need someone to stay with you for the first couple of days after surgery.  If you live greater than an hour from the hospital, you will need to stop every 50-60 minutes to get out of the car and walk around.         PRESCRIPTIONS FROM YOUR PHARMACY:   Please plan to pick these up before surgery and have them at home for when you are discharged. Do not bring them to the hospital with you!    Prilosec (omeprazole) OR Alternative:   This medication is for control  of stomach acid.  You will take this medication daily for the first three months after surgery.  TO TAKE: Open the capsule and put the beads in applesauce.  Take every morning.  If you are currently on a medication for GERD or Reflux, you will just continue that medication.    Levsin (hyoscyamine) or Alternative:   This medication is to help control spasms/cramping in the stomach and intestines.    Take one tablet every 4 hours as needed for cramping.    It may be helpful to take in the morning before taking any other medications.    Zofran (ondansetron) or Alternative:    This medication is for nausea.    To Take: Place one tablet on the tongue and let it dissolve.  You can take this every 8 hours, as needed.    Senna:   This medication is a stool softener:  Take as directed to help avoid constipation.  It is important to take this medication if you are taking a narcotic pain medicine as the pain medication can be constipating.    If you experience diarrhea, please stop taking the Senna.    Pain Medication: This prescription will be given to you at the time of discharge.  Your pain medication prescription at discharge is a different dosage and timing than what you were taking in the hospital!  Follow the new directions.  Take the minimal amount as directed for severe pain. The pain medicine can cause constipation.  Do not drive while taking narcotic pain medication.    For mild to moderate pain, you can take Tylenol or Extra Strength Tylenol per the bottle instructions.  You will need someone to stay with you for the first 1-2 days after surgery, especially if you are taking prescription pain medicine.  Please share the information regarding fluid intake and activity with your caregiver so they can help support you in your efforts.      IN THE HOSPITAL  Use your Incentive Spirometer  You should be up walking at least 3 times (or more) each day while in the hospital  Wear your abdominal binder to help with tummy  support  Sip through your fluids!  Frequent sips - about 1 tsp every couple of minutes.  Drinking more than that at a time can contribute to pain and cramping.       AFTER SURGERY    REGULARLY SCHEDULED MEDICATIONS    Depending on the size and number of medications you take, you may need to space/change the time you take your medications, so you do not overfill your stomach.  Make sure you follow-up with your primary provider to make medication changes needed.  DIABETES: If you have diabetes, monitor your blood sugars more frequently after surgery.  Your blood sugars may normalize quickly.  Please contact your prescribing provider if you need your medication adjusted.  HIGH BLOOD PRESSURE: If you have high blood pressure, monitor your blood pressure after surgery.  Your blood pressure may normalize quickly.  Please contact your prescribing provider if you need your medication adjusted.      POSTOPERATIVE MEDICATIONS  Take your postop medications as prescribed.  You can start a chewable Multivitamin when you get home.  Do not start any additional vitamins until you meet with your provider and dietician to receive further instructions.        PAIN CONTROL  Your pain medication prescription at discharge is a different dosage and timing than what you were taking in the hospital!  Follow the new directions.Take your pain medicine as needed, as directed.  Start with the minimal amount prescribed and supplement with Tylenol or Extra Strength Tylenol.  You can use Tylenol or Tylenol Extra Strength if you are mild to moderate pain.  DO NOT TAKE NSAIDS: IBUPROFEN, ASPIRIN OR NAPROXEN.  ICE: Use an ice pack on the incisions for the first 24 - 48 hours:  Use a barrier between the ice pack and the skin.  Do not leave the ice on longer than 20 minutes at each time.    Change positions frequently.  Walking around once an hour will also help.  HEAT: You may also try a heating pad on your abdomen to help soothe cramping.  Use a  barrier between the heating pad and your skin.  Do not leave on longer than 20 minutes at each time.      DIET  For Dr. Aragon Patients:  You will be following the Stage 2 (Full Liquid Diet) - upon discharge.  Ensure you have a variety of proper full liquids at home to support you in taking in the proper nutrition.  Please refer to the Stage 2 - Full Liquid diet handout provided by the Dietician.  Stage 3 (Pureed) Diet Start Date: 11/8/22  Stage 4 (Soft Foods) Diet Start Date: 11/22/22  Stage 5 (Regular) Diet Start Date: 12/22/22       HYDRATION  Your goal is 48 to 64 ounces each day (4-6 ounces each hour).  This amount will help prevent dehydration.    It is important to measure and keep a tally sheet of your intake.  Keep your tally sheet next to you and romero each time you drink one ounce of fluid.  You should track your fluids for the first month after surgery and if you are ill.  All fluids count in your fluid intake!  Keep a water bottle or thermal bottle by your bed.  Drink a little before going to sleep, if you wake in the middle of the night, and in the morning before getting out of bed.  Keep an eye on your urine.  It is a good indicator of your hydration status.  Your urine should be clear yellow or clear light yellow.    You will learn about advancing to a pureed diet at your first postop Dietician appointment.     BOWELS/CONSTIPATION   Movement is important to keep your bowels working.  Get up and walk at least each hour while you are awake.  It is not unusual to not have a bowel movement for a few days after surgery.  You should be passing gas each day.   Keep taking the SENNA until you have had a regular bowel movement and are off the narcotic pain medication.   If you haven't had a bowel movement by the 4th day after surgery, take one dose of Miralax (according to package directions).  Repeat dose if no results.  If you still don't have any results, use a Fleet Enema.  If still no results, call your  provider's office.   It is normal to have a little blood in your first couple of bowel movements.  If the blood continues, please contact our office.  If you are having gas pains and bloating, you can try Gas-X.    BREATHING EXERCISES  Use your incentive spirometer each hour while awake.  Sitting up or standing, take 5 - 10 slow, deep breaths each time, focusing on expanding your lungs.  This should be done for the first couple of weeks after surgery.  These exercises will help eliminate gases from your system (anesthesia and surgical).      ACTIVITY & EXERCISE  Get up and walk around each hour while you are awake - at least 10 minutes.  Walking will help with circulation, bowel regulation and will help you feel better.  Do not lift anything greater than 10-15 lb for the first 4 weeks.  The only exercise you can start right after surgery is WALKING.  Walking is good for the gut, the circulation and your breathing!   Seated Exercises for Arms and Legs (can be done before or after surgery) [email or web address]  Exercise Guidelines after Weight Loss Surgery (1st 4-6 weeks): [email or web address]  Exercises after Weight Loss Surgery (strengthening, when no weightlifting restrictions - after 4-6 weeks) :  [email or web address]       WOUND CARE  You will have steri-strips over your incision after surgery. They will fall off over the first 7-10 days after surgery.  If the steri strips fall off before your incisions are healed, replace them with a bandaid to pull the incision together.   You may have bruising around your wounds. This is normal. It will go away on its own. The skin around your incisions may be a little red. This is normal, too.   Showering: you may shower the day you get home unless your surgeon tells you differently.  Wash gently around incisions with warm soapy water, rinse well, and gently pat dry.    DO NOT wear tight clothing that rubs against your incisions while  they heal.   DO NOT soak in a bathtub, swimming pool, or hot tub until your incisions are healed completely, usually around 7-14 days. No tub baths or swimming until all incisions are healed.      GET WELL LOOP  Track your fluid intake!  Reminders to set up follow up appointments.  Notifications of frequently asked questions.  Please use Inveni for any concerns regarding your health.    FOLLOW-UP CALL  You will receive a post-op phone call two to three days after surgery to check in and see how you are doing (If your surgery is on a Friday, your phone call will be on the Monday following your surgery).    You can always send us a message via Inveni if you have any questions or concerns.      CALL YOUR PROVIDER IF:      You have more redness, pain, warmth, swelling, or bleeding around your incision.     The wound is larger or deeper or looks dark or dried out.     The drainage from your incision does not decrease in 3 to 5 days or increases.     The drainage becomes thick, tan or yellow and has a bad smell (pus).     Your temperature is above 100 F (37.7 C) for more than 4 hours.     You have pain that your pain medicine is not helping.     You have chest and/or belly pain.     You have trouble breathing.     You have a cough that does not go away.     You cannot drink or eat.     You have a fast heartbeat.     You are dizzy or lightheaded.     You are not passing gas and have not had a bowel movement after following instructions above.     Your stools are loose, or you have diarrhea.     You are vomiting after eating.         Please let us know if you have any additional questions.    Sincerely,    Elissa Wilson RN, BSN  RN Care Coordinator  Bariatric Surgery and Comprehensive Weight Management  Phone: [phone]

## 2022-10-19 NOTE — PROGRESS NOTES
EXAMINATION:    Liver FibroScan    DATE OF EXAM:  10/19/22    INDICATION:    Liver fibrosis assessment      A series of at least 10 Vibration Controlled Transient Elastography (VCTETM) measurements was performed by  placing the probe M (M, XL) over the center of the liver parenchyma and mechanically inducing a 50 Hertz  shear wave.    Each resulting VCTETM measurement was analyzed to determine shear wave propagation speed and  calculate the equivalent liver stiffness.    All measurements were reviewed by the  and physician fortechnical accuracy. Data variability across the acquired measurements was quantified with IQR/Median Percentage.    FINDINGS:    The median liver stiffness was 6.3 kPa with IQR/Median percentage of 13 %.    The measure CAP ultrasound attenuation rate value was 257 dB/m.

## 2022-10-19 NOTE — PROGRESS NOTES
PREOP NURSE VISIT     This patient is having laparoscopic gastric sleeve with Dr. Mike Aragon on 10/25/22.    The following handouts were reviewed with the patient:  Before Your Surgery, Patient Checklist, Weight Loss Surgery Pre-operative Class, Preop Recommendations Quick Reference Guide, History and Physical, Medications to Avoid, Shower or Bathing Before Surgery, Bowel Preparation, Powerful Choices and [place] [facility] Directive.  Questions were addressed and understanding of content was verbalized.  Contact information was provided.    WEIGHT CHECK:  Patient goal weight: 277    Weight today: 272.8    Surgery Date and Time: 10/25/22 at 11:00 am    Call [phone] [facility] Noriega to confirm surgery date.     PAC Visit: 10/6/22    Call [phone] to schedule your pre-operative history and physical with our PAC clinic.    MEDICAL CONDITIONS REVIEW:  Diabetic? No.     Diabetics who are on medications instructed to monitor blood sugar levels closely after surgery and contact prescribing provider if levels run low as they may need their medications adjusted.    On high blood pressure medications? No.     Patients on high blood pressure medications instructed to monitor blood pressure levels closely after surgery and contact prescribing provider if pressure are running low as they may need their medications adjusted.    Patient currently taking opioid/narcotic pain medications? No.     Patient lives greater than 3 hours from hospital?  No.  Patient planning on staying in the area after surgery?  NA     Patients instructed to take breaks each hour on car ride home.  To be out of vehicle, stretch and walk for at least 10 minutes.  To do ankle pump exercises in car.      SUPPORT SYSTEM  , Noe, will be helping patient with postop care.    PAPERWORK  FMLA/Time OFF:  Patient is anticipating taking 2 weeks off after surgery.      Patient instructed to have paperwork faxed to [phone] at the [facility]  of the surgeon.

## 2022-10-21 ENCOUNTER — LAB (OUTPATIENT)
Dept: LAB | Facility: CLINIC | Age: 42
End: 2022-10-21
Attending: PHYSICIAN ASSISTANT
Payer: COMMERCIAL

## 2022-10-21 DIAGNOSIS — E66.01 MORBID OBESITY (H): ICD-10-CM

## 2022-10-21 DIAGNOSIS — Z01.818 PREOPERATIVE TESTING: ICD-10-CM

## 2022-10-21 LAB — SARS-COV-2 RNA RESP QL NAA+PROBE: NEGATIVE

## 2022-10-21 PROCEDURE — U0003 INFECTIOUS AGENT DETECTION BY NUCLEIC ACID (DNA OR RNA); SEVERE ACUTE RESPIRATORY SYNDROME CORONAVIRUS 2 (SARS-COV-2) (CORONAVIRUS DISEASE [COVID-19]), AMPLIFIED PROBE TECHNIQUE, MAKING USE OF HIGH THROUGHPUT TECHNOLOGIES AS DESCRIBED BY CMS-2020-01-R: HCPCS

## 2022-10-21 PROCEDURE — U0005 INFEC AGEN DETEC AMPLI PROBE: HCPCS

## 2022-10-24 ENCOUNTER — ANESTHESIA EVENT (OUTPATIENT)
Dept: SURGERY | Facility: CLINIC | Age: 42
DRG: 619 | End: 2022-10-24
Payer: COMMERCIAL

## 2022-10-24 ENCOUNTER — LAB (OUTPATIENT)
Dept: LAB | Facility: CLINIC | Age: 42
End: 2022-10-24
Payer: COMMERCIAL

## 2022-10-24 DIAGNOSIS — Z98.84 BARIATRIC SURGERY STATUS: ICD-10-CM

## 2022-10-24 LAB
ALBUMIN UR-MCNC: NEGATIVE MG/DL
APPEARANCE UR: CLEAR
BACTERIA #/AREA URNS HPF: ABNORMAL /HPF
BILIRUB UR QL STRIP: NEGATIVE
COLOR UR AUTO: YELLOW
GLUCOSE UR STRIP-MCNC: NEGATIVE MG/DL
HGB UR QL STRIP: NEGATIVE
KETONES UR STRIP-MCNC: NEGATIVE MG/DL
LEUKOCYTE ESTERASE UR QL STRIP: ABNORMAL
NITRATE UR QL: NEGATIVE
PH UR STRIP: 5 [PH] (ref 5–7)
RBC #/AREA URNS AUTO: ABNORMAL /HPF
SP GR UR STRIP: 1.01 (ref 1–1.03)
SQUAMOUS #/AREA URNS AUTO: ABNORMAL /LPF
UROBILINOGEN UR STRIP-ACNC: 0.2 E.U./DL
WBC #/AREA URNS AUTO: ABNORMAL /HPF

## 2022-10-24 PROCEDURE — 81001 URINALYSIS AUTO W/SCOPE: CPT

## 2022-10-24 NOTE — ANESTHESIA PREPROCEDURE EVALUATION
"Anesthesia Pre-Procedure Evaluation    Patient: Leah Yanez   MRN: [id number] : 1980        Procedure : Procedure(s):  GASTRECTOMY, SLEEVE, LAPAROSCOPIC  HERNIORRHAPHY, HIATAL, LAPAROSCOPIC          Past Medical History:   Diagnosis Date     Anti-cardiolipin antibody positive      Griggs esophagus      Concussion 2016     Depressive disorder, not elsewhere classified 2006    Resolved      Gastroesophageal reflux disease with esophagitis      Hiatal hernia      History of pulmonary embolism      Obesity      Raynaud's syndrome     past history of admission for gangrene of one finger      Past Surgical History:   Procedure Laterality Date     ESOPHAGOSCOPY, GASTROSCOPY, DUODENOSCOPY (EGD), COMBINED N/A 2021    Procedure: ESOPHAGOGASTRODUODENOSCOPY, WITH BIOPSY;  Surgeon: Esteban Rose DO;  Location: [facility] OR     TONSILLECTOMY & ADENOIDECTOMY       ZZ NONSPECIFIC PROCEDURE      T&A as a child     ZZ NONSPECIFIC PROCEDURE      Tubes in ears bilaterally      Allergies   Allergen Reactions     Azithromycin      Erythromycin GI Disturbance     When \"very young\"      Social History     Tobacco Use     Smoking status: Never     Smokeless tobacco: Never   Substance Use Topics     Alcohol use: Yes     Comment: Alcoholic Drinks/day: social      Wt Readings from Last 1 Encounters:   10/19/22 123.7 kg (272 lb 12.8 oz)        Anesthesia Evaluation            ROS/MED HX  ENT/Pulmonary: Comment: History of PE    (+) LOUIS risk factors, obese,     Neurologic:       Cardiovascular:       METS/Exercise Tolerance:     Hematologic:       Musculoskeletal: Comment: Raynaud's syndrome      GI/Hepatic: Comment: Griggs's esophagus    (+) hiatal hernia,     Renal/Genitourinary:       Endo:     (+) Obesity,     Psychiatric/Substance Use:     (+) psychiatric history depression     Infectious Disease:       Malignancy:       Other:            Physical Exam    Airway        Mallampati: III   TM distance: > 3 FB "   Neck ROM: full   Mouth opening: > 3 cm    Respiratory Devices and Support         Dental  no notable dental history         Cardiovascular   cardiovascular exam normal          Pulmonary               Other findings: Nose ring and left earring unable to be removed. Patient understands risks of possible abrasions or injury during surgery    OUTSIDE LABS:  CBC:   Lab Results   Component Value Date    WBC 6.8 09/01/2022    WBC 10.8 09/01/2021    HGB 12.5 09/01/2022    HGB 12.4 09/01/2021    HCT 40.4 09/01/2022    HCT 40.8 09/01/2021     09/01/2022     09/01/2021     BMP:   Lab Results   Component Value Date     09/01/2022     08/10/2022    POTASSIUM 3.8 09/01/2022    POTASSIUM 4.2 08/10/2022    CHLORIDE 108 09/01/2022    CHLORIDE 111 (H) 08/10/2022    CO2 20 09/01/2022    CO2 25 08/10/2022    BUN 11 09/01/2022    BUN 11 08/10/2022    CR 0.86 09/01/2022    CR 0.91 08/10/2022     (H) 09/01/2022    GLC 92 08/10/2022     COAGS: No results found for: PTT, INR, FIBR  POC:   Lab Results   Component Value Date    HCG Negative 03/10/2022     HEPATIC:   Lab Results   Component Value Date    ALBUMIN 3.4 09/01/2022    PROTTOTAL 6.8 09/01/2022    ALT 24 09/01/2022    AST 21 09/01/2022    ALKPHOS 105 09/01/2022    BILITOTAL 0.3 09/01/2022     OTHER:   Lab Results   Component Value Date    A1C 5.7 (H) 09/01/2022    KIMBERLYN 9.3 09/01/2022    TSH 0.85 07/24/2003    SED 9 07/24/2003       Anesthesia Plan    ASA Status:  3   NPO Status:  NPO Appropriate (since 10pm on 10/24/22)    Anesthesia Type: General.     - Airway: ETT   Induction: Intravenous.   Maintenance: Inhalation.        Consents    Anesthesia Plan(s) and associated risks, benefits, and realistic alternatives discussed. Questions answered and patient/representative(s) expressed understanding.     - Discussed: Risks, Benefits and Alternatives for BOTH SEDATION and the PROCEDURE were discussed     - Discussed with:  Patient         Postoperative  Care    Pain management: IV analgesics, Multi-modal analgesia.   PONV prophylaxis: Ondansetron (or other 5HT-3), Dexamethasone or Solumedrol     Comments:                Vimal Celis MD

## 2022-10-25 ENCOUNTER — HOSPITAL ENCOUNTER (INPATIENT)
Facility: CLINIC | Age: 42
LOS: 5 days | Discharge: HOME | DRG: 619 | End: 2022-10-30
Attending: SURGERY | Admitting: SURGERY
Payer: COMMERCIAL

## 2022-10-25 ENCOUNTER — ANESTHESIA (OUTPATIENT)
Dept: SURGERY | Facility: CLINIC | Age: 42
DRG: 619 | End: 2022-10-25
Payer: COMMERCIAL

## 2022-10-25 ENCOUNTER — APPOINTMENT (OUTPATIENT)
Dept: GENERAL RADIOLOGY | Facility: CLINIC | Age: 42
DRG: 619 | End: 2022-10-25
Attending: SURGERY
Payer: COMMERCIAL

## 2022-10-25 ENCOUNTER — ALLIED HEALTH/NURSE VISIT (OUTPATIENT)
Dept: RESEARCH | Facility: CLINIC | Age: 42
End: 2022-10-25

## 2022-10-25 DIAGNOSIS — Z86.711 HISTORY OF PULMONARY EMBOLISM: ICD-10-CM

## 2022-10-25 DIAGNOSIS — Z98.84 S/P LAPAROSCOPIC SLEEVE GASTRECTOMY: Primary | ICD-10-CM

## 2022-10-25 DIAGNOSIS — Z00.6 EXAMINATION OF PARTICIPANT IN CLINICAL TRIAL: Primary | ICD-10-CM

## 2022-10-25 DIAGNOSIS — E66.01 MORBID OBESITY (H): ICD-10-CM

## 2022-10-25 LAB
CREAT SERPL-MCNC: 1.06 MG/DL (ref 0.51–0.95)
ERYTHROCYTE [DISTWIDTH] IN BLOOD BY AUTOMATED COUNT: 16 % (ref 10–15)
GFR SERPL CREATININE-BSD FRML MDRD: 67 ML/MIN/1.73M2
GLUCOSE BLDC GLUCOMTR-MCNC: 177 MG/DL (ref 70–99)
GLUCOSE BLDC GLUCOMTR-MCNC: 99 MG/DL (ref 70–99)
HBA1C MFR BLD: 5.5 %
HCG UR QL: NEGATIVE
HCT VFR BLD AUTO: 32.6 % (ref 35–47)
HGB BLD-MCNC: 10.2 G/DL (ref 11.7–15.7)
INSULIN SERPL-ACNC: 14.3 UU/ML (ref 2.6–24.9)
MCH RBC QN AUTO: 25.6 PG (ref 26.5–33)
MCHC RBC AUTO-ENTMCNC: 31.3 G/DL (ref 31.5–36.5)
MCV RBC AUTO: 82 FL (ref 78–100)
PLATELET # BLD AUTO: 316 10E3/UL (ref 150–450)
PLATELET # BLD AUTO: 382 10E3/UL (ref 150–450)
RBC # BLD AUTO: 3.99 10E6/UL (ref 3.8–5.2)
WBC # BLD AUTO: 13.5 10E3/UL (ref 4–11)

## 2022-10-25 PROCEDURE — 83525 ASSAY OF INSULIN: CPT | Performed by: PHYSICIAN ASSISTANT

## 2022-10-25 PROCEDURE — 82565 ASSAY OF CREATININE: CPT | Performed by: SURGERY

## 2022-10-25 PROCEDURE — 250N000011 HC RX IP 250 OP 636: Performed by: ANESTHESIOLOGY

## 2022-10-25 PROCEDURE — 36415 COLL VENOUS BLD VENIPUNCTURE: CPT | Performed by: PHYSICIAN ASSISTANT

## 2022-10-25 PROCEDURE — 83036 HEMOGLOBIN GLYCOSYLATED A1C: CPT | Performed by: PHYSICIAN ASSISTANT

## 2022-10-25 PROCEDURE — 258N000003 HC RX IP 258 OP 636: Performed by: NURSE PRACTITIONER

## 2022-10-25 PROCEDURE — 43775 LAP SLEEVE GASTRECTOMY: CPT | Performed by: SURGERY

## 2022-10-25 PROCEDURE — 88305 TISSUE EXAM BY PATHOLOGIST: CPT | Mod: TC | Performed by: SURGERY

## 2022-10-25 PROCEDURE — 250N000011 HC RX IP 250 OP 636: Performed by: SURGERY

## 2022-10-25 PROCEDURE — 71045 X-RAY EXAM CHEST 1 VIEW: CPT | Mod: 26 | Performed by: RADIOLOGY

## 2022-10-25 PROCEDURE — 36415 COLL VENOUS BLD VENIPUNCTURE: CPT

## 2022-10-25 PROCEDURE — 36416 COLLJ CAPILLARY BLOOD SPEC: CPT

## 2022-10-25 PROCEDURE — 71045 X-RAY EXAM CHEST 1 VIEW: CPT

## 2022-10-25 PROCEDURE — 120N000002 HC R&B MED SURG/OB UMMC

## 2022-10-25 PROCEDURE — 250N000011 HC RX IP 250 OP 636: Performed by: NURSE ANESTHETIST, CERTIFIED REGISTERED

## 2022-10-25 PROCEDURE — 250N000009 HC RX 250: Performed by: NURSE ANESTHETIST, CERTIFIED REGISTERED

## 2022-10-25 PROCEDURE — 370N000017 HC ANESTHESIA TECHNICAL FEE, PER MIN: Performed by: SURGERY

## 2022-10-25 PROCEDURE — 250N000013 HC RX MED GY IP 250 OP 250 PS 637: Performed by: NURSE PRACTITIONER

## 2022-10-25 PROCEDURE — 360N000077 HC SURGERY LEVEL 4, PER MIN: Performed by: SURGERY

## 2022-10-25 PROCEDURE — 85049 AUTOMATED PLATELET COUNT: CPT

## 2022-10-25 PROCEDURE — 258N000003 HC RX IP 258 OP 636: Performed by: NURSE ANESTHETIST, CERTIFIED REGISTERED

## 2022-10-25 PROCEDURE — 250N000009 HC RX 250: Performed by: NURSE PRACTITIONER

## 2022-10-25 PROCEDURE — 272N000001 HC OR GENERAL SUPPLY STERILE: Performed by: SURGERY

## 2022-10-25 PROCEDURE — 0DB64Z3 EXCISION OF STOMACH, PERCUTANEOUS ENDOSCOPIC APPROACH, VERTICAL: ICD-10-PCS | Performed by: SURGERY

## 2022-10-25 PROCEDURE — 82725 ASSAY OF BLOOD FATTY ACIDS: CPT | Performed by: PHYSICIAN ASSISTANT

## 2022-10-25 PROCEDURE — 258N000003 HC RX IP 258 OP 636: Performed by: SURGERY

## 2022-10-25 PROCEDURE — 710N000010 HC RECOVERY PHASE 1, LEVEL 2, PER MIN: Performed by: SURGERY

## 2022-10-25 PROCEDURE — 250N000013 HC RX MED GY IP 250 OP 250 PS 637: Performed by: SURGERY

## 2022-10-25 PROCEDURE — 999N000141 HC STATISTIC PRE-PROCEDURE NURSING ASSESSMENT: Performed by: SURGERY

## 2022-10-25 PROCEDURE — 85027 COMPLETE CBC AUTOMATED: CPT | Performed by: SURGERY

## 2022-10-25 PROCEDURE — 0BQT4ZZ REPAIR DIAPHRAGM, PERCUTANEOUS ENDOSCOPIC APPROACH: ICD-10-PCS | Performed by: SURGERY

## 2022-10-25 PROCEDURE — 250N000011 HC RX IP 250 OP 636: Performed by: NURSE PRACTITIONER

## 2022-10-25 PROCEDURE — 85049 AUTOMATED PLATELET COUNT: CPT | Performed by: SURGERY

## 2022-10-25 PROCEDURE — 81025 URINE PREGNANCY TEST: CPT | Performed by: SURGERY

## 2022-10-25 PROCEDURE — 258N000003 HC RX IP 258 OP 636

## 2022-10-25 PROCEDURE — 250N000025 HC SEVOFLURANE, PER MIN: Performed by: SURGERY

## 2022-10-25 RX ORDER — OXYCODONE HYDROCHLORIDE 10 MG/1
10 TABLET ORAL
Status: DISCONTINUED | OUTPATIENT
Start: 2022-10-25 | End: 2022-10-30 | Stop reason: HOSPADM

## 2022-10-25 RX ORDER — NALOXONE HYDROCHLORIDE 0.4 MG/ML
0.2 INJECTION, SOLUTION INTRAMUSCULAR; INTRAVENOUS; SUBCUTANEOUS
Status: DISCONTINUED | OUTPATIENT
Start: 2022-10-25 | End: 2022-10-30 | Stop reason: HOSPADM

## 2022-10-25 RX ORDER — DIPHENHYDRAMINE HCL 25 MG
25 CAPSULE ORAL EVERY 6 HOURS PRN
Status: DISCONTINUED | OUTPATIENT
Start: 2022-10-25 | End: 2022-10-30 | Stop reason: HOSPADM

## 2022-10-25 RX ORDER — ENOXAPARIN SODIUM 100 MG/ML
40 INJECTION SUBCUTANEOUS
Status: COMPLETED | OUTPATIENT
Start: 2022-10-25 | End: 2022-10-25

## 2022-10-25 RX ORDER — FENTANYL CITRATE 50 UG/ML
INJECTION, SOLUTION INTRAMUSCULAR; INTRAVENOUS PRN
Status: DISCONTINUED | OUTPATIENT
Start: 2022-10-25 | End: 2022-10-25

## 2022-10-25 RX ORDER — ACETAMINOPHEN 325 MG/1
650 TABLET ORAL EVERY 4 HOURS PRN
Status: DISCONTINUED | OUTPATIENT
Start: 2022-10-25 | End: 2022-10-30 | Stop reason: HOSPADM

## 2022-10-25 RX ORDER — ENALAPRILAT 1.25 MG/ML
1.25 INJECTION INTRAVENOUS EVERY 6 HOURS PRN
Status: DISCONTINUED | OUTPATIENT
Start: 2022-10-25 | End: 2022-10-30 | Stop reason: HOSPADM

## 2022-10-25 RX ORDER — ACETAMINOPHEN 325 MG/1
975 TABLET ORAL ONCE
Status: COMPLETED | OUTPATIENT
Start: 2022-10-25 | End: 2022-10-25

## 2022-10-25 RX ORDER — ONDANSETRON 2 MG/ML
4 INJECTION INTRAMUSCULAR; INTRAVENOUS EVERY 30 MIN PRN
Status: DISCONTINUED | OUTPATIENT
Start: 2022-10-25 | End: 2022-10-25 | Stop reason: HOSPADM

## 2022-10-25 RX ORDER — LIDOCAINE HYDROCHLORIDE 20 MG/ML
INJECTION, SOLUTION INFILTRATION; PERINEURAL PRN
Status: DISCONTINUED | OUTPATIENT
Start: 2022-10-25 | End: 2022-10-25

## 2022-10-25 RX ORDER — BUPROPION HYDROCHLORIDE 75 MG/1
150 TABLET ORAL EVERY MORNING
Status: DISCONTINUED | OUTPATIENT
Start: 2022-10-26 | End: 2022-10-25

## 2022-10-25 RX ORDER — NALOXONE HYDROCHLORIDE 0.4 MG/ML
0.4 INJECTION, SOLUTION INTRAMUSCULAR; INTRAVENOUS; SUBCUTANEOUS
Status: DISCONTINUED | OUTPATIENT
Start: 2022-10-25 | End: 2022-10-30 | Stop reason: HOSPADM

## 2022-10-25 RX ORDER — AMOXICILLIN 250 MG
2 CAPSULE ORAL 2 TIMES DAILY
Status: DISCONTINUED | OUTPATIENT
Start: 2022-10-25 | End: 2022-10-30 | Stop reason: HOSPADM

## 2022-10-25 RX ORDER — DIPHENHYDRAMINE HYDROCHLORIDE 50 MG/ML
25 INJECTION INTRAMUSCULAR; INTRAVENOUS EVERY 6 HOURS PRN
Status: DISCONTINUED | OUTPATIENT
Start: 2022-10-25 | End: 2022-10-30 | Stop reason: HOSPADM

## 2022-10-25 RX ORDER — PROCHLORPERAZINE MALEATE 5 MG
10 TABLET ORAL EVERY 6 HOURS PRN
Status: DISCONTINUED | OUTPATIENT
Start: 2022-10-25 | End: 2022-10-29

## 2022-10-25 RX ORDER — HYDROMORPHONE HCL IN WATER/PF 6 MG/30 ML
0.2 PATIENT CONTROLLED ANALGESIA SYRINGE INTRAVENOUS
Status: DISCONTINUED | OUTPATIENT
Start: 2022-10-25 | End: 2022-10-29

## 2022-10-25 RX ORDER — PROPOFOL 10 MG/ML
INJECTION, EMULSION INTRAVENOUS PRN
Status: DISCONTINUED | OUTPATIENT
Start: 2022-10-25 | End: 2022-10-25

## 2022-10-25 RX ORDER — SCOLOPAMINE TRANSDERMAL SYSTEM 1 MG/1
1 PATCH, EXTENDED RELEASE TRANSDERMAL
Status: DISCONTINUED | OUTPATIENT
Start: 2022-10-25 | End: 2022-10-30 | Stop reason: HOSPADM

## 2022-10-25 RX ORDER — FENTANYL CITRATE 50 UG/ML
25 INJECTION, SOLUTION INTRAMUSCULAR; INTRAVENOUS EVERY 5 MIN PRN
Status: DISCONTINUED | OUTPATIENT
Start: 2022-10-25 | End: 2022-10-25 | Stop reason: HOSPADM

## 2022-10-25 RX ORDER — ONDANSETRON 2 MG/ML
INJECTION INTRAMUSCULAR; INTRAVENOUS PRN
Status: DISCONTINUED | OUTPATIENT
Start: 2022-10-25 | End: 2022-10-25

## 2022-10-25 RX ORDER — OXYCODONE HYDROCHLORIDE 5 MG/1
5 TABLET ORAL
Status: DISCONTINUED | OUTPATIENT
Start: 2022-10-25 | End: 2022-10-30 | Stop reason: HOSPADM

## 2022-10-25 RX ORDER — CEFAZOLIN SODIUM/WATER 3 G/30 ML
3 SYRINGE (ML) INTRAVENOUS SEE ADMIN INSTRUCTIONS
Status: DISCONTINUED | OUTPATIENT
Start: 2022-10-25 | End: 2022-10-25 | Stop reason: HOSPADM

## 2022-10-25 RX ORDER — ONDANSETRON 2 MG/ML
4 INJECTION INTRAMUSCULAR; INTRAVENOUS EVERY 6 HOURS PRN
Status: DISCONTINUED | OUTPATIENT
Start: 2022-10-25 | End: 2022-10-30 | Stop reason: HOSPADM

## 2022-10-25 RX ORDER — DEXAMETHASONE SODIUM PHOSPHATE 4 MG/ML
INJECTION, SOLUTION INTRA-ARTICULAR; INTRALESIONAL; INTRAMUSCULAR; INTRAVENOUS; SOFT TISSUE PRN
Status: DISCONTINUED | OUTPATIENT
Start: 2022-10-25 | End: 2022-10-25

## 2022-10-25 RX ORDER — ONDANSETRON 4 MG/1
4 TABLET, ORALLY DISINTEGRATING ORAL EVERY 30 MIN PRN
Status: DISCONTINUED | OUTPATIENT
Start: 2022-10-25 | End: 2022-10-25 | Stop reason: HOSPADM

## 2022-10-25 RX ORDER — HYDROMORPHONE HCL IN WATER/PF 6 MG/30 ML
0.2 PATIENT CONTROLLED ANALGESIA SYRINGE INTRAVENOUS EVERY 5 MIN PRN
Status: DISCONTINUED | OUTPATIENT
Start: 2022-10-25 | End: 2022-10-25 | Stop reason: HOSPADM

## 2022-10-25 RX ORDER — ENOXAPARIN SODIUM 100 MG/ML
40 INJECTION SUBCUTANEOUS EVERY 24 HOURS
Status: DISCONTINUED | OUTPATIENT
Start: 2022-10-26 | End: 2022-10-29

## 2022-10-25 RX ORDER — SODIUM CHLORIDE, SODIUM LACTATE, POTASSIUM CHLORIDE, CALCIUM CHLORIDE 600; 310; 30; 20 MG/100ML; MG/100ML; MG/100ML; MG/100ML
INJECTION, SOLUTION INTRAVENOUS CONTINUOUS PRN
Status: DISCONTINUED | OUTPATIENT
Start: 2022-10-25 | End: 2022-10-25

## 2022-10-25 RX ORDER — ONDANSETRON 2 MG/ML
4 INJECTION INTRAMUSCULAR; INTRAVENOUS
Status: DISCONTINUED | OUTPATIENT
Start: 2022-10-25 | End: 2022-10-25 | Stop reason: HOSPADM

## 2022-10-25 RX ORDER — CEFAZOLIN SODIUM/WATER 3 G/30 ML
3 SYRINGE (ML) INTRAVENOUS
Status: COMPLETED | OUTPATIENT
Start: 2022-10-25 | End: 2022-10-25

## 2022-10-25 RX ORDER — BUPROPION HYDROCHLORIDE 75 MG/1
75 TABLET ORAL 2 TIMES DAILY
Status: DISCONTINUED | OUTPATIENT
Start: 2022-10-25 | End: 2022-10-30 | Stop reason: HOSPADM

## 2022-10-25 RX ORDER — SODIUM CHLORIDE, SODIUM LACTATE, POTASSIUM CHLORIDE, CALCIUM CHLORIDE 600; 310; 30; 20 MG/100ML; MG/100ML; MG/100ML; MG/100ML
INJECTION, SOLUTION INTRAVENOUS CONTINUOUS
Status: DISCONTINUED | OUTPATIENT
Start: 2022-10-25 | End: 2022-10-25 | Stop reason: HOSPADM

## 2022-10-25 RX ORDER — LIDOCAINE 40 MG/G
CREAM TOPICAL
Status: DISCONTINUED | OUTPATIENT
Start: 2022-10-25 | End: 2022-10-30 | Stop reason: HOSPADM

## 2022-10-25 RX ORDER — ONDANSETRON 4 MG/1
4 TABLET, ORALLY DISINTEGRATING ORAL EVERY 6 HOURS PRN
Status: DISCONTINUED | OUTPATIENT
Start: 2022-10-25 | End: 2022-10-30 | Stop reason: HOSPADM

## 2022-10-25 RX ORDER — OXYCODONE HYDROCHLORIDE 5 MG/1
5 TABLET ORAL EVERY 4 HOURS PRN
Status: DISCONTINUED | OUTPATIENT
Start: 2022-10-25 | End: 2022-10-25 | Stop reason: HOSPADM

## 2022-10-25 RX ORDER — HYDROMORPHONE HYDROCHLORIDE 1 MG/ML
0.5 INJECTION, SOLUTION INTRAMUSCULAR; INTRAVENOUS; SUBCUTANEOUS EVERY 5 MIN PRN
Status: DISCONTINUED | OUTPATIENT
Start: 2022-10-25 | End: 2022-10-29

## 2022-10-25 RX ORDER — SODIUM CHLORIDE, SODIUM LACTATE, POTASSIUM CHLORIDE, CALCIUM CHLORIDE 600; 310; 30; 20 MG/100ML; MG/100ML; MG/100ML; MG/100ML
INJECTION, SOLUTION INTRAVENOUS CONTINUOUS
Status: DISCONTINUED | OUTPATIENT
Start: 2022-10-25 | End: 2022-10-27

## 2022-10-25 RX ADMIN — SUGAMMADEX 400 MG: 100 INJECTION, SOLUTION INTRAVENOUS at 12:00

## 2022-10-25 RX ADMIN — PHENYLEPHRINE HYDROCHLORIDE 100 MCG: 10 INJECTION INTRAVENOUS at 11:12

## 2022-10-25 RX ADMIN — FENTANYL CITRATE 25 MCG: 0.05 INJECTION, SOLUTION INTRAMUSCULAR; INTRAVENOUS at 12:20

## 2022-10-25 RX ADMIN — SODIUM CHLORIDE, POTASSIUM CHLORIDE, SODIUM LACTATE AND CALCIUM CHLORIDE: 600; 310; 30; 20 INJECTION, SOLUTION INTRAVENOUS at 10:23

## 2022-10-25 RX ADMIN — HYDROMORPHONE HYDROCHLORIDE 0.2 MG: 0.2 INJECTION, SOLUTION INTRAMUSCULAR; INTRAVENOUS; SUBCUTANEOUS at 12:40

## 2022-10-25 RX ADMIN — DEXAMETHASONE SODIUM PHOSPHATE 8 MG: 4 INJECTION, SOLUTION INTRA-ARTICULAR; INTRALESIONAL; INTRAMUSCULAR; INTRAVENOUS; SOFT TISSUE at 10:39

## 2022-10-25 RX ADMIN — ACETAMINOPHEN 975 MG: 325 TABLET, FILM COATED ORAL at 09:29

## 2022-10-25 RX ADMIN — SODIUM CHLORIDE, POTASSIUM CHLORIDE, SODIUM LACTATE AND CALCIUM CHLORIDE 1000 ML: 600; 310; 30; 20 INJECTION, SOLUTION INTRAVENOUS at 22:28

## 2022-10-25 RX ADMIN — FENTANYL CITRATE 50 MCG: 50 INJECTION, SOLUTION INTRAMUSCULAR; INTRAVENOUS at 11:03

## 2022-10-25 RX ADMIN — LIDOCAINE HYDROCHLORIDE 60 MG: 20 INJECTION, SOLUTION INFILTRATION; PERINEURAL at 10:32

## 2022-10-25 RX ADMIN — HYDROMORPHONE HYDROCHLORIDE 0.2 MG: 0.2 INJECTION, SOLUTION INTRAMUSCULAR; INTRAVENOUS; SUBCUTANEOUS at 23:40

## 2022-10-25 RX ADMIN — HYDROMORPHONE HYDROCHLORIDE 0.2 MG: 0.2 INJECTION, SOLUTION INTRAMUSCULAR; INTRAVENOUS; SUBCUTANEOUS at 13:00

## 2022-10-25 RX ADMIN — ONDANSETRON 4 MG: 2 INJECTION INTRAMUSCULAR; INTRAVENOUS at 12:24

## 2022-10-25 RX ADMIN — FENTANYL CITRATE 25 MCG: 0.05 INJECTION, SOLUTION INTRAMUSCULAR; INTRAVENOUS at 12:25

## 2022-10-25 RX ADMIN — ENOXAPARIN SODIUM 40 MG: 40 INJECTION SUBCUTANEOUS at 09:30

## 2022-10-25 RX ADMIN — FENTANYL CITRATE 50 MCG: 50 INJECTION, SOLUTION INTRAMUSCULAR; INTRAVENOUS at 10:44

## 2022-10-25 RX ADMIN — HYDROMORPHONE HYDROCHLORIDE 0.5 MG: 1 INJECTION, SOLUTION INTRAMUSCULAR; INTRAVENOUS; SUBCUTANEOUS at 12:03

## 2022-10-25 RX ADMIN — FAMOTIDINE 20 MG: 10 INJECTION, SOLUTION INTRAVENOUS at 09:30

## 2022-10-25 RX ADMIN — Medication 100 MG: at 10:32

## 2022-10-25 RX ADMIN — OXYCODONE HYDROCHLORIDE 10 MG: 10 TABLET ORAL at 22:28

## 2022-10-25 RX ADMIN — PROCHLORPERAZINE EDISYLATE 10 MG: 5 INJECTION INTRAMUSCULAR; INTRAVENOUS at 20:26

## 2022-10-25 RX ADMIN — HYOSCYAMINE SULFATE 125 MCG: 0.12 TABLET, ORALLY DISINTEGRATING ORAL at 20:45

## 2022-10-25 RX ADMIN — FENTANYL CITRATE 100 MCG: 50 INJECTION, SOLUTION INTRAMUSCULAR; INTRAVENOUS at 10:27

## 2022-10-25 RX ADMIN — SCOPALAMINE 1 PATCH: 1 PATCH, EXTENDED RELEASE TRANSDERMAL at 17:23

## 2022-10-25 RX ADMIN — PHENYLEPHRINE HYDROCHLORIDE 100 MCG: 10 INJECTION INTRAVENOUS at 11:37

## 2022-10-25 RX ADMIN — ONDANSETRON 4 MG: 2 INJECTION INTRAMUSCULAR; INTRAVENOUS at 10:41

## 2022-10-25 RX ADMIN — SODIUM CHLORIDE, POTASSIUM CHLORIDE, SODIUM LACTATE AND CALCIUM CHLORIDE: 600; 310; 30; 20 INJECTION, SOLUTION INTRAVENOUS at 17:23

## 2022-10-25 RX ADMIN — Medication 3 G: at 10:34

## 2022-10-25 RX ADMIN — OXYCODONE HYDROCHLORIDE 5 MG: 5 TABLET ORAL at 18:52

## 2022-10-25 RX ADMIN — ONDANSETRON 4 MG: 4 TABLET, ORALLY DISINTEGRATING ORAL at 18:54

## 2022-10-25 RX ADMIN — HYDROMORPHONE HYDROCHLORIDE 0.5 MG: 1 INJECTION, SOLUTION INTRAMUSCULAR; INTRAVENOUS; SUBCUTANEOUS at 13:11

## 2022-10-25 RX ADMIN — HYDROMORPHONE HYDROCHLORIDE 0.2 MG: 0.2 INJECTION, SOLUTION INTRAMUSCULAR; INTRAVENOUS; SUBCUTANEOUS at 12:45

## 2022-10-25 RX ADMIN — FENTANYL CITRATE 25 MCG: 0.05 INJECTION, SOLUTION INTRAMUSCULAR; INTRAVENOUS at 12:35

## 2022-10-25 RX ADMIN — SENNOSIDES AND DOCUSATE SODIUM 2 TABLET: 8.6; 5 TABLET ORAL at 20:45

## 2022-10-25 RX ADMIN — PROPOFOL 200 MG: 10 INJECTION, EMULSION INTRAVENOUS at 10:32

## 2022-10-25 RX ADMIN — FENTANYL CITRATE 25 MCG: 0.05 INJECTION, SOLUTION INTRAMUSCULAR; INTRAVENOUS at 12:30

## 2022-10-25 RX ADMIN — HYDROMORPHONE HYDROCHLORIDE 0.2 MG: 0.2 INJECTION, SOLUTION INTRAMUSCULAR; INTRAVENOUS; SUBCUTANEOUS at 16:42

## 2022-10-25 RX ADMIN — HYDROMORPHONE HYDROCHLORIDE 0.2 MG: 0.2 INJECTION, SOLUTION INTRAMUSCULAR; INTRAVENOUS; SUBCUTANEOUS at 12:50

## 2022-10-25 RX ADMIN — HYDROMORPHONE HYDROCHLORIDE 0.2 MG: 0.2 INJECTION, SOLUTION INTRAMUSCULAR; INTRAVENOUS; SUBCUTANEOUS at 21:01

## 2022-10-25 RX ADMIN — PHENYLEPHRINE HYDROCHLORIDE 50 MCG: 10 INJECTION INTRAVENOUS at 11:28

## 2022-10-25 RX ADMIN — BUPROPION HYDROCHLORIDE 75 MG: 75 TABLET, FILM COATED ORAL at 20:45

## 2022-10-25 RX ADMIN — HYDROMORPHONE HYDROCHLORIDE 0.2 MG: 0.2 INJECTION, SOLUTION INTRAMUSCULAR; INTRAVENOUS; SUBCUTANEOUS at 12:55

## 2022-10-25 RX ADMIN — MIDAZOLAM 2 MG: 1 INJECTION INTRAMUSCULAR; INTRAVENOUS at 10:19

## 2022-10-25 RX ADMIN — HYDROMORPHONE HYDROCHLORIDE 0.5 MG: 1 INJECTION, SOLUTION INTRAMUSCULAR; INTRAVENOUS; SUBCUTANEOUS at 13:16

## 2022-10-25 ASSESSMENT — ACTIVITIES OF DAILY LIVING (ADL)
ADLS_ACUITY_SCORE: 24
ADLS_ACUITY_SCORE: 30
ADLS_ACUITY_SCORE: 24

## 2022-10-25 NOTE — PHARMACY-CONSULT NOTE
Bariatric Consult    Medications evaluated as requested per Bariatric Consult. Patient may swallow tablets/capsules ONLY if less than   inch.  Larger tablets/capsules should be broken, crushed or split.    The following changes have been made based on the Bariatric Medication Management Policy. Bupropion 150 mg XL daily was changed to bupropion 75 mg immediate release BID    The pharmacist will continue to follow as new medications are ordered.    Jina Awad, DonaldD, BCPS

## 2022-10-25 NOTE — ANESTHESIA CARE TRANSFER NOTE
Patient: Leah Yanez    Procedure: Procedure(s):  GASTRECTOMY, SLEEVE, LAPAROSCOPIC  HERNIORRHAPHY, HIATAL, LAPAROSCOPIC       Diagnosis: Morbid obesity (H) [E66.01]  Diagnosis Additional Information: No value filed.    Anesthesia Type:   General     Note:    Oropharynx: oropharynx clear of all foreign objects and spontaneously breathing  Level of Consciousness: awake and drowsy  Oxygen Supplementation: nasal cannula  Level of Supplemental Oxygen (L/min / FiO2): 4  Independent Airway: airway patency satisfactory and stable  Dentition: dentition unchanged  Vital Signs Stable: post-procedure vital signs reviewed and stable  Report to RN Given: handoff report given  Patient transferred to: PACU    Handoff Report: Identifed the Patient, Identified the Reponsible Provider, Reviewed the pertinent medical history, Discussed the surgical course, Reviewed Intra-OP anesthesia mangement and issues during anesthesia, Set expectations for post-procedure period and Allowed opportunity for questions and acknowledgement of understanding      Vitals:  Vitals Value Taken Time   BP     Temp     Pulse 67 10/25/22 1213   Resp     SpO2 96 % 10/25/22 1213   Vitals shown include unvalidated device data.    Electronically Signed By: ELISA Larry CRNA  October 25, 2022  12:15 PM

## 2022-10-25 NOTE — ANESTHESIA POSTPROCEDURE EVALUATION
Patient: Leah Yanez    Procedure: Procedure(s):  GASTRECTOMY, SLEEVE, LAPAROSCOPIC  HERNIORRHAPHY, HIATAL, LAPAROSCOPIC       Anesthesia Type:  General    Note:  Disposition: Inpatient   Postop Pain Control: Uneventful            Sign Out: Well controlled pain   PONV: No   Neuro/Psych: Uneventful            Sign Out: Acceptable/Baseline neuro status   Airway/Respiratory: Uneventful            Sign Out: Acceptable/Baseline resp. status   CV/Hemodynamics: Uneventful            Sign Out: Acceptable CV status; No obvious hypovolemia; No obvious fluid overload   Other NRE: NONE   DID A NON-ROUTINE EVENT OCCUR? No           Last vitals:  Vitals Value Taken Time   /94 10/25/22 1320   Temp 37.3  C (99.2  F) 10/25/22 1215   Pulse 63 10/25/22 1325   Resp 17 10/25/22 1325   SpO2 92 % 10/25/22 1325   Vitals shown include unvalidated device data.    Electronically Signed By: Jeanie Jeffery MD  October 25, 2022  1:26 PM

## 2022-10-25 NOTE — PROGRESS NOTES
Pt Name: Leah Litchfield  Surgical Clinical Trials Office Informed Consent Process    Study Name: Liver Collection Study, IRB [id number]  Version: 3.0  IRB Approval date: 10/21/2022    CRC reviewed updates to the consent form with Pt on 10/24/2022 over the phone.  Patient reviewed the consent form with coordinator and agreed to participate in the study, signing in-person on 10/25/2022.    Date Consent was Signed:  10/25/2022    The subject was screened and meets all of the inclusion criteria and none of the exclusion criteria is met.    The subject was told:  -that the study involves research   -the purpose of the research study  -the expected duration of the study and the approximate number of subject sought  -of procedures that are identified as experimental  -of reasonably foreseeable risks or discomforts to the subject  -of any benefits to the subject or others that may be expected from the research  -of alternative procedures and/or treatment  -how the confidentiality of records would be maintained  -whether or not compensation and medical treatments are available should injury occur as a result of the study  -who to contact if they have questions related to the research study or questions regarding research subjects' rights  -that participation is completely voluntary and that their decision to or not to participate will have no impact on their relationships with the N and the staff    No study procedures were completed prior to the consent being obtained.  The use of historical information (lab or assessments) used for the purpose of the study was approved by subject.  The subject was fully aware that we would be reviewing their medical record for the study.  The subject demonstrated an understanding of what the study involved.  Specifically, how this study differed from standard of care at our center and what was required of the subject as part of the study.  The subject reviewed the consent form and was  given the opportunity to ask questions before signing.  Questions and concerns were answered by the study staff and/or study physician.  A copy of the signed informed consent document was provided to the subject.  [x] Yes [] No  The subject was offered a copy of the signed informed consent but declined. [] Yes [x] No  The consent require the use of a :   [] Yes [x]  No     A 'short form' consent was used:     [] Yes [x] No         If yes, provide name of witness:   The subject required a legally-authorized representative (LAR) to sign on their behalf:                                                    [] Yes  [x] No   If yes, please record name of LAR:     Comprehension Questions for Consent:    [x] What concerns do you have regarding participation in this study?  [x] Please explain the risks of participation in this study?  [x] Please describe how you participate in this study?      Notes:  MEAGAN discussed updated consent details with Leah on 10/24/2022, answering questions.  Leah verbalized understanding and agreed to sign the consent.  The remote consent was not yet able to be utilized as the update was pending [employer] approval, so CRC met Pt in pre-op to sign in-person on 10/25/2022.        Signature of Consenter:   Clinical Research Coordinator:   Bella Tolliver, MS, RDN, LDN, CLT, CCRP  office phone: [phone]

## 2022-10-25 NOTE — PROGRESS NOTES
Brief Post Operative Progress Note      Subjective:  10/25/2022    Leah Yanez is a 42 year old female with h/o anti-cardiolipin, cavanaugh esophagus, depression, GERD, hiatal hernia, PE, obesity now POD#0 s/p lap sleeve gastrectomy.    Pt reports mild pain. Denies SOB, chest pain, or dizziness, mild nausea without vomiting. Denies tachycardia or palpitations    Objective:  /89 (BP Location: Left arm)   Pulse 65   Temp (!) 96.3  F (35.7  C) (Oral)   Resp 17   Ht 1.829 m (6')   Wt 122.6 kg (270 lb 4.5 oz)   LMP 09/04/2022   SpO2 94%   BMI 36.66 kg/m      GEN: AAO*3, not in acute distress, lying comfortably  HEENT: atraumatic, mucosa moist  CVS: normal heart rate, perfusing extremeties  RESP: no resp distress, satting well on RA>  ABD: soft, nontender, nondistended, incisions are CDI  : normal female external genitalia, no siegel in place  EXT: distal pulses palpable, normal range of motion  NEURO/PSYCH: CN grossly normal, no focal weakness, normal mood and thought process       Assessment and Plans:     Leah Yanez is a 42 year old female POD#0 s/p lap gastric sleeve. Doing well post operatively. Continue plan of care.  -Pain control: continue current pain regimen  -Diet: Advance to bariatric CLD as tolerated  -Fluids: wean as tolerating CLD  -Activity: Ad raghav      Jas Deshpande MD  PGY-1 Urology  [email or web address]

## 2022-10-25 NOTE — PLAN OF CARE
Vital signs:  Temp: (!) 96.3  F (35.7  C) Temp src: Oral BP: 108/73 Pulse: 69   Resp: 15 SpO2: 97 % O2 Device: Nasal cannula Oxygen Delivery: 2 LPM Height: 182.9 cm (6') Weight: 122.6 kg (270 lb 4.5 oz)    Shift: [id number]  Activity: SBA/Ax1. Pivoted to bed from PACU.   Neuros: WDL, slightly lethargic. A&O x4, calls appropriately.   Cardiac: WDL, VSS  Respiratory: Capno on. On 2L-desats when sleeping. Pt denies SOB.   GI/: No BM, -flatus. Abd pain. Minimal nausea- scopolamine patch in place. Due to void.   Diet: Bariatric clears. Tolerating fairly.  Lines: PIV L hand infusing LR at 75 mL/hr  Incisions/Drains: Lap sites x5 staples with primapore- abd binder and ice packs in place.   Pain/nausea: PRN IV dilaudid and oxycodone. Scopolamine patch in place and ODT zofran given x1  Plan: Continue POC.     Bedside Emergency Equipment Present:  Suction Regulator: Yes  Suction Canister: Yes  Tubing between Regulator and Canister: Yes  O2 Regulator with Tree: Yes  Ambu Bag: Yes

## 2022-10-25 NOTE — OP NOTE
[facility]    Operative Note    Pre-operative diagnosis: Morbid obesity (H) [E66.01]   Post-operative diagnosis * No post-op diagnosis entered *   Procedure: Procedure(s):  GASTRECTOMY, SLEEVE, LAPAROSCOPIC  HERNIORRHAPHY, HIATAL, LAPAROSCOPIC   Surgeon: Daniel Aragon MD   Assistant Surgeon      Anesthesia: The assistance of Rosanna Phipps NP was required in this case due to advanced laparoscopy technique; she assisted by performing port assistance and providing exposure to patient bedside while I was dissecting.    I attest that no qualified resident or fellow was available to assist for this surgery due adequate training and skills to assist with this technically challenging case and instrumentation; as a consequence, I attest that Rosanna performed these needed skills.        General    Estimated blood loss: Less than 50 ml   Drains: None   Specimens: ID Type Source Tests Collected by Time Destination   1 : subcutaneous fat Tissue Abdomen RESEARCH SPECIMEN FOR BIONET TESTING Daniel Aragon MD 10/25/2022 11:05 AM    2 : Liver Biopsy 1 Tissue Liver RESEARCH SPECIMEN FOR BIONET TESTING Daniel Aragon MD 10/25/2022 11:50 AM    3 : Liver Biopsy 2 Tissue Liver RESEARCH SPECIMEN FOR BIONET TESTING Daniel Aragon MD 10/25/2022 11:52 AM    4 : Visceral Fat Tissue Abdomen RESEARCH SPECIMEN FOR BIONET TESTING Daniel Aragon MD 10/25/2022 11:52 AM    5 : Partial Gastrectomy Tissue Stomach SURGICAL PATHOLOGY EXAM Daniel Aragon MD 10/25/2022 11:53 AM       Findings: ~2cm hiatal hernia (there was a clear concavity at the anterior hiatal location).  Repaired with 2 figure of eights posteriorly and 1 anteriorly.   Complications: None.   Implants: None.         BOUGIE SIZE: 40 FR  DISTANCE FROM PYLORUS: 10 CM  STAPLE LINE REINFORCEMENT: NO  STAPLE LINE OVERSEW: NO  COMORBIDITIES:   Past Medical History:   Diagnosis Date     Anti-cardiolipin antibody  "positive      Griggs esophagus      Concussion 2016     Depressive disorder, not elsewhere classified 2006    Resolved      Gastroesophageal reflux disease with esophagitis      Hiatal hernia      History of pulmonary embolism      Obesity      Raynaud's syndrome     past history of admission for gangrene of one finger       INDICATIONS FOR PROCEDURE  Leah Yanez is a 42 year old female who is morbidly obese.  Numerous weight loss attempts without surgery have been without success.     After understanding the risks and benefits of proceeding with a laparoscopic vertical sleeve gastrectomy, she agreed to an operation as outlined by me.    I reviewed the risks of surgery with Leah Yanez.    These include, but are not limited to, death, myocardial infarction, pneumonia, urinary tract infection, deep venous thrombosis with or without pulmonary embolus, abdominal infection from bowel injury or abscess, bowel obstruction, wound infection, and bleeding.    More specific risks related to vertical sleeve gastrectomy were detailed at the bariatric informational seminar and include the followin.) leak at the vertical sleeve staple line, 2.) stricture in the sleeve, 3.) nausea, vomiting, and dehydration for several months, 4.) adhesions causing bowel obstruction, 5.) rapid weight loss causing a higher rate of gallstone formation during the first 6 months after surgery, 6.) decreased absorption of vitamins because of the reduced stomach size, 7.) weight regain if inappropriate food intake occurs.    The BMI that we are treating this patient for was measured at the initial consultation visit in our bariatric program and it was: 36.7 kg/m2 (as calculated just below).      The initial consult height, weight, and BMI are as follows:    Height: 6' 0\"  No flowsheet data found.    Our weight loss surgery program requires weight loss prior to bariatric surgery and currently the height, weight, and BMI are as " done follows:    Height: 182.9 cm (6'), Weight: 122.6 kg (270 lb 4.5 oz), and currently the Body mass index is 36.66 kg/m .    Due to the patient's comorbidity conditions of Griggs's, hiatal hernia, GERD, and class II obesity, in association with elevated body mass index, bariatric surgery has been recommended and is being performed today.    Moreover, as the surgeon performing this procedure, I certify that the following are true in regards to this patient at or prior to the day of surgery:    1. The patient's body mass index (BMI) is or has been greater than or equal to 35 kg/m2.  2. The patient has at least one co-morbidity related to obesity (as outlined above).  3. The patient has been previously unsuccessful with medical treatment for obesity.  Please note that some of this information has been documented as part of a comprehensive pre-bariatric surgery process in the outpatient clinic and is NOT immediately available in the inpatient encounter for this bariatric operation.      OPERATIVE PROCEDURE:     Leah Yanez was brought to the operating room and prepared in routine fashion. Under the benefits of general anesthesia, a left upper quadrant Veress needle was inserted and pneumoperitoneum was established using carbon dioxide gas to a maximum pressure of 15 mmHg. A total of five ports were placed into the abdomen.     A liver retractor was placed through the rightmost port and this provided a view of the upper stomach. The operation was started by dividing the short gastric vessels off the greater curvature of the stomach. This dissection was carried up to the angle of His, and ligasure dissector was used for hemostasis.     A hiatal hernia repair was performed by crural approximation with two sutures posteriorly and one suture anteriorly  (figure of eight of 2-0 surgidac was used to close the hiatus).    A bougie (size noted above) was passed into the stomach and I used 5 blue loads of the [employer]  flex linear cutter stapler device to create a vertical sleeve gastrectomy with the bougie as a template. The bougie was removed.    A transabdominal properitoneal anesthetic block was performed using 30 ml 0.5% bupivicaine diluted with 90 ml saline (120 mL total); this was injected using 22gauge spinal needle into the properitoneal planes around the ports and laterally along the anterior axillary line under direct optical guidance. Some of the mixture was used to inject local anesthetic at the skin of each incision as well.    The sleeve gastrectomy specimen (partial gastrectomy) was now removed from the abdomen through the 15 mm port.    Hemostasis was secured, and the liver retractor and all ports were removed from the abdomen under direct visualization.     All needle and sponge counts were correct x2 at the end of the operation, and I was present for all critical components of the procedure.     Skin incisions were closed using skin staples, and sterile dressings were placed.     Daniel Aragon MD  Surgery  [phone] (hospital )  [phone] (clinic nurses)

## 2022-10-25 NOTE — BRIEF OP NOTE
[facility]    Brief Operative Note    Pre-operative diagnosis: Morbid obesity (H) [E66.01]  Post-operative diagnosis Same as pre-operative diagnosis    Procedure: Procedure(s):  GASTRECTOMY, SLEEVE, LAPAROSCOPIC  HERNIORRHAPHY, HIATAL, LAPAROSCOPIC  Surgeon: Surgeon(s) and Role:     * Daniel Aragon MD - Primary     * Desean Van MD - Resident - Assisting  Anesthesia: General   Estimated Blood Loss: 20 mL from 10/25/2022 10:22 AM to 10/25/2022 12:11 PM      Drains: None  Specimens:   ID Type Source Tests Collected by Time Destination   1 : subcutaneous fat Tissue Abdomen RESEARCH SPECIMEN FOR BIONET TESTING Daniel Aragon MD 10/25/2022 11:05 AM    2 : Liver Biopsy 1 Tissue Liver RESEARCH SPECIMEN FOR BIONET TESTING Daniel Aragon MD 10/25/2022 11:50 AM    3 : Liver Biopsy 2 Tissue Liver RESEARCH SPECIMEN FOR BIONET TESTING Daniel Aragon MD 10/25/2022 11:52 AM    4 : Visceral Fat Tissue Abdomen RESEARCH SPECIMEN FOR BIONET TESTING Daniel Aragon MD 10/25/2022 11:52 AM    5 : Partial Gastrectomy Tissue Stomach SURGICAL PATHOLOGY EXAM Daniel Aragon MD 10/25/2022 11:53 AM      Findings:   None.  Complications: None.  Implants: * No implants in log *

## 2022-10-26 ENCOUNTER — APPOINTMENT (OUTPATIENT)
Dept: GENERAL RADIOLOGY | Facility: CLINIC | Age: 42
DRG: 619 | End: 2022-10-26
Attending: SURGERY
Payer: COMMERCIAL

## 2022-10-26 LAB
ANION GAP SERPL CALCULATED.3IONS-SCNC: 11 MMOL/L (ref 7–15)
BUN SERPL-MCNC: 19.4 MG/DL (ref 6–20)
CALCIUM SERPL-MCNC: 9.2 MG/DL (ref 8.6–10)
CHLORIDE SERPL-SCNC: 104 MMOL/L (ref 98–107)
CREAT SERPL-MCNC: 1.33 MG/DL (ref 0.51–0.95)
DEPRECATED HCO3 PLAS-SCNC: 22 MMOL/L (ref 22–29)
ERYTHROCYTE [DISTWIDTH] IN BLOOD BY AUTOMATED COUNT: 16.6 % (ref 10–15)
FLUAV RNA SPEC QL NAA+PROBE: NEGATIVE
FLUBV RNA RESP QL NAA+PROBE: NEGATIVE
GFR SERPL CREATININE-BSD FRML MDRD: 51 ML/MIN/1.73M2
GLUCOSE BLDC GLUCOMTR-MCNC: 154 MG/DL (ref 70–99)
GLUCOSE BLDC GLUCOMTR-MCNC: 155 MG/DL (ref 70–99)
GLUCOSE BLDC GLUCOMTR-MCNC: 59 MG/DL (ref 70–99)
GLUCOSE SERPL-MCNC: 177 MG/DL (ref 70–99)
HCT VFR BLD AUTO: 25 % (ref 35–47)
HGB BLD-MCNC: 7.7 G/DL (ref 11.7–15.7)
MCH RBC QN AUTO: 25.4 PG (ref 26.5–33)
MCHC RBC AUTO-ENTMCNC: 30.8 G/DL (ref 31.5–36.5)
MCV RBC AUTO: 83 FL (ref 78–100)
PLATELET # BLD AUTO: 344 10E3/UL (ref 150–450)
POTASSIUM SERPL-SCNC: 5.5 MMOL/L (ref 3.4–5.3)
RBC # BLD AUTO: 3.03 10E6/UL (ref 3.8–5.2)
RSV RNA SPEC NAA+PROBE: NEGATIVE
SARS-COV-2 RNA RESP QL NAA+PROBE: NEGATIVE
SODIUM SERPL-SCNC: 137 MMOL/L (ref 136–145)
WBC # BLD AUTO: 18.5 10E3/UL (ref 4–11)

## 2022-10-26 PROCEDURE — 71045 X-RAY EXAM CHEST 1 VIEW: CPT | Mod: 26 | Performed by: RADIOLOGY

## 2022-10-26 PROCEDURE — 258N000003 HC RX IP 258 OP 636

## 2022-10-26 PROCEDURE — 250N000013 HC RX MED GY IP 250 OP 250 PS 637: Performed by: STUDENT IN AN ORGANIZED HEALTH CARE EDUCATION/TRAINING PROGRAM

## 2022-10-26 PROCEDURE — 258N000003 HC RX IP 258 OP 636: Performed by: NURSE PRACTITIONER

## 2022-10-26 PROCEDURE — 120N000002 HC R&B MED SURG/OB UMMC

## 2022-10-26 PROCEDURE — 250N000013 HC RX MED GY IP 250 OP 250 PS 637: Performed by: NURSE PRACTITIONER

## 2022-10-26 PROCEDURE — 36415 COLL VENOUS BLD VENIPUNCTURE: CPT

## 2022-10-26 PROCEDURE — 250N000011 HC RX IP 250 OP 636: Performed by: ANESTHESIOLOGY

## 2022-10-26 PROCEDURE — 85027 COMPLETE CBC AUTOMATED: CPT

## 2022-10-26 PROCEDURE — 999N000248 HC STATISTIC IV INSERT WITH US BY RN

## 2022-10-26 PROCEDURE — 80048 BASIC METABOLIC PNL TOTAL CA: CPT

## 2022-10-26 PROCEDURE — 258N000001 HC RX 258

## 2022-10-26 PROCEDURE — 250N000011 HC RX IP 250 OP 636: Performed by: NURSE PRACTITIONER

## 2022-10-26 PROCEDURE — 71045 X-RAY EXAM CHEST 1 VIEW: CPT

## 2022-10-26 PROCEDURE — 250N000013 HC RX MED GY IP 250 OP 250 PS 637: Performed by: SURGERY

## 2022-10-26 PROCEDURE — 87637 SARSCOV2&INF A&B&RSV AMP PRB: CPT

## 2022-10-26 RX ORDER — CALCIUM CARBONATE 500 MG/1
500 TABLET, CHEWABLE ORAL DAILY PRN
Status: DISCONTINUED | OUTPATIENT
Start: 2022-10-26 | End: 2022-10-30 | Stop reason: HOSPADM

## 2022-10-26 RX ORDER — DEXTROSE MONOHYDRATE 25 G/50ML
25-50 INJECTION, SOLUTION INTRAVENOUS
Status: DISCONTINUED | OUTPATIENT
Start: 2022-10-26 | End: 2022-10-30 | Stop reason: HOSPADM

## 2022-10-26 RX ORDER — ENOXAPARIN SODIUM 100 MG/ML
40 INJECTION SUBCUTANEOUS EVERY 12 HOURS
Qty: 22.4 ML | Refills: 0 | Status: ON HOLD | OUTPATIENT
Start: 2022-10-26 | End: 2022-11-07

## 2022-10-26 RX ORDER — NICOTINE POLACRILEX 4 MG
15-30 LOZENGE BUCCAL
Status: DISCONTINUED | OUTPATIENT
Start: 2022-10-26 | End: 2022-10-30 | Stop reason: HOSPADM

## 2022-10-26 RX ORDER — ACETAMINOPHEN 325 MG/1
650 TABLET ORAL EVERY 4 HOURS PRN
Qty: 30 TABLET | Refills: 0 | Status: SHIPPED | OUTPATIENT
Start: 2022-10-26 | End: 2022-11-02

## 2022-10-26 RX ORDER — DEXTROSE MONOHYDRATE, SODIUM CHLORIDE, AND POTASSIUM CHLORIDE 50; 1.49; 9 G/1000ML; G/1000ML; G/1000ML
INJECTION, SOLUTION INTRAVENOUS CONTINUOUS
Status: CANCELLED | OUTPATIENT
Start: 2022-10-26

## 2022-10-26 RX ORDER — OXYCODONE HYDROCHLORIDE 5 MG/1
5 TABLET ORAL EVERY 6 HOURS PRN
Qty: 12 TABLET | Refills: 0 | Status: SHIPPED | OUTPATIENT
Start: 2022-10-26 | End: 2023-01-16

## 2022-10-26 RX ADMIN — CALCIUM CARBONATE (ANTACID) CHEW TAB 500 MG 500 MG: 500 CHEW TAB at 12:13

## 2022-10-26 RX ADMIN — SODIUM CHLORIDE, POTASSIUM CHLORIDE, SODIUM LACTATE AND CALCIUM CHLORIDE: 600; 310; 30; 20 INJECTION, SOLUTION INTRAVENOUS at 00:45

## 2022-10-26 RX ADMIN — HYOSCYAMINE SULFATE 125 MCG: 0.12 TABLET, ORALLY DISINTEGRATING ORAL at 09:42

## 2022-10-26 RX ADMIN — ENOXAPARIN SODIUM 40 MG: 40 INJECTION SUBCUTANEOUS at 00:56

## 2022-10-26 RX ADMIN — ONDANSETRON 4 MG: 2 INJECTION INTRAMUSCULAR; INTRAVENOUS at 09:40

## 2022-10-26 RX ADMIN — HYDROMORPHONE HYDROCHLORIDE 0.5 MG: 1 INJECTION, SOLUTION INTRAMUSCULAR; INTRAVENOUS; SUBCUTANEOUS at 12:13

## 2022-10-26 RX ADMIN — OXYCODONE HYDROCHLORIDE 10 MG: 10 TABLET ORAL at 08:28

## 2022-10-26 RX ADMIN — OXYCODONE HYDROCHLORIDE 10 MG: 10 TABLET ORAL at 03:30

## 2022-10-26 RX ADMIN — PROCHLORPERAZINE EDISYLATE 10 MG: 5 INJECTION INTRAMUSCULAR; INTRAVENOUS at 12:12

## 2022-10-26 RX ADMIN — HYOSCYAMINE SULFATE 125 MCG: 0.12 TABLET, ORALLY DISINTEGRATING ORAL at 18:31

## 2022-10-26 RX ADMIN — ONDANSETRON 4 MG: 2 INJECTION INTRAMUSCULAR; INTRAVENOUS at 16:57

## 2022-10-26 RX ADMIN — HYDROMORPHONE HYDROCHLORIDE 0.2 MG: 0.2 INJECTION, SOLUTION INTRAMUSCULAR; INTRAVENOUS; SUBCUTANEOUS at 19:00

## 2022-10-26 RX ADMIN — SODIUM CHLORIDE, POTASSIUM CHLORIDE, SODIUM LACTATE AND CALCIUM CHLORIDE 500 ML: 600; 310; 30; 20 INJECTION, SOLUTION INTRAVENOUS at 09:43

## 2022-10-26 RX ADMIN — OMEPRAZOLE 20 MG: 20 CAPSULE, DELAYED RELEASE ORAL at 08:17

## 2022-10-26 RX ADMIN — DEXTROSE MONOHYDRATE 25 ML: 25 INJECTION, SOLUTION INTRAVENOUS at 20:57

## 2022-10-26 RX ADMIN — PROCHLORPERAZINE EDISYLATE 10 MG: 5 INJECTION INTRAMUSCULAR; INTRAVENOUS at 19:00

## 2022-10-26 ASSESSMENT — ACTIVITIES OF DAILY LIVING (ADL)
ADLS_ACUITY_SCORE: 30
ADLS_ACUITY_SCORE: 30
ADLS_ACUITY_SCORE: 32
ADLS_ACUITY_SCORE: 30
ADLS_ACUITY_SCORE: 32

## 2022-10-26 NOTE — PROGRESS NOTES
Surgery Progress Note  10/26/2022       Subjective:  Doing okay this morning. Endorses shoulder pain bilaterally that worsens when taking a deep breath. Denies shortness of breath or chest pain. States abdominal pain is well controlled. Denies nausea or vomiting.      Objective:  Temp:  [96  F (35.6  C)-97.2  F (36.2  C)] 97.2  F (36.2  C)  Pulse:  [] 85  Resp:  [15-23] 18  BP: ()/() 151/51  SpO2:  [92 %-100 %] 95 %    I/O last 3 completed shifts:  In: 700 [I.V.:700]  Out: 20 [Blood:20]      Gen: Awake, alert, appears uncomfortable   Resp: NLB on RA  Abd: soft, nondistended, appropriately tender to palpation   Incision: c/d/I with dressings in place   Ext: WWP, no edema     Labs:  Recent Labs   Lab 10/25/22  2322 10/25/22  0906   WBC 13.5*  --    HGB 10.2*  --     316       Recent Labs   Lab 10/25/22  2022 10/25/22  0906 10/25/22  0846   CR  --  1.06*  --    *  --  99       Imaging:  No new imaging in last 24 hours.      Assessment/Plan:   42 year old female now POD1 s/p laparoscopic sleeve gastrectomy and hiatal herniorrhaphy. She continues to have shoulder pain and is intermittently tachycardic to the 120s, likely 2/2 pain given benign abdominal exam, patient remains afebrile. HR now 85.     - CLD for 1 week   - Multimodal pain control w/ prn tylenol, dilaudid and oxy   - Staples removed at bedside and replaced with steri strips this morning. Patient tolerated well.    - LR @ 75 ml/hr  - Lovenox for DVT ppx   - No NSAIDs or toradol for pain control given recent bariatric surgery   - Will continue to monitor tachycardia     Seen, examined, and discussed with chief resident, who will discuss with staff.  - - - - - - - - - - - - - - - - - -  Desean Van MD PGY 1  General Surgery - [facility]

## 2022-10-26 NOTE — PLAN OF CARE
/65 (BP Location: Right arm)   Pulse 80   Temp (!) 96  F (35.6  C) (Axillary)   Resp 18   Ht 1.829 m (6')   Wt 122.6 kg (270 lb 4.5 oz)   LMP 09/04/2022   SpO2 94%   BMI 36.66 kg/m        Shift: 0928-6122  Status: Morbid obesity. 10/25/2022-Gastrectomy sleeve.   Neuro: A&O x 4. Denies numbness and tingling.   Resp: 2 LPM NC oxygen. De-sating on room air.   Cardiac: Hypotensive. BP 83/54. Paged provider. 2 Liter LR bolus ordered and administered. BP at 0500 133/83.   GI/: Voiding spontaneously. Bariatric clear liquid diet.   Skin: Warm and dry.   Activity: SBA.   Incision & Drainage: PIV infusing LR at 75 mL/hr.   Pain: 8/10 on bilateral shoulders. Gave PRN Levsin, dilaudid  and oxycodone. Applied hot packs.   Labs: Reviewed. X-ray results pending.   Changes: Hypotension treated with IVF bolus.   Plan: Continue with POC.       Goal Outcome Evaluation: Improving.          Overall Patient Progress: no changeOverall Patient Progress: no change

## 2022-10-26 NOTE — PROGRESS NOTES
7B Leah Yanez, 35-2  Pt. is very pale and cool to touch. Reporting sharp shooting pain up both shoulders w/ deep breathes. BP on L arm: 83/54 and BP on R arm: 96/57. She states that she is lightheaded.   Daniela LOW RN #[id number]

## 2022-10-26 NOTE — PHARMACY-ADMISSION MEDICATION HISTORY
Pharmacy Admission Medication History    Admission medication history interview status for the 10/25/2022 admission is complete. See EPIC admission navigator for allergy information, prior to admission medications and immunization status.     Medication history interview source(s): Patient    Medication history resources (including written lists, pill bottles, clinic record): None    Medication history source reliability: Good    Actions taken by pharmacist (provider contacted, medication changes, etc):None     Changes made to medication history:None. Note some medications to be started post-surgery    Additional medication history information: None    Medication reconciliation/reorder completed by provider prior to medication history? Yes    Time spent in this activity: 15 minutes    Prior to Admission medications    Medication Sig Last Dose Taking? Auth Provider Long Term End Date   acetaminophen (TYLENOL) 325 MG tablet Take 2 tablets (650 mg) by mouth every 4 hours as needed for other (For optimal non-opioid multimodal pain management to improve pain control and physical function.)  Yes Rosanna Phipps NP No    buPROPion (WELLBUTRIN) 75 MG tablet Take 150 mg by mouth every morning Pt not sure of dose 10/24/2022 at 0700 Yes Reported, Patient              fish oil-omega-3 fatty acids 1000 MG capsule Take 2 g by mouth every morning Past Week Yes Reported, Patient     omeprazole (PRILOSEC) 40 MG DR capsule Take 1 capsule (40 mg) by mouth 2 times daily 10/25/2022 at 0700 Yes Esteban Rose DO  12/29/22   oxyCODONE (ROXICODONE) 5 MG tablet Take 1 tablet (5 mg) by mouth every 6 hours as needed for moderate to severe pain  Yes Rosanna Phipps NP     hyoscyamine (LEVSIN) 0.125 MG tablet Take 1 tablet (125 mcg) by mouth every 4 hours as needed for cramping  Patient taking differently: Take 0.125 mg by mouth every 4 hours as needed for cramping Not started yet   Daniel Aragon MD     ondansetron (ZOFRAN ODT) 4 MG ODT tab  Take 1 tablet (4 mg) by mouth every 8 hours as needed for nausea  Patient taking differently: Take 4 mg by mouth every 8 hours as needed for nausea Not started yet   Daniel Aragon MD     senna-docusate (SENOKOT-S/PERICOLACE) 8.6-50 MG tablet Take 2 tablets by mouth daily as needed for constipation (While taking narcotic pain medications.  Stop taking if having loose stools.)  Patient taking differently: Take 2 tablets by mouth daily as needed for constipation (While taking narcotic pain medications.  Stop taking if having loose stools.) Not started yet   Daniel Aragon MD

## 2022-10-26 NOTE — PROGRESS NOTES
Cross-cover note: 9:00 PM 10/25/2022     10/25/2022    General Surgery Cross Cover Note    Called by nursing regarding hypotension, nausea, lightheadedness.    Per patient she is experiencing lightheadedness, nausea, pain in between her shoulder blades.  This all started when she got out of bed to go to use the restroom.  When she stood up she became lightheaded, clammy.  When she was put back in bed the nurses took her blood pressure which was subsequently read as 83/54.  She is endorsing pain in between her shoulder blades with deep breathing.  Pain in her abdomen from the surgical sites.  Lightheadedness, nausea.  And urged to void but unable to.    B/P: 92/59, T: 96, P: 97, R: 23    PE:  A&O x3, uncomfortable  Breathing non-labored  Abd soft, non-tender, non-distended  Extr. Warm to touch  Incisions clean, dry, intact    Plan:  Likely vasovagal response to standing, work-up to rule out cardiac causes or bleeding after anesthesia and surgery    - CBC, BMP, mag, Phos  - Chest x-ray  - 1 L bolus  - Continue to monitor vital signs, BP every 5 minutes, monitor for tachycardia, altered mental status  - Bladder scan, if greater than 350 cc and patient is unable to void straight cath    Discussed with mike resident on-call.    Marc Oconnell MD  Integrated Plastic Surgery Resident PGY-1  Surgery Cross cover

## 2022-10-26 NOTE — DISCHARGE SUMMARY
[facility]   Minimally Invasive/Bariatric Surgery Discharge Summary    Date of Admission: 10/25/2022  Date of Discharge: 10/26/2022     Admission Diagnosis:  1. Morbid obesity      Discharge Diagnosis:  Same as above  1. S/p Laparoscopic sleeve gastrectomy  2. Laproscopic Hiatal Herniorrhaphy    Consultations:  Pharmacy    Procedures:  1. Laparoscopic sleeve gastrectomy by Dr. Aragon on 10/25/22    Brief HPI:  Leah Yanez is a 42 year old female with a history of morbid obesity and associated co-morbidities of hiatal hernia, acid reflux, esophagitis, esophageal narrowing, dysphagia, Griggs's. Pre-operatively, her BMI was 37.7 kg/m^2. Numerous weight loss attempts without surgery have been without success. She underwent extensive pre-operative screening and education and was found to be an appropriate candidate for weight loss surgery. After a discussion of the risks, benefits, and alternatives, the patient elected to proceed with surgery.     Hospital Course:  The patient was admitted and underwent the above procedure. She tolerated the procedure well. There were no complications. The patient's diet was slowly advanced. Pain was controlled with oral pain medication and the patient was able to ambulate and void without difficulty. She received appropriate education post operatively. On POD#1 the patient was discharged to home.    Discharge Physical Exam:  /83 (BP Location: Right arm)   Pulse 120   Temp (!) 96.5  F (35.8  C) (Oral)   Resp 20   Ht 1.829 m (6')   Wt 122.6 kg (270 lb 4.5 oz)   LMP 09/04/2022   SpO2 92%   BMI 36.66 kg/m       A&O x3, uncomfortable  Breathing non-labored  Abd soft, non-tender, non-distended  Extr. Warm to touch  Incisions clean, dry, intact    Meds:     Review of your medicines      UNREVIEWED medicines. Ask your doctor about these medicines      Dose / Directions   acetaminophen 325 MG tablet  Commonly known as: TYLENOL      Dose:  325-650 mg  Take 325-650 mg by mouth every 6 hours as needed for mild pain  Refills: 0     buPROPion 75 MG tablet  Commonly known as: WELLBUTRIN      Dose: 150 mg  Take 150 mg by mouth every morning Pt not sure of dose  Refills: 0     cyanocobalamin 100 MCG tablet  Commonly known as: VITAMIN B-12      Dose: 100 mcg  Take 100 mcg by mouth every morning  Refills: 0     hyoscyamine 0.125 MG tablet  Commonly known as: LEVSIN  Used for: Class 2 severe obesity with serious comorbidity and body mass index (BMI) of 38.0 to 38.9 in adult, unspecified obesity type (H), At high risk for postoperative complications      Dose: 125 mcg  Take 1 tablet (125 mcg) by mouth every 4 hours as needed for cramping  Quantity: 30 tablet  Refills: 1     IRON 100/C PO      Dose: 1 tablet  1 tablet daily before breakfast  Refills: 0     multivitamin per tablet      Dose: 1 tablet  Take 1 tablet by mouth every morning  Refills: 0     omeprazole 40 MG DR capsule  Commonly known as: priLOSEC  Used for: Esophageal dysphagia, Esophageal stricture      Dose: 40 mg  Take 1 capsule (40 mg) by mouth 2 times daily  Quantity: 180 capsule  Refills: 3     ondansetron 4 MG ODT tab  Commonly known as: ZOFRAN ODT  Used for: Class 2 severe obesity with serious comorbidity and body mass index (BMI) of 38.0 to 38.9 in adult, unspecified obesity type (H), At high risk for postoperative complications      Dose: 4 mg  Take 1 tablet (4 mg) by mouth every 8 hours as needed for nausea  Quantity: 15 tablet  Refills: 0     senna-docusate 8.6-50 MG tablet  Commonly known as: SENOKOT-S/PERICOLACE  Used for: Class 2 severe obesity with serious comorbidity and body mass index (BMI) of 38.0 to 38.9 in adult, unspecified obesity type (H), At high risk for postoperative complications      Dose: 2 tablet  Take 2 tablets by mouth daily as needed for constipation (While taking narcotic pain medications.  Stop taking if having loose stools.)  Quantity: 30 tablet  Refills: 1      vitamin D3 50 mcg (2000 units) tablet  Commonly known as: CHOLECALCIFEROL      Dose: 1 tablet  Take 1 tablet by mouth every morning  Refills: 0        CONTINUE these medicines which have NOT CHANGED      Dose / Directions   fish oil-omega-3 fatty acids 1000 MG capsule      Dose: 2 g  Take 2 g by mouth every morning  Refills: 0            Additional instructions:     Follow-up Care - Dietician    Follow-up with your bariatric dietitian in 1 week.     Discharge diet    Phase II: Full liquid diet, room temperature with one protein shake per day. Goal water intake: 40oz per day by post-operative day #4. Meet with Bariatric Dietitian in 1 week to discuss dietary changes.     Do not drive    Do NOT drive until after you have been completely off narcotics for a minimum of 24 hours.     Weight Restrictions    Do not lift over 20 pounds for 2 weeks.     Return to Work    May return to work in 2 weeks if cleared by Bariatric surgeon.     Wash your hands    Wash your hands with soap and warm water before you touch the site of surgery.     Surgical Site Care    If you have skin tapes on your surgical site(s), leave them on the surgical site(s) until they fall off on their own or 1 week after surgery date. May remove Band-Aid one day after surgery.     Shower/Bathing    Okay to shower. Do NOT soak in tub for 2-4 weeks.     Contact Surgeon    Contact your Surgeon IF:  ~  Your pain is not controlled by pain medication or pain that suddenly increases;  ~  You develop a fever greater than 101 and/or chills 24 hours after surgery;  ~  You notice redness or bad smelling drainage at the surgery site;  ~  You notice a large amount of bleeding from the surgery site that does not stop when moderate pressure is applied;  ~  You are unable to pass urine within 8-10 hours after surgery;  ~  You have an upset stomach or vomiting lasting more than a day;  ~  You have a skin reaction to tape or dressing such as redness, itching or rash at the  surgery site.     Reason for your hospital stay    Sleeve gastrectomy     Activity    Your activity upon discharge: activity as tolerated and no lifting of more than 20lb for 4 week     Follow Up and recommended labs and tests    Follow up with me,  Rosanna Phipps NP, 11/1/2022. to evaluate after surgery.  No follow up labs or test are needed.     Diet    Follow this diet upon discharge: bariatric full liquid       Discussed with Dr. Aragon.    Marc Oconnell MD PGY1  Integrated Plastic and Reconstructive Surgery  [facility]

## 2022-10-26 NOTE — DISCHARGE INSTRUCTIONS
After Bariatric Surgery Discharge Instructions  [facility]     Note: Ask your nurse to order your medications from the pharmacy. Be sure you have your medications with you when you leave.  Diet on discharge bariatric full liquid.    Lovenox twice daily for 4 weeks per Dr. Aragon   How much fluid should I drink?  Strive for 48-64 ounces daily.  Carry a water bottle with you without a straw or sports top. Drink from it often.  Keep track of how much fluid you drink in a day.  Remember:  -Do not use straws, chew gum or suck on hard candies. They may cause painful gas.    -Sip, don't slurp when you drink.    -Practice small sips using a medicine cup for the first week postop.   -No ice or cold drinks. This could cause gas or spasms.   -No coffee, soda pop or drinks with caffeine. These may cause stomach pain.   -No alcohol. It is bad for your liver and will cause stomach pain.    How often should I do my deep breathing and coughing?  Use your incentive spirometer (small plastic breathing device) every hour while awake after you get home. Using the incentive spirometer helps you deep breath. Continuing to cough and deep breath will help prevent fevers and pneumonia.   If you do not have a fever after one week, you can stop using the incentive spirometer and discard it.     You can continue to take deep breaths without the incentive spirometer every one to two hours while awake for the month after surgery.    What kinds of activity can I do?  Get plenty of rest the first few days after surgery and try to balance rest and activity. You will need some time to recover - you may be more tired than you realize at first. You'll feel better and heal faster if you take good care of yourself.  For 4 weeks after surgery (Please review restrictions at your one week visit, they could change based on how well you are doing):  -Don't lift more than 20 pounds.   -Take 4-5 short walks every  day.  -Don't jog, run, or do belly exercises.  Don't swim, bathe or use a hot tub until your cuts are healed (scabs are gone).  You may shower  Don't plan to fly or take a road trip within the first 1 to 3 months after surgery.  You could get a blood clot in your legs. If you must travel, get up and move around every hour for at least 5 minutes before continuing on your journey.  Your care team can help you decide when it's safe for you to travel.     What can I do for pain control?  You had major belly surgery that involves all layers of your belly muscles. Pain is expected, even for some as far out as 6-8 weeks after surgery. Moving, sneezing, coughing, and breathing will cause discomfort because these activities use your belly muscles.   Please see your after visit summary medication review for what pain medication will be continued, discontinued and newly started for you.    You can take opioid pain medicine if prescribed and if needed. Try to wean off from it as soon as you feel comfortable.   Do not drive while you are taking opioid pain medicine. This is dangerous.  You can take acetaminophen (Tylenol) between your prescribed pain medicine on a scheduled basis OR take it scheduled every 6 hours (check with your care team for specifics).  -Acetaminophen formulation options:    -Liquid   -Caplet (Cut caplet in half before taking)   -Do not take more than 3000 mg Tylenol in a 24 hour period.  -You may also take Tylenol for pain in place of the opioid pain medicine (check with your care team for specifics).   You can apply ice or heat to the affected area(s). Just remember to wrap the ice in something and limit icing sessions to 20 minutes. Excessive icing can irritate the skin or cause skin damage.  You can apply heat with a hot, wet towel or heating pad. Just like cold therapy, limit heat application to 20 minutes. Never sleep with a heating pad on. It could cause severe burns to your skin.  Wear your binder to  support your belly muscles if you have one.  Take this off a little more each day and try to be off completely by 2 weeks after surgery. If you don't need your binder for comfort or support, you don't need to wear it.   You may not be able to sleep in a comfortable position for a few weeks after surgery. This is normal. You may be more comfortable sleeping in a recliner or propped up with 3 pillows for the first couple of weeks after surgery.  Do not take NSAID's (Non-Steroidal Anti-Inflammatory Drugs) (examples: ibuprofen, Motrin, Advil, Aleve, and Naproxen), aspirin, or use pain patches with NSAID's. They will increase your risk of bleeding or getting an ulcer.    Please call the clinic for any of the following pain concerns, we would like to talk to you:  -pain that does not improve with rest  -pain that gets worse and worse  -pain that is not controlled by your pain medicine  -a sudden severe increase in pain    What medications will I need to take after surgery?  You may be discharged with the following types of medications: omeprazole 20 mg once daily for heartburn symptom prevention, zofran as needed for nausea and a medication called hyoscyamine (levsin) as needed for cramping.Please see your after visit summary medication review for what will be continued, discontinued and newly started.  It is important to reduce the amount of acid in your new stomach for a couple of months after surgery while it is still healing. We will prescribe an acid reducer or antacid. Take it as directed. This will help prevent ulcers, heartburn and acid reflux.  If you took an acid reducer before you had surgery, your care team will let you know what acid reducer you will take after surgery.   It is okay to swallow any medications smaller than   inch in size.   -If it is larger than   inch, it may need to be cut, crushed, or in a liquid form. Check with your care team about which way is most appropriate for you and your  medications.    What should I know about my incisions (cuts)?  Your incisions are covered with white steri strips or butterfly tape and have band aids or gauze over the top. If you have gauze or band aids, they can come off in the hospital.  Leave your steri strips on until they fall off on their own. If the steri strips don't fall off after 1 to 2 weeks, you can take them off. If they fall off earlier, replace them with clean band aids trying to avoid touching the incision itself.   If you have gauze covered by a clear dressing, remove 2-3 days after surgery or as directed by your care team.  You may shower in the hospital after surgery and can get your incision coverings wet.  Do not submerge in water (e.g. No baths, swimming pools, hot tubs) until your care team tells you it is okay and your incisions are completely healed.    Call the clinic if you have any of these signs or symptoms of infection:  -Redness around the site.  -Drainage that smells bad.  -Drainage that is thick yellow or green.  -An increase in pain around the incision site  -An increase in swelling around the incision site  -Heat or warmth around incision site   -Fever of 101.5  F (38.3  C) or higher when taken under the tongue.    -Chills    Will my urine or bowel movements change?   Your first bowel movements (stools) will likely be liquid. You may also notice old blood or a darker color (black or maroon color) in your bowel movements.  This is not unusual and usually goes away after the first week, if not sooner. You may not have a bowel movement for a week.   If you have not had a bowel movement for at least three days after your surgery date and are passing gas, you can use over the counter stool softeners.  Please stop taking the stool softeners and laxatives if your stools are loose.  Increasing fluids and activity as well as getting off narcotics will help prevent constipation.    Call the clinic if:   -You have stomach pain.   -You  "continue to have constipation.  -You have excessive bloating after walking and passing gas.    How can I prevent dehydration if I feel nauseated (sick to my stomach) and vomit (throw up)?   Vomiting is not normal after surgery. If you continue to have nausea and vomiting, call the clinic.   Nausea can be a sign of dehydration. That is why it is very important to track your fluids.  Do not nap more than one hour during the day. Set a timer to wake yourself up, if needed. Too much sleep will keep you from drinking enough fluid during the day and lead to dehydration.  No outside activity in hot, humid weather until you can drink 48 to 64 ounces of fluid in 24 hours. If you sweat a lot, your body may lose too much water.  Try to take a 1 ounce sip of water (one medicine cup) every 15 minutes.  Set a timer to remind yourself.    Call the clinic if you have any of these signs or symptoms of dehydration:  -Dark colored urine  -Urinating (pass water) less than 2-3 times per day  -Lack of energy  -Nausea  -Dizziness  -Headache    Call the clinic ANY TIME at [phone] if:  -Your pain medicine is not working.  -You have a fever ? 101.5 F.  -You have belly or left shoulder pain that gets worse and worse.  -You have a swollen leg with redness, warmth, or pain behind the knee or calf.  -You have chest pain   -You feel very short of breath.  -You have a sudden severe increase in heart rate.  -You have vomiting that gets worse and worse.  -You have constant nausea (feeling sick to your stomach) that does not go away with medication.  -You have trouble swallowing.  -You have an increasing feeling that \"something is not right\".  -You have hiccups that do not stop.  -You have any questions or concerns.    AFTER HOURS QUESTIONS OR CONCERNS: Call [phone] and ask to speak with surgery resident if you are having troubles in the evenings, at night, or on weekends. Please call if you experience increasing abdominal pain, nausea, " vomiting, increasing drainage from your wounds, chills, or fever >101.5    If you have to go to the Emergency Room, we prefer you go to the hospital that did your surgery. Please let them know that you had bariatric surgery and to notify your surgeon.    When should I go back to the clinic?  Follow up with your care team in 1-2 weeks.   If this appointment was not already made, please call: [phone]    Appointments located at [facility] clinic:  [facility]    [address]  [place] [address]

## 2022-10-27 LAB
ABO/RH(D): NORMAL
ANTIBODY SCREEN: NEGATIVE
BLD PROD TYP BPU: NORMAL
BLD PROD TYP BPU: NORMAL
BLOOD COMPONENT TYPE: NORMAL
BLOOD COMPONENT TYPE: NORMAL
CODING SYSTEM: NORMAL
CODING SYSTEM: NORMAL
CROSSMATCH: NORMAL
CROSSMATCH: NORMAL
ERYTHROCYTE [DISTWIDTH] IN BLOOD BY AUTOMATED COUNT: 16.8 % (ref 10–15)
GLUCOSE BLDC GLUCOMTR-MCNC: 100 MG/DL (ref 70–99)
GLUCOSE BLDC GLUCOMTR-MCNC: 112 MG/DL (ref 70–99)
GLUCOSE BLDC GLUCOMTR-MCNC: 113 MG/DL (ref 70–99)
GLUCOSE BLDC GLUCOMTR-MCNC: 122 MG/DL (ref 70–99)
GLUCOSE BLDC GLUCOMTR-MCNC: 51 MG/DL (ref 70–99)
GLUCOSE BLDC GLUCOMTR-MCNC: 70 MG/DL (ref 70–99)
GLUCOSE BLDC GLUCOMTR-MCNC: 93 MG/DL (ref 70–99)
HCT VFR BLD AUTO: 22.5 % (ref 35–47)
HGB BLD-MCNC: 6.9 G/DL (ref 11.7–15.7)
HGB BLD-MCNC: 8.2 G/DL (ref 11.7–15.7)
HOLD SPECIMEN: NORMAL
ISSUE DATE AND TIME: NORMAL
ISSUE DATE AND TIME: NORMAL
MCH RBC QN AUTO: 26 PG (ref 26.5–33)
MCHC RBC AUTO-ENTMCNC: 30.7 G/DL (ref 31.5–36.5)
MCV RBC AUTO: 85 FL (ref 78–100)
NEFA SERPL-SCNC: 0.8 MMOL/L
PATH REPORT.COMMENTS IMP SPEC: NORMAL
PATH REPORT.COMMENTS IMP SPEC: NORMAL
PATH REPORT.FINAL DX SPEC: NORMAL
PATH REPORT.GROSS SPEC: NORMAL
PATH REPORT.MICROSCOPIC SPEC OTHER STN: NORMAL
PATH REPORT.RELEVANT HX SPEC: NORMAL
PHOTO IMAGE: NORMAL
PLATELET # BLD AUTO: 233 10E3/UL (ref 150–450)
PLATELET # BLD AUTO: 253 10E3/UL (ref 150–450)
RBC # BLD AUTO: 2.65 10E6/UL (ref 3.8–5.2)
SPECIMEN EXPIRATION DATE: NORMAL
UNIT ABO/RH: NORMAL
UNIT ABO/RH: NORMAL
UNIT NUMBER: NORMAL
UNIT NUMBER: NORMAL
UNIT STATUS: NORMAL
UNIT STATUS: NORMAL
UNIT TYPE ISBT: 6200
UNIT TYPE ISBT: 6200
WBC # BLD AUTO: 13.8 10E3/UL (ref 4–11)

## 2022-10-27 PROCEDURE — 999N000128 HC STATISTIC PERIPHERAL IV START W/O US GUIDANCE

## 2022-10-27 PROCEDURE — 258N000003 HC RX IP 258 OP 636

## 2022-10-27 PROCEDURE — 85018 HEMOGLOBIN: CPT

## 2022-10-27 PROCEDURE — 36415 COLL VENOUS BLD VENIPUNCTURE: CPT

## 2022-10-27 PROCEDURE — P9016 RBC LEUKOCYTES REDUCED: HCPCS

## 2022-10-27 PROCEDURE — 258N000001 HC RX 258

## 2022-10-27 PROCEDURE — 85049 AUTOMATED PLATELET COUNT: CPT | Performed by: NURSE PRACTITIONER

## 2022-10-27 PROCEDURE — 120N000002 HC R&B MED SURG/OB UMMC

## 2022-10-27 PROCEDURE — 88305 TISSUE EXAM BY PATHOLOGIST: CPT | Mod: 26 | Performed by: PATHOLOGY

## 2022-10-27 PROCEDURE — 85027 COMPLETE CBC AUTOMATED: CPT

## 2022-10-27 PROCEDURE — 86850 RBC ANTIBODY SCREEN: CPT | Performed by: SURGERY

## 2022-10-27 PROCEDURE — 250N000013 HC RX MED GY IP 250 OP 250 PS 637: Performed by: SURGERY

## 2022-10-27 PROCEDURE — 250N000013 HC RX MED GY IP 250 OP 250 PS 637: Performed by: NURSE PRACTITIONER

## 2022-10-27 PROCEDURE — 36416 COLLJ CAPILLARY BLOOD SPEC: CPT

## 2022-10-27 PROCEDURE — 86901 BLOOD TYPING SEROLOGIC RH(D): CPT | Performed by: SURGERY

## 2022-10-27 PROCEDURE — 250N000011 HC RX IP 250 OP 636: Performed by: NURSE PRACTITIONER

## 2022-10-27 PROCEDURE — 86923 COMPATIBILITY TEST ELECTRIC: CPT

## 2022-10-27 RX ADMIN — BUPROPION HYDROCHLORIDE 75 MG: 75 TABLET, FILM COATED ORAL at 20:10

## 2022-10-27 RX ADMIN — HYDROMORPHONE HYDROCHLORIDE 0.2 MG: 0.2 INJECTION, SOLUTION INTRAMUSCULAR; INTRAVENOUS; SUBCUTANEOUS at 16:05

## 2022-10-27 RX ADMIN — HYDROMORPHONE HYDROCHLORIDE 0.2 MG: 0.2 INJECTION, SOLUTION INTRAMUSCULAR; INTRAVENOUS; SUBCUTANEOUS at 08:09

## 2022-10-27 RX ADMIN — ONDANSETRON 4 MG: 2 INJECTION INTRAMUSCULAR; INTRAVENOUS at 20:24

## 2022-10-27 RX ADMIN — HYDROMORPHONE HYDROCHLORIDE 0.2 MG: 0.2 INJECTION, SOLUTION INTRAMUSCULAR; INTRAVENOUS; SUBCUTANEOUS at 04:18

## 2022-10-27 RX ADMIN — ENOXAPARIN SODIUM 40 MG: 40 INJECTION SUBCUTANEOUS at 01:15

## 2022-10-27 RX ADMIN — DEXTROSE AND SODIUM CHLORIDE: 5; 900 INJECTION, SOLUTION INTRAVENOUS at 20:09

## 2022-10-27 RX ADMIN — PROCHLORPERAZINE EDISYLATE 10 MG: 5 INJECTION INTRAMUSCULAR; INTRAVENOUS at 16:12

## 2022-10-27 RX ADMIN — OXYCODONE HYDROCHLORIDE 10 MG: 10 TABLET ORAL at 01:28

## 2022-10-27 RX ADMIN — ONDANSETRON 4 MG: 2 INJECTION INTRAMUSCULAR; INTRAVENOUS at 02:04

## 2022-10-27 RX ADMIN — HYDROMORPHONE HYDROCHLORIDE 0.2 MG: 0.2 INJECTION, SOLUTION INTRAMUSCULAR; INTRAVENOUS; SUBCUTANEOUS at 12:15

## 2022-10-27 RX ADMIN — ONDANSETRON 4 MG: 4 TABLET, ORALLY DISINTEGRATING ORAL at 11:39

## 2022-10-27 RX ADMIN — DEXTROSE MONOHYDRATE 25 ML: 25 INJECTION, SOLUTION INTRAVENOUS at 20:34

## 2022-10-27 RX ADMIN — DEXTROSE AND SODIUM CHLORIDE: 5; 900 INJECTION, SOLUTION INTRAVENOUS at 01:14

## 2022-10-27 RX ADMIN — HYDROMORPHONE HYDROCHLORIDE 0.2 MG: 0.2 INJECTION, SOLUTION INTRAMUSCULAR; INTRAVENOUS; SUBCUTANEOUS at 20:27

## 2022-10-27 ASSESSMENT — ACTIVITIES OF DAILY LIVING (ADL)
ADLS_ACUITY_SCORE: 32

## 2022-10-27 NOTE — PLAN OF CARE
Goal Outcome Evaluation: Ongoing not progressing   Alert and oriented x 4. Between 2-4 L oxygen. Unable to keep liquids downs, nauseated. Zofran and compazine given. C/O shoulder pain, had PRN oxycodone and IV dilaudid. Bleeding from right upper abdominal incision site after staple removal. Pressure applied. Continue to monitor.

## 2022-10-27 NOTE — PROGRESS NOTES
7B 31-1 Leah Elgin  Can we re-check hemoglobin? Pt. is pale, lightheaded, increased O2 to 4 LPM. Unable to keep fluids down. Hypertensive, within range and tachycardic  earlier tonight. Thanks.    Daniela LOW RN #[id number]

## 2022-10-27 NOTE — PLAN OF CARE
Goal Outcome Evaluation:    Plan of Care Reviewed With: patient  /62   Pulse 79   Temp 98.5  F (36.9  C) (Oral)   Resp 18   Ht 1.829 m (6')   Wt 122.6 kg (270 lb 4.5 oz)   LMP 09/04/2022   SpO2 94%   BMI 36.66 kg/m      NEURO: A&O x4. Pt slightly drowsy, arouses to voice. Calls appropriately.   RESPIRATORY: Denies SOB, sating adequately on 3LPM.   CARDIAC: WDL, tachy when getting up. Denies chest pain.   GI/: Voiding adequately without difficulty. BS hypoactive, no BM.   DIET: CLD  PAIN: Pain well controlled with PRN Dilaudid.   LABS: Hemoglobin this AM of 6.9. 2 units of blood infused, no signs of reaction, recheck scheduled for 2130.  SKIN: no new deficits noted.   INCISION/DRAINS/IV ACCESS: Lap sites x5, CDI, abd binder. L PIV infusing 1 unit of blood at 100mL/hr.   ACTIVITY: SBA  PLAN: Recheck hemoglobin. Continue POC.

## 2022-10-27 NOTE — PLAN OF CARE
/61 (BP Location: Right arm)   Pulse 93   Temp 96.8  F (36  C) (Oral)   Resp 18   Ht 1.829 m (6')   Wt 122.6 kg (270 lb 4.5 oz)   LMP 2022   SpO2 93%   BMI 36.66 kg/m          Shift: 1356-9111  Status: Morbid obesity. 10/25/2022 Gastrectomy sleeve  Neuro: A&O x 4. Denies numbness and tingling.   Resp: 4 LPM oxygen NC. De-sating to upper 80's with attempted wean off oxygen.   Cardiac: Tachycardic and Hypertensive.   GI/: Bariatric clear liquid diet. Intermittent nausea and emesis with oral medication intake. No BM this shift. Hypoactive BS.    Skin: Pale. Warm and dry.   Activity: SBA.   Incision & Drainage: PIV infusing dextrose 5% and 0.9% NaCl 100 mL/hr. (5) abdominal lap sites.   Pain: 8/10 on bilateral shoulders. Gave PRN oxycodone.   Labs: Hemoglobin 7.7. WBC: 18.5. B.   Changes: Unable to keep any fluids down, hypertension, tachycardia and increase in oxygen use. Paged provider to request hemoglobin re-check.   Plan: Continue with POC.         Goal Outcome Evaluation: No change.         Overall Patient Progress: no changeOverall Patient Progress: no change

## 2022-10-27 NOTE — PROGRESS NOTES
Surgery Progress Note  10/27/2022       Subjective:  Now POD2 from lap sleeve. Yesterday afternoon struggled with dysphagia, feels like any sip of clear liquid gets caught in her throat and is regurgitated without making it to the stomach. Says that yesterday evening was better than the first night but had an episode of facial flushing and shivering.      Objective:  /67 (BP Location: Right arm)   Pulse 99   Temp (!) 96.7  F (35.9  C) (Oral)   Resp 18   Ht 1.829 m (6')   Wt 122.6 kg (270 lb 4.5 oz)   LMP 09/04/2022   SpO2 93%   BMI 36.66 kg/m     I/O:  UOP 3x unmeasured  Emesis 1x (patient reports every time she takes a sip)     Gen: Awake, alert, NAD, appears uncomfortable  HEENT: MMM, cheeks flushed  Resp: normal respiratory effort on 3L NC  Abd: obese, soft, appropriately tender, incisions closed with steri strips, some dry drainage from right lateral incision  Ext: WWP, no edema     Labs:  Hgb  6.9 (7.7 < 10.2 < 12.5 preop)  Wbc 18.5 ( 13.5)    Imaging:  XR Chest Port 1 View  IMPRESSION: Slightly increased bibasilar opacities likely represent atelectasis versus infection.     Assessment/Plan:   42 year old female with hx of morbid obesity who is s/p laparoscopic sleeve gastrectomy and hiatal hernia repair with Dr. Aragon on 10/25/22. She was kept in hospital yesterday for pain control and tachycardia which was fluid responsive. She developed worsened dysphagia throughout the afternoon yesterday and was made NPO overnight. She also had an episode of shivering and fascial flushing. HR has been 90s since midnight, normotensive. Hgb this morning is 6.9. Suspect she did have some bleeding post op but given her overall well clinical appearance and hemodynamic stability, will plan to transfuse 2u PRBC this morning and monitor clinically.    - hold Lovenox for now  - transfuse 2u PRBC, discussed with patient and consented  - recheck hgb this afternoon  - ok for CLD as tolerated  - IV protonix while not  tolerating PO  - continue mIVF  - encourage incentive spirometer and time spent out of bed  - PTA Wellbutrin  Dispo: inpatient 7B, anticipate dc home 1-2 days     Seen, examined, and discussed with chief resident and staff, Dr. Aragon.  - - - - - - - - - - - - - - - - - -  Danni Clifton MD  General Surgery Resident

## 2022-10-27 NOTE — PROGRESS NOTES
Patient unable to tolerate oral intake, intermittent nausea. Flushed skin, shivers. Abdominal binder on. Steri strips intact, one incision with bloody drainage (covered with gauze). Service notified and assessed patient at bedside.    Blood glucose checked 59, service notified. Dextrose administered, recheck 150s. Will continue to monitor patient.

## 2022-10-27 NOTE — PROGRESS NOTES
Bariatric Surgery Brief Note (Overnight)    PATIENT NAME: Leah Yanez  MRN: [id number]  DATE: 10/26/2022 10:01 PM  LAST PROCEDURE: GASTRECTOMY, SLEEVE, LAPAROSCOPIC:   HERNIORRHAPHY, HIATAL, LAPAROSCOPIC:  (10/25/2022)    Paged regarding facial flushing, dysphagia, Bl shoulder pain, and shivering. She is on POD1. On bedside assessment, Leah was alert and oriented, but endorses feeling unwell.  She endorsed BL shoulder pain since the OR but notes that is is improving since yesterday. She also endorsed severe nausea with emesis throughout the day. She also reported dysphagia localizing to her esophagus, limiting ability to take anything PO. On PE she was actively shivering and mildly hypothermic. Weak inspiratory ability d/t pain, but normal O2 sats on 2L NC. Abd soft/NT/ND. Port sites without active bleeding, hematoma, or signs of infection. Her face was mildly flushed with no edema. Reports no hx of anaphylaxis or concerning drug allergies.    Vital Signs: Most Recent  Ranges (24 hours)   Temp: 97.2  F (36.2  C)  Pulse: 76  BP: (!) 142/60  Resp: 18  SpO2: 95 % on Nasal cannula Temp  Min: 96  F (35.6  C)  Max: 97.2  F (36.2  C)  Pulse  Min: 76  Max: 124  BP  Min: 89/49  Max: 151/51  Resp  Min: 18  Max: 20  SpO2  Min: 92 %  Max: 95 %     Plan:  # Dysphagia  - NPO with ice chips  - mIVF  - Allow meds PO as able, otherwise utilize IV medications when possible.  - Consider speech consult in AM    # Shivering, Facial Flushing, Hypothermia  # O2 Requirements  - CBC, BMP, COVID and Flu PCR  - Can DC isolation precautions when PCR tests are resulted if negative  - Monitor for signs of worsening respiratory requirements, if so will order a STAT CXR  - Q4H glucose checks. Can consider changing mIVF to [id number] pending BMP results.    Jose Martin Wild MD   General Surgery Resident

## 2022-10-28 LAB
CREAT SERPL-MCNC: 1.1 MG/DL (ref 0.51–0.95)
ERYTHROCYTE [DISTWIDTH] IN BLOOD BY AUTOMATED COUNT: 16.4 % (ref 10–15)
GFR SERPL CREATININE-BSD FRML MDRD: 64 ML/MIN/1.73M2
GLUCOSE BLDC GLUCOMTR-MCNC: 114 MG/DL (ref 70–99)
GLUCOSE BLDC GLUCOMTR-MCNC: 116 MG/DL (ref 70–99)
GLUCOSE BLDC GLUCOMTR-MCNC: 121 MG/DL (ref 70–99)
GLUCOSE BLDC GLUCOMTR-MCNC: 166 MG/DL (ref 70–99)
HCT VFR BLD AUTO: 26 % (ref 35–47)
HGB BLD-MCNC: 7.9 G/DL (ref 11.7–15.7)
MCH RBC QN AUTO: 26.6 PG (ref 26.5–33)
MCHC RBC AUTO-ENTMCNC: 30.4 G/DL (ref 31.5–36.5)
MCV RBC AUTO: 88 FL (ref 78–100)
PLATELET # BLD AUTO: 230 10E3/UL (ref 150–450)
RBC # BLD AUTO: 2.97 10E6/UL (ref 3.8–5.2)
WBC # BLD AUTO: 9.9 10E3/UL (ref 4–11)

## 2022-10-28 PROCEDURE — 258N000003 HC RX IP 258 OP 636

## 2022-10-28 PROCEDURE — 120N000002 HC R&B MED SURG/OB UMMC

## 2022-10-28 PROCEDURE — 36415 COLL VENOUS BLD VENIPUNCTURE: CPT | Performed by: SURGERY

## 2022-10-28 PROCEDURE — 82565 ASSAY OF CREATININE: CPT | Performed by: SURGERY

## 2022-10-28 PROCEDURE — 250N000011 HC RX IP 250 OP 636: Performed by: NURSE PRACTITIONER

## 2022-10-28 PROCEDURE — 250N000013 HC RX MED GY IP 250 OP 250 PS 637: Performed by: SURGERY

## 2022-10-28 PROCEDURE — 250N000009 HC RX 250: Performed by: NURSE PRACTITIONER

## 2022-10-28 PROCEDURE — C9113 INJ PANTOPRAZOLE SODIUM, VIA: HCPCS

## 2022-10-28 PROCEDURE — 250N000013 HC RX MED GY IP 250 OP 250 PS 637: Performed by: NURSE PRACTITIONER

## 2022-10-28 PROCEDURE — 250N000013 HC RX MED GY IP 250 OP 250 PS 637: Performed by: STUDENT IN AN ORGANIZED HEALTH CARE EDUCATION/TRAINING PROGRAM

## 2022-10-28 PROCEDURE — 85027 COMPLETE CBC AUTOMATED: CPT

## 2022-10-28 PROCEDURE — 250N000011 HC RX IP 250 OP 636

## 2022-10-28 RX ADMIN — SENNOSIDES AND DOCUSATE SODIUM 2 TABLET: 8.6; 5 TABLET ORAL at 21:07

## 2022-10-28 RX ADMIN — ACETAMINOPHEN 650 MG: 325 TABLET, FILM COATED ORAL at 21:42

## 2022-10-28 RX ADMIN — SENNOSIDES AND DOCUSATE SODIUM 2 TABLET: 8.6; 5 TABLET ORAL at 09:20

## 2022-10-28 RX ADMIN — HYDROMORPHONE HYDROCHLORIDE 0.2 MG: 0.2 INJECTION, SOLUTION INTRAMUSCULAR; INTRAVENOUS; SUBCUTANEOUS at 13:59

## 2022-10-28 RX ADMIN — ONDANSETRON 4 MG: 4 TABLET, ORALLY DISINTEGRATING ORAL at 18:27

## 2022-10-28 RX ADMIN — HYOSCYAMINE SULFATE 125 MCG: 0.12 TABLET, ORALLY DISINTEGRATING ORAL at 11:30

## 2022-10-28 RX ADMIN — OXYCODONE HYDROCHLORIDE 5 MG: 5 TABLET ORAL at 18:26

## 2022-10-28 RX ADMIN — CALCIUM CARBONATE (ANTACID) CHEW TAB 500 MG 500 MG: 500 CHEW TAB at 21:42

## 2022-10-28 RX ADMIN — OXYCODONE HYDROCHLORIDE 5 MG: 5 TABLET ORAL at 01:50

## 2022-10-28 RX ADMIN — DEXTROSE AND SODIUM CHLORIDE: 5; 900 INJECTION, SOLUTION INTRAVENOUS at 05:18

## 2022-10-28 RX ADMIN — DEXTROSE AND SODIUM CHLORIDE: 5; 900 INJECTION, SOLUTION INTRAVENOUS at 16:06

## 2022-10-28 RX ADMIN — ONDANSETRON 4 MG: 4 TABLET, ORALLY DISINTEGRATING ORAL at 09:20

## 2022-10-28 RX ADMIN — BUPROPION HYDROCHLORIDE 75 MG: 75 TABLET, FILM COATED ORAL at 09:20

## 2022-10-28 RX ADMIN — SCOPALAMINE 1 PATCH: 1 PATCH, EXTENDED RELEASE TRANSDERMAL at 11:31

## 2022-10-28 RX ADMIN — PANTOPRAZOLE SODIUM 40 MG: 40 INJECTION, POWDER, FOR SOLUTION INTRAVENOUS at 09:27

## 2022-10-28 RX ADMIN — BUPROPION HYDROCHLORIDE 75 MG: 75 TABLET, FILM COATED ORAL at 21:07

## 2022-10-28 RX ADMIN — HYOSCYAMINE SULFATE 125 MCG: 0.12 TABLET, ORALLY DISINTEGRATING ORAL at 21:42

## 2022-10-28 RX ADMIN — OXYCODONE HYDROCHLORIDE 5 MG: 5 TABLET ORAL at 09:20

## 2022-10-28 ASSESSMENT — ACTIVITIES OF DAILY LIVING (ADL)
ADLS_ACUITY_SCORE: 32

## 2022-10-28 NOTE — PLAN OF CARE
Goal Outcome Evaluation:      Plan of Care Reviewed With: patient    Overall Patient Progress: improving    NEURO: A&Ox4, calls appropriately.   RESPIRATORY: Denies SOB, sating adequately on 4LPM. IS encouraged.   CARDIAC: WDL. Denies chest pain.   GI/: Voiding adequately without difficulty. BS hypoactive, no BM.   DIET: CLD  PAIN: Pain well controlled with PRN Dilaudid x1 and oxycodone x1 with relief.   SKIN/INCISION: Lap sites x5, RUQ lap site with small bloody drainage covered with gauze 2x2, abd binder changed.   IV ACCESS: New R PIV placed infusing D5% and 0.9% NaCl at 100 ml/hr.   Labs: Recheck hgb 8.2 (post 2 units PRBC). Bg 51 IV dextrose given. Recheck 70.  at bedtime and 121 overnight.   ACTIVITY: SBA  PLAN: Continue POC.

## 2022-10-28 NOTE — PROVIDER NOTIFICATION
Vitals: Temp: (!) 96.2  F (35.7  C) Temp src: Oral BP: 130/63 Pulse: 76   Resp: 18 SpO2: 94 % O2 Device: Nasal cannula Oxygen Delivery: 3 LPM  NEURO: A&O x4.Calls appropriately.   RESPIRATORY: Denies SOB, sating adequately on 3LPM.   CARDIAC: WDL. Denies chest pain.   GI/: Voiding adequately without difficulty. BS hypoactive, no BM.   DIET/Nausea: CLD. Intermittent nausea controlled with Zofran x2.  PAIN: Pain well controlled with PRN Dilaudid x2.   LABS: Reviewed. Hgb 7.9,   SKIN: no new deficits noted.   INCISION/DRAINS/IV ACCESS: Lap sites x5, 1 lap site bleeding, steri strips and dressing applied, abd binder. L PIV infusing D5% and 0.9% NaCl @ 100mL/hr.   ACTIVITY: Assist x1, ambulated to bathroom.  PLAN:  Continue POC.

## 2022-10-28 NOTE — PROGRESS NOTES
Surgery Progress Note  10/28/2022       Subjective:  Now POD3 from lap sleeve. Received 2u PRBC yesterday for Hgb  6.9, responded appropriately to hgb 8.2 with resolution of her tachycardia. Says that dysphagia is improving and she is now able to tolerate small sips. Hasn't been out of bed much, only ambulating to the bathroom. No nausea. Pain is controlled.    Objective:  /59 (BP Location: Right arm)   Pulse 77   Temp (!) 96.7  F (35.9  C) (Oral)   Resp 20   Ht 1.829 m (6')   Wt 122.6 kg (270 lb 4.5 oz)   LMP 09/04/2022   SpO2 93%   BMI 36.66 kg/m     I/O:  UOP 1x unmeasured     Gen: Awake, alert, NAD  HEENT: MMM  Resp: normal respiratory effort on 4L NC  Abd: obese, soft, appropriately tender, incisions closed with steri strips  Ext: WWP, no edema     Labs:  Hgb  AM labs pending (8.2 <6.9 < 7.7 < 10.2 < 12.5 preop)  Wbc AM labs pending    Imaging:  XR Chest Port 1 View  IMPRESSION: Slightly increased bibasilar opacities likely represent atelectasis versus infection.     Assessment/Plan:   42 year old female with hx of morbid obesity who is s/p laparoscopic sleeve gastrectomy and hiatal hernia repair with Dr. Aragon on 10/25/22. Received 2u PRBC yesterday for hgb 6.9, responded appropriately. Feeling better today. Dysphagia is improving. Has not spent much time ambulating.     - follow up morning labs, will restart lovenox if hgb >8 this AM  - ok for CLD as tolerated  - IV protonix while not tolerating PO  - continue mIVF  - needs to spend time out of bed today  - PTA Wellbutrin  Dispo: anticipate dc home later today vs tomorrow AM      Seen, examined, and discussed with chief resident and staff, Dr. Aragon.  - - - - - - - - - - - - - - - - - -  Danni Clifton MD  General Surgery Resident      /62 (BP Location: Right arm)   Pulse 74   Temp (!) 95.9  F (35.5  C) (Oral)   Resp 16   Ht 1.829 m (6')   Wt 122.6 kg (270 lb 4.5 oz)   LMP 09/04/2022   SpO2 95%   BMI 36.66 kg/m        Hb stable  now.  Bleeding likely stopped now.    Needs to ambulate.  Increase po intake.  Lovenox start Saturday 40 mg daily; 40 mg twice daily on Monday.

## 2022-10-29 LAB
GLUCOSE BLDC GLUCOMTR-MCNC: 113 MG/DL (ref 70–99)
GLUCOSE BLDC GLUCOMTR-MCNC: 79 MG/DL (ref 70–99)
HGB BLD-MCNC: 7.8 G/DL (ref 11.7–15.7)

## 2022-10-29 PROCEDURE — 250N000013 HC RX MED GY IP 250 OP 250 PS 637: Performed by: NURSE PRACTITIONER

## 2022-10-29 PROCEDURE — 250N000011 HC RX IP 250 OP 636: Performed by: SURGERY

## 2022-10-29 PROCEDURE — 120N000002 HC R&B MED SURG/OB UMMC

## 2022-10-29 PROCEDURE — 250N000013 HC RX MED GY IP 250 OP 250 PS 637: Performed by: SURGERY

## 2022-10-29 PROCEDURE — C9113 INJ PANTOPRAZOLE SODIUM, VIA: HCPCS

## 2022-10-29 PROCEDURE — 258N000003 HC RX IP 258 OP 636

## 2022-10-29 PROCEDURE — 250N000011 HC RX IP 250 OP 636

## 2022-10-29 PROCEDURE — 85018 HEMOGLOBIN: CPT

## 2022-10-29 PROCEDURE — 250N000011 HC RX IP 250 OP 636: Performed by: NURSE PRACTITIONER

## 2022-10-29 PROCEDURE — 36415 COLL VENOUS BLD VENIPUNCTURE: CPT

## 2022-10-29 PROCEDURE — 999N000248 HC STATISTIC IV INSERT WITH US BY RN

## 2022-10-29 PROCEDURE — 250N000013 HC RX MED GY IP 250 OP 250 PS 637

## 2022-10-29 RX ORDER — LIDOCAINE 40 MG/G
CREAM TOPICAL
Status: DISCONTINUED | OUTPATIENT
Start: 2022-10-29 | End: 2022-10-30 | Stop reason: HOSPADM

## 2022-10-29 RX ORDER — HYDROMORPHONE HCL IN WATER/PF 6 MG/30 ML
.2-.4 PATIENT CONTROLLED ANALGESIA SYRINGE INTRAVENOUS
Status: DISCONTINUED | OUTPATIENT
Start: 2022-10-29 | End: 2022-10-30 | Stop reason: HOSPADM

## 2022-10-29 RX ORDER — ENOXAPARIN SODIUM 100 MG/ML
40 INJECTION SUBCUTANEOUS EVERY 24 HOURS
Status: DISCONTINUED | OUTPATIENT
Start: 2022-10-29 | End: 2022-10-30 | Stop reason: HOSPADM

## 2022-10-29 RX ORDER — HYDROXYZINE HCL 10 MG/5 ML
25 SOLUTION, ORAL ORAL EVERY 6 HOURS PRN
Status: DISCONTINUED | OUTPATIENT
Start: 2022-10-29 | End: 2022-10-30 | Stop reason: HOSPADM

## 2022-10-29 RX ADMIN — HYDROMORPHONE HYDROCHLORIDE 0.2 MG: 0.2 INJECTION, SOLUTION INTRAMUSCULAR; INTRAVENOUS; SUBCUTANEOUS at 22:17

## 2022-10-29 RX ADMIN — ONDANSETRON 4 MG: 2 INJECTION INTRAMUSCULAR; INTRAVENOUS at 15:55

## 2022-10-29 RX ADMIN — DEXTROSE AND SODIUM CHLORIDE: 5; 900 INJECTION, SOLUTION INTRAVENOUS at 02:56

## 2022-10-29 RX ADMIN — PANTOPRAZOLE SODIUM 40 MG: 40 INJECTION, POWDER, FOR SOLUTION INTRAVENOUS at 10:00

## 2022-10-29 RX ADMIN — HYDROXYZINE HYDROCHLORIDE 25 MG: 10 SOLUTION ORAL at 22:17

## 2022-10-29 RX ADMIN — HYOSCYAMINE SULFATE 125 MCG: 0.12 TABLET, ORALLY DISINTEGRATING ORAL at 15:56

## 2022-10-29 RX ADMIN — DEXTROSE AND SODIUM CHLORIDE: 5; 900 INJECTION, SOLUTION INTRAVENOUS at 21:06

## 2022-10-29 RX ADMIN — HYDROMORPHONE HYDROCHLORIDE 0.2 MG: 0.2 INJECTION, SOLUTION INTRAMUSCULAR; INTRAVENOUS; SUBCUTANEOUS at 05:21

## 2022-10-29 RX ADMIN — DEXTROSE AND SODIUM CHLORIDE: 5; 900 INJECTION, SOLUTION INTRAVENOUS at 12:43

## 2022-10-29 RX ADMIN — BUPROPION HYDROCHLORIDE 75 MG: 75 TABLET, FILM COATED ORAL at 21:06

## 2022-10-29 RX ADMIN — ENOXAPARIN SODIUM 40 MG: 40 INJECTION SUBCUTANEOUS at 19:06

## 2022-10-29 RX ADMIN — HYDROMORPHONE HYDROCHLORIDE 0.2 MG: 0.2 INJECTION, SOLUTION INTRAMUSCULAR; INTRAVENOUS; SUBCUTANEOUS at 10:06

## 2022-10-29 RX ADMIN — HYDROMORPHONE HYDROCHLORIDE 0.2 MG: 0.2 INJECTION, SOLUTION INTRAMUSCULAR; INTRAVENOUS; SUBCUTANEOUS at 15:56

## 2022-10-29 RX ADMIN — SENNOSIDES AND DOCUSATE SODIUM 2 TABLET: 8.6; 5 TABLET ORAL at 21:06

## 2022-10-29 RX ADMIN — ONDANSETRON 4 MG: 4 TABLET, ORALLY DISINTEGRATING ORAL at 02:04

## 2022-10-29 ASSESSMENT — ACTIVITIES OF DAILY LIVING (ADL)
ADLS_ACUITY_SCORE: 32

## 2022-10-29 NOTE — PROGRESS NOTES
Surgery Progress Note  10/29/2022       Subjective:  Patient notes that she had difficulty sleeping throughout the night. She complains of dysphagia with swallowing liquids and vomits everything she swallows. Notes that the vomiting is more like regurgitation rather than gastric emesis.     Objective:  Temp:  [96  F (35.6  C)-96.6  F (35.9  C)] 96.6  F (35.9  C)  Pulse:  [75-77] 75  Resp:  [12-18] 12  BP: (118-139)/(57-66) 118/57  SpO2:  [91 %-96 %] 96 %    I/O last 3 completed shifts:  In: 170 [I.V.:170]  Out: -       Gen: Awake, alert, NAD  Resp: NLB on RA  Abd: soft, nondistended, nontender  Incision: c/d/I  Ext: WWP, no edema     Labs:  Recent Labs   Lab 10/28/22  0701 10/27/22  2150 10/27/22  0745 10/26/22  2252   WBC 9.9  --  13.8* 18.5*   HGB 7.9* 8.2* 6.9* 7.7*    233 253 344       Recent Labs   Lab 10/28/22  1845 10/28/22  1221 10/28/22  0935 10/28/22  0701 10/26/22  2118 10/26/22  2103 10/25/22  2022 10/25/22  0906   NA  --   --   --   --   --  137  --   --    POTASSIUM  --   --   --   --   --  5.5*  --   --    CHLORIDE  --   --   --   --   --  104  --   --    CO2  --   --   --   --   --  22  --   --    BUN  --   --   --   --   --  19.4  --   --    CR  --   --   --  1.10*  --  1.33*  --  1.06*   * 114* 116*  --    < > 177*   < >  --    KIMBERLYN  --   --   --   --   --  9.2  --   --     < > = values in this interval not displayed.       Imaging:  No new imaging in 24 hours.     Assessment/Plan:   42 year old female with hx of morbid obesity who is s/p laparoscopic sleeve gastrectomy and hiatal hernia repair with Dr. Aragon on 10/25/22. Hgb is now 7.9 after receiving 2 units of pRBCs on 10/28. Dysphagia is persistent. Ambulated on 10/28.     - Lovenox unheld  - ok for CLD as tolerated  - IV protonix while not tolerating PO  - continue mIVF  - encourage incentive spirometer and time OOB  - PTA Wellbutrin  - Surgery team will follow up with Dr. Aragon for recs  - PICC line placement for outpatient IV  fluids    Dispo: inpatient 7B, care coordination will work with her to set up home health so that she can receive her IV fluids at home    Seen, examined, and discussed with chief resident, who will discuss with staff.  - - - - - - - - - - - - - - - - - -  Jyoti Jones DO, MPH  PGY-1 Family Medicine Resident

## 2022-10-29 NOTE — PROVIDER NOTIFICATION
"\"Paged MD Davis \"7B Leah Yanez. RM 31-1  Pt states feeling dizziness and headache. She also looks pale. Can you place an order for hemoglobin check?  Thanks, Lakshmi\"  Time: 1317\"    HGB 7.8. recommend continuing prescribed regimen for nausea  "

## 2022-10-29 NOTE — PROGRESS NOTES
"/57 (BP Location: Right arm)   Pulse 75   Temp (!) 96.6  F (35.9  C) (Oral)   Resp 12   Ht 1.829 m (6')   Wt 122.6 kg (270 lb 4.5 oz)   LMP 09/04/2022   SpO2 96%   BMI 36.66 kg/m        NEURO: A&O x4.Calls appropriately.   RESPIRATORY: Denies SOB, sating adequately on RA.   CARDIAC: WDL. Denies chest pain.   GI/: Voiding adequately without difficulty. BS not audible, no BM. Team notified   DIET/Nausea: NPO pt not tolerating CLD overnight. Pt stated \"I just can't seem to    Intermittent nausea, Zofran administered.  PAIN: Pain controlled with .   LABS: Reviewed. Hgb 7.9,   SKIN: no new deficits noted.   INCISION/DRAINS/IV ACCESS: Lap sites x5,abd binder. L PIV infusing D5% and 0.9% NaCl @ 100mL/hr.   ACTIVITY: SBA, ambulated to bathroom x2.  PLAN:  Continue POC.          "

## 2022-10-29 NOTE — PROGRESS NOTES
I saw Leah today at 115pm.  I agree with the resident note.    She has had blood loss anemia responding to two units of packed red blood cells.    She has also had dysphagia with likely edema causing problems with swallowing and fluid intake.    Would restart lovenox 40 daily and place PICC for outpatient IVF therapy.    I discussed this plan also with Leah s  who was present today.

## 2022-10-30 VITALS
TEMPERATURE: 96.3 F | HEART RATE: 71 BPM | DIASTOLIC BLOOD PRESSURE: 40 MMHG | OXYGEN SATURATION: 94 % | BODY MASS INDEX: 36.61 KG/M2 | SYSTOLIC BLOOD PRESSURE: 105 MMHG | RESPIRATION RATE: 16 BRPM | WEIGHT: 270.28 LBS | HEIGHT: 72 IN

## 2022-10-30 PROCEDURE — C9113 INJ PANTOPRAZOLE SODIUM, VIA: HCPCS

## 2022-10-30 PROCEDURE — 999N000129 HC STATISTIC PICC/MID LINE PROC CANCELLED SUBQ WORKUP

## 2022-10-30 PROCEDURE — 250N000011 HC RX IP 250 OP 636

## 2022-10-30 PROCEDURE — 250N000013 HC RX MED GY IP 250 OP 250 PS 637: Performed by: NURSE PRACTITIONER

## 2022-10-30 PROCEDURE — 250N000013 HC RX MED GY IP 250 OP 250 PS 637: Performed by: SURGERY

## 2022-10-30 PROCEDURE — 258N000003 HC RX IP 258 OP 636

## 2022-10-30 RX ORDER — ENOXAPARIN SODIUM 100 MG/ML
40 INJECTION SUBCUTANEOUS EVERY 12 HOURS
Qty: 22.4 ML | Refills: 0 | Status: SHIPPED | OUTPATIENT
Start: 2022-10-30 | End: 2022-11-02

## 2022-10-30 RX ORDER — HYDROXYZINE HCL 10 MG/5 ML
25 SOLUTION, ORAL ORAL EVERY 6 HOURS PRN
Qty: 118 ML | Refills: 0 | Status: ON HOLD | OUTPATIENT
Start: 2022-10-30 | End: 2022-11-07

## 2022-10-30 RX ADMIN — DEXTROSE AND SODIUM CHLORIDE: 5; 900 INJECTION, SOLUTION INTRAVENOUS at 08:18

## 2022-10-30 RX ADMIN — PANTOPRAZOLE SODIUM 40 MG: 40 INJECTION, POWDER, FOR SOLUTION INTRAVENOUS at 08:16

## 2022-10-30 RX ADMIN — ACETAMINOPHEN 650 MG: 325 TABLET, FILM COATED ORAL at 04:34

## 2022-10-30 RX ADMIN — SENNOSIDES AND DOCUSATE SODIUM 2 TABLET: 8.6; 5 TABLET ORAL at 08:16

## 2022-10-30 RX ADMIN — BUPROPION HYDROCHLORIDE 75 MG: 75 TABLET, FILM COATED ORAL at 08:16

## 2022-10-30 ASSESSMENT — ACTIVITIES OF DAILY LIVING (ADL)
ADLS_ACUITY_SCORE: 32

## 2022-10-30 NOTE — DISCHARGE SUMMARY
[facility]  Discharge Summary  General Surgery     Leah Yanez MRN# [id number]   YOB: 1980 Age: 42 year old     Date of Admission:  10/25/2022  Date of Discharge::  10/30/2022  Admitting Physician:  Daniel Aragon MD  Discharge Physician:  Same as above  Primary Care Physician:        Clinic - [place]          Admission Diagnoses:   Morbid obesity (H) [E66.01]            Discharge Diagnosis:   There are no discharge diagnoses documented for the most recent discharge.   Same as above    Acute blood loss anemia; dysphagia.    Also atelectasis as seen on chest x-ray.    Acute respiratory failure.           Procedures:   10/25  Procedure(s):  GASTRECTOMY, SLEEVE, LAPAROSCOPIC  HERNIORRHAPHY, HIATAL, LAPAROSCOPIC          Consultations:   PHARMACY BARIATRIC IP CONSULT  NURSING TO CONSULT FOR VASCULAR ACCESS CARE IP CONSULT  NURSING TO CONSULT FOR VASCULAR ACCESS CARE IP CONSULT  NURSING TO CONSULT FOR VASCULAR ACCESS CARE IP CONSULT  VASCULAR ACCESS FOR PICC PLACEMENT ADULT IP CONSULT          Brief History of Illness:   Leah Yanez is a 42 year old female with h/o anti-cardiolipin, cavanaugh esophagus, depression, GERD, hiatal hernia, PE, obesity presents for scheduled sleeve gastrectomy           Hospital Course:   The patient underwent sleeve gastrectomy on 10/25, which she tolerated well without immediate complications. She was transferred to the PACU and then floor for routine postoperative care. She developed postoperative hemorrhage requiring blood transfusion  during this hospital stay and the blood loss was related to surgery.  She was also unable to tolerate adequate oral intake. She felt better and was tolerating full liquids diet and ambulating on day of discharge. She is discharged with 4 weeks of anticoagulation, and has close follow up to address potential needs for outpatient fluid resuscitation.    Had atelectasis on CXR; treated with  incentive spirometry; not much time ambulating in association with blood loss anemia.           Imaging Studies:     Results for orders placed or performed during the hospital encounter of 10/25/22   XR Chest Port 1 View    Narrative    EXAM: XR CHEST PORT 1 VIEW  10/25/2022 9:50 PM     HISTORY:  shortness of breath, chest pain   . Laparoscopic hiatal  hernia repair today.    COMPARISON:  CT 4/11/2022    TECHNIQUE: Portable AP semiupright view of the chest    FINDINGS:   The trachea is midline. The cardiomediastinal silhouette is within  normal limits. The pulmonary vasculature is distinct. No appreciable  pneumothorax or pleural effusion. Streaky bibasilar opacities, right  greater than left. No focal airspace opacity. No acute osseous  abnormality.      Impression    IMPRESSION:   Streaky bibasilar opacities likely represent atelectasis versus  infection.    I have personally reviewed the examination and initial interpretation  and I agree with the findings.    MACO RING MD         SYSTEM ID:  [id number]   XR Chest Port 1 View    Narrative    EXAM: XR CHEST PORT 1 VIEW  10/26/2022 11:54 PM     HISTORY:  Incr WBC count, shivering, dysphagia. POD1 from bariatric  surgery    COMPARISON:  10/25/2022    TECHNIQUE: Portable AP semiupright view of the chest    FINDINGS:   The trachea is midline. The cardiomediastinal silhouette is within  normal limits. The pulmonary vasculature is distinct. No appreciable  pneumothorax or pleural effusion. Bilateral low lung volumes. 1Streaky  bibasilar opacities, right greater than left, increased compared to  prior. No focal airspace opacity. No acute osseous abnormality.      Impression    IMPRESSION:   Slightly increased bibasilar opacities likely represent atelectasis  versus infection.    I have personally reviewed the examination and initial interpretation  and I agree with the findings.    FRANK TAVAREZ MD         SYSTEM ID:  [id number]              Medications Prior to  Admission:     Medications Prior to Admission   Medication Sig Dispense Refill Last Dose    buPROPion (WELLBUTRIN) 75 MG tablet Take 150 mg by mouth every morning Pt not sure of dose   10/24/2022 at 0700    fish oil-omega-3 fatty acids 1000 MG capsule Take 2 g by mouth every morning   Past Week    omeprazole (PRILOSEC) 40 MG DR capsule Take 1 capsule (40 mg) by mouth 2 times daily 180 capsule 3 10/25/2022 at 0700    hyoscyamine (LEVSIN) 0.125 MG tablet Take 1 tablet (125 mcg) by mouth every 4 hours as needed for cramping (Patient taking differently: Take 0.125 mg by mouth every 4 hours as needed for cramping Not started yet) 30 tablet 1     ondansetron (ZOFRAN ODT) 4 MG ODT tab Take 1 tablet (4 mg) by mouth every 8 hours as needed for nausea (Patient taking differently: Take 4 mg by mouth every 8 hours as needed for nausea Not started yet) 15 tablet 0     senna-docusate (SENOKOT-S/PERICOLACE) 8.6-50 MG tablet Take 2 tablets by mouth daily as needed for constipation (While taking narcotic pain medications.  Stop taking if having loose stools.) (Patient taking differently: Take 2 tablets by mouth daily as needed for constipation (While taking narcotic pain medications.  Stop taking if having loose stools.) Not started yet) 30 tablet 1     [DISCONTINUED] acetaminophen (TYLENOL) 325 MG tablet Take 325-650 mg by mouth every 6 hours as needed for mild pain   More than a month    [DISCONTINUED] cyanocobalamin (VITAMIN B-12) 100 MCG tablet Take 100 mcg by mouth every morning   10/24/2022    [DISCONTINUED] Iron-Vitamin C (IRON 100/C PO) 1 tablet daily before breakfast   Past Week    [DISCONTINUED] MULTIVITAMINS OR TABS Take 1 tablet by mouth every morning   Past Week    [DISCONTINUED] vitamin D3 (CHOLECALCIFEROL) 50 mcg (2000 units) tablet Take 1 tablet by mouth every morning   More than a month              Discharge Medications:     Current Discharge Medication List        START taking these medications    Details   !!  enoxaparin ANTICOAGULANT (LOVENOX) 40 MG/0.4ML syringe Inject 0.4 mLs (40 mg) Subcutaneous every 12 hours for 28 days  Qty: 22.4 mL, Refills: 0    Associated Diagnoses: S/P laparoscopic sleeve gastrectomy      !! enoxaparin ANTICOAGULANT (LOVENOX) 40 MG/0.4ML syringe Inject 0.4 mLs (40 mg) Subcutaneous every 12 hours for 28 days  Qty: 22.4 mL, Refills: 0    Associated Diagnoses: S/P laparoscopic sleeve gastrectomy; History of pulmonary embolism      hydrOXYzine (ATARAX) 10 MG/5ML syrup Take 12.5 mLs (25 mg) by mouth every 6 hours as needed for anxiety (adjuvant pain)  Qty: 118 mL, Refills: 0    Associated Diagnoses: S/P laparoscopic sleeve gastrectomy      oxyCODONE (ROXICODONE) 5 MG tablet Take 1 tablet (5 mg) by mouth every 6 hours as needed for moderate to severe pain  Qty: 12 tablet, Refills: 0    Associated Diagnoses: S/P laparoscopic sleeve gastrectomy       !! - Potential duplicate medications found. Please discuss with provider.        CONTINUE these medications which have CHANGED    Details   acetaminophen (TYLENOL) 325 MG tablet Take 2 tablets (650 mg) by mouth every 4 hours as needed for other (For optimal non-opioid multimodal pain management to improve pain control and physical function.)  Qty: 30 tablet, Refills: 0    Associated Diagnoses: S/P laparoscopic sleeve gastrectomy           CONTINUE these medications which have NOT CHANGED    Details   buPROPion (WELLBUTRIN) 75 MG tablet Take 150 mg by mouth every morning Pt not sure of dose      fish oil-omega-3 fatty acids 1000 MG capsule Take 2 g by mouth every morning      omeprazole (PRILOSEC) 40 MG DR capsule Take 1 capsule (40 mg) by mouth 2 times daily  Qty: 180 capsule, Refills: 3    Associated Diagnoses: Esophageal dysphagia; Esophageal stricture      hyoscyamine (LEVSIN) 0.125 MG tablet Take 1 tablet (125 mcg) by mouth every 4 hours as needed for cramping  Qty: 30 tablet, Refills: 1    Associated Diagnoses: Class 2 severe obesity with serious  comorbidity and body mass index (BMI) of 38.0 to 38.9 in adult, unspecified obesity type (H); At high risk for postoperative complications      ondansetron (ZOFRAN ODT) 4 MG ODT tab Take 1 tablet (4 mg) by mouth every 8 hours as needed for nausea  Qty: 15 tablet, Refills: 0    Associated Diagnoses: Class 2 severe obesity with serious comorbidity and body mass index (BMI) of 38.0 to 38.9 in adult, unspecified obesity type (H); At high risk for postoperative complications      senna-docusate (SENOKOT-S/PERICOLACE) 8.6-50 MG tablet Take 2 tablets by mouth daily as needed for constipation (While taking narcotic pain medications.  Stop taking if having loose stools.)  Qty: 30 tablet, Refills: 1    Associated Diagnoses: Class 2 severe obesity with serious comorbidity and body mass index (BMI) of 38.0 to 38.9 in adult, unspecified obesity type (H); At high risk for postoperative complications           STOP taking these medications       cyanocobalamin (VITAMIN B-12) 100 MCG tablet Comments:   Reason for Stopping:         Iron-Vitamin C (IRON 100/C PO) Comments:   Reason for Stopping:         MULTIVITAMINS OR TABS Comments:   Reason for Stopping:         vitamin D3 (CHOLECALCIFEROL) 50 mcg (2000 units) tablet Comments:   Reason for Stopping:                       Medications Discontinued or Adjusted During This Hospitalization:   See above           Antibiotics Prescribed at Discharge:   None prescribed           Day of Discharge Physical Exam:   Temp:  [95.8  F (35.4  C)-97.5  F (36.4  C)] 97.5  F (36.4  C)  Pulse:  [] 80  Resp:  [16] 16  BP: (117-128)/(57-76) 125/76  SpO2:  [77 %-94 %] 94 %    General: awake, alert, no acute distress, resting comfortably in bed   CV: warm, well perfused   Pulm: breathing comfortably on room air   Abdomen: soft, non-distended, appropriately tender, no rebound or guarding;  Incision C/D/I   Extremities: no edema, moving all extremities spontaneously and without apparent deficit             Final Pathology Result:   TBD           Discharge Instructions and Follow-Up:     Discharge Procedure Orders   Follow-up Care - Dietician   Order Comments: Follow-up with your bariatric dietitian in 1 week.     Discharge diet   Order Comments: Phase II: Full liquid diet, room temperature with one protein shake per day. Goal water intake: 40oz per day by post-operative day #4. Meet with Bariatric Dietitian in 1 week to discuss dietary changes.     Do not drive   Order Comments: Do NOT drive until after you have been completely off narcotics for a minimum of 24 hours.     Weight Restrictions   Order Comments: Do not lift over 20 pounds for 2 weeks.     Return to Work   Order Comments: May return to work in 2 weeks if cleared by Bariatric surgeon.     Wash your hands   Order Comments: Wash your hands with soap and warm water before you touch the site of surgery.     Surgical Site Care   Order Comments: If you have skin tapes on your surgical site(s), leave them on the surgical site(s) until they fall off on their own or 1 week after surgery date. May remove Band-Aid one day after surgery.     Shower/Bathing   Order Comments: Okay to shower. Do NOT soak in tub for 2-4 weeks.     Contact Surgeon   Order Comments: Contact your Surgeon IF:  ~  Your pain is not controlled by pain medication or pain that suddenly increases;  ~  You develop a fever greater than 101 and/or chills 24 hours after surgery;  ~  You notice redness or bad smelling drainage at the surgery site;  ~  You notice a large amount of bleeding from the surgery site that does not stop when moderate pressure is applied;  ~  You are unable to pass urine within 8-10 hours after surgery;  ~  You have an upset stomach or vomiting lasting more than a day;  ~  You have a skin reaction to tape or dressing such as redness, itching or rash at the surgery site.     Reason for your hospital stay   Order Comments: Sleeve gastrectomy     Activity   Order  Comments: Your activity upon discharge: activity as tolerated and no lifting of more than 20lb for 4 week     Order Specific Question Answer Comments   Is discharge order? Yes      Follow Up and recommended labs and tests   Order Comments: Follow up with me,  Rosanna Phipps NP, 11/1/2022. to evaluate after surgery.  No follow up labs or test are needed.     Diet   Order Comments: Follow this diet upon discharge: bariatric full liquid     Order Specific Question Answer Comments   Is discharge order? Yes               Home Health Care:     Not needed           Discharge Disposition:     Discharged to home      Condition at discharge: Stable    Patient was seen, examined, and discussed on day of discharge with chief resident, who discussed with staff surgeon.      Jas Deshpande MD  Urology PGY-1

## 2022-10-30 NOTE — PROGRESS NOTES
Surgery Progress Note  10/30/2022       Subjective:  NAEON. Reports feeling much better from yesterday. Having BMs, OOB.    Objective:  Temp:  [95.8  F (35.4  C)-97.5  F (36.4  C)] 97.5  F (36.4  C)  Pulse:  [] 80  Resp:  [16] 16  BP: (117-128)/(57-76) 125/76  SpO2:  [77 %-96 %] 94 %    No intake/output data recorded.      Gen: Awake, alert, NAD  Resp: NLB on RA  Abd: soft, nondistended, nontender  Incision: c/d/I  Ext: WWP, no edema     Labs:  Recent Labs   Lab 10/29/22  1332 10/28/22  0701 10/27/22  2150 10/27/22  0745 10/26/22  2252   WBC  --  9.9  --  13.8* 18.5*   HGB 7.8* 7.9* 8.2* 6.9* 7.7*   PLT  --  230 233 253 344       Recent Labs   Lab 10/29/22  1330 10/29/22  0902 10/28/22  1845 10/28/22  0935 10/28/22  0701 10/26/22  2118 10/26/22  2103 10/25/22  2022 10/25/22  0906   NA  --   --   --   --   --   --  137  --   --    POTASSIUM  --   --   --   --   --   --  5.5*  --   --    CHLORIDE  --   --   --   --   --   --  104  --   --    CO2  --   --   --   --   --   --  22  --   --    BUN  --   --   --   --   --   --  19.4  --   --    CR  --   --   --   --  1.10*  --  1.33*  --  1.06*   * 79 166*   < >  --    < > 177*   < >  --    KIMBERLYN  --   --   --   --   --   --  9.2  --   --     < > = values in this interval not displayed.       Imaging:  No new imaging in 24 hours.     Assessment/Plan:   42 year old female with hx of morbid obesity who is s/p laparoscopic sleeve gastrectomy and hiatal hernia repair with Dr. Aragon on 10/25/22. Hgb is now 7.9 after receiving 2 units of pRBCs on 10/28. Dysphagia is persistent. Ambulated on 10/28.     - Lovenox unheld, 40 QD  - ok for CLD as tolerated  - continue mIVF  - encourage incentive spirometer and time OOB  - Will hold off on PICC as tolerating PO    Dispo: possible discharge today vs tomorrow    Seen, examined, and discussed with chief resident, who will discuss with staff.  - - - - - - - - - - - - - - - - - -  Jas Deshpande  PGY1  [facility]

## 2022-10-30 NOTE — PROGRESS NOTES
Patient discharged home following hospital stay for:      Vitals stable.  Oxygen status: room air  Patient tolerating bariatric full liquid diet: Yes    Belongings sent with patient. IV site discontinued. Discharge meds to discharge pharmacy. AVS and education gone over with patient, signed copy in patient chart. Pt. To follow up as outpatient with PCP. Pt verbalized understanding of all education and information.

## 2022-10-30 NOTE — PROGRESS NOTES
Vitals: Temp: (!) 96.2  F (35.7  C) Temp src: Oral BP: 128/74 Pulse: 79   Resp: 16 SpO2: 94 % O2 Device: None (Room air) Oxygen Delivery: 1/2 LPM  NEURO: A&O x4.Calls appropriately.   RESPIRATORY: Denies SOB, sating adequately on LPM.   CARDIAC: WDL. Denies chest pain.   GI/: Voiding adequately without difficulty. BS hypoactive, no BM.   DIET/Nausea: CLD. Intermittent nausea controlled with Zofran x2.  PAIN: Pain well controlled with PRN Dilaudid x2.   LABS: Reviewed.  SKIN: no new deficits noted.   INCISION/DRAINS/IV ACCESS: Lap sites x5, 1 lap site bleeding, steri strips and dressing applied, abd binder. L PIV infusing D5% and 0.9% NaCl @ 100mL/hr.   ACTIVITY: Assist x1, ambulated to bathroom.  PLAN:  Continue POC

## 2022-10-30 NOTE — PROGRESS NOTES
"/76 (BP Location: Left arm)   Pulse 80   Temp 97.5  F (36.4  C) (Oral)   Resp 16   Ht 1.829 m (6')   Wt 122.6 kg (270 lb 4.5 oz)   LMP 09/04/2022   SpO2 94%   BMI 36.66 kg/m        NEURO: A&O x4.Calls appropriately.   RESPIRATORY: Denies SOB, sating adequately on RA.   CARDIAC: WDL. Denies chest pain.   GI/: Voiding adequately without difficulty. BS+, SM BM. Pt stated \"I feel like something is moving down there finally\".   DIET/Nausea: FLD no c/o of n/v   PAIN: Tylenol, dilaudid and hydroxyzine x1.   LABS: Reviewed.   SKIN: no new deficits noted.   INCISION/DRAINS/IV ACCESS: Lap sites x5,abd binder. L PIV infusing D5% and 0.9% NaCl @ 100mL/hr.   ACTIVITY: SBA, ambulated to bathroom x2.  PLAN:  Continue POC.  "

## 2022-10-31 ENCOUNTER — PATIENT OUTREACH (OUTPATIENT)
Dept: ENDOCRINOLOGY | Facility: CLINIC | Age: 42
End: 2022-10-31

## 2022-10-31 ENCOUNTER — INFUSION THERAPY VISIT (OUTPATIENT)
Dept: INFUSION THERAPY | Facility: CLINIC | Age: 42
End: 2022-10-31
Attending: SURGERY
Payer: COMMERCIAL

## 2022-10-31 ENCOUNTER — TELEPHONE (OUTPATIENT)
Dept: ENDOCRINOLOGY | Facility: CLINIC | Age: 42
End: 2022-10-31

## 2022-10-31 ENCOUNTER — PATIENT OUTREACH (OUTPATIENT)
Dept: CARE COORDINATION | Facility: CLINIC | Age: 42
End: 2022-10-31

## 2022-10-31 VITALS
DIASTOLIC BLOOD PRESSURE: 81 MMHG | RESPIRATION RATE: 20 BRPM | SYSTOLIC BLOOD PRESSURE: 128 MMHG | TEMPERATURE: 98.6 F | HEART RATE: 65 BPM | OXYGEN SATURATION: 95 %

## 2022-10-31 DIAGNOSIS — E86.0 DEHYDRATION: Primary | ICD-10-CM

## 2022-10-31 DIAGNOSIS — E86.0 DEHYDRATION: ICD-10-CM

## 2022-10-31 PROCEDURE — 250N000011 HC RX IP 250 OP 636: Performed by: SURGERY

## 2022-10-31 PROCEDURE — 96365 THER/PROPH/DIAG IV INF INIT: CPT

## 2022-10-31 PROCEDURE — 250N000009 HC RX 250: Performed by: SURGERY

## 2022-10-31 PROCEDURE — 258N000003 HC RX IP 258 OP 636: Performed by: SURGERY

## 2022-10-31 RX ORDER — METHYLPREDNISOLONE SODIUM SUCCINATE 125 MG/2ML
125 INJECTION, POWDER, LYOPHILIZED, FOR SOLUTION INTRAMUSCULAR; INTRAVENOUS
Status: CANCELLED
Start: 2022-10-31

## 2022-10-31 RX ORDER — EPINEPHRINE 1 MG/ML
0.3 INJECTION, SOLUTION, CONCENTRATE INTRAVENOUS EVERY 5 MIN PRN
Status: CANCELLED | OUTPATIENT
Start: 2022-10-31

## 2022-10-31 RX ORDER — HEPARIN SODIUM,PORCINE 10 UNIT/ML
5 VIAL (ML) INTRAVENOUS
Status: CANCELLED | OUTPATIENT
Start: 2022-10-31

## 2022-10-31 RX ORDER — ALBUTEROL SULFATE 0.83 MG/ML
2.5 SOLUTION RESPIRATORY (INHALATION)
Status: CANCELLED | OUTPATIENT
Start: 2022-10-31

## 2022-10-31 RX ORDER — DIPHENHYDRAMINE HYDROCHLORIDE 50 MG/ML
50 INJECTION INTRAMUSCULAR; INTRAVENOUS
Status: CANCELLED
Start: 2022-10-31

## 2022-10-31 RX ORDER — EPINEPHRINE 1 MG/ML
0.3 INJECTION, SOLUTION INTRAMUSCULAR; SUBCUTANEOUS EVERY 5 MIN PRN
Status: CANCELLED | OUTPATIENT
Start: 2022-10-31

## 2022-10-31 RX ORDER — HEPARIN SODIUM (PORCINE) LOCK FLUSH IV SOLN 100 UNIT/ML 100 UNIT/ML
5 SOLUTION INTRAVENOUS
Status: CANCELLED | OUTPATIENT
Start: 2022-10-31

## 2022-10-31 RX ORDER — ALBUTEROL SULFATE 90 UG/1
1-2 AEROSOL, METERED RESPIRATORY (INHALATION)
Status: CANCELLED
Start: 2022-10-31

## 2022-10-31 RX ORDER — MEPERIDINE HYDROCHLORIDE 25 MG/ML
25 INJECTION INTRAMUSCULAR; INTRAVENOUS; SUBCUTANEOUS EVERY 30 MIN PRN
Status: CANCELLED | OUTPATIENT
Start: 2022-10-31

## 2022-10-31 RX ADMIN — SODIUM CHLORIDE 1000 ML: 9 INJECTION, SOLUTION INTRAVENOUS at 13:00

## 2022-10-31 RX ADMIN — FOLIC ACID: 5 INJECTION, SOLUTION INTRAMUSCULAR; INTRAVENOUS; SUBCUTANEOUS at 12:09

## 2022-10-31 NOTE — PROGRESS NOTES
[facility]    Background: Transitional Care Management program identified per system criteria and reviewed by [facility] Resource [place] team for possible outreach.    Assessment:  Upon chart review, patient is in communication with a clinic team member, nurse Elissa Mack with [facility] Care Coordination or provider within [facility] for reason of discussing hospital follow up plan of care and answering questions patient may have related to discharge instructions. CCR[facility] Team member will update episode status of Transitional Care Management program to enrolled per system workflows.     Plan: [facility] will make no additional outreach attempts to minimize duplicative efforts and reduce potential confusion for patient.      Valencia Borrero MA  [facility] [place], [facility]    *Connected Care Resource Team does NOT follow patient ongoing. Referrals are identified based on internal discharge reports and the outreach is to ensure patient has an understanding of their discharge instructions.

## 2022-10-31 NOTE — TELEPHONE ENCOUNTER
Patient is getting fluids in today.   Encouraged her to try Protein water.  States she felt like yogurt got stuck yesterday and left her with a feeling like her throat was coated and sticky. Less nausea. Notified her that Dr. Aragon would like patient to have fluid infusions 2 days apart for the next week.  Patient is on Lovenox BID.  States she was outside this morning and stated it felt good to breath in the cold air.  Was able to walk to mailbox without difficulty and overall feels better today.  Reminded her of her follow up appointment tomorrow with Rosanna.  Stressed the importance of coming in to [facility] for appointment.    [facility] provided an appointment for 11:30 AM today to work patient in.  Patient states her  has a meeting today from  so she is not sure if she can get to the infusion appointment.  Patient encouraged to make it to infusion appointment.

## 2022-10-31 NOTE — PROGRESS NOTES
Infusion Nursing Note:  Leah Yanez presents today for IVF.    Patient seen by provider today: No   present during visit today: Not Applicable.    Note: discharged from hospital yesterday.  Today esophagus very painful, difficult to swallow, occ nausea    Intravenous Access:  Peripheral IV placed.    Treatment Conditions:  Not Applicable.    Post Infusion Assessment:  Patient tolerated infusion without incident.  Blood return noted pre and post infusion.  Site patent and intact, free from redness, edema or discomfort.  No evidence of extravasations.  Access discontinued per protocol.     Discharge Plan:   Discharge instructions reviewed with: Patient.  Patient and/or family verbalized understanding of discharge instructions and all questions answered.  Patient discharged in stable condition accompanied by: self.  Departure Mode: Ambulatory.      Jamila Olivas RN

## 2022-11-01 ENCOUNTER — HOSPITAL ENCOUNTER (INPATIENT)
Facility: CLINIC | Age: 42
LOS: 15 days | Discharge: HOME | DRG: 907 | End: 2022-11-16
Attending: EMERGENCY MEDICINE | Admitting: SURGERY
Payer: COMMERCIAL

## 2022-11-01 ENCOUNTER — APPOINTMENT (OUTPATIENT)
Dept: CT IMAGING | Facility: CLINIC | Age: 42
DRG: 907 | End: 2022-11-01
Attending: EMERGENCY MEDICINE
Payer: COMMERCIAL

## 2022-11-01 ENCOUNTER — OFFICE VISIT (OUTPATIENT)
Dept: ENDOCRINOLOGY | Facility: CLINIC | Age: 42
End: 2022-11-01
Payer: COMMERCIAL

## 2022-11-01 ENCOUNTER — APPOINTMENT (OUTPATIENT)
Dept: GENERAL RADIOLOGY | Facility: CLINIC | Age: 42
DRG: 907 | End: 2022-11-01
Attending: EMERGENCY MEDICINE
Payer: COMMERCIAL

## 2022-11-01 VITALS
HEART RATE: 75 BPM | HEIGHT: 72 IN | TEMPERATURE: 98.7 F | WEIGHT: 277.5 LBS | SYSTOLIC BLOOD PRESSURE: 136 MMHG | OXYGEN SATURATION: 96 % | BODY MASS INDEX: 37.59 KG/M2 | RESPIRATION RATE: 40 BRPM | DIASTOLIC BLOOD PRESSURE: 85 MMHG

## 2022-11-01 DIAGNOSIS — Z86.711 HISTORY OF PULMONARY EMBOLISM: ICD-10-CM

## 2022-11-01 DIAGNOSIS — R13.19 ESOPHAGEAL DYSPHAGIA: ICD-10-CM

## 2022-11-01 DIAGNOSIS — J18.9 PNEUMONIA OF BOTH LOWER LOBES DUE TO INFECTIOUS ORGANISM: ICD-10-CM

## 2022-11-01 DIAGNOSIS — E66.01 MORBID OBESITY (H): ICD-10-CM

## 2022-11-01 DIAGNOSIS — K44.9 HIATAL HERNIA: Primary | ICD-10-CM

## 2022-11-01 DIAGNOSIS — Z98.84 S/P LAPAROSCOPIC SLEEVE GASTRECTOMY: Primary | ICD-10-CM

## 2022-11-01 DIAGNOSIS — K91.870 POSTOPERATIVE HEMATOMA INVOLVING DIGESTIVE SYSTEM FOLLOWING DIGESTIVE SYSTEM PROCEDURE: ICD-10-CM

## 2022-11-01 DIAGNOSIS — Z11.52 ENCOUNTER FOR SCREENING LABORATORY TESTING FOR SEVERE ACUTE RESPIRATORY SYNDROME CORONAVIRUS 2 (SARS-COV-2): ICD-10-CM

## 2022-11-01 DIAGNOSIS — Z98.84 S/P LAPAROSCOPIC SLEEVE GASTRECTOMY: ICD-10-CM

## 2022-11-01 DIAGNOSIS — R09.02 HYPOXEMIA: ICD-10-CM

## 2022-11-01 DIAGNOSIS — R07.1 CHEST PAIN ON BREATHING: ICD-10-CM

## 2022-11-01 DIAGNOSIS — R09.02 HYPOXIA: ICD-10-CM

## 2022-11-01 DIAGNOSIS — R06.09 DYSPNEA ON EXERTION: ICD-10-CM

## 2022-11-01 DIAGNOSIS — E46 MALNUTRITION (H): ICD-10-CM

## 2022-11-01 DIAGNOSIS — J18.8 OTHER PNEUMONIA, UNSPECIFIED ORGANISM: ICD-10-CM

## 2022-11-01 LAB
ALBUMIN SERPL BCG-MCNC: 3.5 G/DL (ref 3.5–5.2)
ALP SERPL-CCNC: 131 U/L (ref 35–104)
ALT SERPL W P-5'-P-CCNC: 32 U/L (ref 10–35)
ANION GAP SERPL CALCULATED.3IONS-SCNC: 17 MMOL/L (ref 7–15)
AST SERPL W P-5'-P-CCNC: 41 U/L (ref 10–35)
BASOPHILS # BLD AUTO: 0 10E3/UL (ref 0–0.2)
BASOPHILS NFR BLD AUTO: 0 %
BILIRUB SERPL-MCNC: 1.5 MG/DL
BUN SERPL-MCNC: 7.6 MG/DL (ref 6–20)
CALCIUM SERPL-MCNC: 9 MG/DL (ref 8.6–10)
CHLORIDE SERPL-SCNC: 102 MMOL/L (ref 98–107)
CREAT SERPL-MCNC: 0.86 MG/DL (ref 0.51–0.95)
DEPRECATED HCO3 PLAS-SCNC: 17 MMOL/L (ref 22–29)
EOSINOPHIL # BLD AUTO: 0 10E3/UL (ref 0–0.7)
EOSINOPHIL NFR BLD AUTO: 0 %
ERYTHROCYTE [DISTWIDTH] IN BLOOD BY AUTOMATED COUNT: 17.1 % (ref 10–15)
FLUAV RNA SPEC QL NAA+PROBE: NEGATIVE
FLUBV RNA RESP QL NAA+PROBE: NEGATIVE
GFR SERPL CREATININE-BSD FRML MDRD: 86 ML/MIN/1.73M2
GLUCOSE SERPL-MCNC: 73 MG/DL (ref 70–99)
HCT VFR BLD AUTO: 29.1 % (ref 35–47)
HGB BLD-MCNC: 9.1 G/DL (ref 11.7–15.7)
HOLD SPECIMEN: NORMAL
HOLD SPECIMEN: NORMAL
IMM GRANULOCYTES # BLD: 0.2 10E3/UL
IMM GRANULOCYTES NFR BLD: 2 %
L PNEUMO1 AG UR QL IA: NEGATIVE
LACTATE SERPL-SCNC: 1.3 MMOL/L (ref 0.7–2)
LIPASE SERPL-CCNC: 68 U/L (ref 13–60)
LYMPHOCYTES # BLD AUTO: 1 10E3/UL (ref 0.8–5.3)
LYMPHOCYTES NFR BLD AUTO: 8 %
MCH RBC QN AUTO: 26.5 PG (ref 26.5–33)
MCHC RBC AUTO-ENTMCNC: 31.3 G/DL (ref 31.5–36.5)
MCV RBC AUTO: 85 FL (ref 78–100)
MONOCYTES # BLD AUTO: 0.8 10E3/UL (ref 0–1.3)
MONOCYTES NFR BLD AUTO: 6 %
NEUTROPHILS # BLD AUTO: 10.9 10E3/UL (ref 1.6–8.3)
NEUTROPHILS NFR BLD AUTO: 84 %
NRBC # BLD AUTO: 0 10E3/UL
NRBC BLD AUTO-RTO: 0 /100
PLATELET # BLD AUTO: 327 10E3/UL (ref 150–450)
POTASSIUM SERPL-SCNC: 3.3 MMOL/L (ref 3.4–5.3)
PROT SERPL-MCNC: 6.6 G/DL (ref 6.4–8.3)
RBC # BLD AUTO: 3.44 10E6/UL (ref 3.8–5.2)
RSV RNA SPEC NAA+PROBE: NEGATIVE
S PNEUM AG SPEC QL: NEGATIVE
SARS-COV-2 RNA RESP QL NAA+PROBE: NEGATIVE
SODIUM SERPL-SCNC: 136 MMOL/L (ref 136–145)
WBC # BLD AUTO: 13.1 10E3/UL (ref 4–11)

## 2022-11-01 PROCEDURE — 83605 ASSAY OF LACTIC ACID: CPT | Performed by: EMERGENCY MEDICINE

## 2022-11-01 PROCEDURE — 250N000011 HC RX IP 250 OP 636: Performed by: STUDENT IN AN ORGANIZED HEALTH CARE EDUCATION/TRAINING PROGRAM

## 2022-11-01 PROCEDURE — 120N000002 HC R&B MED SURG/OB UMMC

## 2022-11-01 PROCEDURE — 96375 TX/PRO/DX INJ NEW DRUG ADDON: CPT | Performed by: EMERGENCY MEDICINE

## 2022-11-01 PROCEDURE — 82310 ASSAY OF CALCIUM: CPT | Performed by: EMERGENCY MEDICINE

## 2022-11-01 PROCEDURE — 87641 MR-STAPH DNA AMP PROBE: CPT | Performed by: PHYSICIAN ASSISTANT

## 2022-11-01 PROCEDURE — 84145 PROCALCITONIN (PCT): CPT | Performed by: PHYSICIAN ASSISTANT

## 2022-11-01 PROCEDURE — 36415 COLL VENOUS BLD VENIPUNCTURE: CPT | Performed by: EMERGENCY MEDICINE

## 2022-11-01 PROCEDURE — G0378 HOSPITAL OBSERVATION PER HR: HCPCS

## 2022-11-01 PROCEDURE — 250N000011 HC RX IP 250 OP 636: Performed by: EMERGENCY MEDICINE

## 2022-11-01 PROCEDURE — 99024 POSTOP FOLLOW-UP VISIT: CPT | Performed by: NURSE PRACTITIONER

## 2022-11-01 PROCEDURE — 71275 CT ANGIOGRAPHY CHEST: CPT | Mod: 26 | Performed by: RADIOLOGY

## 2022-11-01 PROCEDURE — 96365 THER/PROPH/DIAG IV INF INIT: CPT | Mod: 59 | Performed by: EMERGENCY MEDICINE

## 2022-11-01 PROCEDURE — C9803 HOPD COVID-19 SPEC COLLECT: HCPCS | Performed by: EMERGENCY MEDICINE

## 2022-11-01 PROCEDURE — 87899 AGENT NOS ASSAY W/OPTIC: CPT | Performed by: PHYSICIAN ASSISTANT

## 2022-11-01 PROCEDURE — 87637 SARSCOV2&INF A&B&RSV AMP PRB: CPT | Performed by: PHYSICIAN ASSISTANT

## 2022-11-01 PROCEDURE — 36415 COLL VENOUS BLD VENIPUNCTURE: CPT | Performed by: PHYSICIAN ASSISTANT

## 2022-11-01 PROCEDURE — 96372 THER/PROPH/DIAG INJ SC/IM: CPT | Performed by: SURGERY

## 2022-11-01 PROCEDURE — 250N000011 HC RX IP 250 OP 636: Performed by: SURGERY

## 2022-11-01 PROCEDURE — 85025 COMPLETE CBC W/AUTO DIFF WBC: CPT | Performed by: EMERGENCY MEDICINE

## 2022-11-01 PROCEDURE — 87040 BLOOD CULTURE FOR BACTERIA: CPT | Performed by: PHYSICIAN ASSISTANT

## 2022-11-01 PROCEDURE — 86140 C-REACTIVE PROTEIN: CPT | Performed by: PHYSICIAN ASSISTANT

## 2022-11-01 PROCEDURE — 84484 ASSAY OF TROPONIN QUANT: CPT | Performed by: PHYSICIAN ASSISTANT

## 2022-11-01 PROCEDURE — 96366 THER/PROPH/DIAG IV INF ADDON: CPT

## 2022-11-01 PROCEDURE — 71046 X-RAY EXAM CHEST 2 VIEWS: CPT | Mod: 26 | Performed by: RADIOLOGY

## 2022-11-01 PROCEDURE — 99207 PR APP CREDIT; MD BILLING SHARED VISIT: CPT | Performed by: PHYSICIAN ASSISTANT

## 2022-11-01 PROCEDURE — 258N000003 HC RX IP 258 OP 636: Performed by: EMERGENCY MEDICINE

## 2022-11-01 PROCEDURE — 99222 1ST HOSP IP/OBS MODERATE 55: CPT | Performed by: STUDENT IN AN ORGANIZED HEALTH CARE EDUCATION/TRAINING PROGRAM

## 2022-11-01 PROCEDURE — 99285 EMERGENCY DEPT VISIT HI MDM: CPT | Mod: CS,25 | Performed by: EMERGENCY MEDICINE

## 2022-11-01 PROCEDURE — 83690 ASSAY OF LIPASE: CPT | Performed by: EMERGENCY MEDICINE

## 2022-11-01 PROCEDURE — 250N000013 HC RX MED GY IP 250 OP 250 PS 637: Performed by: SURGERY

## 2022-11-01 PROCEDURE — 71046 X-RAY EXAM CHEST 2 VIEWS: CPT

## 2022-11-01 PROCEDURE — 96361 HYDRATE IV INFUSION ADD-ON: CPT | Performed by: EMERGENCY MEDICINE

## 2022-11-01 PROCEDURE — 71275 CT ANGIOGRAPHY CHEST: CPT

## 2022-11-01 PROCEDURE — 99285 EMERGENCY DEPT VISIT HI MDM: CPT | Mod: CS | Performed by: EMERGENCY MEDICINE

## 2022-11-01 RX ORDER — PANTOPRAZOLE SODIUM 40 MG/1
40 TABLET, DELAYED RELEASE ORAL
Status: DISCONTINUED | OUTPATIENT
Start: 2022-11-02 | End: 2022-11-01

## 2022-11-01 RX ORDER — HYDROMORPHONE HYDROCHLORIDE 1 MG/ML
0.5 INJECTION, SOLUTION INTRAMUSCULAR; INTRAVENOUS; SUBCUTANEOUS
Status: COMPLETED | OUTPATIENT
Start: 2022-11-01 | End: 2022-11-01

## 2022-11-01 RX ORDER — ENOXAPARIN SODIUM 100 MG/ML
40 INJECTION SUBCUTANEOUS 2 TIMES DAILY
Status: DISCONTINUED | OUTPATIENT
Start: 2022-11-01 | End: 2022-11-16 | Stop reason: HOSPADM

## 2022-11-01 RX ORDER — HYDROXYZINE HCL 10 MG/5 ML
25 SOLUTION, ORAL ORAL EVERY 6 HOURS PRN
Status: DISCONTINUED | OUTPATIENT
Start: 2022-11-01 | End: 2022-11-03

## 2022-11-01 RX ORDER — ACETAMINOPHEN 325 MG/1
650 TABLET ORAL EVERY 4 HOURS PRN
Status: DISCONTINUED | OUTPATIENT
Start: 2022-11-01 | End: 2022-11-02

## 2022-11-01 RX ORDER — AMOXICILLIN 250 MG
2 CAPSULE ORAL DAILY PRN
Status: DISCONTINUED | OUTPATIENT
Start: 2022-11-01 | End: 2022-11-16 | Stop reason: HOSPADM

## 2022-11-01 RX ORDER — SODIUM CHLORIDE, SODIUM LACTATE, POTASSIUM CHLORIDE, CALCIUM CHLORIDE 600; 310; 30; 20 MG/100ML; MG/100ML; MG/100ML; MG/100ML
125 INJECTION, SOLUTION INTRAVENOUS CONTINUOUS
Status: DISCONTINUED | OUTPATIENT
Start: 2022-11-01 | End: 2022-11-03

## 2022-11-01 RX ORDER — ONDANSETRON 2 MG/ML
4 INJECTION INTRAMUSCULAR; INTRAVENOUS EVERY 6 HOURS PRN
Status: DISCONTINUED | OUTPATIENT
Start: 2022-11-01 | End: 2022-11-16 | Stop reason: HOSPADM

## 2022-11-01 RX ORDER — ENOXAPARIN SODIUM 100 MG/ML
40 INJECTION SUBCUTANEOUS EVERY 12 HOURS
Status: DISCONTINUED | OUTPATIENT
Start: 2022-11-02 | End: 2022-11-01

## 2022-11-01 RX ORDER — BUPROPION HYDROCHLORIDE 150 MG/1
150 TABLET ORAL EVERY MORNING
COMMUNITY
End: 2023-03-10

## 2022-11-01 RX ORDER — KETOROLAC TROMETHAMINE 15 MG/ML
10 INJECTION, SOLUTION INTRAMUSCULAR; INTRAVENOUS ONCE
Status: COMPLETED | OUTPATIENT
Start: 2022-11-01 | End: 2022-11-01

## 2022-11-01 RX ORDER — PIPERACILLIN SODIUM, TAZOBACTAM SODIUM 4; .5 G/20ML; G/20ML
4.5 INJECTION, POWDER, LYOPHILIZED, FOR SOLUTION INTRAVENOUS ONCE
Status: COMPLETED | OUTPATIENT
Start: 2022-11-01 | End: 2022-11-01

## 2022-11-01 RX ORDER — BUPROPION HYDROCHLORIDE 150 MG/1
150 TABLET ORAL EVERY MORNING
Status: DISCONTINUED | OUTPATIENT
Start: 2022-11-02 | End: 2022-11-02

## 2022-11-01 RX ORDER — LIDOCAINE 40 MG/G
CREAM TOPICAL
Status: DISCONTINUED | OUTPATIENT
Start: 2022-11-01 | End: 2022-11-06

## 2022-11-01 RX ORDER — ONDANSETRON 4 MG/1
4 TABLET, ORALLY DISINTEGRATING ORAL EVERY 6 HOURS PRN
Status: DISCONTINUED | OUTPATIENT
Start: 2022-11-01 | End: 2022-11-16 | Stop reason: HOSPADM

## 2022-11-01 RX ORDER — PANTOPRAZOLE SODIUM 40 MG/1
40 TABLET, DELAYED RELEASE ORAL
Status: DISCONTINUED | OUTPATIENT
Start: 2022-11-01 | End: 2022-11-04

## 2022-11-01 RX ORDER — OXYCODONE HYDROCHLORIDE 5 MG/1
5 TABLET ORAL EVERY 6 HOURS PRN
Status: DISCONTINUED | OUTPATIENT
Start: 2022-11-01 | End: 2022-11-03

## 2022-11-01 RX ORDER — IOPAMIDOL 755 MG/ML
85 INJECTION, SOLUTION INTRAVASCULAR ONCE
Status: COMPLETED | OUTPATIENT
Start: 2022-11-01 | End: 2022-11-01

## 2022-11-01 RX ORDER — PIPERACILLIN SODIUM, TAZOBACTAM SODIUM 4; .5 G/20ML; G/20ML
4.5 INJECTION, POWDER, LYOPHILIZED, FOR SOLUTION INTRAVENOUS EVERY 6 HOURS
Status: DISCONTINUED | OUTPATIENT
Start: 2022-11-02 | End: 2022-11-02

## 2022-11-01 RX ADMIN — SODIUM CHLORIDE, POTASSIUM CHLORIDE, SODIUM LACTATE AND CALCIUM CHLORIDE 1000 ML: 600; 310; 30; 20 INJECTION, SOLUTION INTRAVENOUS at 17:36

## 2022-11-01 RX ADMIN — PANTOPRAZOLE SODIUM 40 MG: 40 TABLET, DELAYED RELEASE ORAL at 20:54

## 2022-11-01 RX ADMIN — HYDROMORPHONE HYDROCHLORIDE 0.5 MG: 1 INJECTION, SOLUTION INTRAMUSCULAR; INTRAVENOUS; SUBCUTANEOUS at 17:35

## 2022-11-01 RX ADMIN — KETOROLAC TROMETHAMINE 10 MG: 15 INJECTION, SOLUTION INTRAMUSCULAR; INTRAVENOUS at 19:36

## 2022-11-01 RX ADMIN — PIPERACILLIN SODIUM AND TAZOBACTAM SODIUM 4.5 G: 4; .5 INJECTION, POWDER, LYOPHILIZED, FOR SOLUTION INTRAVENOUS at 19:42

## 2022-11-01 RX ADMIN — SODIUM CHLORIDE, POTASSIUM CHLORIDE, SODIUM LACTATE AND CALCIUM CHLORIDE 125 ML/HR: 600; 310; 30; 20 INJECTION, SOLUTION INTRAVENOUS at 20:58

## 2022-11-01 RX ADMIN — IOPAMIDOL 85 ML: 755 INJECTION, SOLUTION INTRAVENOUS at 18:01

## 2022-11-01 RX ADMIN — ENOXAPARIN SODIUM 40 MG: 40 INJECTION SUBCUTANEOUS at 21:44

## 2022-11-01 ASSESSMENT — ENCOUNTER SYMPTOMS
FATIGUE: 1
BACK PAIN: 1
COUGH: 1
SHORTNESS OF BREATH: 1
APPETITE CHANGE: 1
GASTROINTESTINAL NEGATIVE: 1

## 2022-11-01 ASSESSMENT — PAIN SCALES - GENERAL: PAINLEVEL: SEVERE PAIN (7)

## 2022-11-01 ASSESSMENT — ACTIVITIES OF DAILY LIVING (ADL)
ADLS_ACUITY_SCORE: 35

## 2022-11-01 NOTE — ED TRIAGE NOTES
Presents from clinic with referral, was at 1 week post op appointment, with SOB and increased left sided CP/abdominal pain.     Hx: PE, on lovenox.      Triage Assessment     Row Name 11/01/22 0852       Triage Assessment (Adult)    Airway WDL WDL       Respiratory WDL    Respiratory WDL WDL       Skin Circulation/Temperature WDL    Skin Circulation/Temperature WDL WDL       Cardiac WDL    Cardiac WDL WDL       Peripheral/Neurovascular WDL    Peripheral Neurovascular WDL WDL       Cognitive/Neuro/Behavioral WDL    Cognitive/Neuro/Behavioral WDL WDL

## 2022-11-01 NOTE — ED PROVIDER NOTES
[place] EMERGENCY DEPARTMENT ([facility])  November 1, 2022     History     Chief Complaint   Patient presents with     Shortness of Breath     Post-op Problem     HPI  Leah Yanez is a 42 year old female with a past medical history including s/p sleeve gastrectomy and hiatal hernia repair (10/25/2022), Raynaud's syndrome PE who was referred to the Emergency Department by [facility] after she presented there earlier today. She was directed to the ED for shortness of breath and left pleuritic chest pain as well as pain to the top of the left shoulder.  Symptoms have been going on for 2 days.  She has no leg or calf symptoms she has had a prior PE she is on Lovenox but it was held for 2days.  Room air saturations are about 88% but corrected with 2 L nasal cannula      Per chart review, the patient was admitted to [facility] 10/25 - 10/30 for a scheduled sleeve gastrectomy. She underwent sleeve gastrectomy on 10/25, which she tolerated well without immediate complications. She was transferred to the PACU and then floor for routine postoperative care. The remainder of her postoperative course was unremarkable. She had hemoglobin drop that necessitated transfusion. She felt better and was tolerating full liquids diet, and ambulating on day of discharge. She was discharged with 4 weeks of anticoagulation and close follow up to address potential needs for outpatient fluid resuscitation.       Past Medical History  Past Medical History:   Diagnosis Date     Anti-cardiolipin antibody positive      Griggs esophagus      Concussion 2016     Depressive disorder, not elsewhere classified 03/01/2006    Resolved 8/07     Gastroesophageal reflux disease with esophagitis      Hiatal hernia      History of pulmonary embolism      Obesity      Raynaud's syndrome     past history of admission for gangrene of one finger     Past Surgical History:   Procedure Laterality Date     ESOPHAGOSCOPY, GASTROSCOPY,  "DUODENOSCOPY (EGD), COMBINED N/A 12/29/2021    Procedure: ESOPHAGOGASTRODUODENOSCOPY, WITH BIOPSY;  Surgeon: Esteban Rose DO;  Location: [facility] OR     LAPAROSCOPIC GASTRIC SLEEVE N/A 10/25/2022    Procedure: GASTRECTOMY, SLEEVE, LAPAROSCOPIC;  Surgeon: Daniel Aragon MD;  Location: [facility] OR     LAPAROSCOPIC HERNIORRHAPHY HIATAL N/A 10/25/2022    Procedure: HERNIORRHAPHY, HIATAL, LAPAROSCOPIC;  Surgeon: Daniel Aragon MD;  Location: [facility] OR     TONSILLECTOMY & ADENOIDECTOMY       ZZC NONSPECIFIC PROCEDURE      T&A as a child     ZZC NONSPECIFIC PROCEDURE      Tubes in ears bilaterally     acetaminophen (TYLENOL) 325 MG tablet  buPROPion (WELLBUTRIN) 75 MG tablet  enoxaparin ANTICOAGULANT (LOVENOX) 40 MG/0.4ML syringe  enoxaparin ANTICOAGULANT (LOVENOX) 40 MG/0.4ML syringe  fish oil-omega-3 fatty acids 1000 MG capsule  hydrOXYzine (ATARAX) 10 MG/5ML syrup  hyoscyamine (LEVSIN) 0.125 MG tablet  omeprazole (PRILOSEC) 40 MG DR capsule  ondansetron (ZOFRAN ODT) 4 MG ODT tab  oxyCODONE (ROXICODONE) 5 MG tablet  senna-docusate (SENOKOT-S/PERICOLACE) 8.6-50 MG tablet      Allergies   Allergen Reactions     Azithromycin      Erythromycin GI Disturbance     When \"very young\"     Family History  Family History   Problem Relation Age of Onset     Hypertension Mother         arthritis     Heart Disease Father         MI, Lipids     Acute Myocardial Infarction Father      Cancer - colorectal Maternal Grandmother         arthritis     Hypertension Maternal Grandfather         arthritis, macular degeneration     Colon Cancer Paternal Grandmother      Alzheimer Disease Paternal Grandfather      Anesthesia Reaction No family hx of      Clotting Disorder No family hx of      Social History   Social History     Tobacco Use     Smoking status: Never     Smokeless tobacco: Never   Substance Use Topics     Alcohol use: Yes     Comment: Alcoholic Drinks/day: social     Drug use: No      Past medical history, past surgical history, " medications, allergies, family history, and social history were reviewed with the patient. No additional pertinent items.       Review of Systems   Constitutional: Positive for appetite change and fatigue.   HENT: Positive for congestion.    Respiratory: Positive for cough and shortness of breath.    Cardiovascular: Positive for chest pain. Negative for leg swelling.   Gastrointestinal: Negative.    Genitourinary: Negative.    Musculoskeletal: Positive for back pain.   All other systems reviewed and are negative.    A complete review of systems was performed with pertinent positives and negatives noted in the HPI, and all other systems negative.    Physical Exam   BP: (!) 152/69  Pulse: 82  Temp: 98.4  F (36.9  C)  Resp: 26  Height: 182.9 cm (6')  Weight: 125.9 kg (277 lb 8 oz)  SpO2: 96 %  Physical Exam  Vitals and nursing note reviewed.   Constitutional:       General: She is in acute distress.      Appearance: She is well-developed.   HENT:      Head: Normocephalic and atraumatic.      Mouth/Throat:      Mouth: Mucous membranes are dry.   Eyes:      General: No scleral icterus.     Conjunctiva/sclera: Conjunctivae normal.      Pupils: Pupils are equal, round, and reactive to light.   Cardiovascular:      Rate and Rhythm: Normal rate and regular rhythm.      Heart sounds: Normal heart sounds.   Pulmonary:      Effort: Pulmonary effort is normal. Tachypnea present. No respiratory distress.      Breath sounds: Normal breath sounds. No wheezing.   Abdominal:      General: Abdomen is flat.      Palpations: Abdomen is soft.   Musculoskeletal:      Cervical back: Neck supple.   Skin:     General: Skin is warm and dry.   Neurological:      General: No focal deficit present.      Mental Status: She is alert and oriented to person, place, and time.      Cranial Nerves: No cranial nerve deficit.   Psychiatric:         Mood and Affect: Mood normal.         Behavior: Behavior normal.         ED Course      Procedures        Medications   lactated ringers BOLUS 1,000 mL (1,000 mLs Intravenous New Bag 11/1/22 1736)     Followed by   lactated ringers infusion (has no administration in time range)   piperacillin-tazobactam (ZOSYN) 4.5 g vial to attach to  mL bag (4.5 g Intravenous New Bag 11/1/22 1942)   pharmacy alert - intermittent dosing (has no administration in time range)   HYDROmorphone (PF) (DILAUDID) injection 0.5 mg (0.5 mg Intravenous Given 11/1/22 1735)   iopamidol (ISOVUE-370) solution 85 mL (85 mLs Intravenous Given 11/1/22 1801)   sodium chloride (PF) 0.9% PF flush 100 mL (100 mLs Intravenous Given 11/1/22 1801)   ketorolac (TORADOL) injection 10 mg (10 mg Intravenous Given 11/1/22 1936)     CT Chest Pulmonary Embolism w Contrast   Preliminary Result   RESIDENT PRELIMINARY INTERPRETATION   Impression:   1. No pulmonary embolism identified.   2. Consolidative changes in the bilateral lower lobes with trace left   pleural effusion. Findings can represent atelectasis versus infection.   Given the patulous appearance of the esophagus with air-fluid level,   aspiration cannot be ruled out.      In the event of a positive result for acute pulmonary embolism   resulting in right heart strain, consider calling the    [facility] patient placement ([phone]) for PERT (Pulmonary Embolism   Response Team) Activation?      PERT -- Pulmonary Embolism Response Team (Multidisciplinary team   including cardiology, interventional radiology, critical care,   hematology)      XR Chest 2 Views   Final Result   IMPRESSION:    Similar to slightly increased perihilar and bibasilar streaky   opacities which may represent mild pulmonary edema, infection and/or   atelectasis.      I have personally reviewed the examination and initial interpretation   and I agree with the findings.      RAHEEM HCUNG MD            SYSTEM ID:  [id number]                 Results for orders placed or performed during the hospital encounter of 11/01/22   XR Chest  2 Views     Status: None    Narrative    EXAM: XR CHEST 2 VIEWS  11/1/2022 5:01 PM     HISTORY:  SOA       COMPARISON:  Chest radiograph 10/26/2022 and CT of chest abdomen and  pelvis 4/11/2022    FINDINGS:   PA and lateral views of the chest. Midline trachea. Cardiomediastinal  silhouette is stable. No pneumothorax or pleural effusion. Similar to  slightly increased perihilar and bibasilar streaky opacities.  Visualized upper abdomen is unremarkable. No acute osseous  abnormality.      Impression    IMPRESSION:   Similar to slightly increased perihilar and bibasilar streaky  opacities which may represent mild pulmonary edema, infection and/or  atelectasis.    I have personally reviewed the examination and initial interpretation  and I agree with the findings.    RAHEEM CHUNG MD         SYSTEM ID:  [id number]   CT Chest Pulmonary Embolism w Contrast     Status: None (Preliminary result)    Impression    RESIDENT PRELIMINARY INTERPRETATION  Impression:  1. No pulmonary embolism identified.  2. Consolidative changes in the bilateral lower lobes with trace left  pleural effusion. Findings can represent atelectasis versus infection.  Given the patulous appearance of the esophagus with air-fluid level,  aspiration cannot be ruled out.    In the event of a positive result for acute pulmonary embolism  resulting in right heart strain, consider calling the   [facility] patient placement ([phone]) for PERT (Pulmonary Embolism  Response Team) Activation?    PERT -- Pulmonary Embolism Response Team (Multidisciplinary team  including cardiology, interventional radiology, critical care,  hematology)   Comprehensive metabolic panel     Status: Abnormal   Result Value Ref Range    Sodium 136 136 - 145 mmol/L    Potassium 3.3 (L) 3.4 - 5.3 mmol/L    Chloride 102 98 - 107 mmol/L    Carbon Dioxide (CO2) 17 (L) 22 - 29 mmol/L    Anion Gap 17 (H) 7 - 15 mmol/L    Urea Nitrogen 7.6 6.0 - 20.0 mg/dL    Creatinine 0.86 0.51 - 0.95  mg/dL    Calcium 9.0 8.6 - 10.0 mg/dL    Glucose 73 70 - 99 mg/dL    Alkaline Phosphatase 131 (H) 35 - 104 U/L    AST 41 (H) 10 - 35 U/L    ALT 32 10 - 35 U/L    Protein Total 6.6 6.4 - 8.3 g/dL    Albumin 3.5 3.5 - 5.2 g/dL    Bilirubin Total 1.5 (H) <=1.2 mg/dL    GFR Estimate 86 >60 mL/min/1.73m2   Lipase     Status: Abnormal   Result Value Ref Range    Lipase 68 (H) 13 - 60 U/L   Lactic acid whole blood     Status: Normal   Result Value Ref Range    Lactic Acid 1.3 0.7 - 2.0 mmol/L   CBC with platelets and differential     Status: Abnormal   Result Value Ref Range    WBC Count 13.1 (H) 4.0 - 11.0 10e3/uL    RBC Count 3.44 (L) 3.80 - 5.20 10e6/uL    Hemoglobin 9.1 (L) 11.7 - 15.7 g/dL    Hematocrit 29.1 (L) 35.0 - 47.0 %    MCV 85 78 - 100 fL    MCH 26.5 26.5 - 33.0 pg    MCHC 31.3 (L) 31.5 - 36.5 g/dL    RDW 17.1 (H) 10.0 - 15.0 %    Platelet Count 327 150 - 450 10e3/uL    % Neutrophils 84 %    % Lymphocytes 8 %    % Monocytes 6 %    % Eosinophils 0 %    % Basophils 0 %    % Immature Granulocytes 2 %    NRBCs per 100 WBC 0 <1 /100    Absolute Neutrophils 10.9 (H) 1.6 - 8.3 10e3/uL    Absolute Lymphocytes 1.0 0.8 - 5.3 10e3/uL    Absolute Monocytes 0.8 0.0 - 1.3 10e3/uL    Absolute Eosinophils 0.0 0.0 - 0.7 10e3/uL    Absolute Basophils 0.0 0.0 - 0.2 10e3/uL    Absolute Immature Granulocytes 0.2 <=0.4 10e3/uL    Absolute NRBCs 0.0 10e3/uL   Extra Tube     Status: None    Narrative    The following orders were created for panel order Extra Tube.  Procedure                               Abnormality         Status                     ---------                               -----------         ------                     Extra Blue Top Tube[[id number]]                              Final result               Extra Red Top Tube[[id number]]                               Final result                 Please view results for these tests on the individual orders.   Extra Blue Top Tube     Status: None   Result Value Ref Range     Hold Specimen JIC    Extra Red Top Tube     Status: None   Result Value Ref Range    Hold Specimen JIC    CBC with platelets differential     Status: Abnormal    Narrative    The following orders were created for panel order CBC with platelets differential.  Procedure                               Abnormality         Status                     ---------                               -----------         ------                     CBC with platelets and d...[[id number]]  Abnormal            Final result                 Please view results for these tests on the individual orders.     Medications   lactated ringers BOLUS 1,000 mL (1,000 mLs Intravenous New Bag 11/1/22 1736)     Followed by   lactated ringers infusion (has no administration in time range)   piperacillin-tazobactam (ZOSYN) 4.5 g vial to attach to  mL bag (4.5 g Intravenous New Bag 11/1/22 1942)   pharmacy alert - intermittent dosing (has no administration in time range)   HYDROmorphone (PF) (DILAUDID) injection 0.5 mg (0.5 mg Intravenous Given 11/1/22 1735)   iopamidol (ISOVUE-370) solution 85 mL (85 mLs Intravenous Given 11/1/22 1801)   sodium chloride (PF) 0.9% PF flush 100 mL (100 mLs Intravenous Given 11/1/22 1801)   ketorolac (TORADOL) injection 10 mg (10 mg Intravenous Given 11/1/22 1936)        Assessments & Plan (with Medical Decision Making)   42-year-old female postop sleeve gastrectomy and repair of hiatal hernia now with shortness of breath hypoxia and chest pain CT is negative for PE she has bilateral lower lobe infiltrates left greater than right.  She requires admission to the hospital given her vital signs hypoxia and tachypnea of 26 breaths a minute.  She was started on antibiotics per hospital-acquired pneumonia protocol Case discussed with general surgery resident who would like the patient admitted to their service.  The findings and plan were explained to the patient.    I have reviewed the nursing notes. I have reviewed the  findings, diagnosis, plan and need for follow up with the patient.    New Prescriptions    No medications on file       Final diagnoses:   Pneumonia of both lower lobes due to infectious organism   Hypoxia       --  Geovany Caro MD  [facility] EMERGENCY DEPARTMENT  11/1/2022     Geovany Caro MD  11/01/22 1952

## 2022-11-01 NOTE — PROGRESS NOTES
Postoperative bariatric surgery visit.    Patient underwent sleeve gastrectomy and hiatal hernia repair1 week ago. 10/25/2022 Dr. Aragon    Discharged 10/28/2022     IVF today due to struggling with fluid intake over the weekend.     Tolerating liquids: - improving since this AM. Tolerating >30oz today   Lightheadedness: none  Abdominal pain: LUQ pain starting last night, radiating up to left lateral chest, worse with movement and deep breath  Bowel movements: no issues  Fevers/shakes/chills: no  GERD: no   Leg/calf pain: none    Hx of PE, taking lovenox bid since surgery     How many opioid pain medications used after surgery?  What did you do with extra pills?  Were any opioid pain medication refills provided after surgery?  Were any opioid pain medications needed after 30 days postop?    /85 (BP Location: Left arm, Patient Position: Sitting, Cuff Size: Adult Large)   Pulse 75   Temp 98.7  F (37.1  C) (Oral)   Resp (!) 40   Ht 1.829 m (6')   Wt 125.9 kg (277 lb 8 oz)   LMP 09/04/2022   SpO2 96%   BMI 37.64 kg/m       Wt Readings from Last 5 Encounters:   11/01/22 125.9 kg (277 lb 8 oz)   10/25/22 122.6 kg (270 lb 4.5 oz)   10/19/22 123.7 kg (272 lb 12.8 oz)   08/18/22 126.1 kg (278 lb)   08/10/22 127.8 kg (281 lb 11.2 oz)      NAD  Overall looks pale and shallow labored breathing   Incisions c/d/i; non-tender    Final Diagnosis   A.  STOMACH, PARTIAL GASTRECTOMY:  -Gastric mucosa with no significant histologic abnormality  -Negative for intestinal metaplasia and dysplasia  -No H. pylori-like organisms identified on routine stain         Plan:  1. RD visit today.  2. Start vitamin supplements per RD directions.  3. Advance diet per RD directions.  4. Follow-up:11/30/2-22  5. Actigall prescription?  6. B12 SL or injection **  7. Pathology reviewed normal   8. Weight loss medications none   9. Need to restart statin?       Rosanna Phipps, CNP  [facility] [place]     Spoke  to Dr. Aragon about symptoms about patient. Will send to ED by private care, with . Spoke to charge nurse at [facility] in regards to sending patient to ED for evaluation for PE. Rosanna Phipps NP

## 2022-11-01 NOTE — LETTER
11/1/2022       RE: Leah Yanez  [address] [address]     Dear Colleague,    Thank you for referring your patient, Leah Yanez, to the [facility] [place] at [facility]. Please see a copy of my visit note below.    Postoperative bariatric surgery visit.    Patient underwent sleeve gastrectomy and hiatal hernia repair1 week ago. 10/25/2022 Dr. Aragon    Discharged 10/28/2022     IVF today due to struggling with fluid intake over the weekend.     Tolerating liquids: - improving since this AM. Tolerating >30oz today   Lightheadedness: none  Abdominal pain: LUQ pain starting last night, radiating up to left lateral chest, worse with movement and deep breath  Bowel movements: no issues  Fevers/shakes/chills: no  GERD: no   Leg/calf pain: none    Hx of PE, taking lovenox bid since surgery     How many opioid pain medications used after surgery?  What did you do with extra pills?  Were any opioid pain medication refills provided after surgery?  Were any opioid pain medications needed after 30 days postop?    /85 (BP Location: Left arm, Patient Position: Sitting, Cuff Size: Adult Large)   Pulse 75   Temp 98.7  F (37.1  C) (Oral)   Resp (!) 40   Ht 1.829 m (6')   Wt 125.9 kg (277 lb 8 oz)   LMP 09/04/2022   SpO2 96%   BMI 37.64 kg/m       Wt Readings from Last 5 Encounters:   11/01/22 125.9 kg (277 lb 8 oz)   10/25/22 122.6 kg (270 lb 4.5 oz)   10/19/22 123.7 kg (272 lb 12.8 oz)   08/18/22 126.1 kg (278 lb)   08/10/22 127.8 kg (281 lb 11.2 oz)      NAD  Overall looks pale and shallow labored breathing   Incisions c/d/i; non-tender    Final Diagnosis   A.  STOMACH, PARTIAL GASTRECTOMY:  -Gastric mucosa with no significant histologic abnormality  -Negative for intestinal metaplasia and dysplasia  -No H. pylori-like organisms identified on routine stain         Plan:  1. RD visit today.  2. Start vitamin  supplements per RD directions.  3. Advance diet per RD directions.  4. Follow-up:11/30/2-22  5. Actigall prescription?  6. B12 SL or injection **  7. Pathology reviewed normal   8. Weight loss medications none   9. Need to restart statin?       Rosanna Phipps CNP  [facility] [place]     Spoke to Dr. Aragon about symptoms about patient. Will send to ED by private care, with . Spoke to charge nurse at [facility] in regards to sending patient to ED for evaluation for PE. Rosanna Phipps, SETH

## 2022-11-01 NOTE — NURSING NOTE
(   Chief Complaint   Patient presents with     Post-op Visit     Post op, DOS: 10/25/22    )    ( Weight: 125.9 kg (277 lb 8 oz) )  ( Height: 182.9 cm (6') )  ( BMI (Calculated): 37.64 )  (   )  (   )  (   )  (   )  (   )  (   )    ( BP: 136/85 )  (   )  ( Temp: 98.7  F (37.1  C) )  ( Temp src: Oral )  ( Pulse: 75 )  ( Resp: (!) 40 )  ( SpO2: 96 % )    (   Patient Active Problem List   Diagnosis     Food impaction of esophagus, initial encounter     Esophageal dysphagia     Heartburn     History of pulmonary embolism     Anti-cardiolipin antibody positive     Raynaud's phenomenon     Gastroesophageal reflux disease with esophagitis     Hiatal hernia     Griggs's esophagus     Class 2 severe obesity with serious comorbidity and body mass index (BMI) of 38.0 to 38.9 in adult (H)     Morbid obesity (H)     Dehydration    )  (   Current Outpatient Medications   Medication Sig Dispense Refill     acetaminophen (TYLENOL) 325 MG tablet Take 2 tablets (650 mg) by mouth every 4 hours as needed for other (For optimal non-opioid multimodal pain management to improve pain control and physical function.) 30 tablet 0     buPROPion (WELLBUTRIN) 75 MG tablet Take 150 mg by mouth every morning Pt not sure of dose       enoxaparin ANTICOAGULANT (LOVENOX) 40 MG/0.4ML syringe Inject 0.4 mLs (40 mg) Subcutaneous every 12 hours for 28 days 22.4 mL 0     enoxaparin ANTICOAGULANT (LOVENOX) 40 MG/0.4ML syringe Inject 0.4 mLs (40 mg) Subcutaneous every 12 hours for 28 days 22.4 mL 0     fish oil-omega-3 fatty acids 1000 MG capsule Take 2 g by mouth every morning       hydrOXYzine (ATARAX) 10 MG/5ML syrup Take 12.5 mLs (25 mg) by mouth every 6 hours as needed for anxiety (adjuvant pain) 118 mL 0     hyoscyamine (LEVSIN) 0.125 MG tablet Take 1 tablet (125 mcg) by mouth every 4 hours as needed for cramping (Patient taking differently: Take 0.125 mg by mouth every 4 hours as needed for cramping Not started yet) 30 tablet 1     omeprazole  (PRILOSEC) 40 MG DR capsule Take 1 capsule (40 mg) by mouth 2 times daily 180 capsule 3     ondansetron (ZOFRAN ODT) 4 MG ODT tab Take 1 tablet (4 mg) by mouth every 8 hours as needed for nausea (Patient taking differently: Take 4 mg by mouth every 8 hours as needed for nausea Not started yet) 15 tablet 0     oxyCODONE (ROXICODONE) 5 MG tablet Take 1 tablet (5 mg) by mouth every 6 hours as needed for moderate to severe pain 12 tablet 0     senna-docusate (SENOKOT-S/PERICOLACE) 8.6-50 MG tablet Take 2 tablets by mouth daily as needed for constipation (While taking narcotic pain medications.  Stop taking if having loose stools.) (Patient not taking: Reported on 11/1/2022) 30 tablet 1    )  ( Diabetes Eval:    )    ( Pain Eval:  Severe Pain (7) )    ( Wound Eval:       )    (   History   Smoking Status     Never   Smokeless Tobacco     Never    )    ( Signed By:  Kimi Motley, EMT; November 1, 2022; 2:29 PM )

## 2022-11-02 ENCOUNTER — APPOINTMENT (OUTPATIENT)
Dept: GENERAL RADIOLOGY | Facility: CLINIC | Age: 42
DRG: 907 | End: 2022-11-02
Payer: COMMERCIAL

## 2022-11-02 ENCOUNTER — APPOINTMENT (OUTPATIENT)
Dept: ULTRASOUND IMAGING | Facility: CLINIC | Age: 42
DRG: 907 | End: 2022-11-02
Payer: COMMERCIAL

## 2022-11-02 PROBLEM — Z11.52 ENCOUNTER FOR SCREENING LABORATORY TESTING FOR SEVERE ACUTE RESPIRATORY SYNDROME CORONAVIRUS 2 (SARS-COV-2): Status: ACTIVE | Noted: 2022-11-02

## 2022-11-02 PROBLEM — J18.8 OTHER PNEUMONIA, UNSPECIFIED ORGANISM: Status: ACTIVE | Noted: 2022-11-02

## 2022-11-02 PROBLEM — R09.02 HYPOXEMIA: Status: ACTIVE | Noted: 2022-11-02

## 2022-11-02 LAB
ALBUMIN SERPL BCG-MCNC: 3.2 G/DL (ref 3.5–5.2)
ALP SERPL-CCNC: 126 U/L (ref 35–104)
ALT SERPL W P-5'-P-CCNC: 23 U/L (ref 10–35)
ANION GAP SERPL CALCULATED.3IONS-SCNC: 16 MMOL/L (ref 7–15)
AST SERPL W P-5'-P-CCNC: 31 U/L (ref 10–35)
ATRIAL RATE - MUSE: 74 BPM
BILIRUB SERPL-MCNC: 1.3 MG/DL
BUN SERPL-MCNC: 6.7 MG/DL (ref 6–20)
CALCIUM SERPL-MCNC: 8.5 MG/DL (ref 8.6–10)
CHLORIDE SERPL-SCNC: 101 MMOL/L (ref 98–107)
CREAT SERPL-MCNC: 1 MG/DL (ref 0.51–0.95)
CRP SERPL-MCNC: 324 MG/L
DEPRECATED HCO3 PLAS-SCNC: 18 MMOL/L (ref 22–29)
DIASTOLIC BLOOD PRESSURE - MUSE: NORMAL MMHG
ERYTHROCYTE [DISTWIDTH] IN BLOOD BY AUTOMATED COUNT: 17.1 % (ref 10–15)
GFR SERPL CREATININE-BSD FRML MDRD: 72 ML/MIN/1.73M2
GLUCOSE SERPL-MCNC: 78 MG/DL (ref 70–99)
HCT VFR BLD AUTO: 27.8 % (ref 35–47)
HGB BLD-MCNC: 8.5 G/DL (ref 11.7–15.7)
INTERPRETATION ECG - MUSE: NORMAL
MCH RBC QN AUTO: 26.2 PG (ref 26.5–33)
MCHC RBC AUTO-ENTMCNC: 30.6 G/DL (ref 31.5–36.5)
MCV RBC AUTO: 86 FL (ref 78–100)
MRSA DNA SPEC QL NAA+PROBE: NEGATIVE
P AXIS - MUSE: 8 DEGREES
PLATELET # BLD AUTO: 299 10E3/UL (ref 150–450)
POTASSIUM SERPL-SCNC: 3 MMOL/L (ref 3.4–5.3)
POTASSIUM SERPL-SCNC: 4 MMOL/L (ref 3.4–5.3)
PR INTERVAL - MUSE: 140 MS
PROCALCITONIN SERPL IA-MCNC: 0.13 NG/ML
PROT SERPL-MCNC: 6.1 G/DL (ref 6.4–8.3)
QRS DURATION - MUSE: 92 MS
QT - MUSE: 390 MS
QTC - MUSE: 432 MS
R AXIS - MUSE: 40 DEGREES
RADIOLOGIST FLAGS: ABNORMAL
RBC # BLD AUTO: 3.25 10E6/UL (ref 3.8–5.2)
SA TARGET DNA: POSITIVE
SODIUM SERPL-SCNC: 135 MMOL/L (ref 136–145)
SYSTOLIC BLOOD PRESSURE - MUSE: NORMAL MMHG
T AXIS - MUSE: -11 DEGREES
TROPONIN T SERPL HS-MCNC: 7 NG/L
VENTRICULAR RATE- MUSE: 74 BPM
WBC # BLD AUTO: 12.1 10E3/UL (ref 4–11)

## 2022-11-02 PROCEDURE — 250N000011 HC RX IP 250 OP 636: Performed by: SURGERY

## 2022-11-02 PROCEDURE — 250N000011 HC RX IP 250 OP 636

## 2022-11-02 PROCEDURE — 84132 ASSAY OF SERUM POTASSIUM: CPT | Performed by: SURGERY

## 2022-11-02 PROCEDURE — G0378 HOSPITAL OBSERVATION PER HR: HCPCS

## 2022-11-02 PROCEDURE — 96366 THER/PROPH/DIAG IV INF ADDON: CPT

## 2022-11-02 PROCEDURE — 36415 COLL VENOUS BLD VENIPUNCTURE: CPT | Performed by: PHYSICIAN ASSISTANT

## 2022-11-02 PROCEDURE — 96372 THER/PROPH/DIAG INJ SC/IM: CPT | Performed by: SURGERY

## 2022-11-02 PROCEDURE — 250N000013 HC RX MED GY IP 250 OP 250 PS 637: Performed by: SURGERY

## 2022-11-02 PROCEDURE — 250N000013 HC RX MED GY IP 250 OP 250 PS 637

## 2022-11-02 PROCEDURE — 36415 COLL VENOUS BLD VENIPUNCTURE: CPT | Performed by: SURGERY

## 2022-11-02 PROCEDURE — 74240 X-RAY XM UPR GI TRC 1CNTRST: CPT

## 2022-11-02 PROCEDURE — 74240 X-RAY XM UPR GI TRC 1CNTRST: CPT | Mod: 26 | Performed by: RADIOLOGY

## 2022-11-02 PROCEDURE — 258N000003 HC RX IP 258 OP 636: Performed by: EMERGENCY MEDICINE

## 2022-11-02 PROCEDURE — 999N000248 HC STATISTIC IV INSERT WITH US BY RN

## 2022-11-02 PROCEDURE — 250N000013 HC RX MED GY IP 250 OP 250 PS 637: Performed by: STUDENT IN AN ORGANIZED HEALTH CARE EDUCATION/TRAINING PROGRAM

## 2022-11-02 PROCEDURE — 120N000002 HC R&B MED SURG/OB UMMC

## 2022-11-02 PROCEDURE — 76705 ECHO EXAM OF ABDOMEN: CPT | Mod: 26 | Performed by: RADIOLOGY

## 2022-11-02 PROCEDURE — 96376 TX/PRO/DX INJ SAME DRUG ADON: CPT

## 2022-11-02 PROCEDURE — 96375 TX/PRO/DX INJ NEW DRUG ADDON: CPT

## 2022-11-02 PROCEDURE — 76705 ECHO EXAM OF ABDOMEN: CPT

## 2022-11-02 PROCEDURE — 85027 COMPLETE CBC AUTOMATED: CPT | Performed by: PHYSICIAN ASSISTANT

## 2022-11-02 PROCEDURE — 250N000011 HC RX IP 250 OP 636: Performed by: EMERGENCY MEDICINE

## 2022-11-02 PROCEDURE — 250N000011 HC RX IP 250 OP 636: Performed by: STUDENT IN AN ORGANIZED HEALTH CARE EDUCATION/TRAINING PROGRAM

## 2022-11-02 PROCEDURE — 80053 COMPREHEN METABOLIC PANEL: CPT | Performed by: PHYSICIAN ASSISTANT

## 2022-11-02 PROCEDURE — 99232 SBSQ HOSP IP/OBS MODERATE 35: CPT | Performed by: STUDENT IN AN ORGANIZED HEALTH CARE EDUCATION/TRAINING PROGRAM

## 2022-11-02 PROCEDURE — 87040 BLOOD CULTURE FOR BACTERIA: CPT | Performed by: PHYSICIAN ASSISTANT

## 2022-11-02 RX ORDER — LIDOCAINE 4 G/G
1 PATCH TOPICAL
Status: DISCONTINUED | OUTPATIENT
Start: 2022-11-02 | End: 2022-11-08

## 2022-11-02 RX ORDER — FENTANYL CITRATE 50 UG/ML
25 INJECTION, SOLUTION INTRAMUSCULAR; INTRAVENOUS ONCE
Status: COMPLETED | OUTPATIENT
Start: 2022-11-02 | End: 2022-11-02

## 2022-11-02 RX ORDER — POTASSIUM CHLORIDE 7.45 MG/ML
10 INJECTION INTRAVENOUS
Status: COMPLETED | OUTPATIENT
Start: 2022-11-02 | End: 2022-11-02

## 2022-11-02 RX ORDER — ACETAMINOPHEN 325 MG/1
650 TABLET ORAL EVERY 4 HOURS
Qty: 360 TABLET | Refills: 0 | Status: SHIPPED | OUTPATIENT
Start: 2022-11-02 | End: 2022-12-02

## 2022-11-02 RX ORDER — ACETAMINOPHEN 325 MG/1
650 TABLET ORAL EVERY 4 HOURS
Status: DISCONTINUED | OUTPATIENT
Start: 2022-11-02 | End: 2022-11-07

## 2022-11-02 RX ORDER — POTASSIUM CHLORIDE 29.8 MG/ML
20 INJECTION INTRAVENOUS ONCE
Status: DISCONTINUED | OUTPATIENT
Start: 2022-11-02 | End: 2022-11-02

## 2022-11-02 RX ORDER — BUPROPION HYDROCHLORIDE 75 MG/1
75 TABLET ORAL 2 TIMES DAILY
Status: DISCONTINUED | OUTPATIENT
Start: 2022-11-03 | End: 2022-11-16 | Stop reason: HOSPADM

## 2022-11-02 RX ORDER — METHOCARBAMOL 500 MG/1
500 TABLET, FILM COATED ORAL 4 TIMES DAILY PRN
Qty: 15 TABLET | Refills: 0 | Status: ON HOLD | OUTPATIENT
Start: 2022-11-02 | End: 2022-12-15

## 2022-11-02 RX ORDER — AMOXICILLIN 500 MG/1
1000 CAPSULE ORAL 3 TIMES DAILY
Status: DISCONTINUED | OUTPATIENT
Start: 2022-11-02 | End: 2022-11-05

## 2022-11-02 RX ORDER — METHOCARBAMOL 500 MG/1
500 TABLET, FILM COATED ORAL 4 TIMES DAILY
Status: DISCONTINUED | OUTPATIENT
Start: 2022-11-02 | End: 2022-11-07

## 2022-11-02 RX ORDER — AMOXICILLIN 500 MG/1
1000 CAPSULE ORAL 3 TIMES DAILY
Qty: 30 CAPSULE | Refills: 0 | Status: SHIPPED | OUTPATIENT
Start: 2022-11-02 | End: 2022-11-16

## 2022-11-02 RX ADMIN — ACETAMINOPHEN 650 MG: 325 TABLET, FILM COATED ORAL at 20:01

## 2022-11-02 RX ADMIN — OXYCODONE HYDROCHLORIDE 5 MG: 5 TABLET ORAL at 22:55

## 2022-11-02 RX ADMIN — PANTOPRAZOLE SODIUM 40 MG: 40 TABLET, DELAYED RELEASE ORAL at 08:21

## 2022-11-02 RX ADMIN — PIPERACILLIN SODIUM AND TAZOBACTAM SODIUM 4.5 G: 4; .5 INJECTION, POWDER, LYOPHILIZED, FOR SOLUTION INTRAVENOUS at 08:20

## 2022-11-02 RX ADMIN — POTASSIUM CHLORIDE 10 MEQ: 7.46 INJECTION, SOLUTION INTRAVENOUS at 11:47

## 2022-11-02 RX ADMIN — PANTOPRAZOLE SODIUM 40 MG: 40 TABLET, DELAYED RELEASE ORAL at 16:22

## 2022-11-02 RX ADMIN — AMOXICILLIN 1000 MG: 500 CAPSULE ORAL at 11:48

## 2022-11-02 RX ADMIN — LIDOCAINE PATCH 4% 1 PATCH: 40 PATCH TOPICAL at 11:46

## 2022-11-02 RX ADMIN — ACETAMINOPHEN 650 MG: 325 TABLET, FILM COATED ORAL at 08:22

## 2022-11-02 RX ADMIN — PIPERACILLIN SODIUM AND TAZOBACTAM SODIUM 4.5 G: 4; .5 INJECTION, POWDER, LYOPHILIZED, FOR SOLUTION INTRAVENOUS at 01:40

## 2022-11-02 RX ADMIN — ENOXAPARIN SODIUM 40 MG: 40 INJECTION SUBCUTANEOUS at 20:00

## 2022-11-02 RX ADMIN — OXYCODONE HYDROCHLORIDE 5 MG: 5 TABLET ORAL at 01:18

## 2022-11-02 RX ADMIN — AMOXICILLIN 1000 MG: 500 CAPSULE ORAL at 20:00

## 2022-11-02 RX ADMIN — ACETAMINOPHEN 650 MG: 325 TABLET, FILM COATED ORAL at 11:47

## 2022-11-02 RX ADMIN — POTASSIUM CHLORIDE 10 MEQ: 7.46 INJECTION, SOLUTION INTRAVENOUS at 14:12

## 2022-11-02 RX ADMIN — ENOXAPARIN SODIUM 40 MG: 40 INJECTION SUBCUTANEOUS at 08:23

## 2022-11-02 RX ADMIN — OXYCODONE HYDROCHLORIDE 5 MG: 5 TABLET ORAL at 08:26

## 2022-11-02 RX ADMIN — OXYCODONE HYDROCHLORIDE 5 MG: 5 TABLET ORAL at 16:21

## 2022-11-02 RX ADMIN — ACETAMINOPHEN 650 MG: 325 TABLET, FILM COATED ORAL at 16:21

## 2022-11-02 RX ADMIN — POTASSIUM CHLORIDE 10 MEQ: 7.46 INJECTION, SOLUTION INTRAVENOUS at 10:42

## 2022-11-02 RX ADMIN — DIATRIZOATE MEGLUMINE AND DIATRIZOATE SODIUM 100 ML: 660; 100 SOLUTION ORAL; RECTAL at 13:43

## 2022-11-02 RX ADMIN — METHOCARBAMOL 500 MG: 500 TABLET ORAL at 22:55

## 2022-11-02 RX ADMIN — BUPROPION HYDROCHLORIDE 150 MG: 150 TABLET, FILM COATED, EXTENDED RELEASE ORAL at 08:22

## 2022-11-02 RX ADMIN — FENTANYL CITRATE 25 MCG: 50 INJECTION, SOLUTION INTRAMUSCULAR; INTRAVENOUS at 02:33

## 2022-11-02 RX ADMIN — POTASSIUM CHLORIDE 10 MEQ: 7.46 INJECTION, SOLUTION INTRAVENOUS at 08:58

## 2022-11-02 RX ADMIN — SODIUM CHLORIDE, POTASSIUM CHLORIDE, SODIUM LACTATE AND CALCIUM CHLORIDE 125 ML/HR: 600; 310; 30; 20 INJECTION, SOLUTION INTRAVENOUS at 05:48

## 2022-11-02 ASSESSMENT — ACTIVITIES OF DAILY LIVING (ADL)
ADLS_ACUITY_SCORE: 37
ADLS_ACUITY_SCORE: 35
ADLS_ACUITY_SCORE: 37
ADLS_ACUITY_SCORE: 35
ADLS_ACUITY_SCORE: 37
ADLS_ACUITY_SCORE: 35
ADLS_ACUITY_SCORE: 35
ADLS_ACUITY_SCORE: 37

## 2022-11-02 NOTE — CONSULTS
[facility]  Consult Note - Hospitalist Service, GOLD TEAM   Date of Admission:  11/1/2022  Consult Requested by: Bariatric Surgery Team  Reason for Consult: Pneumonia    Assessment & Plan    Leah Yanez is a 42 year old female patient with a past medical history significant for depression, Raynaud's c/b gangrene one digit (resolved), concussion 2016, estrogen provoked pulmonary embolisms while on OCPs, transient weak positive anticardiolipin antibody, large symptomatic hiatal hernia c/b esophagitis and esophageal narrowing requiring dilation, and morbid obesity who is now s/p laparoscopic sleeve gastrectomy and hiatal herniorrhaphy on 10/25/22 (admitted 10/25-10/30) c/b acute blood loss anemia requiring transfusion; sent to [facility] ED from Bariatric surgery outpatient clinic secondary to concerns regarding shortness of breath and pleuritic chest pain.    Recommendations:   - Agree with Zosyn   - MRSA swab (ordered). If positive would add Vancomycin.   - Sputum culture (ordered)   - Urine strep pneumo and legionella (ordered)   - Blood cultures (ordered)   - COVID and Flu swab (ordered)   - Add on CRP and procalc (ordered)   - EKG and troponin (ordered)    S/p laparoscopic sleeve gastrectomy and hiatal herniorrhaphy (10/25/22)  Acute blood loss anemia  Patient with above history who was sent from Bariatric Surgery clinic secondary to concerns for possible post-op PE found to have bilateral pneumonia. Hemoglobin 9.1 which is improved from 7.8 10/29.   - Management per primary surgery team   - Agree with continuing Lovenox BID for prophylaxis   - Agree with continuing PPI    Hypoxia  Leukocytosis  HAP - Bilateral Pneumonia  Pleuritic Chest Pain  WBC 13.1 with left shift. CT PE without evidence of PE but did preliminarily reveal findings concerning for consolidative changes in the bilateral lower lobes with trace left pleural effusion. Requiring 2L O2  currently.   - Check EKG (ordered) and troponin (ordered). Since ongoing pain for 2-3 days we do not need a delta if normal.   - Continue Zosyn started in ED for now.   - Check MRSA swab and if positive add Vancomycin   - Check sputum culture, urine legionella and strep pneumo, COVID/Flu swab.   - Add on CRP and procalc   - Suspect pleurisy related to infection vs radiating pain from surgery, however if no improvement by tomorrow would consider TTE.    Elevated LFTs  LFTs mildly elevated AST 41, Alk Phos 131 and total bili 1.5. Unclear etiology. Abdominal exam benign and afebrile. Discussed with primary team who did not think this would be related to the surgery. Hemolysis seems less likely with improving hemoglobin and normal platelets    - Recheck in am   - Consider RUQ US if not improving   - If total bilirubin rising especially if hemoglobin falling tomorrow, would check LDH, haptoglobin, peripheral smear.    Anion Gap Metabolic Acidosis  At admission CO2 17 and anion gap 17. Patient reporting very poor PO intake.   - Anticipate this will improve with IVF already ordered.   - Repeat BMP in am    Hypokalemia  Potassium 3.3 at admission likely related to poor PO intake.   - Recommend replacement    Depression   - Continue PTA Wellbutrin   - Atarax prn       The patient's care was discussed with the Primary team and attending medicine physician Dr. Rodriguez.    MICHEAL Marquez [facility]  Securely message with the Vocera Web Console (learn more here)  Text page via [facility] Paging/Directory   Please see signed in provider for up to date coverage information    Hospitalist Service, GOLD TEAM     Clinically Significant Risk Factors Present on Admission        # Hypokalemia: Lowest K = 3.3 mmol/L (Ref range: 3.4-5.3) in last 2 days, will replace as needed        # Drug Induced Coagulation Defect: home medication list includes an anticoagulant medication        # Obesity:  Estimated body mass index is 37.64 kg/m  as calculated from the following:    Height as of this encounter: 1.829 m (6').    Weight as of this encounter: 125.9 kg (277 lb 8 oz).           ______________________________________________________________________    Chief Complaint   Shortness of Breath, Pleuritic Chest Pain    History is obtained from the patient and EMR.    History of Present Illness   Leah Yanez is a 42 year old female patient with a past medical history significant for depression, Raynaud's c/b gangrene one digit (resolved), concussion 2016, estrogen provoked pulmonary embolisms while on OCPs, transient weak positive anticardiolipin antibody, large symptomatic hiatal hernia c/b esophagitis and esophageal narrowing requiring dilation, and morbid obesity who is now s/p laparoscopic sleeve gastrectomy and hiatal herniorrhaphy on 10/25/22 (admitted 10/25-10/30) c/b acute blood loss anemia requiring transfusion; sent to [facility] ED from Bariatric surgery outpatient clinic secondary to concerns regarding shortness of breath and pleuritic chest pain.    Patient reporting she discharged from the hospital in Sunday and within 24 hours developed left sided chest pain radiating into the shoulder and LUQ with deep breathing. Seen in bariatric surgery clinic 11/1 and sent to ED for PE rule out. She reports nausea and vomiting at home with poor PO intake since surgery, improved somewhat today but still unable to tolerate any solids. No fevers, chills. Passing gas.     Review of Systems   The 10 point Review of Systems is negative other than noted in the HPI or here.    Past Medical History    I have reviewed this patient's medical history and updated it with pertinent information if needed.   Past Medical History:   Diagnosis Date     Anti-cardiolipin antibody positive      Griggs esophagus      Concussion 2016     Depressive disorder, not elsewhere classified 03/01/2006    Resolved 8/07      Gastroesophageal reflux disease with esophagitis      Hiatal hernia      History of pulmonary embolism      Obesity      Raynaud's syndrome     past history of admission for gangrene of one finger       Past Surgical History   I have reviewed this patient's surgical history and updated it with pertinent information if needed.  Past Surgical History:   Procedure Laterality Date     ESOPHAGOSCOPY, GASTROSCOPY, DUODENOSCOPY (EGD), COMBINED N/A 12/29/2021    Procedure: ESOPHAGOGASTRODUODENOSCOPY, WITH BIOPSY;  Surgeon: Esteban Rose DO;  Location: [facility] OR     LAPAROSCOPIC GASTRIC SLEEVE N/A 10/25/2022    Procedure: GASTRECTOMY, SLEEVE, LAPAROSCOPIC;  Surgeon: Daniel Aragon MD;  Location: [facility] OR     LAPAROSCOPIC HERNIORRHAPHY HIATAL N/A 10/25/2022    Procedure: HERNIORRHAPHY, HIATAL, LAPAROSCOPIC;  Surgeon: Daniel Aragon MD;  Location: [facility] OR     TONSILLECTOMY & ADENOIDECTOMY       ZZC NONSPECIFIC PROCEDURE      T&A as a child     ZZC NONSPECIFIC PROCEDURE      Tubes in ears bilaterally       Social History   I have reviewed this patient's social history and updated it with pertinent information if needed.  Social History     Tobacco Use     Smoking status: Never     Smokeless tobacco: Never   Substance Use Topics     Alcohol use: Yes     Comment: Alcoholic Drinks/day: social     Drug use: No       Family History   I have reviewed this patient's family history and updated it with pertinent information if needed.  Family History   Problem Relation Age of Onset     Hypertension Mother         arthritis     Heart Disease Father         MI, Lipids     Acute Myocardial Infarction Father      Cancer - colorectal Maternal Grandmother         arthritis     Hypertension Maternal Grandfather         arthritis, macular degeneration     Colon Cancer Paternal Grandmother      Alzheimer Disease Paternal Grandfather      Anesthesia Reaction No family hx of      Clotting Disorder No family hx of        Medications    Current Facility-Administered Medications   Medication     acetaminophen (TYLENOL) tablet 650 mg     [START ON 11/2/2022] buPROPion (WELLBUTRIN XL) 24 hr tablet 150 mg     [START ON 11/2/2022] enoxaparin ANTICOAGULANT (LOVENOX) injection 40 mg     hydrOXYzine (ATARAX) syrup 25 mg     lactated ringers infusion     lidocaine (LMX4) cream     lidocaine 1 % 1 mL     ondansetron (ZOFRAN ODT) ODT tab 4 mg    Or     ondansetron (ZOFRAN) injection 4 mg     oxyCODONE (ROXICODONE) tablet 5 mg     pantoprazole (PROTONIX) EC tablet 40 mg     [START ON 11/2/2022] piperacillin-tazobactam (ZOSYN) 4.5 g vial to attach to  mL bag     senna-docusate (SENOKOT-S/PERICOLACE) 8.6-50 MG per tablet 2 tablet     sodium chloride (PF) 0.9% PF flush 3 mL     sodium chloride (PF) 0.9% PF flush 3 mL     Current Outpatient Medications   Medication Sig     buPROPion (WELLBUTRIN XL) 150 MG 24 hr tablet Take 150 mg by mouth every morning     acetaminophen (TYLENOL) 325 MG tablet Take 2 tablets (650 mg) by mouth every 4 hours as needed for other (For optimal non-opioid multimodal pain management to improve pain control and physical function.)     enoxaparin ANTICOAGULANT (LOVENOX) 40 MG/0.4ML syringe Inject 0.4 mLs (40 mg) Subcutaneous every 12 hours for 28 days     enoxaparin ANTICOAGULANT (LOVENOX) 40 MG/0.4ML syringe Inject 0.4 mLs (40 mg) Subcutaneous every 12 hours for 28 days     fish oil-omega-3 fatty acids 1000 MG capsule Take 2 g by mouth every morning     hydrOXYzine (ATARAX) 10 MG/5ML syrup Take 12.5 mLs (25 mg) by mouth every 6 hours as needed for anxiety (adjuvant pain)     hyoscyamine (LEVSIN) 0.125 MG tablet Take 1 tablet (125 mcg) by mouth every 4 hours as needed for cramping (Patient taking differently: Take 0.125 mg by mouth every 4 hours as needed for cramping Not started yet)     omeprazole (PRILOSEC) 40 MG DR capsule Take 1 capsule (40 mg) by mouth 2 times daily     ondansetron (ZOFRAN ODT) 4 MG ODT tab Take 1 tablet (4  "mg) by mouth every 8 hours as needed for nausea (Patient taking differently: Take 4 mg by mouth every 8 hours as needed for nausea Not started yet)     oxyCODONE (ROXICODONE) 5 MG tablet Take 1 tablet (5 mg) by mouth every 6 hours as needed for moderate to severe pain     senna-docusate (SENOKOT-S/PERICOLACE) 8.6-50 MG tablet Take 2 tablets by mouth daily as needed for constipation (While taking narcotic pain medications.  Stop taking if having loose stools.) (Patient not taking: Reported on 11/1/2022)       Allergies   Allergies   Allergen Reactions     Azithromycin      Erythromycin GI Disturbance     When \"very young\"       Physical Exam   Vital Signs: Temp: 98.4  F (36.9  C) Temp src: Oral BP: (!) 143/79 Pulse: 82   Resp: 26 SpO2: 98 % O2 Device: Nasal cannula Oxygen Delivery: 5 LPM  Weight: 277 lbs 8 oz    GENERAL: Alert and oriented x 3. Well nourished, well developed.  No acute distress.    HEENT: Normocephalic, atraumatic. Very mild scleral icterus. Mucous membranes moist.   CV: RRR. S1, S2. No murmurs appreciated.   RESPIRATORY: Effort normal on 2L O2. Lungs CTAB with no wheezing, rales, or rhonchi.   GI: Abdomen soft and non distended, bowel sounds present x all 4 quadrants. No tenderness, rebound, or guarding. Surgical incisions healing well without erythema.  NEUROLOGICAL: No focal deficits. Follows commands.  MUSCULOSKELETAL: No joint swelling or tenderness. Moves all extremities.   EXTREMITIES: No gross deformities. No peripheral edema.   SKIN: Grossly warm, dry, and intact. No jaundice. No rashes.     Data   Results for orders placed or performed during the hospital encounter of 11/01/22 (from the past 24 hour(s))   Extra Tube    Narrative    The following orders were created for panel order Extra Tube.  Procedure                               Abnormality         Status                     ---------                               -----------         ------                     Extra Blue Top " Tube[[id number]]                              Final result               Extra Red Top Tube[[id number]]                               Final result                 Please view results for these tests on the individual orders.   Extra Blue Top Tube   Result Value Ref Range    Hold Specimen JIC    Extra Red Top Tube   Result Value Ref Range    Hold Specimen JIC    XR Chest 2 Views    Narrative    EXAM: XR CHEST 2 VIEWS  11/1/2022 5:01 PM     HISTORY:  SOA       COMPARISON:  Chest radiograph 10/26/2022 and CT of chest abdomen and  pelvis 4/11/2022    FINDINGS:   PA and lateral views of the chest. Midline trachea. Cardiomediastinal  silhouette is stable. No pneumothorax or pleural effusion. Similar to  slightly increased perihilar and bibasilar streaky opacities.  Visualized upper abdomen is unremarkable. No acute osseous  abnormality.      Impression    IMPRESSION:   Similar to slightly increased perihilar and bibasilar streaky  opacities which may represent mild pulmonary edema, infection and/or  atelectasis.    I have personally reviewed the examination and initial interpretation  and I agree with the findings.    RAHEEM CHUNG MD         SYSTEM ID:  [id number]   CBC with platelets differential    Narrative    The following orders were created for panel order CBC with platelets differential.  Procedure                               Abnormality         Status                     ---------                               -----------         ------                     CBC with platelets and d...[[id number]]  Abnormal            Final result                 Please view results for these tests on the individual orders.   Comprehensive metabolic panel   Result Value Ref Range    Sodium 136 136 - 145 mmol/L    Potassium 3.3 (L) 3.4 - 5.3 mmol/L    Chloride 102 98 - 107 mmol/L    Carbon Dioxide (CO2) 17 (L) 22 - 29 mmol/L    Anion Gap 17 (H) 7 - 15 mmol/L    Urea Nitrogen 7.6 6.0 - 20.0 mg/dL    Creatinine 0.86 0.51 - 0.95  mg/dL    Calcium 9.0 8.6 - 10.0 mg/dL    Glucose 73 70 - 99 mg/dL    Alkaline Phosphatase 131 (H) 35 - 104 U/L    AST 41 (H) 10 - 35 U/L    ALT 32 10 - 35 U/L    Protein Total 6.6 6.4 - 8.3 g/dL    Albumin 3.5 3.5 - 5.2 g/dL    Bilirubin Total 1.5 (H) <=1.2 mg/dL    GFR Estimate 86 >60 mL/min/1.73m2   Lipase   Result Value Ref Range    Lipase 68 (H) 13 - 60 U/L   Lactic acid whole blood   Result Value Ref Range    Lactic Acid 1.3 0.7 - 2.0 mmol/L   CBC with platelets and differential   Result Value Ref Range    WBC Count 13.1 (H) 4.0 - 11.0 10e3/uL    RBC Count 3.44 (L) 3.80 - 5.20 10e6/uL    Hemoglobin 9.1 (L) 11.7 - 15.7 g/dL    Hematocrit 29.1 (L) 35.0 - 47.0 %    MCV 85 78 - 100 fL    MCH 26.5 26.5 - 33.0 pg    MCHC 31.3 (L) 31.5 - 36.5 g/dL    RDW 17.1 (H) 10.0 - 15.0 %    Platelet Count 327 150 - 450 10e3/uL    % Neutrophils 84 %    % Lymphocytes 8 %    % Monocytes 6 %    % Eosinophils 0 %    % Basophils 0 %    % Immature Granulocytes 2 %    NRBCs per 100 WBC 0 <1 /100    Absolute Neutrophils 10.9 (H) 1.6 - 8.3 10e3/uL    Absolute Lymphocytes 1.0 0.8 - 5.3 10e3/uL    Absolute Monocytes 0.8 0.0 - 1.3 10e3/uL    Absolute Eosinophils 0.0 0.0 - 0.7 10e3/uL    Absolute Basophils 0.0 0.0 - 0.2 10e3/uL    Absolute Immature Granulocytes 0.2 <=0.4 10e3/uL    Absolute NRBCs 0.0 10e3/uL   CT Chest Pulmonary Embolism w Contrast    Narrative    CT CHEST PULMONARY EMBOLISM W CONTRAST, 11/1/2022 6:08 PM    History: Female sex; Not pregnant; No imaging to r/o PE in the last 24  hours; Pulmonary Embolism Rule-Out Criteria (PERC) score > 0; Revised  [place] Score (RGS) not >= 11; No D-dimer result available; D-dimer not  ordered    Comparison: CT, 4/11/2022; chest radiographs, 11/1/2022    Technique: Helical acquisition of CT images of the chest from the lung  apices to the kidneys were acquired after the administration of  intravenous contrast according to the CT pulmonary angiogram protocol.  Axial images were reconstructed in  1 and 3 mm slice thickness. Coronal  reconstructions performed. Three-dimensional (3D) post-processed  angiographic images were reconstructed, archived to PACS and used in  the interpretation of this study.    Contrast dose: iopamidol (ISOVUE-370) solution 85 mL    Findings:   The contrast bolus is adequate.  There is no pulmonary embolism.  Consolidative changes in bilateral lower lobes with trace left pleural  effusion. Additional subpleural atelectasis/scarring in left upper and  right middle lobes. No suspicious pulmonary nodules. No pneumothorax.  The central airways are patent.    Mediastinum: Heart size is normal. No significant pericardial  effusion. Normal caliber ascending aorta and main pulmonary artery. No  thoracic lymphadenopathy. Patulous thoracic esophagus with air fluid  level.    Upper abdomen: Sleeve gastrectomy. Otherwise, the appearance of the  upper abdomen is unremarkable.    Bones and soft tissues: Unremarkable.       Impression    Impression:  1. No pulmonary embolism identified.  2. Consolidative changes in the bilateral lower lobes with trace left  pleural effusion. Findings can represent atelectasis versus infection.  Given the patulous appearance of the esophagus with air-fluid level,  aspiration cannot be ruled out.    In the event of a positive result for acute pulmonary embolism  resulting in right heart strain, consider calling the   [facility] patient placement ([phone]) for PERT (Pulmonary Embolism  Response Team) Activation?    PERT -- Pulmonary Embolism Response Team (Multidisciplinary team  including cardiology, interventional radiology, critical care,  hematology)    I have personally reviewed the examination and initial interpretation  and I agree with the findings.    RAHEEM CHUNG MD         SYSTEM ID:  [id number]

## 2022-11-02 NOTE — UTILIZATION REVIEW
[facility] Utilization Review  Admission Status; Secondary Review Determination     Admission Date: 11/1/2022  4:37 PM      Under the authority of the Utilization Management Committee, the utilization review process indicated a secondary review on the above patient.  The review outcome is based on review of the medical records, discussions with staff, and applying clinical experience noted on the date of the review.        (X)      Inpatient Status Appropriate - This patient's medical care is consistent with medical management for inpatient care and reasonable inpatient medical practice.          RATIONALE FOR DETERMINATION   42-year-old female with history of depression, Raynaud's, gangrene 1 digit, pulmonary embolisms while on OCPs, transient weak positive anticardiolipin antibody, hiatal hernia, esophagitis and esophageal narrowing requiring dilation and morbid obesity, status post laparoscopic sleeve gastrectomy and hiatal herniorrhaphy admitted with shortness of breath and pleuritic chest pain.  CT chest shows no evidence of PE but shows consolidation in bilateral lower lobes with pleural effusions, abdominal ultrasound showed septated multifaceted fluid collections which may represent postoperative seroma/hematoma with cholelithiasis.  Patient also developed acute hypoxic respiratory failure requiring 2 L oxygen via nasal cannula.  Patient is on IV Dilaudid, Toradol and IV Zosyn.  Complex patient who was admitted with pleurisy related to infection versus radiating pain from surgery, possible need for TTE.  Complex patient who has bilateral pneumonia with leukocytosis and pleuritic chest pain, requires IV Zosyn, aggressive pain control, also has acute hypoxic respiratory failure requiring oxygen, needs close monitoring in the hospital with ongoing interventions, was switched to observation yesterday, but patient meets criteria for inpatient stay, recommend change to inpatient status, communicated to  Dr. Calderon      The severity of illness, intensity of service provided, expected LOS and risk for adverse outcome make the care complex, high risk and appropriate for hospital admission.The patient requires hospital based medical care which is anticipated to require a stay of 2 or more midnights.        The information on this document is developed by the utilization review team in order for the business office to ensure compliance.  This only denotes the appropriateness of proper admission status and does not reflect the quality of care rendered.              Sincerely,       Raya Muñoz MD  Physician Advisor  Utilization Review-[place]    Phone: [phone]

## 2022-11-02 NOTE — DISCHARGE SUMMARY
[facility]   Minimally Invasive/Bariatric Surgery Discharge Summary    Date of Admission: 11/1/2022  Date of Discharge: 11/16/2022     Admission Diagnosis:  1. Hospital acquired pneumonia  2. S/p laparoscopic sleeve gastrectomy and hiatal hernia repair  3. Elevated LFTs  4. Hypokalemia  5. Post-sleeve gastrectomy surgical anemia.    Discharge Diagnosis:  Same as above    Consultations:  Internal medicine    Procedures:  11/4: Exploratory laparoscopy, NOEL drain placement  11/14: EGD, dilation of gastric stricture, NJ tube placement    Brief HPI:  Leah Yanez is a pleasant 42 year old female who is s/p laparoscopic sleeve gastrectomy and hiatal hernia repair on 10/25/22 with Dr. Aragon, kept in hospital for acute blood loss anemia, dysphagia and pain control, discharged 10/30/22. She returned to the ED today from clinic on 11/1/22 with LUQ abdominal and shoulder pain, was found to have bilateral lower lobe pneumonia and new oxygen requirement. She does have a history of PE and is on Lovenox. No evidence of PE on CT. Admitted for IV antibiotics and observation.    Hospital Course:  Patient was initially seen in the ED due to hypoxia and continued significant RUQ pain after discharge on 10/30 from hiatal hernia repair and sleeve gastrectomy on 10/25. Admitted initially to observation. Internal medicine was consulted and evaluated the patient promptly. She was started on IV Zosyn for her hospital acquired pneumonia and supplemental oxygen for hypoxia.      Due to severe RUQ pain and elevated T bili, ultrasound was performed on 11/2 and CT on 11/3, which demonstrated a fluid collection concerning for hematoma. She received multimodal pain control for her abdominal pain with minimal improvement. Her pain was associated with significant anxiety as well and required anxiolytics. No evidence of active bleeding on imaging, but due to continued significant and persistent pain exploratory  laparoscopy was performed 11/4, hematoma was evacuated and NOEL drain placed. She had improvement in pain following the procedure. Drain removed on 11/11.     Post-op she was admitted to SICU due to continued need for mechanical ventilatory support. Extubated to Bipap on 11/6, weaned to NC over the following days, ultimately on room air prior to discharge. During post op course multiple CXRs were obtained due to her continued ventilatory support requirements and frequent backsliding - requiring increased support in overnights. CXRs demonstrated atelectasis and pleural effusions which were concerning for volume overload in setting of increased weight since admission despite very little caloric intake. Atelectasis and effusions improved over course of hospitalization with several doses of Lasix as well as ambulation and incentive spirometry prior to discharge.     She did have persistent dysphagia with liquid intolerance throughout the hospital stay, significantly limiting her PO intake. Ultimately, underwent EGD which showed gastric stricture, and dilation was performed 11/14. NJ tube was placed at that time for tube feeds. She had improvement in dysphagia following this procedure, but continued to have nausea and vomiting with PO intake. At time of discharge she is dependent on tube feeds for her nutritional needs.     At discharge, she is feeling much improved. Her abdominal pain is significantly better with only mild tenderness. She does continue to have nausea and vomiting with PO intake, can tolerate very small sips, but is dependent on tube feeds for her nutritional needs. Additionally, respiratory status is improved, now able to maintain O2 sats on room air.     Discharge Physical Exam:  BP (!) 140/85 (BP Location: Right arm)   Pulse 74   Temp (!) 96.7  F (35.9  C) (Oral)   Resp 18   Ht 1.829 m (6')   Wt 111 kg (244 lb 12.8 oz)   LMP 11/14/2022 (Approximate)   SpO2 97%   BMI 33.20 kg/m       General:  Laying back in bed. Speaking in full sentences. No acute distress.  HEENT: NJ tube in place, with tube feed running at 40 ml/hr  Pulm: Breathing comfortably with symmetric chest rise. Sats at 93% on RA.   CV: Regular rate and rhythm  Abdomen: Incisions clean, dry and intact without surrounding erythema or warmth. Very mild tenderness over the upper abdomen.   Extremities: No edema  Neuro: Moving all 4 extremities independently  Psych: No acute distress. Calm and interactive. Does not appear anxious. Normal affect. Expresses excitement regarding going home.     Meds:     Review of your medicines      UNREVIEWED medicines. Ask your doctor about these medicines      Dose / Directions   amoxicillin 500 MG capsule  Commonly known as: AMOXIL  Indication: Community Acquired Pneumonia  Ask about: Should I take this medication?      Dose: 1,000 mg  Take 2 capsules (1,000 mg) by mouth 3 times daily for 5 days  Quantity: 30 capsule  Refills: 0        START taking      Dose / Directions   ferrous fumarate 65 mg ([employer]. FE)-Vitamin C 125 mg  MG Tabs tablet  Commonly known as: VITRON C  Used for: S/P laparoscopic sleeve gastrectomy      Dose: 1 tablet  Take 1 tablet by mouth daily for 14 days  Quantity: 14 tablet  Refills: 0     hydrOXYzine 25 MG tablet  Commonly known as: ATARAX  Used for: S/P laparoscopic sleeve gastrectomy      Dose: 25 mg  Take 1 tablet (25 mg) by mouth every 6 hours as needed for other (adjuvant pain)  Quantity: 20 tablet  Refills: 0     methocarbamol 500 MG tablet  Commonly known as: ROBAXIN  Used for: Hiatal hernia      Dose: 500 mg  Take 1 tablet (500 mg) by mouth 4 times daily as needed for muscle spasms  Quantity: 15 tablet  Refills: 0     ondansetron 4 MG ODT tab  Commonly known as: ZOFRAN ODT  Used for: S/P laparoscopic sleeve gastrectomy      Dose: 4 mg  Take 1 tablet (4 mg) by mouth every 6 hours as needed for nausea or vomiting  Quantity: 12 tablet  Refills: 0        CONTINUE these medicines  which may have CHANGED, or have new prescriptions. If we are uncertain of the size of tablets/capsules you have at home, strength may be listed as something that might have changed.      Dose / Directions   acetaminophen 325 MG tablet  Commonly known as: TYLENOL  This may have changed:     when to take this    reasons to take this      Dose: 650 mg  Take 2 tablets (650 mg) by mouth every 4 hours for 30 days  Quantity: 360 tablet  Refills: 0     enoxaparin ANTICOAGULANT 40 MG/0.4ML syringe  Commonly known as: LOVENOX  This may have changed:     when to take this    Another medication with the same name was removed. Continue taking this medication, and follow the directions you see here.  Used for: S/P laparoscopic sleeve gastrectomy      Dose: 40 mg  Inject 0.4 mLs (40 mg) Subcutaneous 2 times daily for 28 days  Quantity: 22.4 mL  Refills: 0        CONTINUE these medicines which have NOT CHANGED      Dose / Directions   buPROPion 150 MG 24 hr tablet  Commonly known as: WELLBUTRIN XL      Dose: 150 mg  Take 150 mg by mouth every morning  Refills: 0     cyanocobalamin 500 MCG tablet  Commonly known as: VITAMIN B-12      Dose: 1,000 mcg  Take 1,000 mcg by mouth daily  Refills: 0     fish oil-omega-3 fatty acids 1000 MG capsule      Dose: 2 g  Take 2 g by mouth every morning  Refills: 0     MULTIVITAMIN/IRON PO      Dose: 1 tablet  Take 1 tablet by mouth daily  Refills: 0     omeprazole 40 MG DR capsule  Commonly known as: priLOSEC  Used for: Esophageal dysphagia, Esophageal stricture      Dose: 40 mg  Take 1 capsule (40 mg) by mouth 2 times daily  Quantity: 180 capsule  Refills: 3     oxyCODONE 5 MG tablet  Commonly known as: ROXICODONE  Used for: S/P laparoscopic sleeve gastrectomy      Dose: 5 mg  Take 1 tablet (5 mg) by mouth every 6 hours as needed for moderate to severe pain  Quantity: 12 tablet  Refills: 0     senna-docusate 8.6-50 MG tablet  Commonly known as: SENOKOT-S/PERICOLACE  Used for: Class 2 severe  obesity with serious comorbidity and body mass index (BMI) of 38.0 to 38.9 in adult, unspecified obesity type (H), At high risk for postoperative complications      Dose: 2 tablet  Take 2 tablets by mouth daily as needed for constipation (While taking narcotic pain medications.  Stop taking if having loose stools.)  Quantity: 30 tablet  Refills: 1     Vitamin D3 25 mcg (1000 units) tablet  Commonly known as: CHOLECALCIFEROL      Dose: 4 tablet  Take 4 tablets by mouth daily  Refills: 0           Where to get your medicines      These medications were sent to [facility] - [place], [place] - [address]  [address] [address]    Phone: [phone]     enoxaparin ANTICOAGULANT 40 MG/0.4ML syringe    ferrous fumarate 65 mg ([employer]. FE)-Vitamin C 125 mg  MG Tabs tablet    hydrOXYzine 25 MG tablet    methocarbamol 500 MG tablet    ondansetron 4 MG ODT tab     These medications were sent to [id number] - [place] - [address] AT [facility] [address]  [address] [place] [address]    Phone: [phone]     acetaminophen 325 MG tablet    amoxicillin 500 MG capsule       Additional instructions:     Home Infusion Referral      Reason for your hospital stay    You were in the hospital for work up of community acquired pneumonia and surgical pain.     Activity    Your activity upon discharge: activity as tolerated and no driving while on analgesics, no heavy lifting for 4 weeks     [facility] Follow-up and recommended labs and tests    Follow up with Dr. Aragon on 11/23 for repeat endoscopy as scheduled.     Appointments on [place] and/or [facility] (with [facility] or [facility] provider or service). Call [phone] if you haven't heard regarding these appointments within 7 days of discharge.     Diet    Follow this diet upon discharge: Orders Placed This Encounter      Bariatric Diet Full Liquids     Discharge summary completed  by:  Daniel Correa, MS3    Resident attestation  I, Misti Wilhelm MD, was present with the medical student who participated in the service and in the documentation of the note.  I have verified the history and personally performed the physical exam and medical decision making.  I agree with the assessment and plan of care as documented in the note.      Patient seen with chief resident, Dr Cardona, and discussed with Dr Aragon, attending.  - - - - - - - - - - - - - - - - - -  Misti Villanueva MD  General Surgery PGY-1

## 2022-11-02 NOTE — H&P
Minimally Invasive Surgery H&P  2022    Leah Yanez  : 1980    Date of Service: 2022 8:25 PM    Assessment and Plan:  Leah Yanez is a pleasant 42 year old female who is s/p laparoscopic sleeve gastrectomy and hiatal hernia repair on 10/25/22 with Dr. Aragon, kept in hospital for dysphagia and pain control, discharged 10/30/22. She returned to the ED today from clinic with LUQ abdominal and shoulder pain, was found to have bilateral lower lobe pneumonia and new oxygen requirement. She does have a history of PE and is on Lovenox. No evidence of PE on CT today. Plan to admit for observation overnight.     - admit to observation, MIS primary  - IV Zosyn for HAP  - consult medicine for assistance with pneumonia management and antibiotic regimen   - wean oxygen as able, encourage incentive spirometer and time out of bed  - continue bariatric diet per post op plan  - will obtain UGI study tomorrow  - prn pain regimen  - PTA wellbutrin, Protonix, Atarax, Senna  - Lovenox BID for dvt ppx  Dispo: anticipate discharge tomorrow if UGI study is reassuring and she is appropriate for outpatient antibiotic regimen    Discussed with staff, Dr. Kvng Clifton MD  General Surgery Resident    --------------------------------------------------------------------  History of Present Illness:    Leah Yanez is a 42 year old female with history notable for anti-cardiolipin, cavanaugh esophagus, depression, GERD, hiatal hernia, PE and obesity who is s/p laparoscopic sleeve gastrectomy and hiatal hernia repair with Dr. Aragon on 10/25/22, discharged 10/30/22, who was seen in clinic today and referred to the ED for evaluation of abdominal and shoulder pain. She says that prior to discharge two days ago, her left shoulder pain had essentially resolved. However, yesterday afternoon she developed a pain in her left upper quadrant that gradually worsened and began radiating to her left shoulder. She  "noted increased pain with deep breathing and some shortness of breath and increased fatigue at home. She did have one episode of regurgitation after eating a bite of applesauce but otherwise denies nausea or vomiting. She feels like she had a \"breakthrough\" with her dysphagia yesterday and notes significant improvement in her swallowing. She has been tolerating clear liquids and small bites of yogurt without difficulty in the last 24 hours. She denies fever, chills, leg pain/swelling.    Past Medical History:  Past Medical History:   Diagnosis Date     Anti-cardiolipin antibody positive      Griggs esophagus      Concussion 2016     Depressive disorder, not elsewhere classified 03/01/2006    Resolved 8/07     Gastroesophageal reflux disease with esophagitis      Hiatal hernia      History of pulmonary embolism      Obesity      Raynaud's syndrome     past history of admission for gangrene of one finger       Past Surgical History  Laparoscopic sleeve gastrectomy, hiatal hernia repair (Dr. Aragon) 10/25/22  Tonsillectomy, adenoidectomy    Family History:  Family History   Problem Relation Age of Onset     Hypertension Mother         arthritis     Heart Disease Father         MI, Lipids     Acute Myocardial Infarction Father      Cancer - colorectal Maternal Grandmother         arthritis     Hypertension Maternal Grandfather         arthritis, macular degeneration     Colon Cancer Paternal Grandmother      Alzheimer Disease Paternal Grandfather      Anesthesia Reaction No family hx of      Clotting Disorder No family hx of        Social History:  Social History     Socioeconomic History     Marital status:      Spouse name: Not on file     Number of children: Not on file     Years of education: Not on file     Highest education level: Not on file   Occupational History     Not on file   Tobacco Use     Smoking status: Never     Smokeless tobacco: Never   Substance and Sexual Activity     Alcohol use: Yes     " Comment: Alcoholic Drinks/day: social     Drug use: No     Sexual activity: Not Currently   Other Topics Concern     Not on file   Social History Narrative     Not on file     Social Determinants of Health     Financial Resource Strain: Not on file   Food Insecurity: Not on file   Transportation Needs: Not on file   Physical Activity: Not on file   Stress: Not on file   Social Connections: Not on file   Intimate Partner Violence: Not on file   Housing Stability: Not on file       Medications:  Current Outpatient Medications   Medication Sig Dispense Refill     buPROPion (WELLBUTRIN XL) 150 MG 24 hr tablet Take 150 mg by mouth every morning       acetaminophen (TYLENOL) 325 MG tablet Take 2 tablets (650 mg) by mouth every 4 hours as needed for other (For optimal non-opioid multimodal pain management to improve pain control and physical function.) 30 tablet 0     enoxaparin ANTICOAGULANT (LOVENOX) 40 MG/0.4ML syringe Inject 0.4 mLs (40 mg) Subcutaneous every 12 hours for 28 days 22.4 mL 0     enoxaparin ANTICOAGULANT (LOVENOX) 40 MG/0.4ML syringe Inject 0.4 mLs (40 mg) Subcutaneous every 12 hours for 28 days 22.4 mL 0     fish oil-omega-3 fatty acids 1000 MG capsule Take 2 g by mouth every morning       hydrOXYzine (ATARAX) 10 MG/5ML syrup Take 12.5 mLs (25 mg) by mouth every 6 hours as needed for anxiety (adjuvant pain) 118 mL 0     hyoscyamine (LEVSIN) 0.125 MG tablet Take 1 tablet (125 mcg) by mouth every 4 hours as needed for cramping (Patient taking differently: Take 0.125 mg by mouth every 4 hours as needed for cramping Not started yet) 30 tablet 1     omeprazole (PRILOSEC) 40 MG DR capsule Take 1 capsule (40 mg) by mouth 2 times daily 180 capsule 3     ondansetron (ZOFRAN ODT) 4 MG ODT tab Take 1 tablet (4 mg) by mouth every 8 hours as needed for nausea (Patient taking differently: Take 4 mg by mouth every 8 hours as needed for nausea Not started yet) 15 tablet 0     oxyCODONE (ROXICODONE) 5 MG tablet Take 1  "tablet (5 mg) by mouth every 6 hours as needed for moderate to severe pain 12 tablet 0     senna-docusate (SENOKOT-S/PERICOLACE) 8.6-50 MG tablet Take 2 tablets by mouth daily as needed for constipation (While taking narcotic pain medications.  Stop taking if having loose stools.) (Patient not taking: Reported on 11/1/2022) 30 tablet 1       Allergies:  Allergies   Allergen Reactions     Azithromycin      Erythromycin GI Disturbance     When \"very young\"       Review of Symptoms:  A 10 point review of symptoms has been conducted and is negative except for that mentioned in the above HPI.    Physical Exam:   BP (!) 143/79   Pulse 82   Temp 98.4  F (36.9  C) (Oral)   Resp 26   Ht 1.829 m (6')   Wt 125.9 kg (277 lb 8 oz)   LMP 10/01/2022   SpO2 98%   BMI 37.64 kg/m     Gen:    Laying comfortably in bed, NAD, fatigued appearing  HEENT: Normocephalic and atraumatic  CV:  RRR  Pulm:  Breathing comfortably on 4L NC. Oxygen turned off while we spoke, O2 sats hover ~90% without supplemental O2. Speaking in complete sentences.   Abd:  Obese, soft, nondistended, nontender. Incisions clean, dry and intact with steri strips. No surrounding erythema or drainage.   Ext:  Warm and well perfused, no obvious deformities    Labs:  All labs reviewed, notable for:  WBC 13.1  Hgb 9.1  Plt 327  K 3.3  T bili 1.5    Imaging:  XR Chest 2 Views    Result Date: 11/1/2022  IMPRESSION: Similar to slightly increased perihilar and bibasilar streaky opacities which may represent mild pulmonary edema, infection and/or atelectasis. I have personally reviewed the examination and initial interpretation and I agree with the findings. RAHEEM CHUNG MD   SYSTEM ID:  [id number]    CT Chest Pulmonary Embolism w Contrast    RESIDENT PRELIMINARY INTERPRETATION Impression: 1. No pulmonary embolism identified. 2. Consolidative changes in the bilateral lower lobes with trace left pleural effusion. Findings can represent atelectasis versus infection. " Given the patulous appearance of the esophagus with air-fluid level, aspiration cannot be ruled out.

## 2022-11-02 NOTE — PROVIDER NOTIFICATION
MIS/Bariatric surgery resident text paged:    Pt O2 is dipping to 89/90% on RA. Requiring 2L O2 now, still tachypneic. Wondering if pt okay to discharge. Please advise. Thanks.    1179: No response from resident. Page sent to Staff bariatric surgery-Pt O2 is dipping to 89/90% on RA. Requiring 2L O2 now, still tachypneic. Wondering if pt okay to discharge. Please advise. Thanks.    Response from both providers. Advised to give atarax for anxiety. Provider said Upper GI study was not fully read, so advised for pt to stay the night.

## 2022-11-02 NOTE — PROGRESS NOTES
[facility]    Medicine Progress Note - Hospitalist Service, GOLD TEAM 7    Date of Admission:  11/1/2022    Assessment & Plan   Leah Yanez is a 42 year old female patient with a past medical history significant for depression, Raynaud's c/b gangrene one digit (resolved), concussion 2016, estrogen provoked pulmonary embolisms while on OCPs, transient weak positive anticardiolipin antibody, large symptomatic hiatal hernia c/b esophagitis and esophageal narrowing requiring dilation, and morbid obesity who is now s/p laparoscopic sleeve gastrectomy and hiatal herniorrhaphy on 10/25/22 (admitted 10/25-10/30) c/b acute blood loss anemia requiring transfusion; sent to [facility] ED from Bariatric surgery outpatient clinic secondary to concerns regarding shortness of breath and pleuritic chest pain.     Recommendations:   - Given low oxygen requirements, negative infectious work-up, it is appropriate to narrow antibiotic coverage to amoxicillin for community acquired pneumonia without significant respiratory risk factors.   - Incentive spirometry, mobilization, and time sitting up will help expand lungs. Pain limits her ability to do these things.   - I discussed with surgical team that I do not feel that her pain is due to pneumonia, PE ruled out by CT angiogram, troponin and ECG reassuring; therefore could likely be referred abdominal pain. They have ordered additional imaging studies and will address.    - Trial lidocaine patch in addition to oxycodone and Tylenol for pain. Avoid further NSAIDs per discussion with surgery due to recent sleeve gastrectomy.   - I was paged regarding ultrasound results and in turn paged general surgery team, since their team ordered and will follow-up on this finding.      S/p laparoscopic sleeve gastrectomy and hiatal herniorrhaphy (10/25/22)  Acute blood loss anemia  Patient with above history who was sent from Bariatric Surgery  clinic secondary to concerns for possible post-op PE found to have bilateral pneumonia. Hemoglobin 9.1 which is improved from 7.8 10/29.   - Management per primary surgery team   - Agree with continuing Lovenox BID for prophylaxis   - Agree with continuing PPI     Hypoxia  Leukocytosis, improving  Bilateral Pneumonia vs atelectasis  Pleuritic Chest Pain  WBC 13.1 with left shift. CT PE without evidence of PE but did preliminarily reveal findings concerning for consolidative changes in the bilateral lower lobes with trace left pleural effusion. Requiring 2L O2 without oxygen need at home. Troponin and ECG reassuring. MRSA nasal swab, strep pneumo, and legionella negative. COVID and flu negative. CRP and procal both elevated but also with recent surgery and abdominal findings.    - Narrow antibiotic for now with close monitoring.   - Appreciate surgical work-up of possible abdominal causes of radiating pain. Consider further chest work-up if abdominal work-up is unrevealing.      Elevated LFTs  LFTs mildly elevated AST 41, Alk Phos 131 and total bili 1.5. Unclear etiology. Abdominal exam benign and afebrile. Discussed with primary team who did not think this would be related to the surgery. Hemolysis seems less likely with improving hemoglobin and normal platelets    - Trend.   - Abdominal ultrasound with cholelithiasis but no cholecystitis. Fluid collection that could be postoperative; paged general surgery team to discuss.      Anion Gap Metabolic Acidosis, improving  At admission CO2 17 and anion gap 17. Patient reporting very poor PO intake.   - Anticipate this will improve with IVF already ordered.   - Trend BMP     Hypokalemia  Potassium 3.3 at admission likely related to poor PO intake.   - RN potassium replacement protocol ordered     Depression   - Continue PTA Wellbutrin   - Atarax prn     Diet: Bariatric Diet Full Liquids    DVT Prophylaxis: Enoxaparin (Lovenox) SQ  Holder Catheter: Not present  Central  Lines: None  Cardiac Monitoring: None  Code Status:   Full code    Disposition Plan     Expected Discharge Date: 11/02/2022                The patient's care was discussed with the Patient, Patient's Family and Primary team.    Rhianna Joel MD  Hospitalist Service, [address]  Securely message with the Vocera Web Console (learn more here)  Text page via [facility] Paging/Directory   Please see signed in provider for up to date coverage information      Clinically Significant Risk Factors Present on Admission        # Hypokalemia: Lowest K = 3 mmol/L (Ref range: 3.4-5.3) in last 2 days, will replace as needed  # Hyponatremia: Lowest Na = 135 mmol/L (Ref range: 136-145) in last 2 days, will monitor as appropriate      # Hypoalbuminemia: Lowest albumin = 3.2 g/dL (Ref range: 3.5-5.2) at 11/2/2022  6:36 AM, will monitor as appropriate  # Drug Induced Coagulation Defect: home medication list includes an anticoagulant medication        # Obesity: Estimated body mass index is 37.64 kg/m  as calculated from the following:    Height as of this encounter: 1.829 m (6').    Weight as of this encounter: 125.9 kg (277 lb 8 oz).           ______________________________________________________________________    Interval History   Left chest wall pain poorly controlled overnight and she finally slept after a dose of fentanyl IV. This pain is worse with sitting up or moving and is worse with taking a deep breath. This continues to make it challenging for her to move and use incentive spirometry. She reports mild coughing intermittently and one episode of clear/white expectorate that she's not sure if it was sputum or emesis. Taking small amounts of clear liquids. No fevers or chills.     Data reviewed today: I reviewed all medications, new labs and imaging results over the last 24 hours.     Physical Exam   Vital Signs: Temp: 99.8  F (37.7  C) Temp src: Oral BP: 133/69  Pulse: 83   Resp: (!) 32 SpO2: 95 % O2 Device: Nasal cannula Oxygen Delivery: 1 LPM  Weight: 277 lbs 8 oz  Physical Exam  Vitals reviewed.   Constitutional:       General: She is not in acute distress.     Appearance: She is not toxic-appearing.   HENT:      Head: Atraumatic.      Right Ear: External ear normal.      Left Ear: External ear normal.      Nose: Nose normal.      Mouth/Throat:      Mouth: Mucous membranes are moist.   Eyes:      General:         Right eye: No discharge.         Left eye: No discharge.      Conjunctiva/sclera: Conjunctivae normal.   Cardiovascular:      Rate and Rhythm: Normal rate and regular rhythm.      Pulses: Normal pulses.      Heart sounds: No murmur heard.  Pulmonary:      Effort: Pulmonary effort is normal. No respiratory distress.      Breath sounds: No wheezing.      Comments: Breathing shallow and fast on nasal cannula oxygen. She feels that the pain is worse with deep breaths, leading her to have the shallow breathing. No cough during my encounter.   Abdominal:      General: Bowel sounds are normal. There is no distension.      Palpations: Abdomen is soft.      Tenderness: There is no abdominal tenderness. There is no guarding.      Comments: Bandaged areas on abdomen from prior surgery   Musculoskeletal:         General: No tenderness.      Cervical back: Neck supple.      Right lower leg: No edema.      Left lower leg: No edema.   Skin:     General: Skin is warm.      Capillary Refill: Capillary refill takes less than 2 seconds.      Findings: No rash.   Neurological:      General: No focal deficit present.      Mental Status: She is alert.   Psychiatric:         Behavior: Behavior normal.         Thought Content: Thought content normal.         Data   Recent Labs   Lab 11/02/22  0636 11/01/22  1734 10/29/22  1332 10/29/22  1330 10/28/22  0935 10/28/22  0701 10/26/22  2118 10/26/22  2103   WBC 12.1* 13.1*  --   --   --  9.9   < >  --    HGB 8.5* 9.1* 7.8*  --   --  7.9*    < >  --    MCV 86 85  --   --   --  88   < >  --     327  --   --   --  230   < >  --    * 136  --   --   --   --   --  137   POTASSIUM 3.0* 3.3*  --   --   --   --   --  5.5*   CHLORIDE 101 102  --   --   --   --   --  104   CO2 18* 17*  --   --   --   --   --  22   BUN 6.7 7.6  --   --   --   --   --  19.4   CR 1.00* 0.86  --   --   --  1.10*  --  1.33*   ANIONGAP 16* 17*  --   --   --   --   --  11   KIMBERLYN 8.5* 9.0  --   --   --   --   --  9.2   GLC 78 73  --  113*   < >  --    < > 177*   ALBUMIN 3.2* 3.5  --   --   --   --   --   --    PROTTOTAL 6.1* 6.6  --   --   --   --   --   --    BILITOTAL 1.3* 1.5*  --   --   --   --   --   --    ALKPHOS 126* 131*  --   --   --   --   --   --    ALT 23 32  --   --   --   --   --   --    AST 31 41*  --   --   --   --   --   --    LIPASE  --  68*  --   --   --   --   --   --     < > = values in this interval not displayed.     Recent Results (from the past 24 hour(s))   XR Chest 2 Views    Narrative    EXAM: XR CHEST 2 VIEWS  11/1/2022 5:01 PM     HISTORY:  SOA       COMPARISON:  Chest radiograph 10/26/2022 and CT of chest abdomen and  pelvis 4/11/2022    FINDINGS:   PA and lateral views of the chest. Midline trachea. Cardiomediastinal  silhouette is stable. No pneumothorax or pleural effusion. Similar to  slightly increased perihilar and bibasilar streaky opacities.  Visualized upper abdomen is unremarkable. No acute osseous  abnormality.      Impression    IMPRESSION:   Similar to slightly increased perihilar and bibasilar streaky  opacities which may represent mild pulmonary edema, infection and/or  atelectasis.    I have personally reviewed the examination and initial interpretation  and I agree with the findings.    RAHEEM CHUNG MD         SYSTEM ID:  [id number]   CT Chest Pulmonary Embolism w Contrast    Narrative    CT CHEST PULMONARY EMBOLISM W CONTRAST, 11/1/2022 6:08 PM    History: Female sex; Not pregnant; No imaging to r/o PE in the last  24  hours; Pulmonary Embolism Rule-Out Criteria (PERC) score > 0; Revised  Dayton Score (RGS) not >= 11; No D-dimer result available; D-dimer not  ordered    Comparison: CT, 4/11/2022; chest radiographs, 11/1/2022    Technique: Helical acquisition of CT images of the chest from the lung  apices to the kidneys were acquired after the administration of  intravenous contrast according to the CT pulmonary angiogram protocol.  Axial images were reconstructed in 1 and 3 mm slice thickness. Coronal  reconstructions performed. Three-dimensional (3D) post-processed  angiographic images were reconstructed, archived to PACS and used in  the interpretation of this study.    Contrast dose: iopamidol (ISOVUE-370) solution 85 mL    Findings:   The contrast bolus is adequate.  There is no pulmonary embolism.  Consolidative changes in bilateral lower lobes with trace left pleural  effusion. Additional subpleural atelectasis/scarring in left upper and  right middle lobes. No suspicious pulmonary nodules. No pneumothorax.  The central airways are patent.    Mediastinum: Heart size is normal. No significant pericardial  effusion. Normal caliber ascending aorta and main pulmonary artery. No  thoracic lymphadenopathy. Patulous thoracic esophagus with air fluid  level.    Upper abdomen: Sleeve gastrectomy. Otherwise, the appearance of the  upper abdomen is unremarkable.    Bones and soft tissues: Unremarkable.       Impression    Impression:  1. No pulmonary embolism identified.  2. Consolidative changes in the bilateral lower lobes with trace left  pleural effusion. Findings can represent atelectasis versus infection.  Given the patulous appearance of the esophagus with air-fluid level,  aspiration cannot be ruled out.    In the event of a positive result for acute pulmonary embolism  resulting in right heart strain, consider calling the   [facility] patient placement ([phone]) for PERT (Pulmonary Embolism  Response Team)  Activation?    PERT -- Pulmonary Embolism Response Team (Multidisciplinary team  including cardiology, interventional radiology, critical care,  hematology)    I have personally reviewed the examination and initial interpretation  and I agree with the findings.    RAHEEM CHUNG MD         SYSTEM ID:  [id number]   US Abdomen Limited   Result Value    Radiologist flags (Urgent)     Septated fluid collection measuring 9.4 x 6.4 x 7.6    Narrative    EXAMINATION: Limited Abdominal Ultrasound, 11/2/2022 10:32 AM     COMPARISON: 11/1/2022 chest CT    HISTORY: RUQ evaluation, elevated T bili, s/p laparoscopic sleeve  gastrectomy and hiatal hernia repair 10/25    FINDINGS:   Fluid: There is a multifaceted septated fluid collection measuring 9.4  x 6.4 x 7.6 cm  surrounding the left lateral lobe    Liver: The liver demonstrates normal echotexture, measuring 18.2 cm cm  in craniocaudal dimension. There is no focal mass.     Gallbladder: Cholelithiasis with layering sludge. No evidence of  inflammation.    Bile Ducts: Both the intra- and extrahepatic biliary system are of  normal caliber.  The common bile duct measures 3.7 mm in diameter.    Pancreas: Visualized portions of the head and body of the pancreas are  unremarkable.     Kidney: The right kidney measures 10.6 cm long. There is no  hydronephrosis or hydroureter, no shadowing renal calculi, cystic  lesion or mass.       Impression    IMPRESSION:  1. Septated multifaceted fluid collection measuring 9.4 x 6.4 x 7.6 cm  surrounding the lateral left hepatic lobe near the surgical site,  which may represent postoperative seroma/hematoma.  2. Cholelithiasis    [Access Center:  Septated fluid collection measuring 9.4 x 6.4 x 7.6  cm in the surgical site.    This report will be copied to the [place] [facility] [place] to ensure a  provider acknowledges the finding. Access Center is available Monday  through Friday 8am-3:30 pm.     I have personally reviewed the examination and  initial interpretation  and I agree with the findings.    BC HAMMER,          SYSTEM ID:  [id number]

## 2022-11-02 NOTE — PROVIDER NOTIFICATION
Med gold 7 text paged:    Pt O2 is dipping to 89/90% on RA. Requiring 2L O2 now, still tachypneic. Wondering if pt okay to discharge. Please advise. Thanks.    Provider notified that surgery is primary team, will page.

## 2022-11-02 NOTE — PROGRESS NOTES
EGS Surgery Progress Note  11/02/2022       Subjective:  Doing okay this morning. Struggled with pain control overnight. No emesis but did cough up sputum. Tolerating clear liquids.      Objective:  BP (!) 108/90   Pulse 74   Temp 98.4  F (36.9  C) (Oral)   Resp 26   Ht 1.829 m (6')   Wt 125.9 kg (277 lb 8 oz)   LMP 10/01/2022   SpO2 93%   BMI 37.64 kg/m    Gen: Awake, alert, NAD  CV: RRR  Resp: NLB on 2L, desats to 89% on room air while talking  Abd: obese, soft, nondistended, nontender, incisions c/d/i with steri strips  Ext: WWP, no edema    I/O:   PO 1200  UOP 1x   Emesis 1x charted - pt reports this was mucous from coughing     Labs:  All labs reviewed, notable for:  WBC 12.1 (13.1)  Hgb 8.5 (9.1)  Troponin, Procal, CRP in process  K 3.0  Alk phos 126  T bili 1.3    Creat 1.0    Imaging:  No new imaging.   UGI pending.    Assessment/Plan:   Leah Yanez is a pleasant 42 year old female who is s/p laparoscopic sleeve gastrectomy and hiatal hernia repair on 10/25/22 with Dr. Aragon who returned to the ED with LUQ abdominal and shoulder pain, was found to have bilateral lower lobe pneumonia and new oxygen requirement. No evidence of PE. Weaning oxygen.     - multimodal pain regimen: acetaminophen 650mg q4h, oxycodone 5mg q6h prn, atarax 25mg q6h prn  - wean oxygen, encourage IS and time out of bed  - transition to PO abx for pneumonia, appreciate medicine recommendations  - bariatric full liquid diet as tolerated  - UGI study to evaluate for leak   - replace K  - will obtain RUQ ultrasound for elevated T bili  - continue LR at 125cc/hr, creat 1.0  - Lovenox BID for dvt ppx  - PTA wellbutrin, protonix, senna prn  - appreciate medicine assistance and recommendations  Dispo: anticipate later today if UGI and RUQ US reassuring and appropriate for PO abx regimen     Seen, examined, and discussed with chief resident and staff.  - - - - - - - - - - - - - - - - - -  Danni Clifton MD  General Surgery  PGY-2

## 2022-11-03 ENCOUNTER — APPOINTMENT (OUTPATIENT)
Dept: CARDIOLOGY | Facility: CLINIC | Age: 42
DRG: 907 | End: 2022-11-03
Attending: STUDENT IN AN ORGANIZED HEALTH CARE EDUCATION/TRAINING PROGRAM
Payer: COMMERCIAL

## 2022-11-03 ENCOUNTER — APPOINTMENT (OUTPATIENT)
Dept: CT IMAGING | Facility: CLINIC | Age: 42
DRG: 907 | End: 2022-11-03
Payer: COMMERCIAL

## 2022-11-03 LAB
ALBUMIN SERPL BCG-MCNC: 3 G/DL (ref 3.5–5.2)
ALP SERPL-CCNC: 120 U/L (ref 35–104)
ALT SERPL W P-5'-P-CCNC: 16 U/L (ref 10–35)
ANION GAP SERPL CALCULATED.3IONS-SCNC: 18 MMOL/L (ref 7–15)
AST SERPL W P-5'-P-CCNC: 37 U/L (ref 10–35)
BASOPHILS # BLD AUTO: 0 10E3/UL (ref 0–0.2)
BASOPHILS NFR BLD AUTO: 0 %
BILIRUB SERPL-MCNC: 1.1 MG/DL
BUN SERPL-MCNC: 4.9 MG/DL (ref 6–20)
CALCIUM SERPL-MCNC: 9 MG/DL (ref 8.6–10)
CHLORIDE SERPL-SCNC: 102 MMOL/L (ref 98–107)
CREAT SERPL-MCNC: 0.8 MG/DL (ref 0.51–0.95)
DEPRECATED HCO3 PLAS-SCNC: 15 MMOL/L (ref 22–29)
EOSINOPHIL # BLD AUTO: 0.1 10E3/UL (ref 0–0.7)
EOSINOPHIL NFR BLD AUTO: 1 %
ERYTHROCYTE [DISTWIDTH] IN BLOOD BY AUTOMATED COUNT: 17.2 % (ref 10–15)
GFR SERPL CREATININE-BSD FRML MDRD: >90 ML/MIN/1.73M2
GLUCOSE BLDC GLUCOMTR-MCNC: 102 MG/DL (ref 70–99)
GLUCOSE BLDC GLUCOMTR-MCNC: 90 MG/DL (ref 70–99)
GLUCOSE SERPL-MCNC: 83 MG/DL (ref 70–99)
HCT VFR BLD AUTO: 27.5 % (ref 35–47)
HGB BLD-MCNC: 8.3 G/DL (ref 11.7–15.7)
IMM GRANULOCYTES # BLD: 0.3 10E3/UL
IMM GRANULOCYTES NFR BLD: 3 %
KETONES BLD-SCNC: 3.9 MMOL/L (ref 0–0.6)
LVEF ECHO: NORMAL
LYMPHOCYTES # BLD AUTO: 1 10E3/UL (ref 0.8–5.3)
LYMPHOCYTES NFR BLD AUTO: 9 %
MAGNESIUM SERPL-MCNC: 1.8 MG/DL (ref 1.7–2.3)
MAGNESIUM SERPL-MCNC: 1.9 MG/DL (ref 1.7–2.3)
MCH RBC QN AUTO: 25.9 PG (ref 26.5–33)
MCHC RBC AUTO-ENTMCNC: 30.2 G/DL (ref 31.5–36.5)
MCV RBC AUTO: 86 FL (ref 78–100)
MONOCYTES # BLD AUTO: 0.9 10E3/UL (ref 0–1.3)
MONOCYTES NFR BLD AUTO: 8 %
NEUTROPHILS # BLD AUTO: 8.8 10E3/UL (ref 1.6–8.3)
NEUTROPHILS NFR BLD AUTO: 79 %
NRBC # BLD AUTO: 0 10E3/UL
NRBC BLD AUTO-RTO: 0 /100
NT-PROBNP SERPL-MCNC: 1122 PG/ML (ref 0–450)
PHOSPHATE SERPL-MCNC: 2.9 MG/DL (ref 2.5–4.5)
PHOSPHATE SERPL-MCNC: 2.9 MG/DL (ref 2.5–4.5)
PLATELET # BLD AUTO: 305 10E3/UL (ref 150–450)
POTASSIUM SERPL-SCNC: 3.1 MMOL/L (ref 3.4–5.3)
POTASSIUM SERPL-SCNC: 3.2 MMOL/L (ref 3.4–5.3)
PROT SERPL-MCNC: 6.3 G/DL (ref 6.4–8.3)
RADIOLOGIST FLAGS: ABNORMAL
RBC # BLD AUTO: 3.21 10E6/UL (ref 3.8–5.2)
SODIUM SERPL-SCNC: 135 MMOL/L (ref 136–145)
WBC # BLD AUTO: 11 10E3/UL (ref 4–11)

## 2022-11-03 PROCEDURE — 82010 KETONE BODYS QUAN: CPT | Performed by: STUDENT IN AN ORGANIZED HEALTH CARE EDUCATION/TRAINING PROGRAM

## 2022-11-03 PROCEDURE — 250N000013 HC RX MED GY IP 250 OP 250 PS 637

## 2022-11-03 PROCEDURE — 999N000127 HC STATISTIC PERIPHERAL IV START W US GUIDANCE

## 2022-11-03 PROCEDURE — 93306 TTE W/DOPPLER COMPLETE: CPT | Mod: 26 | Performed by: INTERNAL MEDICINE

## 2022-11-03 PROCEDURE — 250N000011 HC RX IP 250 OP 636: Performed by: SURGERY

## 2022-11-03 PROCEDURE — 250N000011 HC RX IP 250 OP 636: Performed by: STUDENT IN AN ORGANIZED HEALTH CARE EDUCATION/TRAINING PROGRAM

## 2022-11-03 PROCEDURE — 250N000013 HC RX MED GY IP 250 OP 250 PS 637: Performed by: SURGERY

## 2022-11-03 PROCEDURE — 36415 COLL VENOUS BLD VENIPUNCTURE: CPT | Performed by: STUDENT IN AN ORGANIZED HEALTH CARE EDUCATION/TRAINING PROGRAM

## 2022-11-03 PROCEDURE — 250N000011 HC RX IP 250 OP 636

## 2022-11-03 PROCEDURE — 255N000002 HC RX 255 OP 636: Performed by: INTERNAL MEDICINE

## 2022-11-03 PROCEDURE — 84100 ASSAY OF PHOSPHORUS: CPT | Performed by: SURGERY

## 2022-11-03 PROCEDURE — 258N000003 HC RX IP 258 OP 636: Performed by: EMERGENCY MEDICINE

## 2022-11-03 PROCEDURE — 999N000208 ECHOCARDIOGRAM COMPLETE

## 2022-11-03 PROCEDURE — 83735 ASSAY OF MAGNESIUM: CPT | Performed by: SURGERY

## 2022-11-03 PROCEDURE — 250N000013 HC RX MED GY IP 250 OP 250 PS 637: Performed by: STUDENT IN AN ORGANIZED HEALTH CARE EDUCATION/TRAINING PROGRAM

## 2022-11-03 PROCEDURE — 85025 COMPLETE CBC W/AUTO DIFF WBC: CPT | Performed by: STUDENT IN AN ORGANIZED HEALTH CARE EDUCATION/TRAINING PROGRAM

## 2022-11-03 PROCEDURE — 120N000002 HC R&B MED SURG/OB UMMC

## 2022-11-03 PROCEDURE — 258N000003 HC RX IP 258 OP 636: Performed by: STUDENT IN AN ORGANIZED HEALTH CARE EDUCATION/TRAINING PROGRAM

## 2022-11-03 PROCEDURE — 84132 ASSAY OF SERUM POTASSIUM: CPT | Performed by: SURGERY

## 2022-11-03 PROCEDURE — 84100 ASSAY OF PHOSPHORUS: CPT | Performed by: STUDENT IN AN ORGANIZED HEALTH CARE EDUCATION/TRAINING PROGRAM

## 2022-11-03 PROCEDURE — 83880 ASSAY OF NATRIURETIC PEPTIDE: CPT | Performed by: STUDENT IN AN ORGANIZED HEALTH CARE EDUCATION/TRAINING PROGRAM

## 2022-11-03 PROCEDURE — 99233 SBSQ HOSP IP/OBS HIGH 50: CPT | Performed by: STUDENT IN AN ORGANIZED HEALTH CARE EDUCATION/TRAINING PROGRAM

## 2022-11-03 PROCEDURE — 74177 CT ABD & PELVIS W/CONTRAST: CPT | Mod: 26 | Performed by: RADIOLOGY

## 2022-11-03 PROCEDURE — 74177 CT ABD & PELVIS W/CONTRAST: CPT

## 2022-11-03 PROCEDURE — 83735 ASSAY OF MAGNESIUM: CPT | Performed by: STUDENT IN AN ORGANIZED HEALTH CARE EDUCATION/TRAINING PROGRAM

## 2022-11-03 PROCEDURE — 36415 COLL VENOUS BLD VENIPUNCTURE: CPT | Performed by: SURGERY

## 2022-11-03 PROCEDURE — 80053 COMPREHEN METABOLIC PANEL: CPT | Performed by: STUDENT IN AN ORGANIZED HEALTH CARE EDUCATION/TRAINING PROGRAM

## 2022-11-03 RX ORDER — GABAPENTIN 250 MG/5ML
300 SOLUTION ORAL 3 TIMES DAILY
Status: DISCONTINUED | OUTPATIENT
Start: 2022-11-03 | End: 2022-11-05

## 2022-11-03 RX ORDER — THIAMINE HYDROCHLORIDE 100 MG/ML
100 INJECTION, SOLUTION INTRAMUSCULAR; INTRAVENOUS DAILY
Status: DISCONTINUED | OUTPATIENT
Start: 2022-11-03 | End: 2022-11-08

## 2022-11-03 RX ORDER — LORAZEPAM 2 MG/ML
0.5 INJECTION INTRAMUSCULAR EVERY 4 HOURS
Status: DISCONTINUED | OUTPATIENT
Start: 2022-11-03 | End: 2022-11-03

## 2022-11-03 RX ORDER — HYDROMORPHONE HCL IN WATER/PF 6 MG/30 ML
.2-.4 PATIENT CONTROLLED ANALGESIA SYRINGE INTRAVENOUS EVERY 4 HOURS PRN
Status: DISCONTINUED | OUTPATIENT
Start: 2022-11-03 | End: 2022-11-04

## 2022-11-03 RX ORDER — MAGNESIUM SULFATE HEPTAHYDRATE 40 MG/ML
2 INJECTION, SOLUTION INTRAVENOUS ONCE
Status: COMPLETED | OUTPATIENT
Start: 2022-11-03 | End: 2022-11-03

## 2022-11-03 RX ORDER — HYDROMORPHONE HYDROCHLORIDE 1 MG/ML
0.5 INJECTION, SOLUTION INTRAMUSCULAR; INTRAVENOUS; SUBCUTANEOUS ONCE
Status: COMPLETED | OUTPATIENT
Start: 2022-11-03 | End: 2022-11-03

## 2022-11-03 RX ORDER — POTASSIUM CHLORIDE 750 MG/1
40 TABLET, EXTENDED RELEASE ORAL ONCE
Status: COMPLETED | OUTPATIENT
Start: 2022-11-03 | End: 2022-11-03

## 2022-11-03 RX ORDER — NALOXONE HYDROCHLORIDE 0.4 MG/ML
0.4 INJECTION, SOLUTION INTRAMUSCULAR; INTRAVENOUS; SUBCUTANEOUS
Status: DISCONTINUED | OUTPATIENT
Start: 2022-11-03 | End: 2022-11-16 | Stop reason: HOSPADM

## 2022-11-03 RX ORDER — NALOXONE HYDROCHLORIDE 0.4 MG/ML
0.2 INJECTION, SOLUTION INTRAMUSCULAR; INTRAVENOUS; SUBCUTANEOUS
Status: DISCONTINUED | OUTPATIENT
Start: 2022-11-03 | End: 2022-11-16 | Stop reason: HOSPADM

## 2022-11-03 RX ORDER — IOPAMIDOL 755 MG/ML
120 INJECTION, SOLUTION INTRAVASCULAR ONCE
Status: COMPLETED | OUTPATIENT
Start: 2022-11-03 | End: 2022-11-03

## 2022-11-03 RX ORDER — LORAZEPAM 0.5 MG/1
0.5 TABLET ORAL EVERY 4 HOURS PRN
Status: DISCONTINUED | OUTPATIENT
Start: 2022-11-03 | End: 2022-11-03

## 2022-11-03 RX ORDER — DIAZEPAM 10 MG/2ML
2.5 INJECTION, SOLUTION INTRAMUSCULAR; INTRAVENOUS EVERY 6 HOURS PRN
Status: DISCONTINUED | OUTPATIENT
Start: 2022-11-03 | End: 2022-11-05

## 2022-11-03 RX ORDER — DEXTROSE MONOHYDRATE, SODIUM CHLORIDE, AND POTASSIUM CHLORIDE 50; 1.49; 4.5 G/1000ML; G/1000ML; G/1000ML
INJECTION, SOLUTION INTRAVENOUS CONTINUOUS
Status: DISCONTINUED | OUTPATIENT
Start: 2022-11-03 | End: 2022-11-05

## 2022-11-03 RX ORDER — POTASSIUM CHLORIDE 7.45 MG/ML
10 INJECTION INTRAVENOUS
Status: DISCONTINUED | OUTPATIENT
Start: 2022-11-03 | End: 2022-11-03

## 2022-11-03 RX ORDER — SODIUM CHLORIDE AND POTASSIUM CHLORIDE 150; 450 MG/100ML; MG/100ML
INJECTION, SOLUTION INTRAVENOUS CONTINUOUS
Status: DISCONTINUED | OUTPATIENT
Start: 2022-11-03 | End: 2022-11-03

## 2022-11-03 RX ORDER — LORAZEPAM 2 MG/ML
0.5 INJECTION INTRAMUSCULAR EVERY 4 HOURS PRN
Status: DISCONTINUED | OUTPATIENT
Start: 2022-11-03 | End: 2022-11-03

## 2022-11-03 RX ORDER — OXYCODONE HYDROCHLORIDE 5 MG/1
5-10 TABLET ORAL EVERY 6 HOURS PRN
Status: DISCONTINUED | OUTPATIENT
Start: 2022-11-03 | End: 2022-11-05

## 2022-11-03 RX ORDER — HYDROXYZINE HCL 10 MG/5 ML
25 SOLUTION, ORAL ORAL 3 TIMES DAILY
Status: DISCONTINUED | OUTPATIENT
Start: 2022-11-03 | End: 2022-11-07

## 2022-11-03 RX ORDER — POTASSIUM CHLORIDE 7.45 MG/ML
10 INJECTION INTRAVENOUS
Status: COMPLETED | OUTPATIENT
Start: 2022-11-03 | End: 2022-11-03

## 2022-11-03 RX ADMIN — POTASSIUM CHLORIDE 40 MEQ: 750 TABLET, EXTENDED RELEASE ORAL at 09:44

## 2022-11-03 RX ADMIN — GABAPENTIN 300 MG: 250 SOLUTION ORAL at 09:45

## 2022-11-03 RX ADMIN — POTASSIUM CHLORIDE 10 MEQ: 7.46 INJECTION, SOLUTION INTRAVENOUS at 18:23

## 2022-11-03 RX ADMIN — HYDROMORPHONE HYDROCHLORIDE 0.4 MG: 0.2 INJECTION, SOLUTION INTRAMUSCULAR; INTRAVENOUS; SUBCUTANEOUS at 14:19

## 2022-11-03 RX ADMIN — POTASSIUM CHLORIDE 10 MEQ: 7.46 INJECTION, SOLUTION INTRAVENOUS at 19:59

## 2022-11-03 RX ADMIN — ONDANSETRON 4 MG: 2 INJECTION INTRAMUSCULAR; INTRAVENOUS at 12:24

## 2022-11-03 RX ADMIN — POTASSIUM CHLORIDE 10 MEQ: 7.46 INJECTION, SOLUTION INTRAVENOUS at 16:51

## 2022-11-03 RX ADMIN — MAGNESIUM SULFATE IN WATER 2 G: 40 INJECTION, SOLUTION INTRAVENOUS at 16:34

## 2022-11-03 RX ADMIN — HUMAN ALBUMIN MICROSPHERES AND PERFLUTREN 5 ML: 10; .22 INJECTION, SOLUTION INTRAVENOUS at 10:41

## 2022-11-03 RX ADMIN — HYDROMORPHONE HYDROCHLORIDE 0.4 MG: 0.2 INJECTION, SOLUTION INTRAMUSCULAR; INTRAVENOUS; SUBCUTANEOUS at 23:05

## 2022-11-03 RX ADMIN — OXYCODONE HYDROCHLORIDE 5 MG: 5 TABLET ORAL at 05:57

## 2022-11-03 RX ADMIN — ACETAMINOPHEN 650 MG: 325 TABLET, FILM COATED ORAL at 05:01

## 2022-11-03 RX ADMIN — METHOCARBAMOL 500 MG: 500 TABLET ORAL at 21:46

## 2022-11-03 RX ADMIN — SODIUM CHLORIDE, POTASSIUM CHLORIDE, SODIUM LACTATE AND CALCIUM CHLORIDE 125 ML/HR: 600; 310; 30; 20 INJECTION, SOLUTION INTRAVENOUS at 00:04

## 2022-11-03 RX ADMIN — POTASSIUM CHLORIDE, DEXTROSE MONOHYDRATE AND SODIUM CHLORIDE: 150; 5; 450 INJECTION, SOLUTION INTRAVENOUS at 23:31

## 2022-11-03 RX ADMIN — POTASSIUM CHLORIDE 10 MEQ: 7.46 INJECTION, SOLUTION INTRAVENOUS at 21:20

## 2022-11-03 RX ADMIN — DIAZEPAM 2.5 MG: 5 INJECTION, SOLUTION INTRAMUSCULAR; INTRAVENOUS at 16:42

## 2022-11-03 RX ADMIN — HYDROMORPHONE HYDROCHLORIDE 0.4 MG: 0.2 INJECTION, SOLUTION INTRAMUSCULAR; INTRAVENOUS; SUBCUTANEOUS at 18:23

## 2022-11-03 RX ADMIN — POTASSIUM CHLORIDE AND SODIUM CHLORIDE: 450; 150 INJECTION, SOLUTION INTRAVENOUS at 09:53

## 2022-11-03 RX ADMIN — BUPROPION HYDROCHLORIDE 75 MG: 75 TABLET, FILM COATED ORAL at 09:44

## 2022-11-03 RX ADMIN — HYOSCYAMINE SULFATE 125 MCG: 0.12 TABLET, ORALLY DISINTEGRATING ORAL at 09:50

## 2022-11-03 RX ADMIN — ACETAMINOPHEN 650 MG: 325 TABLET, FILM COATED ORAL at 00:04

## 2022-11-03 RX ADMIN — LIDOCAINE PATCH 4% 1 PATCH: 40 PATCH TOPICAL at 09:47

## 2022-11-03 RX ADMIN — IOPAMIDOL 120 ML: 755 INJECTION, SOLUTION INTRAVENOUS at 17:27

## 2022-11-03 RX ADMIN — PANTOPRAZOLE SODIUM 40 MG: 40 TABLET, DELAYED RELEASE ORAL at 09:44

## 2022-11-03 RX ADMIN — THIAMINE HYDROCHLORIDE 100 MG: 100 INJECTION, SOLUTION INTRAMUSCULAR; INTRAVENOUS at 12:56

## 2022-11-03 RX ADMIN — HYDROMORPHONE HYDROCHLORIDE 0.5 MG: 1 INJECTION, SOLUTION INTRAMUSCULAR; INTRAVENOUS; SUBCUTANEOUS at 06:40

## 2022-11-03 RX ADMIN — POTASSIUM CHLORIDE, DEXTROSE MONOHYDRATE AND SODIUM CHLORIDE: 150; 5; 450 INJECTION, SOLUTION INTRAVENOUS at 11:57

## 2022-11-03 RX ADMIN — AMOXICILLIN 1000 MG: 500 CAPSULE ORAL at 20:04

## 2022-11-03 RX ADMIN — ACETAMINOPHEN 650 MG: 325 TABLET, FILM COATED ORAL at 09:44

## 2022-11-03 RX ADMIN — AMOXICILLIN 1000 MG: 500 CAPSULE ORAL at 09:44

## 2022-11-03 RX ADMIN — METHOCARBAMOL 500 MG: 500 TABLET ORAL at 06:03

## 2022-11-03 RX ADMIN — HYDROXYZINE HYDROCHLORIDE 25 MG: 10 SOLUTION ORAL at 05:57

## 2022-11-03 ASSESSMENT — ACTIVITIES OF DAILY LIVING (ADL)
ADLS_ACUITY_SCORE: 23
ADLS_ACUITY_SCORE: 23
DOING_ERRANDS_INDEPENDENTLY_DIFFICULTY: NO
ADLS_ACUITY_SCORE: 23
EATING/SWALLOWING: SWALLOWING LIQUIDS;SWALLOWING SOLID FOOD
DIFFICULTY_COMMUNICATING: NO
ADLS_ACUITY_SCORE: 25
DRESSING/BATHING_DIFFICULTY: NO
WEAR_GLASSES_OR_BLIND: YES
EATING/SWALLOWING: SWALLOWING LIQUIDS;SWALLOWING SOLID FOOD
WALKING_OR_CLIMBING_STAIRS_DIFFICULTY: NO
DIFFICULTY_EATING/SWALLOWING: YES
DOING_ERRANDS_INDEPENDENTLY_DIFFICULTY: NO
ADLS_ACUITY_SCORE: 23
DIFFICULTY_EATING/SWALLOWING: YES
ADLS_ACUITY_SCORE: 25
WEAR_GLASSES_OR_BLIND: YES
ADLS_ACUITY_SCORE: 25
CONCENTRATING,_REMEMBERING_OR_MAKING_DECISIONS_DIFFICULTY: NO
ADLS_ACUITY_SCORE: 23
SWALLOWING: 2-->DIFFICULTY SWALLOWING LIQUIDS/FOODS
DRESSING/BATHING_DIFFICULTY: NO
ADLS_ACUITY_SCORE: 37
VISION_MANAGEMENT: GLASSES
ADLS_ACUITY_SCORE: 23
EATING: 0-->INDEPENDENT
CHANGE_IN_FUNCTIONAL_STATUS_SINCE_ONSET_OF_CURRENT_ILLNESS/INJURY: YES
EATING: 0-->INDEPENDENT
FALL_HISTORY_WITHIN_LAST_SIX_MONTHS: NO
CONCENTRATING,_REMEMBERING_OR_MAKING_DECISIONS_DIFFICULTY: NO
TOILETING_ISSUES: NO
FALL_HISTORY_WITHIN_LAST_SIX_MONTHS: NO
ADLS_ACUITY_SCORE: 25
CHANGE_IN_FUNCTIONAL_STATUS_SINCE_ONSET_OF_CURRENT_ILLNESS/INJURY: YES
TOILETING_ISSUES: NO
SWALLOWING: 0-->SWALLOWS FOODS/LIQUIDS WITHOUT DIFFICULTY
ADLS_ACUITY_SCORE: 25
HEARING_DIFFICULTY_OR_DEAF: NO
WALKING_OR_CLIMBING_STAIRS_DIFFICULTY: NO

## 2022-11-03 NOTE — PROGRESS NOTES
CLINICAL NUTRITION SERVICES - ASSESSMENT NOTE     Nutrition Prescription    RECOMMENDATIONS FOR MDs/PROVIDERS TO ORDER:  - If pt continues to struggle to tolerate meaningful po intake (including protein sources) would recommend TF via post pyloric NFT if not medically contraindicated.      Malnutrition Status:     Patient does not meet two of the established criteria necessary for diagnosing malnutrition but is at risk for malnutrition    Recommendations already ordered by Registered Dietitian (RD):  - Collaborate with other providers--bariatric resident (obtained okay for pt to order some clear/full liquid items off menu), with hospitalist (recommended checking Mg and Phos, possibly adding IV multivit and 100 mg thiamine and D5 in IVF), bedside RN.   - Per okay from bariatric surgeon--okay for pt to order (clear/full liquids) off menu--such as diluted juice, thin cream of wheat and no sugar added pudding.   - deleted Ensure Clear ordered earlier today as may promote dumping.     Future/Additional Recommendations:  - If pt continues to have poor tolerance of meaningful po intake (especially protein sources), would recommend nutrition support.   - If a post pyloric NFT could be placed, would recommend Promote @ goal of  50ml/hr  (1200ml/day)  will provide: 1200 kcals, 75 g PRO, 1006 ml free H20, 156 g CHO, and 0 g fiber daily. With addition of 1 pkt Prosource TF 20 2x/d  would provide 1360 (15 kcal/kg) and 115 gm protein (1.3 gm Pro/kg).      REASON FOR ASSESSMENT  Leah Yanez is a/an 42 year old female assessed by the dietitian for Interdisciplinary Rounds--minimal intake since 10/25 gastric sleeve and hiatal hernia repair.     NUTRITION HISTORY  Pt relates that she has always tried to eat intuitively with body acceptance.  While being worked up for hiatal hernia repair she was told she should have bariatric surgery with this.  Otherwise she had no personal desire for bariatric surgery.  She was able to lose 10  "lb in the couple of months prior to her gastric sleeve surgery and felt she was eating a balanced diet.  With 10/25 HH repair and sleeve gastrectomy she had significant esophageal swelling and she was encouraged to take liquids slowly.  Pt discharged after 5 days and continued to have trouble swallowing but over time could tolerate swallowing water.  She found that milky drinks/supplements caused thicker fluid in her esophagus that would bubble up and cause her to regurgitate what she was trying to keep down.  She has tolerated minimal oral intake since admission 10/25.  Pt notes vomiting not related to nausea but more regurgitation of intake that doesn't go down.     CURRENT NUTRITION ORDERS  Diet: bariatric full liquids   Intake/Tolerance: Pt unable to tolerate Ensure Max or Gelatein 20 sent with morning tray.  When I visited midmorning she was leaning over a tall garbage can while sitting at edge of bed.  Discussed trying to sit up as able for better oxygenation (PNA diagnosed),    IVF adjusted this AM to include D5 @ 75 ml/hr--providing 90 gm CHO/day.     LABS  Labs reviewed  11/3: Na 135, K+ 3.1, Mg and PO4 added on per RD request this AM--Mg++ 1.9, PO4 2.9.   ketone quantitative- 3.9 and BNP 1122. BUN 4.9, anion gap 18    ECHO today with EF of 55-60%, essentially nl.    MEDICATIONS  Medications reviewed  Amoxicillin 3x/d, lovenox 2x/d, neurontin 3x/d, hyoscyamine (sublingual q 4 hr), protonix EC,     GI narrowed esophagus noted since 10/25 surgery.  Regurgitation/vomiting without nausea. Low stool noted today--question if related to new antibiotic.     ANTHROPOMETRICS  Height: 182.9 cm (6' 0\")  Most Recent Weight: 125.9 kg (277 lb 8 oz)    IBW: 80 kg (using IBW + 10% for large frame).  158% IBW  BMI: Obesity Grade II BMI 35-39.9  Weight History: Suspect 11/1 wt was bed weight from ED.  No weights after end of 5 days stay to  amount of wt gain with IVF. 10# wt loss prior to admission--intentional per pt. "   Wt Readings from Last 156 Encounters:   11/01/22 125.9 kg (277 lb 8 oz)   11/01/22 125.9 kg (277 lb 8 oz)   10/25/22 122.6 kg (270 lb 4.5 oz)   10/19/22 123.7 kg (272 lb 12.8 oz)   08/18/22 126.1 kg (278 lb)   08/10/22 127.8 kg (281 lb 11.2 oz)   06/20/22 130.2 kg (287 lb)   05/19/22 129 kg (284 lb 4.8 oz)   04/13/22 128 kg (282 lb 3.2 oz)   03/10/22 122.4 kg (269 lb 13.5 oz)   02/23/22 122.5 kg (270 lb)   12/29/21 122.5 kg (270 lb)   09/09/21 117.9 kg (260 lb)   08/06/07 96.2 kg (212 lb)   06/26/06 106.6 kg (235 lb)   06/16/06 110.9 kg (244 lb 6.4 oz)   02/27/06 113.3 kg (249 lb 12.8 oz)   06/29/04 103.9 kg (229 lb)   04/14/04 102.5 kg (226 lb)   07/24/03 103 kg (227 lb)       Dosing Weight: 91.5 kg (using 126 kg 11/1 wt and IBW of 80 kg)    ASSESSED NUTRITION NEEDS  Estimated Energy Needs: 1373 - 1830 - 2290 kcals/day (15 - 20 - 25 kcals/kg)  Justification: post op bariatric surgery--higher end if pt needs interventions and catabolism not desired in the short term.   Estimated Protein Needs: 92 - 110 - 137 grams protein/day (1 - 1.2  -1.5 grams of pro/kg)  Justification: pending need for further interventions--(standard post bariatric goals vs obesity guidelines)  Estimated Fluid Needs: 2290+ mL/day (25 ml/kg)   Justification: Maintenance and Per provider pending fluid status    PHYSICAL FINDINGS  See malnutrition section below.  Full NPFE deferred as no suspect unable to feel any muscle wasting due to underlying obesity.      MALNUTRITION  % Intake: </= 50% for >/= 5 days (severe)  % Weight Loss: Weight loss does not meet criteria--prior wt loss before 10/25 admission intentional  Subcutaneous Fat Loss: None observed  Muscle Loss: None observed  Fluid Accumulation/Edema: None noted  Malnutrition Diagnosis: Patient does not meet two of the established criteria necessary for diagnosing malnutrition but is at risk for malnutrition    NUTRITION DIAGNOSIS  Altered GI function related to hiatal hernia repair and  sleeve gastrectomy as evidenced by inability to tolerate post bariatric diet, and high quantitative ketones.     INTERVENTIONS  Implementation  Nutrition Education: discussed diet options she could potentially tolerate.    Collaboration with other providers     Goals  Diet tolerance vs nutrition support within 2-3 days.     Monitoring/Evaluation  Progress toward goals will be monitored and evaluated per protocol.    Farrah Andrews RD, LD   7B (M-F) Pager: [phone]  RD Weekend/Holiday Pager: [phone]

## 2022-11-03 NOTE — PROGRESS NOTES
[facility]    Medicine Progress Note - Hospitalist Service, GOLD TEAM 7    Date of Admission:  11/1/2022    Assessment & Plan   Leah Yanez is a 42 year old female patient with a past medical history significant for depression, Raynaud's c/b gangrene one digit (resolved), concussion 2016, estrogen provoked pulmonary embolisms while on OCPs, transient weak positive anticardiolipin antibody, large symptomatic hiatal hernia c/b esophagitis and esophageal narrowing requiring dilation, and morbid obesity who is now s/p laparoscopic sleeve gastrectomy and hiatal herniorrhaphy on 10/25/22 (admitted 10/25-10/30) c/b acute blood loss anemia requiring transfusion; sent to [facility] ED from Bariatric surgery outpatient clinic secondary to concerns regarding shortness of breath and pleuritic chest pain.     Recommendations:   - Starvation ketoacidosis identified on labs. Discussed with dietician.   - Start D5 1/2 NS, oral supplements as scheduled. Electrolytes for morning due to risk of refeeding syndrome.    - Added RN magnesium and phosphorous replacement protocols.   - BNP elevated so echocardiogram ordered. I discussed this with the patient and she was in agreement. Thiamine added pending results.   - Continue amoxicillin for now. No cough, low suspicion for worsening bacterial pneumonia but will continue to monitor.   - Oxygen support as needed. Recommend saturation >90% on room air with ambulation prior to discharge.    - Appreciate surgical management of pain and inability to tolerate oral intake. Due to electrolyte abnormalities and ketosis, will need to establish better oral intake before discharge.      S/p laparoscopic sleeve gastrectomy and hiatal herniorrhaphy (10/25/22)  Acute blood loss anemia  Patient with above history who was sent from Bariatric Surgery clinic secondary to concerns for possible post-op PE found to have bilateral pneumonia. Hemoglobin 9.1  which is improved from 7.8 10/29.   - Management per primary surgery team   - Agree with continuing Lovenox BID for prophylaxis   - Agree with continuing PPI     Starvation ketoacidosis, worsening  Hypokalemia  Very poor oral intake since surgery and now starvation ketoacidosis has worsened since admission.   - Appreciate dietician consultation  - Add dextrose to fluids  - Twice daily POCT glucose to avoid hypoglycemia  - Monitor and replace magnesium, phosphorous, potassium  - Encourage oral intake. Appreciate surgical assistance with that.   - Monitor labs.     Hypoxia, persistent  Leukocytosis, improving  Bilateral Pneumonia vs atelectasis  Pleuritic Chest Pain, persistent  Elevated BNP  WBC 13.1 with left shift on admission. CT PE without evidence of PE but did preliminarily reveal findings concerning for consolidative changes in the bilateral lower lobes with trace left pleural effusion. Requiring 2L O2 without oxygen need at home. Troponin and ECG reassuring. MRSA nasal swab, strep pneumo, and legionella negative. COVID and flu negative. CRP and procal both elevated but also with recent surgery and abdominal findings.    - Continue amoxicillin to complete 5-7 day course. Monitor for any signs of worsening pneumonia.    - Appreciate surgical work-up of abdominal causes of radiating pain.   - BNP returned elevated; discussed with patient and ordered echocardiogram  - Recommend that she maintain oxygen saturation >90% on room air with ambulation and at rest prior to discharge.      Elevated LFTs  LFTs mildly elevated AST 41, Alk Phos 131 and total bili 1.5. Unclear etiology. Abdominal exam benign and afebrile. Discussed with primary team who did not think this would be related to the surgery. Hemolysis seems less likely with improving hemoglobin and normal platelets    - Trend.   - Abdominal ultrasound with cholelithiasis but no cholecystitis. Fluid collection that could be postoperative; paged general surgery  team to discuss.      Depression   - Continue PTA Wellbutrin   - Atarax prn     Diet: Bariatric Diet Full Liquids  Diet    DVT Prophylaxis: Enoxaparin (Lovenox) SQ  Holder Catheter: Not present  Central Lines: None  Cardiac Monitoring: None  Code Status:   Full code    Disposition Plan      Expected Discharge Date: 11/04/2022                The patient's care was discussed with the patient, patient visitor, bedside RN, and dietician.     Rhianna Joel MD  Hospitalist Service, [address]  Securely message with the Vocera Web Console (learn more here)  Text page via [facility] Paging/Directory   Please see signed in provider for up to date coverage information      Clinically Significant Risk Factors Present on Admission        # Hypokalemia: Lowest K = 3 mmol/L (Ref range: 3.4-5.3) in last 2 days, will replace as needed  # Hyponatremia: Lowest Na = 135 mmol/L (Ref range: 136-145) in last 2 days, will monitor as appropriate      # Hypoalbuminemia: Lowest albumin = 3 g/dL (Ref range: 3.5-5.2) at 11/3/2022  6:16 AM, will monitor as appropriate  # Drug Induced Coagulation Defect: home medication list includes an anticoagulant medication        # Obesity: Estimated body mass index is 37.64 kg/m  as calculated from the following:    Height as of this encounter: 1.829 m (6').    Weight as of this encounter: 125.9 kg (277 lb 8 oz).           ______________________________________________________________________    Interval History   Oxygen mask placed overnight due to nasal irritation and difficulty breathing through nose while sleeping. No significant cough or sputum production. Breathing still feels very shallow due to pain with deep breaths. Left-sided pain persists, sharp, worse with deep breaths, similar to yesterday. Very poor oral intake with some vomiting. No hematemesis. No weakness but short of breath with walking even short distances. No fevers or chills.      Data reviewed today: I reviewed all medications, new labs and imaging results over the last 24 hours.     Physical Exam   Vital Signs: Temp: 98.2  F (36.8  C) Temp src: Oral BP: 129/73 Pulse: 84   Resp: 21 SpO2: 95 % O2 Device: Oxymask Oxygen Delivery: 2 LPM  Weight: 277 lbs 8 oz  Physical Exam  Vitals reviewed.   Constitutional:       General: She is not in acute distress.     Appearance: She is not toxic-appearing.   HENT:      Head: Atraumatic.      Right Ear: External ear normal.      Left Ear: External ear normal.      Nose: Nose normal.      Mouth/Throat:      Mouth: Mucous membranes are moist.   Eyes:      General:         Right eye: No discharge.         Left eye: No discharge.      Conjunctiva/sclera: Conjunctivae normal.   Cardiovascular:      Rate and Rhythm: Normal rate and regular rhythm.      Pulses: Normal pulses.      Heart sounds: No murmur heard.  Pulmonary:      Effort: Pulmonary effort is normal. No respiratory distress.      Breath sounds: No wheezing.      Comments: Oxygen mask in place. Shallow breathing. No wheezes or rales. Talking in full sentences.   Abdominal:      General: Bowel sounds are normal. There is no distension.      Palpations: Abdomen is soft.      Tenderness: There is no abdominal tenderness. There is no guarding.   Musculoskeletal:         General: No tenderness.      Cervical back: Neck supple.      Right lower leg: No edema.      Left lower leg: No edema.   Skin:     General: Skin is warm.      Capillary Refill: Capillary refill takes less than 2 seconds.      Findings: No rash.   Neurological:      General: No focal deficit present.      Mental Status: She is alert.   Psychiatric:         Behavior: Behavior normal.         Thought Content: Thought content normal.         Data   Recent Labs   Lab 11/03/22  0616 11/02/22  1922 11/02/22  0636 11/01/22  1734   WBC 11.0  --  12.1* 13.1*   HGB 8.3*  --  8.5* 9.1*   MCV 86  --  86 85     --  299 327   *  --   135* 136   POTASSIUM 3.1* 4.0 3.0* 3.3*   CHLORIDE 102  --  101 102   CO2 15*  --  18* 17*   BUN 4.9*  --  6.7 7.6   CR 0.80  --  1.00* 0.86   ANIONGAP 18*  --  16* 17*   KIMBERLYN 9.0  --  8.5* 9.0   GLC 83  --  78 73   ALBUMIN 3.0*  --  3.2* 3.5   PROTTOTAL 6.3*  --  6.1* 6.6   BILITOTAL 1.1  --  1.3* 1.5*   ALKPHOS 120*  --  126* 131*   ALT 16  --  23 32   AST 37*  --  31 41*   LIPASE  --   --   --  68*     Recent Results (from the past 24 hour(s))   XR Upper GI with KUB    Narrative    Gastrografin and barium Contrast Upper GI, 11/2/2022     Comparison: CT abdomen 4/11/2022    History: 42-year-old female with previous laparoscopic sleeve  gastrostomy and hiatal hernia repair on 10/25. Presented on 11/1 with  upper abdominal pain. Evaluation for leak.    Fluoroscopy time: 2.8 minutes.    Findings:   On the  film surgical clips are seen within the upper abdomen  consistent with the patient's history. There is a paucity of bowel  gas. No acute osseous abnormalities. The bladder is filled with  contrast from CT from the day prior. Bibasilar streaky opacities are  seen in the lower lung fields, grossly unchanged from the chest x-ray  from the day prior.    There is prolonged stasis of contrast at the gastroesophageal  junction, with delayed passage of contrast into the stomach. There are  surgical clips and a suture line along the greater curvature of the  stomach with a narrow lumen consistent with sleeve gastrectomy  history. There is free passage of contrast from the prepyloric stomach  into the duodenum. No extraluminal leak identified.    The visualized duodenum is within normal limits.       Impression    Impression:   1. Status post sleeve gastrectomy without evidence of leak.  2. Prolonged stasis of contrast in the distal esophagus. The gastric  lumen appears very narrow which may be accentuated by postoperative  edema. Contrast easily passed from the prepyloric stomach into the  proximal duodenum.    I,  BARBRA MARTIN MD, attest that I was present for all critical  portions of the procedure and was immediately available to provide  guidance and assistance during the remainder of the procedure.    I have personally reviewed the examination and initial interpretation  and I agree with the findings.    BARBRA MARTIN MD         SYSTEM ID:  [id number]

## 2022-11-03 NOTE — PROVIDER NOTIFICATION
"Writer paged MD \"Pt still having nausea and emesis after IV zofran, can you order IV compazine? Thanks!\"  "

## 2022-11-03 NOTE — PLAN OF CARE
Problem: Plan of Care - These are the overarching goals to be used throughout the patient stay.    Goal: Plan of Care Review  Flowsheets (Taken 11/3/2022 2724)  Outcome Evaluation: Tolerate bariatric diet (including protein sources) or start nutrition support.  Plan of Care Reviewed With: patient  Overall Patient Progress: declining   Goal Outcome Evaluation:      Plan of Care Reviewed With: patient    Overall Patient Progress: decliningOverall Patient Progress: declining    Outcome Evaluation: Tolerate bariatric diet (including protein sources) or start nutrition support.

## 2022-11-03 NOTE — PLAN OF CARE
/62 (BP Location: Right arm, Patient Position: Semi-Torres's, Cuff Size: Adult Large)   Pulse 91   Temp (!) 96.1  F (35.6  C) (Oral)   Resp 28   Ht 1.829 m (6')   Wt 125.9 kg (277 lb 8 oz)   LMP 10/01/2022   SpO2 95%   BMI 37.64 kg/m      Admitted/transferred from:   2 RN full   skin assessment completed by Melani Soares RN and Nilam Galindo.  Skin assessment finding: other 5 abdominal lap sites. No other skin issues     Interventions/actions: other none      Will continue to monitor.       Reason for admission: Shortness of breath, Pleuritic chest pain  Neuro: AOx4, Pt had severe anxiety  Pain: Denies  Resp: rapid  GI/: Up to bathroom, LBM: 10/31/2022  Skin/Wound: Skin checked by 2 nurses during transfer to Unit 7B; 5 Lap sites  Access: L AC infusing LR at 125  Labs: Reviewed  Activity: Standby assist  Nutrition: Bariatric full liquid diet  Changes this shift: tolerating lower O2 levels  Plan:  Possible discharge

## 2022-11-03 NOTE — PROGRESS NOTES
EGS Surgery Progress Note  11/03/2022       Subjective:  Struggling with pain and anxiety this morning. Tearful, worried that she will not be able to handle the pain at home. Tolerating small sips of clears. No nausea.       Objective:  /62 (BP Location: Right arm, Patient Position: Semi-Torres's, Cuff Size: Adult Large)   Pulse 91   Temp (!) 96.1  F (35.6  C) (Oral)   Resp 26   Ht 1.829 m (6')   Wt 125.9 kg (277 lb 8 oz)   LMP 10/01/2022   SpO2 95%   BMI 37.64 kg/m    Gen: Awake, alert, tearful  CV: RRR  Resp: shallow breathing on 2L   Abd: obese, soft, nondistended, nontender, incisions c/d/i with steri strips  Ext: WWP, no edema    I/O:   UOP 1x   BM 1x     Labs:  All labs reviewed, notable for:  Na 135  K 3.1  Alk phos 120  AST 37  WBC 11  Hgb 8.3    Imaging:  US Abdomen Limited  Result Date: 11/2/2022  IMPRESSION: 1. Septated multifaceted fluid collection measuring 9.4 x 6.4 x 7.6 cm surrounding the lateral left hepatic lobe near the surgical site, which may represent postoperative seroma/hematoma. 2. Cholelithiasis [Access Center:  Septated fluid collection measuring 9.4 x 6.4 x 7.6 cm in the surgical site. This report will be copied to the [facility] to ensure a provider acknowledges the finding. Access Center is available Monday through Friday 8am-3:30 pm. I have personally reviewed the examination and initial interpretation and I agree with the findings. BC HAMMER,    SYSTEM ID:  [id number]    XR Upper GI with KUB  Result Date: 11/2/2022  Impression: 1. Status post sleeve gastrectomy without evidence of leak. 2. Prolonged stasis of contrast in the distal esophagus. The gastric lumen appears very narrow which may be accentuated by postoperative edema. Contrast easily passed from the prepyloric stomach into the proximal duodenum. I, BARBRA MARTIN MD, attest that I was present for all critical portions of the procedure and was immediately available to provide guidance and  assistance during the remainder of the procedure. I have personally reviewed the examination and initial interpretation and I agree with the findings. BARBRA MARTIN MD   SYSTEM ID:  [id number]      Assessment/Plan:   Leah Yanez is a pleasant 42 year old female who is s/p laparoscopic sleeve gastrectomy and hiatal hernia repair on 10/25/22 with Dr. Aragon who returned to the ED with LUQ abdominal and shoulder pain, was found to have bilateral lower lobe pneumonia and new oxygen requirement. No evidence of PE. RUQ US obtained to evaluate elevated Tbili, found to have septated fluid collection along left hepatic lobe consistent with hematoma, likely the explanation for her acute blood loss anemia post operatively. No evidence to suggest ongoing bleeding at this point. UGI study obtained without evidence for leak. Leah continues to struggle with pain which prevents her from taking deep breaths.     - multimodal pain regimen: acetaminophen 650mg q4h, oxycodone 5mg q6h prn, atarax 25mg q6h prn. Will add gabapentin 300mg TID and Levsin 125mcg q4h today.  - wean oxygen, encourage IS and time out of bed  - bariatric full liquid diet as tolerated  - replace K  - continue LR at 125cc/hr  - Lovenox BID for dvt ppx  - PTA wellbutrin, protonix, senna prn  - appreciate medicine assistance and recommendations, continue amoxicillin for PNA  Dispo: anticipate later today vs tomorrow     Seen, examined, and discussed with chief resident and staff.  - - - - - - - - - - - - - - - - - -  Danni Clifton MD  General Surgery PGY-2

## 2022-11-03 NOTE — PROVIDER NOTIFICATION
"Writer lizzie WILEY \"CASSIDY Yanez [id number] 7B Pt vomited 3 times and once after IV zofran. Pt having diarhea. Tachypneic and \"feeling awful\" can you advise? thanks! Kamilah Richardson RN\"  "

## 2022-11-03 NOTE — PROVIDER NOTIFICATION
"Writer lizzie WILEY \"CASSIDY Yanez [id number] 7B Critical lab value- Ketone quantitative 3.9, can you let me know you get this page? thanks! Kamilah RINCON RN [id number]'  "

## 2022-11-03 NOTE — PLAN OF CARE
Goal Outcome Evaluation:    /63 (BP Location: Right arm)   Pulse 85   Temp 98.7  F (37.1  C) (Oral)   Resp 20   Ht 1.829 m (6')   Wt 125.9 kg (277 lb 8 oz)   LMP 10/01/2022   SpO2 96%   BMI 37.64 kg/m      Status: VSS, admitted for bilateral lower lobe pneumonia and new oxygen requirement  Pain/Nausea: Pain in LLQ abdomen and L shoulder- managed with PRN IV Dilaudid. Cont nausea, 4 emesis- gave IV zofran with no relief, resting bowels  Mobility: SBA- ambulated in room  Diet: Shreyas Full L- not tolerating  Labs: K 3.1- replaced orally then 3.2- replacing IV, Mg 1.9- replaced IV, Ph 2.9, BG 90, Ketone 3.9 (critical)  LDAs: R PIV- MIFF @75mL/hr, L PIV- TKO  Skin/incisions: Intact ex 5 abd lap sites- steri strips  Neuro: A&Ox4, extremely anxious- gave IV Valium, CMS intact  Respiratory: 2.5L oxymask sating >90%. LS clear. Tachypneic. SOB, TONY  Cardiac: X tachycardia within parameters  GI/: Voiding spontaneously, not saving. BM today-watery. +BS, not passing flatus  New Changes: CT of abdomen, excessive nausea this AM even with small sips, cannot tolerate PO, bowel rest, IV pain meds and anxiety meds  Plan: Continue with POC

## 2022-11-04 ENCOUNTER — APPOINTMENT (OUTPATIENT)
Dept: GENERAL RADIOLOGY | Facility: CLINIC | Age: 42
DRG: 907 | End: 2022-11-04
Payer: COMMERCIAL

## 2022-11-04 ENCOUNTER — ANESTHESIA EVENT (OUTPATIENT)
Dept: SURGERY | Facility: CLINIC | Age: 42
DRG: 907 | End: 2022-11-04
Payer: COMMERCIAL

## 2022-11-04 ENCOUNTER — ANESTHESIA (OUTPATIENT)
Dept: SURGERY | Facility: CLINIC | Age: 42
DRG: 907 | End: 2022-11-04
Payer: COMMERCIAL

## 2022-11-04 LAB
ABO/RH(D): NORMAL
ALBUMIN SERPL BCG-MCNC: 2.8 G/DL (ref 3.5–5.2)
ALP SERPL-CCNC: 125 U/L (ref 35–104)
ALT SERPL W P-5'-P-CCNC: 17 U/L (ref 10–35)
ANION GAP SERPL CALCULATED.3IONS-SCNC: 13 MMOL/L (ref 7–15)
ANTIBODY SCREEN: NEGATIVE
AST SERPL W P-5'-P-CCNC: 41 U/L (ref 10–35)
BASE EXCESS BLDA CALC-SCNC: -1.8 MMOL/L (ref -9.6–2)
BASE EXCESS BLDA CALC-SCNC: 0.4 MMOL/L (ref -9.6–2)
BASOPHILS # BLD AUTO: 0 10E3/UL (ref 0–0.2)
BASOPHILS NFR BLD AUTO: 0 %
BILIRUB SERPL-MCNC: 0.8 MG/DL
BUN SERPL-MCNC: 2.3 MG/DL (ref 6–20)
CA-I BLD-MCNC: 4.4 MG/DL (ref 4.4–5.2)
CA-I BLD-MCNC: 4.6 MG/DL (ref 4.4–5.2)
CALCIUM SERPL-MCNC: 8.9 MG/DL (ref 8.6–10)
CHLORIDE SERPL-SCNC: 106 MMOL/L (ref 98–107)
CREAT SERPL-MCNC: 0.81 MG/DL (ref 0.51–0.95)
DEPRECATED HCO3 PLAS-SCNC: 19 MMOL/L (ref 22–29)
EOSINOPHIL # BLD AUTO: 0 10E3/UL (ref 0–0.7)
EOSINOPHIL NFR BLD AUTO: 0 %
ERYTHROCYTE [DISTWIDTH] IN BLOOD BY AUTOMATED COUNT: 17.7 % (ref 10–15)
GFR SERPL CREATININE-BSD FRML MDRD: >90 ML/MIN/1.73M2
GLUCOSE BLD-MCNC: 116 MG/DL (ref 70–99)
GLUCOSE BLD-MCNC: 141 MG/DL (ref 70–99)
GLUCOSE BLDC GLUCOMTR-MCNC: 108 MG/DL (ref 70–99)
GLUCOSE BLDC GLUCOMTR-MCNC: 118 MG/DL (ref 70–99)
GLUCOSE SERPL-MCNC: 119 MG/DL (ref 70–99)
HCO3 BLDA-SCNC: 24 MMOL/L (ref 21–28)
HCO3 BLDA-SCNC: 25 MMOL/L (ref 21–28)
HCT VFR BLD AUTO: 28.4 % (ref 35–47)
HGB BLD-MCNC: 8.2 G/DL (ref 11.7–15.7)
HGB BLD-MCNC: 8.5 G/DL (ref 11.7–15.7)
HGB BLD-MCNC: 8.7 G/DL (ref 11.7–15.7)
IMM GRANULOCYTES # BLD: 0.2 10E3/UL
IMM GRANULOCYTES NFR BLD: 2 %
LACTATE BLD-SCNC: 0.5 MMOL/L
LACTATE BLD-SCNC: 0.5 MMOL/L
LYMPHOCYTES # BLD AUTO: 0.9 10E3/UL (ref 0.8–5.3)
LYMPHOCYTES NFR BLD AUTO: 9 %
MAGNESIUM SERPL-MCNC: 2 MG/DL (ref 1.7–2.3)
MCH RBC QN AUTO: 26 PG (ref 26.5–33)
MCHC RBC AUTO-ENTMCNC: 30.6 G/DL (ref 31.5–36.5)
MCV RBC AUTO: 85 FL (ref 78–100)
MONOCYTES # BLD AUTO: 0.7 10E3/UL (ref 0–1.3)
MONOCYTES NFR BLD AUTO: 8 %
NEUTROPHILS # BLD AUTO: 7.5 10E3/UL (ref 1.6–8.3)
NEUTROPHILS NFR BLD AUTO: 81 %
NRBC # BLD AUTO: 0 10E3/UL
NRBC BLD AUTO-RTO: 0 /100
O2/TOTAL GAS SETTING VFR VENT: 100 %
O2/TOTAL GAS SETTING VFR VENT: 65 %
PCO2 BLDA: 41 MM HG (ref 35–45)
PCO2 BLDA: 43 MM HG (ref 35–45)
PH BLDA: 7.35 [PH] (ref 7.35–7.45)
PH BLDA: 7.4 [PH] (ref 7.35–7.45)
PHOSPHATE SERPL-MCNC: 2.2 MG/DL (ref 2.5–4.5)
PLATELET # BLD AUTO: 298 10E3/UL (ref 150–450)
PO2 BLDA: 74 MM HG (ref 80–105)
PO2 BLDA: 92 MM HG (ref 80–105)
POTASSIUM BLD-SCNC: 3.5 MMOL/L (ref 3.5–5)
POTASSIUM BLD-SCNC: 3.5 MMOL/L (ref 3.5–5)
POTASSIUM SERPL-SCNC: 3.4 MMOL/L (ref 3.4–5.3)
POTASSIUM SERPL-SCNC: 3.5 MMOL/L (ref 3.4–5.3)
POTASSIUM SERPL-SCNC: 3.5 MMOL/L (ref 3.4–5.3)
PROT SERPL-MCNC: 6.3 G/DL (ref 6.4–8.3)
RBC # BLD AUTO: 3.35 10E6/UL (ref 3.8–5.2)
SODIUM BLD-SCNC: 139 MMOL/L (ref 133–144)
SODIUM BLD-SCNC: 139 MMOL/L (ref 133–144)
SODIUM SERPL-SCNC: 138 MMOL/L (ref 136–145)
SPECIMEN EXPIRATION DATE: NORMAL
WBC # BLD AUTO: 9.3 10E3/UL (ref 4–11)

## 2022-11-04 PROCEDURE — 200N000002 HC R&B ICU UMMC

## 2022-11-04 PROCEDURE — 250N000009 HC RX 250: Performed by: NURSE ANESTHETIST, CERTIFIED REGISTERED

## 2022-11-04 PROCEDURE — 250N000013 HC RX MED GY IP 250 OP 250 PS 637: Performed by: ANESTHESIOLOGY

## 2022-11-04 PROCEDURE — 258N000003 HC RX IP 258 OP 636: Performed by: PHYSICIAN ASSISTANT

## 2022-11-04 PROCEDURE — 250N000011 HC RX IP 250 OP 636

## 2022-11-04 PROCEDURE — 250N000013 HC RX MED GY IP 250 OP 250 PS 637: Performed by: STUDENT IN AN ORGANIZED HEALTH CARE EDUCATION/TRAINING PROGRAM

## 2022-11-04 PROCEDURE — 84132 ASSAY OF SERUM POTASSIUM: CPT | Performed by: PHYSICIAN ASSISTANT

## 2022-11-04 PROCEDURE — 258N000003 HC RX IP 258 OP 636: Performed by: STUDENT IN AN ORGANIZED HEALTH CARE EDUCATION/TRAINING PROGRAM

## 2022-11-04 PROCEDURE — 370N000017 HC ANESTHESIA TECHNICAL FEE, PER MIN: Performed by: SURGERY

## 2022-11-04 PROCEDURE — 250N000013 HC RX MED GY IP 250 OP 250 PS 637

## 2022-11-04 PROCEDURE — 94002 VENT MGMT INPAT INIT DAY: CPT

## 2022-11-04 PROCEDURE — 86923 COMPATIBILITY TEST ELECTRIC: CPT

## 2022-11-04 PROCEDURE — 80053 COMPREHEN METABOLIC PANEL: CPT | Performed by: STUDENT IN AN ORGANIZED HEALTH CARE EDUCATION/TRAINING PROGRAM

## 2022-11-04 PROCEDURE — 99232 SBSQ HOSP IP/OBS MODERATE 35: CPT | Performed by: STUDENT IN AN ORGANIZED HEALTH CARE EDUCATION/TRAINING PROGRAM

## 2022-11-04 PROCEDURE — 84132 ASSAY OF SERUM POTASSIUM: CPT

## 2022-11-04 PROCEDURE — 85025 COMPLETE CBC W/AUTO DIFF WBC: CPT | Performed by: STUDENT IN AN ORGANIZED HEALTH CARE EDUCATION/TRAINING PROGRAM

## 2022-11-04 PROCEDURE — 49329 UNLSTD LAPS PX ABD PERTM&OMN: CPT | Mod: 78 | Performed by: SURGERY

## 2022-11-04 PROCEDURE — 250N000011 HC RX IP 250 OP 636: Performed by: STUDENT IN AN ORGANIZED HEALTH CARE EDUCATION/TRAINING PROGRAM

## 2022-11-04 PROCEDURE — 36415 COLL VENOUS BLD VENIPUNCTURE: CPT | Performed by: STUDENT IN AN ORGANIZED HEALTH CARE EDUCATION/TRAINING PROGRAM

## 2022-11-04 PROCEDURE — 86850 RBC ANTIBODY SCREEN: CPT | Performed by: ANESTHESIOLOGY

## 2022-11-04 PROCEDURE — 272N000001 HC OR GENERAL SUPPLY STERILE: Performed by: SURGERY

## 2022-11-04 PROCEDURE — 84132 ASSAY OF SERUM POTASSIUM: CPT | Performed by: STUDENT IN AN ORGANIZED HEALTH CARE EDUCATION/TRAINING PROGRAM

## 2022-11-04 PROCEDURE — 99291 CRITICAL CARE FIRST HOUR: CPT | Mod: 24 | Performed by: STUDENT IN AN ORGANIZED HEALTH CARE EDUCATION/TRAINING PROGRAM

## 2022-11-04 PROCEDURE — 250N000011 HC RX IP 250 OP 636: Performed by: ANESTHESIOLOGY

## 2022-11-04 PROCEDURE — 710N000010 HC RECOVERY PHASE 1, LEVEL 2, PER MIN: Performed by: SURGERY

## 2022-11-04 PROCEDURE — 86901 BLOOD TYPING SEROLOGIC RH(D): CPT | Performed by: ANESTHESIOLOGY

## 2022-11-04 PROCEDURE — 250N000025 HC SEVOFLURANE, PER MIN: Performed by: SURGERY

## 2022-11-04 PROCEDURE — 999N000065 XR CHEST PORT 1 VIEW

## 2022-11-04 PROCEDURE — 36415 COLL VENOUS BLD VENIPUNCTURE: CPT | Performed by: PHYSICIAN ASSISTANT

## 2022-11-04 PROCEDURE — 258N000003 HC RX IP 258 OP 636: Performed by: NURSE ANESTHETIST, CERTIFIED REGISTERED

## 2022-11-04 PROCEDURE — 250N000011 HC RX IP 250 OP 636: Performed by: NURSE ANESTHETIST, CERTIFIED REGISTERED

## 2022-11-04 PROCEDURE — 360N000076 HC SURGERY LEVEL 3, PER MIN: Performed by: SURGERY

## 2022-11-04 PROCEDURE — 999N000185 HC STATISTIC TRANSPORT TIME EA 15 MIN

## 2022-11-04 PROCEDURE — 250N000011 HC RX IP 250 OP 636: Performed by: SURGERY

## 2022-11-04 PROCEDURE — 5A1945Z RESPIRATORY VENTILATION, 24-96 CONSECUTIVE HOURS: ICD-10-PCS | Performed by: SURGERY

## 2022-11-04 PROCEDURE — 999N000141 HC STATISTIC PRE-PROCEDURE NURSING ASSESSMENT: Performed by: SURGERY

## 2022-11-04 PROCEDURE — 82330 ASSAY OF CALCIUM: CPT

## 2022-11-04 PROCEDURE — 250N000013 HC RX MED GY IP 250 OP 250 PS 637: Performed by: PHYSICIAN ASSISTANT

## 2022-11-04 PROCEDURE — 71045 X-RAY EXAM CHEST 1 VIEW: CPT | Mod: 26 | Performed by: RADIOLOGY

## 2022-11-04 PROCEDURE — 250N000013 HC RX MED GY IP 250 OP 250 PS 637: Performed by: SURGERY

## 2022-11-04 PROCEDURE — 250N000009 HC RX 250: Performed by: STUDENT IN AN ORGANIZED HEALTH CARE EDUCATION/TRAINING PROGRAM

## 2022-11-04 PROCEDURE — 258N000001 HC RX 258: Performed by: SURGERY

## 2022-11-04 PROCEDURE — 250N000009 HC RX 250: Performed by: PHYSICIAN ASSISTANT

## 2022-11-04 PROCEDURE — 84100 ASSAY OF PHOSPHORUS: CPT | Performed by: STUDENT IN AN ORGANIZED HEALTH CARE EDUCATION/TRAINING PROGRAM

## 2022-11-04 PROCEDURE — 258N000003 HC RX IP 258 OP 636: Performed by: ANESTHESIOLOGY

## 2022-11-04 PROCEDURE — 999N000157 HC STATISTIC RCP TIME EA 10 MIN

## 2022-11-04 PROCEDURE — 0WCG4ZZ EXTIRPATION OF MATTER FROM PERITONEAL CAVITY, PERCUTANEOUS ENDOSCOPIC APPROACH: ICD-10-PCS | Performed by: SURGERY

## 2022-11-04 PROCEDURE — 83735 ASSAY OF MAGNESIUM: CPT | Performed by: STUDENT IN AN ORGANIZED HEALTH CARE EDUCATION/TRAINING PROGRAM

## 2022-11-04 RX ORDER — PROPOFOL 10 MG/ML
INJECTION, EMULSION INTRAVENOUS PRN
Status: DISCONTINUED | OUTPATIENT
Start: 2022-11-04 | End: 2022-11-05 | Stop reason: CLARIF

## 2022-11-04 RX ORDER — ONDANSETRON 2 MG/ML
4 INJECTION INTRAMUSCULAR; INTRAVENOUS EVERY 30 MIN PRN
Status: DISCONTINUED | OUTPATIENT
Start: 2022-11-04 | End: 2022-11-04 | Stop reason: HOSPADM

## 2022-11-04 RX ORDER — DEXTROSE MONOHYDRATE 25 G/50ML
25-50 INJECTION, SOLUTION INTRAVENOUS
Status: DISCONTINUED | OUTPATIENT
Start: 2022-11-04 | End: 2022-11-16

## 2022-11-04 RX ORDER — CEFAZOLIN SODIUM/WATER 3 G/30 ML
3 SYRINGE (ML) INTRAVENOUS SEE ADMIN INSTRUCTIONS
Status: DISCONTINUED | OUTPATIENT
Start: 2022-11-04 | End: 2022-11-04 | Stop reason: HOSPADM

## 2022-11-04 RX ORDER — OXYCODONE HYDROCHLORIDE 5 MG/1
5 TABLET ORAL EVERY 4 HOURS PRN
Status: DISCONTINUED | OUTPATIENT
Start: 2022-11-04 | End: 2022-11-04 | Stop reason: HOSPADM

## 2022-11-04 RX ORDER — PROPOFOL 10 MG/ML
5-75 INJECTION, EMULSION INTRAVENOUS CONTINUOUS
Status: DISCONTINUED | OUTPATIENT
Start: 2022-11-04 | End: 2022-11-04

## 2022-11-04 RX ORDER — ALBUTEROL SULFATE 90 UG/1
AEROSOL, METERED RESPIRATORY (INHALATION) PRN
Status: DISCONTINUED | OUTPATIENT
Start: 2022-11-04 | End: 2022-11-05 | Stop reason: CLARIF

## 2022-11-04 RX ORDER — SODIUM CHLORIDE, SODIUM LACTATE, POTASSIUM CHLORIDE, CALCIUM CHLORIDE 600; 310; 30; 20 MG/100ML; MG/100ML; MG/100ML; MG/100ML
INJECTION, SOLUTION INTRAVENOUS CONTINUOUS
Status: DISCONTINUED | OUTPATIENT
Start: 2022-11-04 | End: 2022-11-04 | Stop reason: HOSPADM

## 2022-11-04 RX ORDER — LABETALOL HYDROCHLORIDE 5 MG/ML
10 INJECTION, SOLUTION INTRAVENOUS
Status: DISCONTINUED | OUTPATIENT
Start: 2022-11-04 | End: 2022-11-04 | Stop reason: HOSPADM

## 2022-11-04 RX ORDER — CEFAZOLIN SODIUM/WATER 3 G/30 ML
3 SYRINGE (ML) INTRAVENOUS
Status: DISCONTINUED | OUTPATIENT
Start: 2022-11-04 | End: 2022-11-04 | Stop reason: HOSPADM

## 2022-11-04 RX ORDER — ERTAPENEM 1 G/1
INJECTION, POWDER, LYOPHILIZED, FOR SOLUTION INTRAMUSCULAR; INTRAVENOUS PRN
Status: DISCONTINUED | OUTPATIENT
Start: 2022-11-04 | End: 2022-11-05 | Stop reason: CLARIF

## 2022-11-04 RX ORDER — ONDANSETRON 4 MG/1
4 TABLET, ORALLY DISINTEGRATING ORAL EVERY 30 MIN PRN
Status: DISCONTINUED | OUTPATIENT
Start: 2022-11-04 | End: 2022-11-04 | Stop reason: HOSPADM

## 2022-11-04 RX ORDER — ENOXAPARIN SODIUM 100 MG/ML
40 INJECTION SUBCUTANEOUS ONCE
Status: COMPLETED | OUTPATIENT
Start: 2022-11-04 | End: 2022-11-04

## 2022-11-04 RX ORDER — NICOTINE POLACRILEX 4 MG
15-30 LOZENGE BUCCAL
Status: DISCONTINUED | OUTPATIENT
Start: 2022-11-04 | End: 2022-11-16

## 2022-11-04 RX ORDER — SODIUM CHLORIDE, SODIUM LACTATE, POTASSIUM CHLORIDE, CALCIUM CHLORIDE 600; 310; 30; 20 MG/100ML; MG/100ML; MG/100ML; MG/100ML
INJECTION, SOLUTION INTRAVENOUS CONTINUOUS PRN
Status: DISCONTINUED | OUTPATIENT
Start: 2022-11-04 | End: 2022-11-05 | Stop reason: CLARIF

## 2022-11-04 RX ORDER — MAGNESIUM OXIDE 400 MG/1
400 TABLET ORAL EVERY 4 HOURS
Status: COMPLETED | OUTPATIENT
Start: 2022-11-04 | End: 2022-11-04

## 2022-11-04 RX ORDER — FENTANYL CITRATE 50 UG/ML
INJECTION, SOLUTION INTRAMUSCULAR; INTRAVENOUS PRN
Status: DISCONTINUED | OUTPATIENT
Start: 2022-11-04 | End: 2022-11-05 | Stop reason: CLARIF

## 2022-11-04 RX ORDER — ONDANSETRON 2 MG/ML
INJECTION INTRAMUSCULAR; INTRAVENOUS PRN
Status: DISCONTINUED | OUTPATIENT
Start: 2022-11-04 | End: 2022-11-05 | Stop reason: CLARIF

## 2022-11-04 RX ORDER — CYCLOBENZAPRINE HCL 10 MG
10 TABLET ORAL EVERY 8 HOURS PRN
Status: DISCONTINUED | OUTPATIENT
Start: 2022-11-04 | End: 2022-11-05

## 2022-11-04 RX ORDER — HYDROMORPHONE HCL IN WATER/PF 6 MG/30 ML
.2-.4 PATIENT CONTROLLED ANALGESIA SYRINGE INTRAVENOUS
Status: DISCONTINUED | OUTPATIENT
Start: 2022-11-04 | End: 2022-11-06

## 2022-11-04 RX ORDER — HYDROMORPHONE HCL IN WATER/PF 6 MG/30 ML
0.2 PATIENT CONTROLLED ANALGESIA SYRINGE INTRAVENOUS EVERY 5 MIN PRN
Status: DISCONTINUED | OUTPATIENT
Start: 2022-11-04 | End: 2022-11-04 | Stop reason: HOSPADM

## 2022-11-04 RX ORDER — FENTANYL CITRATE 50 UG/ML
25 INJECTION, SOLUTION INTRAMUSCULAR; INTRAVENOUS EVERY 5 MIN PRN
Status: DISCONTINUED | OUTPATIENT
Start: 2022-11-04 | End: 2022-11-04 | Stop reason: HOSPADM

## 2022-11-04 RX ORDER — HYDROMORPHONE HYDROCHLORIDE 1 MG/ML
0.5 INJECTION, SOLUTION INTRAMUSCULAR; INTRAVENOUS; SUBCUTANEOUS ONCE
Status: COMPLETED | OUTPATIENT
Start: 2022-11-04 | End: 2022-11-04

## 2022-11-04 RX ORDER — PROPOFOL 10 MG/ML
5-75 INJECTION, EMULSION INTRAVENOUS CONTINUOUS
Status: DISCONTINUED | OUTPATIENT
Start: 2022-11-04 | End: 2022-11-06

## 2022-11-04 RX ORDER — LIDOCAINE HYDROCHLORIDE 20 MG/ML
INJECTION, SOLUTION INFILTRATION; PERINEURAL PRN
Status: DISCONTINUED | OUTPATIENT
Start: 2022-11-04 | End: 2022-11-05 | Stop reason: CLARIF

## 2022-11-04 RX ADMIN — MAGNESIUM OXIDE TAB 400 MG (241.3 MG ELEMENTAL MG) 400 MG: 400 (241.3 MG) TAB at 12:29

## 2022-11-04 RX ADMIN — HYOSCYAMINE SULFATE 125 MCG: 0.12 TABLET, ORALLY DISINTEGRATING ORAL at 10:04

## 2022-11-04 RX ADMIN — METHOCARBAMOL 500 MG: 500 TABLET ORAL at 12:35

## 2022-11-04 RX ADMIN — FENTANYL CITRATE 50 MCG: 50 INJECTION, SOLUTION INTRAMUSCULAR; INTRAVENOUS at 17:09

## 2022-11-04 RX ADMIN — HYDROMORPHONE HYDROCHLORIDE 0.4 MG: 0.2 INJECTION, SOLUTION INTRAMUSCULAR; INTRAVENOUS; SUBCUTANEOUS at 10:31

## 2022-11-04 RX ADMIN — ONDANSETRON 4 MG: 2 INJECTION INTRAMUSCULAR; INTRAVENOUS at 19:09

## 2022-11-04 RX ADMIN — PANTOPRAZOLE SODIUM 40 MG: 40 TABLET, DELAYED RELEASE ORAL at 08:52

## 2022-11-04 RX ADMIN — OXYCODONE HYDROCHLORIDE 5 MG: 5 TABLET ORAL at 02:22

## 2022-11-04 RX ADMIN — SODIUM CHLORIDE, POTASSIUM CHLORIDE, SODIUM LACTATE AND CALCIUM CHLORIDE 1000 ML: 600; 310; 30; 20 INJECTION, SOLUTION INTRAVENOUS at 20:00

## 2022-11-04 RX ADMIN — AMOXICILLIN 1000 MG: 500 CAPSULE ORAL at 14:47

## 2022-11-04 RX ADMIN — HYDROMORPHONE HYDROCHLORIDE 0.4 MG: 0.2 INJECTION, SOLUTION INTRAMUSCULAR; INTRAVENOUS; SUBCUTANEOUS at 03:10

## 2022-11-04 RX ADMIN — HYDROMORPHONE HYDROCHLORIDE 0.4 MG: 0.2 INJECTION, SOLUTION INTRAMUSCULAR; INTRAVENOUS; SUBCUTANEOUS at 12:30

## 2022-11-04 RX ADMIN — ACETAMINOPHEN 650 MG: 325 TABLET, FILM COATED ORAL at 23:26

## 2022-11-04 RX ADMIN — ERTAPENEM SODIUM 1 G: 1 INJECTION, POWDER, LYOPHILIZED, FOR SOLUTION INTRAMUSCULAR; INTRAVENOUS at 17:17

## 2022-11-04 RX ADMIN — ENOXAPARIN SODIUM 40 MG: 40 INJECTION SUBCUTANEOUS at 09:59

## 2022-11-04 RX ADMIN — Medication 50 MG: at 17:09

## 2022-11-04 RX ADMIN — PROPOFOL 60 MCG/KG/MIN: 10 INJECTION, EMULSION INTRAVENOUS at 23:51

## 2022-11-04 RX ADMIN — LIDOCAINE PATCH 4% 1 PATCH: 40 PATCH TOPICAL at 08:52

## 2022-11-04 RX ADMIN — Medication 30 MG: at 17:43

## 2022-11-04 RX ADMIN — POTASSIUM PHOSPHATE, MONOBASIC POTASSIUM PHOSPHATE, DIBASIC 15 MMOL: 224; 236 INJECTION, SOLUTION, CONCENTRATE INTRAVENOUS at 12:49

## 2022-11-04 RX ADMIN — ACETAMINOPHEN 650 MG: 325 TABLET, FILM COATED ORAL at 10:00

## 2022-11-04 RX ADMIN — HYDROMORPHONE HYDROCHLORIDE 0.4 MG: 0.2 INJECTION, SOLUTION INTRAMUSCULAR; INTRAVENOUS; SUBCUTANEOUS at 07:55

## 2022-11-04 RX ADMIN — MIDAZOLAM 1 MG: 1 INJECTION INTRAMUSCULAR; INTRAVENOUS at 17:06

## 2022-11-04 RX ADMIN — THIAMINE HYDROCHLORIDE 100 MG: 100 INJECTION, SOLUTION INTRAMUSCULAR; INTRAVENOUS at 10:04

## 2022-11-04 RX ADMIN — Medication 20 MG: at 18:44

## 2022-11-04 RX ADMIN — BUPROPION HYDROCHLORIDE 75 MG: 75 TABLET, FILM COATED ORAL at 08:52

## 2022-11-04 RX ADMIN — HYDROMORPHONE HYDROCHLORIDE 0.5 MG: 1 INJECTION, SOLUTION INTRAMUSCULAR; INTRAVENOUS; SUBCUTANEOUS at 18:42

## 2022-11-04 RX ADMIN — SUGAMMADEX 200 MG: 100 INJECTION, SOLUTION INTRAVENOUS at 20:00

## 2022-11-04 RX ADMIN — PHENYLEPHRINE HYDROCHLORIDE 100 MCG: 10 INJECTION INTRAVENOUS at 17:33

## 2022-11-04 RX ADMIN — PANTOPRAZOLE SODIUM 40 MG: 40 TABLET, DELAYED RELEASE ORAL at 15:35

## 2022-11-04 RX ADMIN — HYDROMORPHONE HYDROCHLORIDE 0.4 MG: 0.2 INJECTION, SOLUTION INTRAMUSCULAR; INTRAVENOUS; SUBCUTANEOUS at 14:48

## 2022-11-04 RX ADMIN — DIAZEPAM 2.5 MG: 5 INJECTION, SOLUTION INTRAMUSCULAR; INTRAVENOUS at 06:45

## 2022-11-04 RX ADMIN — AMOXICILLIN 1000 MG: 500 CAPSULE ORAL at 08:52

## 2022-11-04 RX ADMIN — ACETAMINOPHEN 650 MG: 325 TABLET, FILM COATED ORAL at 05:54

## 2022-11-04 RX ADMIN — ALBUTEROL SULFATE 6 PUFF: 108 INHALANT RESPIRATORY (INHALATION) at 17:20

## 2022-11-04 RX ADMIN — MAGNESIUM OXIDE TAB 400 MG (241.3 MG ELEMENTAL MG) 400 MG: 400 (241.3 MG) TAB at 14:47

## 2022-11-04 RX ADMIN — METHOCARBAMOL 500 MG: 500 TABLET ORAL at 15:42

## 2022-11-04 RX ADMIN — POTASSIUM CHLORIDE, DEXTROSE MONOHYDRATE AND SODIUM CHLORIDE: 150; 5; 450 INJECTION, SOLUTION INTRAVENOUS at 12:30

## 2022-11-04 RX ADMIN — METHOCARBAMOL 500 MG: 500 TABLET ORAL at 08:52

## 2022-11-04 RX ADMIN — PROPOFOL 100 MG: 10 INJECTION, EMULSION INTRAVENOUS at 17:09

## 2022-11-04 RX ADMIN — FENTANYL CITRATE 50 MCG: 50 INJECTION, SOLUTION INTRAMUSCULAR; INTRAVENOUS at 17:48

## 2022-11-04 RX ADMIN — DIAZEPAM 2.5 MG: 5 INJECTION, SOLUTION INTRAMUSCULAR; INTRAVENOUS at 15:35

## 2022-11-04 RX ADMIN — PROPOFOL 60 MCG/KG/MIN: 10 INJECTION, EMULSION INTRAVENOUS at 21:38

## 2022-11-04 RX ADMIN — HYDROMORPHONE HYDROCHLORIDE 0.5 MG: 1 INJECTION, SOLUTION INTRAMUSCULAR; INTRAVENOUS; SUBCUTANEOUS at 16:45

## 2022-11-04 RX ADMIN — DIAZEPAM 2.5 MG: 5 INJECTION, SOLUTION INTRAMUSCULAR; INTRAVENOUS at 00:41

## 2022-11-04 RX ADMIN — PROPOFOL 50 MCG/KG/MIN: 10 INJECTION, EMULSION INTRAVENOUS at 19:51

## 2022-11-04 RX ADMIN — SODIUM CHLORIDE, POTASSIUM CHLORIDE, SODIUM LACTATE AND CALCIUM CHLORIDE: 600; 310; 30; 20 INJECTION, SOLUTION INTRAVENOUS at 17:04

## 2022-11-04 RX ADMIN — LIDOCAINE HYDROCHLORIDE 100 MG: 20 INJECTION, SOLUTION INFILTRATION; PERINEURAL at 17:09

## 2022-11-04 ASSESSMENT — ACTIVITIES OF DAILY LIVING (ADL)
ADLS_ACUITY_SCORE: 25
ADLS_ACUITY_SCORE: 23
ADLS_ACUITY_SCORE: 25
ADLS_ACUITY_SCORE: 33
ADLS_ACUITY_SCORE: 25

## 2022-11-04 ASSESSMENT — ENCOUNTER SYMPTOMS
SEIZURES: 0
DYSRHYTHMIAS: 0

## 2022-11-04 ASSESSMENT — LIFESTYLE VARIABLES: TOBACCO_USE: 0

## 2022-11-04 NOTE — PROGRESS NOTES
Physician Attestation based on a discussion I had with Leah and family at ~130pm.  I, Daniel Aragon, saw and evaluated this patient as part of a shared visit.  I have reviewed and discussed with the advanced practice provider and/or resident their history, physical and plan.    I personally reviewed the vital signs, medications, labs and imaging.    My key history or physical exam findings: continues to have significant pain; hematoma confirmed on CT scan; no evidence of anything else.  Persists in having dysphagia to liquids as well.    No evidence of active bleeding last 6 days; continues to receive lovenox for PE prevention; has pneumonia, which has been a set back as well.    Key management decisions made by me:     After discussing with Leah, , and dad, I think best course of action is exploratory laparoscopy with evacuation of hematoma, which will be done today.    Goal of this therapy is to improve overall pain control and reduce this as source of concern.      Daniel Aragon MD  Date of Service (when I saw the patient): 130pm.        EGS Surgery Progress Note  11/04/2022       Subjective:  Continues to struggle with pain and anxiety. Describes pain in her LUQ now radiating across her upper right abdomen. Pain makes it difficult to breathe. Not tolerating full liquid diet, having episodes of regurgitation.      Objective:  /65 (BP Location: Left arm)   Pulse 99   Temp 98.3  F (36.8  C) (Oral)   Resp 28   Ht 1.829 m (6')   Wt 125.9 kg (277 lb 8 oz)   LMP 10/01/2022   SpO2 94%   BMI 37.64 kg/m    Gen: Awake, alert, anxious and tearful  CV: RRR  Resp: shallow breathing on 2L   Abd: obese, soft, nondistended, mildly tender upper abdomen, incisions c/d/i with steri strips  Ext: WWP, no edema    I/O:   UOP 5x  Emesis 3x  Stool 1x     Labs:  All labs reviewed, notable for:  WBC 9.3  Hgb 8.7 (8.3 < 8.5)    Imaging:  CT Abdomen Pelvis w Contrast  Result Date: 11/3/2022  IMPRESSION:  1. Large mixed density loculated perisplenic/perigastric fluid collection concerning for a hematoma. No definitive evidence of active IV contrast extravasation. 2. Small hyperdense free fluid in the pelvis. Favored to be hemoperitoneum. However contrast leaking from the GI tract cannot be completely ruled out. 3. Consolidative changes seen bilateral lower lobes. Most likely postsurgical atelectasis. However aspiration/infection cannot be ruled out. Hyperdense small right pleural effusion is likely artifactual. [Urgent Result: Intraperitoneal hematoma] Finding was identified on 11/3/2022 9:40 PM. Dr. Clifton was contacted by Dr. Thorne at 11/3/2022 9:52 PM and verbalized understanding of the urgent finding. I have personally reviewed the examination and initial interpretation and I agree with the findings. LEVI LAURENT MD   SYSTEM ID:  [id number]      Assessment/Plan:   Leah Yanez is a pleasant 42 year old female who is s/p laparoscopic sleeve gastrectomy and hiatal hernia repair on 10/25/22 with Dr. Aragon who returned to the ED with LUQ abdominal and shoulder pain, was found to have bilateral lower lobe pneumonia and new oxygen requirement. No evidence of PE. RUQ US obtained to evaluate elevated Tbili, found to have septated fluid collection along left hepatic lobe consistent with hematoma, likely the explanation for her acute blood loss anemia post operatively. This was again seen on CT scan 11/3. No evidence to suggest ongoing bleeding at this point. UGI study obtained without evidence for leak, low suspicion for leak at this point. Leah continues to struggle with pain and anxiety which prevents her from taking deep breaths.     - multimodal pain regimen: acetaminophen 650mg q4h, oxycodone 5-10mg q6h prn, Robaxin 500mg QID, atarax 25mg TID, gabapentin 300mg TID, Levsin 125mcg q4h,, prn IV dilaudid q4h, lidocaine patch  - prn valium 2.5mg q6h prn  - wean oxygen, encourage IS and time out of bed  - not  tolerating liquid diet well, at risk for malnutrition, appreciate nutritionist recommendations: if continued poor tolerance of meaningful PO intake, will consider nutritional support  - NPO for now pending definitive plan today  - continue Lovenox BID for dvt ppx  - continue PPI BID  - PTA wellbutrin, protonix, senna prn  - appreciate medicine assistance and recommendations, continue amoxicillin for presumed aspiration PNA vs atelectasis     Seen, examined, and discussed with chief resident and staff.  - - - - - - - - - - - - - - - - - -  Danni Clifton MD  General Surgery PGY-2

## 2022-11-04 NOTE — ANESTHESIA PROCEDURE NOTES
Airway       Patient location during procedure: OR       Procedure Start/Stop Times: 11/4/2022 5:16 PM  Staff -        Anesthesiologist:  Lillian Rivera MD       CRNA: Sujatha Lara APRN CRNA       Performed By: CRNAIndications and Patient Condition       Indications for airway management: jeffrey-procedural       Induction type:intravenous       Mask difficulty assessment: 2 - vent by mask + OA or adjuvant +/- NMBA    Final Airway Details       Final airway type: endotracheal airway       Successful airway: ETT - single  Endotracheal Airway Details        ETT size (mm): 7.0       Cuffed: yes       Successful intubation technique: direct laryngoscopy       DL Blade Type: Nair 2       Grade View of Cords: 1       Adjucts: stylet       Position: Right       Measured from: lips       Secured at (cm): 23    Post intubation assessment        Placement verified by: capnometry, equal breath sounds and chest rise        Number of attempts at approach: 1       Number of other approaches attempted: 0       Secured with: pink tape       Ease of procedure: easy       Dentition: Intact and Unchanged    Medication(s) Administered   Medication Administration Time: 11/4/2022 5:16 PM    Additional Comments       When CRNA went to DL patient, green bile noted at vocal cords. Suctioned and intubated.

## 2022-11-04 NOTE — PROGRESS NOTES
[facility]    Medicine Progress Note - Hospitalist Service, GOLD TEAM 7    Date of Admission:  11/1/2022    Assessment & Plan   Leah Yanez is a 42 year old female patient with a past medical history significant for depression, Raynaud's c/b gangrene one digit (resolved), concussion 2016, estrogen provoked pulmonary embolisms while on OCPs, transient weak positive anticardiolipin antibody, large symptomatic hiatal hernia c/b esophagitis and esophageal narrowing requiring dilation, and morbid obesity who is now s/p laparoscopic sleeve gastrectomy and hiatal herniorrhaphy on 10/25/22 (admitted 10/25-10/30) c/b acute blood loss anemia requiring transfusion; sent to [facility] ED from Bariatric surgery outpatient clinic secondary to concerns regarding shortness of breath and pleuritic chest pain.     Recommendations:  - Pain uncontrolled when I saw her this morning so I increased the frequency of Dilaudid and discussed with the surgical team.   - Acidosis is improving with dextrose-containing fluids. Continue to monitor glucose, electrolytes. Likely to need to continue until she can tolerate oral intake or alternative form of nutrition.    - Continue amoxicillin for possible pneumonia vs atelectasis.    - Oxygen support as needed. Recommend saturation >90% on room air with ambulation prior to discharge.    - Appreciate surgical management of pain and inability to tolerate oral intake. They ultimately opted to take to OR this afternoon.      S/p laparoscopic sleeve gastrectomy and hiatal herniorrhaphy (10/25/22)  Acute blood loss anemia  Patient with above history who was sent from Bariatric Surgery clinic secondary to concerns for possible post-op PE found to have bilateral pneumonia. Hemoglobin 9.1 which is improved from 7.8 10/29.   - Management per primary surgery team. Decision to take to OR this afternoon.  - Agree with gabapentin, Dilaudid, anxiety medication     - Agree with continuing PPI     Starvation ketoacidosis, improving  Hypokalemia  Very poor oral intake since surgery and now starvation ketoacidosis has worsened since admission.   - Appreciate dietician consultation  - Continue dextrose containing fluids pending improvement in oral intake  - Twice daily POCT glucose to avoid hypoglycemia  - Monitor and replace magnesium, phosphorous, potassium  - Encourage oral intake. Appreciate surgical assistance with that.   - Monitor labs.     Hypoxia, persistent  Leukocytosis, improving  Bilateral Pneumonia vs atelectasis  Pleuritic Chest Pain, persistent  Elevated BNP  WBC 13.1 with left shift on admission. CT PE without evidence of PE but did preliminarily reveal findings concerning for consolidative changes in the bilateral lower lobes with trace left pleural effusion. Requiring 2L O2 without oxygen need at home. Troponin and ECG reassuring. MRSA nasal swab, strep pneumo, and legionella negative. COVID and flu negative. CRP and procal both elevated but also with recent surgery and abdominal findings. Echocardiogram normal.    - Continue amoxicillin to complete 5-7 day course. Monitor for any signs of worsening pneumonia.    - Appreciate surgical management of abdominal pain.   - Recommend that she maintain oxygen saturation >90% on room air with ambulation and at rest prior to discharge.      Elevated LFTs  LFTs mildly elevated AST 41, Alk Phos 131 and total bili 1.5. Unclear etiology. Abdominal exam benign and afebrile. Discussed with primary team who did not think this would be related to the surgery. Hemolysis seems less likely with improving hemoglobin and normal platelets. Abdominal ultrasound with cholelithiasis but no cholecystitis. Monitor.      - Trend.      Depression   - Continue PTA Wellbutrin   - Atarax prn     Diet: Diet  Room Service  NPO per Anesthesia Guidelines for Procedure/Surgery Except for: Meds    DVT Prophylaxis: Enoxaparin (Lovenox) SQ  Holder  Catheter: Not present  Central Lines: None  Cardiac Monitoring: None  Code Status: Full Code Full code    Disposition Plan      Expected Discharge Date: 11/07/2022                The patient's care was discussed with the patient, patient visitor, bedside RN, and surgical resident.     Rhianna Joel MD  Hospitalist Service, [address]  Securely message with the Vocera Web Console (learn more here)  Text page via [facility] Paging/Directory   Please see signed in provider for up to date coverage information      Clinically Significant Risk Factors        # Hypokalemia: Lowest K = 3.1 mmol/L (Ref range: 3.4-5.3) in last 2 days, will replace as needed  # Hyponatremia: Lowest Na = 135 mmol/L (Ref range: 136-145) in last 2 days, will monitor as appropriate      # Hypoalbuminemia: Lowest albumin = 2.8 g/dL (Ref range: 3.5-5.2) at 11/4/2022  6:19 AM, will monitor as appropriate           # Obesity: Estimated body mass index is 37.64 kg/m  as calculated from the following:    Height as of this encounter: 1.829 m (6').    Weight as of this encounter: 125.9 kg (277 lb 8 oz)., PRESENT ON ADMISSION         ______________________________________________________________________    Interval History   Leah had uncontrolled abdominal pain overnight that has now spread to both upper quadrants. It remains much worse with taking a deep breath or trying to sit up. She remains unable to tolerate oral intake and has missed some medications due to inability to tolerate. No fevers or chills. No cough. No numbness or weakness.     Data reviewed today: I reviewed all medications, new labs and imaging results over the last 24 hours.     Physical Exam   Vital Signs: Temp: 99  F (37.2  C) Temp src: Oral BP: (!) 142/65 Pulse: 95   Resp: 20 SpO2: 94 % O2 Device: Oxymask Oxygen Delivery: 5 LPM  Weight: 277 lbs 8 oz  Physical Exam  Vitals reviewed.   Constitutional:       Appearance: She is  not toxic-appearing.      Comments: Distressed by pain, grimacing, wiggling in bed   HENT:      Head: Atraumatic.      Right Ear: External ear normal.      Left Ear: External ear normal.      Nose: Nose normal.      Mouth/Throat:      Mouth: Mucous membranes are moist.   Eyes:      General:         Right eye: No discharge.         Left eye: No discharge.      Conjunctiva/sclera: Conjunctivae normal.   Cardiovascular:      Rate and Rhythm: Normal rate and regular rhythm.      Pulses: Normal pulses.      Heart sounds: No murmur heard.  Pulmonary:      Effort: Pulmonary effort is normal. No respiratory distress.      Breath sounds: No wheezing.      Comments: Oxygen mask in place. Shallow breathing. No wheezes or rales. Talking in full sentences.   Abdominal:      General: Bowel sounds are normal. There is no distension.      Palpations: Abdomen is soft.      Tenderness: There is no abdominal tenderness. There is no guarding.   Musculoskeletal:         General: No tenderness.      Cervical back: Neck supple.      Right lower leg: No edema.      Left lower leg: No edema.   Skin:     General: Skin is warm.      Capillary Refill: Capillary refill takes less than 2 seconds.      Findings: No rash.   Neurological:      General: No focal deficit present.      Mental Status: She is alert.   Psychiatric:         Behavior: Behavior normal.         Thought Content: Thought content normal.         Data   Recent Labs   Lab 11/04/22  1148 11/04/22  0619 11/04/22  0354 11/03/22  2334 11/03/22  2325 11/03/22  1407 11/03/22  0616 11/02/22  1922 11/02/22  0636 11/01/22  1734   WBC  --  9.3  --   --   --   --  11.0  --  12.1* 13.1*   HGB  --  8.7*  --   --   --   --  8.3*  --  8.5* 9.1*   MCV  --  85  --   --   --   --  86  --  86 85   PLT  --  298  --   --   --   --  305  --  299 327   NA  --  138  --   --   --   --  135*  --  135* 136   POTASSIUM 3.4 3.5  --   --  3.5   < > 3.1*   < > 3.0* 3.3*   CHLORIDE  --  106  --   --   --   --   102  --  101 102   CO2  --  19*  --   --   --   --  15*  --  18* 17*   BUN  --  2.3*  --   --   --   --  4.9*  --  6.7 7.6   CR  --  0.81  --   --   --   --  0.80  --  1.00* 0.86   ANIONGAP  --  13  --   --   --   --  18*  --  16* 17*   KIMBERLYN  --  8.9  --   --   --   --  9.0  --  8.5* 9.0   GLC  --  119* 118* 102*  --    < > 83  --  78 73   ALBUMIN  --  2.8*  --   --   --   --  3.0*  --  3.2* 3.5   PROTTOTAL  --  6.3*  --   --   --   --  6.3*  --  6.1* 6.6   BILITOTAL  --  0.8  --   --   --   --  1.1  --  1.3* 1.5*   ALKPHOS  --  125*  --   --   --   --  120*  --  126* 131*   ALT  --  17  --   --   --   --  16  --  23 32   AST  --  41*  --   --   --   --  37*  --  31 41*   LIPASE  --   --   --   --   --   --   --   --   --  68*    < > = values in this interval not displayed.     Recent Results (from the past 24 hour(s))   CT Abdomen Pelvis w Contrast   Result Value    Radiologist flags Intraperitoneal hematoma (Urgent)    Narrative    EXAMINATION: CT ABDOMEN PELVIS W CONTRAST, 11/3/2022 5:49 PM    INDICATION: persistent pain s/p lap gastric sleeve and hiatal hernia  repair 10/25    COMPARISON STUDY: CT, 4/11/2022    TECHNIQUE: CT scan of the abdomen and pelvis was performed on  multidetector CT scanner using volumetric acquisition technique and  images were reconstructed in multiple planes with variable thickness  and reviewed on dedicated workstations.     CONTRAST: 120 mL Isovue-370 injected IV without oral contrast    CT scan radiation dose is optimized to minimum requisite dose using  automated dose modulation techniques.    FINDINGS:    Lower thorax: Bilateral lower lobe consolidation. Hyperattenuating  small right pleural effusion, likely due to streak artifact. Patulous  lower esophagus with residual contrast from prior upper GI study.    Liver: No mass. No intrahepatic biliary ductal dilation.    Biliary System: Normal gallbladder. No extrahepatic biliary ductal  dilation.    Pancreas: No mass or pancreatic  ductal dilation.    Adrenal glands: No mass or nodules    Spleen: Normal. Large perisplenic/perigastric mixed density loculated  fluid collection concerning for a hematoma.    Kidneys: No suspicious mass, obstructing calculus or hydronephrosis.    Gastrointestinal tract : Postsurgical changes of sleeve gastrectomy  with perisplenic/perigastric hematoma. Normal appendix. Normal caliber  small bowel.  Residual contrast throughout the GI tract.    Mesentery/peritoneum/retroperitoneum: No mass. Perisplenic/perigastric  hematoma. Small hyperdense free fluid in the pelvis. No free air.    Lymph nodes: No significant lymphadenopathy.    Vasculature: Patent major abdominal vasculature.  No definitive  evidence of active extravasation.    Pelvis: Urinary bladder is normal.    Osseous structures: No aggressive or acute osseous lesion.      Soft tissues: Within normal limits.      Impression    IMPRESSION:    1. Large mixed density loculated perisplenic/perigastric fluid  collection concerning for a hematoma. No definitive evidence of active  IV contrast extravasation.  2. Small hyperdense free fluid in the pelvis. Favored to be  hemoperitoneum. However contrast leaking from the GI tract cannot be  completely ruled out.  3. Consolidative changes seen bilateral lower lobes. Most likely  postsurgical atelectasis. However aspiration/infection cannot be ruled  out. Hyperdense small right pleural effusion is likely artifactual.    [Urgent Result: Intraperitoneal hematoma]    Finding was identified on 11/3/2022 9:40 PM.     Dr. Clifton was contacted by Dr. Thorne at 11/3/2022 9:52 PM and  verbalized understanding of the urgent finding.     I have personally reviewed the examination and initial interpretation  and I agree with the findings.    LEVI LAURENT MD         SYSTEM ID:  [id number]

## 2022-11-04 NOTE — PLAN OF CARE
Goal Outcome Evaluation:      Plan of Care Reviewed With: patient    Overall Patient Progress: declining    Time: 1930-0730  Status: admitted for bilateral lower lobe pneumonia and new oxygen requirement  Neuro: A&Ox4, extremely anxious at times - IV Valium prn x2, CMS intact  Pain: Abdominal pain and L shoulder- managed with PRN IV Dilaudid, Robaxin, Oxycodone and Aqua K heating pad with some relief.   Nausea: Intermittent nausea, refused some PO meds, no emesis this shift.   Diet: Shreyas Full Liquid- not tolerating. Diet changed to NPO ex meds.   Labs: Replaced K 3.2 -recheck K 3.5. Mg replaced on day shift.  and 118.   LDAs: R PIV- MIFF @75mL/hr, L PIV- TKO  Skin/incisions: Intact ex 5 abd lap sites- steri strips  Respiratory: 2.5L oxymask sating >92%. LS clear/diminished. Tachypneic. SOB, TOYN  Cardiac: HR 84-98 denies cardiac chest pain.   GI/: Voiding spontaneously, not saving. +BS, not passing flatus, LBM yesterday 11/3.   Mobility: SBA- ambulated to the bathroom   New Changes: CT of abdomen with contrast with urgent abnormal results concerning hematoma. Jose Martin Wild MD was notified 2320. Spoke with Davy Sher MD about abnormal CT results -Lovenox held.   Plan: Continue with POC

## 2022-11-04 NOTE — ANESTHESIA PREPROCEDURE EVALUATION
"Anesthesia Pre-Procedure Evaluation    Patient: Leah Yanez   MRN: [id number] : 1980        Procedure : Procedure(s):  LAPAROSCOPY, DIAGNOSTIC, BY GENERAL SURGERY          Past Medical History:   Diagnosis Date     Anti-cardiolipin antibody positive      Griggs esophagus      Concussion 2016     Depressive disorder, not elsewhere classified 2006    Resolved      Gastroesophageal reflux disease with esophagitis      Hiatal hernia      History of pulmonary embolism      Obesity      Raynaud's syndrome     past history of admission for gangrene of one finger      Past Surgical History:   Procedure Laterality Date     ESOPHAGOSCOPY, GASTROSCOPY, DUODENOSCOPY (EGD), COMBINED N/A 2021    Procedure: ESOPHAGOGASTRODUODENOSCOPY, WITH BIOPSY;  Surgeon: Esteban Rose DO;  Location: [facility] OR     LAPAROSCOPIC GASTRIC SLEEVE N/A 10/25/2022    Procedure: GASTRECTOMY, SLEEVE, LAPAROSCOPIC;  Surgeon: Daniel Aragon MD;  Location: [facility] OR     LAPAROSCOPIC HERNIORRHAPHY HIATAL N/A 10/25/2022    Procedure: HERNIORRHAPHY, HIATAL, LAPAROSCOPIC;  Surgeon: Daniel Aragon MD;  Location: [facility] OR     TONSILLECTOMY & ADENOIDECTOMY       ZZC NONSPECIFIC PROCEDURE      T&A as a child     ZZC NONSPECIFIC PROCEDURE      Tubes in ears bilaterally      Allergies   Allergen Reactions     Azithromycin      Erythromycin GI Disturbance     When \"very young\"      Social History     Tobacco Use     Smoking status: Never     Smokeless tobacco: Never   Substance Use Topics     Alcohol use: Yes     Comment: Alcoholic Drinks/day: social      Wt Readings from Last 1 Encounters:   22 125.9 kg (277 lb 8 oz)        Anesthesia Evaluation   Pt has had prior anesthetic. Type: General and MAC.    No history of anesthetic complications       ROS/MED HX  ENT/Pulmonary: Comment: History of neg sleep study    (+) LOUIS risk factors, obese,  (-) tobacco use and recent URI   Neurologic: Comment: Past history of concussion after " falling off horse 2016. Symptoms resolved   (-) no seizures   Cardiovascular: Comment: Significant history of Raynauds with past admission for gangrene of one finger. Manages by keeping hands and core warm. No meds. Fingers turn white and then black.    (+) -----Previous cardiac testing   Echo: Date: Results:    Stress Test: Date: Results:    ECG Reviewed: Date: 9/1/21 Results:  SR, nonspecific T wave abnormality  Cath: Date: Results:   (-) taking anticoagulants/antiplatelets, TONY and arrhythmias   METS/Exercise Tolerance: >4 METS    Hematologic: Comments: Estrogen provoked PE while on OCPs  Plan for enoxaparin for 3 weeks after surgery    (+) History of blood clots, pt is not anticoagulated,  (-) history of blood transfusion   Musculoskeletal:  - neg musculoskeletal ROS     GI/Hepatic: Comment: History of dilation   Large hiatal hernia  Some dysphagia    (+) GERD, Symptomatic, esophageal disease, hiatal hernia,     Renal/Genitourinary:  - neg Renal ROS     Endo:     (+) Obesity,     Psychiatric/Substance Use:     (+) psychiatric history depression     Infectious Disease:  - neg infectious disease ROS     Malignancy:  - neg malignancy ROS     Other:  - neg other ROS          Physical Exam    Airway        Mallampati: III   TM distance: > 3 FB   Neck ROM: full   Mouth opening: > 3 cm    Respiratory Devices and Support         Dental  no notable dental history     (+) lower retainer      Cardiovascular          Rhythm and rate: regular and normal     Pulmonary           breath sounds clear to auscultation           OUTSIDE LABS:  CBC:   Lab Results   Component Value Date    WBC 9.3 11/04/2022    WBC 11.0 11/03/2022    HGB 8.7 (L) 11/04/2022    HGB 8.3 (L) 11/03/2022    HCT 28.4 (L) 11/04/2022    HCT 27.5 (L) 11/03/2022     11/04/2022     11/03/2022     BMP:   Lab Results   Component Value Date     11/04/2022     (L) 11/03/2022    POTASSIUM 3.4 11/04/2022    POTASSIUM 3.5 11/04/2022     CHLORIDE 106 11/04/2022    CHLORIDE 102 11/03/2022    CO2 19 (L) 11/04/2022    CO2 15 (L) 11/03/2022    BUN 2.3 (L) 11/04/2022    BUN 4.9 (L) 11/03/2022    CR 0.81 11/04/2022    CR 0.80 11/03/2022     (H) 11/04/2022     (H) 11/04/2022     COAGS: No results found for: PTT, INR, FIBR  POC:   Lab Results   Component Value Date    HCG Negative 10/25/2022     HEPATIC:   Lab Results   Component Value Date    ALBUMIN 2.8 (L) 11/04/2022    PROTTOTAL 6.3 (L) 11/04/2022    ALT 17 11/04/2022    AST 41 (H) 11/04/2022    ALKPHOS 125 (H) 11/04/2022    BILITOTAL 0.8 11/04/2022     OTHER:   Lab Results   Component Value Date    LACT 1.3 11/01/2022    A1C 5.5 10/25/2022    KIMBERLYN 8.9 11/04/2022    PHOS 2.2 (L) 11/04/2022    MAG 2.0 11/04/2022    LIPASE 68 (H) 11/01/2022    TSH 0.85 07/24/2003    .00 (H) 11/01/2022    SED 9 07/24/2003       Anesthesia Plan    ASA Status:  2   NPO Status:  NPO Appropriate    Anesthesia Type: General.     - Airway: ETT      Maintenance: Balanced.        Consents    Anesthesia Plan(s) and associated risks, benefits, and realistic alternatives discussed. Questions answered and patient/representative(s) expressed understanding.    - Discussed:     - Discussed with:  Patient         Postoperative Care    Pain management: IV analgesics.   PONV prophylaxis: Ondansetron (or other 5HT-3)     Comments:                Sam Orona MD     I have seen and evaluated the patient myself and agree with the above assessment and plan.  I have explained the risks/benefits of anesthesia to the patient who understands and agrees to proceed.    Lillian Rivera MD  Staff Anesthesiologist  Pager [phone]

## 2022-11-04 NOTE — PLAN OF CARE
Vitals:    22 0310 22 0731 22 1612 22 1652   BP: 138/61 139/65 (!) 142/65    BP Location: Left arm Left arm Left arm    Patient Position:       Cuff Size:       Pulse: 98 99 95    Resp: 28 28 20    Temp: 97.5  F (36.4  C) 98.3  F (36.8  C) 99  F (37.2  C)    TempSrc: Oral Oral Oral    SpO2: 95% 94% 92% 94%   Weight:       Height:           Neuro: alert and oriented, very anxious     VS: afebrile vitally stable, sating 96%     B, phos 2.2, replaced, mag replaced,      Cardiac: 58     Pain/Nausea: complain of pain, schedule tylenol, muscle relaxer, dilaudid 0.4 Q 2hrs  With some relief, remain NPO      Lines/Drains: PIV x 2, right TKO, Left D51/5 at 75 cc     Incision/Wound: old lap site steri strip intact      GI/: abdominal discomfort and tender, audible, voided      Diet/Appetite: NPO except med      Activity: up independently,      Plan: pt went down to OR for procedure, report given to 3C RN.

## 2022-11-05 ENCOUNTER — APPOINTMENT (OUTPATIENT)
Dept: GENERAL RADIOLOGY | Facility: CLINIC | Age: 42
DRG: 907 | End: 2022-11-05
Attending: ANESTHESIOLOGY
Payer: COMMERCIAL

## 2022-11-05 LAB
ALBUMIN SERPL BCG-MCNC: 2.2 G/DL (ref 3.5–5.2)
ALP SERPL-CCNC: 114 U/L (ref 35–104)
ALT SERPL W P-5'-P-CCNC: 21 U/L (ref 10–35)
ANION GAP SERPL CALCULATED.3IONS-SCNC: 13 MMOL/L (ref 7–15)
ANION GAP SERPL CALCULATED.3IONS-SCNC: 14 MMOL/L (ref 7–15)
AST SERPL W P-5'-P-CCNC: ABNORMAL U/L
BASOPHILS # BLD AUTO: 0 10E3/UL (ref 0–0.2)
BASOPHILS NFR BLD AUTO: 0 %
BILIRUB SERPL-MCNC: 0.5 MG/DL
BUN SERPL-MCNC: 3.4 MG/DL (ref 6–20)
BUN SERPL-MCNC: 5 MG/DL (ref 6–20)
CALCIUM SERPL-MCNC: 8 MG/DL (ref 8.6–10)
CALCIUM SERPL-MCNC: 8 MG/DL (ref 8.6–10)
CHLORIDE SERPL-SCNC: 105 MMOL/L (ref 98–107)
CHLORIDE SERPL-SCNC: 107 MMOL/L (ref 98–107)
CREAT SERPL-MCNC: 0.86 MG/DL (ref 0.51–0.95)
CREAT SERPL-MCNC: 0.93 MG/DL (ref 0.51–0.95)
DEPRECATED HCO3 PLAS-SCNC: 18 MMOL/L (ref 22–29)
DEPRECATED HCO3 PLAS-SCNC: 20 MMOL/L (ref 22–29)
EOSINOPHIL # BLD AUTO: 0 10E3/UL (ref 0–0.7)
EOSINOPHIL NFR BLD AUTO: 0 %
ERYTHROCYTE [DISTWIDTH] IN BLOOD BY AUTOMATED COUNT: 17.9 % (ref 10–15)
ERYTHROCYTE [DISTWIDTH] IN BLOOD BY AUTOMATED COUNT: 18 % (ref 10–15)
GFR SERPL CREATININE-BSD FRML MDRD: 78 ML/MIN/1.73M2
GFR SERPL CREATININE-BSD FRML MDRD: 86 ML/MIN/1.73M2
GLUCOSE BLDC GLUCOMTR-MCNC: 110 MG/DL (ref 70–99)
GLUCOSE BLDC GLUCOMTR-MCNC: 123 MG/DL (ref 70–99)
GLUCOSE BLDC GLUCOMTR-MCNC: 90 MG/DL (ref 70–99)
GLUCOSE BLDC GLUCOMTR-MCNC: 98 MG/DL (ref 70–99)
GLUCOSE SERPL-MCNC: 120 MG/DL (ref 70–99)
GLUCOSE SERPL-MCNC: 124 MG/DL (ref 70–99)
HCT VFR BLD AUTO: 26.7 % (ref 35–47)
HCT VFR BLD AUTO: 26.8 % (ref 35–47)
HGB BLD-MCNC: 7.7 G/DL (ref 11.7–15.7)
HGB BLD-MCNC: 8.2 G/DL (ref 11.7–15.7)
IMM GRANULOCYTES # BLD: 0.1 10E3/UL
IMM GRANULOCYTES NFR BLD: 1 %
LYMPHOCYTES # BLD AUTO: 0.7 10E3/UL (ref 0.8–5.3)
LYMPHOCYTES NFR BLD AUTO: 8 %
MAGNESIUM SERPL-MCNC: 1.9 MG/DL (ref 1.7–2.3)
MCH RBC QN AUTO: 25.8 PG (ref 26.5–33)
MCH RBC QN AUTO: 26.4 PG (ref 26.5–33)
MCHC RBC AUTO-ENTMCNC: 28.8 G/DL (ref 31.5–36.5)
MCHC RBC AUTO-ENTMCNC: 30.6 G/DL (ref 31.5–36.5)
MCV RBC AUTO: 86 FL (ref 78–100)
MCV RBC AUTO: 89 FL (ref 78–100)
MONOCYTES # BLD AUTO: 0.6 10E3/UL (ref 0–1.3)
MONOCYTES NFR BLD AUTO: 6 %
MRSA DNA SPEC QL NAA+PROBE: NEGATIVE
NEUTROPHILS # BLD AUTO: 7.9 10E3/UL (ref 1.6–8.3)
NEUTROPHILS NFR BLD AUTO: 85 %
NRBC # BLD AUTO: 0 10E3/UL
NRBC BLD AUTO-RTO: 0 /100
NT-PROBNP SERPL-MCNC: 249 PG/ML (ref 0–450)
PHOSPHATE SERPL-MCNC: 3 MG/DL (ref 2.5–4.5)
PLATELET # BLD AUTO: 242 10E3/UL (ref 150–450)
PLATELET # BLD AUTO: 264 10E3/UL (ref 150–450)
POTASSIUM SERPL-SCNC: 3.7 MMOL/L (ref 3.4–5.3)
POTASSIUM SERPL-SCNC: 3.7 MMOL/L (ref 3.4–5.3)
PROT SERPL-MCNC: 5.4 G/DL (ref 6.4–8.3)
RBC # BLD AUTO: 2.99 10E6/UL (ref 3.8–5.2)
RBC # BLD AUTO: 3.11 10E6/UL (ref 3.8–5.2)
SA TARGET DNA: NEGATIVE
SODIUM SERPL-SCNC: 137 MMOL/L (ref 136–145)
SODIUM SERPL-SCNC: 140 MMOL/L (ref 136–145)
WBC # BLD AUTO: 12 10E3/UL (ref 4–11)
WBC # BLD AUTO: 9.3 10E3/UL (ref 4–11)

## 2022-11-05 PROCEDURE — 250N000011 HC RX IP 250 OP 636

## 2022-11-05 PROCEDURE — 80069 RENAL FUNCTION PANEL: CPT | Performed by: PHYSICIAN ASSISTANT

## 2022-11-05 PROCEDURE — 36415 COLL VENOUS BLD VENIPUNCTURE: CPT | Performed by: STUDENT IN AN ORGANIZED HEALTH CARE EDUCATION/TRAINING PROGRAM

## 2022-11-05 PROCEDURE — 87106 FUNGI IDENTIFICATION YEAST: CPT

## 2022-11-05 PROCEDURE — 999N000253 HC STATISTIC WEANING TRIALS

## 2022-11-05 PROCEDURE — 250N000013 HC RX MED GY IP 250 OP 250 PS 637: Performed by: STUDENT IN AN ORGANIZED HEALTH CARE EDUCATION/TRAINING PROGRAM

## 2022-11-05 PROCEDURE — 250N000013 HC RX MED GY IP 250 OP 250 PS 637

## 2022-11-05 PROCEDURE — 94003 VENT MGMT INPAT SUBQ DAY: CPT

## 2022-11-05 PROCEDURE — 999N000157 HC STATISTIC RCP TIME EA 10 MIN

## 2022-11-05 PROCEDURE — 250N000009 HC RX 250

## 2022-11-05 PROCEDURE — C9113 INJ PANTOPRAZOLE SODIUM, VIA: HCPCS | Performed by: STUDENT IN AN ORGANIZED HEALTH CARE EDUCATION/TRAINING PROGRAM

## 2022-11-05 PROCEDURE — 250N000013 HC RX MED GY IP 250 OP 250 PS 637: Performed by: SURGERY

## 2022-11-05 PROCEDURE — 200N000002 HC R&B ICU UMMC

## 2022-11-05 PROCEDURE — 258N000003 HC RX IP 258 OP 636

## 2022-11-05 PROCEDURE — 250N000013 HC RX MED GY IP 250 OP 250 PS 637: Performed by: ANESTHESIOLOGY

## 2022-11-05 PROCEDURE — 84155 ASSAY OF PROTEIN SERUM: CPT | Performed by: STUDENT IN AN ORGANIZED HEALTH CARE EDUCATION/TRAINING PROGRAM

## 2022-11-05 PROCEDURE — 31624 DX BRONCHOSCOPE/LAVAGE: CPT

## 2022-11-05 PROCEDURE — 0B9D8ZX DRAINAGE OF RIGHT MIDDLE LUNG LOBE, VIA NATURAL OR ARTIFICIAL OPENING ENDOSCOPIC, DIAGNOSTIC: ICD-10-PCS | Performed by: ANESTHESIOLOGY

## 2022-11-05 PROCEDURE — 83880 ASSAY OF NATRIURETIC PEPTIDE: CPT | Performed by: ANESTHESIOLOGY

## 2022-11-05 PROCEDURE — 85027 COMPLETE CBC AUTOMATED: CPT | Performed by: STUDENT IN AN ORGANIZED HEALTH CARE EDUCATION/TRAINING PROGRAM

## 2022-11-05 PROCEDURE — 74018 RADEX ABDOMEN 1 VIEW: CPT | Mod: 26 | Performed by: RADIOLOGY

## 2022-11-05 PROCEDURE — 87641 MR-STAPH DNA AMP PROBE: CPT

## 2022-11-05 PROCEDURE — 250N000011 HC RX IP 250 OP 636: Performed by: STUDENT IN AN ORGANIZED HEALTH CARE EDUCATION/TRAINING PROGRAM

## 2022-11-05 PROCEDURE — 74018 RADEX ABDOMEN 1 VIEW: CPT

## 2022-11-05 PROCEDURE — 258N000003 HC RX IP 258 OP 636: Performed by: STUDENT IN AN ORGANIZED HEALTH CARE EDUCATION/TRAINING PROGRAM

## 2022-11-05 PROCEDURE — 99291 CRITICAL CARE FIRST HOUR: CPT | Mod: 24 | Performed by: ANESTHESIOLOGY

## 2022-11-05 PROCEDURE — 31624 DX BRONCHOSCOPE/LAVAGE: CPT | Mod: GC | Performed by: ANESTHESIOLOGY

## 2022-11-05 PROCEDURE — 999N000128 HC STATISTIC PERIPHERAL IV START W/O US GUIDANCE

## 2022-11-05 PROCEDURE — 83735 ASSAY OF MAGNESIUM: CPT | Performed by: PHYSICIAN ASSISTANT

## 2022-11-05 RX ORDER — MAGNESIUM OXIDE 400 MG/1
400 TABLET ORAL EVERY 4 HOURS
Status: COMPLETED | OUTPATIENT
Start: 2022-11-05 | End: 2022-11-05

## 2022-11-05 RX ORDER — DEXTROSE MONOHYDRATE, SODIUM CHLORIDE, SODIUM LACTATE, POTASSIUM CHLORIDE, CALCIUM CHLORIDE 5; 600; 310; 179; 20 G/100ML; MG/100ML; MG/100ML; MG/100ML; MG/100ML
INJECTION, SOLUTION INTRAVENOUS CONTINUOUS
Status: DISCONTINUED | OUTPATIENT
Start: 2022-11-05 | End: 2022-11-06

## 2022-11-05 RX ORDER — LIDOCAINE HYDROCHLORIDE 10 MG/ML
INJECTION, SOLUTION EPIDURAL; INFILTRATION; INTRACAUDAL; PERINEURAL
Status: DISCONTINUED
Start: 2022-11-05 | End: 2022-11-06 | Stop reason: HOSPADM

## 2022-11-05 RX ORDER — OXYCODONE HCL 5 MG/5 ML
5-10 SOLUTION, ORAL ORAL
Status: DISCONTINUED | OUTPATIENT
Start: 2022-11-05 | End: 2022-11-09

## 2022-11-05 RX ORDER — PIPERACILLIN SODIUM, TAZOBACTAM SODIUM 4; .5 G/20ML; G/20ML
4.5 INJECTION, POWDER, LYOPHILIZED, FOR SOLUTION INTRAVENOUS EVERY 6 HOURS
Status: COMPLETED | OUTPATIENT
Start: 2022-11-05 | End: 2022-11-12

## 2022-11-05 RX ORDER — DEXMEDETOMIDINE HYDROCHLORIDE 4 UG/ML
.1-1.2 INJECTION, SOLUTION INTRAVENOUS CONTINUOUS
Status: DISCONTINUED | OUTPATIENT
Start: 2022-11-05 | End: 2022-11-07

## 2022-11-05 RX ORDER — FENTANYL CITRATE 50 UG/ML
50-100 INJECTION, SOLUTION INTRAMUSCULAR; INTRAVENOUS
Status: COMPLETED | OUTPATIENT
Start: 2022-11-05 | End: 2022-11-05

## 2022-11-05 RX ORDER — NOREPINEPHRINE BITARTRATE 0.06 MG/ML
INJECTION, SOLUTION INTRAVENOUS
Status: DISCONTINUED
Start: 2022-11-05 | End: 2022-11-06 | Stop reason: HOSPADM

## 2022-11-05 RX ORDER — DEXMEDETOMIDINE HYDROCHLORIDE 4 UG/ML
.1-1.2 INJECTION, SOLUTION INTRAVENOUS CONTINUOUS
Status: DISCONTINUED | OUTPATIENT
Start: 2022-11-05 | End: 2022-11-05 | Stop reason: ALTCHOICE

## 2022-11-05 RX ORDER — POLYETHYLENE GLYCOL 3350 17 G/17G
17 POWDER, FOR SOLUTION ORAL DAILY
Status: DISCONTINUED | OUTPATIENT
Start: 2022-11-05 | End: 2022-11-16 | Stop reason: HOSPADM

## 2022-11-05 RX ORDER — LIDOCAINE 40 MG/G
CREAM TOPICAL
Status: ACTIVE | OUTPATIENT
Start: 2022-11-05 | End: 2022-11-08

## 2022-11-05 RX ADMIN — LIDOCAINE PATCH 4% 1 PATCH: 40 PATCH TOPICAL at 09:25

## 2022-11-05 RX ADMIN — FENTANYL CITRATE 100 MCG: 0.05 INJECTION, SOLUTION INTRAMUSCULAR; INTRAVENOUS at 11:57

## 2022-11-05 RX ADMIN — THIAMINE HYDROCHLORIDE 100 MG: 100 INJECTION, SOLUTION INTRAMUSCULAR; INTRAVENOUS at 10:01

## 2022-11-05 RX ADMIN — ACETAMINOPHEN 650 MG: 325 TABLET, FILM COATED ORAL at 06:16

## 2022-11-05 RX ADMIN — HYDROMORPHONE HYDROCHLORIDE 0.2 MG: 0.2 INJECTION, SOLUTION INTRAMUSCULAR; INTRAVENOUS; SUBCUTANEOUS at 20:10

## 2022-11-05 RX ADMIN — PIPERACILLIN SODIUM AND TAZOBACTAM SODIUM 4.5 G: 4; .5 INJECTION, POWDER, LYOPHILIZED, FOR SOLUTION INTRAVENOUS at 09:26

## 2022-11-05 RX ADMIN — ACETAMINOPHEN 650 MG: 325 TABLET, FILM COATED ORAL at 22:10

## 2022-11-05 RX ADMIN — PANTOPRAZOLE SODIUM 40 MG: 40 INJECTION, POWDER, FOR SOLUTION INTRAVENOUS at 03:09

## 2022-11-05 RX ADMIN — POLYETHYLENE GLYCOL 3350 17 G: 17 POWDER, FOR SOLUTION ORAL at 16:05

## 2022-11-05 RX ADMIN — DEXMEDETOMIDINE HYDROCHLORIDE 0.3 MCG/KG/HR: 400 INJECTION INTRAVENOUS at 21:58

## 2022-11-05 RX ADMIN — PROPOFOL 50 MCG/KG/MIN: 10 INJECTION, EMULSION INTRAVENOUS at 06:49

## 2022-11-05 RX ADMIN — MIDAZOLAM HYDROCHLORIDE 2 MG: 1 INJECTION, SOLUTION INTRAMUSCULAR; INTRAVENOUS at 11:57

## 2022-11-05 RX ADMIN — OXYCODONE HYDROCHLORIDE 10 MG: 5 SOLUTION ORAL at 23:50

## 2022-11-05 RX ADMIN — OXYCODONE HYDROCHLORIDE 10 MG: 5 SOLUTION ORAL at 13:07

## 2022-11-05 RX ADMIN — PIPERACILLIN SODIUM AND TAZOBACTAM SODIUM 4.5 G: 4; .5 INJECTION, POWDER, LYOPHILIZED, FOR SOLUTION INTRAVENOUS at 16:04

## 2022-11-05 RX ADMIN — PROPOFOL 10 MCG/KG/MIN: 10 INJECTION, EMULSION INTRAVENOUS at 23:54

## 2022-11-05 RX ADMIN — NOREPINEPHRINE BITARTRATE 0.03 MCG/KG/MIN: 1 INJECTION, SOLUTION, CONCENTRATE INTRAVENOUS at 11:42

## 2022-11-05 RX ADMIN — ENOXAPARIN SODIUM 40 MG: 40 INJECTION SUBCUTANEOUS at 09:26

## 2022-11-05 RX ADMIN — PROPOFOL 50 MCG/KG/MIN: 10 INJECTION, EMULSION INTRAVENOUS at 04:05

## 2022-11-05 RX ADMIN — HYDROMORPHONE HYDROCHLORIDE 0.4 MG: 0.2 INJECTION, SOLUTION INTRAMUSCULAR; INTRAVENOUS; SUBCUTANEOUS at 09:26

## 2022-11-05 RX ADMIN — HYDROXYZINE HYDROCHLORIDE 25 MG: 10 SOLUTION ORAL at 19:55

## 2022-11-05 RX ADMIN — PIPERACILLIN SODIUM AND TAZOBACTAM SODIUM 4.5 G: 4; .5 INJECTION, POWDER, LYOPHILIZED, FOR SOLUTION INTRAVENOUS at 21:57

## 2022-11-05 RX ADMIN — PROPOFOL 60 MCG/KG/MIN: 10 INJECTION, EMULSION INTRAVENOUS at 01:55

## 2022-11-05 RX ADMIN — MAGNESIUM OXIDE TAB 400 MG (241.3 MG ELEMENTAL MG) 400 MG: 400 (241.3 MG) TAB at 22:09

## 2022-11-05 RX ADMIN — PROPOFOL 15 MCG/KG/MIN: 10 INJECTION, EMULSION INTRAVENOUS at 13:13

## 2022-11-05 RX ADMIN — METHOCARBAMOL 500 MG: 500 TABLET ORAL at 13:08

## 2022-11-05 RX ADMIN — MAGNESIUM OXIDE TAB 400 MG (241.3 MG ELEMENTAL MG) 400 MG: 400 (241.3 MG) TAB at 16:05

## 2022-11-05 RX ADMIN — ONDANSETRON 4 MG: 2 INJECTION INTRAMUSCULAR; INTRAVENOUS at 13:21

## 2022-11-05 RX ADMIN — POTASSIUM CHLORIDE, DEXTROSE MONOHYDRATE AND SODIUM CHLORIDE: 150; 5; 450 INJECTION, SOLUTION INTRAVENOUS at 02:40

## 2022-11-05 RX ADMIN — BUPROPION HYDROCHLORIDE 75 MG: 75 TABLET, FILM COATED ORAL at 09:25

## 2022-11-05 RX ADMIN — METHOCARBAMOL 500 MG: 500 TABLET ORAL at 09:25

## 2022-11-05 RX ADMIN — TOPICAL ANESTHETIC 0.5 ML: 200 SPRAY DENTAL; PERIODONTAL at 16:02

## 2022-11-05 RX ADMIN — BUPROPION HYDROCHLORIDE 75 MG: 75 TABLET, FILM COATED ORAL at 19:54

## 2022-11-05 RX ADMIN — PANTOPRAZOLE SODIUM 40 MG: 40 INJECTION, POWDER, FOR SOLUTION INTRAVENOUS at 19:54

## 2022-11-05 RX ADMIN — ACETAMINOPHEN 650 MG: 325 TABLET, FILM COATED ORAL at 13:07

## 2022-11-05 RX ADMIN — ACETAMINOPHEN 650 MG: 325 TABLET, FILM COATED ORAL at 18:06

## 2022-11-05 RX ADMIN — HYDROXYZINE HYDROCHLORIDE 25 MG: 10 SOLUTION ORAL at 13:08

## 2022-11-05 RX ADMIN — DEXMEDETOMIDINE HYDROCHLORIDE 0.2 MCG/KG/HR: 400 INJECTION INTRAVENOUS at 12:04

## 2022-11-05 RX ADMIN — OXYCODONE HYDROCHLORIDE 10 MG: 5 SOLUTION ORAL at 19:54

## 2022-11-05 RX ADMIN — SODIUM CHLORIDE 500 ML: 9 INJECTION, SOLUTION INTRAVENOUS at 03:13

## 2022-11-05 RX ADMIN — ENOXAPARIN SODIUM 40 MG: 40 INJECTION SUBCUTANEOUS at 19:54

## 2022-11-05 RX ADMIN — ACETAMINOPHEN 650 MG: 325 TABLET, FILM COATED ORAL at 09:25

## 2022-11-05 RX ADMIN — METHOCARBAMOL 500 MG: 500 TABLET ORAL at 16:05

## 2022-11-05 RX ADMIN — OXYCODONE HYDROCHLORIDE 10 MG: 5 TABLET ORAL at 09:25

## 2022-11-05 RX ADMIN — NOREPINEPHRINE BITARTRATE 0.03 MCG/KG/MIN: 1 INJECTION, SOLUTION, CONCENTRATE INTRAVENOUS at 16:05

## 2022-11-05 RX ADMIN — OXYCODONE HYDROCHLORIDE 10 MG: 5 SOLUTION ORAL at 16:04

## 2022-11-05 RX ADMIN — METHOCARBAMOL 500 MG: 500 TABLET ORAL at 19:54

## 2022-11-05 RX ADMIN — ACETAMINOPHEN 650 MG: 325 TABLET, FILM COATED ORAL at 01:56

## 2022-11-05 ASSESSMENT — ACTIVITIES OF DAILY LIVING (ADL)
ADLS_ACUITY_SCORE: 33

## 2022-11-05 NOTE — PROGRESS NOTES
ICU PROGRESS NOTE  11/05/2022      CO-MORBIDITIES:   Hiatal hernia  (primary encounter diagnosis)  Pneumonia of both lower lobes due to infectious organism  Hypoxia  Other pneumonia, unspecified organism  Hypoxemia  Encounter for screening laboratory testing for severe acute respiratory syndrome coronavirus 2 (SARS-CoV-2)  Postoperative hematoma involving digestive system following digestive system procedure    ASSESSMENT:  Leah Yanez is a 42 year old female with recent sleeve gastrectomy and hiatal hernia repair on 10/25/22 who who was admitted on 11/1/2022 for pneumonia and intra-abdominal hematoma. She is now s/p diagnostic laparoscopy with evacuation of abdominal hematoma on 11/4/22. Postoperatively she was unable to be weaned from mechanical ventilation and was admitted to the SICU for respiratory support overnight.     TODAY:  - PST this am, unsuccessful  - tracheal aspirate  - bronch and BAL today  - switch from amoxicillin to zosyn    PLAN:    Neurological:  # Acute pain   # Agitation   # Encephalopathy   - Monitor neurological status. Delirium preventions and precautions.   - Pain: Davida tylenol 650 mg q4h, gabapentin 300 mg TID, atarax 25 mg TID, robaxin 500 mg QID, lidocaine patches; PRN dilaudid, oxy  - Sedation plan: wean Propofol gtt, start precedex gtt     # Depression  - PTA wellbutrin     Pulmonary:   # Post operative ventilatory support  - VENT: Vent Mode: CMV/AC  (Continuous Mandatory Ventilation/ Assist Control)  FiO2 (%): (S) 30 %  Resp Rate (Set): 16 breaths/min  Tidal Volume (Set, mL): 500 mL  PEEP (cm H2O): 7 cmH2O  Resp: 23  - PST trial this am unsuccessful  - Supplemental oxygen to keep saturation above 92 %.  - Plan for bronch and BAL today     # Bilateral lower lobe pneumonia  - see ID     Cardiovascular:    MAPs low 60s overnight, improved after 500cc bolus.   - Monitor hemodynamic status.       Gastroenterology/Nutrition:  # S/p sleeve gastrectomy 10/25  # S/p diagnostic laparoscopy  and hematoma evacuation 11/4  # Protein calorie deficit malnutrition   - NPO while intubated  - NGT to gravity, use for meds  - PTA B1, hyoscyamine     Fluids/Electrolytes:   - D5 1/2 NS+K @75 ml/h for IV fluid hydration     Renal:  - Will continue to monitor intake and output.  - Holder while intubated, remove once extubated     Endocrine:  # Stress hyperglycemia   - Will monitor BG     ID:  #  Pneumonia   - Continues to have acute respiratory failure, amoxicillin inadequately treating pneumonia  - Switch to zosyn    #Fever  Likely multifactorial, ongoing infection and acute postoperative period.  - Scheduled tylenol, continue to monitor        Heme:     # Acute blood loss anemia   - Hemoglobin 7.7  - Transfuse if hgb <7.0 or signs/symptoms of hypoperfusion. Monitor and trend.      Musculoskeletal:   # Weakness and deconditioning of critical illness   - Physical and occupational therapy consult         General Cares/Prophylaxis:    DVT Prophylaxis: Enoxaparin (Lovenox) SQ  GI Prophylaxis: PPI  Restraints: Restraints for medical healing needed: YES     Lines/ tubes/ drains:  - ETT, NG, Holder, PIVs     Code Status: Full code    Disposition:  -  Surgical ICU.     Patient seen, findings and plan discussed with ICU staff.    Carol Metcalf MD  Surgery PGY-2     ====================================    TODAY'S PROGRESS:   SUBJECTIVE:   No acute events overnight. Having pain at ETT this morning. Tachypneic with small VTs and increasing FiO2 requiremets with PST.     OBJECTIVE:   1. VITAL SIGNS:   Temp:  [98.1  F (36.7  C)-101.6  F (38.7  C)] 101.6  F (38.7  C)  Pulse:  [82-99] 91  Resp:  [11-28] 23  BP: (100-142)/(46-66) 118/57  Cuff Mean (mmHg):  [81-96] 96  FiO2 (%):  [30 %-65 %] 30 %  SpO2:  [91 %-98 %] 93 %  Vent Mode: CMV/AC  (Continuous Mandatory Ventilation/ Assist Control)  FiO2 (%): (S) 30 %  Resp Rate (Set): 16 breaths/min  Tidal Volume (Set, mL): 500 mL  PEEP (cm H2O): 7 cmH2O  Resp: 23      2. INTAKE/ OUTPUT:    I/O last 3 completed shifts:  In: 2390.02 [P.O.:100; I.V.:2290.02]  Out: 105 [Drains:80; Blood:25]    3. PHYSICAL EXAMINATION:     General: Intubated, sedated   Neuro: sedated  Pulm/Resp: mechanically ventilated  CV: RRR  Abdomen: Soft, non-distended, incisions c/d/i   :  siegel catheter in place, urine yellow and clear  MSK/Extremities: warm and well perfused     4. INVESTIGATIONS:  All labs and imaging studies personally reviewed     Arterial Blood Gases   Recent Labs   Lab 11/04/22 1916 11/04/22 1741   PH 7.35 7.40   PCO2 43 41   PO2 74* 92   HCO3 24 25     Complete Blood Count   Recent Labs   Lab 11/05/22  0603 11/05/22  0101 11/04/22 1916 11/04/22 1741 11/04/22  0619 11/03/22  0616   WBC 9.3 12.0*  --   --  9.3 11.0   HGB 7.7* 8.2* 8.5* 8.2* 8.7* 8.3*    264  --   --  298 305     Basic Metabolic Panel  Recent Labs   Lab 11/05/22  0356 11/05/22  0022 11/04/22  2327 11/04/22  2109 11/04/22 1916 11/04/22 1741 11/04/22 1741 11/04/22  1148 11/04/22  0619 11/03/22  1407 11/03/22  0616 11/02/22  1922 11/02/22  0636   NA  --   --  137  --  139  --  139  --  138  --  135*  --  135*   POTASSIUM  --   --  3.7  --  3.5  --  3.5 3.4 3.5   < > 3.1*   < > 3.0*   CHLORIDE  --   --  105  --   --   --   --   --  106  --  102  --  101   CO2  --   --  18*  --   --   --   --   --  19*  --  15*  --  18*   BUN  --   --  5.0*  --   --   --   --   --  2.3*  --  4.9*  --  6.7   CR  --   --  0.93  --   --   --   --   --  0.81  --  0.80  --  1.00*   * 98 120* 108* 141*   < > 116*  --  119*   < > 83  --  78    < > = values in this interval not displayed.     Liver Function Tests  Recent Labs   Lab 11/04/22  0619 11/03/22  0616 11/02/22  0636 11/01/22  1734   AST 41* 37* 31 41*   ALT 17 16 23 32   ALKPHOS 125* 120* 126* 131*   BILITOTAL 0.8 1.1 1.3* 1.5*   ALBUMIN 2.8* 3.0* 3.2* 3.5     Pancreatic Enzymes  Recent Labs   Lab 11/01/22  1734   LIPASE 68*     Coagulation Profile  No lab results found in last 7  days.      5. RADIOLOGY:   Recent Results (from the past 24 hour(s))   XR Chest Port 1 View    Impression    RESIDENT PRELIMINARY INTERPRETATION  IMPRESSION:     1. The endotracheal tube tip projects approximately 4.3 cm above the  chapis.  2. The side port of the gastric tube projects over the GE junction.  Recommend advancing.  3. Increased consolidation in the right lower lung zone. Atelectasis  versus infection.  4. Persistent retrocardiac density opacities with stable small left  pleural effusion. Atelectasis versus infection.       =========================================

## 2022-11-05 NOTE — PROGRESS NOTES
I discussed with surgical resident this morning. Due to patient being in SICU, internal medicine consultation is not needed at this time. Please call medicine team back when internal medicine input is requested. I am happy to see her again with [facility] team. I do agree with broadening antibiotic given the interval development of fever and increasing oxygen requirement.     Rhianna Joel MD

## 2022-11-05 NOTE — PROGRESS NOTES
STAFF NOTE:  Improving oxygenation overnight  Last BM on the 3rd per     tachypneic on pressure support this morning with high RSBI  Not horrrible secretions from ETT  Exam intubated, but awake and interactive; CAM-ICU positive for ffocus, but that was in part because propofol was still running - overall pretty good  Peripherals only  MIVF running  Wound site looks fine    Labs overall look fine  Serum albumin only 2.2  No lactic acidosis  Interestingly, no gentry leukocytosis  Hgb 7.7, really just a slow drift    CXR on arrival last night continues to show some opacification presumed to be the infection seen on CT; G tube probably shallow, but I'm not sure in the context of a gastrectomy    This is a 42F hx depression, Raynaud's, remote OCP-provoked DVT/PE, and hiatal hernia who is s/p sleeve gastrectomy and hiatal hernia repair with delayed discovery of jeffrey-gastric hematoma.      After her initial surgery, there were prolonged issues with dysphagia and hypoxia; she had a Hgb drop that necessitated transfusion, but it does not appear that this was otherwise worked up at all.  She remained hopsitalized for about 5d after that initial surgery, and eventually discharged on PROPHYLACTIC lovenox (dosed at 40u bid because of obesity).  She was readmitted to the hospital from a routine 1 week followup clinic with shortness of breath and chest pain and an aniongap metabolic acidosis / starvation ketoacidosis.  Initially, this was presumed to be due to a community-acquired pneumonia (MRSA nasal swab, strep pneumo, COVID, flu, and legionella negative) based on CT chest findings, but later, in the context of ongoing pain, a CT of the abdomen revealed the hematoma (no evidence of active bleeding in the ~1 week prior to return to OR).  She had issues with oxygenation intra-op, and some hypoxia post-op, for which she remained intbuated.      She is not ready for extubation this morning.  I am presuming that her hypoxia is  multifactorial: obesity-related decreased FRC and difficulties with recruitment probably plan some role; but inadequately-treated pneumonia may as well (she is on antibiotics appropriate for community-acquired infection despite having been hospitalized >5d prior to her diagnosis; that said, there is no leukocytosis or significant secretions - just fever and consolidation); inadequately treated pain may contribute to her current hyperventilation with PS; transient right mainstem intubation jeffrey-operatively is very unlikely, but not definitively ruled out; there is no evidence of significant edema.    Will therefore empirrically broaden antibiotics to Zosyn for HCAP coverage and perform bronch with BAL since I am unable to extubate her this morning.  Will try again for extubation this afternoon after she gets better analgesia (no narcotics were given overnight for some reason).    DEPRESSION:  -wellbutrin    Hx SLEEVE GASTRECTOMY:  -discontinue gabapentin, IV and oral narcotics available  -scheduled methocarbamol; tylenol, and lidocaine patch available  -hyoscyamine probably not necessary at this point  -bid PPI is IV for now  -precedex for sedation for analgesic properties and to allow better neuro assessment so she's not getting high doses of propofol to mask pain response - target RASS -1 to 0.    ACUTE HYPOXIC RESPIRATORY FAILURE  -with PaO2 < 120 on >40% FiO2, P/F ratio was <300.  Monitoring with continuous pulse oximetry and intermittent ABGs.  -unclear etiology, as above, but for now I am presuming inadequately treated pneumonia and a component of derecruitment /decreased FRC with obesity.  -lung protective ventilator settings with Vt <8ml/kg IBW, but maintain higher PEEP given obesity  -no need for pharmacologic diuresis today    PRESUMED HCAP:  -formal bronch/BAL today since not readily extubatable  -empirically broaden to zosyn until BAL results back    ACUTE BLOOD LOSS ANEMIA:  -No evidence of ongoing  active hemorrhage, so will use a transfusion trigger of Hgb <7 going forward  -should be fine for DVT ppx  With bid lovenox    STARVATION KETOACIDOSIS:  -no role for tube feeds while itnubated  -will start on bariatric clears after extubation  -dextrose-containing fluids ffor MIVF (switch to D5LR from the hypotonic 1/2NS with 20K in this obese patient with risk for hypoantremia) and thiamine for now    MISC:  -Code status is full code  - updated at bedside  -lovenox 40bid (dosed bid for BMI); PPI bid is currently IV; bid for gastrectomy  -lines: peripheral only  -siegel can come out with extubation  -anticipate discharge to home in ~3 days      Billing statement: 48min of critical care time; spent in an initial review of imaging, labs, physical exam, and discussion of the patient with my own team and the extended care team including the primary service; Based on this patient's presentation / recent intervention and my bedside assessment, I felt there was or is a reasonably high probability of imminent or life-threatening deterioration today or tonight for respiratory reasons.   My overall critical care time, as described in detail above, includes such things as coordination of care, arrhythmia and hemodynamics management with infusions of medicines, respiratory management, fluid therapy including fluid boluses, and pain and sedation therapy. This time excludes time I spent personally performing or supervising procedures for this patient.    VEGA Powell MD  Clinical   Anesthesia / Critical Care  *[phone]

## 2022-11-05 NOTE — ANESTHESIA CARE TRANSFER NOTE
Patient: Leah Yanez    Procedure: Procedure(s):  LAPAROSCOPY, DIAGNOSTIC, BY GENERAL SURGERY, evacuation of abdominl hematoma       Diagnosis: Abdominal hematoma [S30.1XXA]  Diagnosis Additional Information: No value filed.    Anesthesia Type:   General     Note:    Oropharynx: endotracheal tube in place and ventilatory support  Level of Consciousness: iatrogenic sedation        Dentition: dentition unchanged  Vital Signs Stable: post-procedure vital signs reviewed and stable  Report to RN Given: handoff report given  Patient transferred to: PACU    Handoff Report: Identifed the Patient, Identified the Reponsible Provider, Reviewed the pertinent medical history, Discussed the surgical course, Reviewed Intra-OP anesthesia mangement and issues during anesthesia, Set expectations for post-procedure period and Allowed opportunity for questions and acknowledgement of understanding      Vitals:  Vitals Value Taken Time   /60 2001 11/4/22   Temp     Pulse 90 2001 11/4/22   Resp     SpO2 95% 2001 11/4/22       Electronically Signed By: ELISA Rosa CRNA  November 4, 2022  8:07 PM

## 2022-11-05 NOTE — PLAN OF CARE
Neuro: Pt opens eyes spontaneously, follows commands, purposeful movement in all extremities. +4 in BUE, +3 in BLE. PERRL 3mm.   CV: -110s, DBP 50-60s. MAP decreased overnight. NS bolus given at 0300 with improvement in MAP noted. Tmax 101.6, MD aware, ice packs applied to bilateral axilla. SR in 80-90s.   Resp: Lung sounds coarse over dim. 7.0, 23 @ teeth. CMV/, 30%, 7, 16. Thick, white/grey secretions with suctioning.  GI: OG 51 @ lips to gravity suction; OG okay to use for meds. No BM this shift.   : Adequate UOP via Holder.   Drips/Lines/Gtt:   L PIV - D51/2NS with 20mEq/L Kcl @ 75mL/hr  R PIV - Prop @ 50mcg/kg/hr    R NOEL output 9-22mL/hr  Skin: Generalized bruising with more bruises noted on bilateral forearms. Redness blanchable on sacrum/coccyx, Mepilex applied. 5 abd punctures sites covered with Primapore dressings, CDI.      Plan: Pain management. PS trial. Alert MD with any changes in pt condition.       Goal Outcome Evaluation:      Plan of Care Reviewed With: patient, spouse    Overall Patient Progress: improvingOverall Patient Progress: improving

## 2022-11-05 NOTE — ANESTHESIA POSTPROCEDURE EVALUATION
Patient: Leah Yanez    Procedure: Procedure(s):  LAPAROSCOPY, DIAGNOSTIC, BY GENERAL SURGERY, evacuation of abdominl hematoma       Anesthesia Type:  General    Note:  Disposition: ICU            ICU Sign Out: Anesthesiologist/ICU physician sign out WAS performed   Postop Pain Control: Uneventful            Sign Out: Well controlled pain   PONV: No   Neuro/Psych: Uneventful            Sign Out: Acceptable/Baseline neuro status   Airway/Respiratory:             Events: Respiratory failure            Sign Out: AIRWAY IN SITU/Resp. Support               Airway in situ/Resp. Support: ETT                 Reason: Unplanned   CV/Hemodynamics: Uneventful            Sign Out: Acceptable CV status; No obvious hypovolemia; No obvious fluid overload   Other NRE: NONE   DID A NON-ROUTINE EVENT OCCUR? YES    Event details/Postop Comments:  Per sign out received, patient was significantly tachypneic with high O2 requirements and temp of 39.5C preoperatively. Likely her pneumonia had not responded to the initial antibiotic therapy and continued to progress. After completion of surgery, patient was saturating well but with evidence of significantly impaired pulmonary gas exchange. Despite recruitment maneuvers, endotracheal suctioning, and administration of bronchodilators, her PF ratio was still in the low 100s. Thus patient was kept intubated and transferred to ICU for further management and optimization of respiratory status.           Last vitals:  Vitals Value Taken Time   /63 11/04/22 2045   Temp 36.7  C (98.1  F) 11/04/22 2000   Pulse 90 11/04/22 2051   Resp 15 11/04/22 2051   SpO2 97 % 11/04/22 2049   Vitals shown include unvalidated device data.    Electronically Signed By: Johnathon Edward MD  November 5, 2022  3:32 PM

## 2022-11-05 NOTE — BRIEF OP NOTE
[facility]    Brief Operative Note    Pre-operative diagnosis: Intraabdominal hematoma [S30.1XXA]  Post-operative diagnosis Same as pre-operative diagnosis    Procedure: Procedure(s):  LAPAROSCOPY, DIAGNOSTIC, BY GENERAL SURGERY, evacution of abdominl hematoma  Surgeon: Surgeon(s) and Role:     * Daniel Aragon MD - Primary   HAYDEE Pfeiffer - Resident    Anesthesia: General   Estimated Blood Loss: 25 mL this operation, 1000 mL of blood/clots evacuated    Drains: Ran-Berrios  Specimens: * No specimens in log *  Findings:   perisplenic and perigastric hematoma with clotted blood and minimal liquified blood, no evidence of active bleeding, sleeve gastrectomy staple line is hemostatic. liquid old blood in the pelvis. .  Complications: None.  Implants: * No implants in log *      Post Op:  Patient is unable to oxygenate well on FiO2 of 65%. Will remain intubated and transferred to the SICU -for ongoing care and respiratory support.   Resume enoxaparin  NPO  Pain control: Fentanyl ggt titratable   Lung protective ventilation. Wean support as able.     HAYDEE Pfeiffer  General Surgery PGY-4

## 2022-11-05 NOTE — PLAN OF CARE
Neuro: Sedated/intubated. Weaning sedation as able and giving PRN pain medications. Opens eyes to voice, follows simple commands. Perrl. 4+ BUE, 3+ BLE.   Pain:   -Scheduled: robaxin, atarax, gabapentin  -PRN: oxy, dilaudid  -Gtts: Dex, propofol  Cardiac: SR w/ rare PVC. -110/50s. Levo needed to keep MAP >65. T-max 101.0, scheduled tylenol. Cooling measures enacted.   Resp: CMV/AC 35%/5/450/16, Coarse/dim lungs. Bronchoscopy performed w/ moderate sedation given. Cultures sent.  GI: OG ok'd for meds. Smear BM x1, bowel regimen started.   : Holder w/ 50-100ml/hr UO. +menses  LADs: PIV x3, OG, Lap incisions x5, R NOEL (20ml dark red diluted blood q4h), ETT  Gtts:   -Propofol at 10mcg/kg/hr  -Dex at 0.3mcg/kg/hr  -Levo at 0.03mcg/kg/hr  -D5LR w/ 20K at 75ml/hr  Integ:  -Blanchable erythema on coccyx  Labs: Lytes replaced per protocol      Plan: Continue PS trials and aggressive pulm. toilet. Potential PICC placement 11/6 if patient continues on gtts. Continue to monitor and update MD with changes.

## 2022-11-05 NOTE — H&P
SURGICAL ICU ADMISSION NOTE  11/04/2022      PRIMARY TEAM: MIS  REASON FOR CRITICAL CARE ADMISSION: Postoperative respiratory support    ADMITTING PHYSICIAN: Dr. Aragon  Date of Service (when I saw the patient): 11/04/2022    ASSESSMENT:  Leah Yanez is a 42 year old female with recent sleeve gastrectomy and hiatal hernia repair on 10/25/22 who who was admitted on 11/1/2022 for pneumonia and intra-abdominal hematoma. She is now s/p diagnostic laparoscopy with evacuation of abdominal hematoma on 11/4/22. Postoperatively she was unable to be weaned from mechanical ventilation and was admitted to the SICU for respiratory support overnight.     PLAN:    Neurological:  # Acute pain   # Agitation   # Encephalopathy   - Monitor neurological status. Delirium preventions and precautions.   - Pain: Fentanyl gtt; Davida tylenol 650 mg q4h, gabapentin 300 mg TID, atarax 25 mg TID, robaxin 500 mg QID, lidocaine patches; PRN flexeril, valium, dilaudid, oxy  - Sedation plan: Propofol gtt    # Depression  - PTA wellbutrin    Pulmonary:   # Post operative ventilatory support  - VENT: Vent Mode: CMV/AC  (Continuous Mandatory Ventilation/ Assist Control)  FiO2 (%): 60 %  Resp Rate (Set): 16 breaths/min  Tidal Volume (Set, mL): 500 mL  PEEP (cm H2O): 8 cmH2O  Resp: 16  Continue full vent support. PST when meets criteria.  Ventilatory bundle. HOB elevation   - Supplemental oxygen to keep saturation above 92 %.    # Bilateral lower lobe pneumonia  - amoxicillin 1000 mg TID    Cardiovascular:    - Monitor hemodynamic status.      Gastroenterology/Nutrition:  # S/p sleeve gastrectomy 10/25  # S/p diagnostic laparoscopy and hematoma evacuation 11/4  # Protein calorie deficit malnutrition   - NPO  - NGT to LIS  - PTA B1, hyoscyamine    Fluids/Electrolytes:   - - D5 1/2 NS+K @75 ml/h for IV fluid hydration    Renal:  - Will continue to monitor intake and output.  - Gallo     Endocrine:  # Stress hyperglycemia   - Will monitor BG    ID:  #   Pneumonia   - Continue amoxicillin       Heme:     # Acute blood loss anemia   - Hemoglobin 8.5  - Transfuse if hgb <7.0 or signs/symptoms of hypoperfusion. Monitor and trend.     Musculoskeletal:   # Weakness and deconditioning of critical illness   - Physical and occupational therapy consult       General Cares/Prophylaxis:    DVT Prophylaxis: Enoxaparin (Lovenox) SQ  GI Prophylaxis: PPI  Restraints: Restraints for medical healing needed: YES    Lines/ tubes/ drains:  - ETT, NG, Holder, Amalia    Disposition:  -  Surgical ICU.     Patient seen, findings and plan discussed with surgical ICU staff, Dr. Thompson.      Carol Metcalf MD  Surgery PGY-2     - - - - - - - - - - - - - - - - - - - - - - - - - - - - - - - - - - - - - - - - - - - - - -   HISTORY PRESENTING ILLNESS:    Leah Yanez is a 42 year old female with recent sleeve gastrectomy and hiatal hernia repair on 10/25/22 who who was admitted on 11/1/2022 for pneumonia and intra-abdominal hematoma. She is now s/p diagnostic laparoscopy with evacuation of abdominal hematoma on 11/4/22. Postoperatively she was unable to be weaned from mechanical ventilation and was admitted to the SICU for respiratory support overnight. On arrival to the unit she was intubated and sedated.  was updated at bedside.     REVIEW OF SYSTEMS: 10 point ROS neg other than the symptoms noted above in the HPI.    PAST MEDICAL HISTORY:   Past Medical History:   Diagnosis Date     Anti-cardiolipin antibody positive      Griggs esophagus      Concussion 2016     Depressive disorder, not elsewhere classified 03/01/2006    Resolved 8/07     Gastroesophageal reflux disease with esophagitis      Hiatal hernia      History of pulmonary embolism      Obesity      Raynaud's syndrome     past history of admission for gangrene of one finger       SURGICAL HISTORY:   Past Surgical History:   Procedure Laterality Date     ESOPHAGOSCOPY, GASTROSCOPY, DUODENOSCOPY (EGD), COMBINED N/A 12/29/2021  "   Procedure: ESOPHAGOGASTRODUODENOSCOPY, WITH BIOPSY;  Surgeon: Esteban Rose DO;  Location: [facility] OR     LAPAROSCOPIC GASTRIC SLEEVE N/A 10/25/2022    Procedure: GASTRECTOMY, SLEEVE, LAPAROSCOPIC;  Surgeon: Daniel Aragon MD;  Location: [facility] OR     LAPAROSCOPIC HERNIORRHAPHY HIATAL N/A 10/25/2022    Procedure: HERNIORRHAPHY, HIATAL, LAPAROSCOPIC;  Surgeon: Daniel Aragon MD;  Location: [facility] OR     TONSILLECTOMY & ADENOIDECTOMY       ZZC NONSPECIFIC PROCEDURE      T&A as a child     ZZ NONSPECIFIC PROCEDURE      Tubes in ears bilaterally       SOCIAL HISTORY:   Social History     Tobacco Use     Smoking status: Never     Smokeless tobacco: Never   Substance Use Topics     Alcohol use: Yes     Comment: Alcoholic Drinks/day: social     Drug use: No       FAMILY HISTORY:   Family History   Problem Relation Age of Onset     Hypertension Mother         arthritis     Heart Disease Father         MI, Lipids     Acute Myocardial Infarction Father      Cancer - colorectal Maternal Grandmother         arthritis     Hypertension Maternal Grandfather         arthritis, macular degeneration     Colon Cancer Paternal Grandmother      Alzheimer Disease Paternal Grandfather      Anesthesia Reaction No family hx of      Clotting Disorder No family hx of        ALLERGIES:   Allergies   Allergen Reactions     Azithromycin      Erythromycin GI Disturbance     When \"very young\"       MEDICATIONS:  No current facility-administered medications on file prior to encounter.  buPROPion (WELLBUTRIN XL) 150 MG 24 hr tablet, Take 150 mg by mouth every morning  enoxaparin ANTICOAGULANT (LOVENOX) 40 MG/0.4ML syringe, Inject 0.4 mLs (40 mg) Subcutaneous every 12 hours for 28 days  fish oil-omega-3 fatty acids 1000 MG capsule, Take 2 g by mouth every morning  hydrOXYzine (ATARAX) 10 MG/5ML syrup, Take 12.5 mLs (25 mg) by mouth every 6 hours as needed for anxiety (adjuvant pain)  hyoscyamine (LEVSIN) 0.125 MG tablet, Take 1 tablet (125 " mcg) by mouth every 4 hours as needed for cramping  omeprazole (PRILOSEC) 40 MG DR capsule, Take 1 capsule (40 mg) by mouth 2 times daily  ondansetron (ZOFRAN ODT) 4 MG ODT tab, Take 1 tablet (4 mg) by mouth every 8 hours as needed for nausea  oxyCODONE (ROXICODONE) 5 MG tablet, Take 1 tablet (5 mg) by mouth every 6 hours as needed for moderate to severe pain  senna-docusate (SENOKOT-S/PERICOLACE) 8.6-50 MG tablet, Take 2 tablets by mouth daily as needed for constipation (While taking narcotic pain medications.  Stop taking if having loose stools.)        PHYSICAL EXAMINATION:  Temp:  [97.5  F (36.4  C)-99.8  F (37.7  C)] 99  F (37.2  C)  Pulse:  [84-99] 95  Resp:  [20-30] 20  BP: (138-147)/(61-72) 142/65  SpO2:  [92 %-97 %] 94 %  General: Intubated, sedated   Neuro: sedated  Pulm/Resp: mechanically ventilated  CV: RRR  Abdomen: Soft, non-distended, incisions c/d/i   :  siegel catheter in place, urine yellow and clear  MSK/Extremities: warm and well perfused     LABS: Reviewed.   Arterial Blood Gases   Recent Labs   Lab 11/04/22 1916 11/04/22 1741   PH 7.35 7.40   PCO2 43 41   PO2 74* 92   HCO3 24 25     Complete Blood Count   Recent Labs   Lab 11/04/22 1916 11/04/22 1741 11/04/22  0619 11/03/22  0616 11/02/22  0636 11/01/22  1734   WBC  --   --  9.3 11.0 12.1* 13.1*   HGB 8.5* 8.2* 8.7* 8.3* 8.5* 9.1*   PLT  --   --  298 305 299 327     Basic Metabolic Panel  Recent Labs   Lab 11/04/22 1916 11/04/22 1741 11/04/22  1148 11/04/22  0619 11/04/22  0354 11/03/22  1407 11/03/22  0616 11/02/22  1922 11/02/22  0636 11/01/22  1734    139  --  138  --   --  135*  --  135* 136   POTASSIUM 3.5 3.5 3.4 3.5  --    < > 3.1*   < > 3.0* 3.3*   CHLORIDE  --   --   --  106  --   --  102  --  101 102   CO2  --   --   --  19*  --   --  15*  --  18* 17*   BUN  --   --   --  2.3*  --   --  4.9*  --  6.7 7.6   CR  --   --   --  0.81  --   --  0.80  --  1.00* 0.86   * 116*  --  119* 118*   < > 83  --  78 73    < > =  values in this interval not displayed.     Liver Function Tests  Recent Labs   Lab 11/04/22  0619 11/03/22  0616 11/02/22  0636 11/01/22  1734   AST 41* 37* 31 41*   ALT 17 16 23 32   ALKPHOS 125* 120* 126* 131*   BILITOTAL 0.8 1.1 1.3* 1.5*   ALBUMIN 2.8* 3.0* 3.2* 3.5     Pancreatic Enzymes  Recent Labs   Lab 11/01/22  1734   LIPASE 68*     Coagulation Profile  No lab results found in last 7 days.    IMAGING:  No results found for this or any previous visit (from the past 24 hour(s)).

## 2022-11-05 NOTE — PROCEDURES
Procedure Diagnostic Bronchoscopy and lavage      Situation : Patient in ICU   Intubated and sedated     Case of: Hypoxia, pneumonia and diagnostic laparoscopy and evacuation of hematoma    Indications: Hypoxia, pneumonia       Consent : from her spouse      Procedure :     After confirming the Patient identification, indication and procedure.   flexible bronchoscopy performed bedside in presence of the assigned nurse and the RT   No abnormal anatomy found   Minimal thick secretions found   BAL done for the Right middle lobe and sent for culture   No immediate complication   Procedure time 10 mins

## 2022-11-05 NOTE — PROGRESS NOTES
Minimally Invasive Surgery Progress Note  11/05/2022       Subjective:  Transferred to SICU post operatively for ventilatory support. Following commands and nodding head appropriately to questions this morning. Endorses continued pain in her LUQ. Tmax 101.6 overnight.     Objective:  /55   Pulse 93   Temp (!) 101  F (38.3  C) (Oral)   Resp 16   Ht 1.829 m (6')   Wt 126 kg (277 lb 12.5 oz)   LMP 10/01/2022   SpO2 93%   BMI 37.67 kg/m    Gen: Intubated, sedated, nods head to questions appropriately, follows commands  CV: RRR  Resp: intubated, mechanically ventilated   Abd: obese, soft, nondistended, previous incisions c/d/i, new dressings clean and dry, right NOEL with serosanguinous output  Ext: WWP, no edema    I/O:   UOP 1305/400  OG 0  NOEL 111/27     Labs:  All labs reviewed, notable for:  WBC 9.3 (12.0 < 9.3)  Hgb 7.7 (8.2 < 8.7)    Imaging:  New imaging reviewed    Assessment/Plan:   Leah Yanez is a pleasant 42 year old female who is s/p laparoscopic sleeve gastrectomy and hiatal hernia repair on 10/25/22 with Dr. Aragon who returned to the ED with LUQ abdominal and shoulder pain, was found to have bilateral lower lobe pneumonia and new oxygen requirement. No evidence of PE. RUQ US obtained to evaluate elevated Tbili, found to have septated fluid collection along left hepatic lobe consistent with hematoma, likely the explanation for her acute blood loss anemia post operatively. This was again seen on CT scan 11/3. No evidence to suggest ongoing bleeding. UGI study obtained without evidence for leak. However, eLah continued to struggle with pain and decision was made to perform exploratory laparoscopy. She is now s/p diagnostic laparoscopy with evacuation of abdominal hematoma on 11/4. During the operation, a stitch was removed from the hiatal repair. She was admitted to the SICU post operatively for mechanical ventilatory support.     - appreciate excellent SICU cares  - multimodal pain  regimen  - wean to extubate this AM  - ok to remove OG, no need for NGT  - okay for bariatric clear liquid diet as tolerated  - continue Lovenox BID for dvt ppx  - continue PPI BID  - PTA wellbutrin, protonix, senna prn  - continue abx for presumed aspiration pneumonia vs atelectasis  Dispo: SICU, okay to transfer to floor once respiratory status allows    Patient seen and dicussed with chief resident and staff, Dr. Aragon.  - - - - - - - - - - - - - - - - - -  Danni Clifton MD  General Surgery PGY-2

## 2022-11-05 NOTE — PLAN OF CARE
Admitted/transferred from: PACU  Reason for admission/transfer: Hypoxia, unable to wean from ETT  2 RN skin assessment: completed by Jayla Osorio  Result of skin assessment and interventions/actions: generalized bruising, redness blanchable on sacrum/coccyx   Height, weight, drug calc weight: Done  Patient belongings (see Flowsheet)  MDRO education added to care planN/A  ?

## 2022-11-05 NOTE — PROGRESS NOTES
"Bronchoscopy Risk Assessment Guidelines      A. Patient symptoms to consider when assessing pulmonary TB risk are:    I. Cough greater than 3 weeks; and fever, hemoptysis, pleuritic chest    pain, weight loss greater than 10 lbs, night sweats, fatigue, infiltrates on    upper lobes or superior segments of lower lobes, cavitation on chest    x-ray.   B. Patient risk factors to consider when assessing pulmonary TB risk are:    I. Exposure to known TB case, foreign-born persons (within 5 years of    arrival to [place]), residence in a crowded setting (correctional facility,     long-term care center, etc.), persons with HIV or immunosuppression.    Patients with symptoms and risk factors should generally be considered \"suspect risk\" and bronchoscopies should be performed in airborne precautions.    This patient has NO KNOWN RISK of Tuberculosis (proceed with bronchoscopy)    Specimens sent: yes  Complications: None  Scope used: #[id number] Slim  Attending Physician: Dr. Andre Campbell, RT on 11/5/2022 at 1:23 PM  "

## 2022-11-06 ENCOUNTER — APPOINTMENT (OUTPATIENT)
Dept: ULTRASOUND IMAGING | Facility: CLINIC | Age: 42
DRG: 907 | End: 2022-11-06
Attending: ANESTHESIOLOGY
Payer: COMMERCIAL

## 2022-11-06 LAB
ALBUMIN SERPL BCG-MCNC: 2.3 G/DL (ref 3.5–5.2)
ALBUMIN UR-MCNC: 50 MG/DL
ALP SERPL-CCNC: 145 U/L (ref 35–104)
ALT SERPL W P-5'-P-CCNC: 19 U/L (ref 10–35)
ANION GAP SERPL CALCULATED.3IONS-SCNC: 12 MMOL/L (ref 7–15)
APPEARANCE UR: CLEAR
AST SERPL W P-5'-P-CCNC: 36 U/L (ref 10–35)
BACTERIA BLD CULT: NO GROWTH
BASOPHILS # BLD AUTO: 0 10E3/UL (ref 0–0.2)
BASOPHILS NFR BLD AUTO: 0 %
BILIRUB SERPL-MCNC: 0.5 MG/DL
BILIRUB UR QL STRIP: NEGATIVE
BUN SERPL-MCNC: 5.1 MG/DL (ref 6–20)
CALCIUM SERPL-MCNC: 8.3 MG/DL (ref 8.6–10)
CHLORIDE SERPL-SCNC: 108 MMOL/L (ref 98–107)
CK SERPL-CCNC: 125 U/L (ref 26–192)
COLOR UR AUTO: ABNORMAL
CREAT SERPL-MCNC: 0.97 MG/DL (ref 0.51–0.95)
CRP SERPL-MCNC: 417 MG/L
DEPRECATED HCO3 PLAS-SCNC: 23 MMOL/L (ref 22–29)
EOSINOPHIL # BLD AUTO: 0.1 10E3/UL (ref 0–0.7)
EOSINOPHIL NFR BLD AUTO: 1 %
ERYTHROCYTE [DISTWIDTH] IN BLOOD BY AUTOMATED COUNT: 17.8 % (ref 10–15)
GFR SERPL CREATININE-BSD FRML MDRD: 74 ML/MIN/1.73M2
GLUCOSE BLDC GLUCOMTR-MCNC: 110 MG/DL (ref 70–99)
GLUCOSE BLDC GLUCOMTR-MCNC: 132 MG/DL (ref 70–99)
GLUCOSE BLDC GLUCOMTR-MCNC: 189 MG/DL (ref 70–99)
GLUCOSE BLDC GLUCOMTR-MCNC: 66 MG/DL (ref 70–99)
GLUCOSE BLDC GLUCOMTR-MCNC: 68 MG/DL (ref 70–99)
GLUCOSE BLDC GLUCOMTR-MCNC: 97 MG/DL (ref 70–99)
GLUCOSE SERPL-MCNC: 120 MG/DL (ref 70–99)
GLUCOSE UR STRIP-MCNC: NEGATIVE MG/DL
HCT VFR BLD AUTO: 27 % (ref 35–47)
HGB BLD-MCNC: 7.9 G/DL (ref 11.7–15.7)
HGB UR QL STRIP: ABNORMAL
IMM GRANULOCYTES # BLD: 0.1 10E3/UL
IMM GRANULOCYTES NFR BLD: 1 %
KETONES UR STRIP-MCNC: NEGATIVE MG/DL
LDH SERPL L TO P-CCNC: 465 U/L (ref 0–250)
LEUKOCYTE ESTERASE UR QL STRIP: ABNORMAL
LIPASE SERPL-CCNC: 65 U/L (ref 13–60)
LYMPHOCYTES # BLD AUTO: 1.2 10E3/UL (ref 0.8–5.3)
LYMPHOCYTES NFR BLD AUTO: 13 %
MAGNESIUM SERPL-MCNC: 1.9 MG/DL (ref 1.7–2.3)
MCH RBC QN AUTO: 25.9 PG (ref 26.5–33)
MCHC RBC AUTO-ENTMCNC: 29.3 G/DL (ref 31.5–36.5)
MCV RBC AUTO: 89 FL (ref 78–100)
MONOCYTES # BLD AUTO: 0.5 10E3/UL (ref 0–1.3)
MONOCYTES NFR BLD AUTO: 6 %
MUCOUS THREADS #/AREA URNS LPF: PRESENT /LPF
MYOGLOBIN SERPL-MCNC: 127 UG/L
NEUTROPHILS # BLD AUTO: 7.1 10E3/UL (ref 1.6–8.3)
NEUTROPHILS NFR BLD AUTO: 79 %
NITRATE UR QL: NEGATIVE
NRBC # BLD AUTO: 0 10E3/UL
NRBC BLD AUTO-RTO: 0 /100
NT-PROBNP SERPL-MCNC: 407 PG/ML (ref 0–450)
PH UR STRIP: 6 [PH] (ref 5–7)
PHOSPHATE SERPL-MCNC: 2.5 MG/DL (ref 2.5–4.5)
PLATELET # BLD AUTO: 296 10E3/UL (ref 150–450)
POTASSIUM SERPL-SCNC: 3.5 MMOL/L (ref 3.4–5.3)
PROCALCITONIN SERPL IA-MCNC: 0.22 NG/ML
PROT SERPL-MCNC: 5.6 G/DL (ref 6.4–8.3)
RBC # BLD AUTO: 3.05 10E6/UL (ref 3.8–5.2)
RBC URINE: 6 /HPF
SODIUM SERPL-SCNC: 143 MMOL/L (ref 136–145)
SP GR UR STRIP: 1.02 (ref 1–1.03)
SQUAMOUS EPITHELIAL: <1 /HPF
UROBILINOGEN UR STRIP-MCNC: NORMAL MG/DL
WBC # BLD AUTO: 9 10E3/UL (ref 4–11)
WBC URINE: 8 /HPF

## 2022-11-06 PROCEDURE — 81001 URINALYSIS AUTO W/SCOPE: CPT | Performed by: ANESTHESIOLOGY

## 2022-11-06 PROCEDURE — 258N000001 HC RX 258: Performed by: STUDENT IN AN ORGANIZED HEALTH CARE EDUCATION/TRAINING PROGRAM

## 2022-11-06 PROCEDURE — 250N000011 HC RX IP 250 OP 636

## 2022-11-06 PROCEDURE — 258N000003 HC RX IP 258 OP 636

## 2022-11-06 PROCEDURE — 83690 ASSAY OF LIPASE: CPT

## 2022-11-06 PROCEDURE — 82550 ASSAY OF CK (CPK): CPT | Performed by: ANESTHESIOLOGY

## 2022-11-06 PROCEDURE — 83874 ASSAY OF MYOGLOBIN: CPT | Performed by: ANESTHESIOLOGY

## 2022-11-06 PROCEDURE — 250N000013 HC RX MED GY IP 250 OP 250 PS 637

## 2022-11-06 PROCEDURE — 258N000001 HC RX 258

## 2022-11-06 PROCEDURE — 250N000013 HC RX MED GY IP 250 OP 250 PS 637: Performed by: STUDENT IN AN ORGANIZED HEALTH CARE EDUCATION/TRAINING PROGRAM

## 2022-11-06 PROCEDURE — C9113 INJ PANTOPRAZOLE SODIUM, VIA: HCPCS | Performed by: STUDENT IN AN ORGANIZED HEALTH CARE EDUCATION/TRAINING PROGRAM

## 2022-11-06 PROCEDURE — 36569 INSJ PICC 5 YR+ W/O IMAGING: CPT

## 2022-11-06 PROCEDURE — 86140 C-REACTIVE PROTEIN: CPT | Performed by: ANESTHESIOLOGY

## 2022-11-06 PROCEDURE — 94003 VENT MGMT INPAT SUBQ DAY: CPT

## 2022-11-06 PROCEDURE — 85025 COMPLETE CBC W/AUTO DIFF WBC: CPT | Performed by: STUDENT IN AN ORGANIZED HEALTH CARE EDUCATION/TRAINING PROGRAM

## 2022-11-06 PROCEDURE — 83880 ASSAY OF NATRIURETIC PEPTIDE: CPT | Performed by: ANESTHESIOLOGY

## 2022-11-06 PROCEDURE — 250N000011 HC RX IP 250 OP 636: Performed by: STUDENT IN AN ORGANIZED HEALTH CARE EDUCATION/TRAINING PROGRAM

## 2022-11-06 PROCEDURE — 84145 PROCALCITONIN (PCT): CPT | Performed by: ANESTHESIOLOGY

## 2022-11-06 PROCEDURE — 93970 EXTREMITY STUDY: CPT

## 2022-11-06 PROCEDURE — 99291 CRITICAL CARE FIRST HOUR: CPT | Mod: 24 | Performed by: ANESTHESIOLOGY

## 2022-11-06 PROCEDURE — 94660 CPAP INITIATION&MGMT: CPT

## 2022-11-06 PROCEDURE — 250N000009 HC RX 250

## 2022-11-06 PROCEDURE — 272N000452 HC KIT SHRLOCK 5FR POWER PICC TRIPLE LUMEN

## 2022-11-06 PROCEDURE — 36415 COLL VENOUS BLD VENIPUNCTURE: CPT | Performed by: SURGERY

## 2022-11-06 PROCEDURE — 83615 LACTATE (LD) (LDH) ENZYME: CPT | Performed by: ANESTHESIOLOGY

## 2022-11-06 PROCEDURE — 83735 ASSAY OF MAGNESIUM: CPT | Performed by: SURGERY

## 2022-11-06 PROCEDURE — 80053 COMPREHEN METABOLIC PANEL: CPT | Performed by: STUDENT IN AN ORGANIZED HEALTH CARE EDUCATION/TRAINING PROGRAM

## 2022-11-06 PROCEDURE — 258N000001 HC RX 258: Performed by: ANESTHESIOLOGY

## 2022-11-06 PROCEDURE — 87040 BLOOD CULTURE FOR BACTERIA: CPT | Performed by: SURGERY

## 2022-11-06 PROCEDURE — 99292 CRITICAL CARE ADDL 30 MIN: CPT | Mod: 24 | Performed by: ANESTHESIOLOGY

## 2022-11-06 PROCEDURE — 200N000002 HC R&B ICU UMMC

## 2022-11-06 PROCEDURE — 250N000013 HC RX MED GY IP 250 OP 250 PS 637: Performed by: ANESTHESIOLOGY

## 2022-11-06 PROCEDURE — 999N000157 HC STATISTIC RCP TIME EA 10 MIN

## 2022-11-06 PROCEDURE — 36415 COLL VENOUS BLD VENIPUNCTURE: CPT | Performed by: STUDENT IN AN ORGANIZED HEALTH CARE EDUCATION/TRAINING PROGRAM

## 2022-11-06 PROCEDURE — 84100 ASSAY OF PHOSPHORUS: CPT | Performed by: SURGERY

## 2022-11-06 PROCEDURE — 93970 EXTREMITY STUDY: CPT | Mod: 26 | Performed by: RADIOLOGY

## 2022-11-06 PROCEDURE — 250N000013 HC RX MED GY IP 250 OP 250 PS 637: Performed by: SURGERY

## 2022-11-06 RX ORDER — HEPARIN SODIUM,PORCINE 10 UNIT/ML
5-20 VIAL (ML) INTRAVENOUS EVERY 24 HOURS
Status: DISCONTINUED | OUTPATIENT
Start: 2022-11-06 | End: 2022-11-16 | Stop reason: HOSPADM

## 2022-11-06 RX ORDER — DEXTROSE MONOHYDRATE 100 MG/ML
INJECTION, SOLUTION INTRAVENOUS CONTINUOUS
Status: DISCONTINUED | OUTPATIENT
Start: 2022-11-06 | End: 2022-11-12

## 2022-11-06 RX ORDER — NOREPINEPHRINE BITARTRATE 0.06 MG/ML
.01-.6 INJECTION, SOLUTION INTRAVENOUS CONTINUOUS
Status: DISCONTINUED | OUTPATIENT
Start: 2022-11-06 | End: 2022-11-08

## 2022-11-06 RX ORDER — HYDROMORPHONE HCL IN WATER/PF 6 MG/30 ML
.2-.6 PATIENT CONTROLLED ANALGESIA SYRINGE INTRAVENOUS
Status: DISCONTINUED | OUTPATIENT
Start: 2022-11-06 | End: 2022-11-07

## 2022-11-06 RX ORDER — DEXTROSE MONOHYDRATE, SODIUM CHLORIDE, SODIUM LACTATE, POTASSIUM CHLORIDE, CALCIUM CHLORIDE 5; 600; 310; 179; 20 G/100ML; MG/100ML; MG/100ML; MG/100ML; MG/100ML
INJECTION, SOLUTION INTRAVENOUS CONTINUOUS
Status: CANCELLED | OUTPATIENT
Start: 2022-11-06

## 2022-11-06 RX ORDER — DEXTROSE MONOHYDRATE 100 MG/ML
INJECTION, SOLUTION INTRAVENOUS CONTINUOUS
Status: CANCELLED | OUTPATIENT
Start: 2022-11-06

## 2022-11-06 RX ORDER — HEPARIN SODIUM,PORCINE 10 UNIT/ML
5-20 VIAL (ML) INTRAVENOUS
Status: DISCONTINUED | OUTPATIENT
Start: 2022-11-06 | End: 2022-11-16 | Stop reason: HOSPADM

## 2022-11-06 RX ADMIN — ACETAMINOPHEN 650 MG: 325 TABLET, FILM COATED ORAL at 11:34

## 2022-11-06 RX ADMIN — PIPERACILLIN SODIUM AND TAZOBACTAM SODIUM 4.5 G: 4; .5 INJECTION, POWDER, LYOPHILIZED, FOR SOLUTION INTRAVENOUS at 11:31

## 2022-11-06 RX ADMIN — HYDROMORPHONE HYDROCHLORIDE 0.4 MG: 0.2 INJECTION, SOLUTION INTRAMUSCULAR; INTRAVENOUS; SUBCUTANEOUS at 16:50

## 2022-11-06 RX ADMIN — METHOCARBAMOL 500 MG: 500 TABLET ORAL at 11:34

## 2022-11-06 RX ADMIN — HYDROMORPHONE HYDROCHLORIDE 0.4 MG: 0.2 INJECTION, SOLUTION INTRAMUSCULAR; INTRAVENOUS; SUBCUTANEOUS at 12:51

## 2022-11-06 RX ADMIN — HYDROMORPHONE HYDROCHLORIDE 0.4 MG: 0.2 INJECTION, SOLUTION INTRAMUSCULAR; INTRAVENOUS; SUBCUTANEOUS at 18:49

## 2022-11-06 RX ADMIN — HYDROMORPHONE HYDROCHLORIDE 0.4 MG: 0.2 INJECTION, SOLUTION INTRAMUSCULAR; INTRAVENOUS; SUBCUTANEOUS at 14:43

## 2022-11-06 RX ADMIN — PIPERACILLIN SODIUM AND TAZOBACTAM SODIUM 4.5 G: 4; .5 INJECTION, POWDER, LYOPHILIZED, FOR SOLUTION INTRAVENOUS at 04:14

## 2022-11-06 RX ADMIN — NOREPINEPHRINE BITARTRATE 0.03 MCG/KG/MIN: 1 INJECTION, SOLUTION, CONCENTRATE INTRAVENOUS at 03:21

## 2022-11-06 RX ADMIN — ONDANSETRON 4 MG: 2 INJECTION INTRAMUSCULAR; INTRAVENOUS at 07:29

## 2022-11-06 RX ADMIN — DEXTROSE MONOHYDRATE: 100 INJECTION, SOLUTION INTRAVENOUS at 20:46

## 2022-11-06 RX ADMIN — BUPROPION HYDROCHLORIDE 75 MG: 75 TABLET, FILM COATED ORAL at 08:23

## 2022-11-06 RX ADMIN — HYDROMORPHONE HYDROCHLORIDE 0.4 MG: 0.2 INJECTION, SOLUTION INTRAMUSCULAR; INTRAVENOUS; SUBCUTANEOUS at 07:37

## 2022-11-06 RX ADMIN — THIAMINE HYDROCHLORIDE 100 MG: 100 INJECTION, SOLUTION INTRAMUSCULAR; INTRAVENOUS at 09:50

## 2022-11-06 RX ADMIN — HYDROMORPHONE HYDROCHLORIDE 0.2 MG: 0.2 INJECTION, SOLUTION INTRAMUSCULAR; INTRAVENOUS; SUBCUTANEOUS at 00:22

## 2022-11-06 RX ADMIN — OXYCODONE HYDROCHLORIDE 10 MG: 5 SOLUTION ORAL at 08:23

## 2022-11-06 RX ADMIN — DEXMEDETOMIDINE HYDROCHLORIDE 0.5 MCG/KG/HR: 400 INJECTION INTRAVENOUS at 07:40

## 2022-11-06 RX ADMIN — PROPOFOL 20 MCG/KG/MIN: 10 INJECTION, EMULSION INTRAVENOUS at 07:40

## 2022-11-06 RX ADMIN — LIDOCAINE PATCH 4% 1 PATCH: 40 PATCH TOPICAL at 07:42

## 2022-11-06 RX ADMIN — ENOXAPARIN SODIUM 40 MG: 40 INJECTION SUBCUTANEOUS at 20:01

## 2022-11-06 RX ADMIN — DEXTROSE MONOHYDRATE 50 ML: 25 INJECTION, SOLUTION INTRAVENOUS at 20:36

## 2022-11-06 RX ADMIN — PIPERACILLIN SODIUM AND TAZOBACTAM SODIUM 4.5 G: 4; .5 INJECTION, POWDER, LYOPHILIZED, FOR SOLUTION INTRAVENOUS at 22:22

## 2022-11-06 RX ADMIN — PANTOPRAZOLE SODIUM 40 MG: 40 INJECTION, POWDER, FOR SOLUTION INTRAVENOUS at 20:01

## 2022-11-06 RX ADMIN — DEXTROSE MONOHYDRATE 25 ML: 25 INJECTION, SOLUTION INTRAVENOUS at 19:56

## 2022-11-06 RX ADMIN — PANTOPRAZOLE SODIUM 40 MG: 40 INJECTION, POWDER, FOR SOLUTION INTRAVENOUS at 07:48

## 2022-11-06 RX ADMIN — ACETAMINOPHEN 650 MG: 325 TABLET, FILM COATED ORAL at 02:05

## 2022-11-06 RX ADMIN — METHOCARBAMOL 500 MG: 500 TABLET ORAL at 08:23

## 2022-11-06 RX ADMIN — PIPERACILLIN SODIUM AND TAZOBACTAM SODIUM 4.5 G: 4; .5 INJECTION, POWDER, LYOPHILIZED, FOR SOLUTION INTRAVENOUS at 17:51

## 2022-11-06 RX ADMIN — DEXMEDETOMIDINE HYDROCHLORIDE 0.4 MCG/KG/HR: 400 INJECTION INTRAVENOUS at 04:13

## 2022-11-06 RX ADMIN — ACETAMINOPHEN 650 MG: 325 TABLET, FILM COATED ORAL at 06:12

## 2022-11-06 RX ADMIN — DEXTROSE MONOHYDRATE 25 ML: 25 INJECTION, SOLUTION INTRAVENOUS at 20:19

## 2022-11-06 RX ADMIN — LIDOCAINE HYDROCHLORIDE 1 ML: 10 INJECTION, SOLUTION EPIDURAL; INFILTRATION; INTRACAUDAL; PERINEURAL at 11:38

## 2022-11-06 RX ADMIN — ENOXAPARIN SODIUM 40 MG: 40 INJECTION SUBCUTANEOUS at 07:47

## 2022-11-06 RX ADMIN — DEXTROSE MONOHYDRATE, SODIUM CHLORIDE, SODIUM LACTATE, POTASSIUM CHLORIDE, CALCIUM CHLORIDE: 5; 600; 310; 179; 20 INJECTION, SOLUTION INTRAVENOUS at 05:02

## 2022-11-06 RX ADMIN — HYDROMORPHONE HYDROCHLORIDE 0.2 MG: 0.2 INJECTION, SOLUTION INTRAMUSCULAR; INTRAVENOUS; SUBCUTANEOUS at 21:19

## 2022-11-06 RX ADMIN — HYDROXYZINE HYDROCHLORIDE 25 MG: 10 SOLUTION ORAL at 08:23

## 2022-11-06 ASSESSMENT — ACTIVITIES OF DAILY LIVING (ADL)
ADLS_ACUITY_SCORE: 33

## 2022-11-06 NOTE — PROVIDER NOTIFICATION
11/06/22 1131   PICC 11/06/22 Triple Lumen Left Basilic Access   Placement Date/Time: 11/06/22 (c) 1131   Lumens: Triple Lumen  Lumen Identification: Gray;Red;White  Catheter Brand: Transinsight  Catheter Size: 5 fr  Lot #: [id number]  Full barrier precautions done: Yes, hand hygiene, sterile gown, sterile gloves, mask, cap,...   Site Assessment WDL   External Cath Length (cm) (S)  1 cm   Extremity Circumference (cm) 39 cm   Dressing Chlorhexidine disk;Transparent;Securement device   Dressing Status clean;dry;intact   Dressing Change Due (S)  11/13/22   Line Necessity Yes, meets criteria   Phlebitis Scale 0-->no symptoms   Infiltration? no   PICC Comment (S)  PICC is OK to use   Gray - Status blood return noted;saline locked   Mccormick - Cap Change Due 11/10/22   Gray - Intervention Flushed   Red - Status blood return noted;saline locked   Red - Cap Change Due 11/10/22   Red - Intervention Flushed   White - Status blood return noted;saline locked   White - Cap Change Due 11/10/22   White - Intervention Flushed

## 2022-11-06 NOTE — PROCEDURES
[facility]    Triple Lumen PICC Placement    Date/Time: 11/6/2022 11:31 AM  Performed by: Sampson Urbina RN  Authorized by: Danni Clifton MD   Indications: vascular access      UNIVERSAL PROTOCOL   Site Marked: Yes  Prior Images Obtained and Reviewed:  Yes  Required items: Required blood products, implants, devices and special equipment available    Patient identity confirmed:  Verbally with patient, arm band, provided demographic data and hospital-assigned identification number  Patient was reevaluated immediately before administering moderate or deep sedation or anesthesia  Confirmation Checklist:  Patient's identity using two indicators, relevant allergies, procedure was appropriate and matched the consent or emergent situation and correct equipment/implants were available  Time out: Immediately prior to the procedure a time out was called    Universal Protocol: the [facility] Universal Protocol was followed    Preparation: Patient was prepped and draped in usual sterile fashion       ANESTHESIA    Anesthesia: See MAR for details  Local Anesthetic:  Lidocaine 1% without epinephrine      SEDATION    Patient Sedated: No        Preparation: skin prepped with ChloraPrep  Skin prep agent: skin prep agent completely dried prior to procedure  Sterile barriers: maximum sterile barriers were used: cap, mask, sterile gown, sterile gloves, and large sterile sheet  Hand hygiene: hand hygiene performed prior to central venous catheter insertion  Type of line used: PICC  Catheter type: triple lumen  Lumen type: non-valved and power PICC  Lumen Identification: Gray, Red and White  Catheter size: 5 Fr  Brand: Bard  Lot number: [id number]  Placement method: venipuncture, MST, ultrasound and tip navigation system  Number of attempts: 1  Difficulty threading catheter: no  Successful placement: yes  Orientation: left    Location: basilic vein (vein diameter - 0.48 cm)  Arm  circumference: adults 10 cm  Extremity circumference: 39  Visible catheter length: 1  Total catheter length: 51  Dressing and securement: alcohol impregnated caps, chlorhexidine disc applied, transparent dressing, tissue adhesive, sterile dressing applied, statlock and site cleansed  Post procedure assessment: blood return through all ports, free fluid flow and placement verified by 3CG technology  PROCEDURE Describe Procedure: PICC tip is in satisfactory location as verified by [employer] 3CG Tip Confirmation System. PICC is OK to use.

## 2022-11-06 NOTE — PLAN OF CARE
Neuro: Pt opens eyes spontaneously, follows commands, purposeful movement in all extremities. +4 in BUE, +3 in BLE. PERRL 3mm.   CV: -110s, DBP 40-50s. Tmax 102.6, MD aware, ice packs applied to bilateral axilla and cooling blanket placed. SR in 80-90s.   Resp: Lung sounds coarse over dim. 7.0, 23 @ teeth. CMV/, 35%, 5, 16. Thick, white/grey secretions with suctioning.  GI: OG 58 @ lips to gravity suction; OG okay to use for meds. No BM this shift.   : Adequate UOP via Holder.   Lines:  L PIV   R PIV    R PIV   R NOEL output 2-7mL/hr  Drips/Gtts:  - [id number] 20mEq/L KCl @ 75mL/hr  - Prop @ 10mcg/kg/hr  - Levo @ 0.03 mcg/kg/hr  - Dexmedetomidine @ 0.4mcg/kg/hr  Skin: Generalized bruising with more bruises noted on bilateral forearms. Redness blanchable on sacrum/coccyx. 5 abd punctures sites covered with Primapore dressings, CDI.       Plan: Pain management. PS trial for possible extubation. IV antibiotics. Alert MD with any changes in pt condition.     Goal Outcome Evaluation:      Plan of Care Reviewed With: patient, spouse    Overall Patient Progress: no changeOverall Patient Progress: no change

## 2022-11-06 NOTE — PROGRESS NOTES
STAFF NOTE:  Again failed PST last evening  Continued high fever despite a relatively low procalcitonin and lack of leukocytosis  Very nauseated with a log of emesis overnight    tachypneic on pressure support this morning with high RSBI, although better than it was yesterday  Not horrrible secretions from ETT  Exam intubated, but awake and interactive; CAM-ICU negative this morning.  Can hold her breath and do adequate vital capacity, apparently NOT limited by pain, although she does complain of ongoing pain, especially in her flank   Peripherals only  Port wound sites looks fine    Labs are interesting in the context of her severe fever:  No leukocytosis  Nothing growing on her BAL  procalcitonin is relatively low, especially in the context of being post-surgery  No CK or myoglobin to suggest MH, neuroleptic malignant syndrome, etc; LDH is somewhat elevated and CRP is actually increasing from where it was at the time of surgery, however  transaminaseas and bilirubin are fine    BLE doppler pending    This is a 42F hx depression, Raynaud's, remote OCP-provoked DVT/PE, and hiatal hernia who is s/p sleeve gastrectomy and hiatal hernia repair with delayed discovery of jeffrey-gastric hematoma.      She continues to have high fever.  For now I am presuming omental necrosis, as our fever workup was actually kind of disappointingly negative, and I know that not all of the hematoma was able to be evacuated in the OR due to mixing in with omentum and partial breakdown already.  That said, I have searched for rare syndromes like MH or neuroleptic malignant syndrome; have broadened our infectious search; have again looked for thrombotic disease somewhere despite the low likelihood in someone who has been on good DVT prophylaxis throughout her time here; and have considered the possibility of drug effect (like from Precedex).    She has borderline oxygenation and a marginal RSBI (not quite 105, but close enough that I'm  worried).  Because she is doing so well neurologically, because I didn't see clear signs of infection on BAL, and because I think gagging on the ETT is contributing to some of her nausea, I'm willing to offer her a trial of extubation to bIPAP.  I suspect there's a ~25% chance of needing reintubation, but I trust that she'll be good with doing what she can like pulmonary toilet and sitting up and stuff to help.  If I'm wrong about her nausea, then BIPAP will be bad for her, she might need reintubation just to protect the airway.     Will also get a PICC for durable IV access, since we're probably looking at another ~5d in the hospital, and all she currently has available is a little 22gPIV.  I don't think TPN will be necessary, but if we decide she needs it, having the PICC will also be useful.    DEPRESSION:  -wellbutrin (shouldn't be any risk of like serotonin syndrome and stuff with this as opposed to an selective serotonin reuptake inhibitor)    Hx SLEEVE GASTRECTOMY:  -IV and oral narcotics available  -scheduled methocarbamol; tylenol, and lidocaine patch available  -bid PPI is IV for now  -if unable to be extubated, will discontinue precedex to see if that was contributing to fever   -a hiatal hernia stitch was loosened on return to OR, so in theory that should help with some of her nausea / esophageal transit    ACUTE HYPOXIC RESPIRATORY FAILURE  -with PaO2 < 120 on >40% FiO2, P/F ratio was <300.  Monitoring with continuous pulse oximetry and intermittent ABGs.  -unclear etiology, as above.  I am less certain now that hypoxia can be explained by inadequately treated pneumonia, but will continue Zosyn for at least a few days beyond our BAL to make sure no cultures come back positive.    -Trial of extubation to BIPAP as above, although by RSBI she is marginal.  -no need for pharmacologic diuresis today    PRESUMED HCAP:  -continue zosyn at least one more day, at which point she will have gotten a total of 7d of  at least CAP coverage (will have been 3d of HCAP coverage), unless BAL results back positive    ACUTE BLOOD LOSS ANEMIA:  -No evidence of ongoing active hemorrhage, so will use a transfusion trigger of Hgb <7 going forward  -should be fine for DVT ppx with bid lovenox.      STARVATION KETOACIDOSIS:  -no role for tube feeds while itnubated  -will start on bariatric clears after extubation if she can come off BIPAP and nausea is improved  -D5LR and thiamine for now    MISC:  -Code status is full code  - updated at bedside  -lovenox 40bid (dosed bid for BMI, although technially, with a BMI below 40, she could get by with once-daily dosing); PPI bid is currently IV; bid for gastrectomy.  This can probably also be oral after extubation  -lines: place PICC  -siegel can come out with extubation  -anticipate discharge to home in ~3 days    Billing statement: 80min of critical care time; spent in an initial review of imaging, labs, physical exam, and discussion of the patient with my own team and the extended care team including the primary service; Based on this patient's presentation / recent intervention and my bedside assessment, I felt there was or is a reasonably high probability of imminent or life-threatening deterioration today or tonight for respiratory reasons.   My overall critical care time, as described in detail above, includes such things as coordination of care, arrhythmia and hemodynamics management with infusions of medicines, respiratory management, fluid therapy including fluid boluses, and pain and sedation therapy. This time excludes time I spent personally performing or supervising procedures for this patient.    VEGA Powell MD  Clinical   Anesthesia / Critical Care  *[phone]

## 2022-11-06 NOTE — PROGRESS NOTES
General Surgery Progress Note  2022   ------------------------------------------------------------------------------------------------  Subjective:    Intubated, but awake and making intentional movements. Able to communicate by indicating with hands. Continues to have pain across entire upper abdomen, with greatest tenderness in right upper quadrant, and sensation of fluids getting stuck in her throat. Per nursing, has had emesis with position changes. Fevered to 102.6 overnight.     ------------------------------------------------------------------------------------------------  Objective:  Temp:  [98.8  F (37.1  C)-102.6  F (39.2  C)] 102.5  F (39.2  C)  Pulse:  [72-85] 81  Resp:  [12-35] 16  BP: ()/(40-72) 119/55  FiO2 (%):  [30 %-35 %] 35 %  SpO2:  [89 %-100 %] 94 %    I/O last 3 completed shifts:  In: 3369.33 [I.V.:2964.33; NG/GT:405]  Out: 2385 [Urine:2230; Drains:155]      Gen: Awake, interactive, NAD  Resp: Mechanically ventilated, symmetric chest rise.   CV: regular rate, appears well perfused  Abd: soft, non-distended. Tender across upper abdomen, greatest in right upper quadrant around incision site.   Incision: dressings in place, c/d/i. Right NOEL with serosanguinous output.   Ext: No upper extremity edema.   Neuro: Making intentional movements, tracking with eyes. Able to communicate by pointing, indicating letters.      Labs:  CBC: WBC 9.0, Hgb 7.9 (L), Plt 296  BNP: 407  Procal: 0.22 (H)  CMP: Cl 108 (H), Urea Nitrogen 5.1 (L), Cr 0.97 (H), Ca 8.3 (L), Glucose 120 (H), Alk Phos 145 (H), AST 36 (H), Prot Total 5.6 (L), Albumin 2.3 (L), o/w WNL  M.9  CK: 125  LDH: 465 (H)   CRP: 417.00 (H)   Myoglobin: In process  UA: Ordered  Blood Culture (): No growth at 4 days  Blood Culture x2: Ordered   Respiratory Aerobes (from Lavage): >25 PMN/low power field, No organisms, cultures pending  MRSA MSSA PCR: Negative for all tested    Imaging:  US Lower Extremity Bilateral:  In process.     AXR  11/5/2022:  Impression:   1. Enteric tube sidehole in the stomach.  2. Left greater than right pleural effusions with adjacent atelectasis  versus consolidation.      =======================================================  Assessment/Plan:   42 year old female who is s/p laparoscopic sleeve gastrectomy and hiatal hernia repair on 10/25/22 with Dr. Aragon who returned to the ED with LUQ abdominal and shoulder pain, was found to have bilateral lower lobe pneumonia and new oxygen requirement. No evidence of PE. RUQ US obtained to evaluate elevated Tbili, found to have septated fluid collection along left hepatic lobe consistent with hematoma, likely the explanation for her acute blood loss anemia post operatively. This was again seen on CT scan 11/3. No evidence to suggest ongoing bleeding. UGI study obtained without evidence for leak. However, Leah continued to struggle with pain and decision was made to perform exploratory laparoscopy. She is now s/p diagnostic laparoscopy with evacuation of abdominal hematoma on 11/4. During the operation, a stitch was removed from the hiatal repair. She was admitted to the SICU post operatively for mechanical ventilatory support. Notably, she developed worsening fevers to Tmax 102.6 overnight from 11/5 into AM 11/6 with elevation in CRP, though no leukocytosis.      - appreciate excellent SICU cares  - multimodal pain regimen  - SBT and possible extubation per SICU team   - ok to remove OG, no need for NGT, recommend against tube feeds   - okay for PO bariatric clear liquid diet as tolerated  - continue Lovenox BID for dvt ppx  - continue PPI BID  - PTA wellbutrin, protonix, senna prn  - Davida atarax for anxiety   - continue abx in setting of presumed pneumonia, currently on zosyn - might require broadening given new onset fevers in the setting of ongoing abx use    - 11/5 BAL cultures NGTD    - 11/2 BCx NGTD   Dispo: SICU, okay to transfer to floor once extubated and clinically  stable     Seen, examined, and discussed with chief resident, who will discuss with staff.    Daniel Correa, MS3  [employer]  8:53 AM 11/6/2022    This note was dictated by the medical student. Any corrections/edits were made by me.     Desean Van MD PGY1  General Surgery Resident - [facility]

## 2022-11-06 NOTE — PROGRESS NOTES
ICU PROGRESS NOTE  11/06/2022      CO-MORBIDITIES:   Hiatal hernia  (primary encounter diagnosis)  Pneumonia of both lower lobes due to infectious organism  Hypoxia  Other pneumonia, unspecified organism  Hypoxemia  Encounter for screening laboratory testing for severe acute respiratory syndrome coronavirus 2 (SARS-CoV-2)  Postoperative hematoma involving digestive system following digestive system procedure    ASSESSMENT:  Leah Yanez is a 42 year old female with recent sleeve gastrectomy and hiatal hernia repair on 10/25/22 who who was admitted on 11/1/2022 for pneumonia and intra-abdominal hematoma. She is now s/p diagnostic laparoscopy with evacuation of abdominal hematoma on 11/4/22. Postoperatively she was unable to be weaned from mechanical ventilation and was admitted to the SICU for respiratory support overnight.     TODAY:  - PST and extubate  - Fever workup    PLAN:    Neurological:  # Acute pain   # Agitation   # Encephalopathy   - Monitor neurological status. Delirium preventions and precautions.   - Pain: Davida tylenol 650 mg q4h, gabapentin 300 mg TID, atarax 25 mg TID, robaxin 500 mg QID, lidocaine patches; PRN dilaudid, oxy   - Sedation plan: precedex gtt      # Depression  - PTA wellbutrin      Pulmonary:   # Post operative ventilatory support  - VENT: Vent Mode: CMV/AC  (Continuous Mandatory Ventilation/ Assist Control)  FiO2 (%): 35 %  Resp Rate (Set): 16 breaths/min  Tidal Volume (Set, mL): 450 mL  PEEP (cm H2O): 5 cmH2O  Pressure Support (cm H2O): 7 cmH2O  Resp: 16  - PST trial  - Supplemental oxygen to keep saturation above 92 %.  - 11/5 bronch with BAL negative     # Bilateral lower lobe pneumonia  - see ID     Cardiovascular:    #Hypotension  - requiring 0.05 levo  - Monitor hemodynamic status.       Gastroenterology/Nutrition:  # S/p sleeve gastrectomy 10/25  # S/p diagnostic laparoscopy and hematoma evacuation 11/4  # Protein calorie deficit malnutrition   - NPO  - NGT to gravity, use  for meds  - PTA B1, hyoscyamine      Fluids/Electrolytes:   - D5 LR+K @75 ml/h for IV fluid hydration     Renal:  - Will continue to monitor intake and output.  - Holder while intubated, remove once extubated     Endocrine:  # Stress hyperglycemia   - Will monitor BG     ID:  #  Pneumonia   - Continues to have acute respiratory failure  - Amoxicillin (11/2-11/5), stopped due to inadequately treating pneumonia  - Zosyn (11/5 - )    #Fever  Likely multifactorial, ongoing infection and acute postoperative period, possible ischemic/necrotic omentum   - Scheduled tylenol, continue to monitor   - DVT US BLE negative        Heme:     # Acute blood loss anemia   - Hemoglobin stable  - Transfuse if hgb <7.0 or signs/symptoms of hypoperfusion. Monitor and trend.      Musculoskeletal:   # Weakness and deconditioning of critical illness   - Physical and occupational therapy consult         General Cares/Prophylaxis:    DVT Prophylaxis: Enoxaparin (Lovenox) SQ  GI Prophylaxis: PPI  Restraints: Restraints for medical healing needed: YES     Lines/ tubes/ drains:  - ETT, NG, Holder, PIVs     Code Status: Full code    Disposition:  -  Surgical ICU.     Patient seen, findings and plan discussed with ICU staff.    Carol Metcalf MD  Surgery PGY-2     ====================================    TODAY'S PROGRESS:   SUBJECTIVE:   No acute events overnight. Continues to be febrile.     OBJECTIVE:   1. VITAL SIGNS:   Temp:  [98.8  F (37.1  C)-102.6  F (39.2  C)] 102.6  F (39.2  C)  Pulse:  [72-94] 78  Resp:  [12-35] 16  BP: ()/(40-72) 114/51  FiO2 (%):  [30 %-35 %] 35 %  SpO2:  [89 %-100 %] 93 %  Vent Mode: CMV/AC  (Continuous Mandatory Ventilation/ Assist Control)  FiO2 (%): 35 %  Resp Rate (Set): 16 breaths/min  Tidal Volume (Set, mL): 450 mL  PEEP (cm H2O): 5 cmH2O  Pressure Support (cm H2O): 7 cmH2O  Resp: 16      2. INTAKE/ OUTPUT:   I/O last 3 completed shifts:  In: 3806.83 [I.V.:2926.83; NG/GT:380; IV Piggyback:500]  Out: 3188  [Urine:2965; Drains:223]    3. PHYSICAL EXAMINATION:     General: Intubated, interactive   Neuro: follows commands  Pulm/Resp: mechanically ventilated  CV: RRR  Abdomen: Soft, non-distended, incisions c/d/i   :  siegel catheter in place, urine yellow and clear  MSK/Extremities: warm and well perfused     4. INVESTIGATIONS:  All labs and imaging studies personally reviewed     Arterial Blood Gases   Recent Labs   Lab 11/04/22 1916 11/04/22 1741   PH 7.35 7.40   PCO2 43 41   PO2 74* 92   HCO3 24 25     Complete Blood Count   Recent Labs   Lab 11/05/22  0603 11/05/22  0101 11/04/22 1916 11/04/22  1741 11/04/22 0619 11/03/22  0616   WBC 9.3 12.0*  --   --  9.3 11.0   HGB 7.7* 8.2* 8.5* 8.2* 8.7* 8.3*    264  --   --  298 305     Basic Metabolic Panel  Recent Labs   Lab 11/06/22  0419 11/06/22  0005 11/05/22  1951 11/05/22  1150 11/05/22  0603 11/05/22  0022 11/04/22  2327 11/04/22  2109 11/04/22 1916 11/04/22  1741 11/04/22  1148 11/04/22  0619 11/03/22  1407 11/03/22  0616   NA  --   --   --   --  140  --  137  --  139 139  --  138  --  135*   POTASSIUM  --   --   --   --  3.7  --  3.7  --  3.5 3.5   < > 3.5   < > 3.1*   CHLORIDE  --   --   --   --  107  --  105  --   --   --   --  106  --  102   CO2  --   --   --   --  20*  --  18*  --   --   --   --  19*  --  15*   BUN  --   --   --   --  3.4*  --  5.0*  --   --   --   --  2.3*  --  4.9*   CR  --   --   --   --  0.86  --  0.93  --   --   --   --  0.81  --  0.80   * 97 90 123* 124*   < > 120*   < > 141* 116*   < > 119*   < > 83    < > = values in this interval not displayed.     Liver Function Tests  Recent Labs   Lab 11/05/22  0603 11/04/22  0619 11/03/22  0616 11/02/22  0636 11/01/22  1734   AST  --  41* 37* 31 41*   ALT 21 17 16 23 32   ALKPHOS 114* 125* 120* 126* 131*   BILITOTAL 0.5 0.8 1.1 1.3* 1.5*   ALBUMIN 2.2* 2.8* 3.0* 3.2* 3.5     Pancreatic Enzymes  Recent Labs   Lab 11/01/22  1734   LIPASE 68*     Coagulation Profile  No lab results  found in last 7 days.      5. RADIOLOGY:   Recent Results (from the past 24 hour(s))   XR Abdomen Port 1 View    Narrative    Exam: XR ABDOMEN PORT 1 VIEW, 11/5/2022 12:36 PM    Indication: OG in place, need placement verified    Comparison: 11/3/2022    Findings:   Endotracheal tube tip at the mid thoracic trachea. Enteric tube  sidehole projects over the stomach. The cardiomediastinal silhouette  is within normal limits. The pulmonary vasculature is indistinct. Left  greater than right pleural effusions and adjacent opacities. No  pneumothorax.      Impression    Impression:   1. Enteric tube sidehole in the stomach.  2. Left greater than right pleural effusions with adjacent atelectasis  versus consolidation.     BC HAMMER,          SYSTEM ID:  [id number]       =========================================

## 2022-11-06 NOTE — PROGRESS NOTES
[facility], Procedure Note          Extubation:       Leah Yanez  MRN# [id number]   November 6, 2022, 1200 PM... ...    ... ..   Patient extubated at: 11/6/22 1200   Supplemental Oxygen: Via BIPAP 12/6 40%   Cough:.. The cough is strong productive       Secretion Amount: Small amount, thick and cloudy in color   Respiratory Exam:: Breath sounds: equal diminished     ... Location: bilaterally   Skin Exam:... Patient color: pink    Patient Status: Currently sitting up in bed   ... ...   .... ....     Recorded by Scott Danielle, RRT

## 2022-11-07 ENCOUNTER — APPOINTMENT (OUTPATIENT)
Dept: OCCUPATIONAL THERAPY | Facility: CLINIC | Age: 42
DRG: 907 | End: 2022-11-07
Payer: COMMERCIAL

## 2022-11-07 LAB
ALBUMIN SERPL BCG-MCNC: 2.1 G/DL (ref 3.5–5.2)
ALP SERPL-CCNC: 128 U/L (ref 35–104)
ALT SERPL W P-5'-P-CCNC: 16 U/L (ref 10–35)
ANION GAP SERPL CALCULATED.3IONS-SCNC: 12 MMOL/L (ref 7–15)
AST SERPL W P-5'-P-CCNC: 26 U/L (ref 10–35)
BACTERIA BRONCH: ABNORMAL
BASOPHILS # BLD AUTO: 0 10E3/UL (ref 0–0.2)
BASOPHILS NFR BLD AUTO: 0 %
BILIRUB SERPL-MCNC: 0.4 MG/DL
BLD PROD TYP BPU: NORMAL
BLOOD COMPONENT TYPE: NORMAL
BUN SERPL-MCNC: 7.2 MG/DL (ref 6–20)
CALCIUM SERPL-MCNC: 7.9 MG/DL (ref 8.6–10)
CHLORIDE SERPL-SCNC: 107 MMOL/L (ref 98–107)
CODING SYSTEM: NORMAL
CREAT SERPL-MCNC: 0.92 MG/DL (ref 0.51–0.95)
CROSSMATCH: NORMAL
DEPRECATED HCO3 PLAS-SCNC: 23 MMOL/L (ref 22–29)
EOSINOPHIL # BLD AUTO: 0.1 10E3/UL (ref 0–0.7)
EOSINOPHIL NFR BLD AUTO: 1 %
ERYTHROCYTE [DISTWIDTH] IN BLOOD BY AUTOMATED COUNT: 17.8 % (ref 10–15)
GFR SERPL CREATININE-BSD FRML MDRD: 79 ML/MIN/1.73M2
GLUCOSE BLDC GLUCOMTR-MCNC: 108 MG/DL (ref 70–99)
GLUCOSE BLDC GLUCOMTR-MCNC: 118 MG/DL (ref 70–99)
GLUCOSE BLDC GLUCOMTR-MCNC: 119 MG/DL (ref 70–99)
GLUCOSE BLDC GLUCOMTR-MCNC: 56 MG/DL (ref 70–99)
GLUCOSE BLDC GLUCOMTR-MCNC: 85 MG/DL (ref 70–99)
GLUCOSE BLDC GLUCOMTR-MCNC: 97 MG/DL (ref 70–99)
GLUCOSE BLDC GLUCOMTR-MCNC: 98 MG/DL (ref 70–99)
GLUCOSE SERPL-MCNC: 113 MG/DL (ref 70–99)
GRAM STAIN RESULT: ABNORMAL
GRAM STAIN RESULT: ABNORMAL
HCT VFR BLD AUTO: 23.6 % (ref 35–47)
HGB BLD-MCNC: 6.8 G/DL (ref 11.7–15.7)
HGB BLD-MCNC: 7.7 G/DL (ref 11.7–15.7)
IMM GRANULOCYTES # BLD: 0.1 10E3/UL
IMM GRANULOCYTES NFR BLD: 1 %
ISSUE DATE AND TIME: NORMAL
LYMPHOCYTES # BLD AUTO: 1.1 10E3/UL (ref 0.8–5.3)
LYMPHOCYTES NFR BLD AUTO: 12 %
MCH RBC QN AUTO: 25.4 PG (ref 26.5–33)
MCHC RBC AUTO-ENTMCNC: 28.8 G/DL (ref 31.5–36.5)
MCV RBC AUTO: 88 FL (ref 78–100)
MONOCYTES # BLD AUTO: 0.6 10E3/UL (ref 0–1.3)
MONOCYTES NFR BLD AUTO: 6 %
NEUTROPHILS # BLD AUTO: 7.5 10E3/UL (ref 1.6–8.3)
NEUTROPHILS NFR BLD AUTO: 80 %
NRBC # BLD AUTO: 0 10E3/UL
NRBC BLD AUTO-RTO: 0 /100
PLATELET # BLD AUTO: 313 10E3/UL (ref 150–450)
POTASSIUM SERPL-SCNC: 3.5 MMOL/L (ref 3.4–5.3)
PROT SERPL-MCNC: 5.2 G/DL (ref 6.4–8.3)
RBC # BLD AUTO: 2.68 10E6/UL (ref 3.8–5.2)
SODIUM SERPL-SCNC: 142 MMOL/L (ref 136–145)
UNIT ABO/RH: NORMAL
UNIT NUMBER: NORMAL
UNIT STATUS: NORMAL
UNIT TYPE ISBT: 6200
WBC # BLD AUTO: 9.4 10E3/UL (ref 4–11)

## 2022-11-07 PROCEDURE — 97530 THERAPEUTIC ACTIVITIES: CPT | Mod: GO

## 2022-11-07 PROCEDURE — C9113 INJ PANTOPRAZOLE SODIUM, VIA: HCPCS | Performed by: STUDENT IN AN ORGANIZED HEALTH CARE EDUCATION/TRAINING PROGRAM

## 2022-11-07 PROCEDURE — 250N000011 HC RX IP 250 OP 636

## 2022-11-07 PROCEDURE — 258N000001 HC RX 258

## 2022-11-07 PROCEDURE — 83735 ASSAY OF MAGNESIUM: CPT | Performed by: PHYSICIAN ASSISTANT

## 2022-11-07 PROCEDURE — 250N000011 HC RX IP 250 OP 636: Performed by: STUDENT IN AN ORGANIZED HEALTH CARE EDUCATION/TRAINING PROGRAM

## 2022-11-07 PROCEDURE — 999N000147 HC STATISTIC PT IP EVAL DEFER

## 2022-11-07 PROCEDURE — 250N000011 HC RX IP 250 OP 636: Performed by: SURGERY

## 2022-11-07 PROCEDURE — 97535 SELF CARE MNGMENT TRAINING: CPT | Mod: GO

## 2022-11-07 PROCEDURE — 250N000013 HC RX MED GY IP 250 OP 250 PS 637

## 2022-11-07 PROCEDURE — P9016 RBC LEUKOCYTES REDUCED: HCPCS

## 2022-11-07 PROCEDURE — 80053 COMPREHEN METABOLIC PANEL: CPT | Performed by: STUDENT IN AN ORGANIZED HEALTH CARE EDUCATION/TRAINING PROGRAM

## 2022-11-07 PROCEDURE — 200N000002 HC R&B ICU UMMC

## 2022-11-07 PROCEDURE — 94660 CPAP INITIATION&MGMT: CPT

## 2022-11-07 PROCEDURE — 85018 HEMOGLOBIN: CPT

## 2022-11-07 PROCEDURE — 97165 OT EVAL LOW COMPLEX 30 MIN: CPT | Mod: GO

## 2022-11-07 PROCEDURE — 999N000157 HC STATISTIC RCP TIME EA 10 MIN

## 2022-11-07 PROCEDURE — 99233 SBSQ HOSP IP/OBS HIGH 50: CPT | Mod: 24 | Performed by: SURGERY

## 2022-11-07 PROCEDURE — 250N000013 HC RX MED GY IP 250 OP 250 PS 637: Performed by: SURGERY

## 2022-11-07 PROCEDURE — 84100 ASSAY OF PHOSPHORUS: CPT | Performed by: PHYSICIAN ASSISTANT

## 2022-11-07 PROCEDURE — 85025 COMPLETE CBC W/AUTO DIFF WBC: CPT | Performed by: STUDENT IN AN ORGANIZED HEALTH CARE EDUCATION/TRAINING PROGRAM

## 2022-11-07 RX ORDER — HYDROXYZINE HYDROCHLORIDE 25 MG/1
25 TABLET, FILM COATED ORAL EVERY 6 HOURS PRN
Status: DISCONTINUED | OUTPATIENT
Start: 2022-11-07 | End: 2022-11-16 | Stop reason: HOSPADM

## 2022-11-07 RX ORDER — UREA 10 %
1000 LOTION (ML) TOPICAL DAILY
Status: ON HOLD | COMMUNITY
End: 2022-12-11

## 2022-11-07 RX ORDER — ACETAMINOPHEN 325 MG/1
975 TABLET ORAL EVERY 6 HOURS PRN
Status: DISCONTINUED | OUTPATIENT
Start: 2022-11-07 | End: 2022-11-08

## 2022-11-07 RX ORDER — ACETAMINOPHEN 650 MG/1
650 SUPPOSITORY RECTAL EVERY 4 HOURS PRN
Status: DISCONTINUED | OUTPATIENT
Start: 2022-11-07 | End: 2022-11-07

## 2022-11-07 RX ORDER — VITAMIN B COMPLEX
4 TABLET ORAL DAILY
Status: ON HOLD | COMMUNITY
End: 2022-12-15

## 2022-11-07 RX ORDER — METHOCARBAMOL 750 MG/1
750 TABLET, FILM COATED ORAL 4 TIMES DAILY
Status: DISCONTINUED | OUTPATIENT
Start: 2022-11-07 | End: 2022-11-16 | Stop reason: HOSPADM

## 2022-11-07 RX ORDER — HYDROXYZINE HCL 10 MG/5 ML
25 SOLUTION, ORAL ORAL 3 TIMES DAILY PRN
Status: DISCONTINUED | OUTPATIENT
Start: 2022-11-07 | End: 2022-11-07

## 2022-11-07 RX ORDER — HYDROXYZINE HYDROCHLORIDE 25 MG/1
50 TABLET, FILM COATED ORAL EVERY 6 HOURS PRN
Status: DISCONTINUED | OUTPATIENT
Start: 2022-11-07 | End: 2022-11-16 | Stop reason: HOSPADM

## 2022-11-07 RX ORDER — HYDROMORPHONE HYDROCHLORIDE 1 MG/ML
.3-.6 INJECTION, SOLUTION INTRAMUSCULAR; INTRAVENOUS; SUBCUTANEOUS
Status: DISCONTINUED | OUTPATIENT
Start: 2022-11-07 | End: 2022-11-16 | Stop reason: HOSPADM

## 2022-11-07 RX ORDER — FUROSEMIDE 10 MG/ML
20 INJECTION INTRAMUSCULAR; INTRAVENOUS ONCE
Status: COMPLETED | OUTPATIENT
Start: 2022-11-07 | End: 2022-11-07

## 2022-11-07 RX ADMIN — PANTOPRAZOLE SODIUM 40 MG: 40 INJECTION, POWDER, FOR SOLUTION INTRAVENOUS at 20:08

## 2022-11-07 RX ADMIN — HYDROMORPHONE HYDROCHLORIDE 0.6 MG: 0.2 INJECTION, SOLUTION INTRAMUSCULAR; INTRAVENOUS; SUBCUTANEOUS at 00:04

## 2022-11-07 RX ADMIN — ACETAMINOPHEN 975 MG: 325 TABLET, FILM COATED ORAL at 20:07

## 2022-11-07 RX ADMIN — THIAMINE HYDROCHLORIDE 100 MG: 100 INJECTION, SOLUTION INTRAMUSCULAR; INTRAVENOUS at 08:04

## 2022-11-07 RX ADMIN — HYDROMORPHONE HYDROCHLORIDE 0.6 MG: 0.2 INJECTION, SOLUTION INTRAMUSCULAR; INTRAVENOUS; SUBCUTANEOUS at 06:30

## 2022-11-07 RX ADMIN — HYDROMORPHONE HYDROCHLORIDE 0.5 MG: 1 INJECTION, SOLUTION INTRAMUSCULAR; INTRAVENOUS; SUBCUTANEOUS at 17:51

## 2022-11-07 RX ADMIN — METHOCARBAMOL 750 MG: 750 TABLET ORAL at 20:08

## 2022-11-07 RX ADMIN — METHOCARBAMOL 750 MG: 750 TABLET ORAL at 15:18

## 2022-11-07 RX ADMIN — HYDROMORPHONE HYDROCHLORIDE 0.6 MG: 0.2 INJECTION, SOLUTION INTRAMUSCULAR; INTRAVENOUS; SUBCUTANEOUS at 08:29

## 2022-11-07 RX ADMIN — ENOXAPARIN SODIUM 40 MG: 40 INJECTION SUBCUTANEOUS at 08:04

## 2022-11-07 RX ADMIN — BUPROPION HYDROCHLORIDE 75 MG: 75 TABLET, FILM COATED ORAL at 20:08

## 2022-11-07 RX ADMIN — PIPERACILLIN SODIUM AND TAZOBACTAM SODIUM 4.5 G: 4; .5 INJECTION, POWDER, LYOPHILIZED, FOR SOLUTION INTRAVENOUS at 10:48

## 2022-11-07 RX ADMIN — HYDROMORPHONE HYDROCHLORIDE 0.6 MG: 1 INJECTION, SOLUTION INTRAMUSCULAR; INTRAVENOUS; SUBCUTANEOUS at 10:47

## 2022-11-07 RX ADMIN — HYDROMORPHONE HYDROCHLORIDE 0.5 MG: 1 INJECTION, SOLUTION INTRAMUSCULAR; INTRAVENOUS; SUBCUTANEOUS at 20:09

## 2022-11-07 RX ADMIN — HYDROMORPHONE HYDROCHLORIDE 0.5 MG: 1 INJECTION, SOLUTION INTRAMUSCULAR; INTRAVENOUS; SUBCUTANEOUS at 13:58

## 2022-11-07 RX ADMIN — PANTOPRAZOLE SODIUM 40 MG: 40 INJECTION, POWDER, FOR SOLUTION INTRAVENOUS at 08:04

## 2022-11-07 RX ADMIN — PIPERACILLIN SODIUM AND TAZOBACTAM SODIUM 4.5 G: 4; .5 INJECTION, POWDER, LYOPHILIZED, FOR SOLUTION INTRAVENOUS at 21:55

## 2022-11-07 RX ADMIN — ACETAMINOPHEN 650 MG: 325 TABLET, FILM COATED ORAL at 13:58

## 2022-11-07 RX ADMIN — HYDROMORPHONE HYDROCHLORIDE 0.6 MG: 0.2 INJECTION, SOLUTION INTRAMUSCULAR; INTRAVENOUS; SUBCUTANEOUS at 04:10

## 2022-11-07 RX ADMIN — FUROSEMIDE 20 MG: 10 INJECTION, SOLUTION INTRAVENOUS at 15:38

## 2022-11-07 RX ADMIN — PIPERACILLIN SODIUM AND TAZOBACTAM SODIUM 4.5 G: 4; .5 INJECTION, POWDER, LYOPHILIZED, FOR SOLUTION INTRAVENOUS at 15:18

## 2022-11-07 RX ADMIN — FUROSEMIDE 20 MG: 10 INJECTION, SOLUTION INTRAVENOUS at 14:19

## 2022-11-07 RX ADMIN — HYDROMORPHONE HYDROCHLORIDE 0.5 MG: 1 INJECTION, SOLUTION INTRAMUSCULAR; INTRAVENOUS; SUBCUTANEOUS at 23:36

## 2022-11-07 RX ADMIN — DEXTROSE MONOHYDRATE: 100 INJECTION, SOLUTION INTRAVENOUS at 15:38

## 2022-11-07 RX ADMIN — PIPERACILLIN SODIUM AND TAZOBACTAM SODIUM 4.5 G: 4; .5 INJECTION, POWDER, LYOPHILIZED, FOR SOLUTION INTRAVENOUS at 04:01

## 2022-11-07 ASSESSMENT — ACTIVITIES OF DAILY LIVING (ADL)
DEPENDENT_IADLS:: INDEPENDENT
ADLS_ACUITY_SCORE: 33
PREVIOUS_RESPONSIBILITIES: MEAL PREP;HOUSEKEEPING;LAUNDRY;SHOPPING;YARDWORK;MEDICATION MANAGEMENT;FINANCES;DRIVING;WORK
ADLS_ACUITY_SCORE: 33
ADLS_ACUITY_SCORE: 29
ADLS_ACUITY_SCORE: 33

## 2022-11-07 NOTE — PLAN OF CARE
4AB PT Defer    Physical Therapy: Orders received. Chart reviewed and discussed with care team.?In discussion with OT, pt's needs can be satisfied by single discipline while inpatient to address IND with ADLs and IADLs post procedure. Physical Therapy not indicated as patient is at or near her functional baseline with upright mobility, mobilizing with SBA and IV pole and no balance deficits per OT.? Defer discharge recommendations to Occupational Therapy.? Will complete orders.

## 2022-11-07 NOTE — PROGRESS NOTES
Occupational Therapy Evaluation 11/07/22 0850   Appointment Info   Signing Clinician's Name / Credentials (OT) Antonella Bashir, OTR/L   Living Environment   People in Home spouse   Current Living Arrangements house   Home Accessibility stairs within home   Number of Stairs, Within Home, Primary other (see comments)  (3 flights of stairs)   Transportation Anticipated family or friend will provide   Living Environment Comments Pt lives in a house w/ her . Walk-in shower on the upper level where pts bedroom is w/ partial bath on main floor. Pts  works from home and can A with I/ADLs as needed.   Self-Care   Usual Activity Tolerance good   Current Activity Tolerance fair   Equipment Currently Used at Home none   Fall history within last six months no   Activity/Exercise/Self-Care Comment Pt was IND with ADLs and mobility at baseline   Instrumental Activities of Daily Living (IADL)   Previous Responsibilities meal prep;housekeeping;laundry;shopping;yardwork;medication management;finances;driving;work   IADL Comments Pt works as a    General Information   Onset of Illness/Injury or Date of Surgery 11/04/22   Referring Physician Alanis Pena MD   Patient/Family Therapy Goal Statement (OT) TO return to PLOF   Additional Occupational Profile Info/Pertinent History of Current Problem Leah Yanez is a 42 year old female with recent sleeve gastrectomy and hiatal hernia repair on 10/25/22 who who was admitted on 11/1/2022 for pneumonia and intra-abdominal hematoma. She is now s/p diagnostic laparoscopy with evacuation of abdominal hematoma on 11/4/22. Postoperatively she was unable to be weaned from mechanical ventilation and was admitted to the SICU for respiratory support overnight   Existing Precautions/Restrictions fall;abdominal  (abdominal precautions for comfort/pain mgmt)   Cognitive Status Examination   Orientation Status orientation to person, place and time   Affect/Mental Status  (Cognitive) WNL   Cognitive Status Comments Cognition appears intact   Visual Perception   Visual Impairment/Limitations WNL   Sensory   Sensory Quick Adds sensation intact   Pain Assessment   Patient Currently in Pain Yes, see Vital Sign flowsheet   Posture   Posture not impaired   Range of Motion Comprehensive   General Range of Motion no range of motion deficits identified   Strength Comprehensive (MMT)   General Manual Muscle Testing (MMT) Assessment no strength deficits identified   Coordination   Upper Extremity Coordination No deficits were identified   Bed Mobility   Bed Mobility supine-sit   Supine-Sit B[place]nell (Bed Mobility) moderate assist (50% patient effort)   Transfers   Transfers sit-stand transfer   Sit-Stand Transfer   Sit-Stand [place] (Transfers) set up;contact guard;verbal cues   Balance   Balance Assessment standing balance: dynamic;sitting balance: dynamic   Balance Comments good standing/seated dynamic balance   Activities of Daily Living   BADL Assessment/Intervention bathing;upper body dressing;lower body dressing;grooming;toileting   Bathing Assessment/Intervention   Bicknell Level (Bathing) not tested   Comment, (Bathing) anticipate mod A   Upper Body Dressing Assessment/Training   Comment, (Upper Body Dressing) anticipate max A   Bicknell Level (Upper Body Dressing) not tested   Lower Body Dressing Assessment/Training   Bicknell Level (Lower Body Dressing) minimum assist (75% patient effort)   Grooming Assessment/Training   Bicknell Level (Grooming) set up;supervision   Toileting   Bicknell Level (Toileting) dependent (less than 25% patient effort)   Clinical Impression   Criteria for Skilled Therapeutic Interventions Met (OT) Yes, treatment indicated   OT Diagnosis Decreased I/ADL IND   OT Problem List-Impairments impacting ADL problems related to;activity tolerance impaired;mobility;pain;post-surgical precautions   Assessment of Occupational Performance 5 or  more Performance Deficits   Identified Performance Deficits dressing, bathing, toileting, bedmobility, transfers, home mgmt   Planned Therapy Interventions (OT) ADL retraining;IADL retraining;bed mobility training;transfer training;home program guidelines;progressive activity/exercise   Clinical Decision Making Complexity (OT) low complexity   Anticipated Equipment Needs Upon Discharge (OT)   (tbd)   Risk & Benefits of therapy have been explained evaluation/treatment results reviewed;care plan/treatment goals reviewed;risks/benefits reviewed;current/potential barriers reviewed;participants voiced agreement with care plan;participants included;patient;spouse/significant other   OT Total Evaluation Time   OT Eval, Low Complexity Minutes (64916) 5   OT Goals   Therapy Frequency (OT) Daily   OT Predicted Duration/Target Date for Goal Attainment 11/11/22   OT Goals Hygiene/Grooming;Upper Body Dressing;Lower Body Dressing;Lower Body Bathing;Bed Mobility;Transfers;Toilet Transfer/Toileting   OT: Hygiene/Grooming supervision/stand-by assist;while standing   OT: Upper Body Dressing Supervision/stand-by assist   OT: Lower Body Dressing Modified independent   OT: Lower Body Bathing Modified independent   OT: Bed Mobility Modified independent;supine to/from sitting;rolling   OT: Transfer Modified independent   OT: Toilet Transfer/Toileting Modified independent;toilet transfer;cleaning and garment management   OT Discharge Planning   OT Discharge Recommendation (DC Rec) home with assist   OT Rationale for DC Rec Pt does present below functional baseline but anticipate over hospital stay will progress to home w/ A from .   OT Brief overview of current status A x 1 w/ IV pole   Total Session Time   Total Session Time (sum of timed and untimed services) 5

## 2022-11-07 NOTE — PLAN OF CARE
Neuro: A & Ox4, Perrl. 4-5+ strength in all extremities. Call light appropriate.  Pain: Dilaudid 0.4mg q2h  Activity: Pt does well with 2 assist to the bathroom.  Cardiac: SR w/ rare PVC. -120/50s. Levo off since 1400. T-max 102.0, temperature reduced to 99.5 post extubation when precedex was discontinued.   Resp: Extubated at 1200 to Bipap 40%/30LPM. Transitioned to HiFlow 40%/30LPM. Strong/non-productive cough. IS education complete. Lungs coarse/dim.  GI: NPO, swabs for comfort. BM x3 - loose/brn. Continent.   : Holder DC'd at 1330. Due to void at 1930. +menses.  LADs: PIV x2, PICC x3 (placed & verified) Lap incisions x5, R NOEL (20ml dark red diluted blood q4h),   Gtts:   -D5LR w/ 20K at 75ml/hr  Integ:  -Blanchable erythema on coccyx        Plan: Continue to monitor and encourage activity and IS use. Update MD with changes.

## 2022-11-07 NOTE — PROGRESS NOTES
ICU PROGRESS NOTE  11/07/2022      CO-MORBIDITIES:   Hiatal hernia  (primary encounter diagnosis)  Pneumonia of both lower lobes due to infectious organism  Hypoxia  Other pneumonia, unspecified organism  Hypoxemia  Encounter for screening laboratory testing for severe acute respiratory syndrome coronavirus 2 (SARS-CoV-2)  Postoperative hematoma involving digestive system following digestive system procedure    ASSESSMENT:  Leah Yanez is a 42 year old female with recent sleeve gastrectomy and hiatal hernia repair on 10/25/22 who who was admitted on 11/1/2022 for pneumonia and intra-abdominal hematoma. She is now s/p diagnostic laparoscopy with evacuation of abdominal hematoma on 11/4/22. Postoperatively she was unable to be weaned from mechanical ventilation and was admitted to the SICU for respiratory support overnight.     TODAY:  - po pain meds  - continue monitoring fever, tylenol  - wean HFNC as tolerated  - bariatric CLD  - Lasix 20 mg this AM  - Zosyn x7 day  - 1u pRBC     --------------------------------------------------------------------------------------------------------------------------  PLAN:    Neurological:  # Acute pain   # Agitation   # Encephalopathy   - Monitor neurological status. Delirium preventions and precautions.   - Pain: Davida tylenol 650 mg q4h, gabapentin 300 mg TID, robaxin 500 mg QID, lidocaine patches; PRN dilaudid, oxy, atarax  - Sedation plan: None     # Depression  - PTA wellbutrin      Pulmonary:   # Post operative ventilatory support  - extubated 11/6, now on HFNC 30 LPM 40% FiO2, wean as tolerated  - Supplemental oxygen to keep saturation above 92 %.  - 11/5 bronch with BAL negative     # Bilateral lower lobe pneumonia  - see ID     Cardiovascular:    #Hypotension  - off pressors  - Monitor hemodynamic status.       Gastroenterology/Nutrition:  # S/p sleeve gastrectomy 10/25  # S/p diagnostic laparoscopy and hematoma evacuation 11/4  # Protein calorie deficit malnutrition    - Bedside swallow and advance to bariatric clears  - PTA B1, hyoscyamine      Fluids/Electrolytes:   - D10 @ 50cc/h for hypoglycemia, will discontinue once taking po     Renal:  - Will continue to monitor intake and output.  - Holder removed, voided post-pull     Endocrine:  # Stress hyperglycemia   # Hypoglycemia  - Will monitor BG, mIVF D10     ID:  #  Pneumonia   - Continues to have acute respiratory failure  - Amoxicillin (11/2-11/5), stopped due to inadequately treating pneumonia  - Zosyn (11/5 - ) x7 days    #Fever  Likely multifactorial, probable necrotic omentum. Elevated CRP and LDH. No leukocytosis to suggest ongoing infection, and pneumonia being treated with zosyn. BLE US negative for DVT and has had adequate prophylaxis.   - Scheduled tylenol, continue to monitor        Heme:     # Acute blood loss anemia   - Hemoglobin stable  - Transfuse if hgb <7.0 or signs/symptoms of hypoperfusion. Monitor and trend.      Musculoskeletal:   # Weakness and deconditioning of critical illness   - Physical and occupational therapy consult         General Cares/Prophylaxis:    DVT Prophylaxis: Enoxaparin (Lovenox) SQ  GI Prophylaxis: PPI  Restraints: Restraints for medical healing needed: YES     Lines/ tubes/ drains:  - PICC, PIVs, NOEL     Code Status: Full code    Disposition:  -  Surgical ICU.     Patient seen, findings and plan discussed with ICU staff.    Carol Metcalf MD  Surgery PGY-2     ====================================    TODAY'S PROGRESS:   SUBJECTIVE:   No acute events overnight. Continues to be febrile. Having abdominal pain similar to preop. Wants to start diet when possible.     OBJECTIVE:   1. VITAL SIGNS:   Temp:  [99.5  F (37.5  C)-102.7  F (39.3  C)] 101.8  F (38.8  C)  Pulse:  [] 88  Resp:  [14-38] 27  BP: ()/() 97/52  FiO2 (%):  [35 %-60 %] 40 %  SpO2:  [90 %-100 %] 97 %  Vent Mode: CPAP/PS  (Continuous positive airway pressure with Pressure Support)  FiO2 (%): 40 %  Resp Rate  (Set): 16 breaths/min  Tidal Volume (Set, mL): 450 mL  PEEP (cm H2O): 5 cmH2O  Pressure Support (cm H2O): 7 cmH2O  Resp: 27      2. INTAKE/ OUTPUT:   I/O last 3 completed shifts:  In: 2922.49 [I.V.:2607.49; NG/GT:315]  Out: 1508 [Urine:1420; Drains:88]    3. PHYSICAL EXAMINATION:     General: Awake, interactive  Neuro: follows commands, alert  Pulm/Resp: HFNC, no respiratory distress  CV: RRR  Abdomen: Soft, mildly distended, tender to light palpation of periumbilical region and left abdomen. Nontender on right.   MSK/Extremities: warm and well perfused     4. INVESTIGATIONS:  All labs and imaging studies personally reviewed     Arterial Blood Gases   Recent Labs   Lab 11/04/22 1916 11/04/22 1741   PH 7.35 7.40   PCO2 43 41   PO2 74* 92   HCO3 24 25     Complete Blood Count   Recent Labs   Lab 11/06/22  0616 11/05/22  0603 11/05/22  0101 11/04/22 1916 11/04/22  1741 11/04/22 0619   WBC 9.0 9.3 12.0*  --   --  9.3   HGB 7.9* 7.7* 8.2* 8.5*   < > 8.7*    242 264  --   --  298    < > = values in this interval not displayed.     Basic Metabolic Panel  Recent Labs   Lab 11/07/22  0400 11/06/22 2132 11/06/22 2057 11/06/22 2050 11/06/22 1954 11/06/22  0616 11/05/22  1150 11/05/22  0603 11/05/22  0022 11/04/22  2327 11/04/22  2109 11/04/22 1916 11/04/22  1148 11/04/22  0619   NA  --   --   --   --   --  143  --  140  --  137  --  139   < > 138   POTASSIUM  --   --   --   --   --  3.5  --  3.7  --  3.7  --  3.5   < > 3.5   CHLORIDE  --   --   --   --   --  108*  --  107  --  105  --   --   --  106   CO2  --   --   --   --   --  23  --  20*  --  18*  --   --   --  19*   BUN  --   --   --   --   --  5.1*  --  3.4*  --  5.0*  --   --   --  2.3*   CR  --   --   --   --   --  0.97*  --  0.86  --  0.93  --   --   --  0.81   GLC 98 132* 189* 66*   < > 120*   < > 124*   < > 120*   < > 141*   < > 119*    < > = values in this interval not displayed.     Liver Function Tests  Recent Labs   Lab 11/06/22  0657  11/05/22  0603 11/04/22  0619 11/03/22  0616 11/02/22  0636   AST 36*  --  41* 37* 31   ALT 19 21 17 16 23   ALKPHOS 145* 114* 125* 120* 126*   BILITOTAL 0.5 0.5 0.8 1.1 1.3*   ALBUMIN 2.3* 2.2* 2.8* 3.0* 3.2*     Pancreatic Enzymes  Recent Labs   Lab 11/06/22  0616 11/01/22  1734   LIPASE 65* 68*     Coagulation Profile  No lab results found in last 7 days.      5. RADIOLOGY:   Recent Results (from the past 24 hour(s))   US Lower Extremity Venous Duplex Bilateral    Narrative    EXAMINATION: DOPPLER VENOUS ULTRASOUND OF BILATERAL LOWER EXTREMITIES,  11/6/2022 8:50 AM     COMPARISON: None.    HISTORY: Evaluate for DVT    TECHNIQUE:  Gray-scale evaluation with compression, spectral flow and  color Doppler assessment of the deep venous system of both legs from  groin to knee, and then at the ankles.    FINDINGS:  In both lower extremities, the common femoral, femoral, popliteal and  posterior tibial veins demonstrate normal compressibility and blood  flow.      Impression    IMPRESSION: No evidence of deep venous thrombosis in either lower  extremity.    I have personally reviewed the examination and initial interpretation  and I agree with the findings.    BC HAMMER DO         SYSTEM ID:  [id number]   POC US Echo Limited    Narrative    Limited Bedside Cardiac Ultrasound, performed and interpreted by me.   Indication: Chest Pain and Shortness of Breath.  Parasternal long axis, parasternal short axis and apical 4 chamber views were acquired.   Image quality was satisfactory.    Findings:    Global left ventricular function appears intact.  Chambers do not appear dilated.  There is no evidence of free fluid within the pericardium.  TAPSE not formally measured, but grossly normal RV size and function.  No evidence for elevated filling pressures by diastology (normal diastolic function).  Trace left pleural effusion, not ammenable to percutaneous drainage.      IMPRESSION: No significant evidence of intravascular or  extravascular volume overload.    VEGA Powell MD  Clinical   Anesthesia / Critical Care  *[id number]         =========================================

## 2022-11-07 NOTE — PHARMACY-ADMISSION MEDICATION HISTORY
Admission Medication History Completed by Pharmacy    See [facility] Admission Navigator for allergy information, preferred outpatient pharmacy, prior to admission medications and immunization status.     Medication History Sources:     Fill history    Patient Inteview    Changes made to PTA medication list (reason):    Added: Multivitamin with iron every day, Vitamin D 4000 units daily, Vitamin B12 (patient reported)    Deleted: hydroxyzine, enoxaparin, hyoscyamine, ondansetron, oxycodone, senna-docusate (patient reported)    Changed: None    Additional Information:    Patient's friend present at bedside for interview    Patient stated that she stopped taking her medications about a week ago before her surgery as directed by her provider    She is unsure of the dose of vitamin B12 that she takes at home but takes 2 daily    Oxycodone, hydroxyzine, hyoscyamine, senna-docusate, and enoxaparin  Were prescribed to her post-op for a different surgery and she is no longer taking them     Prior to Admission medications    Medication Sig Last Dose Taking? Auth Provider Long Term End Date   buPROPion (WELLBUTRIN XL) 150 MG 24 hr tablet Take 150 mg by mouth every morning Past Week Yes Unknown, Entered By History     cyanocobalamin (VITAMIN B-12) 500 MCG tablet Take 1,000 mcg by mouth daily Past Week Yes Unknown, Entered By History No    fish oil-omega-3 fatty acids 1000 MG capsule Take 2 g by mouth every morning Past Week Yes Reported, Patient     Multiple Vitamins-Iron (MULTIVITAMIN/IRON PO) Take 1 tablet by mouth daily Past Week Yes Unknown, Entered By History     omeprazole (PRILOSEC) 40 MG DR capsule Take 1 capsule (40 mg) by mouth 2 times daily Past Week Yes Esteban Rose DO  12/29/22   Vitamin D3 (CHOLECALCIFEROL) 25 mcg (1000 units) tablet Take 4 tablets by mouth daily Past Week Yes Unknown, Entered By History No      Date completed: 11/07/22    Medication history completed by:     Rashad Meier, PharmD  PGY1 Pharmacist  Resident

## 2022-11-07 NOTE — PROGRESS NOTES
Minimally Invasive and Bariatric Surgery Progress Note  11/07/2022   ------------------------------------------------------------------------------------------------  Subjective:  Extubated yesterday to Bipap and now HFNC. T max 102.7 overnight. Happy to have breathing tube out. Remains NPO. No nausea. Still has pain across upper abdomen.     ------------------------------------------------------------------------------------------------  Objective:  /69 (BP Location: Left arm)   Pulse 81   Temp 99.5  F (37.5  C) (Oral)   Resp (!) 34   Ht 1.829 m (6')   Wt 138.8 kg (306 lb)   LMP 10/01/2022   SpO2 98%   BMI 41.50 kg/m       I/O:   UOP 1420 +1x  NOEL 88/18 since midnight  BM 6x     Gen: Awake, interactive, NAD  Resp: breathing comfortably on 30LPM 40% HFNC  CV: regular rate, appears well perfused  Abd: soft, non-distended. Tender across upper abdomen, greatest in right upper quadrant around incision site. Right NOEL with s/s output.  Incision: Dressings in place, c/d/i.   Ext: warm and dry     Labs:  All labs reviewed.  WBC 9.4  Hgb 6.8 (7.9)  Plt 313    UA trace leuk est, negative ketones, 8 WBC    11/5/22 BAL 1+ yeast   11/6/22 BC NGTD  11/2 BC NGTD    Imaging:  US Lower Extremity Bilateral:  Negative for DVT    POC US Echo Limited  Result Date: 11/6/2022  IMPRESSION: No significant evidence of intravascular or extravascular volume overload. VEGA Powell MD Clinical  Anesthesia / Critical Care *[id number]       =======================================================  Assessment/Plan:   42 year old female who is s/p laparoscopic sleeve gastrectomy and hiatal hernia repair on 10/25/22 with Dr. Aragon who returned to the ED with LUQ abdominal and shoulder pain, was found to have bilateral lower lobe pneumonia and new oxygen requirement. No evidence of PE. RUQ US obtained to evaluate elevated Tbili, found to have septated fluid collection along left hepatic lobe consistent with hematoma,  likely the explanation for her acute blood loss anemia post operatively. This was again seen on CT scan 11/3. No evidence to suggest ongoing bleeding. UGI study obtained without evidence for leak. However, Leah continued to struggle with pain and decision was made to perform exploratory laparoscopy. She is now s/p diagnostic laparoscopy with evacuation of abdominal hematoma on 11/4. During the operation, a stitch was removed from the hiatal repair. She was admitted to the SICU post operatively for mechanical ventilatory support. Notably, she developed worsening fevers to Tmax 102.6 overnight from 11/5 into AM 11/6 with elevation in CRP, though no leukocytosis. She was extubated to BIPAP then HFCN 11/6. Continues to complain of pain across upper abdomen. WBC normal though continued fevers overnight.     - appreciate excellent SICU cares  - multimodal pain and anxiety regimen  - continue to wean respiratory support as able  - NPO, swallow study - ok for meds and sips of clears  - given acute drop in Hgb, HOLD Lovenox this AM  - monitor and record NOEL drain output  - continue PPI BID  - appreciate thorough infectious workup by SICU, continue zosyn  Dispo: SICU, okay to transfer to floor once respiratory status improves and clinically stable     Patient and plan discussed with Dr. Aragon.    Danni Clifton MD  General Surgery Resident

## 2022-11-07 NOTE — PLAN OF CARE
Major Shift Events: Pt was hypoglycemic at start of shift (BG 69), 25 mL D50 given and BG not above 100 at q15min recheck, so additional 25mL D50 given. Pt given a total of 2 D50 amps, and switched from D5LR 20mEq Kcl maintenance fluid to D10. BG has been stable thereafter.    Neuro: AO x4. Pupils PERRLA 3mm. Moves all extremities; +4 BUE, +3 BLE  Pain: Pt c/o pain frequently throughout night. 0.6mg Dilaudid given for pain per MAR. Pain in abdomen and throat from ETT irritation.  CV: SBP 90-120s, DBP 50-70s. Tmax 102.7, MD aware, ice packs applied to bilateral axilla and fan turned on. SR in 80-90s.   Resp: Lung sounds coarse over dim. Pt on HFNC 30L, 40% until 2000, then on BiPAP until 0400 when placed back on HFNC. Needs reminders to use IS while awake.   GI: BM smear x2 this shift. NPO w/ swabs for comfort. Continent.  : Adequate UOP via bedside commode. Adult diaper + menstrual pad in place while pt is menstruating.  Lines:  L PIV   L PICC triple lumen  R PIV   R NOEL with minimal output  Drips/Gtts:  - D10 @ 50mL/hr  Skin: Generalized bruising with more bruises noted on bilateral forearms. Redness blanchable on sacrum/coccyx. 5 abd punctures sites covered with Primapore dressings, CDI.      Assist x1 when getting OOB.      Plan: Pain management. IV antibiotics. Alert MD with any changes in pt condition.     Goal Outcome Evaluation:      Plan of Care Reviewed With: patient, spouse    Overall Patient Progress: improvingOverall Patient Progress: improving

## 2022-11-07 NOTE — CONSULTS
Care Management Initial Consult    General Information  Assessment completed with: VM-chart review,    Type of CM/SW Visit: Chart Assessment    Primary Care Provider verified and updated as needed: Yes         Advance Care Planning: Advance Care Planning Reviewed: no concerns identified        Communication Assessment  Patient's communication style: spoken language (English or Bilingual)    Hearing Difficulty or Deaf: no   Wear Glasses or Blind: yes    Cognitive  Cognitive/Neuro/Behavioral: WDL  Level of Consciousness: alert  Arousal Level: opens eyes spontaneously  Orientation: oriented x 4  Mood/Behavior: calm, cooperative  Best Language: 0 - No aphasia  Speech: clear    Living Environment:   People in home: spouse     Current living Arrangements: house      Able to return to prior arrangements: yes       Family/Social Support:  Care provided by: self, spouse/significant other  Provides care for: no one       Noe       Description of Support System: Supportive    Support Assessment: Adequate family and caregiver support, Adequate social supports    Current Resources:   Patient receiving home care services: No     Community Resources: OP Infusion  Equipment currently used at home: none  Supplies currently used at home: None    Employment/Financial:  Employment Status: other (see comments)        Financial Concerns: No concerns identified           Lifestyle & Psychosocial Needs:  Social Determinants of Health     Tobacco Use: Low Risk      Smoking Tobacco Use: Never     Smokeless Tobacco Use: Never     Passive Exposure: Not on file   Alcohol Use: Not on file   Financial Resource Strain: Not on file   Food Insecurity: Not on file   Transportation Needs: Not on file   Physical Activity: Not on file   Stress: Not on file   Social Connections: Not on file   Intimate Partner Violence: Not on file   Depression: Not at risk     PHQ-2 Score: 0   Housing Stability: Not on file       Functional Status:  Prior to  admission patient needed assistance:   Dependent ADLs:: Independent  Dependent IADLs:: Independent    Values/Beliefs:  Spiritual, Cultural Beliefs, Restorationism Practices, Values that affect care: no               Additional Information:  Brief history per provider notes~     42 year old female who is s/p laparoscopic sleeve gastrectomy and hiatal hernia repair on 10/25/22 with Dr. Aragon who returned to the ED with LUQ abdominal and shoulder pain, was found to have bilateral lower lobe pneumonia and new oxygen requirement. Leah continued to struggle with pain and decision was made to perform exploratory laparoscopy- She is now s/p diagnostic laparoscopy with evacuation of abdominal hematoma on 11/4. During the operation, a stitch was removed from the hiatal repair. She was admitted to the SICU post operatively for mechanical ventilatory support. Notably, she developed worsening fevers to Tmax 102.6 overnight from 11/5 into AM 11/6 with elevation in CRP, though no leukocytosis. She was extubated to BIPAP then HFCN 11/6. Continues to complain of pain across upper abdomen. WBC normal though continued fevers overnight.    Continued cares:   ~ continue to wean respiratory support as abl;e  ~ Swallow study  ~ Infectious work up    _____________________    Attempted to see patient for discharge needs. Providers are in the room with her. Upon chart  review, there may not be any needs at discharge. Therapy assessment indicates home with assist with spouse for support.     Care management team will be available for any needs.      BENY Linda RN Care Coordinator  Unit [facility] pager: [phone]     For Weekend & Holiday on call RN Care Coordinator:  (Tasks: Home care, home infusion, medical equipment/oxygen, transportation, IMM & MOON forms, etc.)     Text Paging in [facility] Smart Web is the preferred method of contact for these teams     [place] & [place] (0825-8226) Saturday & Sunday; (3286-5064)  Recognized  Holidays  Pager: [phone] Units: 4A, 4C, 4E, 5A & 5B   Pager: [phone] Units: 6A, 6B  Pager: [phone] Units: 6C, 6D  Pager: [phone] Units: 7A, 7B, 7C, 7D & 5C  Pager: [phone] Units: [facility] ED, 5 Ortho, 5 Med/Surg, 6 Med/Surg, 8A, 10 ICU, & [facility]     For Weekend & Holiday on call Social Work:  (Tasks: TCU, transportation, Hospice, adjustment to illness counseling, Health Care Directives, Child Protection and Domestic Violence concerns, Vulnerable Adult, IMM forms, etc.)     Text Paging in [employer] Smart Web is the preferred method of contact for these teams    [place] (0800 - 1630) Saturday and Sunday  Pager: [phone] Units: 4A, 4C, 4E  Pager: [phone] Units: 5A & 5B  Pager: [phone] Units: 6A & 6B  Pager: [phone] Units: 6C & 6D  Pager: [phone] Units: 7A & 7B  Pager: [phone] Units: 7C & 7D  Pager: [phone] Units: [facility] ([id number]) Saturday and Sunday  Pager: [phone] Units: 5 Ortho, 5 Med/Surg, & [facility] ED  Pager: [phone] Units: 6 Med/Surg, 8A, & 10 ICU   ______________________________________________     After hours (9505-5273) for all units everyday- (only the  is available after hours until midnight)  Pager [phone]

## 2022-11-08 ENCOUNTER — APPOINTMENT (OUTPATIENT)
Dept: GENERAL RADIOLOGY | Facility: CLINIC | Age: 42
DRG: 907 | End: 2022-11-08
Payer: COMMERCIAL

## 2022-11-08 ENCOUNTER — APPOINTMENT (OUTPATIENT)
Dept: OCCUPATIONAL THERAPY | Facility: CLINIC | Age: 42
DRG: 907 | End: 2022-11-08
Payer: COMMERCIAL

## 2022-11-08 LAB
ALBUMIN SERPL BCG-MCNC: 2.5 G/DL (ref 3.5–5.2)
ALP SERPL-CCNC: 127 U/L (ref 35–104)
ALT SERPL W P-5'-P-CCNC: 19 U/L (ref 10–35)
ANION GAP SERPL CALCULATED.3IONS-SCNC: 10 MMOL/L (ref 7–15)
AST SERPL W P-5'-P-CCNC: 31 U/L (ref 10–35)
BACTERIA BLD CULT: NO GROWTH
BASE EXCESS BLDA CALC-SCNC: 8.4 MMOL/L (ref -9–1.8)
BASOPHILS # BLD AUTO: 0 10E3/UL (ref 0–0.2)
BASOPHILS NFR BLD AUTO: 0 %
BILIRUB SERPL-MCNC: 0.4 MG/DL
BUN SERPL-MCNC: 7 MG/DL (ref 6–20)
CALCIUM SERPL-MCNC: 8 MG/DL (ref 8.6–10)
CHLORIDE SERPL-SCNC: 100 MMOL/L (ref 98–107)
CREAT SERPL-MCNC: 0.92 MG/DL (ref 0.51–0.95)
DEPRECATED HCO3 PLAS-SCNC: 29 MMOL/L (ref 22–29)
EOSINOPHIL # BLD AUTO: 0.2 10E3/UL (ref 0–0.7)
EOSINOPHIL NFR BLD AUTO: 2 %
ERYTHROCYTE [DISTWIDTH] IN BLOOD BY AUTOMATED COUNT: 17.2 % (ref 10–15)
GFR SERPL CREATININE-BSD FRML MDRD: 79 ML/MIN/1.73M2
GLUCOSE BLDC GLUCOMTR-MCNC: 105 MG/DL (ref 70–99)
GLUCOSE BLDC GLUCOMTR-MCNC: 111 MG/DL (ref 70–99)
GLUCOSE BLDC GLUCOMTR-MCNC: 116 MG/DL (ref 70–99)
GLUCOSE BLDC GLUCOMTR-MCNC: 134 MG/DL (ref 70–99)
GLUCOSE SERPL-MCNC: 125 MG/DL (ref 70–99)
HCO3 BLD-SCNC: 33 MMOL/L (ref 21–28)
HCT VFR BLD AUTO: 25.3 % (ref 35–47)
HGB BLD-MCNC: 7.5 G/DL (ref 11.7–15.7)
IMM GRANULOCYTES # BLD: 0.1 10E3/UL
IMM GRANULOCYTES NFR BLD: 1 %
LYMPHOCYTES # BLD AUTO: 0.9 10E3/UL (ref 0.8–5.3)
LYMPHOCYTES NFR BLD AUTO: 11 %
MAGNESIUM SERPL-MCNC: 1.7 MG/DL (ref 1.7–2.3)
MAGNESIUM SERPL-MCNC: 1.8 MG/DL (ref 1.7–2.3)
MCH RBC QN AUTO: 26 PG (ref 26.5–33)
MCHC RBC AUTO-ENTMCNC: 29.6 G/DL (ref 31.5–36.5)
MCV RBC AUTO: 88 FL (ref 78–100)
MONOCYTES # BLD AUTO: 0.6 10E3/UL (ref 0–1.3)
MONOCYTES NFR BLD AUTO: 7 %
NEUTROPHILS # BLD AUTO: 6.4 10E3/UL (ref 1.6–8.3)
NEUTROPHILS NFR BLD AUTO: 79 %
NRBC # BLD AUTO: 0 10E3/UL
NRBC BLD AUTO-RTO: 0 /100
O2/TOTAL GAS SETTING VFR VENT: 50 %
OXYHGB MFR BLD: 95 % (ref 92–100)
PCO2 BLD: 46 MM HG (ref 35–45)
PH BLD: 7.47 [PH] (ref 7.35–7.45)
PHOSPHATE SERPL-MCNC: 3.3 MG/DL (ref 2.5–4.5)
PHOSPHATE SERPL-MCNC: 3.4 MG/DL (ref 2.5–4.5)
PLATELET # BLD AUTO: 327 10E3/UL (ref 150–450)
PO2 BLD: 70 MM HG (ref 80–105)
POTASSIUM SERPL-SCNC: 3 MMOL/L (ref 3.4–5.3)
POTASSIUM SERPL-SCNC: 3.4 MMOL/L (ref 3.4–5.3)
POTASSIUM SERPL-SCNC: 4.4 MMOL/L (ref 3.4–5.3)
PROT SERPL-MCNC: 5.8 G/DL (ref 6.4–8.3)
RBC # BLD AUTO: 2.89 10E6/UL (ref 3.8–5.2)
SODIUM SERPL-SCNC: 139 MMOL/L (ref 136–145)
WBC # BLD AUTO: 8.2 10E3/UL (ref 4–11)

## 2022-11-08 PROCEDURE — 94799 UNLISTED PULMONARY SVC/PX: CPT

## 2022-11-08 PROCEDURE — 250N000011 HC RX IP 250 OP 636: Performed by: STUDENT IN AN ORGANIZED HEALTH CARE EDUCATION/TRAINING PROGRAM

## 2022-11-08 PROCEDURE — 250N000011 HC RX IP 250 OP 636: Performed by: PHYSICIAN ASSISTANT

## 2022-11-08 PROCEDURE — 999N000157 HC STATISTIC RCP TIME EA 10 MIN: Mod: 76

## 2022-11-08 PROCEDURE — C9113 INJ PANTOPRAZOLE SODIUM, VIA: HCPCS | Performed by: STUDENT IN AN ORGANIZED HEALTH CARE EDUCATION/TRAINING PROGRAM

## 2022-11-08 PROCEDURE — 250N000013 HC RX MED GY IP 250 OP 250 PS 637: Performed by: ANESTHESIOLOGY

## 2022-11-08 PROCEDURE — 71045 X-RAY EXAM CHEST 1 VIEW: CPT

## 2022-11-08 PROCEDURE — 97535 SELF CARE MNGMENT TRAINING: CPT | Mod: GO

## 2022-11-08 PROCEDURE — 999N000043 HC STATISTIC CTO2 CONT OXYGEN TECH TIME EA 90 MIN

## 2022-11-08 PROCEDURE — 94640 AIRWAY INHALATION TREATMENT: CPT

## 2022-11-08 PROCEDURE — 83735 ASSAY OF MAGNESIUM: CPT | Performed by: SURGERY

## 2022-11-08 PROCEDURE — 250N000013 HC RX MED GY IP 250 OP 250 PS 637: Performed by: SURGERY

## 2022-11-08 PROCEDURE — 84100 ASSAY OF PHOSPHORUS: CPT | Performed by: SURGERY

## 2022-11-08 PROCEDURE — 80053 COMPREHEN METABOLIC PANEL: CPT | Performed by: STUDENT IN AN ORGANIZED HEALTH CARE EDUCATION/TRAINING PROGRAM

## 2022-11-08 PROCEDURE — 97530 THERAPEUTIC ACTIVITIES: CPT | Mod: GO

## 2022-11-08 PROCEDURE — 250N000011 HC RX IP 250 OP 636

## 2022-11-08 PROCEDURE — 36600 WITHDRAWAL OF ARTERIAL BLOOD: CPT

## 2022-11-08 PROCEDURE — 999N000215 HC STATISTIC HFNC ADULT NON-CPAP

## 2022-11-08 PROCEDURE — 250N000011 HC RX IP 250 OP 636: Performed by: SURGERY

## 2022-11-08 PROCEDURE — 85025 COMPLETE CBC W/AUTO DIFF WBC: CPT | Performed by: STUDENT IN AN ORGANIZED HEALTH CARE EDUCATION/TRAINING PROGRAM

## 2022-11-08 PROCEDURE — 71045 X-RAY EXAM CHEST 1 VIEW: CPT | Mod: 26 | Performed by: RADIOLOGY

## 2022-11-08 PROCEDURE — 82805 BLOOD GASES W/O2 SATURATION: CPT

## 2022-11-08 PROCEDURE — 250N000009 HC RX 250

## 2022-11-08 PROCEDURE — 250N000013 HC RX MED GY IP 250 OP 250 PS 637

## 2022-11-08 PROCEDURE — 82040 ASSAY OF SERUM ALBUMIN: CPT | Performed by: STUDENT IN AN ORGANIZED HEALTH CARE EDUCATION/TRAINING PROGRAM

## 2022-11-08 PROCEDURE — 200N000002 HC R&B ICU UMMC

## 2022-11-08 PROCEDURE — 250N000009 HC RX 250: Performed by: STUDENT IN AN ORGANIZED HEALTH CARE EDUCATION/TRAINING PROGRAM

## 2022-11-08 PROCEDURE — 94640 AIRWAY INHALATION TREATMENT: CPT | Mod: 76

## 2022-11-08 PROCEDURE — 84132 ASSAY OF SERUM POTASSIUM: CPT | Performed by: SURGERY

## 2022-11-08 PROCEDURE — 99233 SBSQ HOSP IP/OBS HIGH 50: CPT | Performed by: PHYSICIAN ASSISTANT

## 2022-11-08 PROCEDURE — 250N000013 HC RX MED GY IP 250 OP 250 PS 637: Performed by: PHYSICIAN ASSISTANT

## 2022-11-08 PROCEDURE — 258N000001 HC RX 258: Performed by: PHYSICIAN ASSISTANT

## 2022-11-08 RX ORDER — FENTANYL CITRATE 50 UG/ML
25-50 INJECTION, SOLUTION INTRAMUSCULAR; INTRAVENOUS
Status: CANCELLED | OUTPATIENT
Start: 2022-11-08

## 2022-11-08 RX ORDER — MAGNESIUM OXIDE 400 MG/1
400 TABLET ORAL EVERY 4 HOURS
Status: DISCONTINUED | OUTPATIENT
Start: 2022-11-08 | End: 2022-11-08

## 2022-11-08 RX ORDER — NALOXONE HYDROCHLORIDE 0.4 MG/ML
0.4 INJECTION, SOLUTION INTRAMUSCULAR; INTRAVENOUS; SUBCUTANEOUS
Status: DISCONTINUED | OUTPATIENT
Start: 2022-11-08 | End: 2022-11-08

## 2022-11-08 RX ORDER — FLUMAZENIL 0.1 MG/ML
0.2 INJECTION, SOLUTION INTRAVENOUS
Status: CANCELLED | OUTPATIENT
Start: 2022-11-08

## 2022-11-08 RX ORDER — NALOXONE HYDROCHLORIDE 0.4 MG/ML
0.2 INJECTION, SOLUTION INTRAMUSCULAR; INTRAVENOUS; SUBCUTANEOUS
Status: DISCONTINUED | OUTPATIENT
Start: 2022-11-08 | End: 2022-11-08

## 2022-11-08 RX ORDER — POTASSIUM CHLORIDE 29.8 MG/ML
20 INJECTION INTRAVENOUS
Status: COMPLETED | OUTPATIENT
Start: 2022-11-08 | End: 2022-11-08

## 2022-11-08 RX ORDER — BUPIVACAINE HYDROCHLORIDE 2.5 MG/ML
INJECTION, SOLUTION EPIDURAL; INFILTRATION; INTRACAUDAL
Status: DISCONTINUED | OUTPATIENT
Start: 2022-11-08 | End: 2022-11-08

## 2022-11-08 RX ORDER — LANOLIN ALCOHOL/MO/W.PET/CERES
1000 CREAM (GRAM) TOPICAL DAILY
Status: DISCONTINUED | OUTPATIENT
Start: 2022-11-08 | End: 2022-11-16 | Stop reason: HOSPADM

## 2022-11-08 RX ORDER — FUROSEMIDE 10 MG/ML
20 INJECTION INTRAMUSCULAR; INTRAVENOUS ONCE
Status: DISCONTINUED | OUTPATIENT
Start: 2022-11-08 | End: 2022-11-08

## 2022-11-08 RX ORDER — FUROSEMIDE 10 MG/ML
20 INJECTION INTRAMUSCULAR; INTRAVENOUS ONCE
Status: COMPLETED | OUTPATIENT
Start: 2022-11-08 | End: 2022-11-08

## 2022-11-08 RX ORDER — DEXMEDETOMIDINE HYDROCHLORIDE 4 UG/ML
INJECTION, SOLUTION INTRAVENOUS
Status: DISCONTINUED | OUTPATIENT
Start: 2022-11-08 | End: 2022-11-08

## 2022-11-08 RX ORDER — POTASSIUM CHLORIDE 1.5 G/1.58G
40 POWDER, FOR SOLUTION ORAL ONCE
Status: COMPLETED | OUTPATIENT
Start: 2022-11-08 | End: 2022-11-08

## 2022-11-08 RX ORDER — ACETAMINOPHEN 325 MG/1
650 TABLET ORAL EVERY 6 HOURS SCHEDULED
Status: DISCONTINUED | OUTPATIENT
Start: 2022-11-08 | End: 2022-11-16 | Stop reason: HOSPADM

## 2022-11-08 RX ORDER — MAGNESIUM SULFATE HEPTAHYDRATE 40 MG/ML
2 INJECTION, SOLUTION INTRAVENOUS ONCE
Status: COMPLETED | OUTPATIENT
Start: 2022-11-08 | End: 2022-11-08

## 2022-11-08 RX ORDER — OMEPRAZOLE
40 KIT 2 TIMES DAILY
Status: DISCONTINUED | OUTPATIENT
Start: 2022-11-08 | End: 2022-11-09

## 2022-11-08 RX ORDER — ALBUTEROL SULFATE 0.83 MG/ML
2.5 SOLUTION RESPIRATORY (INHALATION) 4 TIMES DAILY PRN
Status: DISCONTINUED | OUTPATIENT
Start: 2022-11-08 | End: 2022-11-16 | Stop reason: HOSPADM

## 2022-11-08 RX ORDER — FUROSEMIDE 10 MG/ML
40 INJECTION INTRAMUSCULAR; INTRAVENOUS ONCE
Status: COMPLETED | OUTPATIENT
Start: 2022-11-08 | End: 2022-11-08

## 2022-11-08 RX ORDER — DEXAMETHASONE SODIUM PHOSPHATE 10 MG/ML
INJECTION, SOLUTION INTRAMUSCULAR; INTRAVENOUS
Status: DISCONTINUED | OUTPATIENT
Start: 2022-11-08 | End: 2022-11-08

## 2022-11-08 RX ADMIN — OXYCODONE HYDROCHLORIDE 5 MG: 5 SOLUTION ORAL at 06:19

## 2022-11-08 RX ADMIN — DEXTROSE MONOHYDRATE: 100 INJECTION, SOLUTION INTRAVENOUS at 13:26

## 2022-11-08 RX ADMIN — MAGNESIUM SULFATE HEPTAHYDRATE 2 G: 40 INJECTION, SOLUTION INTRAVENOUS at 08:06

## 2022-11-08 RX ADMIN — ALBUTEROL SULFATE 2.5 MG: 2.5 SOLUTION RESPIRATORY (INHALATION) at 20:12

## 2022-11-08 RX ADMIN — PANTOPRAZOLE SODIUM 40 MG: 40 INJECTION, POWDER, FOR SOLUTION INTRAVENOUS at 07:49

## 2022-11-08 RX ADMIN — BUPROPION HYDROCHLORIDE 75 MG: 75 TABLET, FILM COATED ORAL at 20:27

## 2022-11-08 RX ADMIN — FUROSEMIDE 20 MG: 10 INJECTION, SOLUTION INTRAVENOUS at 16:37

## 2022-11-08 RX ADMIN — OXYCODONE HYDROCHLORIDE 10 MG: 5 SOLUTION ORAL at 21:15

## 2022-11-08 RX ADMIN — ALBUTEROL SULFATE 2.5 MG: 2.5 SOLUTION RESPIRATORY (INHALATION) at 17:27

## 2022-11-08 RX ADMIN — METHOCARBAMOL 750 MG: 750 TABLET ORAL at 07:49

## 2022-11-08 RX ADMIN — ALBUTEROL SULFATE 2.5 MG: 2.5 SOLUTION RESPIRATORY (INHALATION) at 13:34

## 2022-11-08 RX ADMIN — METHOCARBAMOL 750 MG: 750 TABLET ORAL at 11:11

## 2022-11-08 RX ADMIN — PIPERACILLIN SODIUM AND TAZOBACTAM SODIUM 4.5 G: 4; .5 INJECTION, POWDER, LYOPHILIZED, FOR SOLUTION INTRAVENOUS at 03:50

## 2022-11-08 RX ADMIN — Medication 20 MCG: at 14:29

## 2022-11-08 RX ADMIN — PIPERACILLIN SODIUM AND TAZOBACTAM SODIUM 4.5 G: 4; .5 INJECTION, POWDER, LYOPHILIZED, FOR SOLUTION INTRAVENOUS at 10:26

## 2022-11-08 RX ADMIN — ENOXAPARIN SODIUM 40 MG: 40 INJECTION SUBCUTANEOUS at 20:27

## 2022-11-08 RX ADMIN — POTASSIUM CHLORIDE 20 MEQ: 29.8 INJECTION, SOLUTION INTRAVENOUS at 17:02

## 2022-11-08 RX ADMIN — HYDROMORPHONE HYDROCHLORIDE 0.5 MG: 1 INJECTION, SOLUTION INTRAMUSCULAR; INTRAVENOUS; SUBCUTANEOUS at 02:03

## 2022-11-08 RX ADMIN — HYDROMORPHONE HYDROCHLORIDE 0.5 MG: 1 INJECTION, SOLUTION INTRAMUSCULAR; INTRAVENOUS; SUBCUTANEOUS at 08:05

## 2022-11-08 RX ADMIN — Medication 1000 MCG: at 08:05

## 2022-11-08 RX ADMIN — BUPIVACAINE HYDROCHLORIDE 30 ML: 2.5 INJECTION, SOLUTION EPIDURAL; INFILTRATION; INTRACAUDAL; PERINEURAL at 14:29

## 2022-11-08 RX ADMIN — THIAMINE HYDROCHLORIDE 100 MG: 100 INJECTION, SOLUTION INTRAMUSCULAR; INTRAVENOUS at 07:50

## 2022-11-08 RX ADMIN — MAGNESIUM OXIDE TAB 400 MG (241.3 MG ELEMENTAL MG) 400 MG: 400 (241.3 MG) TAB at 07:50

## 2022-11-08 RX ADMIN — ACETAMINOPHEN 975 MG: 325 TABLET, FILM COATED ORAL at 03:47

## 2022-11-08 RX ADMIN — ACETAMINOPHEN 650 MG: 325 TABLET, FILM COATED ORAL at 23:23

## 2022-11-08 RX ADMIN — ALBUTEROL SULFATE 2.5 MG: 2.5 SOLUTION RESPIRATORY (INHALATION) at 09:56

## 2022-11-08 RX ADMIN — POTASSIUM CHLORIDE 20 MEQ: 29.8 INJECTION, SOLUTION INTRAVENOUS at 09:08

## 2022-11-08 RX ADMIN — OMEPRAZOLE 40 MG: KIT at 20:27

## 2022-11-08 RX ADMIN — POTASSIUM CHLORIDE 20 MEQ: 29.8 INJECTION, SOLUTION INTRAVENOUS at 16:01

## 2022-11-08 RX ADMIN — HYDROMORPHONE HYDROCHLORIDE 0.5 MG: 1 INJECTION, SOLUTION INTRAMUSCULAR; INTRAVENOUS; SUBCUTANEOUS at 04:36

## 2022-11-08 RX ADMIN — POTASSIUM CHLORIDE 40 MEQ: 1.5 POWDER, FOR SOLUTION ORAL at 16:37

## 2022-11-08 RX ADMIN — METHOCARBAMOL 750 MG: 750 TABLET ORAL at 20:27

## 2022-11-08 RX ADMIN — PIPERACILLIN SODIUM AND TAZOBACTAM SODIUM 4.5 G: 4; .5 INJECTION, POWDER, LYOPHILIZED, FOR SOLUTION INTRAVENOUS at 21:58

## 2022-11-08 RX ADMIN — POTASSIUM CHLORIDE 20 MEQ: 29.8 INJECTION, SOLUTION INTRAVENOUS at 08:06

## 2022-11-08 RX ADMIN — BUPROPION HYDROCHLORIDE 75 MG: 75 TABLET, FILM COATED ORAL at 07:50

## 2022-11-08 RX ADMIN — FUROSEMIDE 40 MG: 10 INJECTION, SOLUTION INTRAVENOUS at 10:26

## 2022-11-08 RX ADMIN — PIPERACILLIN SODIUM AND TAZOBACTAM SODIUM 4.5 G: 4; .5 INJECTION, POWDER, LYOPHILIZED, FOR SOLUTION INTRAVENOUS at 15:12

## 2022-11-08 RX ADMIN — OXYCODONE HYDROCHLORIDE 5 MG: 5 SOLUTION ORAL at 10:26

## 2022-11-08 RX ADMIN — ONDANSETRON 4 MG: 2 INJECTION INTRAMUSCULAR; INTRAVENOUS at 04:13

## 2022-11-08 RX ADMIN — METHOCARBAMOL 750 MG: 750 TABLET ORAL at 15:12

## 2022-11-08 RX ADMIN — ACETAMINOPHEN 650 MG: 325 TABLET, FILM COATED ORAL at 17:55

## 2022-11-08 RX ADMIN — DEXAMETHASONE SODIUM PHOSPHATE 2 MG: 10 INJECTION, SOLUTION INTRAMUSCULAR; INTRAVENOUS at 14:29

## 2022-11-08 RX ADMIN — ACETAMINOPHEN 650 MG: 325 TABLET, FILM COATED ORAL at 11:10

## 2022-11-08 RX ADMIN — ONDANSETRON 4 MG: 4 TABLET, ORALLY DISINTEGRATING ORAL at 08:05

## 2022-11-08 RX ADMIN — OXYCODONE HYDROCHLORIDE 10 MG: 5 SOLUTION ORAL at 15:13

## 2022-11-08 RX ADMIN — POTASSIUM CHLORIDE 20 MEQ: 29.8 INJECTION, SOLUTION INTRAVENOUS at 06:56

## 2022-11-08 RX ADMIN — FUROSEMIDE 20 MG: 10 INJECTION, SOLUTION INTRAVENOUS at 15:12

## 2022-11-08 ASSESSMENT — ACTIVITIES OF DAILY LIVING (ADL)
ADLS_ACUITY_SCORE: 31
ADLS_ACUITY_SCORE: 33
ADLS_ACUITY_SCORE: 31
ADLS_ACUITY_SCORE: 33
ADLS_ACUITY_SCORE: 31

## 2022-11-08 NOTE — PLAN OF CARE
Major Shift Events: Pt very motivated w/ nursing cares & therapy today. Abdominal/lap site pain somewhat controlled w/ scheduled Tylenol/Robaxin, PRN oxy & Dilaudid. Anesthesia provided a one-time block this afternoon, per pt this made some improvement in pain. Up in the chair several times t/o the day. Tmax 100.1. Switched from BIPAP 50% 12/6 @ 0800 to HFNC 50% 40 LPM. Strong & productive cough. Several small watery BM's. Given 40mg IV Lasix this AM & PM, good response. Advanced to bariatric full liq diet. R abd NOEL w/ minimal serosang output.   Plan: Continue to wean FiO2 & encourage good pulmonary hygiene. Encourage PO intake to maintain BG.     For vital signs and complete assessments, please see documentation flowsheets.       Goal Outcome Evaluation:      Plan of Care Reviewed With: patient, spouse, sibling, friend    Overall Patient Progress: improving

## 2022-11-08 NOTE — PROGRESS NOTES
SURGICAL ICU PROGRESS NOTE  11/08/2022    Date of Service (when I saw the patient): 11/08/2022    ASSESSMENT: Leah Yanez is a 42 year old female with recent sleeve gastrectomy and hiatal hernia repair on 10/25/22 who who was admitted on 11/1/2022 for pneumonia and intra-abdominal hematoma. She is now s/p diagnostic laparoscopy with evacuation of abdominal hematoma on 11/4/22. Postoperatively she was unable to be weaned from mechanical ventilation and was admitted to the SICU for respiratory management       CHANGES and MAJOR THINGS TODAY:   RAPS consult for possible block for pain control 2/2 splinting with coughing and pulmonary toileting   Scheduled tylenol   Increase Robaxin   Decrease D10 infusion rate   RT for recruitment:  meta nebs, Acapella, IS  Lasix 40 mg this am, 20 mg scheduled for later today   Repeat CXR in morning   OOB to chair with therapies   - PT consult  In addition to OT consult   Discuss FT with MIS team   Change protonix to home omeprazole 40 mg BID   Change thiamine to PO, add B12     PLAN:    Neurological:  # Acute pain   - Monitor neurological status. Delirium preventions and precautions.   - Pain: Schedule tylenol 650 mg q6hr, gabapentin 300 mg TID, robaxin 750 mg QID, lidocaine patches; PRN dilaudid, oxycodone, atarax  - RAPS team consult for possible pain block      # Depression  - PTA wellbutrin 75 mg BID       Pulmonary:   # Acute hypoxic respiratory failure   - extubated 11/6, on  HFNC 30. BiPAP started 11/8/22 due to increase WOB and splinting.   - 11/5 bronch with BAL negative    - confer with RT re: Metanebs/percussive therapies for additional pulmon toileting, incentive spirometry      # Bilateral lower lobe pneumonia  - see ID     Cardiovascular:    # Hypotension, now resolved   # hypervolemia   - off pressors  - Monitor hemodynamic status.    - continue with diuresis: Lasix 40 mg this am       Gastroenterology/Nutrition:  # S/p sleeve gastrectomy 10/25  # S/p diagnostic  laparoscopy and hematoma evacuation 11/4  # Protein calorie deficit malnutrition   - bariatric clears  - PTA B1, B12 addeded     Fluids/Electrolytes:   - D10 @ 35cc/h for hypoglycemia, will discontinue once taking po     Renal:  # hypokalemia   # hypomagnesia, relative   - replace K per protocol. RN managed   - stop PO mag oxide replacement this am, subsitute 2 grams Mag x1 dose   - Will continue to monitor intake and output.     Endocrine:  # Hypoglycemia  - Will monitor BG, mIVF D10     ID:  #  Pneumonia   - Amoxicillin (11/2-11/5), stopped due to inadequately treating pneumonia  - Zosyn (11/5 - ) x7 days    Cultures:   11/6: BC- NGTD   11/5: putum: candida   11/1:  legionella and streptococcus negative      # Fever  Likely multifactorial, probable necrotic omentum vs pneumonia: Elevated CRP and LDH. No leukocytosis to suggest ongoing infection, and pneumonia being treated with zosyn.  - WBC normal   -  BLE US negative for DVT     Heme:     # Acute blood loss anemia   - Hemoglobin stable hgb 7.5. will transfuse if hgb <7.0 or signs/symptoms of hypoperfusion. Monitor and trend.      Musculoskeletal:   # Weakness and deconditioning of critical illness   - Physical and occupational therapy consults         General Cares/Prophylaxis:    DVT Prophylaxis: Enoxaparin (Lovenox) SQ  GI Prophylaxis: PPI  Restraints: Restraints for medical healing needed: YES     Lines/ tubes/ drains:  - PICC, PIVs, NOEL     Code Status: Full code     Disposition:  -  Surgical ICU.     Patient seen, findings and plan discussed with surgical ICU staff, Dr. Gomez.    Time spent on this Encounter   Billing:  I spent 45minutes bedside and on the inpatient unit today managing the critical care of Leah Yanez in relation to the issues listed in this note.      Pedro Gillespie PA-C  ====================================  INTERVAL HISTORY:  Course reviewed. Events overnight noted. Increased oxygen requirements, started on BiPAP overnight due to  increased splinting/pain. Pt reports inability to take deep breath. Pain in abdomen.      OBJECTIVE:   1. VITAL SIGNS:   Temp:  [99.1  F (37.3  C)-101.1  F (38.4  C)] 101.1  F (38.4  C)  Pulse:  [72-98] 82  Resp:  [13-34] 20  BP: (101-133)/(59-82) 124/68  FiO2 (%):  [40 %-80 %] 80 %  SpO2:  [90 %-98 %] 96 %  FiO2 (%): 80 %  Resp: 20      2. INTAKE/ OUTPUT:   I/O last 3 completed shifts:  In: 1970 [I.V.:1670]  Out: 2720 [Urine:2700; Drains:20]    3. PHYSICAL EXAMINATION:  General: sitting up in bed, awake, alert and interactive   HEENT: pupils 3mm and reactive. OP clear but appear mildly dry   Neuro: A&Ox3, TIPTON. Follows simple commands   Pulm/Resp: BBS, decreases at bases.   CV: RRR, S1/S2   Abdomen: port sites dressed, NOEL drain in Right--serous brown fluid, abdomen soft and compressible. Prior incision with TTP  : no siegel, no urine to asses   Incisions/Skin: skin warm and dry, no rashes   MSK/Extremities: 1+ dependent generalized edema in posterior thighs/buttocks, trace in calves, pulses 2+  4. INVESTIGATIONS:   Arterial Blood Gases   Recent Labs   Lab 11/08/22  0410 11/04/22  1916 11/04/22  1741   PH 7.47* 7.35 7.40   PCO2 46* 43 41   PO2 70* 74* 92   HCO3 33* 24 25     Complete Blood Count   Recent Labs   Lab 11/08/22  0524 11/07/22  1406 11/07/22  0645 11/06/22  0616 11/05/22  0603   WBC 8.2  --  9.4 9.0 9.3   HGB 7.5* 7.7* 6.8* 7.9* 7.7*     --  313 296 242     Basic Metabolic Panel  Recent Labs   Lab 11/08/22  0524 11/08/22  0337 11/07/22  2328 11/07/22 1956 11/07/22  1202 11/07/22  0645 11/06/22 1954 11/06/22  0616 11/05/22  1150 11/05/22  0603     --   --   --   --  142  --  143  --  140   POTASSIUM 3.0*  --   --   --   --  3.5  --  3.5  --  3.7   CHLORIDE 100  --   --   --   --  107  --  108*  --  107   CO2 29  --   --   --   --  23  --  23  --  20*   BUN 7.0  --   --   --   --  7.2  --  5.1*  --  3.4*   CR 0.92  --   --   --   --  0.92  --  0.97*  --  0.86   * 105* 108* 118*   < >  113*   < > 120*   < > 124*    < > = values in this interval not displayed.     Liver Function Tests  Recent Labs   Lab 11/08/22  0524 11/07/22  0645 11/06/22  0616 11/05/22  0603 11/04/22  0619   AST 31 26 36*  --  41*   ALT 19 16 19 21 17   ALKPHOS 127* 128* 145* 114* 125*   BILITOTAL 0.4 0.4 0.5 0.5 0.8   ALBUMIN 2.5* 2.1* 2.3* 2.2* 2.8*     Pancreatic Enzymes  Recent Labs   Lab 11/06/22  0616 11/01/22  1734   LIPASE 65* 68*     Coagulation Profile  No lab results found in last 7 days.      5. RADIOLOGY:   Recent Results (from the past 24 hour(s))   XR Chest Port 1 View    Impression    RESIDENT PRELIMINARY INTERPRETATION  Impression:   1. Left hemichest opacity, likely secondary to subtotal atelectasis  with superimposed moderate pleural effusion causing mild rightward  shift of the trachea.  2. Atelectasis of the right lung base and likely smaller right pleural  effusion.  3. Interval extubation and removal of enteric tube. Left PICC line in  the right atrium.       =========================================

## 2022-11-08 NOTE — ADDENDUM NOTE
Addendum  created 11/08/22 1442 by Garfield Howell MD    Attestation recorded in Intraprocedure, Flowsheet accepted, Intraprocedure Attestations filed

## 2022-11-08 NOTE — ANESTHESIA PROCEDURE NOTES
Erector spinae Procedure Note    Pre-Procedure   Staff -        Anesthesiologist:  Garfield Howell MD       Resident/Fellow: Cedric Meadows MD       Performed By: resident       Location: floor       Procedure Start/Stop Times: 11/8/2022 2:29 PM and 11/8/2022 2:29 PM       Pre-Anesthestic Checklist: patient identified, IV checked, site marked, risks and benefits discussed, informed consent, monitors and equipment checked, pre-op evaluation, at physician/surgeon's request and post-op pain management  Timeout:       Correct Patient: Yes        Correct Procedure: Yes        Correct Site: Yes        Correct Position: Yes        Correct Laterality: Yes        Site Marked: Yes  Procedure Documentation  Procedure: Erector spinae       Diagnosis: POST OPERATIVE PAIN       Laterality: bilateral       Patient Position: supine       Patient Prep/Sterile Barriers: sterile gloves, mask       Skin prep: Chloraprep       Needle Type: short bevel       Needle Gauge: 21.        Needle Length (millimeters): 110        Ultrasound guided       1. Ultrasound was used to identify targeted nerve, plexus, vascular marker, or fascial plane and place a needle adjacent to it in real-time.       2. Ultrasound was used to visualize the spread of anesthetic in close proximity to the above referenced structure.       3. A permanent image is entered into the patient's record.    Assessment/Narrative         The placement was negative for: blood aspirated, painful injection and site bleeding       Paresthesias: No.       Bolus given via needle..        Secured via.        Insertion/Infusion Method: Single Shot       Complications: none       Injection made incrementally with aspirations every 5 mL.    Medication(s) Administered   Bupivacaine 0.25% PF (Infiltration) - Infiltration   30 mL - 11/8/2022 2:29:00 PM  Dexmedetomidine 4 mcg/mL (Perineural) - Perineural   20 mcg - 11/8/2022 2:29:00 PM  Dexamethasone 10 mg/mL PF  "(Perineural) - Perineural   2 mg - 11/8/2022 2:29:00 PM  Medication Administration Time: 11/8/2022 2:29 PM     Comments:  Bilateral erector spinae block      FOR [facility] ONLY:   Pain Team Contact information: please page the Pain Team Via Futon. Search \"Pain\". During daytime hours, please page the attending first. At night please page the resident first.    "

## 2022-11-08 NOTE — PLAN OF CARE
ICU End of Shift Summary. See flowsheets for vital signs and detailed assessment.    Changes this shift: Alert and oriented. Follows all commands. Tmax 101.4, SICU notified, tylenol given. Pain managed with dilaudid, tylenol, and oxycodone. Pt complained of increased SOB, abdominal pressure, accessory muscle use along with tachypnea and increased work of breathing and oxygen needs at 0400. SICU paged, ABG and CXR done. CXR showing atelectasis, plan to continue to work towards pain management and increased use of IS and bipap.     Plan: Possible block for pain management. K+ replacement.       Goal Outcome Evaluation:      Plan of Care Reviewed With: patient    Overall Patient Progress: no changeOverall Patient Progress: no change

## 2022-11-08 NOTE — PROGRESS NOTES
General Surgery Progress Note  2022   ------------------------------------------------------------------------------------------------  Subjective:    Overnight episode of increased work of breathing, tachypnea, accessory muscle use leading to increased respiratory support with initially BiPAP. CXR at that time demonstrated significant atelectasis. At time of evaluation weaned to HiFlow with 80% FiO2, 40 LPM with improvement in respiratory status. States that she is continuing to have pain, though slightly improved from yesterday. She reports feeling motivated to work on improving respiratory function and conditioning, would like to try spending time in chair, moving as tolerated. Of note, she is s/p blood transfusion yesterday () afternoon, which was given for Hgb of 6.8, corrected appropriately to 7.7 following transfusion.     ------------------------------------------------------------------------------------------------  Objective:  Temp:  [99.1  F (37.3  C)-101.1  F (38.4  C)] 99.3  F (37.4  C)  Pulse:  [72-98] 84  Resp:  [13-34] 34  BP: (101-133)/(59-82) 117/68  FiO2 (%):  [40 %-80 %] 70 %  SpO2:  [90 %-99 %] 99 %    I/O last 3 completed shifts:  In:  [I.V.:1670]  Out: 2720 [Urine:2700; Drains:20]      Gen: Awake, interactive, NAD  Resp: Symmetric chest rise. Breathing without difficulty on HiFlow at 80% FiO2, 40 LPM.   CV: regular rate, appears well perfused  Abd: soft, nondistended, tender across upper abdomen. Right NOEL with a few ml of sanguinous output.   Incision: c/d/I  Ext: palpable pulses, no edema     Labs:  0410  Blood Gases Arterial: pH 7.47 (H), pCO2 46 (H), pO2 70 (L), Bicarb 33 (H), Base Excess 8.4 (H), o/w WNL  0524  CBC: WBC 8.2, Hgb 7.5 (L), Plt 327   CMP: K 3.0 (L), Ca 8.0 (L), Glucose 125 (H), Alk Phos 127 (H), Prot Total: 5.8 (L), Alb 2.5 (L), o/w WNL  Phos: 3.3  M.7  Blood Culture (): No growth (final read)  Blood Culture (): No growth at 1 day, x2  Respiratory  Aerobes: 1+ Essence dubliniensis     Imaging:  XR Chest Portable 1 View:  1. Increased basilar predominant opacification of the left hemithorax,  likely secondary to subtotal atelectasis with superimposed enlarging  moderate pleural effusion causing mild rightward shift of the trachea.  2. Atelectasis of the right lung base and likely smaller right pleural  effusion.  3. Interval extubation and removal of enteric tube. Left PICC in the  right atrium.    Impression per reading radiologist.     =======================================================  Assessment/Plan:   42 year old female who is s/p laparoscopic sleeve gastrectomy and hiatal hernia repair on 10/25/22 with Dr. Aragon who returned to the ED with LUQ abdominal and shoulder pain, was found to have bilateral lower lobe pneumonia and new oxygen requirement. No evidence of PE. RUQ US obtained to evaluate elevated Tbili, found to have septated fluid collection along left hepatic lobe consistent with hematoma, likely the explanation for her acute blood loss anemia post operatively. This was again seen on CT scan 11/3. No evidence to suggest ongoing bleeding. UGI study obtained without evidence for leak. However, Leah continued to struggle with pain and decision was made to perform exploratory laparoscopy. She is now s/p diagnostic laparoscopy with evacuation of abdominal hematoma on 11/4. During the operation, a stitch was removed from the hiatal repair. She was admitted to the SICU post operatively for mechanical ventilatory support. Notably, she has been having intermittent fevers to 101-102 overnights without leukocytosis. Elevated inflammatory markers as well. She was extubated to BIPAP then HFCN 11/6, has been slow to wean and required re escalation to BiPAP then HiFlow overnight from 11/7-11/8, CXR at that time demonstrated atelectasis. Of note, BAL aerobic culture from 11/5 positive for 1+ Essence dubliniensis. Continues to complain of pain across upper  abdomen.     - appreciate excellent SICU cares  - multimodal pain and anxiety regimen  - continue to wean respiratory support as able  - Encourage IS, ambulation, time in upright/in chair  - Ok for bariatric clear diet  - Hgb stable, restart Lovenox  - Daily Hgb, transfuse for Hgb<7  - monitor and record NOEL drain output  - continue PPI BID  - appreciate thorough infectious workup by SICU and cares, continue Zosyn (anticipated course 11/5-11/12)   - Consider addition of Micafungin in setting of fevers and positive BAL culture    Dispo: SICU, okay to transfer to floor once respiratory status improves and clinically stable     Seen, examined, and discussed with chief resident, who will discuss with staff.    Daniel Correa, MS3  [employer]  8:38 AM 11/8/2022    I have reviewed and edited this note as needed. I personally saw and examined this patient and discussed the assessment and plan with staff, Dr. Aragon.    Danni Clifton MD  General Surgery PGY2

## 2022-11-08 NOTE — PROGRESS NOTES
D/I: Patient admitted to unit 4A Surgical/Neuro ICU s/p ex-lap and hematoma evacuation.   /70   Pulse 81   Temp 99.2  F (37.3  C) (Oral)   Resp 18   Ht 1.829 m (6')   Wt 138.8 kg (306 lb)   LMP 10/01/2022   SpO2 96%   BMI 41.50 kg/m    Neuro- Intact. Anxious at times.   CV- SR. BP stable. T-max 99.7.  Respiratory- High-flow NC, 40% 40L. Lungs coarse/dim.  GI- Small loose BM. Poor appetite, tolerating clears. Denies nausea/vomitting.   - Good UO. Voids in bathroom. 40mg Lasix given.  Skin- Abd lap sites. Right NOEL with minimal output.  Gtts- D10 @ 50/hr.  Pain- 0.5-0.6mg IV Dilaudid given Q2-3 hours.   ID- Zosyn Q6H.  Blood- Hgb 6.8 this morning- given 1 unit PRBC's-recheck 7.7.  See flowsheets for further interventions and assessments.   A: Stable  P: Continue to monitor pt closely. Notify MD of significant changes.

## 2022-11-08 NOTE — ADDENDUM NOTE
Addendum  created 11/08/22 1430 by Cedric Meadows MD    Child order released for a procedure order, Clinical Note Signed, Intraprocedure Blocks edited, Result filed, SmartForm saved

## 2022-11-09 ENCOUNTER — APPOINTMENT (OUTPATIENT)
Dept: OCCUPATIONAL THERAPY | Facility: CLINIC | Age: 42
DRG: 907 | End: 2022-11-09
Payer: COMMERCIAL

## 2022-11-09 ENCOUNTER — APPOINTMENT (OUTPATIENT)
Dept: GENERAL RADIOLOGY | Facility: CLINIC | Age: 42
DRG: 907 | End: 2022-11-09
Attending: PHYSICIAN ASSISTANT
Payer: COMMERCIAL

## 2022-11-09 LAB
ALBUMIN SERPL BCG-MCNC: 2.7 G/DL (ref 3.5–5.2)
ALP SERPL-CCNC: 146 U/L (ref 35–104)
ALT SERPL W P-5'-P-CCNC: 22 U/L (ref 10–35)
ANION GAP SERPL CALCULATED.3IONS-SCNC: 12 MMOL/L (ref 7–15)
AST SERPL W P-5'-P-CCNC: 38 U/L (ref 10–35)
BASOPHILS # BLD AUTO: 0 10E3/UL (ref 0–0.2)
BASOPHILS NFR BLD AUTO: 0 %
BILIRUB SERPL-MCNC: 0.4 MG/DL
BUN SERPL-MCNC: 8.9 MG/DL (ref 6–20)
CALCIUM SERPL-MCNC: 9 MG/DL (ref 8.6–10)
CHLORIDE SERPL-SCNC: 97 MMOL/L (ref 98–107)
CREAT SERPL-MCNC: 0.87 MG/DL (ref 0.51–0.95)
DEPRECATED HCO3 PLAS-SCNC: 28 MMOL/L (ref 22–29)
EOSINOPHIL # BLD AUTO: 0 10E3/UL (ref 0–0.7)
EOSINOPHIL NFR BLD AUTO: 0 %
ERYTHROCYTE [DISTWIDTH] IN BLOOD BY AUTOMATED COUNT: 16.7 % (ref 10–15)
GFR SERPL CREATININE-BSD FRML MDRD: 85 ML/MIN/1.73M2
GLUCOSE BLDC GLUCOMTR-MCNC: 115 MG/DL (ref 70–99)
GLUCOSE BLDC GLUCOMTR-MCNC: 128 MG/DL (ref 70–99)
GLUCOSE BLDC GLUCOMTR-MCNC: 99 MG/DL (ref 70–99)
GLUCOSE SERPL-MCNC: 128 MG/DL (ref 70–99)
HCT VFR BLD AUTO: 25.4 % (ref 35–47)
HGB BLD-MCNC: 7.5 G/DL (ref 11.7–15.7)
IMM GRANULOCYTES # BLD: 0.1 10E3/UL
IMM GRANULOCYTES NFR BLD: 1 %
LYMPHOCYTES # BLD AUTO: 0.8 10E3/UL (ref 0.8–5.3)
LYMPHOCYTES NFR BLD AUTO: 11 %
MAGNESIUM SERPL-MCNC: 2.4 MG/DL (ref 1.7–2.3)
MCH RBC QN AUTO: 25.9 PG (ref 26.5–33)
MCHC RBC AUTO-ENTMCNC: 29.5 G/DL (ref 31.5–36.5)
MCV RBC AUTO: 88 FL (ref 78–100)
MONOCYTES # BLD AUTO: 0.7 10E3/UL (ref 0–1.3)
MONOCYTES NFR BLD AUTO: 8 %
NEUTROPHILS # BLD AUTO: 6.4 10E3/UL (ref 1.6–8.3)
NEUTROPHILS NFR BLD AUTO: 80 %
NRBC # BLD AUTO: 0 10E3/UL
NRBC BLD AUTO-RTO: 0 /100
PHOSPHATE SERPL-MCNC: 3.7 MG/DL (ref 2.5–4.5)
PLATELET # BLD AUTO: 380 10E3/UL (ref 150–450)
POTASSIUM SERPL-SCNC: 4.2 MMOL/L (ref 3.4–5.3)
PROT SERPL-MCNC: 6.6 G/DL (ref 6.4–8.3)
RBC # BLD AUTO: 2.9 10E6/UL (ref 3.8–5.2)
SODIUM SERPL-SCNC: 137 MMOL/L (ref 136–145)
WBC # BLD AUTO: 8 10E3/UL (ref 4–11)

## 2022-11-09 PROCEDURE — 97530 THERAPEUTIC ACTIVITIES: CPT | Mod: GO

## 2022-11-09 PROCEDURE — 250N000013 HC RX MED GY IP 250 OP 250 PS 637

## 2022-11-09 PROCEDURE — 999N000157 HC STATISTIC RCP TIME EA 10 MIN

## 2022-11-09 PROCEDURE — 250N000011 HC RX IP 250 OP 636: Performed by: STUDENT IN AN ORGANIZED HEALTH CARE EDUCATION/TRAINING PROGRAM

## 2022-11-09 PROCEDURE — 83735 ASSAY OF MAGNESIUM: CPT | Performed by: SURGERY

## 2022-11-09 PROCEDURE — 94799 UNLISTED PULMONARY SVC/PX: CPT

## 2022-11-09 PROCEDURE — 250N000013 HC RX MED GY IP 250 OP 250 PS 637: Performed by: ANESTHESIOLOGY

## 2022-11-09 PROCEDURE — 94640 AIRWAY INHALATION TREATMENT: CPT

## 2022-11-09 PROCEDURE — 84100 ASSAY OF PHOSPHORUS: CPT | Performed by: SURGERY

## 2022-11-09 PROCEDURE — 200N000002 HC R&B ICU UMMC

## 2022-11-09 PROCEDURE — 250N000009 HC RX 250

## 2022-11-09 PROCEDURE — 258N000001 HC RX 258: Performed by: PHYSICIAN ASSISTANT

## 2022-11-09 PROCEDURE — 272N000202 HC AEROBIKA WITH MANOMETER

## 2022-11-09 PROCEDURE — 85025 COMPLETE CBC W/AUTO DIFF WBC: CPT | Performed by: STUDENT IN AN ORGANIZED HEALTH CARE EDUCATION/TRAINING PROGRAM

## 2022-11-09 PROCEDURE — 71045 X-RAY EXAM CHEST 1 VIEW: CPT

## 2022-11-09 PROCEDURE — 272N000735 HC KIT, CIRCUIT WITH NEB, VOLERA

## 2022-11-09 PROCEDURE — 250N000013 HC RX MED GY IP 250 OP 250 PS 637: Performed by: SURGERY

## 2022-11-09 PROCEDURE — 99233 SBSQ HOSP IP/OBS HIGH 50: CPT | Mod: 24 | Performed by: SURGERY

## 2022-11-09 PROCEDURE — 80053 COMPREHEN METABOLIC PANEL: CPT | Performed by: STUDENT IN AN ORGANIZED HEALTH CARE EDUCATION/TRAINING PROGRAM

## 2022-11-09 PROCEDURE — 250N000011 HC RX IP 250 OP 636

## 2022-11-09 PROCEDURE — 71045 X-RAY EXAM CHEST 1 VIEW: CPT | Mod: 26 | Performed by: RADIOLOGY

## 2022-11-09 PROCEDURE — 250N000011 HC RX IP 250 OP 636: Performed by: SURGERY

## 2022-11-09 PROCEDURE — 94640 AIRWAY INHALATION TREATMENT: CPT | Mod: 76

## 2022-11-09 PROCEDURE — 250N000013 HC RX MED GY IP 250 OP 250 PS 637: Performed by: PHYSICIAN ASSISTANT

## 2022-11-09 PROCEDURE — 94660 CPAP INITIATION&MGMT: CPT

## 2022-11-09 RX ORDER — FUROSEMIDE 10 MG/ML
20 INJECTION INTRAMUSCULAR; INTRAVENOUS
Status: COMPLETED | OUTPATIENT
Start: 2022-11-09 | End: 2022-11-09

## 2022-11-09 RX ORDER — OXYCODONE HYDROCHLORIDE 5 MG/1
5 TABLET ORAL EVERY 4 HOURS PRN
Status: DISCONTINUED | OUTPATIENT
Start: 2022-11-09 | End: 2022-11-14

## 2022-11-09 RX ADMIN — OXYCODONE HYDROCHLORIDE 5 MG: 5 TABLET ORAL at 12:48

## 2022-11-09 RX ADMIN — ACETAMINOPHEN 650 MG: 325 TABLET, FILM COATED ORAL at 18:06

## 2022-11-09 RX ADMIN — METHOCARBAMOL 750 MG: 750 TABLET ORAL at 15:32

## 2022-11-09 RX ADMIN — ALBUTEROL SULFATE 2.5 MG: 2.5 SOLUTION RESPIRATORY (INHALATION) at 06:31

## 2022-11-09 RX ADMIN — OMEPRAZOLE 40 MG: 20 CAPSULE, DELAYED RELEASE ORAL at 09:46

## 2022-11-09 RX ADMIN — ENOXAPARIN SODIUM 40 MG: 40 INJECTION SUBCUTANEOUS at 07:58

## 2022-11-09 RX ADMIN — Medication 5 ML: at 12:48

## 2022-11-09 RX ADMIN — OMEPRAZOLE 40 MG: 20 CAPSULE, DELAYED RELEASE ORAL at 15:33

## 2022-11-09 RX ADMIN — HYDROXYZINE HYDROCHLORIDE 25 MG: 25 TABLET, FILM COATED ORAL at 09:46

## 2022-11-09 RX ADMIN — HYDROXYZINE HYDROCHLORIDE 50 MG: 25 TABLET, FILM COATED ORAL at 22:07

## 2022-11-09 RX ADMIN — ACETAMINOPHEN 650 MG: 325 TABLET, FILM COATED ORAL at 12:46

## 2022-11-09 RX ADMIN — METHOCARBAMOL 750 MG: 750 TABLET ORAL at 20:20

## 2022-11-09 RX ADMIN — METHOCARBAMOL 750 MG: 750 TABLET ORAL at 12:46

## 2022-11-09 RX ADMIN — PIPERACILLIN SODIUM AND TAZOBACTAM SODIUM 4.5 G: 4; .5 INJECTION, POWDER, LYOPHILIZED, FOR SOLUTION INTRAVENOUS at 15:32

## 2022-11-09 RX ADMIN — ACETAMINOPHEN 650 MG: 325 TABLET, FILM COATED ORAL at 23:59

## 2022-11-09 RX ADMIN — HYDROMORPHONE HYDROCHLORIDE 0.3 MG: 1 INJECTION, SOLUTION INTRAMUSCULAR; INTRAVENOUS; SUBCUTANEOUS at 15:32

## 2022-11-09 RX ADMIN — ENOXAPARIN SODIUM 40 MG: 40 INJECTION SUBCUTANEOUS at 20:20

## 2022-11-09 RX ADMIN — ALBUTEROL SULFATE 2.5 MG: 2.5 SOLUTION RESPIRATORY (INHALATION) at 15:34

## 2022-11-09 RX ADMIN — ALBUTEROL SULFATE 2.5 MG: 2.5 SOLUTION RESPIRATORY (INHALATION) at 19:41

## 2022-11-09 RX ADMIN — BUPROPION HYDROCHLORIDE 75 MG: 75 TABLET, FILM COATED ORAL at 08:04

## 2022-11-09 RX ADMIN — OXYCODONE HYDROCHLORIDE 5 MG: 5 TABLET ORAL at 22:07

## 2022-11-09 RX ADMIN — PIPERACILLIN SODIUM AND TAZOBACTAM SODIUM 4.5 G: 4; .5 INJECTION, POWDER, LYOPHILIZED, FOR SOLUTION INTRAVENOUS at 09:49

## 2022-11-09 RX ADMIN — FUROSEMIDE 20 MG: 10 INJECTION, SOLUTION INTRAVENOUS at 09:47

## 2022-11-09 RX ADMIN — ACETAMINOPHEN 650 MG: 325 TABLET, FILM COATED ORAL at 06:10

## 2022-11-09 RX ADMIN — ALBUTEROL SULFATE 2.5 MG: 2.5 SOLUTION RESPIRATORY (INHALATION) at 09:18

## 2022-11-09 RX ADMIN — Medication 1000 MCG: at 08:03

## 2022-11-09 RX ADMIN — HYDROMORPHONE HYDROCHLORIDE 0.6 MG: 1 INJECTION, SOLUTION INTRAMUSCULAR; INTRAVENOUS; SUBCUTANEOUS at 07:09

## 2022-11-09 RX ADMIN — HYDROMORPHONE HYDROCHLORIDE 0.5 MG: 1 INJECTION, SOLUTION INTRAMUSCULAR; INTRAVENOUS; SUBCUTANEOUS at 23:59

## 2022-11-09 RX ADMIN — METHOCARBAMOL 750 MG: 750 TABLET ORAL at 08:03

## 2022-11-09 RX ADMIN — THIAMINE HCL TAB 100 MG 100 MG: 100 TAB at 08:04

## 2022-11-09 RX ADMIN — PIPERACILLIN SODIUM AND TAZOBACTAM SODIUM 4.5 G: 4; .5 INJECTION, POWDER, LYOPHILIZED, FOR SOLUTION INTRAVENOUS at 22:07

## 2022-11-09 RX ADMIN — OXYCODONE HYDROCHLORIDE 5 MG: 5 TABLET ORAL at 18:06

## 2022-11-09 RX ADMIN — DEXTROSE MONOHYDRATE: 100 INJECTION, SOLUTION INTRAVENOUS at 15:32

## 2022-11-09 RX ADMIN — PIPERACILLIN SODIUM AND TAZOBACTAM SODIUM 4.5 G: 4; .5 INJECTION, POWDER, LYOPHILIZED, FOR SOLUTION INTRAVENOUS at 04:20

## 2022-11-09 RX ADMIN — BUPROPION HYDROCHLORIDE 75 MG: 75 TABLET, FILM COATED ORAL at 20:20

## 2022-11-09 RX ADMIN — FUROSEMIDE 20 MG: 10 INJECTION, SOLUTION INTRAVENOUS at 15:32

## 2022-11-09 RX ADMIN — OXYCODONE HYDROCHLORIDE 10 MG: 5 SOLUTION ORAL at 08:06

## 2022-11-09 ASSESSMENT — ACTIVITIES OF DAILY LIVING (ADL)
ADLS_ACUITY_SCORE: 31
ADLS_ACUITY_SCORE: 28
ADLS_ACUITY_SCORE: 28
ADLS_ACUITY_SCORE: 31
ADLS_ACUITY_SCORE: 28
ADLS_ACUITY_SCORE: 31
ADLS_ACUITY_SCORE: 31
ADLS_ACUITY_SCORE: 28
ADLS_ACUITY_SCORE: 31
ADLS_ACUITY_SCORE: 31
ADLS_ACUITY_SCORE: 28
ADLS_ACUITY_SCORE: 31

## 2022-11-09 NOTE — PROGRESS NOTES
"CLINICAL NUTRITION SERVICES - REASSESSMENT NOTE     Nutrition Prescription    RECOMMENDATIONS FOR MDs/PROVIDERS TO ORDER:  Monitor po tolerance, low threshold for nutrition support (via radiology place post pyloric feeding tube) as patient has not tolerated any meaningful po intake in the last 15 days.    Malnutrition Status:    Patient does not meet two of the established criteria necessary for diagnosing malnutrition but is at risk for malnutrition    Recommendations already ordered by Registered Dietitian (RD):  Beneprotein powder TID with meals    Future/Additional Recommendations:  - Monitor po tolerance / adequacy   - If EN becomes plan of care: Promote @ goal of  50ml/hr  (1200ml/day)  will provide: 1200 kcals, 75 g PRO, 1006 ml free H20, 156 g CHO, and 0 g fiber daily. With addition of 1 pkt Prosource TF 20 2x/d  would provide 1360 (15 kcal/kg) and 115 gm protein (1.3 gm Pro/kg).      EVALUATION OF THE PROGRESS TOWARD GOALS   Diet: Bariatric Full liquid  Intake: Intake PTA post op was quite minimal (water only) followed by NPO x 3 days for surgery / while intubated.     NEW FINDINGS   11/4 - diagnostic laparotomy with evacuation of hematoma, extubated 11/6    Further history obtained from patient who reports no meaningful po intake since surgery > 2 weeks ago.  Tolerating almost a full applesauce over the course of the day yesterday (most since surgery).  Taking 30 ml doses of liquids: fairlife and plant based protein shake - 60 ml thus far today, in addition to 30ml soup.  \"milky\" liquids tend to create a bubble feeling - even after burping.    Weight: 119 kg (262 lbs), down (~ 3 % over 2 weeks) from pre op weight of 269 lbs, currently diuresing.  Continue to use adjusted weight of 91 kg    Labs: lytes WNL,  (H) with D10    Meds: reviewed and notable for B12 1000 mcg, thiamine 100 mg, lasix BID today, D10 @ 35 ml/hr    GI: BM X 3 yesterday, miralax held    Respiratory: Pneumonia with new oxygen " requirement    MALNUTRITION  % Intake: </= 50% for >/= 5 days (severe)  % Weight Loss: Weight loss does not meet criteria, although may be confounded by fluid  Subcutaneous Fat Loss: None observed visually, working with OT  Muscle Loss: None observed visually, working with OT  Fluid Accumulation/Edema: Does not meet criteria  Malnutrition Diagnosis: Patient does not meet two of the established criteria necessary for diagnosing malnutrition but is at risk for malnutrition    Previous Goals   Diet tolerance vs nutrition support within 2-3 days  Evaluation: Not met    Previous Nutrition Diagnosis  Altered GI function related to hiatal hernia repair and sleeve gastrectomy as evidenced by inability to tolerate post bariatric diet, and high quantitative ketones  Evaluation: No change    CURRENT NUTRITION DIAGNOSIS  Altered GI function related to hiatal hernia repair and sleeve gastrectomy as evidenced by inability to tolerate post bariatric diet    INTERVENTIONS  Implementation  Encouraged protein containing liquids    Goals  Pt to tolerate 90 gm protein daily via PO vs consider nutrition support    Monitoring/Evaluation  Progress toward goals will be monitored and evaluated per protocol.    Temitope Torrez RD, LD, CNSC  4A SICU RD pager: [phone]  Ascom: [phone]

## 2022-11-09 NOTE — PROGRESS NOTES
SURGICAL ICU PROGRESS NOTE  11/09/2022    Date of Service (when I saw the patient): 11/09/2022    ASSESSMENT: Leah Yanez is a 42 year old female with recent sleeve gastrectomy and hiatal hernia repair on 10/25/22 who who was admitted on 11/1/2022 for pneumonia and intra-abdominal hematoma. She is now s/p diagnostic laparoscopy with evacuation of abdominal hematoma on 11/4/22. Postoperatively she was unable to be weaned from mechanical ventilation and was admitted to the SICU for respiratory management       CHANGES and MAJOR THINGS TODAY:   - wean HFNC as tolerated  - nebs with RT  - lasix 20 mg BID  - will reevaluate in PM for transfer     PLAN:    Neurological:  # Acute pain   - Monitor neurological status. Delirium preventions and precautions.   - Pain: Schedule tylenol 650 mg q6hr, gabapentin 300 mg TID, robaxin 750 mg QID, lidocaine patches; PRN dilaudid, oxycodone, atarax  - RAPS team performed erector spinae block on 11/8, should last for 36 hours     # Depression  - PTA wellbutrin 75 mg BID       Pulmonary:   # Acute hypoxic respiratory failure   - extubated 11/6, on  HFNC 30. BiPAP started 11/8/22 due to increase WOB and splinting. Now on HFNC 40 LPM 30% FiO2.   - 11/5 bronch with BAL negative    - confer with RT re: Metanebs/percussive therapies for additional pulmon toileting, incentive spirometry      # Bilateral lower lobe pneumonia  # Bilateral pleural effusions on XR  - will evaluate effusions with US today  - see ID     Cardiovascular:    # Hypotension, now resolved   - off pressors  - Monitor hemodynamic status.       Gastroenterology/Nutrition:  # S/p sleeve gastrectomy 10/25  # S/p diagnostic laparoscopy and hematoma evacuation 11/4  # Protein calorie deficit malnutrition   - bariatric clears  - PTA B1, B12 addeded     - D10 @ 35cc/h for hypoglycemia, will discontinue once taking adequate po     Renal/Fluids/Electrolytes:   # hypokalemia   # hypomagnesia, relative   - replace K per  protocol. RN managed   - Will continue to monitor intake and output.    # hypervolemia  - lasix 20 mg x2 today    Endocrine:  # Hypoglycemia  - Will monitor BG, mIVF D10 @35     ID:  #  Pneumonia   - Amoxicillin (11/2-11/5), stopped due to inadequately treating pneumonia  - Zosyn (11/5 - ) x7 days    Cultures:   11/6: BC- NGTD   11/5: sputum: candida   11/1:  legionella and streptococcus negative      # Fever  Likely multifactorial, probable necrotic omentum vs pneumonia: Elevated CRP and LDH. No leukocytosis to suggest ongoing infection, and pneumonia being treated with zosyn.  - WBC normal   -  BLE US negative for DVT     Heme:     # Acute blood loss anemia   - Hemoglobin stable hgb 7.5. will transfuse if hgb <7.0 or signs/symptoms of hypoperfusion. Monitor and trend.      Musculoskeletal:   # Weakness and deconditioning of critical illness   - Physical and occupational therapy consults         General Cares/Prophylaxis:    DVT Prophylaxis: Enoxaparin (Lovenox) SQ  GI Prophylaxis: PPI  Restraints: Restraints for medical healing needed: YES     Lines/ tubes/ drains:  - PICC, PIVs, NOEL     Code Status: Full code     Disposition:  -  Surgical ICU.     Patient seen, findings and plan discussed with surgical ICU staff, Dr. Gomez.    Carol Metcalf MD  Surgery PGY-2   ====================================  INTERVAL HISTORY:  Course reviewed. No acute events overnight. Feeling anxious this morning. Pain is better but still present. Chest feels tight.     OBJECTIVE:   1. VITAL SIGNS:   Temp:  [98.1  F (36.7  C)-100.1  F (37.8  C)] 100.1  F (37.8  C)  Pulse:  [53-96] 53  Resp:  [14-34] 24  BP: (107-134)/(58-83) 112/66  FiO2 (%):  [50 %-70 %] 50 %  SpO2:  [92 %-100 %] 97 %  FiO2 (%): 50 %  Resp: 24      2. INTAKE/ OUTPUT:   I/O last 3 completed shifts:  In: 2282 [P.O.:600; I.V.:1682]  Out: 4971 [Urine:4150; Drains:21; Other:800]    3. PHYSICAL EXAMINATION:  General: sitting up in bed, awake, alert and interactive    Neuro: A&Ox3    Pulm/Resp: breathing comfortably, on HFNC   CV: RR. Appears well perfused  Abdomen: port sites dressed, NOEL drain in Right--serous brown fluid, abdomen soft and compressible. Prior incision with TTP  : no siegel, no urine to asses   Incisions/Skin: skin warm and dry, no rashes   MSK/Extremities: 1+ dependent generalized edema in posterior thighs/buttocks, trace in calves, pulses 2+  4. INVESTIGATIONS:   Arterial Blood Gases   Recent Labs   Lab 11/08/22  0410 11/04/22  1916 11/04/22  1741   PH 7.47* 7.35 7.40   PCO2 46* 43 41   PO2 70* 74* 92   HCO3 33* 24 25     Complete Blood Count   Recent Labs   Lab 11/08/22  0524 11/07/22  1406 11/07/22  0645 11/06/22  0616 11/05/22  0603   WBC 8.2  --  9.4 9.0 9.3   HGB 7.5* 7.7* 6.8* 7.9* 7.7*     --  313 296 242     Basic Metabolic Panel  Recent Labs   Lab 11/09/22  0429 11/09/22  0418 11/08/22  2351 11/08/22  2025 11/08/22  2024 11/08/22  1158 11/08/22  1157 11/08/22  0524 11/07/22  1202 11/07/22  0645 11/06/22  1954 11/06/22  0616     --   --   --   --   --   --  139  --  142  --  143   POTASSIUM 4.2  --   --   --  4.4 3.4  --  3.0*  --  3.5  --  3.5   CHLORIDE 97*  --   --   --   --   --   --  100  --  107  --  108*   CO2 28  --   --   --   --   --   --  29  --  23  --  23   BUN 8.9  --   --   --   --   --   --  7.0  --  7.2  --  5.1*   CR 0.87  --   --   --   --   --   --  0.92  --  0.92  --  0.97*   * 115* 116* 134*  --   --    < > 125*   < > 113*   < > 120*    < > = values in this interval not displayed.     Liver Function Tests  Recent Labs   Lab 11/09/22  0429 11/08/22  0524 11/07/22  0645 11/06/22  0616   AST 38* 31 26 36*   ALT 22 19 16 19   ALKPHOS 146* 127* 128* 145*   BILITOTAL 0.4 0.4 0.4 0.5   ALBUMIN 2.7* 2.5* 2.1* 2.3*     Pancreatic Enzymes  Recent Labs   Lab 11/06/22  0616   LIPASE 65*     Coagulation Profile  No lab results found in last 7 days.      5. RADIOLOGY:   Recent Results (from the past 24 hour(s))   POC US Guidance Needle  Placement    Narrative    Bilateral erector spinae block       =========================================

## 2022-11-09 NOTE — PLAN OF CARE
Neuro: AO x4. Pupils PERRLA 3mm. Moves all extremities; +5 BUE, +3 BLE  Pain: Pain well managed with ATC Tylenol and PRN oxycodone overnight.   CV: -110s/60-70s. Tmax 100.1. SR to bradycardic, HR in 50s-80s; MD aware.   Resp: Lung sounds coarse over dim. Pt on HFNC 40L, 30% until 2100, then on BiPAP 12/6 50% until 0400 when placed back on HFNC. Uses IS appropriately. Dry, productive cough.   GI: No BM this shift. Full liquid diet.   : Good UOP via bedside commode. Adult diaper + menstrual pad in place.  Lines:  L PICC triple lumen  R PIV   R NOEL with minimal output  Drips/Gtts:  - D10 @ 35mL/hr  Skin: Generalized bruising with more bruises noted on bilateral forearms. Redness blanchable on sacrum/coccyx. 5 abd punctures sites covered with Primapore dressings, CDI.    Activity: SBA, up to bedside commode.      Plan: Pain management. IV antibiotics. Alert MD with any changes in pt condition. Possible pain block placement today.     Goal Outcome Evaluation:      Plan of Care Reviewed With: patient    Overall Patient Progress: improvingOverall Patient Progress: improving

## 2022-11-09 NOTE — PROGRESS NOTES
General Surgery Progress Note  2022   ------------------------------------------------------------------------------------------------  Subjective:  States pain has been improved since block performed by Anesthesia. Spent about 6 hours in chair yesterday, has been using IS. Feeling motivated to work on improving conditioning and respiratory status. Adds that HiFlow has felt helpful with her breathing.   ------------------------------------------------------------------------------------------------  Objective:  Temp:  [98.1  F (36.7  C)-100.1  F (37.8  C)] 100.1  F (37.8  C)  Pulse:  [53-96] 53  Resp:  [14-28] 24  BP: (107-134)/(58-83) 112/66  FiO2 (%):  [50 %-55 %] 55 %  SpO2:  [92 %-100 %] 98 %    I/O last 3 completed shifts:  In: 215 [P.O.:600; I.V.:1552]  Out: 5411 [Urine:4600; Drains:11; Other:800]      Gen: Awake, interactive, NAD  Resp: breathing comfortably on HiFlow with FiO2 55%, 40 LPM.   CV: regular rate, appears well perfused  Abd: soft, nondistended, minimal tenderness over NOEL drain site, less than yesterday. Small amount of serosanguinous drainage in NOEL line, no drainage in bulb (emptied just prior to exam).   Incision: c/d/I  Ext: palpable pulses, no edema     Labs:  CBC: WBC 8.0, RBC 7.5 (L), Plt 380  CMP: Cl 97 (L), Glucose 128 (H), Alk Phos 146 (H), AST 38 (H), Alb 2.7 (L), o/w WNL  M.4 (H)     Imaging:  XR Chest Port 1 View:  1. Improving left greater than right effusion with associated  atelectasis.  2. Left PICC line with the tip in the right atrium.    Impression per reading radiologist.     =======================================================  Assessment/Plan:   42 year old female who is s/p laparoscopic sleeve gastrectomy and hiatal hernia repair on 10/25/22 with Dr. Aragon who returned to the ED with LUQ abdominal and shoulder pain, was found to have bilateral lower lobe pneumonia and new oxygen requirement. No evidence of PE. RUQ US obtained to evaluate elevated Tbili, found  to have septated fluid collection along left hepatic lobe consistent with hematoma, likely the explanation for her acute blood loss anemia post operatively. This was again seen on CT scan 11/3. No evidence to suggest ongoing bleeding. UGI study obtained without evidence for leak. However, Leah continued to struggle with pain and decision was made to perform exploratory laparoscopy. She is now s/p diagnostic laparoscopy with evacuation of abdominal hematoma on 11/4. During the operation, a stitch was removed from the hiatal repair. She was admitted to the SICU post operatively for mechanical ventilatory support. Initially with intermittent fevers, now appear resolved. Elevated inflammatory markers but never had leukocytosis. She was extubated to BIPAP then HFCN 11/6, has been slow to wean with needing increased respiratory support overnights at times - weans during daytime. CXRs showing improving bilateral effusions and atelectasis. Of note, BAL aerobic culture from 11/5 positive for 1+ Essence dubliniensis. Continues to complain of pain across upper abdomen, though good improvement with block by anesthesia.     - appreciate excellent SICU cares  - multimodal pain and anxiety regimen  - continue to wean respiratory support as able  - Encourage IS, ambulation, time in upright/in chair  - Ok for bariatric full liquid diet  - Hgb stable, continue Lovenox  - Daily Hgb, transfuse for Hgb<7  - monitor and record NOEL drain output  - continue PPI BID  - continue Zosyn(anticipated abx course 11/5-11/12)   - Can consider narrowing Abx given improving fevers and respiratory status  - Ok to transfer out of SICU to step down unit    Seen, examined, and discussed with chief resident, who will discuss with staff.    Miguel A Correa, MS3  [employer]  8:24 AM 11/9/2022    I have reviewed and edited this note as needed. I personally saw and examined this patient and discussed the assessment and plan with  staff, Dr. Aragon.    Danni Clifton MD  General Surgery PGY2

## 2022-11-09 NOTE — PLAN OF CARE
Goal Outcome Evaluation:      Plan of Care Reviewed With: patient, spouse    Overall Patient Progress: no changeOverall Patient Progress: no change    Outcome Evaluation: Pt tolerating only minimal po, TF recs in 11/9 note

## 2022-11-10 ENCOUNTER — APPOINTMENT (OUTPATIENT)
Dept: OCCUPATIONAL THERAPY | Facility: CLINIC | Age: 42
DRG: 907 | End: 2022-11-10
Payer: COMMERCIAL

## 2022-11-10 LAB
ALBUMIN SERPL BCG-MCNC: 2.6 G/DL (ref 3.5–5.2)
ALP SERPL-CCNC: 114 U/L (ref 35–104)
ALT SERPL W P-5'-P-CCNC: 24 U/L (ref 10–35)
ANION GAP SERPL CALCULATED.3IONS-SCNC: 11 MMOL/L (ref 7–15)
AST SERPL W P-5'-P-CCNC: 47 U/L (ref 10–35)
BASOPHILS # BLD AUTO: 0 10E3/UL (ref 0–0.2)
BASOPHILS NFR BLD AUTO: 0 %
BILIRUB SERPL-MCNC: 0.4 MG/DL
BUN SERPL-MCNC: 9.7 MG/DL (ref 6–20)
CALCIUM SERPL-MCNC: 8.7 MG/DL (ref 8.6–10)
CHLORIDE SERPL-SCNC: 97 MMOL/L (ref 98–107)
CREAT SERPL-MCNC: 0.96 MG/DL (ref 0.51–0.95)
DEPRECATED HCO3 PLAS-SCNC: 29 MMOL/L (ref 22–29)
EOSINOPHIL # BLD AUTO: 0.1 10E3/UL (ref 0–0.7)
EOSINOPHIL NFR BLD AUTO: 1 %
ERYTHROCYTE [DISTWIDTH] IN BLOOD BY AUTOMATED COUNT: 16.8 % (ref 10–15)
GFR SERPL CREATININE-BSD FRML MDRD: 75 ML/MIN/1.73M2
GLUCOSE BLDC GLUCOMTR-MCNC: 107 MG/DL (ref 70–99)
GLUCOSE BLDC GLUCOMTR-MCNC: 90 MG/DL (ref 70–99)
GLUCOSE BLDC GLUCOMTR-MCNC: 93 MG/DL (ref 70–99)
GLUCOSE BLDC GLUCOMTR-MCNC: 98 MG/DL (ref 70–99)
GLUCOSE SERPL-MCNC: 98 MG/DL (ref 70–99)
HCT VFR BLD AUTO: 24.8 % (ref 35–47)
HGB BLD-MCNC: 7.5 G/DL (ref 11.7–15.7)
IMM GRANULOCYTES # BLD: 0.1 10E3/UL
IMM GRANULOCYTES NFR BLD: 1 %
LYMPHOCYTES # BLD AUTO: 1.2 10E3/UL (ref 0.8–5.3)
LYMPHOCYTES NFR BLD AUTO: 19 %
MAGNESIUM SERPL-MCNC: 2.4 MG/DL (ref 1.7–2.3)
MCH RBC QN AUTO: 26.1 PG (ref 26.5–33)
MCHC RBC AUTO-ENTMCNC: 30.2 G/DL (ref 31.5–36.5)
MCV RBC AUTO: 86 FL (ref 78–100)
MONOCYTES # BLD AUTO: 0.6 10E3/UL (ref 0–1.3)
MONOCYTES NFR BLD AUTO: 10 %
NEUTROPHILS # BLD AUTO: 4.4 10E3/UL (ref 1.6–8.3)
NEUTROPHILS NFR BLD AUTO: 69 %
NRBC # BLD AUTO: 0 10E3/UL
NRBC BLD AUTO-RTO: 0 /100
PHOSPHATE SERPL-MCNC: 3.5 MG/DL (ref 2.5–4.5)
PLATELET # BLD AUTO: 438 10E3/UL (ref 150–450)
POTASSIUM SERPL-SCNC: 3.4 MMOL/L (ref 3.4–5.3)
POTASSIUM SERPL-SCNC: 3.6 MMOL/L (ref 3.4–5.3)
PROT SERPL-MCNC: 6.1 G/DL (ref 6.4–8.3)
RBC # BLD AUTO: 2.87 10E6/UL (ref 3.8–5.2)
SODIUM SERPL-SCNC: 137 MMOL/L (ref 136–145)
WBC # BLD AUTO: 6.3 10E3/UL (ref 4–11)

## 2022-11-10 PROCEDURE — 94799 UNLISTED PULMONARY SVC/PX: CPT

## 2022-11-10 PROCEDURE — 250N000013 HC RX MED GY IP 250 OP 250 PS 637: Performed by: PHYSICIAN ASSISTANT

## 2022-11-10 PROCEDURE — 94640 AIRWAY INHALATION TREATMENT: CPT | Mod: 76

## 2022-11-10 PROCEDURE — 999N000157 HC STATISTIC RCP TIME EA 10 MIN

## 2022-11-10 PROCEDURE — 250N000009 HC RX 250

## 2022-11-10 PROCEDURE — 83735 ASSAY OF MAGNESIUM: CPT | Performed by: SURGERY

## 2022-11-10 PROCEDURE — 250N000011 HC RX IP 250 OP 636: Performed by: STUDENT IN AN ORGANIZED HEALTH CARE EDUCATION/TRAINING PROGRAM

## 2022-11-10 PROCEDURE — 250N000013 HC RX MED GY IP 250 OP 250 PS 637

## 2022-11-10 PROCEDURE — 250N000013 HC RX MED GY IP 250 OP 250 PS 637: Performed by: SURGERY

## 2022-11-10 PROCEDURE — 200N000002 HC R&B ICU UMMC

## 2022-11-10 PROCEDURE — 85025 COMPLETE CBC W/AUTO DIFF WBC: CPT | Performed by: STUDENT IN AN ORGANIZED HEALTH CARE EDUCATION/TRAINING PROGRAM

## 2022-11-10 PROCEDURE — 250N000011 HC RX IP 250 OP 636

## 2022-11-10 PROCEDURE — 84132 ASSAY OF SERUM POTASSIUM: CPT | Performed by: SURGERY

## 2022-11-10 PROCEDURE — 97535 SELF CARE MNGMENT TRAINING: CPT | Mod: GO | Performed by: OCCUPATIONAL THERAPIST

## 2022-11-10 PROCEDURE — 80053 COMPREHEN METABOLIC PANEL: CPT | Performed by: STUDENT IN AN ORGANIZED HEALTH CARE EDUCATION/TRAINING PROGRAM

## 2022-11-10 PROCEDURE — 84100 ASSAY OF PHOSPHORUS: CPT | Performed by: SURGERY

## 2022-11-10 PROCEDURE — 250N000011 HC RX IP 250 OP 636: Performed by: SURGERY

## 2022-11-10 RX ORDER — POTASSIUM CHLORIDE 1.5 G/1.58G
40 POWDER, FOR SOLUTION ORAL ONCE
Status: COMPLETED | OUTPATIENT
Start: 2022-11-10 | End: 2022-11-10

## 2022-11-10 RX ORDER — FERROUS SULFATE 325(65) MG
325 TABLET ORAL DAILY
Status: DISCONTINUED | OUTPATIENT
Start: 2022-11-10 | End: 2022-11-11

## 2022-11-10 RX ADMIN — METHOCARBAMOL 750 MG: 750 TABLET ORAL at 12:13

## 2022-11-10 RX ADMIN — METHOCARBAMOL 750 MG: 750 TABLET ORAL at 16:33

## 2022-11-10 RX ADMIN — PROCHLORPERAZINE EDISYLATE 5 MG: 5 INJECTION INTRAMUSCULAR; INTRAVENOUS at 11:03

## 2022-11-10 RX ADMIN — POTASSIUM CHLORIDE 40 MEQ: 1.5 POWDER, FOR SOLUTION ORAL at 08:29

## 2022-11-10 RX ADMIN — Medication 1 TABLET: at 08:33

## 2022-11-10 RX ADMIN — ONDANSETRON 4 MG: 2 INJECTION INTRAMUSCULAR; INTRAVENOUS at 09:46

## 2022-11-10 RX ADMIN — ENOXAPARIN SODIUM 40 MG: 40 INJECTION SUBCUTANEOUS at 20:53

## 2022-11-10 RX ADMIN — Medication 1000 MCG: at 08:30

## 2022-11-10 RX ADMIN — ACETAMINOPHEN 650 MG: 325 TABLET, FILM COATED ORAL at 17:50

## 2022-11-10 RX ADMIN — HYDROMORPHONE HYDROCHLORIDE 0.5 MG: 1 INJECTION, SOLUTION INTRAMUSCULAR; INTRAVENOUS; SUBCUTANEOUS at 11:10

## 2022-11-10 RX ADMIN — PIPERACILLIN SODIUM AND TAZOBACTAM SODIUM 4.5 G: 4; .5 INJECTION, POWDER, LYOPHILIZED, FOR SOLUTION INTRAVENOUS at 16:33

## 2022-11-10 RX ADMIN — BUPROPION HYDROCHLORIDE 75 MG: 75 TABLET, FILM COATED ORAL at 08:30

## 2022-11-10 RX ADMIN — ACETAMINOPHEN 650 MG: 325 TABLET, FILM COATED ORAL at 05:59

## 2022-11-10 RX ADMIN — BUPROPION HYDROCHLORIDE 75 MG: 75 TABLET, FILM COATED ORAL at 20:53

## 2022-11-10 RX ADMIN — OMEPRAZOLE 40 MG: 20 CAPSULE, DELAYED RELEASE ORAL at 08:31

## 2022-11-10 RX ADMIN — OXYCODONE HYDROCHLORIDE 5 MG: 5 TABLET ORAL at 17:50

## 2022-11-10 RX ADMIN — HYDROMORPHONE HYDROCHLORIDE 0.3 MG: 1 INJECTION, SOLUTION INTRAMUSCULAR; INTRAVENOUS; SUBCUTANEOUS at 06:08

## 2022-11-10 RX ADMIN — OXYCODONE HYDROCHLORIDE 5 MG: 5 TABLET ORAL at 08:33

## 2022-11-10 RX ADMIN — ALBUTEROL SULFATE 2.5 MG: 2.5 SOLUTION RESPIRATORY (INHALATION) at 16:19

## 2022-11-10 RX ADMIN — OMEPRAZOLE 40 MG: 20 CAPSULE, DELAYED RELEASE ORAL at 17:51

## 2022-11-10 RX ADMIN — ACETAMINOPHEN 650 MG: 325 TABLET, FILM COATED ORAL at 12:13

## 2022-11-10 RX ADMIN — ALBUTEROL SULFATE 2.5 MG: 2.5 SOLUTION RESPIRATORY (INHALATION) at 08:08

## 2022-11-10 RX ADMIN — METHOCARBAMOL 750 MG: 750 TABLET ORAL at 08:29

## 2022-11-10 RX ADMIN — ALBUTEROL SULFATE 2.5 MG: 2.5 SOLUTION RESPIRATORY (INHALATION) at 20:10

## 2022-11-10 RX ADMIN — HYDROXYZINE HYDROCHLORIDE 50 MG: 25 TABLET, FILM COATED ORAL at 08:57

## 2022-11-10 RX ADMIN — PIPERACILLIN SODIUM AND TAZOBACTAM SODIUM 4.5 G: 4; .5 INJECTION, POWDER, LYOPHILIZED, FOR SOLUTION INTRAVENOUS at 22:20

## 2022-11-10 RX ADMIN — OXYCODONE HYDROCHLORIDE 5 MG: 5 TABLET ORAL at 22:20

## 2022-11-10 RX ADMIN — PIPERACILLIN SODIUM AND TAZOBACTAM SODIUM 4.5 G: 4; .5 INJECTION, POWDER, LYOPHILIZED, FOR SOLUTION INTRAVENOUS at 09:46

## 2022-11-10 RX ADMIN — ENOXAPARIN SODIUM 40 MG: 40 INJECTION SUBCUTANEOUS at 08:30

## 2022-11-10 RX ADMIN — METHOCARBAMOL 750 MG: 750 TABLET ORAL at 20:53

## 2022-11-10 RX ADMIN — FERROUS SULFATE TAB 325 MG (65 MG ELEMENTAL FE) 325 MG: 325 (65 FE) TAB at 09:46

## 2022-11-10 RX ADMIN — OXYCODONE HYDROCHLORIDE 5 MG: 5 TABLET ORAL at 03:59

## 2022-11-10 RX ADMIN — PIPERACILLIN SODIUM AND TAZOBACTAM SODIUM 4.5 G: 4; .5 INJECTION, POWDER, LYOPHILIZED, FOR SOLUTION INTRAVENOUS at 03:50

## 2022-11-10 RX ADMIN — THIAMINE HCL TAB 100 MG 100 MG: 100 TAB at 08:30

## 2022-11-10 ASSESSMENT — ACTIVITIES OF DAILY LIVING (ADL)
ADLS_ACUITY_SCORE: 31
ADLS_ACUITY_SCORE: 31
ADLS_ACUITY_SCORE: 25
ADLS_ACUITY_SCORE: 31
ADLS_ACUITY_SCORE: 25
ADLS_ACUITY_SCORE: 25
ADLS_ACUITY_SCORE: 28
ADLS_ACUITY_SCORE: 31
ADLS_ACUITY_SCORE: 25
ADLS_ACUITY_SCORE: 28

## 2022-11-10 NOTE — PLAN OF CARE
Major Shift Events:  No major events. Tearful this AM related to anxiety about transferring out of ICU and pain not being properly managed. RN provided therapeutic communication/reassurance and hydroxyzine/pain meds.  HFNC and oxymask throughout day, tolerating well. VSS.   20mg IV Lasix x2. Good UOP. Small BM. Continued menses.   Very minimal NOEL output.  Plan: Transfer to IMC when bed is available. Monitor respiratory status and pain. Continue current POC.  For vital signs and complete assessments, please see documentation flowsheets.     Goal Outcome Evaluation:    Plan of Care Reviewed With: patient, spouse    Overall Patient Progress: improvingOverall Patient Progress: improving    Outcome Evaluation: IMC orders placed.

## 2022-11-10 NOTE — PLAN OF CARE
Neuro: AO x4. Pupils PERRLA 3mm. Moves all extremities; +5 BUE, +3 BLE  Pain: Pain well managed with ATC Tylenol, Robaxin and PRN oxycodone, dilaudid overnight.   CV: BP 110s/50-70s. Tmax 99.9. SR with HR in 60-70s.  Resp: Lung sounds clearover dim. Pt on OxyMask 9L, SpO2 92% or greater. Denies SOB. Uses IS appropriately. Dry, productive cough.   GI: No BM this shift. Full liquid diet.   : Good UOP. Adult diaper + menstrual pad in place.  Lines:  L PICC triple lumen  R PIV   R NOEL with minimal output  Drips/Gtts:  - D10 @ 35mL/hr  Skin: Generalized bruising. Redness blanchable on sacrum/coccyx. 5 abd punctures sites covered with Primapore dressings, CDI.    Activity: SBA, up to bathroom.      Plan: Pain management. IV antibiotics. Alert MD with any changes in pt condition.

## 2022-11-10 NOTE — PROGRESS NOTES
CLINICAL NUTRITION SERVICES - BRIEF NOTE   (See RD note on 11/9 for full assessment)     Reason for RD note: Follow up re: progress with PO    New Findings/Chart Review:  Oral Diet/Intake: Bariatric full liquids with beneprotein TID    Pt reports she was able to tolerate 5 x 30 ml servings of protein drink last night - this is the most she has tolerated since initial surgery.  Now attempting to drink oral K+ replacement and take multiple PO meds.  Pt is planning to mix beneprotein with chicken broth later today.  Motivated and well informed.     Interventions:  Ongoing discussion with patient and  re: protein and hydration goals.    Recommendations:  Hopeful for further progress vs consider post pyloric feeding tube.  Monitor lytes, Mg, Phos as pt at risk for refeeding syndrome.    Nutrition will continue to follow per protocol.    Temitope Torrez RD, LD, [facility]  4A SICU RD pager: [phone]  Ascom: [phone]

## 2022-11-10 NOTE — PROGRESS NOTES
General Surgery Progress Note  11/10/2022   ------------------------------------------------------------------------------------------------  Subjective:  Doing well this morning. Intermittent anxiety over pain control overnight. Pain controlled this morning. Weaned to oxymask. Tolerating full liquids.  ------------------------------------------------------------------------------------------------  Objective:  /59 (BP Location: Right arm)   Pulse 80   Temp 99.9  F (37.7  C) (Oral)   Resp 25   Ht 1.829 m (6')   Wt 119.5 kg (263 lb 7.2 oz)   LMP 10/01/2022   SpO2 91%   BMI 35.73 kg/m       I/O:  UOP 3950  NOEL 10 // 8   BM 2x      Gen: Awake, interactive, NAD  Resp: breathing comfortably on oxymask 9LPM.   CV: regular rate, appears well perfused  Abd: soft, nondistended, minimal tenderness. Small amount of serosanguinous drainage in NOEL line, no drainage in bulb to thumbprint suction.   Incision: c/d/I  Ext: palpable pulses, no edema     Labs:  All labs reviewed.  Creat 0.96  Alk phos 114  WBC 6.3  Hgb 7.5    Imaging:  No new imaging.     =======================================================  Assessment/Plan:   42 year old female who is s/p laparoscopic sleeve gastrectomy and hiatal hernia repair on 10/25/22 with Dr. Aragon who returned to the ED with LUQ abdominal and shoulder pain, was found to have bilateral lower lobe pneumonia and new oxygen requirement. No evidence of PE. RUQ US obtained to evaluate elevated Tbili, found to have septated fluid collection along left hepatic lobe consistent with hematoma, likely the explanation for her acute blood loss anemia post operatively. This was again seen on CT scan 11/3. No evidence to suggest ongoing bleeding. UGI study obtained without evidence for leak. However, Leah continued to struggle with pain and decision was made to perform exploratory laparoscopy. She is now s/p diagnostic laparoscopy with evacuation of abdominal hematoma on 11/4. During the  operation, a stitch was removed from the hiatal repair. She was admitted to the SICU post operatively for mechanical ventilatory support. Initially with intermittent fevers, now appear resolved. Elevated inflammatory markers but never had leukocytosis. She was extubated to BIPAP then HFCN 11/6, now on oxymask. Of note, BAL aerobic culture from 11/5 positive for 1+ Essence dubliniensis.      - appreciate excellent SICU cares  - multimodal pain and anxiety regimen  - continue to wean respiratory support as able  - Encourage IS, ambulation, time in upright/in chair  - Ok for bariatric full liquid diet  - will start Vitron C and iron supplementation daily  - Hgb stable, continue Lovenox  - Daily Hgb, transfuse for Hgb<7  - monitor and record NOEL drain output  - continue PPI BID  - continue Zosyn (anticipated abx course 11/5-11/12)  - Ok to transfer out of SICU to step down unit    Patient and plan discussed with staff.    Danni Clifton MD  General Surgery Resident

## 2022-11-11 ENCOUNTER — APPOINTMENT (OUTPATIENT)
Dept: OCCUPATIONAL THERAPY | Facility: CLINIC | Age: 42
DRG: 907 | End: 2022-11-11
Payer: COMMERCIAL

## 2022-11-11 LAB
ALBUMIN SERPL BCG-MCNC: 2.7 G/DL (ref 3.5–5.2)
ALP SERPL-CCNC: 124 U/L (ref 35–104)
ALT SERPL W P-5'-P-CCNC: 19 U/L (ref 10–35)
ANION GAP SERPL CALCULATED.3IONS-SCNC: 12 MMOL/L (ref 7–15)
AST SERPL W P-5'-P-CCNC: 38 U/L (ref 10–35)
BACTERIA BLD CULT: NO GROWTH
BACTERIA BLD CULT: NO GROWTH
BASOPHILS # BLD AUTO: 0 10E3/UL (ref 0–0.2)
BASOPHILS NFR BLD AUTO: 0 %
BILIRUB SERPL-MCNC: 0.3 MG/DL
BUN SERPL-MCNC: 6.7 MG/DL (ref 6–20)
CALCIUM SERPL-MCNC: 8.4 MG/DL (ref 8.6–10)
CHLORIDE SERPL-SCNC: 97 MMOL/L (ref 98–107)
CREAT SERPL-MCNC: 1.02 MG/DL (ref 0.51–0.95)
DEPRECATED HCO3 PLAS-SCNC: 27 MMOL/L (ref 22–29)
EOSINOPHIL # BLD AUTO: 0.1 10E3/UL (ref 0–0.7)
EOSINOPHIL NFR BLD AUTO: 1 %
ERYTHROCYTE [DISTWIDTH] IN BLOOD BY AUTOMATED COUNT: 16.8 % (ref 10–15)
GFR SERPL CREATININE-BSD FRML MDRD: 70 ML/MIN/1.73M2
GLUCOSE BLDC GLUCOMTR-MCNC: 104 MG/DL (ref 70–99)
GLUCOSE BLDC GLUCOMTR-MCNC: 57 MG/DL (ref 70–99)
GLUCOSE BLDC GLUCOMTR-MCNC: 90 MG/DL (ref 70–99)
GLUCOSE BLDC GLUCOMTR-MCNC: 99 MG/DL (ref 70–99)
GLUCOSE SERPL-MCNC: 257 MG/DL (ref 70–99)
HCT VFR BLD AUTO: 25 % (ref 35–47)
HGB BLD-MCNC: 7.4 G/DL (ref 11.7–15.7)
IMM GRANULOCYTES # BLD: 0.1 10E3/UL
IMM GRANULOCYTES NFR BLD: 1 %
LYMPHOCYTES # BLD AUTO: 1.2 10E3/UL (ref 0.8–5.3)
LYMPHOCYTES NFR BLD AUTO: 14 %
MAGNESIUM SERPL-MCNC: 2.2 MG/DL (ref 1.7–2.3)
MCH RBC QN AUTO: 25.7 PG (ref 26.5–33)
MCHC RBC AUTO-ENTMCNC: 29.6 G/DL (ref 31.5–36.5)
MCV RBC AUTO: 87 FL (ref 78–100)
MONOCYTES # BLD AUTO: 0.8 10E3/UL (ref 0–1.3)
MONOCYTES NFR BLD AUTO: 9 %
NEUTROPHILS # BLD AUTO: 6.1 10E3/UL (ref 1.6–8.3)
NEUTROPHILS NFR BLD AUTO: 75 %
NRBC # BLD AUTO: 0 10E3/UL
NRBC BLD AUTO-RTO: 0 /100
PHOSPHATE SERPL-MCNC: 3.3 MG/DL (ref 2.5–4.5)
PLATELET # BLD AUTO: 485 10E3/UL (ref 150–450)
POTASSIUM SERPL-SCNC: 3.5 MMOL/L (ref 3.4–5.3)
PROT SERPL-MCNC: 6.2 G/DL (ref 6.4–8.3)
RBC # BLD AUTO: 2.88 10E6/UL (ref 3.8–5.2)
SODIUM SERPL-SCNC: 136 MMOL/L (ref 136–145)
WBC # BLD AUTO: 8.3 10E3/UL (ref 4–11)

## 2022-11-11 PROCEDURE — 94640 AIRWAY INHALATION TREATMENT: CPT

## 2022-11-11 PROCEDURE — 250N000013 HC RX MED GY IP 250 OP 250 PS 637

## 2022-11-11 PROCEDURE — 94799 UNLISTED PULMONARY SVC/PX: CPT

## 2022-11-11 PROCEDURE — 999N000157 HC STATISTIC RCP TIME EA 10 MIN

## 2022-11-11 PROCEDURE — 250N000011 HC RX IP 250 OP 636: Performed by: STUDENT IN AN ORGANIZED HEALTH CARE EDUCATION/TRAINING PROGRAM

## 2022-11-11 PROCEDURE — 97530 THERAPEUTIC ACTIVITIES: CPT | Mod: GO

## 2022-11-11 PROCEDURE — 200N000002 HC R&B ICU UMMC

## 2022-11-11 PROCEDURE — 94640 AIRWAY INHALATION TREATMENT: CPT | Mod: 76

## 2022-11-11 PROCEDURE — 250N000013 HC RX MED GY IP 250 OP 250 PS 637: Performed by: PHYSICIAN ASSISTANT

## 2022-11-11 PROCEDURE — 84100 ASSAY OF PHOSPHORUS: CPT | Performed by: SURGERY

## 2022-11-11 PROCEDURE — 250N000011 HC RX IP 250 OP 636: Performed by: SURGERY

## 2022-11-11 PROCEDURE — 83735 ASSAY OF MAGNESIUM: CPT | Performed by: SURGERY

## 2022-11-11 PROCEDURE — 999N000147 HC STATISTIC PT IP EVAL DEFER: Performed by: PHYSICAL THERAPIST

## 2022-11-11 PROCEDURE — 250N000009 HC RX 250

## 2022-11-11 PROCEDURE — 250N000013 HC RX MED GY IP 250 OP 250 PS 637: Performed by: SURGERY

## 2022-11-11 PROCEDURE — 85025 COMPLETE CBC W/AUTO DIFF WBC: CPT | Performed by: STUDENT IN AN ORGANIZED HEALTH CARE EDUCATION/TRAINING PROGRAM

## 2022-11-11 PROCEDURE — 80053 COMPREHEN METABOLIC PANEL: CPT | Performed by: STUDENT IN AN ORGANIZED HEALTH CARE EDUCATION/TRAINING PROGRAM

## 2022-11-11 RX ADMIN — ALBUTEROL SULFATE 2.5 MG: 2.5 SOLUTION RESPIRATORY (INHALATION) at 20:57

## 2022-11-11 RX ADMIN — ENOXAPARIN SODIUM 40 MG: 40 INJECTION SUBCUTANEOUS at 07:55

## 2022-11-11 RX ADMIN — HYDROMORPHONE HYDROCHLORIDE 0.5 MG: 1 INJECTION, SOLUTION INTRAMUSCULAR; INTRAVENOUS; SUBCUTANEOUS at 02:09

## 2022-11-11 RX ADMIN — BUPROPION HYDROCHLORIDE 75 MG: 75 TABLET, FILM COATED ORAL at 21:15

## 2022-11-11 RX ADMIN — ACETAMINOPHEN 650 MG: 325 TABLET, FILM COATED ORAL at 17:33

## 2022-11-11 RX ADMIN — PIPERACILLIN SODIUM AND TAZOBACTAM SODIUM 4.5 G: 4; .5 INJECTION, POWDER, LYOPHILIZED, FOR SOLUTION INTRAVENOUS at 15:53

## 2022-11-11 RX ADMIN — OXYCODONE HYDROCHLORIDE 5 MG: 5 TABLET ORAL at 21:14

## 2022-11-11 RX ADMIN — ACETAMINOPHEN 650 MG: 325 TABLET, FILM COATED ORAL at 12:18

## 2022-11-11 RX ADMIN — PIPERACILLIN SODIUM AND TAZOBACTAM SODIUM 4.5 G: 4; .5 INJECTION, POWDER, LYOPHILIZED, FOR SOLUTION INTRAVENOUS at 10:36

## 2022-11-11 RX ADMIN — METHOCARBAMOL 750 MG: 750 TABLET ORAL at 15:56

## 2022-11-11 RX ADMIN — PROCHLORPERAZINE EDISYLATE 5 MG: 5 INJECTION INTRAMUSCULAR; INTRAVENOUS at 15:50

## 2022-11-11 RX ADMIN — ALBUTEROL SULFATE 2.5 MG: 2.5 SOLUTION RESPIRATORY (INHALATION) at 12:57

## 2022-11-11 RX ADMIN — PIPERACILLIN SODIUM AND TAZOBACTAM SODIUM 4.5 G: 4; .5 INJECTION, POWDER, LYOPHILIZED, FOR SOLUTION INTRAVENOUS at 21:15

## 2022-11-11 RX ADMIN — Medication 1000 MCG: at 07:56

## 2022-11-11 RX ADMIN — OMEPRAZOLE 40 MG: 20 CAPSULE, DELAYED RELEASE ORAL at 15:56

## 2022-11-11 RX ADMIN — ENOXAPARIN SODIUM 40 MG: 40 INJECTION SUBCUTANEOUS at 21:15

## 2022-11-11 RX ADMIN — OXYCODONE HYDROCHLORIDE 5 MG: 5 TABLET ORAL at 04:16

## 2022-11-11 RX ADMIN — METHOCARBAMOL 750 MG: 750 TABLET ORAL at 12:18

## 2022-11-11 RX ADMIN — METHOCARBAMOL 750 MG: 750 TABLET ORAL at 07:56

## 2022-11-11 RX ADMIN — OXYCODONE HYDROCHLORIDE 5 MG: 5 TABLET ORAL at 17:33

## 2022-11-11 RX ADMIN — BUPROPION HYDROCHLORIDE 75 MG: 75 TABLET, FILM COATED ORAL at 07:56

## 2022-11-11 RX ADMIN — PIPERACILLIN SODIUM AND TAZOBACTAM SODIUM 4.5 G: 4; .5 INJECTION, POWDER, LYOPHILIZED, FOR SOLUTION INTRAVENOUS at 04:05

## 2022-11-11 RX ADMIN — ACETAMINOPHEN 650 MG: 325 TABLET, FILM COATED ORAL at 06:04

## 2022-11-11 RX ADMIN — ACETAMINOPHEN 650 MG: 325 TABLET, FILM COATED ORAL at 00:19

## 2022-11-11 RX ADMIN — METHOCARBAMOL 750 MG: 750 TABLET ORAL at 21:15

## 2022-11-11 RX ADMIN — HYDROXYZINE HYDROCHLORIDE 50 MG: 25 TABLET, FILM COATED ORAL at 07:55

## 2022-11-11 RX ADMIN — THIAMINE HCL TAB 100 MG 100 MG: 100 TAB at 07:56

## 2022-11-11 RX ADMIN — OXYCODONE HYDROCHLORIDE 5 MG: 5 TABLET ORAL at 08:30

## 2022-11-11 RX ADMIN — OXYCODONE HYDROCHLORIDE 5 MG: 5 TABLET ORAL at 13:33

## 2022-11-11 RX ADMIN — ACETAMINOPHEN 650 MG: 325 TABLET, FILM COATED ORAL at 21:15

## 2022-11-11 RX ADMIN — OMEPRAZOLE 40 MG: 20 CAPSULE, DELAYED RELEASE ORAL at 07:55

## 2022-11-11 ASSESSMENT — ACTIVITIES OF DAILY LIVING (ADL)
ADLS_ACUITY_SCORE: 27
ADLS_ACUITY_SCORE: 27
ADLS_ACUITY_SCORE: 30
ADLS_ACUITY_SCORE: 27
ADLS_ACUITY_SCORE: 31
ADLS_ACUITY_SCORE: 30

## 2022-11-11 NOTE — PLAN OF CARE
ICU End of Shift Summary. See flowsheets for vital signs and detailed assessment.    Changes this shift:   No acute changes this shift, patient able to stay on Oxymask with 15LPM with activity. Pain managed w/ oxy, robaxin and dilaudid. Poor appetite, D10 at 35 mL/hr    Plan:  Continue with POC, transfer upon bed availability      Goal Outcome Evaluation: Ongoing, progressing

## 2022-11-11 NOTE — PLAN OF CARE
Major Shift Events: A&O x4. Pt still using 15L via Oxymask w/ activity and at night, HFNC 45L 60% at times. D10 @ 35mL/hr. Poor appetite but slowly improving. Ambulated hallways x2. Scheduled and PRN pain meds given - see eMAR. Continues to have pain in abdomen, radiating towards L side. NOEL drain and staples removed.  Plan: transfer.  For vital signs and complete assessments, please see documentation flowsheets.

## 2022-11-11 NOTE — PLAN OF CARE
PT evaluation cx and deferred.  OT completed evaluation, patient ambulating in halls with OT RN staff with SBA with no additional acute PT intervention indicated to address gait/balance.  OT to continue to follow and address ambulation from endurance standpoint. Will complete PT order.

## 2022-11-11 NOTE — PROGRESS NOTES
General Surgery Progress Note  2022   ------------------------------------------------------------------------------------------------  Subjective:  Some nausea after attempting PO protein shakes yesterday, feels she did not progress as much as she would have liked yesterday. Continues to feel motivated. Per nursing notes, did require increased respiratory support with High Flow yesterday at 60% FiO2, on Oxymask this AM.   ------------------------------------------------------------------------------------------------  Objective:  Temp:  [98.2  F (36.8  C)-100.9  F (38.3  C)] 99.3  F (37.4  C)  Pulse:  [71-89] 73  Resp:  [16-35] 24  BP: (117-130)/(63-74) 117/68  FiO2 (%):  [60 %] 60 %  SpO2:  [90 %-98 %] 94 %    I/O last 3 completed shifts:  In: 1185 [I.V.:1185]  Out: 2493 [Urine:2475; Drains:18]      Gen: Awake, interactive, NAD  Resp: breathing comfortably on 15 L via Oxymask.   CV: regular rate, appears well perfused  Abd: soft, nondistended, mild tenderness across upper abdomen.   Incision: c/d/I. RUQ NOEL drain with small amount of serosanguinous output.   Ext: palpable pulses, no edema     Labs:  CBC: WBC 8.3, Hgb 7.4 (L), Plt 485 (H)   CMP: Cl 97 (L), Cr 1.02 (H), Ca 8.4 (L), Glucose 257 (H), Alk Phos 124 (H), AST 38 (H), Prot 6.2 (L), Alb 2.7 (L), o/w WNL  M.2  Phos: 3.3    =======================================================  Assessment/Plan:   42 year old female who is s/p laparoscopic sleeve gastrectomy and hiatal hernia repair on 10/25/22 with Dr. Aragon who returned to the ED with LUQ abdominal and shoulder pain, was found to have bilateral lower lobe pneumonia and new oxygen requirement. No evidence of PE. RUQ US obtained to evaluate elevated Tbili, found to have septated fluid collection along left hepatic lobe consistent with hematoma, likely the explanation for her acute blood loss anemia post operatively. This was again seen on CT scan 11/3. No evidence to suggest ongoing bleeding. UGI  study obtained without evidence for leak. However, Leah continued to struggle with pain and decision was made to perform exploratory laparoscopy. She is now s/p diagnostic laparoscopy with evacuation of abdominal hematoma on 11/4. During the operation, a stitch was removed from the hiatal repair. She was admitted to the SICU post operatively for mechanical ventilatory support. Initially with intermittent fevers, now appear resolved. Elevated inflammatory markers but never had leukocytosis. She was extubated to BIPAP then HFCN 11/6, now on oxymask. Of note, BAL aerobic culture from 11/5 positive for 1+ Essence dubliniensis.     - multimodal pain and anxiety regimen  - continue to wean respiratory support as able  - Encourage IS, ambulation, time in upright/in chair  - Ok for bariatric full liquid diet, encourage protein supplementation   - Vitron C and iron supplementation daily  - EGS will remove NOEL drain, remaining staples today  - Hgb stable, continue Lovenox  - Daily Hgb, transfuse for Hgb<7  - monitor and record NOEL drain output  - continue PPI BID  - continue Zosyn (anticipated abx course 11/5-11/12)  - floor status, boarding in SICU      Seen, examined, and discussed with chief resident, who will discuss with staff.    Daniel Correa, MS3  [employer]  7:49 AM 11/11/2022     I have reviewed and edited this note as needed. I personally saw and examined this patient and discussed the assessment and plan with staff.    Danni Clifton MD  General Surgery PGY2

## 2022-11-11 NOTE — PLAN OF CARE
Major Shift Events:  Alert and oriented x4. Abdominal pain managed with ATC pain medication. BPs within goal. T-max 100.9 axillary. NSR. O2 requirements from 5L NC to 60% high flow. LS clear/diminished. Continues to have significant nausea and acid reflux with oral intake. Voiding well with watery stools. RLQ NOEL drain with minimal serosanguinous output.     Plan: Remove NOEL drain tomorrow. Transfer to  once bed is available.    For vital signs and complete assessments, please see documentation flowsheets.

## 2022-11-11 NOTE — PROGRESS NOTES
CLINICAL NUTRITION SERVICES - BRIEF NOTE   (See RD note on 11/9 for full assessment)     Reason for RD note: Follow up on po intake / tolerance    New Findings/Chart Review:  Oral Diet/Intake: Bariatric full liquid with beneprotein TID    Pt out of room at time of visit, working with therapy  Per surgery, some nausea with po yesterday    Interventions:  Ordered Calorie counts 11/11 - 11/13     Future/Additional Recommendations:  Hopeful for improvement in po vs consider post pyloric feeding tube    Nutrition will continue to follow per protocol.    Temitope Torrez, RD, LD, [facility]  4A SICU RD pager: [phone]  Ascom: [phone]

## 2022-11-12 LAB
ALBUMIN SERPL BCG-MCNC: 2.7 G/DL (ref 3.5–5.2)
ALP SERPL-CCNC: 121 U/L (ref 35–104)
ALT SERPL W P-5'-P-CCNC: 18 U/L (ref 10–35)
ANION GAP SERPL CALCULATED.3IONS-SCNC: 10 MMOL/L (ref 7–15)
ANION GAP SERPL CALCULATED.3IONS-SCNC: 12 MMOL/L (ref 7–15)
AST SERPL W P-5'-P-CCNC: 30 U/L (ref 10–35)
BASOPHILS # BLD AUTO: 0 10E3/UL (ref 0–0.2)
BASOPHILS NFR BLD AUTO: 0 %
BILIRUB SERPL-MCNC: 0.4 MG/DL
BUN SERPL-MCNC: 7.7 MG/DL (ref 6–20)
BUN SERPL-MCNC: 8.6 MG/DL (ref 6–20)
CALCIUM SERPL-MCNC: 9 MG/DL (ref 8.6–10)
CALCIUM SERPL-MCNC: 9 MG/DL (ref 8.6–10)
CHLORIDE SERPL-SCNC: 100 MMOL/L (ref 98–107)
CHLORIDE SERPL-SCNC: 99 MMOL/L (ref 98–107)
CREAT SERPL-MCNC: 0.86 MG/DL (ref 0.51–0.95)
CREAT SERPL-MCNC: 0.95 MG/DL (ref 0.51–0.95)
DEPRECATED HCO3 PLAS-SCNC: 27 MMOL/L (ref 22–29)
DEPRECATED HCO3 PLAS-SCNC: 29 MMOL/L (ref 22–29)
EOSINOPHIL # BLD AUTO: 0.2 10E3/UL (ref 0–0.7)
EOSINOPHIL NFR BLD AUTO: 2 %
ERYTHROCYTE [DISTWIDTH] IN BLOOD BY AUTOMATED COUNT: 16.5 % (ref 10–15)
GFR SERPL CREATININE-BSD FRML MDRD: 76 ML/MIN/1.73M2
GFR SERPL CREATININE-BSD FRML MDRD: 86 ML/MIN/1.73M2
GLUCOSE BLDC GLUCOMTR-MCNC: 100 MG/DL (ref 70–99)
GLUCOSE BLDC GLUCOMTR-MCNC: 85 MG/DL (ref 70–99)
GLUCOSE BLDC GLUCOMTR-MCNC: 98 MG/DL (ref 70–99)
GLUCOSE BLDC GLUCOMTR-MCNC: 98 MG/DL (ref 70–99)
GLUCOSE SERPL-MCNC: 104 MG/DL (ref 70–99)
GLUCOSE SERPL-MCNC: 94 MG/DL (ref 70–99)
HCT VFR BLD AUTO: 24.2 % (ref 35–47)
HGB BLD-MCNC: 7.2 G/DL (ref 11.7–15.7)
IMM GRANULOCYTES # BLD: 0.1 10E3/UL
IMM GRANULOCYTES NFR BLD: 1 %
LYMPHOCYTES # BLD AUTO: 1.2 10E3/UL (ref 0.8–5.3)
LYMPHOCYTES NFR BLD AUTO: 13 %
MAGNESIUM SERPL-MCNC: 2.1 MG/DL (ref 1.7–2.3)
MAGNESIUM SERPL-MCNC: 2.2 MG/DL (ref 1.7–2.3)
MCH RBC QN AUTO: 25.8 PG (ref 26.5–33)
MCHC RBC AUTO-ENTMCNC: 29.8 G/DL (ref 31.5–36.5)
MCV RBC AUTO: 87 FL (ref 78–100)
MONOCYTES # BLD AUTO: 0.8 10E3/UL (ref 0–1.3)
MONOCYTES NFR BLD AUTO: 9 %
NEUTROPHILS # BLD AUTO: 6.7 10E3/UL (ref 1.6–8.3)
NEUTROPHILS NFR BLD AUTO: 75 %
NRBC # BLD AUTO: 0 10E3/UL
NRBC BLD AUTO-RTO: 0 /100
PHOSPHATE SERPL-MCNC: 3.1 MG/DL (ref 2.5–4.5)
PHOSPHATE SERPL-MCNC: 3.3 MG/DL (ref 2.5–4.5)
PLATELET # BLD AUTO: 490 10E3/UL (ref 150–450)
POTASSIUM SERPL-SCNC: 3.6 MMOL/L (ref 3.4–5.3)
POTASSIUM SERPL-SCNC: 3.8 MMOL/L (ref 3.4–5.3)
PROT SERPL-MCNC: 6.2 G/DL (ref 6.4–8.3)
RBC # BLD AUTO: 2.79 10E6/UL (ref 3.8–5.2)
SODIUM SERPL-SCNC: 138 MMOL/L (ref 136–145)
SODIUM SERPL-SCNC: 139 MMOL/L (ref 136–145)
WBC # BLD AUTO: 9 10E3/UL (ref 4–11)

## 2022-11-12 PROCEDURE — 258N000001 HC RX 258: Performed by: PHYSICIAN ASSISTANT

## 2022-11-12 PROCEDURE — 94640 AIRWAY INHALATION TREATMENT: CPT | Mod: 76

## 2022-11-12 PROCEDURE — 85025 COMPLETE CBC W/AUTO DIFF WBC: CPT | Performed by: STUDENT IN AN ORGANIZED HEALTH CARE EDUCATION/TRAINING PROGRAM

## 2022-11-12 PROCEDURE — 84100 ASSAY OF PHOSPHORUS: CPT | Performed by: SURGERY

## 2022-11-12 PROCEDURE — 120N000002 HC R&B MED SURG/OB UMMC

## 2022-11-12 PROCEDURE — 250N000013 HC RX MED GY IP 250 OP 250 PS 637: Performed by: SURGERY

## 2022-11-12 PROCEDURE — 250N000013 HC RX MED GY IP 250 OP 250 PS 637: Performed by: PHYSICIAN ASSISTANT

## 2022-11-12 PROCEDURE — 250N000011 HC RX IP 250 OP 636: Performed by: SURGERY

## 2022-11-12 PROCEDURE — 250N000011 HC RX IP 250 OP 636

## 2022-11-12 PROCEDURE — 94640 AIRWAY INHALATION TREATMENT: CPT

## 2022-11-12 PROCEDURE — 250N000011 HC RX IP 250 OP 636: Performed by: STUDENT IN AN ORGANIZED HEALTH CARE EDUCATION/TRAINING PROGRAM

## 2022-11-12 PROCEDURE — 999N000157 HC STATISTIC RCP TIME EA 10 MIN

## 2022-11-12 PROCEDURE — 83735 ASSAY OF MAGNESIUM: CPT | Performed by: SURGERY

## 2022-11-12 PROCEDURE — 80053 COMPREHEN METABOLIC PANEL: CPT | Performed by: STUDENT IN AN ORGANIZED HEALTH CARE EDUCATION/TRAINING PROGRAM

## 2022-11-12 PROCEDURE — 250N000013 HC RX MED GY IP 250 OP 250 PS 637

## 2022-11-12 PROCEDURE — 250N000009 HC RX 250

## 2022-11-12 RX ORDER — FUROSEMIDE 10 MG/ML
20 INJECTION INTRAMUSCULAR; INTRAVENOUS 2 TIMES DAILY
Status: COMPLETED | OUTPATIENT
Start: 2022-11-12 | End: 2022-11-12

## 2022-11-12 RX ADMIN — PROCHLORPERAZINE EDISYLATE 5 MG: 5 INJECTION INTRAMUSCULAR; INTRAVENOUS at 10:36

## 2022-11-12 RX ADMIN — ACETAMINOPHEN 650 MG: 325 TABLET, FILM COATED ORAL at 22:16

## 2022-11-12 RX ADMIN — OMEPRAZOLE 40 MG: 20 CAPSULE, DELAYED RELEASE ORAL at 16:35

## 2022-11-12 RX ADMIN — DEXTROSE MONOHYDRATE: 100 INJECTION, SOLUTION INTRAVENOUS at 00:35

## 2022-11-12 RX ADMIN — ACETAMINOPHEN 650 MG: 325 TABLET, FILM COATED ORAL at 10:10

## 2022-11-12 RX ADMIN — ENOXAPARIN SODIUM 40 MG: 40 INJECTION SUBCUTANEOUS at 19:29

## 2022-11-12 RX ADMIN — METHOCARBAMOL 750 MG: 750 TABLET ORAL at 19:29

## 2022-11-12 RX ADMIN — ENOXAPARIN SODIUM 40 MG: 40 INJECTION SUBCUTANEOUS at 07:44

## 2022-11-12 RX ADMIN — ACETAMINOPHEN 650 MG: 325 TABLET, FILM COATED ORAL at 16:35

## 2022-11-12 RX ADMIN — PIPERACILLIN SODIUM AND TAZOBACTAM SODIUM 4.5 G: 4; .5 INJECTION, POWDER, LYOPHILIZED, FOR SOLUTION INTRAVENOUS at 04:36

## 2022-11-12 RX ADMIN — Medication 1000 MCG: at 07:44

## 2022-11-12 RX ADMIN — OMEPRAZOLE 40 MG: 20 CAPSULE, DELAYED RELEASE ORAL at 07:45

## 2022-11-12 RX ADMIN — HYDROMORPHONE HYDROCHLORIDE 0.5 MG: 1 INJECTION, SOLUTION INTRAMUSCULAR; INTRAVENOUS; SUBCUTANEOUS at 00:35

## 2022-11-12 RX ADMIN — OXYCODONE HYDROCHLORIDE 5 MG: 5 TABLET ORAL at 07:44

## 2022-11-12 RX ADMIN — THIAMINE HCL TAB 100 MG 100 MG: 100 TAB at 07:44

## 2022-11-12 RX ADMIN — OXYCODONE HYDROCHLORIDE 5 MG: 5 TABLET ORAL at 17:53

## 2022-11-12 RX ADMIN — FUROSEMIDE 20 MG: 10 INJECTION, SOLUTION INTRAVENOUS at 07:44

## 2022-11-12 RX ADMIN — FUROSEMIDE 20 MG: 10 INJECTION, SOLUTION INTRAVENOUS at 19:29

## 2022-11-12 RX ADMIN — ALBUTEROL SULFATE 2.5 MG: 2.5 SOLUTION RESPIRATORY (INHALATION) at 17:02

## 2022-11-12 RX ADMIN — ALBUTEROL SULFATE 2.5 MG: 2.5 SOLUTION RESPIRATORY (INHALATION) at 10:27

## 2022-11-12 RX ADMIN — METHOCARBAMOL 750 MG: 750 TABLET ORAL at 16:35

## 2022-11-12 RX ADMIN — Medication 5 ML: at 04:36

## 2022-11-12 RX ADMIN — BUPROPION HYDROCHLORIDE 75 MG: 75 TABLET, FILM COATED ORAL at 07:44

## 2022-11-12 RX ADMIN — METHOCARBAMOL 750 MG: 750 TABLET ORAL at 07:44

## 2022-11-12 RX ADMIN — ACETAMINOPHEN 650 MG: 325 TABLET, FILM COATED ORAL at 04:36

## 2022-11-12 RX ADMIN — METHOCARBAMOL 750 MG: 750 TABLET ORAL at 12:21

## 2022-11-12 RX ADMIN — BUPROPION HYDROCHLORIDE 75 MG: 75 TABLET, FILM COATED ORAL at 19:29

## 2022-11-12 ASSESSMENT — ACTIVITIES OF DAILY LIVING (ADL)
ADLS_ACUITY_SCORE: 27

## 2022-11-12 NOTE — PROGRESS NOTES
General Surgery Progress Note  2022   ------------------------------------------------------------------------------------------------  Subjective:  Nausea with protein shakes, mostly when drinks too fast. Pain still present, although managed with medications. Sitting in katharine frequently. Motivated.    ------------------------------------------------------------------------------------------------  Objective:  Temp:  [98.8  F (37.1  C)-99.9  F (37.7  C)] 98.8  F (37.1  C)  Pulse:  [70-83] 77  Resp:  [18-28] 28  BP: (110-138)/(69-74) 129/73  FiO2 (%):  [60 %] 60 %  SpO2:  [91 %-99 %] 95 %    I/O last 3 completed shifts:  In: 1440 [P.O.:120; I.V.:1320]  Out:  [Urine:; Drains:5]      Gen: Awake, interactive, NAD  Resp: breathing comfortably on 15 L via Oxymask.   CV: appears well perfused  Abd: soft, nondistended, mild tenderness across upper abdomen.   Incision: c/d/I. RUQ NOEL drain site covered with 4x4, no surrounding erythema, no excessive drainage.   Ext: palpable pulses, no edema     Labs:  CBC: WBC 8.3, Hgb 7.4 (L), Plt 485 (H)   CMP: Cl 97 (L), Cr 1.02 (H), Ca 8.4 (L), Glucose 257 (H), Alk Phos 124 (H), AST 38 (H), Prot 6.2 (L), Alb 2.7 (L), o/w WNL  M.2  Phos: 3.3    =======================================================  Assessment/Plan:   42 year old female who is s/p laparoscopic sleeve gastrectomy and hiatal hernia repair on 10/25/22 with Dr. Aragon who returned to the ED with LUQ abdominal and shoulder pain, was found to have bilateral lower lobe pneumonia and new oxygen requirement. No evidence of PE. RUQ US obtained to evaluate elevated Tbili, found to have septated fluid collection along left hepatic lobe consistent with hematoma, likely the explanation for her acute blood loss anemia post operatively. This was again seen on CT scan 11/3. No evidence to suggest ongoing bleeding. UGI study obtained without evidence for leak. However, Leah continued to struggle with pain and decision  was made to perform exploratory laparoscopy. She is now s/p diagnostic laparoscopy with evacuation of abdominal hematoma on 11/4. During the operation, a stitch was removed from the hiatal repair. She was admitted to the SICU post operatively for mechanical ventilatory support. Initially with intermittent fevers, now appear resolved. Elevated inflammatory markers but never had leukocytosis. She was extubated to BIPAP then HFCN 11/6, now on oxymask. Of note, BAL aerobic culture from 11/5 positive for 1+ Essence dubliniensis.     - q4h FSBS checks, discontinue d10 fluid.   - Oxygen requirement likely due to fluid overload, diuresis today with 20mg lasix twice today 11/12  - multimodal pain and anxiety regimen  - continue to wean respiratory support as able  - Encourage IS, ambulation, time in upright/in chair  - Ok for bariatric full liquid diet, encourage protein supplementation   - Vitron C and iron supplementation daily  - NOEL drain/ staples removed.   - Hgb stable, continue Lovenox  - Daily Hgb, transfuse for Hgb<7  - monitor and record NOEL drain output  - continue PPI BID  - Zosyn completed ( abx course 11/5-11/12)  - floor status, boarding in SICU      Seen, examined, and discussed with chief resident, who discussed with staff.  Marc Oconnell MD PGY1  Integrated Plastic and Reconstructive Surgery  EGS/MIS

## 2022-11-12 NOTE — PLAN OF CARE
Major Shift Events:      Pain noted intermittently requiring PRN oxy and dilaudid. Able to ambulate in hallways, but continues to require O2 at 15L oxymask. No acute events over night. Awaits transfer to floor when able to further wean O2.    For vital signs and complete assessments, please see documentation flowsheets.     Problem: Pain Acute  Goal: Optimal Pain Control and Function  Intervention: Develop Pain Management Plan  Recent Flowsheet Documentation  Taken 11/12/2022 0400 by Neo Aguilar, RN  Pain Management Interventions: medication (see MAR)  Taken 11/12/2022 0000 by Neo Aguilar, RN  Pain Management Interventions: medication (see MAR)  Taken 11/11/2022 2000 by Neo Aguilar, RN  Pain Management Interventions: medication (see MAR)

## 2022-11-12 NOTE — PROGRESS NOTES
Pt has Volara ordered.   Last treatment RT had to stop d/t pt vomiting.   RT checked CXR prior to the initiation of Volara and after the start of Volara with no improvement.   Will try Aerobika along with PRN Alb next therapy.     MCKENZIE Lowe, RT

## 2022-11-12 NOTE — PROGRESS NOTES
Calorie Count  Intake recorded for: 11/11  Total Kcals: 0 Total Protein: 0g  Kcals from Hospital Food: 0   Protein: 0g  Kcals from Outside Food (average):0 Protein: 0g  # Meals Ordered from Kitchen: 0  # Meals Recorded: 0  # Supplements Recorded: 0

## 2022-11-13 ENCOUNTER — APPOINTMENT (OUTPATIENT)
Dept: GENERAL RADIOLOGY | Facility: CLINIC | Age: 42
DRG: 907 | End: 2022-11-13
Payer: COMMERCIAL

## 2022-11-13 ENCOUNTER — APPOINTMENT (OUTPATIENT)
Dept: OCCUPATIONAL THERAPY | Facility: CLINIC | Age: 42
DRG: 907 | End: 2022-11-13
Payer: COMMERCIAL

## 2022-11-13 LAB
ALBUMIN SERPL BCG-MCNC: 2.9 G/DL (ref 3.5–5.2)
ALP SERPL-CCNC: 129 U/L (ref 35–104)
ALT SERPL W P-5'-P-CCNC: 17 U/L (ref 10–35)
ANION GAP SERPL CALCULATED.3IONS-SCNC: 14 MMOL/L (ref 7–15)
AST SERPL W P-5'-P-CCNC: 25 U/L (ref 10–35)
BILIRUB SERPL-MCNC: 0.3 MG/DL
BUN SERPL-MCNC: 10.2 MG/DL (ref 6–20)
CALCIUM SERPL-MCNC: 9.2 MG/DL (ref 8.6–10)
CHLORIDE SERPL-SCNC: 99 MMOL/L (ref 98–107)
CREAT SERPL-MCNC: 0.84 MG/DL (ref 0.51–0.95)
DEPRECATED HCO3 PLAS-SCNC: 25 MMOL/L (ref 22–29)
ERYTHROCYTE [DISTWIDTH] IN BLOOD BY AUTOMATED COUNT: 16.3 % (ref 10–15)
GFR SERPL CREATININE-BSD FRML MDRD: 88 ML/MIN/1.73M2
GLUCOSE BLDC GLUCOMTR-MCNC: 64 MG/DL (ref 70–99)
GLUCOSE BLDC GLUCOMTR-MCNC: 81 MG/DL (ref 70–99)
GLUCOSE BLDC GLUCOMTR-MCNC: 88 MG/DL (ref 70–99)
GLUCOSE BLDC GLUCOMTR-MCNC: 92 MG/DL (ref 70–99)
GLUCOSE SERPL-MCNC: 87 MG/DL (ref 70–99)
HCT VFR BLD AUTO: 25.3 % (ref 35–47)
HGB BLD-MCNC: 7.4 G/DL (ref 11.7–15.7)
MAGNESIUM SERPL-MCNC: 2.1 MG/DL (ref 1.7–2.3)
MCH RBC QN AUTO: 25.7 PG (ref 26.5–33)
MCHC RBC AUTO-ENTMCNC: 29.2 G/DL (ref 31.5–36.5)
MCV RBC AUTO: 88 FL (ref 78–100)
PHOSPHATE SERPL-MCNC: 3.3 MG/DL (ref 2.5–4.5)
PLATELET # BLD AUTO: 546 10E3/UL (ref 150–450)
POTASSIUM SERPL-SCNC: 3.6 MMOL/L (ref 3.4–5.3)
PROT SERPL-MCNC: 6.8 G/DL (ref 6.4–8.3)
RBC # BLD AUTO: 2.88 10E6/UL (ref 3.8–5.2)
SODIUM SERPL-SCNC: 138 MMOL/L (ref 136–145)
WBC # BLD AUTO: 9.2 10E3/UL (ref 4–11)

## 2022-11-13 PROCEDURE — 97535 SELF CARE MNGMENT TRAINING: CPT | Mod: GO

## 2022-11-13 PROCEDURE — 84100 ASSAY OF PHOSPHORUS: CPT | Performed by: SURGERY

## 2022-11-13 PROCEDURE — 80053 COMPREHEN METABOLIC PANEL: CPT | Performed by: SURGERY

## 2022-11-13 PROCEDURE — 250N000013 HC RX MED GY IP 250 OP 250 PS 637: Performed by: SURGERY

## 2022-11-13 PROCEDURE — 250N000013 HC RX MED GY IP 250 OP 250 PS 637

## 2022-11-13 PROCEDURE — 120N000002 HC R&B MED SURG/OB UMMC

## 2022-11-13 PROCEDURE — 250N000011 HC RX IP 250 OP 636

## 2022-11-13 PROCEDURE — 250N000011 HC RX IP 250 OP 636: Performed by: STUDENT IN AN ORGANIZED HEALTH CARE EDUCATION/TRAINING PROGRAM

## 2022-11-13 PROCEDURE — 85027 COMPLETE CBC AUTOMATED: CPT | Performed by: SURGERY

## 2022-11-13 PROCEDURE — 97530 THERAPEUTIC ACTIVITIES: CPT | Mod: GO

## 2022-11-13 PROCEDURE — 83735 ASSAY OF MAGNESIUM: CPT | Performed by: SURGERY

## 2022-11-13 PROCEDURE — 250N000013 HC RX MED GY IP 250 OP 250 PS 637: Performed by: PHYSICIAN ASSISTANT

## 2022-11-13 PROCEDURE — 71046 X-RAY EXAM CHEST 2 VIEWS: CPT

## 2022-11-13 PROCEDURE — 71046 X-RAY EXAM CHEST 2 VIEWS: CPT | Mod: 26 | Performed by: STUDENT IN AN ORGANIZED HEALTH CARE EDUCATION/TRAINING PROGRAM

## 2022-11-13 PROCEDURE — 999N000044 HC STATISTIC CVC DRESSING CHANGE

## 2022-11-13 PROCEDURE — 36415 COLL VENOUS BLD VENIPUNCTURE: CPT | Performed by: SURGERY

## 2022-11-13 RX ORDER — FUROSEMIDE 10 MG/ML
20 INJECTION INTRAMUSCULAR; INTRAVENOUS EVERY 12 HOURS
Status: COMPLETED | OUTPATIENT
Start: 2022-11-13 | End: 2022-11-13

## 2022-11-13 RX ADMIN — BUPROPION HYDROCHLORIDE 75 MG: 75 TABLET, FILM COATED ORAL at 08:17

## 2022-11-13 RX ADMIN — FUROSEMIDE 20 MG: 10 INJECTION, SOLUTION INTRAVENOUS at 10:47

## 2022-11-13 RX ADMIN — Medication 1000 MCG: at 08:16

## 2022-11-13 RX ADMIN — ACETAMINOPHEN 650 MG: 325 TABLET, FILM COATED ORAL at 03:39

## 2022-11-13 RX ADMIN — ENOXAPARIN SODIUM 40 MG: 40 INJECTION SUBCUTANEOUS at 22:04

## 2022-11-13 RX ADMIN — ACETAMINOPHEN 650 MG: 325 TABLET, FILM COATED ORAL at 10:47

## 2022-11-13 RX ADMIN — FUROSEMIDE 20 MG: 10 INJECTION, SOLUTION INTRAVENOUS at 22:04

## 2022-11-13 RX ADMIN — OMEPRAZOLE 40 MG: 20 CAPSULE, DELAYED RELEASE ORAL at 08:17

## 2022-11-13 RX ADMIN — ACETAMINOPHEN 650 MG: 325 TABLET, FILM COATED ORAL at 22:04

## 2022-11-13 RX ADMIN — OXYCODONE HYDROCHLORIDE 5 MG: 5 TABLET ORAL at 03:53

## 2022-11-13 RX ADMIN — Medication 15 ML: at 03:39

## 2022-11-13 RX ADMIN — THIAMINE HCL TAB 100 MG 100 MG: 100 TAB at 08:17

## 2022-11-13 RX ADMIN — METHOCARBAMOL 750 MG: 750 TABLET ORAL at 19:25

## 2022-11-13 RX ADMIN — OXYCODONE HYDROCHLORIDE 5 MG: 5 TABLET ORAL at 22:04

## 2022-11-13 RX ADMIN — METHOCARBAMOL 750 MG: 750 TABLET ORAL at 08:17

## 2022-11-13 RX ADMIN — ENOXAPARIN SODIUM 40 MG: 40 INJECTION SUBCUTANEOUS at 08:17

## 2022-11-13 RX ADMIN — HYDROXYZINE HYDROCHLORIDE 50 MG: 25 TABLET, FILM COATED ORAL at 20:58

## 2022-11-13 ASSESSMENT — ACTIVITIES OF DAILY LIVING (ADL)
ADLS_ACUITY_SCORE: 27

## 2022-11-13 NOTE — PLAN OF CARE
/71 (BP Location: Right arm, Cuff Size: Adult Regular)   Pulse 74   Temp 100.1  F (37.8  C) (Oral)   Resp 22   Ht 1.829 m (6')   Wt 127.1 kg (280 lb 3.3 oz)   LMP 10/01/2022   SpO2 97%   BMI 38.00 kg/m      VSS, complained of 4/10 pain in abdomen, gave scheduled tylenol and 5 mg of oxycodone with adequate relief, blood glucose 92 and 88, L PICC (3) is heparin locked, abdominal incisions are cdi, voiding, 7 L humidified oxymask, tolerating bariatric full liquid diet, continue with plan of care

## 2022-11-13 NOTE — PROGRESS NOTES
Volara d/c. No longer indicated. CXR has not changed since the intitation of Nancy. Trialed Aerobika and pt seem to like that.   Pt vomited during Volara so therapy was stopped.     Rosanna Lowe, RT

## 2022-11-13 NOTE — PROGRESS NOTES
Admitted/transferred from:   2 RN full   skin assessment completed by Radha Evangelista RN and Dominik Benítez.  Skin assessment finding: skin intact, no problems   Interventions/actions: other None     Bedside Emergency Equipment Present:  Suction Regulator: Yes  Suction Canister: Yes  Tubing between Regulator and Canister: Yes  O2 Regulator with Tree: Yes  Ambu Bag: N/A

## 2022-11-13 NOTE — PROGRESS NOTES
General Surgery Progress Note  2022   ------------------------------------------------------------------------------------------------  Subjective:  NAEON. Oxygen requirement reducing to 7L via oxygen mask. Pain unbder control    ------------------------------------------------------------------------------------------------  Objective:  Temp:  [98.8  F (37.1  C)-100.3  F (37.9  C)] 100.1  F (37.8  C)  Pulse:  [68-85] 74  Resp:  [21-30] 22  BP: (109-129)/(66-74) 129/71  SpO2:  [93 %-98 %] 97 %    I/O last 3 completed shifts:  In: 1080 [P.O.:650; I.V.:430]  Out: 2470 [Urine:2450; Emesis/NG output:20]      Gen: Awake, interactive, NAD  Resp: breathing comfortably Oxymask.   CV: appears well perfused  Abd: soft, nondistended, mild tenderness across upper abdomen.   Incision: c/d/I. RUQ NOEL drain site dressing removed no surrounding erythema, no excessive drainage.   Ext: palpable pulses, no edema     Labs:  CBC: WBC 8.3, Hgb 7.4 (L), Plt 485 (H)   CMP: Cl 97 (L), Cr 1.02 (H), Ca 8.4 (L), Glucose 257 (H), Alk Phos 124 (H), AST 38 (H), Prot 6.2 (L), Alb 2.7 (L), o/w WNL  M.2  Phos: 3.3    =======================================================  Assessment/Plan:   42 year old female who is s/p laparoscopic sleeve gastrectomy and hiatal hernia repair on 10/25/22 with Dr. Aragon who returned to the ED with LUQ abdominal and shoulder pain, was found to have bilateral lower lobe pneumonia and new oxygen requirement. No evidence of PE. RUQ US obtained to evaluate elevated Tbili, found to have septated fluid collection along left hepatic lobe consistent with hematoma, likely the explanation for her acute blood loss anemia post operatively. This was again seen on CT scan 11/3. No evidence to suggest ongoing bleeding. UGI study obtained without evidence for leak. However, Leah continued to struggle with pain and decision was made to perform exploratory laparoscopy. She is now s/p diagnostic laparoscopy with evacuation  of abdominal hematoma on 11/4. During the operation, a stitch was removed from the hiatal repair. She was admitted to the SICU post operatively for mechanical ventilatory support. Initially with intermittent fevers, now appear resolved. Elevated inflammatory markers but never had leukocytosis. She was extubated to BIPAP then HFCN 11/6, now on oxymask. Of note, BAL aerobic culture from 11/5 positive for 1+ Essence dubliniensis.       - Oxygen requirement likely due to fluid overload, diuresis with lasix on 11/12, net 1.4L 11/12, will obtain CXR to evaluate for possible fluid overload and alternative etiology of respiratory distress. Will assess need for further diuresis after CXR result  - multimodal pain and anxiety regimen  - continue to wean respiratory support as able  - Encourage IS, ambulation, time in upright/in chair  - Ok for bariatric full liquid diet, encourage protein supplementation, will consult nutrition to see options to broaden her diet  - Vitron C and iron supplementation daily  - NOEL drain/ staples removed.   - Hgb stable, continue Lovenox  - Daily Hgb, transfuse for Hgb<7  - monitor and record NOEL drain output  - continue PPI BID  - Zosyn completed ( abx course 11/5-11/12)     Seen, examined, and discussed with chief resident, who discussed with staff.    Jas Deshpande MD  PGY-1 Urology  EGS

## 2022-11-13 NOTE — PLAN OF CARE
Goal Outcome Evaluation:  /77 (BP Location: Right arm)   Pulse 74   Temp 98.3  F (36.8  C) (Oral)   Resp 18   Ht 1.829 m (6')   Wt 127.1 kg (280 lb 3.3 oz)   LMP 10/01/2022   SpO2 93%   BMI 38.00 kg/m     AVSS. Sating 92-94% on 2L/NC.  Intermittent cough none productive.     Neuro: Alert and oriented x4.     GI/: WDL. Has loose stools. Voiding and is on her menses.     Diet: Bariatric fulls but pt is mainly drinking water only. Poor po intake.     Incisions/Drains:  NG to LIS and NJ with tube feeding.     IV Access: Picc line triple lumen.     Labs: Reviewed.     Activity: OOB and walking on unit. Seen and worked with OT this afternoon.     Pain: No c/o pain. On scheduled tylenol and Robaxin. Declined noon robaxin.     New changes this shift: less need for oxygen improved SOB. Able to walk without oxygen. Pt asking plan of care and a tyext message was sent to her primary team.     Plan: Continue with current POC.

## 2022-11-13 NOTE — PROGRESS NOTES
CLINICAL NUTRITION SERVICES - BRIEF NOTE     Nutrition Prescription    RECOMMENDATIONS FOR MDs/PROVIDERS TO ORDER:  If ongoing very poor tolerance to PO diet, strongly recommend providing enteral nutrition via NJT or TPN to better meet nutritional needs.       *High risk for refeeding syndrome with nutrition support initiation.  Recommend monitoring lytes closely and utilizing high replacement electrolyte protocols.      Bariatric surgeon/team to advise on appropriateness of further diet advancement.     Recommend ordering chewable Flintstones MVI 1 tablet daily for micronutrient needs (if thought patient can tolerate).      Future/Additional Recommendations:  TF recommendations:  Vital High Protein @ goal of  45ml/hr  (1080ml/day) + 1 pkt Prosource TF20 will provide: 1160 kcals (13 kcal/kg), 114 g PRO (1.2g/kg), 902 ml free H20, 119 g CHO, and 0 g fiber daily.  -very high risk for refeeding, recommend starting at 10 ml/hr and advancing by 10 ml/hr q 12 hours to goal rate    TPN recommendations:   1200 ml - Dex 120 grams (GIR 0.9 mg/kg/min),  grams + 250 ml IV lipids 3x/week (M,W,F).  Pending tolerance for 24-48 hours to initial formula, advance dextrose to goal of 150 grams (GIR 1.1 mg/kg/min).  Goal regimen will provide 1224 kcal (13 kcal/kg), 1.4g/kg protein.       EVALUATION OF THE PROGRESS TOWARD GOALS   Diet: Bariatric Full Liquids     Intake: Patient has had minimal PO intake over the last  ~3 weeks.  She notes that the most she has eaten has been 5 - 30 ml servings in a day (this happened only once).  Most days she has been vomiting and not keeping adequate amounts of food down.  Patient wondering if trying more solid foods would be an option, mainly only has an empty stomach with medications d/t poor tolerance of liquids.  Starting to tolerate more water by mouth.  Tries to consume protein supplements, but vomits them sometimes.       INTERVENTIONS  Paged bariatric team resident, who was okay with  advancing patient to Bariatric Pureed diet.     Provided list of foods allowed on Bariatric Full Liquids and Bariatric Pureed diets.  Also provided with diet guidelines s/p gastric sleeve handout.  Reviewed the need for small sips/bites and discussed recommendations re: nutrition support if ongoing very poor tolerance to diet.     Monitoring/Evaluation  Progress toward goals will be monitored and evaluated per protocol.     Diana Gustafson, MS, RD, LD, CCTD, CNSC  7A/Obs unit pager [phone]  Weekend pager [phone]

## 2022-11-14 ENCOUNTER — HOME INFUSION (PRE-WILLOW HOME INFUSION) (OUTPATIENT)
Dept: PHARMACY | Facility: CLINIC | Age: 42
End: 2022-11-14

## 2022-11-14 ENCOUNTER — ANESTHESIA (OUTPATIENT)
Dept: SURGERY | Facility: CLINIC | Age: 42
DRG: 907 | End: 2022-11-14
Payer: COMMERCIAL

## 2022-11-14 ENCOUNTER — APPOINTMENT (OUTPATIENT)
Dept: OCCUPATIONAL THERAPY | Facility: CLINIC | Age: 42
DRG: 907 | End: 2022-11-14
Payer: COMMERCIAL

## 2022-11-14 ENCOUNTER — TELEPHONE (OUTPATIENT)
Dept: SURGERY | Facility: CLINIC | Age: 42
End: 2022-11-14

## 2022-11-14 ENCOUNTER — PREP FOR PROCEDURE (OUTPATIENT)
Dept: SURGERY | Facility: CLINIC | Age: 42
End: 2022-11-14

## 2022-11-14 ENCOUNTER — APPOINTMENT (OUTPATIENT)
Dept: GENERAL RADIOLOGY | Facility: CLINIC | Age: 42
DRG: 907 | End: 2022-11-14
Attending: SURGERY
Payer: COMMERCIAL

## 2022-11-14 ENCOUNTER — TELEPHONE (OUTPATIENT)
Dept: GASTROENTEROLOGY | Facility: CLINIC | Age: 42
End: 2022-11-14

## 2022-11-14 ENCOUNTER — ANESTHESIA EVENT (OUTPATIENT)
Dept: SURGERY | Facility: CLINIC | Age: 42
DRG: 907 | End: 2022-11-14
Payer: COMMERCIAL

## 2022-11-14 DIAGNOSIS — K92.9 GASTRIC ANASTOMOTIC STRICTURE: Primary | ICD-10-CM

## 2022-11-14 DIAGNOSIS — Z98.84 STATUS POST LAPAROSCOPIC SLEEVE GASTRECTOMY: Primary | ICD-10-CM

## 2022-11-14 DIAGNOSIS — Z98.84 S/P LAPAROSCOPIC SLEEVE GASTRECTOMY: Primary | ICD-10-CM

## 2022-11-14 DIAGNOSIS — K31.89 GASTRIC ANASTOMOTIC STRICTURE: Primary | ICD-10-CM

## 2022-11-14 LAB
C DIFF TOX B STL QL: NEGATIVE
GLUCOSE BLDC GLUCOMTR-MCNC: 81 MG/DL (ref 70–99)
GLUCOSE BLDC GLUCOMTR-MCNC: 83 MG/DL (ref 70–99)
GLUCOSE BLDC GLUCOMTR-MCNC: 89 MG/DL (ref 70–99)
HOLD SPECIMEN: NORMAL
MAGNESIUM SERPL-MCNC: 2 MG/DL (ref 1.7–2.3)
PHOSPHATE SERPL-MCNC: 3 MG/DL (ref 2.5–4.5)
POTASSIUM SERPL-SCNC: 3.4 MMOL/L (ref 3.4–5.3)
POTASSIUM SERPL-SCNC: 3.8 MMOL/L (ref 3.4–5.3)

## 2022-11-14 PROCEDURE — 258N000003 HC RX IP 258 OP 636: Performed by: ANESTHESIOLOGY

## 2022-11-14 PROCEDURE — 272N000001 HC OR GENERAL SUPPLY STERILE: Performed by: SURGERY

## 2022-11-14 PROCEDURE — 250N000011 HC RX IP 250 OP 636

## 2022-11-14 PROCEDURE — 250N000013 HC RX MED GY IP 250 OP 250 PS 637: Performed by: PHYSICIAN ASSISTANT

## 2022-11-14 PROCEDURE — 71046 X-RAY EXAM CHEST 2 VIEWS: CPT | Mod: 26 | Performed by: RADIOLOGY

## 2022-11-14 PROCEDURE — 250N000009 HC RX 250: Performed by: ANESTHESIOLOGY

## 2022-11-14 PROCEDURE — 84132 ASSAY OF SERUM POTASSIUM: CPT

## 2022-11-14 PROCEDURE — 43246 EGD PLACE GASTROSTOMY TUBE: CPT | Mod: 78 | Performed by: SURGERY

## 2022-11-14 PROCEDURE — 84100 ASSAY OF PHOSPHORUS: CPT | Performed by: SURGERY

## 2022-11-14 PROCEDURE — 255N000002 HC RX 255 OP 636: Performed by: SURGERY

## 2022-11-14 PROCEDURE — 36415 COLL VENOUS BLD VENIPUNCTURE: CPT

## 2022-11-14 PROCEDURE — 250N000011 HC RX IP 250 OP 636: Performed by: SURGERY

## 2022-11-14 PROCEDURE — 999N000141 HC STATISTIC PRE-PROCEDURE NURSING ASSESSMENT: Performed by: SURGERY

## 2022-11-14 PROCEDURE — 250N000025 HC SEVOFLURANE, PER MIN: Performed by: SURGERY

## 2022-11-14 PROCEDURE — 43249 ESOPH EGD DILATION <30 MM: CPT | Mod: 78 | Performed by: SURGERY

## 2022-11-14 PROCEDURE — 84132 ASSAY OF SERUM POTASSIUM: CPT | Performed by: SURGERY

## 2022-11-14 PROCEDURE — C1769 GUIDE WIRE: HCPCS | Performed by: SURGERY

## 2022-11-14 PROCEDURE — 74360 X-RAY GUIDE GI DILATION: CPT | Mod: 26 | Performed by: SURGERY

## 2022-11-14 PROCEDURE — 258N000003 HC RX IP 258 OP 636: Performed by: SURGERY

## 2022-11-14 PROCEDURE — 250N000009 HC RX 250: Performed by: SURGERY

## 2022-11-14 PROCEDURE — 360N000082 HC SURGERY LEVEL 2 W/ FLUORO, PER MIN: Performed by: SURGERY

## 2022-11-14 PROCEDURE — 120N000002 HC R&B MED SURG/OB UMMC

## 2022-11-14 PROCEDURE — 250N000011 HC RX IP 250 OP 636: Performed by: STUDENT IN AN ORGANIZED HEALTH CARE EDUCATION/TRAINING PROGRAM

## 2022-11-14 PROCEDURE — 250N000011 HC RX IP 250 OP 636: Performed by: ANESTHESIOLOGY

## 2022-11-14 PROCEDURE — 370N000017 HC ANESTHESIA TECHNICAL FEE, PER MIN: Performed by: SURGERY

## 2022-11-14 PROCEDURE — 710N000010 HC RECOVERY PHASE 1, LEVEL 2, PER MIN: Performed by: SURGERY

## 2022-11-14 PROCEDURE — 0D758ZZ DILATION OF ESOPHAGUS, VIA NATURAL OR ARTIFICIAL OPENING ENDOSCOPIC: ICD-10-PCS | Performed by: SURGERY

## 2022-11-14 PROCEDURE — 36592 COLLECT BLOOD FROM PICC: CPT | Performed by: SURGERY

## 2022-11-14 PROCEDURE — 83735 ASSAY OF MAGNESIUM: CPT | Performed by: SURGERY

## 2022-11-14 PROCEDURE — C1726 CATH, BAL DIL, NON-VASCULAR: HCPCS | Performed by: SURGERY

## 2022-11-14 PROCEDURE — 999N000007 HC SITE CHECK

## 2022-11-14 PROCEDURE — 97535 SELF CARE MNGMENT TRAINING: CPT | Mod: GO

## 2022-11-14 PROCEDURE — 999N000179 XR SURGERY CARM FLUORO LESS THAN 5 MIN W STILLS: Mod: TC

## 2022-11-14 PROCEDURE — 250N000013 HC RX MED GY IP 250 OP 250 PS 637

## 2022-11-14 PROCEDURE — 87493 C DIFF AMPLIFIED PROBE: CPT | Performed by: SURGERY

## 2022-11-14 PROCEDURE — 71046 X-RAY EXAM CHEST 2 VIEWS: CPT

## 2022-11-14 RX ORDER — SODIUM CHLORIDE, SODIUM LACTATE, POTASSIUM CHLORIDE, CALCIUM CHLORIDE 600; 310; 30; 20 MG/100ML; MG/100ML; MG/100ML; MG/100ML
INJECTION, SOLUTION INTRAVENOUS CONTINUOUS
Status: DISCONTINUED | OUTPATIENT
Start: 2022-11-14 | End: 2022-11-14 | Stop reason: HOSPADM

## 2022-11-14 RX ORDER — CEFAZOLIN SODIUM/WATER 3 G/30 ML
3 SYRINGE (ML) INTRAVENOUS
Status: DISCONTINUED | OUTPATIENT
Start: 2022-11-14 | End: 2022-11-14 | Stop reason: HOSPADM

## 2022-11-14 RX ORDER — CEFAZOLIN SODIUM/WATER 3 G/30 ML
3 SYRINGE (ML) INTRAVENOUS SEE ADMIN INSTRUCTIONS
Status: DISCONTINUED | OUTPATIENT
Start: 2022-11-14 | End: 2022-11-14 | Stop reason: HOSPADM

## 2022-11-14 RX ORDER — MAGNESIUM SULFATE HEPTAHYDRATE 40 MG/ML
2 INJECTION, SOLUTION INTRAVENOUS ONCE
Status: DISCONTINUED | OUTPATIENT
Start: 2022-11-14 | End: 2022-11-16 | Stop reason: HOSPADM

## 2022-11-14 RX ORDER — MAGNESIUM SULFATE HEPTAHYDRATE 40 MG/ML
2 INJECTION, SOLUTION INTRAVENOUS ONCE
Status: COMPLETED | OUTPATIENT
Start: 2022-11-14 | End: 2022-11-14

## 2022-11-14 RX ORDER — CEFAZOLIN SODIUM IN 0.9 % NACL 3 G/100 ML
3 INTRAVENOUS SOLUTION, PIGGYBACK (ML) INTRAVENOUS
Status: CANCELLED | OUTPATIENT
Start: 2022-11-14

## 2022-11-14 RX ORDER — OXYCODONE HYDROCHLORIDE 5 MG/1
5 TABLET ORAL EVERY 4 HOURS PRN
Status: DISCONTINUED | OUTPATIENT
Start: 2022-11-14 | End: 2022-11-14 | Stop reason: HOSPADM

## 2022-11-14 RX ORDER — FENTANYL CITRATE 50 UG/ML
25 INJECTION, SOLUTION INTRAMUSCULAR; INTRAVENOUS EVERY 5 MIN PRN
Status: DISCONTINUED | OUTPATIENT
Start: 2022-11-14 | End: 2022-11-14 | Stop reason: HOSPADM

## 2022-11-14 RX ORDER — OXYCODONE HCL 5 MG/5 ML
5 SOLUTION, ORAL ORAL EVERY 4 HOURS PRN
Status: DISCONTINUED | OUTPATIENT
Start: 2022-11-14 | End: 2022-11-16 | Stop reason: HOSPADM

## 2022-11-14 RX ORDER — PROPOFOL 10 MG/ML
INJECTION, EMULSION INTRAVENOUS PRN
Status: DISCONTINUED | OUTPATIENT
Start: 2022-11-14 | End: 2022-11-14

## 2022-11-14 RX ORDER — FENTANYL CITRATE 50 UG/ML
INJECTION, SOLUTION INTRAMUSCULAR; INTRAVENOUS PRN
Status: DISCONTINUED | OUTPATIENT
Start: 2022-11-14 | End: 2022-11-14

## 2022-11-14 RX ORDER — OMEPRAZOLE
40 KIT
Status: DISCONTINUED | OUTPATIENT
Start: 2022-11-15 | End: 2022-11-15

## 2022-11-14 RX ORDER — DEXAMETHASONE SODIUM PHOSPHATE 4 MG/ML
INJECTION, SOLUTION INTRA-ARTICULAR; INTRALESIONAL; INTRAMUSCULAR; INTRAVENOUS; SOFT TISSUE PRN
Status: DISCONTINUED | OUTPATIENT
Start: 2022-11-14 | End: 2022-11-14

## 2022-11-14 RX ORDER — IOPAMIDOL 510 MG/ML
INJECTION, SOLUTION INTRAVASCULAR PRN
Status: DISCONTINUED | OUTPATIENT
Start: 2022-11-14 | End: 2022-11-14 | Stop reason: HOSPADM

## 2022-11-14 RX ORDER — DEXTROSE MONOHYDRATE 100 MG/ML
INJECTION, SOLUTION INTRAVENOUS CONTINUOUS PRN
Status: DISCONTINUED | OUTPATIENT
Start: 2022-11-14 | End: 2022-11-16 | Stop reason: HOSPADM

## 2022-11-14 RX ORDER — ONDANSETRON 2 MG/ML
4 INJECTION INTRAMUSCULAR; INTRAVENOUS EVERY 30 MIN PRN
Status: DISCONTINUED | OUTPATIENT
Start: 2022-11-14 | End: 2022-11-14 | Stop reason: HOSPADM

## 2022-11-14 RX ORDER — POTASSIUM CHLORIDE 7.45 MG/ML
10 INJECTION INTRAVENOUS
Status: COMPLETED | OUTPATIENT
Start: 2022-11-14 | End: 2022-11-15

## 2022-11-14 RX ORDER — GUAIFENESIN 600 MG/1
15 TABLET, EXTENDED RELEASE ORAL DAILY
Status: DISCONTINUED | OUTPATIENT
Start: 2022-11-14 | End: 2022-11-15

## 2022-11-14 RX ORDER — CEFAZOLIN SODIUM IN 0.9 % NACL 3 G/100 ML
3 INTRAVENOUS SOLUTION, PIGGYBACK (ML) INTRAVENOUS SEE ADMIN INSTRUCTIONS
Status: CANCELLED | OUTPATIENT
Start: 2022-11-14

## 2022-11-14 RX ORDER — CEFAZOLIN SODIUM/WATER 3 G/30 ML
3 SYRINGE (ML) INTRAVENOUS
Status: COMPLETED | OUTPATIENT
Start: 2022-11-14 | End: 2022-11-14

## 2022-11-14 RX ORDER — ONDANSETRON 4 MG/1
4 TABLET, ORALLY DISINTEGRATING ORAL EVERY 30 MIN PRN
Status: DISCONTINUED | OUTPATIENT
Start: 2022-11-14 | End: 2022-11-14 | Stop reason: HOSPADM

## 2022-11-14 RX ORDER — HYDROMORPHONE HCL IN WATER/PF 6 MG/30 ML
0.2 PATIENT CONTROLLED ANALGESIA SYRINGE INTRAVENOUS EVERY 5 MIN PRN
Status: DISCONTINUED | OUTPATIENT
Start: 2022-11-14 | End: 2022-11-14 | Stop reason: HOSPADM

## 2022-11-14 RX ORDER — SODIUM CHLORIDE 9 MG/ML
INJECTION, SOLUTION INTRAVENOUS CONTINUOUS PRN
Status: DISCONTINUED | OUTPATIENT
Start: 2022-11-14 | End: 2022-11-14

## 2022-11-14 RX ORDER — ONDANSETRON 2 MG/ML
INJECTION INTRAMUSCULAR; INTRAVENOUS PRN
Status: DISCONTINUED | OUTPATIENT
Start: 2022-11-14 | End: 2022-11-14

## 2022-11-14 RX ORDER — SODIUM CHLORIDE, SODIUM LACTATE, POTASSIUM CHLORIDE, CALCIUM CHLORIDE 600; 310; 30; 20 MG/100ML; MG/100ML; MG/100ML; MG/100ML
INJECTION, SOLUTION INTRAVENOUS CONTINUOUS PRN
Status: DISCONTINUED | OUTPATIENT
Start: 2022-11-14 | End: 2022-11-14

## 2022-11-14 RX ORDER — POTASSIUM CHLORIDE 29.8 MG/ML
20 INJECTION INTRAVENOUS
Status: ACTIVE | OUTPATIENT
Start: 2022-11-14 | End: 2022-11-14

## 2022-11-14 RX ORDER — FUROSEMIDE 10 MG/ML
20 INJECTION INTRAMUSCULAR; INTRAVENOUS ONCE
Status: COMPLETED | OUTPATIENT
Start: 2022-11-14 | End: 2022-11-14

## 2022-11-14 RX ORDER — LIDOCAINE HYDROCHLORIDE 20 MG/ML
JELLY TOPICAL ONCE
Status: DISCONTINUED | OUTPATIENT
Start: 2022-11-14 | End: 2022-11-16 | Stop reason: HOSPADM

## 2022-11-14 RX ORDER — LIDOCAINE HYDROCHLORIDE 20 MG/ML
INJECTION, SOLUTION INFILTRATION; PERINEURAL PRN
Status: DISCONTINUED | OUTPATIENT
Start: 2022-11-14 | End: 2022-11-14

## 2022-11-14 RX ADMIN — FENTANYL CITRATE 50 MCG: 50 INJECTION, SOLUTION INTRAMUSCULAR; INTRAVENOUS at 17:59

## 2022-11-14 RX ADMIN — DEXAMETHASONE SODIUM PHOSPHATE 4 MG: 4 INJECTION, SOLUTION INTRA-ARTICULAR; INTRALESIONAL; INTRAMUSCULAR; INTRAVENOUS; SOFT TISSUE at 17:08

## 2022-11-14 RX ADMIN — ONDANSETRON 4 MG: 2 INJECTION INTRAMUSCULAR; INTRAVENOUS at 17:20

## 2022-11-14 RX ADMIN — Medication 50 MG: at 16:59

## 2022-11-14 RX ADMIN — ACETAMINOPHEN 650 MG: 325 TABLET, FILM COATED ORAL at 03:50

## 2022-11-14 RX ADMIN — POTASSIUM CHLORIDE 10 MEQ: 7.46 INJECTION, SOLUTION INTRAVENOUS at 23:13

## 2022-11-14 RX ADMIN — SUGAMMADEX 200 MG: 100 INJECTION, SOLUTION INTRAVENOUS at 18:09

## 2022-11-14 RX ADMIN — SODIUM CHLORIDE 500 ML: 9 INJECTION, SOLUTION INTRAVENOUS at 15:17

## 2022-11-14 RX ADMIN — SODIUM CHLORIDE, POTASSIUM CHLORIDE, SODIUM LACTATE AND CALCIUM CHLORIDE 1 ML: 600; 310; 30; 20 INJECTION, SOLUTION INTRAVENOUS at 18:39

## 2022-11-14 RX ADMIN — LIDOCAINE HYDROCHLORIDE 100 MG: 20 INJECTION, SOLUTION INFILTRATION; PERINEURAL at 16:59

## 2022-11-14 RX ADMIN — MIDAZOLAM 2 MG: 1 INJECTION INTRAMUSCULAR; INTRAVENOUS at 16:48

## 2022-11-14 RX ADMIN — Medication 3 G: at 17:02

## 2022-11-14 RX ADMIN — SODIUM CHLORIDE: 9 INJECTION, SOLUTION INTRAVENOUS at 16:52

## 2022-11-14 RX ADMIN — ONDANSETRON 4 MG: 2 INJECTION INTRAMUSCULAR; INTRAVENOUS at 09:40

## 2022-11-14 RX ADMIN — Medication 15 ML: at 03:51

## 2022-11-14 RX ADMIN — POTASSIUM CHLORIDE 10 MEQ: 7.46 INJECTION, SOLUTION INTRAVENOUS at 21:59

## 2022-11-14 RX ADMIN — ENOXAPARIN SODIUM 40 MG: 40 INJECTION SUBCUTANEOUS at 20:54

## 2022-11-14 RX ADMIN — ENOXAPARIN SODIUM 40 MG: 40 INJECTION SUBCUTANEOUS at 12:54

## 2022-11-14 RX ADMIN — FENTANYL CITRATE 100 MCG: 50 INJECTION, SOLUTION INTRAMUSCULAR; INTRAVENOUS at 16:59

## 2022-11-14 RX ADMIN — PROCHLORPERAZINE EDISYLATE 5 MG: 5 INJECTION INTRAMUSCULAR; INTRAVENOUS at 12:53

## 2022-11-14 RX ADMIN — MAGNESIUM SULFATE IN WATER 2 G: 40 INJECTION, SOLUTION INTRAVENOUS at 20:54

## 2022-11-14 RX ADMIN — FENTANYL CITRATE 50 MCG: 50 INJECTION, SOLUTION INTRAMUSCULAR; INTRAVENOUS at 18:01

## 2022-11-14 RX ADMIN — FUROSEMIDE 20 MG: 10 INJECTION, SOLUTION INTRAVENOUS at 12:54

## 2022-11-14 RX ADMIN — PROPOFOL 150 MG: 10 INJECTION, EMULSION INTRAVENOUS at 16:59

## 2022-11-14 RX ADMIN — HYDROXYZINE HYDROCHLORIDE 50 MG: 25 TABLET, FILM COATED ORAL at 03:59

## 2022-11-14 ASSESSMENT — ACTIVITIES OF DAILY LIVING (ADL)
ADLS_ACUITY_SCORE: 27

## 2022-11-14 NOTE — PLAN OF CARE
Goal Outcome Evaluation:    Cardiac: denies cardiac chest pain  Resp: RA, sating >90%, dyspnea on exertion. Try to wean off O2 at night   Neuro: A&Ox4, calm & cooperative   GI/: voiding spontaneously, 1x BM this shift-specimen collection for C. Diff due to multiple loose stools   Diet: Bariatric pureed food   Skin/Incisions/Drains: 5x abd lap sites   IV access: PICC triple lumen, SL   Labs: Reviewed   Nausea: pt had one episode of emesis after eating  Activity: Assist x1 SBA  Pain: Pt reported increased pain to abdomen w/ increased anxiety @2100-PRN atarax given w/ relief     New changes this shift: C. Diff specimen collection, continue POC

## 2022-11-14 NOTE — TELEPHONE ENCOUNTER
Left VM message that Dr. Aragon would like to do her endoscopy on 11/23 at 8:30 a.m. in the Endoscopy Suite at [place]. To arrive at 7:30 a.m. Stated that she will need to do an at-home COVID-19 test on 11/21 and bring the result with her when she comes for the endoscopy. She will need a drive as well.

## 2022-11-14 NOTE — PLAN OF CARE
/70 (BP Location: Right arm)   Pulse 76   Temp 98.6  F (37  C) (Oral)   Resp 20   Ht 1.829 m (6')   Wt 127.1 kg (280 lb 3.3 oz)   LMP 10/01/2022   SpO2 93%   BMI 38.00 kg/m       Time: 2114-9567    Reason for admission: Hypoxia  Activity: SBA  Pain: C/o ABD pain, received scheduled Tylenol and atarax, and it was effective.   Neuro: alert and oriented.   Cardiac: WDL  Resp: dyspnea on exertion, lung sounds diminished bilaterally.   GI/: Bariatric pureed diet, voids without difficulties, bowel sounds present x four quadrants.   Lines: L PICC, heparin locked.   Skin: WDL X, ABD lap sites.   Labs/Imaging: BG at 0000, 83, BG at 0400, 81.   New changes to shift: No change.   Plan: continue with current plan of cares.

## 2022-11-14 NOTE — PROVIDER NOTIFICATION
7B 21 [place]   Pt states she talked w/ team & want her to try go w/o O2 for the night, is this still something that you want to do? Pt has been needing 7 L for sleep, I think weaning off O2 would be safer   Sophia Wild notified

## 2022-11-14 NOTE — PROVIDER NOTIFICATION
7B 100   [facility] NJ tube cannot be placed at bedside.   Thank you  Penelope Rowell *[phone]

## 2022-11-14 NOTE — PROVIDER NOTIFICATION
7B 221 Leah Yanez   pt has been nauseous for most of the shift unable to tolerate oral meds or fluids. Currently vomiting. Not due for any anti nausea at this time. Can we get something to help.  report weakness.  Penelope URIBE

## 2022-11-14 NOTE — PROGRESS NOTES
Therapy: Enteral TF  Insurance:[facility]   Ded: $1900.00  Met: $1900.00    Co-Insurance: 80/20  Max Out of Pocket: $6650.00  Met: $5094.29  Once OOP is met coverage will be 100%    Plan requires sole source of nutrition in order for formula to be covered, per our nutrition team patient does not meet this criteria.  Cost for Vital HP & Prosource daily is 47.14    Please contact Intake with any questions, [phone] or In Basket pool, [facility] ([phone]).

## 2022-11-14 NOTE — PLAN OF CARE
Neuro: A/Ox4 calls appropriately  Respiratory: on room air denies SOB  Cardiac: WDL denies cardiac chest pain  Diet: puree, poor appetite due to nausea/vomiting.  GI/: +BS small BM this shift. Stool sample collected. Voiding without difficulties.  Incision/Drains: Abdominal lap site and bruising.  IV Access: Left TL PICC red infusing and purple and gray hep locked.  Pain/nausea: patient reports tolerable abdominal pain. Nausea and emesis x2 this shift.  Labs: reviewed. electrolytes replacement ordered.   VS: /72 (BP Location: Right arm)   Pulse 82   Temp (!) 96.6  F (35.9  C) (Axillary)   Resp 18   Ht 1.829 m (6')   Wt 127.1 kg (280 lb 3.3 oz)   LMP 10/01/2022   SpO2 92%   BMI 38.00 kg/m    Activity: up with SBA to bathroom  New this shift: nausea and vomiting continues. Upper endoscopy, stricture dilation with possible stent placement and NJ tube placement.  Plan: possible start of feeding tube.

## 2022-11-14 NOTE — PROGRESS NOTES
CLINICAL NUTRITION SERVICES - BRIEF NOTE   (See RD note on 10/9 for full assessment)     Reason for RD note:   Consult received: Registered Dietitian to order TF per Medical Nutrition Therapy Guidelines    New Findings/Chart Review:  Inadequate oral intake since 10/25 gastric sleeve and hiatal hernia repair.  Noted plan for NJ for TF.  However floor RNs don't place NJ FTs nor do they have bridles.  Noted fluoro can't get pt on their schedule today so procedure will be deferred to tomorrow.   Noted Pt on 100 mg Thiamine, 1000 mcg B12, vitron C (held).  No multivitamin or other minerals ordered.     No IVF ordered currently.  Noted received IV lasix 11/13 and this AM.  No edema noted per nursing.  All weights are bed scale and quite variable.      Noted per RNCC that pt's insurance will not cover pt's TF unless it meets 100% of her needs and is her sole source of nutrition.  Pt hasn't tolerated much of any intake without regurgitation since 10/25 and would expect an NJ would cause further challenges to allowing food/liquids to pass through the esophagus. Anticipate TF will be her sole source of nutrition.       Will plan to meet 100% of needs with TF:  Recommend Vital High Protein (HP) with goal of 60 ml/hr. Will provide: (1440ml/day) 1440 kcals (16 kcal/kg w/ desired wt loss planned), 125 g PRO (1.4 gm/kg), 1203 ml free H20, 159 g CHO, and 0 g fiber daily.  Would start @ 20 ml/hr and increase by 10 ml q 8 hr to goal.  Should not start or advance unless K+ =/>3, Phos =/> 1.9, and Mg++ =/> 1.5.      Interventions:  Collaborate with other providers--[facility] surg resident, bedside RN (request standing wt)  Add certavite with minerals (po or FT once ordered).    Future/Additional Recommendations:  Will order TF tomorrow once NJ placed.      Nutrition will continue to follow per protocol.    Farrah Andrews RD, LD   7B (M-F) Pager: [phone]  RD Weekend/Holiday Pager: [phone]

## 2022-11-14 NOTE — PROGRESS NOTES
Leah Yanez 7B. 21-1    FYI, pt's NJ can't be placed until tomorrow. X-ray stated that they are full for today.     Luis MCCLURE RN. *[phone]    Paged: Desean Van

## 2022-11-14 NOTE — PROGRESS NOTES
"Care Management Follow Up    Length of Stay (days): 13    Expected Discharge Date: 11/16/2022     Concerns to be Addressed: no discharge needs identified     Patient plan of care discussed at interdisciplinary rounds: Yes    Anticipated Discharge Disposition: Home     Anticipated Discharge Services: Home Infusion  Anticipated Discharge DME: None    Patient/family educated on Medicare website which has current facility and service quality ratings: no  Education Provided on the Discharge Plan: not this date   Patient/Family in Agreement with the Plan: unable to assess    Referrals Placed by CM/SW:  Home Infusion  Private pay costs discussed: Not applicable    Additional Information:  Patient getting an NJ placed and starting on tube feedings.  Pt will need to discharge with the tube feedings.  Per protocol referral sent to [facility] to check benefits for coverage.  Per \[facility] [place] [facility]\"" \"Patient Leah Yanez has coverage for enteral tf with her [facility] plan ded $1900 met in full, 80/20 coverage, oop $6650 met $5094.29. the plan requires sole source of nutrition in order for formula to be covered.\"  Per the unit dieticianMargie, tube feedings will meet 100% of her nutritional needs.  Patient will need [facility] for education on tube feeding administration, consult placed.  RNCC will continue to follow and assist with discharge planning.      [facility](TF)  Phone: [phone]  Fax: [phone]        CLARISSE Gonzales  Phone: [phone]  Pager: [phone]    SEARCHABLE in [facility] - search CARE COORDINATOR     [place] & [place] (2132-5000) Saturday & Sunday; ([phone]) [facility]     Units: 4A, 4C, 4E, 5A & 5B   Pager: [phone]    Units: 6A & 6B    Pager: [phone]    Units: 6C & 6D   Pager: [phone]    Units: 7A, 7B, 7C, 7D & 5C    Pager: [phone]    Units: [facility] ED, 5 Ortho, 5 Med/Surg, 6 Med/Surg, 8A, 10 ICU, & [facility]    Pager: [phone]         "

## 2022-11-14 NOTE — PROGRESS NOTES
Leah Yanez 7B. 21-1.    Per family, pt hasn't eaten since Thursday. Can we restart TPN since NJ tube can't be placed today?     Luis MCCLURE RN. *[phone]    Paged: Desean Van

## 2022-11-14 NOTE — PROGRESS NOTES
Calorie Count  Intake recorded for: 11/13  Total Kcals: 0 Total Protein: 0g  Kcals from Hospital Food: 0   Protein: 0g  Kcals from Outside Food (average):0 Protein: 0g  # Meals Ordered from Kitchen: 1  # Meals Recorded: No food intake recorded  # Supplements Recorded: No intake recorded

## 2022-11-14 NOTE — PROGRESS NOTES
Surgery Progress Note  11/14/2022       Subjective:  - No acute events over night. Pain well controlled. One episode of emesis on bariatric diet yesterday evening. Reports generalized inability to tolerate liquids/solids other than water. Denies nausea, vomiting, fever, chills, chest pain, shortness of breath. On RA over night.       Objective:  /74 (BP Location: Right arm)   Pulse 97   Temp (!) 96.2  F (35.7  C) (Oral)   Resp 18   Ht 1.829 m (6')   Wt 127.1 kg (280 lb 3.3 oz)   LMP 10/01/2022   SpO2 94%   BMI 38.00 kg/m       I/O last 3 completed shifts:  In: 120 [P.O.:120]  Out: 1450 [Urine:550; Other:900]      Gen: Awake, alert, NAD  Resp: NLB on RA  Abd: soft, nondistended, nontender to palpation  Incision: c/d/I  Ext: WWP, no edema     Labs:  All labs reviewed.  AM labs pending    Imaging:  CXR 2V 11/13  IMPRESSION:   Stable to slightly increased left pleural effusion. Bibasilar  opacities, favor atelectasis versus infection.     Assessment/Plan:   42 year old female with who is s/p laparoscopic sleeve gastrectomy and hiatal hernia repair on 10/25/22 with Dr. Aragon who returned to the ED with LUQ abdominal and shoulder pain, was found to have bilateral lower lobe pneumonia and new oxygen requirement. No evidence of PE. RUQ US obtained to evaluate elevated Tbili, found to have septated fluid collection along left hepatic lobe consistent with hematoma, likely the explanation for her acute blood loss anemia post operatively. This was again seen on CT scan 11/3. No evidence to suggest ongoing bleeding. UGI study obtained without evidence for leak. However, Leah continued to struggle with pain and decision was made to perform exploratory laparoscopy. She is now s/p diagnostic laparoscopy with evacuation of abdominal hematoma on 11/4. During the operation, a stitch was removed from the hiatal repair. She was admitted to the SICU post operatively for mechanical ventilatory support. Initially with  intermittent fevers, now appear resolved. Elevated inflammatory markers but never had leukocytosis. She was extubated to BIPAP then HFCN 11/6, now on oxymask. Of note, BAL aerobic culture from 11/5 positive for 1+ Essence dubliniensis. Transferred out of SICU 11/13 with decreasing supplemental O2 dependence. Consulted nutritionist to assist with nutritional support.     - multimodal pain control  - wean O2, now on room air  - encourage IS and ambulation  - appreciate dietician recommendations, will place NJ tube today for supplemental nutrition. Plan for outpatient tube feeds.  - continue bariatric purred diet  - 1x 20mg lasix dose for diuresis today  - vitron C and iron supplementation daily  - Hgb stable, continue lovenox  - continue PPI BID  - dispo planning -- will start tube feeds today. Otherwise medically ready for discharge. Anticipate discharge today vs tomorrow pending dietician recommendations. Plan for outpatient upper endoscopy with Dr. Aragon in 2 weeks.    Seen, examined, and discussed with chief resident, who will discussed with staff.  - - - - - - - - - - - - - - - - - -  Shine Escamilla, OMS4    I have reviewed and edited this note as needed. I personally saw and examined this patient and discussed the assessment and plan with staff, Dr. Aragon.    Danni Clifton MD  General Surgery PGY2

## 2022-11-14 NOTE — ANESTHESIA PREPROCEDURE EVALUATION
"Anesthesia Pre-Procedure Evaluation    Patient: Leah Yanez   MRN: [id number] : 1980        Procedure : Procedure(s):  Upper endoscopy; stricture dilataion; possible stent; nasojejunal feeding tube          Past Medical History:   Diagnosis Date     Anti-cardiolipin antibody positive      Griggs esophagus      Concussion 2016     Depressive disorder, not elsewhere classified 2006    Resolved      Gastroesophageal reflux disease with esophagitis      Hiatal hernia      History of pulmonary embolism      Obesity      Raynaud's syndrome     past history of admission for gangrene of one finger      Past Surgical History:   Procedure Laterality Date     ESOPHAGOSCOPY, GASTROSCOPY, DUODENOSCOPY (EGD), COMBINED N/A 2021    Procedure: ESOPHAGOGASTRODUODENOSCOPY, WITH BIOPSY;  Surgeon: Esteban Rose DO;  Location: [facility] OR     LAPAROSCOPIC GASTRIC SLEEVE N/A 10/25/2022    Procedure: GASTRECTOMY, SLEEVE, LAPAROSCOPIC;  Surgeon: Daniel Aragon MD;  Location: [facility] OR     LAPAROSCOPIC HERNIORRHAPHY HIATAL N/A 10/25/2022    Procedure: HERNIORRHAPHY, HIATAL, LAPAROSCOPIC;  Surgeon: Daniel Aragon MD;  Location: [facility] OR     LAPAROSCOPY DIAGNOSTIC (GENERAL) N/A 2022    Procedure: LAPAROSCOPY, DIAGNOSTIC, BY GENERAL SURGERY, evacuation of abdominl hematoma;  Surgeon: Daniel Aragon MD;  Location: [facility] OR     PICC TRIPLE LUMEN PLACEMENT Left 2022    51cm (1cm external), Basilic vein     TONSILLECTOMY & ADENOIDECTOMY       ZZC NONSPECIFIC PROCEDURE      T&A as a child     ZZC NONSPECIFIC PROCEDURE      Tubes in ears bilaterally      Allergies   Allergen Reactions     Azithromycin      Erythromycin GI Disturbance     When \"very young\"      Social History     Tobacco Use     Smoking status: Never     Smokeless tobacco: Never   Substance Use Topics     Alcohol use: Yes     Comment: Alcoholic Drinks/day: social      Wt Readings from Last 1 Encounters:   22 127.1 kg (280 lb 3.3 " oz)        42 year old female with who is s/p laparoscopic sleeve gastrectomy and hiatal hernia repair on 10/25/22 with Dr. Aragon who returned to the ED with LUQ abdominal and shoulder pain, was found to have bilateral lower lobe pneumonia and new oxygen requirement. No evidence of PE. RUQ US obtained to evaluate elevated Tbili, found to have septated fluid collection along left hepatic lobe consistent with hematoma, likely the explanation for her acute blood loss anemia post operatively. This was again seen on CT scan 11/3. No evidence to suggest ongoing bleeding. UGI study obtained without evidence for leak. However, Leah continued to struggle with pain and decision was made to perform exploratory laparoscopy. She is now s/p diagnostic laparoscopy with evacuation of abdominal hematoma on 11/4. During the operation, a stitch was removed from the hiatal repair. She was admitted to the SICU post operatively for mechanical ventilatory support. Initially with intermittent fevers, now appear resolved. Elevated inflammatory markers but never had leukocytosis. She was extubated to BIPAP then HFCN 11/6, now on oxymask. Of note, BAL aerobic culture from 11/5 positive for 1+ Essence dubliniensis. Transferred out of SICU 11/13 with decreasing supplemental O2 dependence. Consulted nutritionist to assist with nutritional support.     Anesthesia Evaluation            ROS/MED HX  ENT/Pulmonary:       Neurologic:       Cardiovascular:       METS/Exercise Tolerance:     Hematologic:     (+) History of blood clots,     Musculoskeletal:       GI/Hepatic:     (+) GERD, esophageal disease, hiatal hernia,     Renal/Genitourinary:       Endo:     (+) Obesity,     Psychiatric/Substance Use:       Infectious Disease:       Malignancy:       Other:               OUTSIDE LABS:  CBC:   Lab Results   Component Value Date    WBC 9.2 11/13/2022    WBC 9.0 11/12/2022    HGB 7.4 (L) 11/13/2022    HGB 7.2 (L) 11/12/2022    HCT 25.3 (L) 11/13/2022     HCT 24.2 (L) 11/12/2022     (H) 11/13/2022     (H) 11/12/2022     BMP:   Lab Results   Component Value Date     11/13/2022     11/12/2022    POTASSIUM 3.4 11/14/2022    POTASSIUM 3.6 11/13/2022    CHLORIDE 99 11/13/2022    CHLORIDE 99 11/12/2022    CO2 25 11/13/2022    CO2 27 11/12/2022    BUN 10.2 11/13/2022    BUN 8.6 11/12/2022    CR 0.84 11/13/2022    CR 0.86 11/12/2022    GLC 89 11/14/2022    GLC 81 11/14/2022     COAGS: No results found for: PTT, INR, FIBR  POC:   Lab Results   Component Value Date    HCG Negative 10/25/2022     HEPATIC:   Lab Results   Component Value Date    ALBUMIN 2.9 (L) 11/13/2022    PROTTOTAL 6.8 11/13/2022    ALT 17 11/13/2022    AST 25 11/13/2022    ALKPHOS 129 (H) 11/13/2022    BILITOTAL 0.3 11/13/2022     OTHER:   Lab Results   Component Value Date    PH 7.47 (H) 11/08/2022    LACT 0.5 11/04/2022    A1C 5.5 10/25/2022    KIMBERLYN 9.2 11/13/2022    PHOS 3.0 11/14/2022    MAG 2.0 11/14/2022    LIPASE 65 (H) 11/06/2022    TSH 0.85 07/24/2003    .00 (H) 11/06/2022    SED 9 07/24/2003       Anesthesia Plan    ASA Status:  4   NPO Status:  NPO Appropriate    Anesthesia Type: General.     - Airway: ETT   Induction: Intravenous, RSI.   Maintenance: Inhalation.   Techniques and Equipment:       - Blood: T&C     Consents    Anesthesia Plan(s) and associated risks, benefits, and realistic alternatives discussed. Questions answered and patient/representative(s) expressed understanding.    - Discussed:     - Discussed with:  Patient      - Extended Intubation/Ventilatory Support Discussed: Yes.      - Patient is DNR/DNI Status: No    Use of blood products discussed: Yes.     - Discussed with: Patient.     Postoperative Care    Pain management: IV analgesics.   PONV prophylaxis: Ondansetron (or other 5HT-3), Dexamethasone or Solumedrol     Comments:                Christopher J. Behrens, MD

## 2022-11-14 NOTE — TELEPHONE ENCOUNTER
- SCHEDULING DETAILS -     MASOUD  Surgeon    11/23  Date of Procedure  Upper Endoscopy [EGD]  Type of Procedure Scheduled   [facility] Location  Which Colonoscopy Prep was Sent?     MODERATE Sedation Type     N PAC / Pre-op Required         Additional comments:  Received call from Geovanny from Dr Aragon's office adding EGD to [facility] for 830AM.

## 2022-11-14 NOTE — ANESTHESIA PROCEDURE NOTES
Airway       Patient location during procedure: OR       Procedure Start/Stop Times: 11/14/2022 5:02 PM  Staff -        CRNA: Elian Abdul APRN CRNA       Performed By: CRNA  Consent for Airway        Urgency: elective  Indications and Patient Condition       Indications for airway management: jeffrey-procedural       Induction type:intravenous       Mask difficulty assessment: 1 - vent by mask    Final Airway Details       Final airway type: endotracheal airway       Successful airway: ETT - single  Endotracheal Airway Details        ETT size (mm): 7.0       Cuffed: yes       Successful intubation technique: direct laryngoscopy       DL Blade Type: Nair 2       Grade View of Cords: 1       Adjucts: stylet       Position: Right       Measured from: lips       Secured at (cm): 22       Bite block used: None    Post intubation assessment        Placement verified by: capnometry, equal breath sounds and chest rise        Number of attempts at approach: 1       Number of other approaches attempted: 0       Secured with: plastic tape       Ease of procedure: easy       Dentition: Intact and Unchanged    Medication(s) Administered   Medication Administration Time: 11/14/2022 5:02 PM

## 2022-11-15 ENCOUNTER — APPOINTMENT (OUTPATIENT)
Dept: EDUCATION SERVICES | Facility: CLINIC | Age: 42
DRG: 907 | End: 2022-11-15
Payer: COMMERCIAL

## 2022-11-15 ENCOUNTER — APPOINTMENT (OUTPATIENT)
Dept: OCCUPATIONAL THERAPY | Facility: CLINIC | Age: 42
DRG: 907 | End: 2022-11-15
Payer: COMMERCIAL

## 2022-11-15 LAB
ANION GAP SERPL CALCULATED.3IONS-SCNC: 16 MMOL/L (ref 7–15)
BUN SERPL-MCNC: 14.3 MG/DL (ref 6–20)
CALCIUM SERPL-MCNC: 9.1 MG/DL (ref 8.6–10)
CHLORIDE SERPL-SCNC: 98 MMOL/L (ref 98–107)
CREAT SERPL-MCNC: 0.77 MG/DL (ref 0.51–0.95)
DEPRECATED HCO3 PLAS-SCNC: 22 MMOL/L (ref 22–29)
GFR SERPL CREATININE-BSD FRML MDRD: >90 ML/MIN/1.73M2
GLUCOSE BLDC GLUCOMTR-MCNC: 100 MG/DL (ref 70–99)
GLUCOSE BLDC GLUCOMTR-MCNC: 104 MG/DL (ref 70–99)
GLUCOSE BLDC GLUCOMTR-MCNC: 120 MG/DL (ref 70–99)
GLUCOSE BLDC GLUCOMTR-MCNC: 123 MG/DL (ref 70–99)
GLUCOSE BLDC GLUCOMTR-MCNC: 86 MG/DL (ref 70–99)
GLUCOSE SERPL-MCNC: 113 MG/DL (ref 70–99)
HOLD SPECIMEN: NORMAL
MAGNESIUM SERPL-MCNC: 2.6 MG/DL (ref 1.7–2.3)
PHOSPHATE SERPL-MCNC: 3.1 MG/DL (ref 2.5–4.5)
POTASSIUM SERPL-SCNC: 4.5 MMOL/L (ref 3.4–5.3)
POTASSIUM SERPL-SCNC: 4.5 MMOL/L (ref 3.4–5.3)
SODIUM SERPL-SCNC: 136 MMOL/L (ref 136–145)

## 2022-11-15 PROCEDURE — 250N000013 HC RX MED GY IP 250 OP 250 PS 637

## 2022-11-15 PROCEDURE — 250N000011 HC RX IP 250 OP 636

## 2022-11-15 PROCEDURE — 97530 THERAPEUTIC ACTIVITIES: CPT | Mod: GO

## 2022-11-15 PROCEDURE — 120N000002 HC R&B MED SURG/OB UMMC

## 2022-11-15 PROCEDURE — 250N000011 HC RX IP 250 OP 636: Performed by: SURGERY

## 2022-11-15 PROCEDURE — 80048 BASIC METABOLIC PNL TOTAL CA: CPT

## 2022-11-15 PROCEDURE — 84100 ASSAY OF PHOSPHORUS: CPT

## 2022-11-15 PROCEDURE — 250N000013 HC RX MED GY IP 250 OP 250 PS 637: Performed by: SURGERY

## 2022-11-15 PROCEDURE — 83735 ASSAY OF MAGNESIUM: CPT

## 2022-11-15 PROCEDURE — 250N000009 HC RX 250

## 2022-11-15 PROCEDURE — 36592 COLLECT BLOOD FROM PICC: CPT

## 2022-11-15 RX ORDER — MULTIPLE VITAMINS W/ MINERALS TAB 9MG-400MCG
1 TAB ORAL DAILY
Status: DISCONTINUED | OUTPATIENT
Start: 2022-11-15 | End: 2022-11-16 | Stop reason: HOSPADM

## 2022-11-15 RX ADMIN — ACETAMINOPHEN 650 MG: 325 TABLET, FILM COATED ORAL at 21:45

## 2022-11-15 RX ADMIN — Medication 5 ML: at 04:19

## 2022-11-15 RX ADMIN — Medication 1000 MCG: at 12:59

## 2022-11-15 RX ADMIN — POTASSIUM CHLORIDE 10 MEQ: 7.46 INJECTION, SOLUTION INTRAVENOUS at 00:15

## 2022-11-15 RX ADMIN — Medication 5 ML: at 04:18

## 2022-11-15 RX ADMIN — ACETAMINOPHEN 650 MG: 325 TABLET, FILM COATED ORAL at 04:25

## 2022-11-15 RX ADMIN — TOPICAL ANESTHETIC 1 ML: 200 SPRAY DENTAL; PERIODONTAL at 21:46

## 2022-11-15 RX ADMIN — Medication 1 TABLET: at 14:25

## 2022-11-15 RX ADMIN — ENOXAPARIN SODIUM 40 MG: 40 INJECTION SUBCUTANEOUS at 08:18

## 2022-11-15 RX ADMIN — POTASSIUM CHLORIDE 10 MEQ: 7.46 INJECTION, SOLUTION INTRAVENOUS at 01:00

## 2022-11-15 RX ADMIN — ENOXAPARIN SODIUM 40 MG: 40 INJECTION SUBCUTANEOUS at 20:44

## 2022-11-15 RX ADMIN — BUPROPION HYDROCHLORIDE 75 MG: 75 TABLET, FILM COATED ORAL at 12:59

## 2022-11-15 RX ADMIN — OMEPRAZOLE 40 MG: 20 CAPSULE, DELAYED RELEASE ORAL at 16:16

## 2022-11-15 RX ADMIN — THIAMINE HCL TAB 100 MG 100 MG: 100 TAB at 12:59

## 2022-11-15 RX ADMIN — BUPROPION HYDROCHLORIDE 75 MG: 75 TABLET, FILM COATED ORAL at 20:44

## 2022-11-15 RX ADMIN — Medication 1 PACKET: at 20:44

## 2022-11-15 ASSESSMENT — ACTIVITIES OF DAILY LIVING (ADL)
ADLS_ACUITY_SCORE: 27

## 2022-11-15 NOTE — PROGRESS NOTES
"Care Management Follow Up    Length of Stay (days): 14    Expected Discharge Date: 11/17/2022     Concerns to be Addressed: no discharge needs identified     Patient plan of care discussed at interdisciplinary rounds: Yes    Anticipated Discharge Disposition: Home     Anticipated Discharge Services: None  Anticipated Discharge DME: None    Patient/family educated on Medicare website which has current facility and service quality ratings: no  Education Provided on the Discharge Plan: yes    Patient/Family in Agreement with the Plan: unable to assess    Referrals Placed by CM/SW:  Home Infusion   Private pay costs discussed: Not applicable    Additional Information:  Received update from [facility] that patient does not meet criteria for her insurance to cover the tube feeding formula.  Quote for formula is \"[facility] quote for that is: $32.15 per day for 5 cans of Vital HP ($6.43 per can) and Prosource is $2.40 per packet. [facility] quote is: $47.14 per day.\"  [facility] liaisonJrodi, met with patient and she prefers to use [facility] as they are the cheaper option.  Referral sent from [facility] to [facility].  Will f/u with [facility] when discharge date is known.  Dietician may change pt's formula to a cheaper option.  RNCC will continue to follow and assist with discharge planning.       [facility])  Ph: [phone]  Fax: [phone]  Lissy Guzman Ph: [phone]    CLARISSE Gonzales  Phone: [phone]  Pager: [phone]    SEARCHABLE in [facility] - search CARE COORDINATOR     [place] ([phone]) Saturday & Sunday; ([phone]) [facility] Recognized Holidays     Units: 4A, 4C, 4E, 5A & 5B   Pager: [phone]    Units: 6A & 6B    Pager: [phone]    Units: 6C & 6D   Pager: [phone]    Units: 7A, 7B, 7C, 7D & 5C    Pager: [phone]    Units: [facility] ED, 5 Ortho, 5 Med/Surg, 6 Med/Surg, 8A, 10 ICU, & [facility]    Pager: [phone]         "

## 2022-11-15 NOTE — PROVIDER NOTIFICATION
7B 221  Leah Beau   Patient is reporting soar throat, and sensation of dry blood in throat emesis x1 today after brushing teeth. Could you order lozenges for patient?  Penelope Rowell *[phone]  Paged Marc Oconnell MD

## 2022-11-15 NOTE — PROGRESS NOTES
/70 (BP Location: Right arm)   Pulse 64   Temp (!) 95.1  F (35.1  C) (Oral)   Resp 20   Ht 1.829 m (6')   Wt 127.1 kg (280 lb 3.3 oz)   LMP 11/14/2022 (Approximate)   SpO2 95%   BMI 38.00 kg/m      Time: 2748-7695.  Reason for Admission: s/p POD#1 Upper endoscopy, gastric stricture dilation, & NJ placement.   Activity: SBA.   Neuro: A&O x4. Lethargic. Calls appropriately.  Cardiac: WDL. Denies chest pain.   Respiratory: WDL on 1L NC, satting mid-90s. LS clear. Denies SOB.   GI/: Voiding spontaneously, AUOP. Hypoactive BS, - flatus. No BM this shift.    Diet: Bariatric fulls. Continuous TF.    Skin/Incisions/Drains: NJ w/ continuous TF@ 10mL/hr + FWF 60mL q4hr, increase to 20mL @ 9AM. Old lap sites, CDI.   Lines: L TL PICC - red: infusing LR tko, white & grey: hep locked.   Labs: Reviewed. Mg & K replaced. , 123.    Pain/Nausea: C/o sore throat. Denies nausea.   New changes this shift: X  Plan: Increase TF to 20mL/hr @ 9AM, as tolerated. Continue POC.

## 2022-11-15 NOTE — PROGRESS NOTES
General Surgery Progress Note  11/15/2022   ------------------------------------------------------------------------------------------------  Subjective:    Feeling much improved from yesterday. Pain is much better controlled. Has continued to require supplemental O2, but now down to 1 L NC at time of evaluation. Has been tolerating sips of clears without difficulty swallowing. However had a bout of emesis this afternoon with brushing teeth.   ------------------------------------------------------------------------------------------------  Objective:  Temp:  [95.1  F (35.1  C)-97.4  F (36.3  C)] 96.9  F (36.1  C)  Pulse:  [64-83] 74  Resp:  [12-20] 16  BP: (114-134)/(60-80) 127/77  SpO2:  [87 %-96 %] 96 %    I/O last 3 completed shifts:  In: 1580 [I.V.:1450]  Out: 740 [Urine:650; Emesis/NG output:90]     Weight: 280 lbs     Gen: Awake, interactive, NAD  HEENT: NJ tube in place with tube feed running at 10 mL/hr.   Resp: breathing comfortably on room air  CV: regular rate, appears well perfused  Abd: soft, nondistended, mild tenderness across upper abdomen.   Incision: c/d/I, healing appropriately.   Ext: palpable pulses, no edema     Labs:  BMP: Anion gap 16 (H), Glucose 113 (H), o/w WNL  M.6 (H)   Phos: WNL  K: WNL    Imaging:  CXR   IMPRESSION:   1.  Stable to slightly decreased moderate sized left-sided pleural  effusion.  2.  Decreased bibasilar atelectasis.    =======================================================  Assessment/Plan:   42 year old female with who is s/p laparoscopic sleeve gastrectomy and hiatal hernia repair on 10/25/22 with Dr. Aragon who returned to the ED with LUQ abdominal and shoulder pain, was found to have bilateral lower lobe pneumonia and new oxygen requirement. No evidence of PE. RUQ US obtained to evaluate elevated Tbili, found to have septated fluid collection along left hepatic lobe consistent with hematoma, likely the explanation for her acute blood loss anemia post  operatively. This was again seen on CT scan 11/3. No evidence to suggest ongoing bleeding. UGI study obtained without evidence for leak. However, Leah continued to struggle with pain and decision was made to perform exploratory laparoscopy. She is now s/p diagnostic laparoscopy with evacuation of abdominal hematoma on 11/4. During the operation, a stitch was removed from the hiatal repair. She was admitted to the SICU post operatively for mechanical ventilatory support. Initially with intermittent fevers, now appear resolved. Elevated inflammatory markers but never had leukocytosis. She was extubated to BIPAP then HFCN 11/6, now on NC. Of note, BAL aerobic culture from 11/5 positive for 1+ Essence dubliniensis, however, based on imaging and weights above admission weight suspect there may be a component of volume overload contributing to her respiratory issues. Transferred out of SICU 11/13 with decreasing supplemental O2 dependence. Performed endoscopy with dilation of gastric stricture and NJ placement on 11/14 due to dysphagia and intolerance of liquids. Improved tolerance of PO since procedure, was able to tolerate sips of water overnight. Pain and discomfort is much improved.      - multimodal pain control  - wean O2, now on room air  - encourage IS and ambulation  - appreciate dietician recommendations, titrate up tube feeds to 20 ml/hr today   - wean as diet advances   - can drink bariatric full liquid diet today if tolerating   - vitron C and iron supplementation daily  - Hgb stable, continue lovenox  - continue PPI BID  - dispo planning. On tube feeds. Anticipate discharge in coming days, will need to plan for home nutrition, follow up with Dr. Aragon for repeat endoscopy in 2 weeks.     Seen, examined, and discussed with chief resident, who will discuss with staff.    This note was dictated by Mike Correa MS3. Edits and Corrections were made by me.     Desean Van MD PGY1  General Surgery Resident -  [facility]

## 2022-11-15 NOTE — ANESTHESIA CARE TRANSFER NOTE
Patient: Leah Yanez    Procedure: Procedure(s):  Upper endoscopy; gastric stricture dilation, interpretation of fluoroscopy nasojejunal feeding tube       Diagnosis: Esophageal stricture [K22.2]  Diagnosis Additional Information: No value filed.    Anesthesia Type:   General     Note:    Oropharynx: oropharynx clear of all foreign objects  Level of Consciousness: awake  Oxygen Supplementation: face mask  Level of Supplemental Oxygen (L/min / FiO2): 8  Independent Airway: airway patency satisfactory and stable  Dentition: dentition unchanged  Vital Signs Stable: post-procedure vital signs reviewed and stable  Report to RN Given: handoff report given  Patient transferred to: PACU    Handoff Report: Identifed the Patient, Identified the Reponsible Provider, Reviewed the pertinent medical history, Discussed the surgical course, Reviewed Intra-OP anesthesia mangement and issues during anesthesia, Set expectations for post-procedure period and Allowed opportunity for questions and acknowledgement of understanding      Vitals:  Vitals Value Taken Time   /77 11/14/22 1824   Temp     Pulse 90 11/14/22 1825   Resp     SpO2 94 % 11/14/22 1825   Vitals shown include unvalidated device data.    Electronically Signed By: ELISA Moran CRNA  November 14, 2022  6:26 PM

## 2022-11-15 NOTE — CONSULTS
Met with patient and spouse at the bedside for NJ tube education and enteral tube feeding demonstration. Verbally reviewed basic cares and safety of the NJ tube, when to call MD, when to call 911.    Patient' spouse demonstrated giving meds and flushing demo tube with water, clamping and unclamping at the appropriate times.     Patient's spouse was able to operate the enteralite infinity pump, set the rate/dose, prime the pump, administer feeding and correctly disconnect the tubing and flush demo NJ tube with water when done with feeding. Talked about storage of formula/feeding bag.     Patient and spouse were engaged in education and asking appropriate questions. Encouraged them to practice giving meds/flushing the tube with bedside nurses.    Literature given: NG/NJ Tube Home Care Instructions, Handwashing and Skin Care, Using the Enteralite Infinity Pump for Tube Feeding.

## 2022-11-15 NOTE — PLAN OF CARE
/72 (BP Location: Right arm, Patient Position: Semi-Torres's, Cuff Size: Adult Large)   Pulse 67   Temp (!) 96.1  F (35.6  C) (Oral)   Resp 18   Ht 1.829 m (6')   Wt 127.1 kg (280 lb 3.3 oz)   LMP 11/14/2022 (Approximate)   SpO2 93%   BMI 38.00 kg/m       Neuro: A/Ox4. Pt able to make needs known.  Respiratory: On room air. SOB on exertion.   Cardiac: WDL. Denies cardiac chest pain.  Diet: Bariatric clears. Poor appetite due to nausea and vomiting. Takes sips of water. On TF. NJ infusing 20 mL/hr. Increase 10mL q8. Next increase at 1700 to 30mL. Goal rate 60mL.   GI/: +BS. Pt had a BM on this shift. Voiding without difficulties.   Incision/Drains: Abdominal lap site and bruising.   IV Access: Left PICC.  Pain/nausea: Pt reports having less nausea than yesterday. 2 episodes of emesis with dark blood streaks, 90 mL.   patient reports tolerable abdominal pain.   Labs: reviewed. No electrolyte replacement needed.  Activity: up with SBA to bathroom  Plan: Continue with POC.

## 2022-11-15 NOTE — PROGRESS NOTES
[facility]     Received referral from Deidre Colon RNCC for Enteral feedings.  Benefits verified.  Pt has [facility] plan that will cover enteral feeding supplies, ded $1900(met in full), 80/20 coverage up to max oop $6650(met $5094.29).  The plan requires sole source of nutrition in order for formula to be covered, per our nutrition team she does not meet this criteria and will be self-pay for formula.   [facility] cost for Vital HP 5cartons/day and 1 packet of Prosource is $47.14/day.  [facility] cost for Vital HP 5 cans/day ($6.43 per can) is $32.15/day and Prosource is $2.40 per packet.     Spoke with patient to review home infusion services, review benefits and offer choice of providers.      Patient would like to use [facility] for home infusion.  She would also like to have RD evaluate if there is a less expensive comparable formula available.  RNCC updated on patient's choice.  [facility] will send referral to [facility].        Thank you for the referral.     BENY Phillip  [facility] Nurse Liaison   [email or web address]  Cell: [phone] M-F  [facility] Main: [phone]

## 2022-11-15 NOTE — PROGRESS NOTES
I saw Leah.    Looks the best she has in a few weeks.    /77 (BP Location: Right arm)   Pulse 74   Temp 96.9  F (36.1  C) (Oral)   Resp 16   Ht 1.829 m (6')   Wt 111.3 kg (245 lb 4.8 oz)   LMP 11/14/2022 (Approximate)   SpO2 96%   BMI 33.27 kg/m        Intake/Output Summary (Last 24 hours) at 11/15/2022 1714  Last data filed at 11/15/2022 1429  Gross per 24 hour   Intake 1580 ml   Output 740 ml   Net 840 ml       Her clinical situation demands complete nutrition by enteral route.  I would like to discharge her tomorrow home with plans for her to receive 100% of calories that way.  Will do that until I see her for follow-up endoscopy Wednesday.    Daniel Aragon MD  Surgery  [phone] (hospital )  [phone] (clinic nurses)

## 2022-11-15 NOTE — PROGRESS NOTES
CLINICAL NUTRITION SERVICES - BRIEF NOTE     Nutrition Prescription    RECOMMENDATIONS FOR MDs/PROVIDERS TO ORDER:  - Pt will be discharging on standard TF formula--Osmolite 1.5 starting with goal of 50 ml/hr/day plus 1 pkt of Prosource TF20.  (Or substitute 2 Prosource TF (11 gm protein per pkt).        Recommendations already ordered by Registered Dietitian (RD):  - Switch to Osmolite 1.5 Salomon @ goal of  50ml/hr  (1200ml/day)  = 1 pkt Prosource TF20 will provide: 1880 kcals, 95 g PRO, 914 ml free H20, 244 g CHO, and 0 g fiber daily.  - Increase FWF to 120 ml q 4 hr to better meet fluid needs.   - Collaborate with other providers--[facility] liaison, RN CC, bedside RN.     Future/Additional Recommendations:  - follow electrolytes, TF tolerance, po intake.      EVALUATION OF THE PROGRESS TOWARD GOALS   Diet: bariatric full liquid diet.  Beneprotein w/ meals.   TF: Vital High Protein--started @ 10 ml/hr and increased to 20 ml/hr earlier today.    FWF: 60 ml q 4 hr.  Intake: pt notes that since having NJ placed she is still getting accustomed to swallowing with nasal FT hanging in the back of her throat.  She notes that during her EDG there was some bleeding.  She feels like she is nauseated from old blood coming up into her esophagus and back of her throat.     Pt tried to drink a little of a plant based supplement drink-only about 30 ml but then felt sick. She feels she'll be able to start eating as soon as the issues with old blood resolve.  She states she has been tolerating sipping on water all day and thinks she drank about 18 oz thus far this afternoon.     Noted IVFs discontinued.       NEW FINDINGS   Labs--Mg 2.6, Phos 3.1, glucose 113, K+ 4.5.  Weights--unfortunately most of weights this admission were bed scale that skewed true weights.  Minimal nutrition since 10/25--3 weeks ago.  Wt down 13.7% in past 6 months.   11/15/22 1048 111.3 kg (245 lb 4.8 oz) Standing scale   11/12/22 0600 127.1 kg (280 lb 3.3 oz) Bed  scale   11/10/22 0000 119.5 kg (263 lb 7.2 oz) Bed scale   11/09/22 0000 119.1 kg (262 lb 9.1 oz) Bed scale   11/08/22 0600 137.2 kg (302 lb 7.5 oz) Bed scale   11/07/22 0600 138.8 kg (306 lb) Bed scale   11/05/22 0000 -- Bed scale   11/04/22 2127 126 kg (277 lb 12.5 oz) Bed scale   11/01/22 1641 125.9 kg (277 lb 8 oz)      05/19/22 129 kg (284 lb 4.8 oz)     11/14--during EGD, noted surgeon dilated gastric sleeve with balloon to 18 cm (was previously strictured and only 10 cm across).      INTERVENTIONS  Education--discussed switching TF formula to reduce cost.  Pt will be able to order just 1 week at a time and adjust as needed as she was told TF was expected to be needed for a very short term.  Enteral Nutrition--adjust formula  FWF: increase flushes to 120 ml q 4 hr.     Monitoring/Evaluation  Progress toward goals will be monitored and evaluated per protocol.     Farrah Andrews RD, LD   7B (M-F) Pager: [phone]  RD Weekend/Holiday Pager: [phone]

## 2022-11-15 NOTE — PROGRESS NOTES
Admitted/transferred from: PACU    2 RN full   skin assessment completed by Jenny Oviedo, RN and BENY Hilliard.     Skin assessment finding: issues found old abd lap sites.      Interventions/actions: skin interventions encourage fluids, promote ambulation as tolerated.      Bedside Emergency Equipment Present:  Suction Regulator: Yes  Suction Canister: Yes  Tubing between Regulator and Canister: Yes  O2 Regulator with Tree: Yes  Ambu Bag: Yes

## 2022-11-15 NOTE — PHARMACY-CONSULT NOTE
Pharmacy Tube Feeding Consult    Medication reviewed for administration by feeding tube and for potential food/drug interactions.    Recommendation: Recommend changing the following medications to a liquid dosage form: omeprazole - this has been done for you.     Pharmacy will continue to follow as new medications are ordered.    Nanette Tamayo, PharmD

## 2022-11-15 NOTE — OP NOTE
[facility]    Operative Note    Pre-operative diagnosis: Esophageal stricture [K22.2]   Post-operative diagnosis * No post-op diagnosis entered *   Procedure: Procedure(s):  Upper endoscopy; gastric stricture dilation, interpretation of fluoroscopy nasojejunal feeding tube   Surgeon: Surgeon(s) and Role:     * Daniel Aragon MD - Primary   Anesthesia: General    Estimated blood loss: Less than 10 ml   Drains: None   Specimens: * No specimens in log *   Findings: None.   Complications: None.   Implants: None.     COMORBIDITIES:   Past Medical History:   Diagnosis Date     Anti-cardiolipin antibody positive      Griggs esophagus      Concussion 2016     Depressive disorder, not elsewhere classified 03/01/2006    Resolved 8/07     Gastroesophageal reflux disease with esophagitis      Hiatal hernia      History of pulmonary embolism      Obesity      Raynaud's syndrome     past history of admission for gangrene of one finger       OPERATIVE INDICATIONS: Leah Yanez is a 42 year old female who is morbidly obese and underwent prior sleeve gastrectomy surgery 20 days earlier; she has had dysphagia and inability to tolerate liquids.     We planned balloon dilatation, possible stent, nasojejunal feeding tube.  Risks discussed.    Height: 182.9 cm (6') Weight: 127.1 kg (280 lb 3.3 oz) Body mass index is 38 kg/m ..  After understanding the risks and benefits of proceeding with EGD, she agreed to the procedure.    OPERATIVE PROCEDURE:    A timeout procedure was performed.  The patient was supine under general anesthesia with oral bite block placed.    Endoscope (~10mm diameter) was introduced orally and advanced through esophagus and sleeve of stomach.    Absolutely NO stricture in esophagus; the z-line (end of esophagus was located about 41cm from bite block, which indicates that all of stomach is in the abdomen (no hiatal hernia).    The scope passed easily through  the sleeve of stomach with passage to the pylorus unimpeded.    I used 16-21mm balloon dilator to dilate through the body of the sleeve; placing the balloon under direct visualization and using fluoroscopy to guide dilatation to 18mm maximally and I left the balloon at 18mm for 5 minutes and the balloon pulled gently through the sleeve.  Absolutely no obstruction at the diaphragm or in the esophagus.    I elected NOT to place stent due to open nature of the sleeve.  Will use nasojejunal feeds to bridge to oral; perhaps a second endoscopy would be needed, which can be done in 2 weeks.    I passed 5mm pediatric scope through right nares and through esophagus, sleeve, past pylorus and through duodenum to proximal jejunum.    Under fluoroscopy guidance, I placed wire and then removed the scope and passed an 8Fr feeding tube over this.  (I cut off the radiopaque tip to allow wire guided passage).    I confirmed placement of the tip post-pyloric after wire removal.    Tube was taped to nose.      I performed (and thus was available for) all critical components of the operation.    Daniel Aragon MD  Surgery  [phone] (hospital )  [phone] (clinic nurses)

## 2022-11-15 NOTE — ANESTHESIA POSTPROCEDURE EVALUATION
Patient: Leah Yanez    Procedure: Procedure(s):  Upper endoscopy; gastric stricture dilation, interpretation of fluoroscopy nasojejunal feeding tube       Anesthesia Type:  General    Note:  Disposition: Inpatient   Postop Pain Control: Uneventful            Sign Out: Well controlled pain   PONV: No   Neuro/Psych: Uneventful            Sign Out: Acceptable/Baseline neuro status   Airway/Respiratory: Uneventful            Sign Out: Acceptable/Baseline resp. status   CV/Hemodynamics: Uneventful            Sign Out: Acceptable CV status; No obvious hypovolemia; No obvious fluid overload   Other NRE:    DID A NON-ROUTINE EVENT OCCUR?            Last vitals:  Vitals Value Taken Time   /83 11/14/22 1915   Temp 36.3  C (97.4  F) 11/14/22 1830   Pulse 79 11/14/22 1917   Resp 12 11/14/22 1845   SpO2 94 % 11/14/22 1943   Vitals shown include unvalidated device data.    Electronically Signed By: Jeremias Walker Junior, MD  November 14, 2022  7:44 PM

## 2022-11-16 ENCOUNTER — APPOINTMENT (OUTPATIENT)
Dept: OCCUPATIONAL THERAPY | Facility: CLINIC | Age: 42
DRG: 907 | End: 2022-11-16
Payer: COMMERCIAL

## 2022-11-16 ENCOUNTER — TELEPHONE (OUTPATIENT)
Dept: GASTROENTEROLOGY | Facility: CLINIC | Age: 42
End: 2022-11-16

## 2022-11-16 VITALS
SYSTOLIC BLOOD PRESSURE: 108 MMHG | DIASTOLIC BLOOD PRESSURE: 74 MMHG | HEART RATE: 100 BPM | TEMPERATURE: 96.4 F | HEIGHT: 72 IN | WEIGHT: 244.8 LBS | RESPIRATION RATE: 18 BRPM | BODY MASS INDEX: 33.16 KG/M2 | OXYGEN SATURATION: 95 %

## 2022-11-16 LAB
ANION GAP SERPL CALCULATED.3IONS-SCNC: 17 MMOL/L (ref 7–15)
BUN SERPL-MCNC: 21.3 MG/DL (ref 6–20)
CALCIUM SERPL-MCNC: 9 MG/DL (ref 8.6–10)
CHLORIDE SERPL-SCNC: 101 MMOL/L (ref 98–107)
CREAT SERPL-MCNC: 0.84 MG/DL (ref 0.51–0.95)
DEPRECATED HCO3 PLAS-SCNC: 22 MMOL/L (ref 22–29)
GFR SERPL CREATININE-BSD FRML MDRD: 88 ML/MIN/1.73M2
GLUCOSE BLDC GLUCOMTR-MCNC: 100 MG/DL (ref 70–99)
GLUCOSE BLDC GLUCOMTR-MCNC: 84 MG/DL (ref 70–99)
GLUCOSE BLDC GLUCOMTR-MCNC: 99 MG/DL (ref 70–99)
GLUCOSE SERPL-MCNC: 98 MG/DL (ref 70–99)
HOLD SPECIMEN: NORMAL
MAGNESIUM SERPL-MCNC: 2.2 MG/DL (ref 1.7–2.3)
PHOSPHATE SERPL-MCNC: 3.1 MG/DL (ref 2.5–4.5)
POTASSIUM SERPL-SCNC: 3.5 MMOL/L (ref 3.4–5.3)
SODIUM SERPL-SCNC: 140 MMOL/L (ref 136–145)

## 2022-11-16 PROCEDURE — 250N000013 HC RX MED GY IP 250 OP 250 PS 637

## 2022-11-16 PROCEDURE — 36415 COLL VENOUS BLD VENIPUNCTURE: CPT

## 2022-11-16 PROCEDURE — 250N000013 HC RX MED GY IP 250 OP 250 PS 637: Performed by: SURGERY

## 2022-11-16 PROCEDURE — 84100 ASSAY OF PHOSPHORUS: CPT

## 2022-11-16 PROCEDURE — 80048 BASIC METABOLIC PNL TOTAL CA: CPT

## 2022-11-16 PROCEDURE — 250N000011 HC RX IP 250 OP 636

## 2022-11-16 PROCEDURE — 83735 ASSAY OF MAGNESIUM: CPT

## 2022-11-16 PROCEDURE — 97535 SELF CARE MNGMENT TRAINING: CPT | Mod: GO

## 2022-11-16 RX ORDER — DEXTROSE MONOHYDRATE 25 G/50ML
25-50 INJECTION, SOLUTION INTRAVENOUS
Status: DISCONTINUED | OUTPATIENT
Start: 2022-11-16 | End: 2022-11-16 | Stop reason: HOSPADM

## 2022-11-16 RX ORDER — ENOXAPARIN SODIUM 100 MG/ML
40 INJECTION SUBCUTANEOUS 2 TIMES DAILY
Qty: 22.4 ML | Refills: 0 | Status: ON HOLD | OUTPATIENT
Start: 2022-11-16 | End: 2022-12-15

## 2022-11-16 RX ORDER — HYDROXYZINE HYDROCHLORIDE 25 MG/1
25 TABLET, FILM COATED ORAL EVERY 6 HOURS PRN
Qty: 20 TABLET | Refills: 0 | Status: SHIPPED | OUTPATIENT
Start: 2022-11-16 | End: 2022-11-21

## 2022-11-16 RX ORDER — ONDANSETRON 4 MG/1
4 TABLET, ORALLY DISINTEGRATING ORAL EVERY 6 HOURS PRN
Qty: 12 TABLET | Refills: 0 | Status: SHIPPED | OUTPATIENT
Start: 2022-11-16 | End: 2022-11-19

## 2022-11-16 RX ORDER — NICOTINE POLACRILEX 4 MG
15-30 LOZENGE BUCCAL
Status: DISCONTINUED | OUTPATIENT
Start: 2022-11-16 | End: 2022-11-16 | Stop reason: HOSPADM

## 2022-11-16 RX ADMIN — BUPROPION HYDROCHLORIDE 75 MG: 75 TABLET, FILM COATED ORAL at 08:17

## 2022-11-16 RX ADMIN — Medication 15 ML: at 05:38

## 2022-11-16 RX ADMIN — ENOXAPARIN SODIUM 40 MG: 40 INJECTION SUBCUTANEOUS at 08:57

## 2022-11-16 RX ADMIN — Medication 1 PACKET: at 08:17

## 2022-11-16 RX ADMIN — ONDANSETRON 4 MG: 4 TABLET, ORALLY DISINTEGRATING ORAL at 08:53

## 2022-11-16 RX ADMIN — OMEPRAZOLE 40 MG: 20 CAPSULE, DELAYED RELEASE ORAL at 08:17

## 2022-11-16 RX ADMIN — HYDROXYZINE HYDROCHLORIDE 25 MG: 25 TABLET, FILM COATED ORAL at 08:47

## 2022-11-16 RX ADMIN — Medication 1000 MCG: at 08:17

## 2022-11-16 ASSESSMENT — ACTIVITIES OF DAILY LIVING (ADL)
ADLS_ACUITY_SCORE: 27

## 2022-11-16 NOTE — PROGRESS NOTES
General Surgery Progress Note  11/16/2022   ------------------------------------------------------------------------------------------------  Subjective:    Continues to feel much better after endoscopy. Had some issues last night gagging on NJ tube with episode of emesis.   ------------------------------------------------------------------------------------------------  Objective:  BP (!) 140/85 (BP Location: Right arm)   Pulse 74   Temp (!) 96.7  F (35.9  C) (Oral)   Resp 18   Ht 1.829 m (6')   Wt 111 kg (244 lb 12.8 oz)   LMP 11/14/2022 (Approximate)   SpO2 97%   BMI 33.20 kg/m       Weight: 244 lbs     Gen: Awake, interactive, NAD  HEENT: NJ tube in place with tube feed running at 40 mL/hr.   Resp: breathing comfortably on room air  CV: regular rate, appears well perfused  Abd: soft, nondistended, nontender.   Incision: c/d/I, healing appropriately.   Ext: palpable pulses, no edema     Labs:  All labs reviewed    Imaging:  No new imaging.    =======================================================  Assessment/Plan:   42 year old female with who is s/p laparoscopic sleeve gastrectomy and hiatal hernia repair on 10/25/22 with Dr. Aragon who returned to the ED with LUQ abdominal and shoulder pain, was found to have bilateral lower lobe pneumonia and new oxygen requirement. No evidence of PE. RUQ US obtained to evaluate elevated Tbili, found to have septated fluid collection along left hepatic lobe consistent with hematoma, likely the explanation for her acute blood loss anemia post operatively. This was again seen on CT scan 11/3. No evidence to suggest ongoing bleeding. UGI study obtained without evidence for leak. However, Leah continued to struggle with pain and decision was made to perform exploratory laparoscopy. She is now s/p diagnostic laparoscopy with evacuation of abdominal hematoma on 11/4. During the operation, a stitch was removed from the hiatal repair. She was admitted to the SICU post  operatively for mechanical ventilatory support. Initially with intermittent fevers, now appear resolved. Elevated inflammatory markers but never had leukocytosis. She was extubated to BIPAP then HFCN 11/6, now on NC. Of note, BAL aerobic culture from 11/5 positive for 1+ Essence dubliniensis, however, based on imaging and weights above admission weight suspect there may be a component of volume overload contributing to her respiratory issues. Transferred out of SICU 11/13 with decreasing supplemental O2 dependence. Performed endoscopy with dilation of gastric body and NJ placement on 11/14 due to dysphagia and intolerance of liquids.     - multimodal pain control  - wean O2, now on room air  - encourage IS and ambulation  - patient's clinical condition demands complete nutrition via enteral route and will need to receive 100% of her calories via tube feeds at home  - appreciate dietician recommendations, titrate up to goal as tolerated  - vitron C and iron supplementation daily  - continue lovenox, plan for discharge home on Lovenox 40mg BID x 4 weeks  - continue PPI BID  Dispo: Medically ready for discharge. Planning to discharge this afternoon pending home care arranged for home tube feeds. Remove PICC prior to discharge. Plan for follow up with Dr. Aragon in 2 weeks for upper endoscopy.    Patient and plan discussed with staff.    Danni Clifton MD  General Surgery Resident

## 2022-11-16 NOTE — TELEPHONE ENCOUNTER
Attempted to contact patient regarding upcoming EGD procedure on 11/23/22 for pre assessment questions. No answer.     Left message to return call to [phone] #[phone]    Covid test scheduled? no Discuss at home rapid antigen COVID test 1-2 days prior to procedure.    Arrival time: 0730    Facility location: [facility]     Sedation type: CS     Indication for procedure: s/p sleeve gastrectomy     Anticoagulants: Enoxaparin (Lovenox). Holding interval of 24 hours    Diabetic? No     Elizabeth Hutson RN

## 2022-11-16 NOTE — PLAN OF CARE
/65 (BP Location: Right arm)   Pulse 78   Temp 97.1  F (36.2  C) (Oral)   Resp 18   Ht 1.829 m (6')   Wt 111.3 kg (245 lb 4.8 oz)   LMP 2022 (Approximate)   SpO2 93%   BMI 33.27 kg/m        Shift: 8118-8520  Status: Hypoxemia.   Neuro: A&O x 4.   Resp: WDL. RA.   Cardiac: WDL. Denies cardiac chest pain.   GI/: Bariatric full liquid diet. Emesis x 2 due to sensitive gag reflux. TF Vital HP infusing at 40 mL/hr. Goal rate 60 mL/hr. Last increase 0130, next 10 mL increase due at 0930. B and 100.   Skin: Warm and dry.  Activity: SBA.   Incision & Drainage: PICC x 3 lumens-heparin locked.   Pain: 2/10. Gave scheduled tylenol and PRN hurricane spray.   Labs: Reviewed.   Changes: No acute changes.   Plan: Possibly discharging today.       Goal Outcome Evaluation: No change.

## 2022-11-16 NOTE — PLAN OF CARE
Goal Outcome Evaluation: adequate for discharge        VSS. A&Ox4; calling appropriately. Sats mid 90s on RA. SOB with exertion. Pt denies pain. Pt reports some anxiousness with going home, PRN atarax given X1 with relief.  NJ with continuous tube feeds. Formula switched to Osmolite 1.5 and infusing at 30 mL/hr. Tolerating well. PICC removed prior to discharge. [facility] at bedside to do TF pump teaching. Pt declined lovenox teaching as she reports she was recently discharged from the hospital and the teaching was done.   Discharge paperwork reviewed with patient and spouse. Both expressed understanding. A copy was signed and given to patient. Pt was sent home with a temporary supply of tube feeds.

## 2022-11-16 NOTE — PROGRESS NOTES
Care Management Discharge Note    Discharge Date: 11/16/2022       Discharge Disposition: Home, Home Infusion    Discharge Services: None    Discharge DME: None    Discharge Transportation: family or friend will provide    Private pay costs discussed: Not applicable    PAS Confirmation Code:    Patient/family educated on Medicare website which has current facility and service quality ratings: no    Education Provided on the Discharge Plan:    Persons Notified of Discharge Plans: patient  Patient/Family in Agreement with the Plan: yes    Handoff Referral Completed: Yes    Additional Information:  Received update that patient is medically ready for discharge to home today.  Patient will discharge on tube feedings via NJ tube.  Updated marcell Guzmanison with [facility].  Megan plans on meeting with patient to review TF administration and discuss home infusion services.  Orders completed for home infusion.  Formula changed to Osmolite as this is a more affordable option for pt.  Megan will discuss cost with pt.  RNCC will remain available if further needs arise.          [facility](TF)  Ph: [phone]  Fax: [phone]  Lissy Guzman Ph: [phone]    CLARISSE Gonzales  Phone: [phone]  Pager: [phone]    SEARCHABLE in [facility] - search CARE COORDINATOR     [place] [employer] [place] ([phone]) Saturday & Sunday; ([phone]) [facility] Recognized Holidays     Units: 4A, 4C, 4E, 5A & 5B   Pager: [phone]    Units: 6A & 6B    Pager: [phone]    Units: 6C & 6D   Pager: [phone]    Units: 7A, 7B, 7C, 7D & 5C    Pager: [phone]    Units: [facility] ED, 5 Ortho, 5 Med/Surg, 6 Med/Surg, 8A, 10 ICU, & [facility]    Pager: [phone]

## 2022-11-16 NOTE — PLAN OF CARE
Occupational Therapy Discharge Summary    Reason for therapy discharge:    All goals and outcomes met, no further needs identified.    Progress towards therapy goal(s). See goals on Care Plan in [facility] electronic health record for goal details.  Goals met    Therapy recommendation(s):    Home with assist from  and family as needed

## 2022-11-16 NOTE — PROVIDER NOTIFICATION
"Purpose of Notification: tube feeds  Notified Person: Bashir, (covering Dr. Deshpande)  Notification Time: 1730   Notification Interaction: Paged      Pt does not want to switch tube feed brands. Would like to continue current \"vital high protein\" with current tube feed advancement. Dietician not available. Can you please update tube feed orders to reflect this change? Thanks! Annee    "

## 2022-11-16 NOTE — PLAN OF CARE
Goal Outcome Evaluation: ongoing    Assumed cares for patient [id number]  VSS. A&Ox4; calling appropriately. Currently sats 92-94% on RA with continuous pulse ox. Denies pain. Tolerating clears with no nausea. NJ with tube feeds infusing at 30 mL/hr; next advancement is at 0130 to 40 mL/hr. Dietician placed new orders to switch tube feeds to osmolite 1.5, but they are not available to page at this hour to address. Pt reports brand was only switched d/t costs. Team was AARON paged.  Continue to monitor and follow plan of care.

## 2022-11-16 NOTE — PROGRESS NOTES
CLINICAL NUTRITION SERVICES - BRIEF NOTE     Nutrition Prescription    RECOMMENDATIONS FOR MDs/PROVIDERS TO ORDER:  -none as clarified TF/nutrition plan with resident via phone.    Malnutrition Status:    Severe malnutrition in the context of acute illness     Recommendations already ordered by Registered Dietitian (RD):  - At discharge pt will use Osmolite 1.5.  She will start with rate of 30 ml/hr and can increase by 5 ml q 8 hr as tolerated to goal of 50 ml/hr.  Will also continue with 1 Prosource pckt (either Prosource TF20 or 1 Prosource with 11 gm protein). This will provide: 1860 kcals (21 kcal/kg), 90 g PRO (1 gm Pro/kg), 914 ml free H20, 244 g CHO, and 0 g fiber daily.    Future/Additional Recommendations:  - plan for pt to be monitored by [facility] RD and also follow up with bariatric RD as well.      Spoke with pt this AM re: provisions for Vital HP vs Osmolite 1.5 + Prosource.  Vital HP was dosed below estimated needs with plan for some oral intake.  Pt hasn't really attempted to eat much after hearing from bariatric team that they would have 100% of her needs met with TF and she could just have pleasure feeding. However this was more related to attempts to having TF covered by insurance.  Since the TF is not intended for > 90 days and there is no impediment to oral intake as mentioned in the op report with dilation of her gastric sleeve insurance would still not cover formula.  Osmolite 1.5 order plus prosource does meet lower end of her calorie and protein needs.  Leah opted to go with this.      EVALUATION OF THE PROGRESS TOWARD GOALS   Diet: Bariatric full liquids (room service)  Nutrition Support:  Vital High Protein still hanging and advanced up to 50 ml/hr midmorning when I went to speak with Leah. Goal for this formula was 60 ml/hr.     Osmolite 1.5 @ 30 ml/hr until discharge home today.   Goal:  Osmolite 1.5 Salomon @ 50ml/hr  (1200ml/day) + 1 pkt Prosource TF20 will provide: 1880 kcals (21  kcal/kg), 96 gm protein (1.1 g PRO/kg using updated dosing wt of 88 kg), 914 ml free H20, 244 g CHO, and 0 g fiber daily.   FWF: 60 ml/hr.   Intake:  Due to confusion surrounding which formula would best meet pt's needs or require a longer LOS to reach the long rate, pt requested that the TF formula change back to Vital High Protein last evening.  Since the Osmolite 1.5 formula was delayed in arriving nursing continued using Vital HP overnight.        For the past week, pt has had minimal oral nutrition--< 100 kcal and trace protein.  TF started slowly late on 11/14 would have reached goal of 60 ml/hr later today.  Averaging intake/TF, pt had < 50% of needs met for past week.     NEW FINDINGS   Weights--unfortunately most of weights this admission were via bed scale including 11/1 initial wt. Pt also noted some fluid gain while in ICU.  Minimal nutrition since 10/25--3 weeks ago.  Wt down 11.6% over the past 2 weeks and 13.7% in past 6 months.   11/15/22 1048 111.3 kg (245 lb 4.8 oz) Standing scale   11/12/22 0600 127.1 kg (280 lb 3.3 oz) Bed scale   11/10/22 0000 119.5 kg (263 lb 7.2 oz) Bed scale   11/09/22 0000 119.1 kg (262 lb 9.1 oz) Bed scale   11/08/22 0600 137.2 kg (302 lb 7.5 oz) Bed scale   11/07/22 0600 138.8 kg (306 lb) Bed scale   11/05/22 0000 -- Bed scale   11/04/22 2127 126 kg (277 lb 12.5 oz) Bed scale   11/01/22 1641 125.9 kg (277 lb 8 oz)        05/19/22 129 kg (284 lb 4.8 oz)    Will update dosing wt to 88 kg based on wt of 111 kg 11/15 and IBW of 80 kg.    ASSESSED NUTRITION NEEDS   Estimated Energy Needs: 1760 - 2200 kcals/day (20 - 25 kcals/kg)   Justification: post op bariatric surgery--higher end if pt needs interventions and catabolism not desired in the short term.   Estimated Protein Needs: 92 - 110 - 137 grams protein/day (1 - 1.2  -1.5 grams of pro/kg)   Justification: pending need for further interventions--(standard post bariatric goals vs obesity guidelines)   Estimated Fluid Needs:  2200+ mL/day (25 ml/kg)   Justification: Maintenance and Per provider pending fluid status     11/14--during EGD, noted surgeon dilated gastric sleeve with balloon to 18 cm (was previously strictured and only 10 cm across).      MALNUTRITION  % Intake: </= 50% for >/= 5 days (severe)  % Weight Loss: > 5% in 1 month (severe) 11.6% in 2 weeks  Subcutaneous Fat Loss: deferred due to need to expedite clarification of TF plan for home per request for discharge today.  Muscle Loss: deferred  Fluid Accumulation/Edema: None noted per nursing charting  Malnutrition Diagnosis: Severe malnutrition in the context of acute illness     Previous Goals   Pt to tolerate 90 gm protein daily via PO vs consider nutrition support  Evaluation: Met--eventually started TF    Previous Nutrition Diagnosis  Altered GI function related to hiatal hernia repair and sleeve gastrectomy as evidenced by inability to tolerate post bariatric diet  Evaluation: No longer applicable, nutrition diagnosis changed below    CURRENT NUTRITION DIAGNOSIS  Inadequate protein-energy intake related to earlier issues with tight gastric sleeve that was just dilated 11/14 as evidenced by 11/14 OP note and intolerance of oral intake x past 3 weeks.        INTERVENTIONS  Implementation  Education--discussed plan for transition to Osmolite 1.5 @ 30 ml/hr to avoid jump in CHO load with switch from low carb Vital HP formula.  To avoid refeeding syndrome at home without daily labs, pt will increase by 5 ml q 8 hr to goal of 50 ml/hr plus 1 pkt Prosource TF (15 gm protein, 60 kcal--on [facility] formulary)  Collaboration with other providers--RN CC, [facility] liaison, bedside RN, bariatric surgery resident.  Enteral Nutrition - Modify schedule    Goals  Total avg nutritional intake to meet a minimum of 20 kcal/kg and 1 g PRO/kg daily (per dosing wt 88 kg).    Monitoring/Evaluation  Progress toward goals will be monitored and evaluated per protocol.    Farrah Andrews  ALEXANDER, STEFANY   7B (M-F) Pager: [phone]  RD Weekend/Holiday Pager: [phone]

## 2022-11-17 ENCOUNTER — PATIENT OUTREACH (OUTPATIENT)
Dept: CARE COORDINATION | Facility: CLINIC | Age: 42
End: 2022-11-17

## 2022-11-17 NOTE — PROGRESS NOTES
"Clinic Care Coordination Contact  [facility]: Post-Discharge Note  SITUATION                                                      Admission:    Admission Date: 11/01/22   Reason for Admission: LUQ abdominal and shoulder pain, was found to have bilateral lower lobe pneumonia and new oxygen requirement  Discharge:   Discharge Date: 11/16/22  Discharge Diagnosis: 1. Hospital acquired pneumonia  2. S/p laparoscopic sleeve gastrectomy and hiatal hernia repair  3. Elevated LFTs  4. Hypokalemia    BACKGROUND                                                      Per hospital discharge summary and inpatient provider notes:    Leah Yanez is a pleasant 42 year old female who is s/p laparoscopic sleeve gastrectomy and hiatal hernia repair on 10/25/22 with Dr. Aragon, kept in hospital for acute blood loss anemia, dysphagia and pain control, discharged 10/30/22. She returned to the ED today from clinic on 11/1/22 with LUQ abdominal and shoulder pain, was found to have bilateral lower lobe pneumonia and new oxygen requirement. She does have a history of PE and is on Lovenox. No evidence of PE on CT. Admitted for IV antibiotics and observation.    ASSESSMENT           Discharge Assessment  How are you doing now that you are home?: \"I am doing pretty good. Just trying to get back into the swing of things with work and home life.\" Janice has continuous tube feeds going and is going to attempt to drink 2 oz of a protein shake. Denies nausea, vomiting or abdodminal pain.  How are your symptoms? (Red Flag symptoms escalate to triage hotline per guidelines): Improved  Do you feel your condition is stable enough to be safe at home until your provider visit?: Yes  Does the patient have their discharge instructions? : Yes  Does the patient have questions regarding their discharge instructions? : No  Were you started on any new medications or were there changes to any of your previous medications? : No  Does the patient have all " of their medications?: Yes  Do you have questions regarding any of your medications? : No  Do you have all of your needed medical supplies or equipment (DME)?  (i.e. oxygen tank, CPAP, cane, etc.): Yes  Discharge follow-up appointment scheduled within 14 calendar days? : Yes  Discharge Follow Up Appointment Date: 11/30/22  Discharge Follow Up Appointment Scheduled with?: Specialty Care Provider         Post-op (Clinicians Only)  Did the patient have surgery or a procedure: Yes  Incision: healing  Drainage: No  Fever: No  Chills: No  Redness: No  Warmth: No  Swelling: No  Incision site pain: No  Eating & Drinking:  (Has a individualized plan from RD for diet progression. 2 oz protein shake this morning)  PO Intake: other  Bowel Function:  (stools are liquid and soft due to tube feeding)  Date of last BM: 11/17/22  Urinary Status: voiding without complaint/concerns        PLAN                                                      Outpatient Plan:      Follow up with Dr. Aragon on 11/23 for repeat endoscopy as scheduled.  Appointments on [place] and/or [facility] (with [facility] or  [facility] provider or service). Call [phone] if you haven't heard  regarding these appointments within 7 days of discharge    Home Infusion Referral  Tube feedings via NJ tube  Osmolite 1.5 5 cartons daily-50 ml/hr continuously  1 packets prosource TF(15 g protein) daily or equivalent  [facility] - [phone]    Future Appointments   Date Time Provider Department Center   11/30/2022  2:00 PM Rosanna Phipps NP [facility]   11/30/2022  3:00 PM Vika Mar RD [facility]         For any urgent concerns, please contact our 24 hour nurse triage line: [phone] ([phone])         Estrella Greene RN

## 2022-11-18 NOTE — TELEPHONE ENCOUNTER
Hello,     We wanted to reach out because your patient is having a EGD on 11.23.2022. The Gastroenterology Service Line is advising that their Lovenox be held for 24 hours prior to their appointment.     We have advised the patient of these holding recommendations and to follow up with their prescribing/managing provider. If bridging is applicable, please advise patient directly of plan.    Please contact us if holding interval is NOT advised and provide alternative recommendations for holding interval(s).        Thank you,     Pre-Assessment Endoscopy Nursing Team  P Endoscopy Pre Assessment Nurse Pool ([id number])

## 2022-11-18 NOTE — TELEPHONE ENCOUNTER
Upon review patient with recent hospital discharge of 11/16/22 with bilateral lower lobe pneumonia.     Writer will send staff message to scoping provider to see if EGD scheduled on 11/23/22 should be postponed.     Elizabeth Hutson RN

## 2022-11-18 NOTE — TELEPHONE ENCOUNTER
"Pre assessment questions completed for upcoming EGD procedure scheduled on 11.23.2022    COVID policy reviewed. Patient to complete rapid antigen test one to two days before their scheduled procedure. Patient to bring photo of the results when they come in for their procedure.    Reviewed procedural arrival time 0730 and facility location [facility].    Antibiotic regimen completed for pneumonia.  Per 11.17.2022 patient outreach encounter \"Follow up with Dr. Aragon on 11/23 for repeat endoscopy as scheduled.\"    Designated  policy reviewed. Instructed to have someone stay 6 hours post procedure.     Anticoagulation/blood thinners? Lovenox; Pt needs to f/u with PCP re: lovenox dosing prior to procedure.    Electronic implanted devices? no    Diabetic? no    Reviewed EGD prep instructions with patient.     Patient verbalized understanding and had no questions or concerns at this time.    Rosey Vivas RN      "

## 2022-11-21 ENCOUNTER — PATIENT OUTREACH (OUTPATIENT)
Dept: ENDOCRINOLOGY | Facility: CLINIC | Age: 42
End: 2022-11-21

## 2022-11-21 ENCOUNTER — LAB (OUTPATIENT)
Dept: LAB | Facility: CLINIC | Age: 42
End: 2022-11-21
Payer: COMMERCIAL

## 2022-11-21 ENCOUNTER — TELEPHONE (OUTPATIENT)
Dept: URGENT CARE | Facility: URGENT CARE | Age: 42
End: 2022-11-21

## 2022-11-21 VITALS
TEMPERATURE: 98.5 F | RESPIRATION RATE: 18 BRPM | DIASTOLIC BLOOD PRESSURE: 89 MMHG | SYSTOLIC BLOOD PRESSURE: 120 MMHG | HEART RATE: 114 BPM

## 2022-11-21 DIAGNOSIS — Z86.711 HISTORY OF PULMONARY EMBOLISM: Primary | ICD-10-CM

## 2022-11-21 DIAGNOSIS — Z86.711 HISTORY OF PULMONARY EMBOLISM: ICD-10-CM

## 2022-11-21 DIAGNOSIS — Z98.84 S/P LAPAROSCOPIC SLEEVE GASTRECTOMY: ICD-10-CM

## 2022-11-21 LAB
APTT PPP: 32 SECONDS (ref 22–38)
ERYTHROCYTE [DISTWIDTH] IN BLOOD BY AUTOMATED COUNT: 17.1 % (ref 10–15)
HCT VFR BLD AUTO: 35.9 % (ref 35–47)
HGB BLD-MCNC: 10.7 G/DL (ref 11.7–15.7)
INR PPP: 1.04 (ref 0.85–1.15)
MCH RBC QN AUTO: 25.5 PG (ref 26.5–33)
MCHC RBC AUTO-ENTMCNC: 29.8 G/DL (ref 31.5–36.5)
MCV RBC AUTO: 86 FL (ref 78–100)
PLATELET # BLD AUTO: 550 10E3/UL (ref 150–450)
RBC # BLD AUTO: 4.2 10E6/UL (ref 3.8–5.2)
WBC # BLD AUTO: 8.9 10E3/UL (ref 4–11)

## 2022-11-21 PROCEDURE — 36415 COLL VENOUS BLD VENIPUNCTURE: CPT

## 2022-11-21 PROCEDURE — 85027 COMPLETE CBC AUTOMATED: CPT

## 2022-11-21 PROCEDURE — 85730 THROMBOPLASTIN TIME PARTIAL: CPT

## 2022-11-21 PROCEDURE — 85610 PROTHROMBIN TIME: CPT

## 2022-11-21 NOTE — TELEPHONE ENCOUNTER
"Upon review patient was notified:  \"Dr. Aragon said you don't need to stop the Lovenox.\"    Writer did leave voicemail with RN to Dr. Aragon to discuss ok to proceed with EGD. With this note above regarding Enoxaparin (Lovenox) it may be presumed that procedure is ok to proceed.     -------------------------------------------------------  Addendum:  Staff message received for patient to proceed with EGD as scheduled per Dr. Aragon's RN. No further action needed at this time.     Elizabeth Hutson, RN  "

## 2022-11-21 NOTE — TELEPHONE ENCOUNTER
Situation: Patient present to urgent care with complaints of SOB post-hosp discharge and recent surgery.    Reports she called surgeon office and they told her to be seen in UC. Patient is not est with a PCP.     Background:    Patient recently discharged from [facility] on 11/16 (15 day stay):  -  Hospital acquired pneumonia  pneumonia  - s/p gastric sleeve (10/25 - discharged 10/30)  - underwent EGD - dilation performed 11/14  - NJ tube placed for tube feedings   - 2x blood transfusions    Reports since discharge has been very weak, SOB with any activity, concerns about low hemoglobin. Reports she wasn't fed for 4 days prior to discharge, and was given NJ tube then sent home.      Assessment:  /89   Pulse 114   Temp 98.5  F (36.9  C) (Oral)   Resp 18   LMP 11/14/2022 (Approximate)      Hx: surgery as noted above, PE     Symptoms:  - SOB: ok if at rest, severe weakness and SOB with walking, going to bathroom, having to wipe etc.   - dizzy/ faint   - period lasting 2 weeks (? Lovenox related?)    Denies:  - general pain, chest pain, fever, known illness exposure (covid, flu etc) hasn't tested, falls     Assessment:   Lung sounds: clear   I/O: wnl  - feeding tube no solids right now     Meds   - on Lovenox (prophylactic due to history PE and recent surgery)  - omeprazole     Recommendation:    Huddled with Dr. Yung in clinic, reviewed above. Due to recent hospital stay, surgery and on going concerns, should be seen in ER ([facility]) for further/quicker work up. Concerns for continued low hemoglobin possible need for transfusion.     Patient is hesitant to return, she did not feel she received great care, she expresses worry. Discussed our capabilities in ambulatory care vs UC vs ER. Concerns for delayed care in ambulatory setting given history, strongly encouraged ER. Patient noted understanding.     Routing to surgery team to AARON

## 2022-11-21 NOTE — PROGRESS NOTES
Per written orders via Dr. Aragon: if patient Hgb less than 7, patient to have 2 units PRBCs administered and hold Lovenox for 2 days.    Patient notified to have labs drawn.  Discussed providers orders.

## 2022-11-23 ENCOUNTER — HOSPITAL ENCOUNTER (OUTPATIENT)
Facility: CLINIC | Age: 42
Discharge: HOME | End: 2022-11-23
Attending: SURGERY | Admitting: SURGERY
Payer: COMMERCIAL

## 2022-11-23 VITALS
TEMPERATURE: 98.3 F | DIASTOLIC BLOOD PRESSURE: 67 MMHG | OXYGEN SATURATION: 93 % | SYSTOLIC BLOOD PRESSURE: 111 MMHG | RESPIRATION RATE: 18 BRPM | HEART RATE: 74 BPM

## 2022-11-23 DIAGNOSIS — K31.89 GASTRIC ANASTOMOTIC STRICTURE: Primary | ICD-10-CM

## 2022-11-23 DIAGNOSIS — K92.9 GASTRIC ANASTOMOTIC STRICTURE: Primary | ICD-10-CM

## 2022-11-23 LAB — UPPER GI ENDOSCOPY: NORMAL

## 2022-11-23 PROCEDURE — 999N000099 HC STATISTIC MODERATE SEDATION < 10 MIN: Performed by: SURGERY

## 2022-11-23 PROCEDURE — 999N000248 HC STATISTIC IV INSERT WITH US BY RN

## 2022-11-23 PROCEDURE — 258N000003 HC RX IP 258 OP 636: Performed by: SURGERY

## 2022-11-23 PROCEDURE — 43249 ESOPH EGD DILATION <30 MM: CPT | Performed by: SURGERY

## 2022-11-23 PROCEDURE — 250N000011 HC RX IP 250 OP 636: Performed by: SURGERY

## 2022-11-23 PROCEDURE — 43235 EGD DIAGNOSTIC BRUSH WASH: CPT | Performed by: SURGERY

## 2022-11-23 PROCEDURE — 99153 MOD SED SAME PHYS/QHP EA: CPT | Performed by: SURGERY

## 2022-11-23 PROCEDURE — G0500 MOD SEDAT ENDO SERVICE >5YRS: HCPCS | Performed by: SURGERY

## 2022-11-23 RX ORDER — ONDANSETRON 2 MG/ML
4 INJECTION INTRAMUSCULAR; INTRAVENOUS EVERY 6 HOURS PRN
Status: DISCONTINUED | OUTPATIENT
Start: 2022-11-23 | End: 2022-11-23 | Stop reason: HOSPADM

## 2022-11-23 RX ORDER — FENTANYL CITRATE 50 UG/ML
INJECTION, SOLUTION INTRAMUSCULAR; INTRAVENOUS PRN
Status: DISCONTINUED | OUTPATIENT
Start: 2022-11-23 | End: 2022-11-23 | Stop reason: HOSPADM

## 2022-11-23 RX ORDER — ONDANSETRON 2 MG/ML
4 INJECTION INTRAMUSCULAR; INTRAVENOUS EVERY 6 HOURS PRN
Status: CANCELLED | OUTPATIENT
Start: 2022-11-23

## 2022-11-23 RX ORDER — LIDOCAINE 40 MG/G
CREAM TOPICAL
Status: DISCONTINUED | OUTPATIENT
Start: 2022-11-23 | End: 2022-11-23 | Stop reason: HOSPADM

## 2022-11-23 RX ORDER — ONDANSETRON 4 MG/1
4 TABLET, ORALLY DISINTEGRATING ORAL EVERY 6 HOURS PRN
Status: CANCELLED | OUTPATIENT
Start: 2022-11-23

## 2022-11-23 RX ORDER — SODIUM CHLORIDE 9 MG/ML
INJECTION, SOLUTION INTRAVENOUS CONTINUOUS PRN
Status: DISCONTINUED | OUTPATIENT
Start: 2022-11-23 | End: 2022-11-23 | Stop reason: HOSPADM

## 2022-11-23 RX ORDER — DIPHENHYDRAMINE HYDROCHLORIDE 50 MG/ML
INJECTION INTRAMUSCULAR; INTRAVENOUS PRN
Status: DISCONTINUED | OUTPATIENT
Start: 2022-11-23 | End: 2022-11-23 | Stop reason: HOSPADM

## 2022-11-23 RX ORDER — NALOXONE HYDROCHLORIDE 0.4 MG/ML
0.4 INJECTION, SOLUTION INTRAMUSCULAR; INTRAVENOUS; SUBCUTANEOUS
Status: CANCELLED | OUTPATIENT
Start: 2022-11-23

## 2022-11-23 RX ORDER — NALOXONE HYDROCHLORIDE 0.4 MG/ML
0.2 INJECTION, SOLUTION INTRAMUSCULAR; INTRAVENOUS; SUBCUTANEOUS
Status: CANCELLED | OUTPATIENT
Start: 2022-11-23

## 2022-11-23 RX ORDER — PROCHLORPERAZINE MALEATE 10 MG
10 TABLET ORAL EVERY 6 HOURS PRN
Status: CANCELLED | OUTPATIENT
Start: 2022-11-23

## 2022-11-23 RX ORDER — ONDANSETRON 4 MG/1
4 TABLET, ORALLY DISINTEGRATING ORAL EVERY 6 HOURS PRN
Qty: 40 TABLET | Refills: 3 | Status: ON HOLD | OUTPATIENT
Start: 2022-11-23 | End: 2022-12-15

## 2022-11-23 RX ORDER — SCOLOPAMINE TRANSDERMAL SYSTEM 1 MG/1
1 PATCH, EXTENDED RELEASE TRANSDERMAL
Qty: 10 PATCH | Refills: 1 | Status: ON HOLD | OUTPATIENT
Start: 2022-11-23 | End: 2022-12-15

## 2022-11-23 RX ORDER — FLUMAZENIL 0.1 MG/ML
0.2 INJECTION, SOLUTION INTRAVENOUS
Status: CANCELLED | OUTPATIENT
Start: 2022-11-23 | End: 2022-11-23

## 2022-11-23 RX ORDER — ONDANSETRON 2 MG/ML
INJECTION INTRAMUSCULAR; INTRAVENOUS PRN
Status: DISCONTINUED | OUTPATIENT
Start: 2022-11-23 | End: 2022-11-23 | Stop reason: HOSPADM

## 2022-11-23 ASSESSMENT — ACTIVITIES OF DAILY LIVING (ADL)
ADLS_ACUITY_SCORE: 35
ADLS_ACUITY_SCORE: 35

## 2022-11-28 ENCOUNTER — PATIENT OUTREACH (OUTPATIENT)
Dept: ENDOCRINOLOGY | Facility: CLINIC | Age: 42
End: 2022-11-28

## 2022-11-28 DIAGNOSIS — R11.2 NAUSEA WITH VOMITING: ICD-10-CM

## 2022-11-28 DIAGNOSIS — Z98.84 S/P LAPAROSCOPIC SLEEVE GASTRECTOMY: Primary | ICD-10-CM

## 2022-11-28 DIAGNOSIS — R06.09 DYSPNEA ON EXERTION: ICD-10-CM

## 2022-11-28 RX ORDER — CEFAZOLIN SODIUM 2 G/50ML
2 SOLUTION INTRAVENOUS SEE ADMIN INSTRUCTIONS
Status: CANCELLED | OUTPATIENT
Start: 2022-11-28

## 2022-11-28 RX ORDER — CEFAZOLIN SODIUM 2 G/50ML
2 SOLUTION INTRAVENOUS
Status: CANCELLED | OUTPATIENT
Start: 2022-11-28

## 2022-11-28 NOTE — PROGRESS NOTES
Patient instructed that she may take her NG tube out at home per Dr. Aragon.  Instructed how to remove the tube.  Answered general questions about stent procedure.  Patient to sent in information about intake once the tube is removed.

## 2022-11-28 NOTE — PROGRESS NOTES
Phone call to  regarding case request for EGD.  Patient does not think she can wait until Friday due to the symptoms

## 2022-11-29 ENCOUNTER — ANESTHESIA EVENT (OUTPATIENT)
Dept: SURGERY | Facility: CLINIC | Age: 42
End: 2022-11-29
Payer: COMMERCIAL

## 2022-11-29 ENCOUNTER — TELEPHONE (OUTPATIENT)
Dept: SURGERY | Facility: CLINIC | Age: 42
End: 2022-11-29

## 2022-11-29 NOTE — TELEPHONE ENCOUNTER
Called patient to let her know we can get OR time tomorrow at 2 p.m. for an upper endoscopy with stent insertion with Dr. Aragon. To arrive at the hospital at noon. She will need to do an at-home COVID test but her pre-op is within the 30 day range and should not have to be repeated per Elissa URIBE RN.

## 2022-11-30 ENCOUNTER — ANESTHESIA (OUTPATIENT)
Dept: SURGERY | Facility: CLINIC | Age: 42
End: 2022-11-30
Payer: COMMERCIAL

## 2022-11-30 ENCOUNTER — APPOINTMENT (OUTPATIENT)
Dept: GENERAL RADIOLOGY | Facility: CLINIC | Age: 42
End: 2022-11-30
Attending: SURGERY
Payer: COMMERCIAL

## 2022-11-30 ENCOUNTER — HOSPITAL ENCOUNTER (OUTPATIENT)
Facility: CLINIC | Age: 42
Discharge: HOME | End: 2022-12-02
Attending: SURGERY | Admitting: SURGERY
Payer: COMMERCIAL

## 2022-11-30 DIAGNOSIS — J18.9 PNEUMONIA OF BOTH LOWER LOBES DUE TO INFECTIOUS ORGANISM: ICD-10-CM

## 2022-11-30 DIAGNOSIS — R13.19 ESOPHAGEAL DYSPHAGIA: ICD-10-CM

## 2022-11-30 DIAGNOSIS — J18.8 OTHER PNEUMONIA, UNSPECIFIED ORGANISM: Primary | ICD-10-CM

## 2022-11-30 DIAGNOSIS — K92.9 GASTRIC ANASTOMOTIC STRICTURE: ICD-10-CM

## 2022-11-30 DIAGNOSIS — Z98.84 S/P LAPAROSCOPIC SLEEVE GASTRECTOMY: ICD-10-CM

## 2022-11-30 DIAGNOSIS — Z98.84 S/P LAPAROSCOPIC SLEEVE GASTRECTOMY: Primary | ICD-10-CM

## 2022-11-30 DIAGNOSIS — K31.89 GASTRIC ANASTOMOTIC STRICTURE: ICD-10-CM

## 2022-11-30 DIAGNOSIS — R11.2 NAUSEA WITH VOMITING: ICD-10-CM

## 2022-11-30 LAB — GLUCOSE BLDC GLUCOMTR-MCNC: 111 MG/DL (ref 70–99)

## 2022-11-30 PROCEDURE — 360N000082 HC SURGERY LEVEL 2 W/ FLUORO, PER MIN: Performed by: SURGERY

## 2022-11-30 PROCEDURE — 370N000017 HC ANESTHESIA TECHNICAL FEE, PER MIN: Performed by: SURGERY

## 2022-11-30 PROCEDURE — 710N000010 HC RECOVERY PHASE 1, LEVEL 2, PER MIN: Performed by: SURGERY

## 2022-11-30 PROCEDURE — 250N000011 HC RX IP 250 OP 636: Performed by: ANESTHESIOLOGY

## 2022-11-30 PROCEDURE — 250N000013 HC RX MED GY IP 250 OP 250 PS 637: Performed by: STUDENT IN AN ORGANIZED HEALTH CARE EDUCATION/TRAINING PROGRAM

## 2022-11-30 PROCEDURE — 250N000011 HC RX IP 250 OP 636

## 2022-11-30 PROCEDURE — 250N000009 HC RX 250

## 2022-11-30 PROCEDURE — U0003 INFECTIOUS AGENT DETECTION BY NUCLEIC ACID (DNA OR RNA); SEVERE ACUTE RESPIRATORY SYNDROME CORONAVIRUS 2 (SARS-COV-2) (CORONAVIRUS DISEASE [COVID-19]), AMPLIFIED PROBE TECHNIQUE, MAKING USE OF HIGH THROUGHPUT TECHNOLOGIES AS DESCRIBED BY CMS-2020-01-R: HCPCS | Performed by: SURGERY

## 2022-11-30 PROCEDURE — 999N000141 HC STATISTIC PRE-PROCEDURE NURSING ASSESSMENT: Performed by: SURGERY

## 2022-11-30 PROCEDURE — C1874 STENT, COATED/COV W/DEL SYS: HCPCS | Performed by: SURGERY

## 2022-11-30 PROCEDURE — C1769 GUIDE WIRE: HCPCS | Performed by: SURGERY

## 2022-11-30 PROCEDURE — 272N000001 HC OR GENERAL SUPPLY STERILE: Performed by: SURGERY

## 2022-11-30 PROCEDURE — 258N000003 HC RX IP 258 OP 636: Performed by: STUDENT IN AN ORGANIZED HEALTH CARE EDUCATION/TRAINING PROGRAM

## 2022-11-30 PROCEDURE — 999N000157 HC STATISTIC RCP TIME EA 10 MIN

## 2022-11-30 PROCEDURE — 250N000025 HC SEVOFLURANE, PER MIN: Performed by: SURGERY

## 2022-11-30 PROCEDURE — 250N000011 HC RX IP 250 OP 636: Performed by: STUDENT IN AN ORGANIZED HEALTH CARE EDUCATION/TRAINING PROGRAM

## 2022-11-30 PROCEDURE — 82962 GLUCOSE BLOOD TEST: CPT

## 2022-11-30 PROCEDURE — 999N000180 XR SURGERY CARM FLUORO LESS THAN 5 MIN: Mod: TC

## 2022-11-30 PROCEDURE — 250N000009 HC RX 250: Performed by: STUDENT IN AN ORGANIZED HEALTH CARE EDUCATION/TRAINING PROGRAM

## 2022-11-30 DEVICE — STENT ESOPHAGEAL ENDOMAXX 23X100MM MAXX-2310
Type: IMPLANTABLE DEVICE | Site: ESOPHAGUS | Status: NON-FUNCTIONAL
Removed: 2022-12-02

## 2022-11-30 RX ORDER — SODIUM CHLORIDE, SODIUM LACTATE, POTASSIUM CHLORIDE, CALCIUM CHLORIDE 600; 310; 30; 20 MG/100ML; MG/100ML; MG/100ML; MG/100ML
INJECTION, SOLUTION INTRAVENOUS CONTINUOUS
Status: DISCONTINUED | OUTPATIENT
Start: 2022-11-30 | End: 2022-11-30

## 2022-11-30 RX ORDER — DEXAMETHASONE SODIUM PHOSPHATE 4 MG/ML
INJECTION, SOLUTION INTRA-ARTICULAR; INTRALESIONAL; INTRAMUSCULAR; INTRAVENOUS; SOFT TISSUE PRN
Status: DISCONTINUED | OUTPATIENT
Start: 2022-11-30 | End: 2022-11-30

## 2022-11-30 RX ORDER — FENTANYL CITRATE 50 UG/ML
25 INJECTION, SOLUTION INTRAMUSCULAR; INTRAVENOUS EVERY 5 MIN PRN
Status: DISCONTINUED | OUTPATIENT
Start: 2022-11-30 | End: 2022-11-30

## 2022-11-30 RX ORDER — HYDROMORPHONE HCL IN WATER/PF 6 MG/30 ML
0.4 PATIENT CONTROLLED ANALGESIA SYRINGE INTRAVENOUS
Status: DISCONTINUED | OUTPATIENT
Start: 2022-11-30 | End: 2022-12-01

## 2022-11-30 RX ORDER — FENTANYL CITRATE 50 UG/ML
50 INJECTION, SOLUTION INTRAMUSCULAR; INTRAVENOUS EVERY 5 MIN PRN
Status: DISCONTINUED | OUTPATIENT
Start: 2022-11-30 | End: 2022-11-30

## 2022-11-30 RX ORDER — LIDOCAINE HYDROCHLORIDE 20 MG/ML
INJECTION, SOLUTION INFILTRATION; PERINEURAL PRN
Status: DISCONTINUED | OUTPATIENT
Start: 2022-11-30 | End: 2022-11-30

## 2022-11-30 RX ORDER — HALOPERIDOL 5 MG/ML
1 INJECTION INTRAMUSCULAR ONCE
Status: COMPLETED | OUTPATIENT
Start: 2022-11-30 | End: 2022-11-30

## 2022-11-30 RX ORDER — LIDOCAINE 40 MG/G
CREAM TOPICAL
Status: DISCONTINUED | OUTPATIENT
Start: 2022-11-30 | End: 2022-12-02 | Stop reason: HOSPADM

## 2022-11-30 RX ORDER — ONDANSETRON 4 MG/1
4 TABLET, ORALLY DISINTEGRATING ORAL EVERY 6 HOURS PRN
Status: DISCONTINUED | OUTPATIENT
Start: 2022-11-30 | End: 2022-12-02 | Stop reason: HOSPADM

## 2022-11-30 RX ORDER — TRAMADOL HYDROCHLORIDE 50 MG/1
50 TABLET ORAL EVERY 6 HOURS PRN
Qty: 50 TABLET | Refills: 0 | Status: ON HOLD | OUTPATIENT
Start: 2022-11-30 | End: 2022-12-23

## 2022-11-30 RX ORDER — HYDROMORPHONE HCL IN WATER/PF 6 MG/30 ML
0.2 PATIENT CONTROLLED ANALGESIA SYRINGE INTRAVENOUS EVERY 5 MIN PRN
Status: DISCONTINUED | OUTPATIENT
Start: 2022-11-30 | End: 2022-11-30

## 2022-11-30 RX ORDER — LIDOCAINE 40 MG/G
CREAM TOPICAL
Status: DISCONTINUED | OUTPATIENT
Start: 2022-11-30 | End: 2022-11-30 | Stop reason: HOSPADM

## 2022-11-30 RX ORDER — TRAMADOL HYDROCHLORIDE 50 MG/1
50 TABLET ORAL EVERY 6 HOURS PRN
Status: DISCONTINUED | OUTPATIENT
Start: 2022-11-30 | End: 2022-12-02 | Stop reason: HOSPADM

## 2022-11-30 RX ORDER — SODIUM CHLORIDE, SODIUM LACTATE, POTASSIUM CHLORIDE, CALCIUM CHLORIDE 600; 310; 30; 20 MG/100ML; MG/100ML; MG/100ML; MG/100ML
INJECTION, SOLUTION INTRAVENOUS CONTINUOUS
Status: DISCONTINUED | OUTPATIENT
Start: 2022-11-30 | End: 2022-12-02 | Stop reason: HOSPADM

## 2022-11-30 RX ORDER — SODIUM CHLORIDE, SODIUM LACTATE, POTASSIUM CHLORIDE, CALCIUM CHLORIDE 600; 310; 30; 20 MG/100ML; MG/100ML; MG/100ML; MG/100ML
INJECTION, SOLUTION INTRAVENOUS CONTINUOUS PRN
Status: DISCONTINUED | OUTPATIENT
Start: 2022-11-30 | End: 2022-11-30

## 2022-11-30 RX ORDER — FENTANYL CITRATE 50 UG/ML
INJECTION, SOLUTION INTRAMUSCULAR; INTRAVENOUS PRN
Status: DISCONTINUED | OUTPATIENT
Start: 2022-11-30 | End: 2022-11-30

## 2022-11-30 RX ORDER — CEFAZOLIN SODIUM/WATER 2 G/20 ML
2 SYRINGE (ML) INTRAVENOUS SEE ADMIN INSTRUCTIONS
Status: DISCONTINUED | OUTPATIENT
Start: 2022-11-30 | End: 2022-11-30 | Stop reason: HOSPADM

## 2022-11-30 RX ORDER — ONDANSETRON 2 MG/ML
INJECTION INTRAMUSCULAR; INTRAVENOUS PRN
Status: DISCONTINUED | OUTPATIENT
Start: 2022-11-30 | End: 2022-11-30

## 2022-11-30 RX ORDER — CEFAZOLIN SODIUM/WATER 2 G/20 ML
2 SYRINGE (ML) INTRAVENOUS
Status: DISCONTINUED | OUTPATIENT
Start: 2022-11-30 | End: 2022-11-30 | Stop reason: HOSPADM

## 2022-11-30 RX ORDER — ONDANSETRON 4 MG/1
4 TABLET, ORALLY DISINTEGRATING ORAL EVERY 30 MIN PRN
Status: DISCONTINUED | OUTPATIENT
Start: 2022-11-30 | End: 2022-11-30

## 2022-11-30 RX ORDER — HYDROMORPHONE HCL IN WATER/PF 6 MG/30 ML
0.4 PATIENT CONTROLLED ANALGESIA SYRINGE INTRAVENOUS EVERY 5 MIN PRN
Status: DISCONTINUED | OUTPATIENT
Start: 2022-11-30 | End: 2022-11-30

## 2022-11-30 RX ORDER — METOPROLOL TARTRATE 1 MG/ML
1-2 INJECTION, SOLUTION INTRAVENOUS EVERY 5 MIN PRN
Status: DISCONTINUED | OUTPATIENT
Start: 2022-11-30 | End: 2022-11-30

## 2022-11-30 RX ORDER — PROPOFOL 10 MG/ML
INJECTION, EMULSION INTRAVENOUS PRN
Status: DISCONTINUED | OUTPATIENT
Start: 2022-11-30 | End: 2022-11-30

## 2022-11-30 RX ORDER — HYDROMORPHONE HCL IN WATER/PF 6 MG/30 ML
0.2 PATIENT CONTROLLED ANALGESIA SYRINGE INTRAVENOUS
Status: DISCONTINUED | OUTPATIENT
Start: 2022-11-30 | End: 2022-12-01

## 2022-11-30 RX ORDER — ONDANSETRON 2 MG/ML
4 INJECTION INTRAMUSCULAR; INTRAVENOUS EVERY 30 MIN PRN
Status: DISCONTINUED | OUTPATIENT
Start: 2022-11-30 | End: 2022-11-30

## 2022-11-30 RX ORDER — ONDANSETRON 2 MG/ML
4 INJECTION INTRAMUSCULAR; INTRAVENOUS EVERY 6 HOURS PRN
Status: DISCONTINUED | OUTPATIENT
Start: 2022-11-30 | End: 2022-12-02 | Stop reason: HOSPADM

## 2022-11-30 RX ADMIN — FENTANYL CITRATE 25 MCG: 50 INJECTION INTRAMUSCULAR; INTRAVENOUS at 15:09

## 2022-11-30 RX ADMIN — PROPOFOL 120 MG: 10 INJECTION, EMULSION INTRAVENOUS at 14:11

## 2022-11-30 RX ADMIN — HYDROMORPHONE HYDROCHLORIDE 0.4 MG: 0.2 INJECTION, SOLUTION INTRAMUSCULAR; INTRAVENOUS; SUBCUTANEOUS at 17:22

## 2022-11-30 RX ADMIN — ONDANSETRON 4 MG: 2 INJECTION INTRAMUSCULAR; INTRAVENOUS at 15:03

## 2022-11-30 RX ADMIN — TRAMADOL HYDROCHLORIDE 50 MG: 50 TABLET, COATED ORAL at 23:45

## 2022-11-30 RX ADMIN — Medication 10 MG: at 16:43

## 2022-11-30 RX ADMIN — SUCCINYLCHOLINE CHLORIDE 50 MG: 20 INJECTION, SOLUTION INTRAMUSCULAR; INTRAVENOUS; PARENTERAL at 14:11

## 2022-11-30 RX ADMIN — MIDAZOLAM 2 MG: 1 INJECTION INTRAMUSCULAR; INTRAVENOUS at 14:18

## 2022-11-30 RX ADMIN — HALOPERIDOL LACTATE 1 MG: 5 INJECTION, SOLUTION INTRAMUSCULAR at 16:24

## 2022-11-30 RX ADMIN — FENTANYL CITRATE 25 MCG: 50 INJECTION INTRAMUSCULAR; INTRAVENOUS at 15:06

## 2022-11-30 RX ADMIN — ONDANSETRON 4 MG: 2 INJECTION INTRAMUSCULAR; INTRAVENOUS at 14:20

## 2022-11-30 RX ADMIN — FENTANYL CITRATE 50 MCG: 50 INJECTION, SOLUTION INTRAMUSCULAR; INTRAVENOUS at 14:11

## 2022-11-30 RX ADMIN — HYDROMORPHONE HYDROCHLORIDE 0.4 MG: 0.2 INJECTION, SOLUTION INTRAMUSCULAR; INTRAVENOUS; SUBCUTANEOUS at 22:32

## 2022-11-30 RX ADMIN — FENTANYL CITRATE 25 MCG: 50 INJECTION INTRAMUSCULAR; INTRAVENOUS at 15:41

## 2022-11-30 RX ADMIN — ONDANSETRON 4 MG: 2 INJECTION INTRAMUSCULAR; INTRAVENOUS at 22:33

## 2022-11-30 RX ADMIN — FENTANYL CITRATE 25 MCG: 50 INJECTION INTRAMUSCULAR; INTRAVENOUS at 15:36

## 2022-11-30 RX ADMIN — LIDOCAINE HYDROCHLORIDE 40 MG: 20 INJECTION, SOLUTION INFILTRATION; PERINEURAL at 14:11

## 2022-11-30 RX ADMIN — FENTANYL CITRATE 50 MCG: 50 INJECTION, SOLUTION INTRAMUSCULAR; INTRAVENOUS at 14:21

## 2022-11-30 RX ADMIN — HYDROMORPHONE HYDROCHLORIDE 0.4 MG: 0.2 INJECTION, SOLUTION INTRAMUSCULAR; INTRAVENOUS; SUBCUTANEOUS at 19:41

## 2022-11-30 RX ADMIN — DEXAMETHASONE SODIUM PHOSPHATE 10 MG: 4 INJECTION, SOLUTION INTRA-ARTICULAR; INTRALESIONAL; INTRAMUSCULAR; INTRAVENOUS; SOFT TISSUE at 14:20

## 2022-11-30 RX ADMIN — SODIUM CHLORIDE, POTASSIUM CHLORIDE, SODIUM LACTATE AND CALCIUM CHLORIDE: 600; 310; 30; 20 INJECTION, SOLUTION INTRAVENOUS at 14:08

## 2022-11-30 ASSESSMENT — ACTIVITIES OF DAILY LIVING (ADL)
ADLS_ACUITY_SCORE: 35
ADLS_ACUITY_SCORE: 35
ADLS_ACUITY_SCORE: 37
ADLS_ACUITY_SCORE: 37
ADLS_ACUITY_SCORE: 35
ADLS_ACUITY_SCORE: 37

## 2022-11-30 NOTE — PROGRESS NOTES
Dr. Aragon notified regarding patients persistent pain (rating 10/10 and tearful since arriving to PACU).  Per Dr. Aragon, patient to be admitted for overnight observation for close monitoring and pain management.      Charge RN and anesthesia PAR MD made aware.  Will continue to monitor patient closely.      Aura Jimenez RN on 11/30/2022 at 3:50 PM

## 2022-11-30 NOTE — ANESTHESIA PROCEDURE NOTES
Airway       Patient location during procedure: OR       Procedure Start/Stop Times: 11/30/2022 2:12 PM  Staff -        CRNA: Anatoliy Osborne APRN CRNA       Performed By: CRNA  Consent for Airway        Urgency: elective  Indications and Patient Condition       Indications for airway management: jeffrey-procedural       Induction type:RSI       Mask difficulty assessment: 0 - not attempted    Final Airway Details       Final airway type: endotracheal airway       Successful airway: ETT - single  Endotracheal Airway Details        ETT size (mm): 7.0       Cuffed: yes       Successful intubation technique: direct laryngoscopy       DL Blade Type: MAC 3       Grade View of Cords: 2       Adjucts: stylet       Position: Right       Measured from: gums/teeth       Secured at (cm): 23       Bite block used: None    Post intubation assessment        Placement verified by: capnometry, equal breath sounds and chest rise        Number of attempts at approach: 1       Number of other approaches attempted: 0       Secured with: pink tape       Ease of procedure: easy       Dentition: Intact and Unchanged    Medication(s) Administered   Medication Administration Time: 11/30/2022 2:12 PM

## 2022-11-30 NOTE — ANESTHESIA POSTPROCEDURE EVALUATION
Patient: Leah Yanez    Procedure: Procedure(s):  UPPER EGD, INTERPRETATION OF FLOUROSCOPY AND STENT PLACEMENT AT GASTRIC STRICTURE       Anesthesia Type:  General    Note:  Disposition: Outpatient   Postop Pain Control: Uneventful            Sign Out: Well controlled pain   PONV: No   Neuro/Psych: Uneventful            Sign Out: Acceptable/Baseline neuro status   Airway/Respiratory: Uneventful            Sign Out: Acceptable/Baseline resp. status   CV/Hemodynamics: Uneventful            Sign Out: Acceptable CV status; No obvious hypovolemia; No obvious fluid overload   Other NRE: NONE   DID A NON-ROUTINE EVENT OCCUR? No           Last vitals:  Vitals Value Taken Time   /76 11/30/22 1730   Temp 36.6  C (97.8  F) 11/30/22 1700   Pulse 62 11/30/22 1751   Resp 6 11/30/22 1751   SpO2 98 % 11/30/22 1751   Vitals shown include unvalidated device data.    Electronically Signed By: Vimal Celis MD  November 30, 2022  5:53 PM

## 2022-11-30 NOTE — ANESTHESIA PREPROCEDURE EVALUATION
Anesthesia Pre-Procedure Evaluation    Patient: Leah Yanez   MRN: [id number] : 1980        Procedure : Procedure(s):  ESOPHAGOGASTRODUODENOSCOPY, WITH TRANSENDOSCOPIC STENT INSERTION          Past Medical History:   Diagnosis Date     Anti-cardiolipin antibody positive      Griggs esophagus      Concussion 2016     Depressive disorder, not elsewhere classified 2006    Resolved      Gastroesophageal reflux disease with esophagitis      Hiatal hernia      History of pulmonary embolism      Obesity      Raynaud's syndrome     past history of admission for gangrene of one finger      Past Surgical History:   Procedure Laterality Date     ESOPHAGOSCOPY, GASTROSCOPY, DUODENOSCOPY (EGD), COMBINED N/A 2021    Procedure: ESOPHAGOGASTRODUODENOSCOPY, WITH BIOPSY;  Surgeon: Esteban Rose DO;  Location: [facility] OR     ESOPHAGOSCOPY, GASTROSCOPY, DUODENOSCOPY (EGD), COMBINED N/A 2022    Procedure: Upper endoscopy; gastric stricture dilation, interpretation of fluoroscopy nasojejunal feeding tube;  Surgeon: Daniel Aragon MD;  Location: [facility] OR     ESOPHAGOSCOPY, GASTROSCOPY, DUODENOSCOPY (EGD), COMBINED N/A 2022    Procedure: ESOPHAGOGASTRODUODENOSCOPY (EGD);  Surgeon: Daniel Aragon MD;  Location: [facility] GI     LAPAROSCOPIC GASTRIC SLEEVE N/A 10/25/2022    Procedure: GASTRECTOMY, SLEEVE, LAPAROSCOPIC;  Surgeon: Daniel Aragon MD;  Location: [facility] OR     LAPAROSCOPIC HERNIORRHAPHY HIATAL N/A 10/25/2022    Procedure: HERNIORRHAPHY, HIATAL, LAPAROSCOPIC;  Surgeon: Daniel Aragon MD;  Location: [facility] OR     LAPAROSCOPY DIAGNOSTIC (GENERAL) N/A 2022    Procedure: LAPAROSCOPY, DIAGNOSTIC, BY GENERAL SURGERY, evacuation of abdominl hematoma;  Surgeon: Daniel Aragon MD;  Location: [facility] OR     PICC TRIPLE LUMEN PLACEMENT Left 2022    51cm (1cm external), Basilic vein     TONSILLECTOMY & ADENOIDECTOMY       [facility] NONSPECIFIC PROCEDURE      T&A as a child     [facility]  "NONSPECIFIC PROCEDURE      Tubes in ears bilaterally      Allergies   Allergen Reactions     Azithromycin      Erythromycin GI Disturbance     When \"very young\"      Social History     Tobacco Use     Smoking status: Never     Smokeless tobacco: Never   Substance Use Topics     Alcohol use: Yes     Comment: Alcoholic Drinks/day: social      Wt Readings from Last 1 Encounters:   11/30/22 106.6 kg (235 lb 0.2 oz)        Anesthesia Evaluation   Pt has had prior anesthetic.     No history of anesthetic complications       ROS/MED HX  ENT/Pulmonary:       Neurologic:       Cardiovascular:       METS/Exercise Tolerance:     Hematologic:     (+) History of blood clots,     Musculoskeletal:       GI/Hepatic: Comment: S/p sleeve gastrectomy 10/25 and hiatal hernia repair complicated by hepatic lobe hematoma s/p evacuation and continued N/V inability to keep down foods scheduled for EGD/stent    (+) GERD, esophageal disease, hiatal hernia,     Renal/Genitourinary:       Endo:     (+) Obesity,     Psychiatric/Substance Use:       Infectious Disease:       Malignancy:       Other:            Physical Exam    Airway        Mallampati: III   TM distance: > 3 FB   Neck ROM: full   Mouth opening: > 3 cm    Respiratory Devices and Support         Dental  no notable dental history         Cardiovascular          Rhythm and rate: regular and normal     Pulmonary           breath sounds clear to auscultation           OUTSIDE LABS:  CBC:   Lab Results   Component Value Date    WBC 8.9 11/21/2022    WBC 9.2 11/13/2022    HGB 10.7 (L) 11/21/2022    HGB 7.4 (L) 11/13/2022    HCT 35.9 11/21/2022    HCT 25.3 (L) 11/13/2022     (H) 11/21/2022     (H) 11/13/2022     BMP:   Lab Results   Component Value Date     11/16/2022     11/15/2022    POTASSIUM 3.5 11/16/2022    POTASSIUM 4.5 11/15/2022    POTASSIUM 4.5 11/15/2022    CHLORIDE 101 11/16/2022    CHLORIDE 98 11/15/2022    CO2 22 11/16/2022    CO2 22 11/15/2022    " BUN 21.3 (H) 11/16/2022    BUN 14.3 11/15/2022    CR 0.84 11/16/2022    CR 0.77 11/15/2022     (H) 11/30/2022    GLC 84 11/16/2022     COAGS:   Lab Results   Component Value Date    PTT 32 11/21/2022    INR 1.04 11/21/2022     POC:   Lab Results   Component Value Date    HCG Negative 10/25/2022     HEPATIC:   Lab Results   Component Value Date    ALBUMIN 2.9 (L) 11/13/2022    PROTTOTAL 6.8 11/13/2022    ALT 17 11/13/2022    AST 25 11/13/2022    ALKPHOS 129 (H) 11/13/2022    BILITOTAL 0.3 11/13/2022     OTHER:   Lab Results   Component Value Date    PH 7.47 (H) 11/08/2022    LACT 0.5 11/04/2022    A1C 5.5 10/25/2022    KIMBERLYN 9.0 11/16/2022    PHOS 3.1 11/16/2022    MAG 2.2 11/16/2022    LIPASE 65 (H) 11/06/2022    TSH 0.85 07/24/2003    .00 (H) 11/06/2022    SED 9 07/24/2003       Anesthesia Plan    ASA Status:  3   NPO Status:  NPO Appropriate    Anesthesia Type: General.     - Airway: ETT   Induction: RSI.   Maintenance: Inhalation.        Consents    Anesthesia Plan(s) and associated risks, benefits, and realistic alternatives discussed. Questions answered and patient/representative(s) expressed understanding.    - Discussed:     - Discussed with:  Patient         Postoperative Care    Pain management: IV analgesics.   PONV prophylaxis: Ondansetron (or other 5HT-3), Dexamethasone or Solumedrol     Comments:                Lillian Rivera MD

## 2022-11-30 NOTE — ANESTHESIA CARE TRANSFER NOTE
Patient: Leah Yanez    Procedure: Procedure(s):  UPPER EGD, INTERPRETATION OF FLOUROSCOPY AND STENT PLACEMENT AT GASTRIC STRICTURE       Diagnosis: S/P laparoscopic sleeve gastrectomy [Z98.84]  Nausea with vomiting [R11.2]  Diagnosis Additional Information: No value filed.    Anesthesia Type:   General     Note:    Oropharynx: oropharynx clear of all foreign objects  Level of Consciousness: awake and drowsy  Oxygen Supplementation: nasal cannula  Level of Supplemental Oxygen (L/min / FiO2): 3  Independent Airway: airway patency satisfactory and stable  Dentition: dentition unchanged  Vital Signs Stable: post-procedure vital signs reviewed and stable  Report to RN Given: handoff report given  Patient transferred to: PACU    Handoff Report: Identifed the Patient, Identified the Reponsible Provider, Reviewed the pertinent medical history, Discussed the surgical course, Reviewed Intra-OP anesthesia mangement and issues during anesthesia, Set expectations for post-procedure period and Allowed opportunity for questions and acknowledgement of understanding      Vitals:  Vitals Value Taken Time   /106 11/30/22 1451   Temp 36.7  C (98  F) 11/30/22 1448   Pulse 85 11/30/22 1457   Resp 8 11/30/22 1457   SpO2 95 % 11/30/22 1457   Vitals shown include unvalidated device data.    Electronically Signed By: ELISA Ambrocio CRNA  November 30, 2022  2:58 PM

## 2022-11-30 NOTE — BRIEF OP NOTE
[facility]    Brief Operative Note    Pre-operative diagnosis: S/P laparoscopic sleeve gastrectomy [Z98.84]  Nausea with vomiting [R11.2]  Post-operative diagnosis gastric stricture    Procedure: Procedure(s):  UPPER EGD, INTERPRETATION OF FLOUROSCOPY AND STENT PLACEMENT AT GASTRIC STRICTURE  Surgeon: Surgeon(s) and Role:     * Daniel Aragon MD - Primary     * Boris Drummond MD - Resident - Assisting  Anesthesia: General   Estimated Blood Loss: Minimal    Drains: None  Specimens: * No specimens in log *  Findings:   Mid gastric stricture. No evidence of esophageal stricture. Placement of a 23x100 endomax covered stent across gastric stricture. .  Complications: None.  Implants: * No implants in log *

## 2022-12-01 ENCOUNTER — ANESTHESIA EVENT (OUTPATIENT)
Dept: SURGERY | Facility: CLINIC | Age: 42
End: 2022-12-01
Payer: COMMERCIAL

## 2022-12-01 LAB — SARS-COV-2 RNA RESP QL NAA+PROBE: NEGATIVE

## 2022-12-01 PROCEDURE — 250N000013 HC RX MED GY IP 250 OP 250 PS 637: Performed by: STUDENT IN AN ORGANIZED HEALTH CARE EDUCATION/TRAINING PROGRAM

## 2022-12-01 PROCEDURE — 258N000003 HC RX IP 258 OP 636: Performed by: STUDENT IN AN ORGANIZED HEALTH CARE EDUCATION/TRAINING PROGRAM

## 2022-12-01 PROCEDURE — 999N000127 HC STATISTIC PERIPHERAL IV START W US GUIDANCE

## 2022-12-01 PROCEDURE — 250N000011 HC RX IP 250 OP 636: Performed by: STUDENT IN AN ORGANIZED HEALTH CARE EDUCATION/TRAINING PROGRAM

## 2022-12-01 PROCEDURE — 250N000009 HC RX 250

## 2022-12-01 PROCEDURE — 250N000011 HC RX IP 250 OP 636

## 2022-12-01 PROCEDURE — 250N000013 HC RX MED GY IP 250 OP 250 PS 637

## 2022-12-01 RX ORDER — HYDROXYZINE HYDROCHLORIDE 25 MG/1
50 TABLET, FILM COATED ORAL EVERY 6 HOURS PRN
Status: DISCONTINUED | OUTPATIENT
Start: 2022-12-01 | End: 2022-12-02 | Stop reason: HOSPADM

## 2022-12-01 RX ORDER — HYDROXYZINE HYDROCHLORIDE 25 MG/1
25 TABLET, FILM COATED ORAL EVERY 6 HOURS PRN
Status: DISCONTINUED | OUTPATIENT
Start: 2022-12-01 | End: 2022-12-02 | Stop reason: HOSPADM

## 2022-12-01 RX ORDER — METHOCARBAMOL 500 MG/1
500 TABLET, FILM COATED ORAL 4 TIMES DAILY PRN
Status: DISCONTINUED | OUTPATIENT
Start: 2022-12-01 | End: 2022-12-02 | Stop reason: HOSPADM

## 2022-12-01 RX ORDER — HYOSCYAMINE SULFATE 0.125 MG
125 TABLET ORAL
Status: DISCONTINUED | OUTPATIENT
Start: 2022-12-01 | End: 2022-12-02 | Stop reason: HOSPADM

## 2022-12-01 RX ORDER — AMOXICILLIN 250 MG
2 CAPSULE ORAL DAILY PRN
Status: DISCONTINUED | OUTPATIENT
Start: 2022-12-01 | End: 2022-12-02 | Stop reason: HOSPADM

## 2022-12-01 RX ORDER — HYDROMORPHONE HCL IN WATER/PF 6 MG/30 ML
0.2 PATIENT CONTROLLED ANALGESIA SYRINGE INTRAVENOUS EVERY 4 HOURS PRN
Status: DISCONTINUED | OUTPATIENT
Start: 2022-12-01 | End: 2022-12-02

## 2022-12-01 RX ORDER — BUPROPION HYDROCHLORIDE 150 MG/1
150 TABLET ORAL EVERY MORNING
Status: DISCONTINUED | OUTPATIENT
Start: 2022-12-01 | End: 2022-12-02 | Stop reason: HOSPADM

## 2022-12-01 RX ORDER — ACETAMINOPHEN 325 MG/1
650 TABLET ORAL EVERY 4 HOURS
Status: DISCONTINUED | OUTPATIENT
Start: 2022-12-01 | End: 2022-12-02 | Stop reason: HOSPADM

## 2022-12-01 RX ORDER — SCOLOPAMINE TRANSDERMAL SYSTEM 1 MG/1
1 PATCH, EXTENDED RELEASE TRANSDERMAL
Status: DISCONTINUED | OUTPATIENT
Start: 2022-12-01 | End: 2022-12-02 | Stop reason: HOSPADM

## 2022-12-01 RX ORDER — LORAZEPAM 0.5 MG/1
0.5 TABLET ORAL EVERY 4 HOURS PRN
Status: DISCONTINUED | OUTPATIENT
Start: 2022-12-01 | End: 2022-12-02 | Stop reason: HOSPADM

## 2022-12-01 RX ORDER — HYDROMORPHONE HCL IN WATER/PF 6 MG/30 ML
0.4 PATIENT CONTROLLED ANALGESIA SYRINGE INTRAVENOUS EVERY 4 HOURS PRN
Status: DISCONTINUED | OUTPATIENT
Start: 2022-12-01 | End: 2022-12-02

## 2022-12-01 RX ADMIN — HYDROMORPHONE HYDROCHLORIDE 0.4 MG: 0.2 INJECTION, SOLUTION INTRAMUSCULAR; INTRAVENOUS; SUBCUTANEOUS at 00:33

## 2022-12-01 RX ADMIN — HYOSCYAMINE SULFATE 125 MCG: 0.12 TABLET ORAL at 21:49

## 2022-12-01 RX ADMIN — HYDROMORPHONE HYDROCHLORIDE 0.2 MG: 0.2 INJECTION, SOLUTION INTRAMUSCULAR; INTRAVENOUS; SUBCUTANEOUS at 08:41

## 2022-12-01 RX ADMIN — ACETAMINOPHEN 650 MG: 325 TABLET, FILM COATED ORAL at 23:31

## 2022-12-01 RX ADMIN — METHOCARBAMOL 500 MG: 500 TABLET ORAL at 08:45

## 2022-12-01 RX ADMIN — LORAZEPAM 0.5 MG: 0.5 TABLET ORAL at 12:42

## 2022-12-01 RX ADMIN — METHOCARBAMOL 500 MG: 500 TABLET ORAL at 23:31

## 2022-12-01 RX ADMIN — ACETAMINOPHEN 650 MG: 325 TABLET, FILM COATED ORAL at 20:18

## 2022-12-01 RX ADMIN — TRAMADOL HYDROCHLORIDE 50 MG: 50 TABLET, COATED ORAL at 12:41

## 2022-12-01 RX ADMIN — HYOSCYAMINE SULFATE 125 MCG: 0.12 TABLET ORAL at 09:29

## 2022-12-01 RX ADMIN — ONDANSETRON 4 MG: 2 INJECTION INTRAMUSCULAR; INTRAVENOUS at 17:46

## 2022-12-01 RX ADMIN — HYDROMORPHONE HYDROCHLORIDE 0.2 MG: 0.2 INJECTION, SOLUTION INTRAMUSCULAR; INTRAVENOUS; SUBCUTANEOUS at 06:34

## 2022-12-01 RX ADMIN — SODIUM CHLORIDE, POTASSIUM CHLORIDE, SODIUM LACTATE AND CALCIUM CHLORIDE: 600; 310; 30; 20 INJECTION, SOLUTION INTRAVENOUS at 09:48

## 2022-12-01 RX ADMIN — ACETAMINOPHEN 650 MG: 325 TABLET, FILM COATED ORAL at 08:44

## 2022-12-01 RX ADMIN — LORAZEPAM 0.5 MG: 0.5 TABLET ORAL at 21:49

## 2022-12-01 RX ADMIN — HYDROMORPHONE HYDROCHLORIDE 0.2 MG: 0.2 INJECTION, SOLUTION INTRAMUSCULAR; INTRAVENOUS; SUBCUTANEOUS at 10:41

## 2022-12-01 RX ADMIN — OMEPRAZOLE 40 MG: 20 CAPSULE, DELAYED RELEASE ORAL at 08:45

## 2022-12-01 RX ADMIN — ONDANSETRON 4 MG: 2 INJECTION INTRAMUSCULAR; INTRAVENOUS at 10:41

## 2022-12-01 RX ADMIN — HYDROXYZINE HYDROCHLORIDE 25 MG: 25 TABLET, FILM COATED ORAL at 12:41

## 2022-12-01 RX ADMIN — ACETAMINOPHEN 650 MG: 325 TABLET, FILM COATED ORAL at 16:18

## 2022-12-01 RX ADMIN — HYDROMORPHONE HYDROCHLORIDE 0.4 MG: 0.2 INJECTION, SOLUTION INTRAMUSCULAR; INTRAVENOUS; SUBCUTANEOUS at 13:42

## 2022-12-01 RX ADMIN — OMEPRAZOLE 40 MG: 20 CAPSULE, DELAYED RELEASE ORAL at 20:18

## 2022-12-01 RX ADMIN — HYOSCYAMINE SULFATE 125 MCG: 0.12 TABLET ORAL at 16:19

## 2022-12-01 RX ADMIN — SCOPALAMINE 1 PATCH: 1 PATCH, EXTENDED RELEASE TRANSDERMAL at 08:44

## 2022-12-01 RX ADMIN — HYDROMORPHONE HYDROCHLORIDE 0.4 MG: 0.2 INJECTION, SOLUTION INTRAMUSCULAR; INTRAVENOUS; SUBCUTANEOUS at 04:23

## 2022-12-01 RX ADMIN — ACETAMINOPHEN 650 MG: 325 TABLET, FILM COATED ORAL at 13:12

## 2022-12-01 RX ADMIN — HYDROXYZINE HYDROCHLORIDE 25 MG: 25 TABLET, FILM COATED ORAL at 20:18

## 2022-12-01 RX ADMIN — HYDROMORPHONE HYDROCHLORIDE 0.4 MG: 0.2 INJECTION, SOLUTION INTRAMUSCULAR; INTRAVENOUS; SUBCUTANEOUS at 02:32

## 2022-12-01 RX ADMIN — ONDANSETRON 4 MG: 2 INJECTION INTRAMUSCULAR; INTRAVENOUS at 04:26

## 2022-12-01 RX ADMIN — SODIUM CHLORIDE, POTASSIUM CHLORIDE, SODIUM LACTATE AND CALCIUM CHLORIDE: 600; 310; 30; 20 INJECTION, SOLUTION INTRAVENOUS at 00:38

## 2022-12-01 RX ADMIN — SODIUM CHLORIDE, POTASSIUM CHLORIDE, SODIUM LACTATE AND CALCIUM CHLORIDE: 600; 310; 30; 20 INJECTION, SOLUTION INTRAVENOUS at 21:57

## 2022-12-01 RX ADMIN — METHOCARBAMOL 500 MG: 500 TABLET ORAL at 16:39

## 2022-12-01 RX ADMIN — HYDROMORPHONE HYDROCHLORIDE 0.4 MG: 0.2 INJECTION, SOLUTION INTRAMUSCULAR; INTRAVENOUS; SUBCUTANEOUS at 21:49

## 2022-12-01 RX ADMIN — TRAMADOL HYDROCHLORIDE 50 MG: 50 TABLET, COATED ORAL at 20:18

## 2022-12-01 RX ADMIN — HYDROMORPHONE HYDROCHLORIDE 0.4 MG: 0.2 INJECTION, SOLUTION INTRAMUSCULAR; INTRAVENOUS; SUBCUTANEOUS at 17:46

## 2022-12-01 RX ADMIN — BUPROPION HYDROCHLORIDE 150 MG: 150 TABLET, FILM COATED, EXTENDED RELEASE ORAL at 08:45

## 2022-12-01 ASSESSMENT — ACTIVITIES OF DAILY LIVING (ADL)
ADLS_ACUITY_SCORE: 35
ADLS_ACUITY_SCORE: 38
ADLS_ACUITY_SCORE: 35
ADLS_ACUITY_SCORE: 35
ADLS_ACUITY_SCORE: 38
ADLS_ACUITY_SCORE: 38

## 2022-12-01 NOTE — PROGRESS NOTES
Surgery Progress Note  12/01/2022       Subjective:  Reports significant abdo pain this morning. Teary at bedside     Objective:  BP (!) 151/91   Pulse 72   Temp 98.1  F (36.7  C) (Oral)   Resp 10   Ht 1.829 m (6')   Wt 106.6 kg (235 lb 0.2 oz)   LMP 11/14/2022 (Approximate)   SpO2 98%   BMI 31.87 kg/m        Gen: Awake, alert, teary due to pain  Resp: NLB on RA  Abd: soft, nondistended, TTP in epigastric area  Ext: WWP, no oedema     Labs:  No lab results found in last 7 days.    Recent Labs   Lab 11/30/22  1245   *       Imaging:  No new imaging.     Assessment/Plan:   43yo female who is s/p laparoscopic sleeve gastrectomy and hiatal hernia repair on 10/25/22 with Dr. Aragon, who remained in hospital post-op for acute blood loss anemia, dysphagia and pain control, discharged 10/30/22. She returned to the ED today from clinic on 11/1/22 with LUQ abdominal and shoulder pain, was found to have bilateral lower lobe pneumonia and new oxygen requirement. She was admitted for IV antibiotics and observation. She returned for EGD and stent placement at gastric stricture.    - continue NPO status  - multi-modal pain control  - continue to monitor      Patient seen with chief resident, Dr Cardona, and to be discussed with staff, Dr Aragon.    Misti Villanueva  General Surgery PGY1  Pg [phone]

## 2022-12-01 NOTE — UTILIZATION REVIEW
Admission Status; Secondary Review Determination   Under the authority of the Utilization Management Committee, the utilization review process indicated a secondary review on the above patient. The review outcome is based on review of the medical records, discussions with staff, and applying clinical experience noted on the date of the review.     (x) Outpatient Status with extended recovery is appropriate - This patient does not meet hospital inpatient criteria.   RATIONALE FOR DETERMINATION   42 year old woman who underwent a UPPER EGD, INTERPRETATION OF FLOUROSCOPY AND STENT PLACEMENT AT GASTRIC STRICTURE. Patient was admitted to the hospital after the procedure.  No documented complications. Patient can be safely monitored and supported in ongoing outpatient/extended recovery setting.   The severity of illness, intensity of service provided, expected LOS and risk for adverse outcome doesn't meet inpatient hospital admission.     The information on this document is developed by the utilization review team in order for the business office to ensure compliance. This only denotes the appropriateness of proper admission status and does not reflect the quality of care rendered.   The definitions of Inpatient Status and Observation Status used in making the determination above are those provided in the CMS Coverage Manual, Chapter 1 and Chapter 6, section 70.4.     Sincerely,   Gera Kline MD    Physician Advisor  Utilization Review/ Case Management  [facility].

## 2022-12-01 NOTE — OR NURSING
"Text paged DR. Wilhelm about pt's pain. She wakes up in excruciating pain 10/10, crying and gripping her abdomen.She says, \"I just want the medicine to put me to sleep so I can't feel it anymore, it is so terrible.\" She has intermittent nausea, but was able to swallow her meds this a.m. slowly. She stated, \"I am afraid I am going to vomit up my whole stomach when it comes.\"  "

## 2022-12-01 NOTE — PLAN OF CARE
Goal Outcome Evaluation:    Arrived to 7C at 1400 from pacu. Patient very emotional and teary regarding hospital stay. Per patient and her sister she had a bad experience on another unit and has PTSD. Allowed to verbalize feelings and emotional support provided. Reviewed plan of care and pain medications. NPO except for meds. Per PACU RN she walked to the bathroom and voided around 1330. Lungs CTA and uses IS. O2 sats stable on 2L. Resting comfortable at present. Plan to return to the OR tomorrow.

## 2022-12-01 NOTE — OR NURSING
Late entry: Dr. Drummond stated that PCA was not desired by surgical staff, but rather that pt is very anxious, and surgical team wants the po meds attempted again, even though the tramadol was inadequate for pain when given before.  Meds ordered and given: ativan, hydroxyzine, and tramadol. Pt tearful, crying, but willing to try to take them. Sister present, cheering her on. Pt's nausea is better, and pt able to swallow meds. IV site tender, pt stated, so Vascular access paged for another site. IVF stopped. Pt walked to the BR with steady gait to void, and transferred back into bed independently. Pt very tearful about anticipating another stay in the rooms upstairs due to her extended stay in the past, but a private room became available, and this was welcomed.

## 2022-12-02 ENCOUNTER — ANESTHESIA (OUTPATIENT)
Dept: SURGERY | Facility: CLINIC | Age: 42
End: 2022-12-02
Payer: COMMERCIAL

## 2022-12-02 VITALS
DIASTOLIC BLOOD PRESSURE: 76 MMHG | HEART RATE: 67 BPM | WEIGHT: 235.01 LBS | HEIGHT: 72 IN | BODY MASS INDEX: 31.83 KG/M2 | TEMPERATURE: 97.1 F | SYSTOLIC BLOOD PRESSURE: 137 MMHG | RESPIRATION RATE: 18 BRPM | OXYGEN SATURATION: 98 %

## 2022-12-02 LAB — GLUCOSE BLDC GLUCOMTR-MCNC: 89 MG/DL (ref 70–99)

## 2022-12-02 PROCEDURE — 250N000011 HC RX IP 250 OP 636: Performed by: STUDENT IN AN ORGANIZED HEALTH CARE EDUCATION/TRAINING PROGRAM

## 2022-12-02 PROCEDURE — 370N000017 HC ANESTHESIA TECHNICAL FEE, PER MIN: Performed by: SURGERY

## 2022-12-02 PROCEDURE — 258N000003 HC RX IP 258 OP 636: Performed by: STUDENT IN AN ORGANIZED HEALTH CARE EDUCATION/TRAINING PROGRAM

## 2022-12-02 PROCEDURE — 710N000010 HC RECOVERY PHASE 1, LEVEL 2, PER MIN: Performed by: SURGERY

## 2022-12-02 PROCEDURE — 999N000141 HC STATISTIC PRE-PROCEDURE NURSING ASSESSMENT: Performed by: SURGERY

## 2022-12-02 PROCEDURE — 43247 EGD REMOVE FOREIGN BODY: CPT | Mod: 78 | Performed by: SURGERY

## 2022-12-02 PROCEDURE — 82962 GLUCOSE BLOOD TEST: CPT

## 2022-12-02 PROCEDURE — 360N000082 HC SURGERY LEVEL 2 W/ FLUORO, PER MIN: Performed by: SURGERY

## 2022-12-02 PROCEDURE — 258N000003 HC RX IP 258 OP 636: Performed by: NURSE ANESTHETIST, CERTIFIED REGISTERED

## 2022-12-02 PROCEDURE — 250N000009 HC RX 250: Performed by: NURSE ANESTHETIST, CERTIFIED REGISTERED

## 2022-12-02 PROCEDURE — 250N000013 HC RX MED GY IP 250 OP 250 PS 637: Performed by: STUDENT IN AN ORGANIZED HEALTH CARE EDUCATION/TRAINING PROGRAM

## 2022-12-02 PROCEDURE — 250N000025 HC SEVOFLURANE, PER MIN: Performed by: SURGERY

## 2022-12-02 PROCEDURE — 250N000013 HC RX MED GY IP 250 OP 250 PS 637

## 2022-12-02 PROCEDURE — 250N000011 HC RX IP 250 OP 636

## 2022-12-02 PROCEDURE — 250N000011 HC RX IP 250 OP 636: Performed by: NURSE ANESTHETIST, CERTIFIED REGISTERED

## 2022-12-02 PROCEDURE — 272N000001 HC OR GENERAL SUPPLY STERILE: Performed by: SURGERY

## 2022-12-02 PROCEDURE — C1726 CATH, BAL DIL, NON-VASCULAR: HCPCS | Performed by: SURGERY

## 2022-12-02 RX ORDER — LABETALOL HYDROCHLORIDE 5 MG/ML
10 INJECTION, SOLUTION INTRAVENOUS
Status: DISCONTINUED | OUTPATIENT
Start: 2022-12-02 | End: 2022-12-02 | Stop reason: HOSPADM

## 2022-12-02 RX ORDER — ALBUTEROL SULFATE 0.83 MG/ML
2.5 SOLUTION RESPIRATORY (INHALATION) EVERY 4 HOURS PRN
Status: DISCONTINUED | OUTPATIENT
Start: 2022-12-02 | End: 2022-12-02 | Stop reason: HOSPADM

## 2022-12-02 RX ORDER — HYDROMORPHONE HCL IN WATER/PF 6 MG/30 ML
0.4 PATIENT CONTROLLED ANALGESIA SYRINGE INTRAVENOUS EVERY 5 MIN PRN
Status: DISCONTINUED | OUTPATIENT
Start: 2022-12-02 | End: 2022-12-02 | Stop reason: HOSPADM

## 2022-12-02 RX ORDER — HYDROXYZINE HYDROCHLORIDE 25 MG/1
25 TABLET, FILM COATED ORAL 3 TIMES DAILY PRN
Qty: 10 TABLET | Refills: 0 | Status: SHIPPED | OUTPATIENT
Start: 2022-12-02 | End: 2023-03-10

## 2022-12-02 RX ORDER — LIDOCAINE HYDROCHLORIDE 20 MG/ML
INJECTION, SOLUTION INFILTRATION; PERINEURAL PRN
Status: DISCONTINUED | OUTPATIENT
Start: 2022-12-02 | End: 2022-12-02

## 2022-12-02 RX ORDER — ONDANSETRON 4 MG/1
4 TABLET, ORALLY DISINTEGRATING ORAL EVERY 30 MIN PRN
Status: DISCONTINUED | OUTPATIENT
Start: 2022-12-02 | End: 2022-12-02 | Stop reason: HOSPADM

## 2022-12-02 RX ORDER — ONDANSETRON 4 MG/1
4 TABLET, ORALLY DISINTEGRATING ORAL EVERY 6 HOURS PRN
Qty: 50 TABLET | Refills: 0 | Status: ON HOLD | OUTPATIENT
Start: 2022-12-02 | End: 2022-12-15

## 2022-12-02 RX ORDER — HALOPERIDOL 5 MG/ML
1 INJECTION INTRAMUSCULAR
Status: DISCONTINUED | OUTPATIENT
Start: 2022-12-02 | End: 2022-12-02 | Stop reason: HOSPADM

## 2022-12-02 RX ORDER — FENTANYL CITRATE 50 UG/ML
INJECTION, SOLUTION INTRAMUSCULAR; INTRAVENOUS PRN
Status: DISCONTINUED | OUTPATIENT
Start: 2022-12-02 | End: 2022-12-02

## 2022-12-02 RX ORDER — DEXAMETHASONE SODIUM PHOSPHATE 4 MG/ML
INJECTION, SOLUTION INTRA-ARTICULAR; INTRALESIONAL; INTRAMUSCULAR; INTRAVENOUS; SOFT TISSUE PRN
Status: DISCONTINUED | OUTPATIENT
Start: 2022-12-02 | End: 2022-12-02

## 2022-12-02 RX ORDER — FENTANYL CITRATE 50 UG/ML
25 INJECTION, SOLUTION INTRAMUSCULAR; INTRAVENOUS EVERY 5 MIN PRN
Status: DISCONTINUED | OUTPATIENT
Start: 2022-12-02 | End: 2022-12-02 | Stop reason: HOSPADM

## 2022-12-02 RX ORDER — SODIUM CHLORIDE, SODIUM LACTATE, POTASSIUM CHLORIDE, CALCIUM CHLORIDE 600; 310; 30; 20 MG/100ML; MG/100ML; MG/100ML; MG/100ML
INJECTION, SOLUTION INTRAVENOUS CONTINUOUS PRN
Status: DISCONTINUED | OUTPATIENT
Start: 2022-12-02 | End: 2022-12-02

## 2022-12-02 RX ORDER — HYDRALAZINE HYDROCHLORIDE 20 MG/ML
2.5-5 INJECTION INTRAMUSCULAR; INTRAVENOUS EVERY 10 MIN PRN
Status: DISCONTINUED | OUTPATIENT
Start: 2022-12-02 | End: 2022-12-02 | Stop reason: HOSPADM

## 2022-12-02 RX ORDER — SODIUM CHLORIDE, SODIUM LACTATE, POTASSIUM CHLORIDE, CALCIUM CHLORIDE 600; 310; 30; 20 MG/100ML; MG/100ML; MG/100ML; MG/100ML
INJECTION, SOLUTION INTRAVENOUS CONTINUOUS
Status: DISCONTINUED | OUTPATIENT
Start: 2022-12-02 | End: 2022-12-02 | Stop reason: HOSPADM

## 2022-12-02 RX ORDER — ONDANSETRON 2 MG/ML
4 INJECTION INTRAMUSCULAR; INTRAVENOUS EVERY 30 MIN PRN
Status: DISCONTINUED | OUTPATIENT
Start: 2022-12-02 | End: 2022-12-02 | Stop reason: HOSPADM

## 2022-12-02 RX ORDER — LIDOCAINE 40 MG/G
CREAM TOPICAL
Status: DISCONTINUED | OUTPATIENT
Start: 2022-12-02 | End: 2022-12-02 | Stop reason: HOSPADM

## 2022-12-02 RX ORDER — FENTANYL CITRATE 50 UG/ML
50 INJECTION, SOLUTION INTRAMUSCULAR; INTRAVENOUS EVERY 5 MIN PRN
Status: DISCONTINUED | OUTPATIENT
Start: 2022-12-02 | End: 2022-12-02 | Stop reason: HOSPADM

## 2022-12-02 RX ORDER — ONDANSETRON 2 MG/ML
INJECTION INTRAMUSCULAR; INTRAVENOUS PRN
Status: DISCONTINUED | OUTPATIENT
Start: 2022-12-02 | End: 2022-12-02

## 2022-12-02 RX ORDER — PROPOFOL 10 MG/ML
INJECTION, EMULSION INTRAVENOUS PRN
Status: DISCONTINUED | OUTPATIENT
Start: 2022-12-02 | End: 2022-12-02

## 2022-12-02 RX ORDER — HYDROMORPHONE HCL IN WATER/PF 6 MG/30 ML
0.2 PATIENT CONTROLLED ANALGESIA SYRINGE INTRAVENOUS EVERY 5 MIN PRN
Status: DISCONTINUED | OUTPATIENT
Start: 2022-12-02 | End: 2022-12-02 | Stop reason: HOSPADM

## 2022-12-02 RX ADMIN — ONDANSETRON 4 MG: 2 INJECTION INTRAMUSCULAR; INTRAVENOUS at 08:11

## 2022-12-02 RX ADMIN — SODIUM CHLORIDE, POTASSIUM CHLORIDE, SODIUM LACTATE AND CALCIUM CHLORIDE: 600; 310; 30; 20 INJECTION, SOLUTION INTRAVENOUS at 12:34

## 2022-12-02 RX ADMIN — DEXAMETHASONE SODIUM PHOSPHATE 10 MG: 4 INJECTION, SOLUTION INTRA-ARTICULAR; INTRALESIONAL; INTRAMUSCULAR; INTRAVENOUS; SOFT TISSUE at 07:40

## 2022-12-02 RX ADMIN — HYDROMORPHONE HYDROCHLORIDE 0.4 MG: 0.2 INJECTION, SOLUTION INTRAMUSCULAR; INTRAVENOUS; SUBCUTANEOUS at 01:52

## 2022-12-02 RX ADMIN — ACETAMINOPHEN 650 MG: 325 TABLET, FILM COATED ORAL at 12:34

## 2022-12-02 RX ADMIN — SUCCINYLCHOLINE CHLORIDE 80 MG: 20 INJECTION, SOLUTION INTRAMUSCULAR; INTRAVENOUS; PARENTERAL at 07:34

## 2022-12-02 RX ADMIN — SUGAMMADEX 200 MG: 100 INJECTION, SOLUTION INTRAVENOUS at 08:11

## 2022-12-02 RX ADMIN — ACETAMINOPHEN 650 MG: 325 TABLET, FILM COATED ORAL at 04:34

## 2022-12-02 RX ADMIN — LIDOCAINE HYDROCHLORIDE 70 MG: 20 INJECTION, SOLUTION INFILTRATION; PERINEURAL at 07:34

## 2022-12-02 RX ADMIN — METHOCARBAMOL 500 MG: 500 TABLET ORAL at 06:09

## 2022-12-02 RX ADMIN — HYDROXYZINE HYDROCHLORIDE 50 MG: 25 TABLET, FILM COATED ORAL at 04:34

## 2022-12-02 RX ADMIN — SODIUM CHLORIDE, POTASSIUM CHLORIDE, SODIUM LACTATE AND CALCIUM CHLORIDE: 600; 310; 30; 20 INJECTION, SOLUTION INTRAVENOUS at 07:23

## 2022-12-02 RX ADMIN — PROPOFOL 150 MG: 10 INJECTION, EMULSION INTRAVENOUS at 07:34

## 2022-12-02 RX ADMIN — FAMOTIDINE 20 MG: 10 INJECTION, SOLUTION INTRAVENOUS at 07:18

## 2022-12-02 RX ADMIN — Medication 20 MG: at 07:40

## 2022-12-02 RX ADMIN — TRAMADOL HYDROCHLORIDE 50 MG: 50 TABLET, COATED ORAL at 02:46

## 2022-12-02 RX ADMIN — FENTANYL CITRATE 50 MCG: 50 INJECTION, SOLUTION INTRAMUSCULAR; INTRAVENOUS at 07:44

## 2022-12-02 RX ADMIN — MIDAZOLAM 2 MG: 1 INJECTION INTRAMUSCULAR; INTRAVENOUS at 07:23

## 2022-12-02 RX ADMIN — FENTANYL CITRATE 50 MCG: 50 INJECTION, SOLUTION INTRAMUSCULAR; INTRAVENOUS at 07:34

## 2022-12-02 RX ADMIN — HYOSCYAMINE SULFATE 125 MCG: 0.12 TABLET ORAL at 12:34

## 2022-12-02 RX ADMIN — ONDANSETRON 4 MG: 2 INJECTION INTRAMUSCULAR; INTRAVENOUS at 04:47

## 2022-12-02 ASSESSMENT — ACTIVITIES OF DAILY LIVING (ADL)
ADLS_ACUITY_SCORE: 33
ADLS_ACUITY_SCORE: 38
ADLS_ACUITY_SCORE: 33

## 2022-12-02 NOTE — PLAN OF CARE
[id number]. A&Ox4. Slightly hypertensive, OVSS on RA. Pain somewhat managed with tylenol, tramadol, atarax, robaxin and IV dilaudid. Ativanx1 for anxiety. Nausea intermittent managed with IV zofran and scopolamine patch. Denies passing flatus. Voiding. PIV infusing LR @ 100ml/hr. Ambulated in halls with SBA. NPO except for meds.On continuous pulse oximetry. Plan is for stent removal in the OR tomorrow.     vital signs normal or at patient baseline- met  -tolerating oral intake to maintain hydration- not met  -adequate pain control on oral analgesics- not met

## 2022-12-02 NOTE — ANESTHESIA PROCEDURE NOTES
Airway       Patient location during procedure: OR       Procedure Start/Stop Times: 12/2/2022 7:35 AM  Staff -        Anesthesiologist:  Temitope Andujar MD       CRNA: Sujatha Lara APRN CRNA       Performed By: CRNAIndications and Patient Condition       Indications for airway management: jeffrey-procedural       Induction type:intravenous       Mask difficulty assessment: 1 - vent by mask    Final Airway Details       Final airway type: endotracheal airway       Successful airway: ETT - single  Endotracheal Airway Details        ETT size (mm): 7.0       Cuffed: yes       Successful intubation technique: direct laryngoscopy       DL Blade Type: Nair 2       Grade View of Cords: 1       Adjucts: stylet       Position: Right       Secured at (cm): 22       Bite block used: None    Post intubation assessment        Placement verified by: capnometry, equal breath sounds and chest rise        Number of attempts at approach: 1       Number of other approaches attempted: 0       Secured with: pink tape       Ease of procedure: easy       Dentition: Intact and Unchanged    Medication(s) Administered   Medication Administration Time: 12/2/2022 7:35 AM

## 2022-12-02 NOTE — ANESTHESIA PREPROCEDURE EVALUATION
Anesthesia Pre-Procedure Evaluation    Patient: Leah Yanez   MRN: [id number] : 1980        Procedure : Procedure(s):  ESOPHAGOGASTRODUODENOSCOPY, WITH ESOPHAGEAL STENT REMOVAL          Past Medical History:   Diagnosis Date     Anti-cardiolipin antibody positive      Griggs esophagus      Concussion 2016     Depressive disorder, not elsewhere classified 2006    Resolved      Gastroesophageal reflux disease with esophagitis      Hiatal hernia      History of pulmonary embolism      Obesity      Raynaud's syndrome     past history of admission for gangrene of one finger      Past Surgical History:   Procedure Laterality Date     ESOPHAGOSCOPY, GASTROSCOPY, DUODENOSCOPY (EGD), COMBINED N/A 2021    Procedure: ESOPHAGOGASTRODUODENOSCOPY, WITH BIOPSY;  Surgeon: Esteban Rose DO;  Location: [facility] OR     ESOPHAGOSCOPY, GASTROSCOPY, DUODENOSCOPY (EGD), COMBINED N/A 2022    Procedure: Upper endoscopy; gastric stricture dilation, interpretation of fluoroscopy nasojejunal feeding tube;  Surgeon: Daniel Aragon MD;  Location: [facility] OR     ESOPHAGOSCOPY, GASTROSCOPY, DUODENOSCOPY (EGD), COMBINED N/A 2022    Procedure: ESOPHAGOGASTRODUODENOSCOPY (EGD);  Surgeon: Daniel Aragon MD;  Location: [facility] GI     LAPAROSCOPIC GASTRIC SLEEVE N/A 10/25/2022    Procedure: GASTRECTOMY, SLEEVE, LAPAROSCOPIC;  Surgeon: Daniel Aragon MD;  Location: [facility] OR     LAPAROSCOPIC HERNIORRHAPHY HIATAL N/A 10/25/2022    Procedure: HERNIORRHAPHY, HIATAL, LAPAROSCOPIC;  Surgeon: Daniel Aragon MD;  Location: [facility] OR     LAPAROSCOPY DIAGNOSTIC (GENERAL) N/A 2022    Procedure: LAPAROSCOPY, DIAGNOSTIC, BY GENERAL SURGERY, evacuation of abdominl hematoma;  Surgeon: Daniel Aragon MD;  Location: [facility] OR     PICC TRIPLE LUMEN PLACEMENT Left 2022    51cm (1cm external), Basilic vein     TONSILLECTOMY & ADENOIDECTOMY       ZZC NONSPECIFIC PROCEDURE      T&A as a child     ZZC NONSPECIFIC  "PROCEDURE      Tubes in ears bilaterally      Allergies   Allergen Reactions     Azithromycin      Erythromycin GI Disturbance     When \"very young\"      Social History     Tobacco Use     Smoking status: Never     Smokeless tobacco: Never   Substance Use Topics     Alcohol use: Yes     Comment: Alcoholic Drinks/day: social      Wt Readings from Last 1 Encounters:   11/30/22 106.6 kg (235 lb 0.2 oz)        Anesthesia Evaluation   Pt has had prior anesthetic.     No history of anesthetic complications       ROS/MED HX  ENT/Pulmonary: Comment: History pneumonia last month s/p abx      Neurologic:  - neg neurologic ROS     Cardiovascular:  - neg cardiovascular ROS   (+) -----Previous cardiac testing   Echo: Date: 11/22 Results:    Stress Test: Date: Results:    ECG Reviewed: Date: Results:    Cath: Date: Results:      METS/Exercise Tolerance:     Hematologic:     (+) anemia,     Musculoskeletal:  - neg musculoskeletal ROS     GI/Hepatic: Comment: S/p sleeve gastrectomy 10/25/22 and hiatal hernia repair complicated by hepatic lobe hematoma s/p evacuation and continued N/V  S/p esophageal stent placement    (+) GERD, esophageal disease, hiatal hernia,     Renal/Genitourinary:  - neg Renal ROS     Endo:     (+) Obesity,     Psychiatric/Substance Use:  - neg psychiatric ROS     Infectious Disease:  - neg infectious disease ROS     Malignancy:  - neg malignancy ROS     Other:               OUTSIDE LABS:  CBC:   Lab Results   Component Value Date    WBC 8.9 11/21/2022    WBC 9.2 11/13/2022    HGB 10.7 (L) 11/21/2022    HGB 7.4 (L) 11/13/2022    HCT 35.9 11/21/2022    HCT 25.3 (L) 11/13/2022     (H) 11/21/2022     (H) 11/13/2022     BMP:   Lab Results   Component Value Date     11/16/2022     11/15/2022    POTASSIUM 3.5 11/16/2022    POTASSIUM 4.5 11/15/2022    POTASSIUM 4.5 11/15/2022    CHLORIDE 101 11/16/2022    CHLORIDE 98 11/15/2022    CO2 22 11/16/2022    CO2 22 11/15/2022    BUN 21.3 (H) " 11/16/2022    BUN 14.3 11/15/2022    CR 0.84 11/16/2022    CR 0.77 11/15/2022    GLC 89 12/02/2022     (H) 11/30/2022     COAGS:   Lab Results   Component Value Date    PTT 32 11/21/2022    INR 1.04 11/21/2022     POC:   Lab Results   Component Value Date    HCG Negative 10/25/2022     HEPATIC:   Lab Results   Component Value Date    ALBUMIN 2.9 (L) 11/13/2022    PROTTOTAL 6.8 11/13/2022    ALT 17 11/13/2022    AST 25 11/13/2022    ALKPHOS 129 (H) 11/13/2022    BILITOTAL 0.3 11/13/2022     OTHER:   Lab Results   Component Value Date    PH 7.47 (H) 11/08/2022    LACT 0.5 11/04/2022    A1C 5.5 10/25/2022    KIMBERLYN 9.0 11/16/2022    PHOS 3.1 11/16/2022    MAG 2.2 11/16/2022    LIPASE 65 (H) 11/06/2022    TSH 0.85 07/24/2003    .00 (H) 11/06/2022    SED 9 07/24/2003       Anesthesia Plan    ASA Status:  3   NPO Status:  NPO Appropriate    Anesthesia Type: General.     - Airway: ETT   Induction: Intravenous, RSI.   Maintenance: Inhalation.   Techniques and Equipment:     - Lines/Monitors: BIS     Consents    Anesthesia Plan(s) and associated risks, benefits, and realistic alternatives discussed. Questions answered and patient/representative(s) expressed understanding.     - Discussed: Risks, Benefits and Alternatives for BOTH SEDATION and the PROCEDURE were discussed     - Discussed with:  Patient         Postoperative Care    Pain management: IV analgesics.   PONV prophylaxis: Ondansetron (or other 5HT-3), Dexamethasone or Solumedrol     Comments:                Temitope Andujar MD

## 2022-12-02 NOTE — PROGRESS NOTES
Observation goals    -Diagnostic tests and consults completed and resulted- in process   -Vital signs normal or at patient baseline- met  -Tolerating oral intake to maintain hydration- not met   -Adequate pain control on oral analgesics- not met

## 2022-12-02 NOTE — ANESTHESIA POSTPROCEDURE EVALUATION
Patient: Leah Yanez    Procedure: Procedure(s):  ESOPHAGOGASTRODUODENOSCOPY, WITH ESOPHAGEAL STENT REMOVAL and Balloon Dialation       Anesthesia Type:  General    Note:  Disposition: Inpatient   Postop Pain Control: Uneventful            Sign Out: Well controlled pain   PONV: No   Neuro/Psych: Uneventful            Sign Out: Acceptable/Baseline neuro status   Airway/Respiratory: Uneventful            Sign Out: Acceptable/Baseline resp. status   CV/Hemodynamics: Uneventful            Sign Out: Acceptable CV status; No obvious hypovolemia; No obvious fluid overload   Other NRE: NONE   DID A NON-ROUTINE EVENT OCCUR? No           Last vitals:  Vitals Value Taken Time   /80 12/02/22 0900   Temp 36.2  C (97.2  F) 12/02/22 0855   Pulse 64 12/02/22 0908   Resp 16 12/02/22 0908   SpO2 96 % 12/02/22 0909   Vitals shown include unvalidated device data.    Electronically Signed By: Aníbal Peter MD  December 2, 2022  9:22 AM

## 2022-12-02 NOTE — PLAN OF CARE
Goal Outcome Evaluation:       Observation goals     -Diagnostic tests and consults completed and resulted- met   -Vital signs normal or at patient baseline- met  -Tolerating oral intake to maintain hydration- met  -Adequate pain control on oral analgesics- met    Arrived from PACU at 0930. Has been tolerating fluids slowly throughout the day without nausea. No pain meds given. UAL to bathroom. Abdomen soft. +Flatus. Would like to discharge; team paged.

## 2022-12-02 NOTE — PLAN OF CARE
Assumed care for pt [id number]  Hypertensive not within notifying parameters, OAVSS on 02 2L due to desating to the 80's while asleep.  Pain somewhat controlled with prn tramadol. Dilaudid, robaxin and atarax. Denies nausea.  Denies passing flatus.up with SBA voiding spontaneously, denies passing flatus. PIV infusing IVMF at 100ml/hr. NPO for stent removal today. G bath completed.  [id number] Report given to pre op nurse.

## 2022-12-02 NOTE — PROGRESS NOTES
Admitted/transferred from: PACU  2 RN full skin assessment completed by Nadeen Taveras RN and Frances URIBE RN  Skin assessment finding: skin intact, no problems   Interventions/actions: None     Will continue to monitor.   General

## 2022-12-02 NOTE — PLAN OF CARE
Goal Outcome Evaluation:         Observation goals     -Diagnostic tests and consults completed and resulted- met   -Vital signs normal or at patient baseline- met  -Tolerating oral intake to maintain hydration- not met   -Adequate pain control on oral analgesics- not met

## 2022-12-02 NOTE — ANESTHESIA CARE TRANSFER NOTE
Patient: Leah Yanez    Procedure: Procedure(s):  ESOPHAGOGASTRODUODENOSCOPY, WITH ESOPHAGEAL STENT REMOVAL and Balloon Dialation       Diagnosis: S/P laparoscopic sleeve gastrectomy [Z98.84]  Esophageal dysphagia [R13.19]  Diagnosis Additional Information: No value filed.    Anesthesia Type:   General     Note:    Oropharynx: oropharynx clear of all foreign objects and spontaneously breathing  Level of Consciousness: awake  Oxygen Supplementation: face mask  Level of Supplemental Oxygen (L/min / FiO2): 6  Independent Airway: airway patency satisfactory and stable  Dentition: dentition unchanged  Vital Signs Stable: post-procedure vital signs reviewed and stable  Report to RN Given: handoff report given  Patient transferred to: PACU    Handoff Report: Identifed the Patient, Identified the Reponsible Provider, Reviewed the pertinent medical history, Discussed the surgical course, Reviewed Intra-OP anesthesia mangement and issues during anesthesia, Set expectations for post-procedure period and Allowed opportunity for questions and acknowledgement of understanding      Vitals:  Vitals Value Taken Time   /83 12/02/22 0824   Temp     Pulse 64 12/02/22 0825   Resp 22 12/02/22 0825   SpO2 100 % 12/02/22 0825   Vitals shown include unvalidated device data.    Electronically Signed By: ELISA Nunes CRNA  December 2, 2022  8:27 AM

## 2022-12-02 NOTE — PROGRESS NOTES
Patient arrived to  with intermittent nausea. Prior to arrival patient received 4 mg IV Zofran, scop patch behind left ear, 500 mg oral robaxin, 25 mg hydroxyzine. Patient is pale, cool, vital signs stable, last Hgb level was 10.7 on 11/21. Talked with Dr. Andujar she will order labs in the OR or PACU if needed, ordering 20 mg IV Pepcid prior to surgery.

## 2022-12-02 NOTE — OP NOTE
[facility]     Operative Note    Pre-operative diagnosis: S/P laparoscopic sleeve gastrectomy [Z98.84]  Esophageal dysphagia [R13.19]     Post-operative diagnosis Gastric stricture     Procedure: Procedure(s):  ESOPHAGOGASTRODUODENOSCOPY, WITH ESOPHAGEAL STENT REMOVAL and Balloon Dialation     Surgeon: Surgeon(s) and Role:     * Daniel Aragon MD - Primary     * Misti Wilhelm MD - Resident - Assisting   Anesthesia: Anesthesiologist: Temitope Andujar MD  CRNA: Sujatha Lara APRN CRNA     Estimated blood loss: None   Drains: None   Specimens: * No specimens in log *     Findings: 1. A 23x10 [employer] covered wall stent was found at the location of gastric stricture.  2. A balloon was inserted and dilated to 20mm at the level of the oesophagus.  2. The stent was then removed by grasping it proximally and pulling the stent retrograde under direct endoscopic visualization.  3. I visualized the area of the stent and the esophagus after stent removal and there was no evidence of esophageal injury.     Complications: None.   Implants: None.       COMORBIDITIES:   Past Medical History:   Diagnosis Date     Anti-cardiolipin antibody positive      Griggs esophagus      Concussion 2016     Depressive disorder, not elsewhere classified 03/01/2006    Resolved 8/07     Gastroesophageal reflux disease with esophagitis      Hiatal hernia      History of pulmonary embolism      Obesity      Raynaud's syndrome     past history of admission for gangrene of one finger        INDICATIONS FOR PROCEDURE  Leah Yanez is a 42 year old Female who underwent prior laparoscopic sleeve gastrectomy surgery complicated by sleeve stricture.    An esophageal stent was previously placed to treat gastric stricture two days earlier.  Height: 182.9 cm (6')  Weight: 106.6 kg (235 lb 0.2 oz)  Body mass index is 31.87 kg/m .     After understanding the risks and benefits of  proceeding with an esophageal stent placement, she agreed to an operation as outlined by me.    I reviewed the risks of surgery with Leah Martinezdict and these include but are not limited to the following:    POTENTIAL COMPLICATIONS  Complications have been reported in the literature for esophageal stent removal.     PROCEDURAL COMPLICATIONS:    Bleeding    Esophageal or stomach perforation    Pain    Aspiration    Failure of stent to fix the problem intended    Reflux    Esophagitis    Esophageal ulceration    Edema    Fever    Fistula formation outside of normal disease progression    Death with cause outside of normal disease progression    DESCRIPTION OF PROCEDURE:   The patient was brought to the operating room, placed supine on the operating room table, and general anesthesia was induced.    A timeout was conducted with all members of the surgical team present to verify that the procedure and patient were correct.    The procedure was started by intubating the esophagus with a 10-mm adult endoscope.     The scope was advanced into the neogastric pouch.     An esophageal stent was located and it was traversed.    I elected to grasp the stent proximally and in this manner, I disengaged the stent from the adjacent mucosa and then I pulled the stent in a controlled movement retrograde and ultimately out of the mouth.    The stent was intact.    The gastroscope was passed back into the esophagus to evaluate for injury.    I evaluated the location of the previously placed stent and it appeared intact.    The gastroscope was removed and the procedure was complete.    I was present for all critical portions of the operation.    Daniel Aragon MD  Surgery  [phone] (hospital )  [phone] (clinic nurses)

## 2022-12-03 NOTE — DISCHARGE SUMMARY
[facility]  Discharge Summary  General Surgery     Leah Yanez MRN# [id number]   YOB: 1980 Age: 42 year old     Date of Admission:  11/30/2022  Date of Discharge::  12/2/2022  4:15 PM  Admitting Physician:  Daniel Aragon MD  Discharge Physician:  Daniel Aragon MD  Primary Care Physician:        [facility]          Admission Diagnoses:   S/P laparoscopic sleeve gastrectomy [Z98.84]  Nausea with vomiting [R11.2]  Gastric anastomotic stricture [K92.9, K31.89]          Discharge Diagnosis:   Gastric stricture with intolerance of esophageal stent.  Dehydration.         Procedures:   Procedure(s):  12/01: Upper EGD, interpretation of fluoroscopy and stent placement at gastric stricture  12/02: ESOPHAGOGASTRODUODENOSCOPY, WITH ESOPHAGEAL STENT REMOVAL and Balloon Dialation          Consultations:   NURSING TO CONSULT FOR VASCULAR ACCESS CARE IP CONSULT          Brief History of Illness:   Leah Yanez is a 43yo female who underwent laparoscopic sleeve gastrectomy and hiatal hernia repair on 10/25/22 with Dr. Aragon, who remained in hospital post-op for acute blood loss anemia, dysphagia and pain control, and was discharged on 10/30/22. She returned to the ED from clinic on 11/1/22 with LUQ abdominal and shoulder pain, and was found to have bilateral lower lobe pneumonia and new oxygen requirement. She was admitted for IV antibiotics and observation and discharged home without any issues. She returned for EGD and stent placement at gastric stricture on 12/01 with Dr Aragon.           Hospital Course:   The patient underwent an upper EGD on 12/01, which she tolerated well without immediate complications. She was transferred to the PACU for observation care. Ms Yanez experienced significant epigastric pain following stent placement. Thus, she underwent EGD and stent removal on POD1. Her postoperative course was unremarkable. Her pain  subsided following stent removal. She was able to tolerate PO intake without nausea or vomiting. On the day of discharge, she was tolerating a Bariatric clear liquid diet, her pain was well controlled with oral pain medications, she was voiding spontaneously, and ambulating independently. Patient will follow up with Dr Aragon in the Minimally Invasive Surgery clinic in approximately 2 weeks.           Imaging Studies:     Results for orders placed or performed during the hospital encounter of 11/30/22   XR Surgery MATTHEW Fluoro Less Than 5 Min    Narrative    This exam was marked as non-reportable because it will not be read by a   radiologist or a [facility] non-radiologist provider.                    Medications Prior to Admission:     No medications prior to admission.              Discharge Medications:     Discharge Medication List as of 12/2/2022  3:22 PM      START taking these medications    Details   hydrOXYzine (ATARAX) 25 MG tablet Take 1 tablet (25 mg) by mouth 3 times daily as needed for itching, Disp-10 tablet, R-0, E-Prescribe      !! ondansetron (ZOFRAN ODT) 4 MG ODT tab Take 1 tablet (4 mg) by mouth every 6 hours as needed for nausea or vomiting, Disp-50 tablet, R-0, E-Prescribe      traMADol (ULTRAM) 50 MG tablet Take 1 tablet (50 mg) by mouth every 6 hours as needed for severe pain (7-10), Disp-50 tablet, R-0, Local Print       !! - Potential duplicate medications found. Please discuss with provider.      CONTINUE these medications which have CHANGED    Details   !! hyoscyamine SL (LEVSIN/SL) 0.125 MG sublingual tablet Take 1 tablet (0.125 mg) by mouth 4 times daily (before meals and nightly), Disp-50 tablet, R-0, E-Prescribe      !! hyoscyamine SL (LEVSIN/SL) 0.125 MG sublingual tablet Take 1 tablet (0.125 mg) by mouth 4 times daily (before meals and nightly), Disp-50 tablet, R-3, E-Prescribe       !! - Potential duplicate medications found. Please discuss with provider.      CONTINUE these  medications which have NOT CHANGED    Details   acetaminophen (TYLENOL) 325 MG tablet Take 2 tablets (650 mg) by mouth every 4 hours for 30 days, Disp-360 tablet, R-0, E-Prescribe      buPROPion (WELLBUTRIN XL) 150 MG 24 hr tablet Take 150 mg by mouth every morning, Historical      cyanocobalamin (VITAMIN B-12) 500 MCG tablet Take 1,000 mcg by mouth daily, Historical      enoxaparin ANTICOAGULANT (LOVENOX) 40 MG/0.4ML syringe Inject 0.4 mLs (40 mg) Subcutaneous 2 times daily for 28 days, Disp-22.4 mL, R-0, E-Prescribe      fish oil-omega-3 fatty acids 1000 MG capsule Take 2 g by mouth every morning, Historical      methocarbamol (ROBAXIN) 500 MG tablet Take 1 tablet (500 mg) by mouth 4 times daily as needed for muscle spasms, Disp-15 tablet, R-0, E-Prescribe      Multiple Vitamins-Iron (MULTIVITAMIN/IRON PO) Take 1 tablet by mouth daily, Historical      omeprazole (PRILOSEC) 40 MG DR capsule Take 1 capsule (40 mg) by mouth 2 times daily, Disp-180 capsule, R-3, E-Prescribe      !! ondansetron (ZOFRAN ODT) 4 MG ODT tab Take 1 tablet (4 mg) by mouth every 6 hours as needed for nausea, Disp-40 tablet, R-3, E-Prescribe      oxyCODONE (ROXICODONE) 5 MG tablet Take 1 tablet (5 mg) by mouth every 6 hours as needed for moderate to severe pain, Disp-12 tablet, R-0, E-Prescribe      scopolamine (TRANSDERM) 1 MG/3DAYS 72 hr patch Place 1 patch onto the skin every 72 hours for 30 days, Disp-10 patch, R-1, E-Prescribe      senna-docusate (SENOKOT-S/PERICOLACE) 8.6-50 MG tablet Take 2 tablets by mouth daily as needed for constipation (While taking narcotic pain medications.  Stop taking if having loose stools.), Disp-30 tablet, R-1, E-Prescribe      Vitamin D3 (CHOLECALCIFEROL) 25 mcg (1000 units) tablet Take 4 tablets by mouth daily, Historical       !! - Potential duplicate medications found. Please discuss with provider.      STOP taking these medications       ferrous fumarate 65 mg, [place]. FE,-Vitamin C 125 mg (VITRON C)   MG TABS tablet Comments:   Reason for Stopping:                       Medications Discontinued or Adjusted During This Hospitalization:   No change           Antibiotics Prescribed at Discharge:   None prescribed           Day of Discharge Physical Exam:        General: awake, alert, no acute distress, laying comfortably in bed   CV: warm, well perfused   Pulm: breathing comfortably on room air   Abdomen: soft, non-distended, non tender, no rebound or guarding.   Extremities: no edema, moving all extremities spontaneously and without apparent deficit            Final Pathology Result:   N/A         Discharge Instructions and Follow-Up:     Discharge Procedure Orders   Primary Care - Care Coordination Referral   Standing Status: Future   Referral Priority: Routine: Next available opening Referral Type: Care Coordination   Number of Visits Requested: 1     When to call - Contact Surgeon Team   Order Comments: You may experience symptoms that require follow-up before your scheduled appointment. Contact your Surgeon Team if you are concerned about pain control, large amount of bleeding, blood clots, constipation, or if you experience signs of infection (fever, growing tenderness at the surgery site, a large amount of drainage, severe pain, foul-smelling drainage, redness or swelling.     When to call - Reach out to Urgent Care   Order Comments: If you are experiencing uncontrolled Nausea and Vomiting, uncontrolled pain, inability to urinate and uncomfortable, and in need of immediate care, and you are NOT able to reach your Surgeon Team, go to an Urgent Care clinic. Do NOT go to the Emergency Room unless you have shortness of breath, chest pain, or other signs of a medical emergency.     When to call - Reasons to Call 911   Order Comments: Call 911 immediately if you experience sudden-onset chest pain, arm weakness/numbness, slurred speech, or shortness of breath     Symptoms - Fever Management   Order Comments:  A low grade fever can be expected after surgery. Your Provider many have prescribed an Opioid pain medication that also contains acetaminophen (TYLENOL) that may help with Fever management.  Do NOT take additional acetaminophen (TYLENOL) in combination with an Opioid/acetaminophen (TYLENOL) product. Read the labels on your Over The Counter (OTC) medications with care.     Symptoms - Reduced Urine Output   Order Comments: If it has been greater than 8 hours since you have urinated despite drinking plenty of water, call your Surgeon Team.     No driving or operating machinery   Order Comments: Do NOT drive any vehicle or operate mechanical equipment for 24 hours following the end of your surgery.  Even though you may feel normal, your reactions may be affected by Anesthesia medication you received.     No Alcohol   Order Comments: Do NOT drink alcoholic beverages for 24 hours following your surgery and while taking pain medications.     Discharge Instructions - Comfort and Pain Management   Order Comments: Pain after surgery is normal and expected. You will have some amount of pain after surgery. Your pain will improve with time. There are several things you can do to help reduce your pain including: rest, ice, and using pain medications as needed. Use pain interventions and don't wait until pain level is out of control. Contact your Surgeon Team if you have pain that persists or worsens after surgery despite rest, ice, and taking your medication(s) as prescribed. You may have a dry mouth, a sore throat, muscles aches or trouble sleeping, and these symptoms should go away after 24 hours.     Discharge Instructions - Rest   Order Comments: Rest and relax for the next 24 hours. Make arrangements to have someone stay with you overnight, and avoid hazardous and strenuous activities.  Do NOT make any important decisions for the next 24 hours.     Shower/Bathing - No restrictions, may shower after 24 hours   Order Comments:  Shower/Bathing - No restrictions, may shower immediately     Order Specific Question Answer Comments   Is discharge order? Yes      Return to normal activity as tolerated   Order Comments: Return to normal activity as tolerated     Order Specific Question Answer Comments   Is discharge order? Yes      When to call - Contact Surgeon Team   Order Comments: You may experience symptoms that require follow-up before your scheduled appointment. Contact your Surgeon Team if you are concerned about pain control, large amount of bleeding, blood clots, constipation, or if you experience signs of infection (fever, growing tenderness at the surgery site, a large amount of drainage, severe pain, foul-smelling drainage, redness or swelling.     When to call - Reach out to Urgent Care   Order Comments: If you are experiencing uncontrolled Nausea and Vomiting, uncontrolled pain, inability to urinate and uncomfortable, and in need of immediate care, and you are NOT able to reach your Surgeon Team, go to an Urgent Care clinic. Do NOT go to the Emergency Room unless you have shortness of breath, chest pain, or other signs of a medical emergency.     When to call - Reasons to Call 911   Order Comments: Call 911 immediately if you experience sudden-onset chest pain, arm weakness/numbness, slurred speech, or shortness of breath     Symptoms - Fever Management   Order Comments: A low grade fever can be expected after surgery. Your Provider many have prescribed an Opioid pain medication that also contains acetaminophen (TYLENOL) that may help with Fever management.  Do NOT take additional acetaminophen (TYLENOL) in combination with an Opioid/acetaminophen (TYLENOL) product. Read the labels on your Over The Counter (OTC) medications with care.     Symptoms - Reduced Urine Output   Order Comments: If it has been greater than 8 hours since you have urinated despite drinking plenty of water, call your Surgeon Team.     No driving or operating  machinery   Order Comments: Do NOT drive any vehicle or operate mechanical equipment for 24 hours following the end of your surgery.  Even though you may feel normal, your reactions may be affected by Anesthesia medication you received.     No Alcohol   Order Comments: Do NOT drink alcoholic beverages for 24 hours following your surgery and while taking pain medications.     Discharge Instructions - Comfort and Pain Management   Order Comments: Pain after surgery is normal and expected. You will have some amount of pain after surgery. Your pain will improve with time. There are several things you can do to help reduce your pain including: rest, ice, and using pain medications as needed. Use pain interventions and don't wait until pain level is out of control. Contact your Surgeon Team if you have pain that persists or worsens after surgery despite rest, ice, and taking your medication(s) as prescribed. You may have a dry mouth, a sore throat, muscles aches or trouble sleeping, and these symptoms should go away after 24 hours.     Discharge Instructions - Rest   Order Comments: Rest and relax for the next 24 hours. Make arrangements to have someone stay with you overnight, and avoid hazardous and strenuous activities.  Do NOT make any important decisions for the next 24 hours.     Reason for your hospital stay   Order Comments: Gastric stricture, oesophagogastroduodenoscopy with stent placement and removal     Discharge Instructions   Order Comments: DISCHARGE INSTRUCTIONS  ACTIVITY  - You may go about your activities of daily living as tolerated.  - No driving while on narcotic pain medications and until you can safely twist/turn and press a gas pedal without pain.       MEDICATIONS AND PAIN CONTROL  - We recommend you take Tylenol (acetaminophen) around the clock for baseline pain control and then oxycodone as needed for pain throughout the day. If you have been prescribed Percocet or Vicodin, be aware that they  contains Tylenol along with a narcotic pain medication such as oxycodone or hydrocodone.   - Do not take any additional Tylenol (acetaminophen) while using a narcotic pain medication which includes acetaminophen  - Do not take more than 4,000mg of acetaminophen in any 24 hour period, as this can cause liver damage  - Take stool softeners such as Senna or Miralax while you are using narcotics to prevent constipation, but stop if you develop diarrhea  - Wean yourself off of narcotic pain medications      FOLLOW-UP  -  A nurse from the General Surgery Clinic will contact you 24 hours, or next business day, after your discharge from the hospital.   - Please follow up in 2 weeks with Dr Aragon in the Minimally Invasive Surgery Clinic. If you have not heard from us regarding this appointment or have questions, please call the General Surgery Clinic at [phone].   - If you have questions or to schedule a follow up appointment please call the General Surgery Clinic at [phone]. Call [phone] and ask to speak with the Surgery resident on call if you are having troubles in the evenings, at night, or on the weekend.  - Call your primary care provider to touch base regarding your recent admission.    - Call or return sooner than your regularly scheduled visit if you develop any of the following:    - fever >101.5,    - uncontrolled pain,    - uncontrolled nausea or vomiting,    - as well as increased redness, swelling, or drainage from your wound.     Diet   Order Comments: Follow this diet upon discharge: Pureed foods the day of surgery 12/02. If tolerating this, may start soft foods starting 12/3. You may progress to your regular diet as you feel able. It is important to stay well-hydrated after surgery and drink plenty of water.  Your daily intake should be at least 64 ounces with 60 grams of protein.     Order Specific Question Answer Comments   Is discharge order? Yes               Home Health Care:     Not  needed           Discharge Disposition:     Discharged to home      Condition at discharge: Stable    Patient was seen, examined, and discussed on day of discharge with chief resident, Dr Cardona, who discussed with staff surgeon, Dr Aragon.    Misti Villanueva  General Surgery PGY1  [id number]

## 2022-12-04 ENCOUNTER — MYC MEDICAL ADVICE (OUTPATIENT)
Dept: ENDOCRINOLOGY | Facility: CLINIC | Age: 42
End: 2022-12-04

## 2022-12-05 NOTE — TELEPHONE ENCOUNTER
Phone call to patient:    Patient states throat feels better today.  States she is still having episodes of vomiting.  States she feels that activity is triggering that.  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    She has been able to get in some protein shake today.   Urine is clear yellow.  She is urinating twice a day.  BM are every other day.    She was able to get in some egg drop soup that stayed down.  Suggested she journal what/how much she is eating and drinking to track and also monitor what foods are better tolerated.      Per Dr. Aragon: patient to continue to attempt getting in as much food and fluids, even if some come up.  Patient has an open esophagus that food should readily pass through.  Can try Maalox to help coat the throat to help decrease throat discomfort.

## 2022-12-06 ENCOUNTER — VIRTUAL VISIT (OUTPATIENT)
Dept: ENDOCRINOLOGY | Facility: CLINIC | Age: 42
End: 2022-12-06
Payer: COMMERCIAL

## 2022-12-06 DIAGNOSIS — E66.9 OBESITY: ICD-10-CM

## 2022-12-06 DIAGNOSIS — R11.2 NAUSEA WITH VOMITING: ICD-10-CM

## 2022-12-06 DIAGNOSIS — Z98.84 S/P LAPAROSCOPIC SLEEVE GASTRECTOMY: ICD-10-CM

## 2022-12-06 DIAGNOSIS — Z71.3 NUTRITIONAL COUNSELING: Primary | ICD-10-CM

## 2022-12-06 PROCEDURE — 97803 MED NUTRITION INDIV SUBSEQ: CPT | Mod: GT | Performed by: DIETITIAN, REGISTERED

## 2022-12-06 RX ORDER — LANOLIN ALCOHOL/MO/W.PET/CERES
100 CREAM (GRAM) TOPICAL DAILY
Qty: 30 TABLET | Refills: 1 | Status: SHIPPED | OUTPATIENT
Start: 2022-12-06 | End: 2023-05-24

## 2022-12-06 RX ORDER — GREEN TEA/HOODIA GORDONII 315-12.5MG
1 CAPSULE ORAL DAILY
Qty: 30 TABLET | Refills: 11 | Status: SHIPPED | OUTPATIENT
Start: 2022-12-06 | End: 2023-03-10

## 2022-12-06 NOTE — PATIENT INSTRUCTIONS
Tom Lynn,    Follow-up with RD in 3-4 weeks     Thank you,    Justine Frankel, RD, LD  If you would like to schedule or reschedule an appointment with the RD, please call [phone]    Nutrition Goals  1) Sip 1-2 oz of fluid every 15-30 mins. Goal to consume 48-64+ oz/day.   - If you feel unable to keep up with drinking enough fluid, schedule appointment to get IV fluids.   2) Continue Full Liquid/Pureed Diet (stage 2 and 3) for a few days. Once you are able to consume 1/4-1/2 cup size meal, may transition to the Soft diet (stage 4).  3) Eat very slowly (>20 min/meal), chewing foods well (to applesauce-like consistency).    - use a baby spoon, take long pauses between bites.  4) 4) Take 2 chewable multivitamins daily. Once able to tolerate diet, can start chewable calcium supplement.  - Take calcium separate from iron/multivitamin   5) Start daily 100 mg thiamin supplement  6) Start sublingual (dissolves under your tongue) B12 (500 mcg/day) while taking Flinstones Complete multivitamin. If you switch to one of the bariatric multivitamins below, you do not need to take.      Chewable Multivitamin Options for After Surgery:  Bariatric Advantage Advanced Multi EA chewable: https://www.bariatricStyroPower.com/item/chewable-advanced-multi-ea  Take 2 chews daily. Additional calcium citrate supplement needed.  - Discount code for Bariatric Advantage vitamin/mineral supplements: UOFMINN (for 15% off order)     Celebrate Multi Complete 45: https://[employer].Function Space/products/iguig-qdxchydf-88?[id number]  Take 2 chews daily. Additional calcium citrate supplement needed.    Victoria's Complete, or generic (with 10 mg iron per chew)   (https://www.psicofxp.com/p/kids-39-complete-multivitamin-chewable-tablets-orange-grape-38-cherry-150ct-up-38-up-8482/-/A-[email or web address]#lnk=sametab)   Take 2 chews per day. Additional B12 and calcium citrate supplements needed. Potential need for additional iron supplement (if needing more  than 20 mg iron per day)      Protein Supplements for After Surgery:   Unflavored protein powder: Genepro, Isopure (25-30 gm protein per 1 scoop)  You may also use flavored protein powder if you prefer.   Protein shots: https://store.MustHaveMenus.com/collections/protein-shots  Pre-made protein shakes (no more than 210 Calories, at least 20 grams of protein, and less than 10 grams of sugar):   Premier Protein (160 Calories, 30 g protein)  Muscle Milk, lactose-free, 17 oz bottle (210 Calories, 30 g protein)  Boost/Ensure Max (160 calories, 30 gm protein)   Qinging Weekly Flower Delivery Core Power (170 calories, 26 gm protein)  Aldi's Elevation Protein Shake (160 calories, 30 gm protein)   Clear Protein Drinks:  BiPro  Premier Protein clear  [id number]  M Health Protein 15 Concentrate  Bone broth  Beneprotein protein powder mixed with 4-6 oz of fluid  [id number]        Post-op Diet Handouts:  Diet Guidelines after Weight-loss Surgery  [email or web address]     Your Stage 1 Diet: Clear Liquids  [email or web address]     Your Stage 2 Diet: Low-fat Full Liquids  [email or web address]     Your Stage 3 Diet: Pureed Foods  [email or web address]     Pureed Pleasures  [email or web address]    Your Stage 4 Diet: Soft Foods  [email or web address]    Your Stage 5 Diet: Regular Foods  [email or web address]    Supplements after Sleeve Gastrectomy, Gastric Bypass or Single Anastomosis Duodenal Switch  [email or web address]    Keeping Track of Fluids  [email or web address]      Interested in working with a health ?  Do you know what you are supposed to do, but you just aren't doing it?  Then, HEALTH COACHING may help you!   Get unstuck and move forward with the support of a specially trained, National Board Certified Health  who uses evidence based approaches to help you move forward with healthy lifestyle changes.    Health Coaches help you identify goals that will  work best for you. Health Coaches provide support and encouragement with overcoming barriers and help you to find inspiration and motivation to lead a healthy lifestyle.    Book a Health Coaching 3 Pack; Three 30 minute Health Coaching Visits, for $99  Visits are done virtually (phone or video)  This is a self pay service; we do not accept insurance for kendrick coaching.    To learn more about Health Coaching and if it's a good fit for you schedule a free Health  Q&A appointment - call [phone]      COMPREHENSIVE WEIGHT MANAGEMENT PROGRAM  VIRTUAL SUPPORT GROUPS    For Support Group Information:      We offer support groups for patients who are working on weight loss and considering, preparing for or have had weight loss surgery.   There is no cost for this opportunity.  You are invited to attend the?Virtual Support Groups?provided by any of the following locations:    [facility] via Microsoft Teams with Jen Herrmann RN  2.   [facility] via [employer] with Jeovanny Cunningham, PhD, LP  3.   [facility] via [employer] with Vanna Hernandez RN  4.   [facility] via Microsoft Teams with Vanna Mcdonnell [facility]    The following Support Group information can also be found on our website:  [email or web address]      [facility] Weight Loss Surgery Support Group    [facility] Weight Loss Surgery Support Group  The support group is a patient-lead forum that meets monthly to share experiences, encouragement and education. It is open to those who have had weight loss surgery, are scheduled for surgery, and those who are considering surgery.   WHEN: This group meets on the 3rd Wednesday of each month from 5:00PM - 6:00PM virtually using Microsoft Teams.   FACILITATOR: Led by Jen Herrmann, RD, LD, RN, the program's Clinical Coordinator.   TO REGISTER: Please contact the clinic via [employer] or call the nurse line directly at [phone] to  "inform our staff that you would like an invite sent to you and to let us know the email you would like the invite sent to. Prior to the meeting, a link with directions on how to join the meeting will be sent to you.    2022 Meetings  January 19: \"Let's Talk\" a time for the group to share.  February 16: \"Let's Talk\" a time for the group to share.  March 16: Guest Speakers: Psychologists, Lamar Samaniego, PhD,LP and Baylee Lnid PsyD,LP  April 20: Guest Speaker: Health , Annalee Blanco, [facility],CHES, CPT  May 18: Guest Speaker: Dietitian, Carlo Carreon, ALEXANDER, LP  Vangie 15: \"Let's Talk\" a time for the group to share.  July 20: \"Let's Talk\" a time for the group to share.  August 17: TBA  September 21: TBA  October 19: Guest Speaker: Dr Celso West MD Pulmonologist and Sleep Medicine Physician, \"Getting a Good Night's Sleep\".  November 16: TBA  December 21: TBA    [facility] Support Groups    Connections: Bariatric Care Support Group?  This is open to all [facility] (and those external to this program) pre- and post- operative bariatric surgery patients as well as their support system.   WHEN: This group meets the 2nd Tuesday of each month from 6:30 PM - 8:00 PM virtually using Microsoft Teams.   FACILITATOR: Led by Jeovanny Cunningham, Ph.D who is a Licensed Psychologist with the [facility] Comprehensive Weight Management Program.   TO REGISTER: Please send an email to Jeovanny Cunningham, Ph.D., LP at?[email or web address]?if you would like an invitation to the group and to learn about using Microsoft Teams.    2022 Meetings  January 11: Christianne Matias, PharmD, Pharmacy Resident at [facility], \"Medications and Bariatric Surgery\".  February 8: Open Forum  March 8  April 12  May 10  Vangie 14    Connections: Post-Operative Bariatric Surgery Support Group  This is a support group for [facility] bariatric patients (and those external to [facility]) who have had " bariatric surgery and are at least 3 months post-surgery.  WHEN: This support group meets the 4th Wednesday of the month from 11:00 AM - 12:00 PM virtually using Microsoft Teams.   FACILITATOR: Led by Certified Bariatric Nurse, Vanna Hernandez RN.   TO REGISTER: Please send an email to Vanna at [email or web address] if you would like an invitation to the group and to learn about using Microsoft Teams.    2022 Meetings  January 26  February 23  March 23  April 27  May 25  Vangie 22    [facility] Healthy Lifestyle Virtual Support Group    Healthy Lifestyle Virtual Support Group?  This is 60 minutes of small group guided discussion, support and resources. All are welcome who want a healthy lifestyle.  WHEN: This group meets monthly on a Friday from 12:30 PM - 1:30 PM virtually using Microsoft Teams.   FACILITATOR: Led by National Board Certified Health and , Vanna Mcdonnell [facility].   TO REGISTER: Please send an email to Vanna [email or web address] to receive monthly invites to the group or if you have any questions about having a health .  Prior to the meeting, a link with directions on how to join the meeting will be sent to you.    2022 Meetings  MAY 20: OPEN FORUM  JUNE: 24:  Setting Limits and BoundariesVanna  July 29: OPEN FORUM  August 26: Special Guest Registered Dietician Vika Mar  Sept 30: OPEN FORUM  Oct 28, GraBrittani Marroquin National Board Certified Health   Nov 18: Navigating How to Eat Around the Holidays with ALEXANDER Heredia  Dec 16: Changing your relationship with movement with  Annalee National Board Certified Health

## 2022-12-06 NOTE — PROGRESS NOTES
"Leah Yanez is a 42 year old female who is being evaluated via a billable video visit.      The patient has been notified of following:     \"This video visit will be conducted via a call between you and your physician/provider. We have found that certain health care needs can be provided without the need for an in-person physical exam.  This service lets us provide the care you need with a video conversation.  If a prescription is necessary we can send it directly to your pharmacy.  If lab work is needed we can place an order for that and you can then stop by our lab to have the test done at a later time.    Video visits are billed at different rates depending on your insurance coverage.  Please reach out to your insurance provider with any questions.    If during the course of the call the physician/provider feels a video visit is not appropriate, you will not be charged for this service.\"    Patient has given verbal consent for Video visit? Yes  How would you like to obtain your AVS? MyChart  If you are dropped from the video visit, the video invite should be resent to: Text to cell phone: [phone]  Will anyone else be joining your video visit? No  {If patient encounters technical issues they should call [phone]      Video-Visit Details    Type of service:  Video Visit    Video Start Time: 8:08 AM  Video End Time: 8:28 AM    Originating Location (pt. Location): Home    Distant Location (provider location):  Offsite (providers home) [facility] [place]     Platform used for Video Visit: [employer]    During this virtual visit the patient is located in [place], patient verifies this as the location during the entirety of this visit.     Nutrition Reassessment  Reason For Visit:  Leah Yanez is a 42 year old female presenting today for nutrition follow-up, 6 weeks s/p sleeve gastrectomy on 10/25/22 with Dr. Aragon.    11/14/22 - EGD with dilation of gastric stricture, NJ " tube placement and initiation of tube feedings.   11/28/22 - NJT removed at home  11/30/22 -  stent placement at gastric stricture  12/2/22 - stent removal     Anthropometrics:  Initial Consult Weight: 287 lbs  Day of Surgery Weight: 244  lbs  Recentt Weight:   Estimated body mass index is 31.87 kg/m  as calculated from the following:    Height as of 11/30/22: 1.829 m (6').    Weight as of 11/30/22: 106.6 kg (235 lb 0.2 oz).    Weight loss: 52 lbs from initial consult; 9 lbs from day of surgery    Current Vitamins/Minerals: Iron capsule - is not tolerating, came up after taking    Nutrition History:  Admitted from 11/1-11/16. Received tube feedings from 11/14-11/28.   Pt states she is throwing up 10 times per day. Whne she throws up its phlegm, mucous and foam. Throws up after drinking water.    Yesterday was able to get down an egg, which was an improvement in volume from previous.   Has tried soups, protein shakes. Finding no specific foods/beverages trigger nausea/vomiting, can happen with all.     Nutrition Prescription:  Grams Protein: 60 (minimum)   Amount of Fluid: 48-64 oz    Nutrition Diagnosis  Food and nutrition-related knowledge deficit r/t lack of prior exposure to diet advancements beyond bariatric pureed diet aeb recent bariatric surgery and pt interest in diet education/review     Intervention  Materials/Education provided on bariatric soft and regular consistency diets, protein intake, fluid intake, eating pace, chewing foods well, portion control, recommended vitamin/mineral supplements. Patient demonstrates understanding.       Expected Engagement: good    Goals:  1) Sip 1-2 oz of fluid every 15-30 mins. Goal to consume 48-64+ oz/day.   - If you feel unable to keep up with drinking enough fluid, schedule appointment to get IV fluids.   2) Continue Full Liquid/Pureed Diet (stage 2 and 3) for a few days. Once you are able to consume 1/4-1/2 cup size meal, may transition to the Soft diet (stage  4).  3) Eat very slowly (>20 min/meal), chewing foods well (to applesauce-like consistency).    - use a baby spoon, take long pauses between bites.  4) 4) Take 2 chewable multivitamins daily. Once able to tolerate diet, can start chewable calcium supplement.  - Take calcium separate from iron/multivitamin   5) Start daily 100 mg thiamin supplement  6) Start sublingual (dissolves under your tongue) B12 (500 mcg/day) while taking Flinstones Complete multivitamin. If you switch to one of the bariatric multivitamins below, you do not need to take.    Chewable Multivitamin Options for After Surgery:  Bariatric Advantage Advanced Multi EA chewable: https://www.[employer].nap- Naturally Attached Parents/item/chewable-advanced-multi-ea  Take 2 chews daily. Additional calcium citrate supplement needed.  - Discount code for Bariatric Advantage vitamin/mineral supplements: UOFMINN (for 15% off order)     Raspberry Pi Foundation Multi Complete 45: https://tamyca/products/acvgl-gsdwmfvp-50?[email or web address]  Take 2 chews daily. Additional calcium citrate supplement needed.    Dacoma's Complete, or generic (with 10 mg iron per chew)   (https://www.[employer].nap- Naturally Attached Parents/p/kids-39-complete-multivitamin-chewable-tablets-orange-grape-38-cherry-150ct-up-38-up-8482/-/A-[email or web address]#lnk=sametab)   Take 2 chews per day. Additional B12 and calcium citrate supplements needed. Potential need for additional iron supplement (if needing more than 20 mg iron per day)      Protein Supplements for After Surgery:     Unflavored protein powder: Genepro, Isopure (25-30 gm protein per 1 scoop)    You may also use flavored protein powder if you prefer.     Protein shots: https://store.[employer].nap- Naturally Attached Parents/collections/protein-shots    Pre-made protein shakes (no more than 210 Calories, at least 20 grams of protein, and less than 10 grams of sugar):     Premier Protein (160 Calories, 30 g protein)    Muscle Milk, lactose-free, 17 oz bottle (210 Calories, 30 g  protein)    Boost/Ensure Max (160 calories, 30 gm protein)     Fairlife Core Power (170 calories, 26 gm protein)    Aldi's Elevation Protein Shake (160 calories, 30 gm protein)     Clear Protein Drinks:    BiPro    Premier Protein clear    [id number]    M Health Protein 15 Concentrate    Bone broth    Beneprotein protein powder mixed with 4-6 oz of fluid    [id number]        Post-op Diet Handouts:  Diet Guidelines after Weight-loss Surgery  [email or web address]     Your Stage 1 Diet: Clear Liquids  [email or web address]     Your Stage 2 Diet: Low-fat Full Liquids  [email or web address]     Your Stage 3 Diet: Pureed Foods  [email or web address]     Pureed Pleasures  [email or web address]    Your Stage 4 Diet: Soft Foods  [email or web address]    Your Stage 5 Diet: Regular Foods  [email or web address]    Supplements after Sleeve Gastrectomy, Gastric Bypass or Single Anastomosis Duodenal Switch  [email or web address]    Keeping Track of Fluids  [email or web address]           Follow-Up: 3-4 weeks    Time spent with patient: 20 minutes.  Justine Frankel RD LD

## 2022-12-06 NOTE — LETTER
"12/6/2022       RE: Leah Yanez  [address] [facility] [address] [facility] [address]     Dear Colleague,    Thank you for referring your patient, Leah Yanez, to the [facility] [place] at [facility]. Please see a copy of my visit note below.    Leah Yanez is a 42 year old female who is being evaluated via a billable video visit.      The patient has been notified of following:     \"This video visit will be conducted via a call between you and your physician/provider. We have found that certain health care needs can be provided without the need for an in-person physical exam.  This service lets us provide the care you need with a video conversation.  If a prescription is necessary we can send it directly to your pharmacy.  If lab work is needed we can place an order for that and you can then stop by our lab to have the test done at a later time.    Video visits are billed at different rates depending on your insurance coverage.  Please reach out to your insurance provider with any questions.    If during the course of the call the physician/provider feels a video visit is not appropriate, you will not be charged for this service.\"    Patient has given verbal consent for Video visit? Yes  How would you like to obtain your AVS? MyChart  If you are dropped from the video visit, the video invite should be resent to: Text to cell phone: [phone]  Will anyone else be joining your video visit? No  {If patient encounters technical issues they should call [phone]      Video-Visit Details    Type of service:  Video Visit    Video Start Time: 8:08 AM  Video End Time: 8:28 AM    Originating Location (pt. Location): Home    Distant Location (provider location):  Offsite (providers home) [facility] [place]     Platform used for Video Visit: Taplister    During this virtual visit the patient is located in [place], " patient verifies this as the location during the entirety of this visit.     Nutrition Reassessment  Reason For Visit:  Leah Yanez is a 42 year old female presenting today for nutrition follow-up, 6 weeks s/p sleeve gastrectomy on 10/25/22 with Dr. Aragon.    11/14/22 - EGD with dilation of gastric stricture, NJ tube placement and initiation of tube feedings.   11/28/22 - NJT removed at home  11/30/22 -  stent placement at gastric stricture  12/2/22 - stent removal     Anthropometrics:  Initial Consult Weight: 287 lbs  Day of Surgery Weight: 244  lbs  Recentt Weight:   Estimated body mass index is 31.87 kg/m  as calculated from the following:    Height as of 11/30/22: 1.829 m (6').    Weight as of 11/30/22: 106.6 kg (235 lb 0.2 oz).    Weight loss: 52 lbs from initial consult; 9 lbs from day of surgery    Current Vitamins/Minerals: Iron capsule - is not tolerating, came up after taking    Nutrition History:  Admitted from 11/1-11/16. Received tube feedings from 11/14-11/28.   Pt states she is throwing up 10 times per day. Whne she throws up its phlegm, mucous and foam. Throws up after drinking water.    Yesterday was able to get down an egg, which was an improvement in volume from previous.   Has tried soups, protein shakes. Finding no specific foods/beverages trigger nausea/vomiting, can happen with all.     Nutrition Prescription:  Grams Protein: 60 (minimum)   Amount of Fluid: 48-64 oz    Nutrition Diagnosis  Food and nutrition-related knowledge deficit r/t lack of prior exposure to diet advancements beyond bariatric pureed diet aeb recent bariatric surgery and pt interest in diet education/review     Intervention  Materials/Education provided on bariatric soft and regular consistency diets, protein intake, fluid intake, eating pace, chewing foods well, portion control, recommended vitamin/mineral supplements. Patient demonstrates understanding.       Expected Engagement: good    Goals:  1) Sip 1-2 oz of  fluid every 15-30 mins. Goal to consume 48-64+ oz/day.   - If you feel unable to keep up with drinking enough fluid, schedule appointment to get IV fluids.   2) Continue Full Liquid/Pureed Diet (stage 2 and 3) for a few days. Once you are able to consume 1/4-1/2 cup size meal, may transition to the Soft diet (stage 4).  3) Eat very slowly (>20 min/meal), chewing foods well (to applesauce-like consistency).    - use a baby spoon, take long pauses between bites.  4) 4) Take 2 chewable multivitamins daily. Once able to tolerate diet, can start chewable calcium supplement.  - Take calcium separate from iron/multivitamin   5) Start daily 100 mg thiamin supplement  6) Start sublingual (dissolves under your tongue) B12 (500 mcg/day) while taking Flinstones Complete multivitamin. If you switch to one of the bariatric multivitamins below, you do not need to take.    Chewable Multivitamin Options for After Surgery:  Bariatric Advantage Advanced Multi EA chewable: https://www.Vuzit.[email or web address]-Observe/item/chewable-advanced-multi-ea  Take 2 chews daily. Additional calcium citrate supplement needed.  - Discount code for Bariatric Advantage vitamin/mineral supplements: UOFMINN (for 15% off order)     CelAnna-Rita Sloss Enterprisesrate Multi Complete 45: https://iKure Techsoft/products/pumib-xbdjasaw-86?vsspphd=[email or web address]  Take 2 chews daily. Additional calcium citrate supplement needed.    [place]'s Complete, or generic (with 10 mg iron per chew)   (https://www.Tackle Grab.2-Observe/p/kids-39-complete-multivitamin-chewable-tablets-orange-grape-38-cherry-150ct-up-38-up-8482/-/[email or web address]#lnk=sametab)   Take 2 chews per day. Additional B12 and calcium citrate supplements needed. Potential need for additional iron supplement (if needing more than 20 mg iron per day)      Protein Supplements for After Surgery:     Unflavored protein powder: Genepro, Isopure (25-30 gm protein per 1 scoop)    You may also use flavored protein powder if you prefer.     Protein  shots: https://store.bariatricpal.com/collections/protein-shots    Pre-made protein shakes (no more than 210 Calories, at least 20 grams of protein, and less than 10 grams of sugar):     Premier Protein (160 Calories, 30 g protein)    Muscle Milk, lactose-free, 17 oz bottle (210 Calories, 30 g protein)    Boost/Ensure Max (160 calories, 30 gm protein)     Fairlife Core Power (170 calories, 26 gm protein)    Aldi's Elevation Protein Shake (160 calories, 30 gm protein)     Clear Protein Drinks:    BiPro    Premier Protein clear    Yayfxce4B    M Health Protein 15 Concentrate    Bone broth    Beneprotein protein powder mixed with 4-6 oz of fluid    [id number]        Post-op Diet Handouts:  Diet Guidelines after Weight-loss Surgery  [email or web address]     Your Stage 1 Diet: Clear Liquids  [email or web address]     Your Stage 2 Diet: Low-fat Full Liquids  [email or web address]     Your Stage 3 Diet: Pureed Foods  [email or web address]     Pureed Pleasures  [email or web address]    Your Stage 4 Diet: Soft Foods  [email or web address]    Your Stage 5 Diet: Regular Foods  [email or web address]    Supplements after Sleeve Gastrectomy, Gastric Bypass or Single Anastomosis Duodenal Switch  [email or web address]    Keeping Track of Fluids  [email or web address]           Follow-Up: 3-4 weeks    Time spent with patient: 20 minutes.  Justine Frankel RD LD        Again, thank you for allowing me to participate in the care of your patient.      Sincerely,    Justine Frankel RD

## 2022-12-06 NOTE — LETTER
Date:December 7, 2022      Patient was self referred, no letter generated. Do not send.        [facility] Health Information

## 2022-12-07 ENCOUNTER — INFUSION THERAPY VISIT (OUTPATIENT)
Dept: INFUSION THERAPY | Facility: CLINIC | Age: 42
End: 2022-12-07
Attending: SURGERY
Payer: COMMERCIAL

## 2022-12-07 VITALS
DIASTOLIC BLOOD PRESSURE: 85 MMHG | HEART RATE: 64 BPM | OXYGEN SATURATION: 93 % | RESPIRATION RATE: 18 BRPM | SYSTOLIC BLOOD PRESSURE: 118 MMHG | TEMPERATURE: 97.6 F

## 2022-12-07 DIAGNOSIS — E86.0 DEHYDRATION: Primary | ICD-10-CM

## 2022-12-07 PROCEDURE — 96361 HYDRATE IV INFUSION ADD-ON: CPT

## 2022-12-07 PROCEDURE — 250N000009 HC RX 250: Performed by: SURGERY

## 2022-12-07 PROCEDURE — 96365 THER/PROPH/DIAG IV INF INIT: CPT

## 2022-12-07 PROCEDURE — 250N000011 HC RX IP 250 OP 636: Performed by: SURGERY

## 2022-12-07 PROCEDURE — 258N000003 HC RX IP 258 OP 636: Performed by: SURGERY

## 2022-12-07 RX ORDER — HEPARIN SODIUM (PORCINE) LOCK FLUSH IV SOLN 100 UNIT/ML 100 UNIT/ML
5 SOLUTION INTRAVENOUS
Status: CANCELLED | OUTPATIENT
Start: 2022-12-07

## 2022-12-07 RX ORDER — METHYLPREDNISOLONE SODIUM SUCCINATE 125 MG/2ML
125 INJECTION, POWDER, LYOPHILIZED, FOR SOLUTION INTRAMUSCULAR; INTRAVENOUS
Status: CANCELLED
Start: 2022-12-07

## 2022-12-07 RX ORDER — ALBUTEROL SULFATE 0.83 MG/ML
2.5 SOLUTION RESPIRATORY (INHALATION)
Status: CANCELLED | OUTPATIENT
Start: 2022-12-07

## 2022-12-07 RX ORDER — DIPHENHYDRAMINE HYDROCHLORIDE 50 MG/ML
50 INJECTION INTRAMUSCULAR; INTRAVENOUS
Status: CANCELLED
Start: 2022-12-07

## 2022-12-07 RX ORDER — EPINEPHRINE 1 MG/ML
0.3 INJECTION, SOLUTION INTRAMUSCULAR; SUBCUTANEOUS EVERY 5 MIN PRN
Status: CANCELLED | OUTPATIENT
Start: 2022-12-07

## 2022-12-07 RX ORDER — MEPERIDINE HYDROCHLORIDE 25 MG/ML
25 INJECTION INTRAMUSCULAR; INTRAVENOUS; SUBCUTANEOUS EVERY 30 MIN PRN
Status: CANCELLED | OUTPATIENT
Start: 2022-12-07

## 2022-12-07 RX ORDER — HEPARIN SODIUM,PORCINE 10 UNIT/ML
5 VIAL (ML) INTRAVENOUS
Status: CANCELLED | OUTPATIENT
Start: 2022-12-07

## 2022-12-07 RX ORDER — ALBUTEROL SULFATE 90 UG/1
1-2 AEROSOL, METERED RESPIRATORY (INHALATION)
Status: CANCELLED
Start: 2022-12-07

## 2022-12-07 RX ADMIN — SODIUM CHLORIDE 1000 ML: 9 INJECTION, SOLUTION INTRAVENOUS at 12:25

## 2022-12-07 RX ADMIN — FOLIC ACID: 5 INJECTION, SOLUTION INTRAMUSCULAR; INTRAVENOUS; SUBCUTANEOUS at 11:21

## 2022-12-07 NOTE — PROGRESS NOTES
Infusion Nursing Note:  Leah Yanez presents today for IV hydration (banana bag + 1L NS).    Patient seen by provider today: No   present during visit today: Not Applicable.    Note: Leah reports having had poor PO intake for weeks since bariatric surgery.  She states she had severe vomiting 2 days ago, but none yesterday or today.    Tachycardic upon arrival, pulse 112.  Improved after first liter of IVF, pulse 64.    Intravenous Access:  Peripheral IV placed.    Treatment Conditions:  Not Applicable.    Post Infusion Assessment:  Patient tolerated infusion without incident.  Blood return noted pre and post infusion.  Site patent and intact, free from redness, edema or discomfort.  No evidence of extravasations.  Access discontinued per protocol.     Discharge Plan:   Discharge instructions reviewed with: Patient.  Patient and/or family verbalized understanding of discharge instructions and all questions answered.  AVS to patient via [employer].  Patient will return as needed.   Patient discharged in stable condition accompanied by: self.  Departure Mode: Ambulatory.      Deep Crespo RN

## 2022-12-08 ENCOUNTER — MYC MEDICAL ADVICE (OUTPATIENT)
Dept: ENDOCRINOLOGY | Facility: CLINIC | Age: 42
End: 2022-12-08

## 2022-12-09 ENCOUNTER — INFUSION THERAPY VISIT (OUTPATIENT)
Dept: INFUSION THERAPY | Facility: CLINIC | Age: 42
End: 2022-12-09
Attending: SURGERY
Payer: COMMERCIAL

## 2022-12-09 VITALS
HEART RATE: 80 BPM | OXYGEN SATURATION: 95 % | RESPIRATION RATE: 20 BRPM | DIASTOLIC BLOOD PRESSURE: 84 MMHG | SYSTOLIC BLOOD PRESSURE: 129 MMHG | TEMPERATURE: 98.2 F

## 2022-12-09 DIAGNOSIS — E86.0 DEHYDRATION: Primary | ICD-10-CM

## 2022-12-09 PROCEDURE — 999N000248 HC STATISTIC IV INSERT WITH US BY RN

## 2022-12-09 PROCEDURE — 96361 HYDRATE IV INFUSION ADD-ON: CPT

## 2022-12-09 PROCEDURE — 96365 THER/PROPH/DIAG IV INF INIT: CPT

## 2022-12-09 PROCEDURE — 250N000009 HC RX 250: Performed by: SURGERY

## 2022-12-09 PROCEDURE — 250N000011 HC RX IP 250 OP 636: Performed by: SURGERY

## 2022-12-09 PROCEDURE — 258N000003 HC RX IP 258 OP 636: Performed by: SURGERY

## 2022-12-09 RX ORDER — ALBUTEROL SULFATE 90 UG/1
1-2 AEROSOL, METERED RESPIRATORY (INHALATION)
Status: CANCELLED
Start: 2022-12-09

## 2022-12-09 RX ORDER — DIPHENHYDRAMINE HYDROCHLORIDE 50 MG/ML
50 INJECTION INTRAMUSCULAR; INTRAVENOUS
Status: CANCELLED
Start: 2022-12-09

## 2022-12-09 RX ORDER — METHYLPREDNISOLONE SODIUM SUCCINATE 125 MG/2ML
125 INJECTION, POWDER, LYOPHILIZED, FOR SOLUTION INTRAMUSCULAR; INTRAVENOUS
Status: CANCELLED
Start: 2022-12-09

## 2022-12-09 RX ORDER — HEPARIN SODIUM (PORCINE) LOCK FLUSH IV SOLN 100 UNIT/ML 100 UNIT/ML
5 SOLUTION INTRAVENOUS
Status: CANCELLED | OUTPATIENT
Start: 2022-12-09

## 2022-12-09 RX ORDER — ALBUTEROL SULFATE 0.83 MG/ML
2.5 SOLUTION RESPIRATORY (INHALATION)
Status: CANCELLED | OUTPATIENT
Start: 2022-12-09

## 2022-12-09 RX ORDER — MEPERIDINE HYDROCHLORIDE 25 MG/ML
25 INJECTION INTRAMUSCULAR; INTRAVENOUS; SUBCUTANEOUS EVERY 30 MIN PRN
Status: CANCELLED | OUTPATIENT
Start: 2022-12-09

## 2022-12-09 RX ORDER — EPINEPHRINE 1 MG/ML
0.3 INJECTION, SOLUTION INTRAMUSCULAR; SUBCUTANEOUS EVERY 5 MIN PRN
Status: CANCELLED | OUTPATIENT
Start: 2022-12-09

## 2022-12-09 RX ORDER — HEPARIN SODIUM,PORCINE 10 UNIT/ML
5 VIAL (ML) INTRAVENOUS
Status: CANCELLED | OUTPATIENT
Start: 2022-12-09

## 2022-12-09 RX ADMIN — THIAMINE HYDROCHLORIDE: 100 INJECTION, SOLUTION INTRAMUSCULAR; INTRAVENOUS at 07:50

## 2022-12-09 RX ADMIN — SODIUM CHLORIDE 1000 ML: 9 INJECTION, SOLUTION INTRAVENOUS at 08:54

## 2022-12-09 NOTE — PROGRESS NOTES
Chief Complaint   Patient presents with     Infusion     IV fluids, Infuvite     Infusion Nursing Note:  Leah Yanez presents today for IV fluids, Infuvite.    Patient seen by provider today: No   present during visit today: Not Applicable.    Note:   IV fluids infused over 2 hours.     Intravenous Access:  Peripheral IV placed.  No labs ordered.     Treatment Conditions:  Not Applicable.    Post Infusion Assessment:  Patient tolerated infusion without incident.  Blood return noted pre and post infusion.  Site patent and intact, free from redness, edema or discomfort.  No evidence of extravasations.  Access discontinued per protocol.     Discharge Plan:   AVS to patient via MYCHART.  Patient will follow up with ordering provider.   Patient discharged in stable condition accompanied by: self.  Departure Mode: Ambulatory.      Administrations This Visit     0.9% sodium chloride BOLUS     Admin Date  12/09/2022 Action  New Bag Dose  1,000 mL Rate  1,000 mL/hr Route  Intravenous Administered By  Norman Lux, BENY          sodium chloride 0.9 % 1,000 mL with Infuvite Adult 10 mL, thiamine 100 mg, folic acid 1 mg infusion     Admin Date  12/09/2022 Action  New Bag Dose   Rate  1,000 mL/hr Route  Intravenous Administered By  Norman Lux, BENY              Vital signs:  Temp: 98.2  F (36.8  C) Temp src: Oral BP: 112/72 Pulse: 90   Resp: 20 SpO2: 95 % O2 Device: None (Room air)        Estimated body mass index is 31.87 kg/m  as calculated from the following:    Height as of 11/30/22: 1.829 m (6').    Weight as of 11/30/22: 106.6 kg (235 lb 0.2 oz).

## 2022-12-09 NOTE — TELEPHONE ENCOUNTER
Phone call to patient to assess.  Patient was just returning from fluid infusion appointment.  Patient states she has had chest pain/pressure located in the esophageal area and radiating up chest and shoulders to her neck.  States it worse with laying down ans with swallowing.  Hurts in esophageal area when she takes a deep breath.  She is taking Senna for constipation.  Had a very small bowel movement yesterday. First stated she had not been taking the Levsin and then said she had been taking it every 4 hours.  Declined refills at this time. Encouraged patient to try Miralax for constipation.  Warm fluids/food and take small bites.  Encouraged patient to seek medical attention if pain worsens or if she has difficulty breathing.  Patient continues with Lovenox.  Encouraged patient to alternate Tylenol and Tramadol for discomfort and also to try a heating pad on the chest area.    Dr. Aragon notified of patient disposition.  Patient to continue on Tramadol for discomfort.  CT with PE protocol if pain persists.  Make sure patient continues on Lovenox.

## 2022-12-09 NOTE — LETTER
12/9/2022         RE: Leah Yanez  [address] [facility] [facility] [address]        Dear Colleague,    Thank you for referring your patient, Leah Yanez, to the [facility]. Please see a copy of my visit note below.    Chief Complaint   Patient presents with     Infusion     IV fluids, Infuvite     Infusion Nursing Note:  Leah Yanez presents today for IV fluids, Infuvite.    Patient seen by provider today: No   present during visit today: Not Applicable.    Note:   IV fluids infused over 2 hours.     Intravenous Access:  Peripheral IV placed.  No labs ordered.     Treatment Conditions:  Not Applicable.    Post Infusion Assessment:  Patient tolerated infusion without incident.  Blood return noted pre and post infusion.  Site patent and intact, free from redness, edema or discomfort.  No evidence of extravasations.  Access discontinued per protocol.     Discharge Plan:   AVS to patient via MYCHART.  Patient will follow up with ordering provider.   Patient discharged in stable condition accompanied by: self.  Departure Mode: Ambulatory.      Administrations This Visit     0.9% sodium chloride BOLUS     Admin Date  12/09/2022 Action  New Bag Dose  1,000 mL Rate  1,000 mL/hr Route  Intravenous Administered By  Norman Lux, BENY          sodium chloride 0.9 % 1,000 mL with Infuvite Adult 10 mL, thiamine 100 mg, folic acid 1 mg infusion     Admin Date  12/09/2022 Action  New Bag Dose   Rate  1,000 mL/hr Route  Intravenous Administered By  Norman Lux, BENY              Vital signs:  Temp: 98.2  F (36.8  C) Temp src: Oral BP: 112/72 Pulse: 90   Resp: 20 SpO2: 95 % O2 Device: None (Room air)        Estimated body mass index is 31.87 kg/m  as calculated from the following:    Height as of 11/30/22: 1.829 m (6').    Weight as of 11/30/22: 106.6 kg (235 lb 0.2 oz).                              Again, thank you for allowing me to participate in the care of  your patient.        Sincerely,        No name on file

## 2022-12-09 NOTE — LETTER
Date:December 9, 2022      Provider requested that no letter be sent. Do not send.       [facility]

## 2022-12-11 ENCOUNTER — APPOINTMENT (OUTPATIENT)
Dept: CT IMAGING | Facility: CLINIC | Age: 42
DRG: 314 | End: 2022-12-11
Attending: EMERGENCY MEDICINE
Payer: COMMERCIAL

## 2022-12-11 ENCOUNTER — APPOINTMENT (OUTPATIENT)
Dept: GENERAL RADIOLOGY | Facility: CLINIC | Age: 42
DRG: 186 | End: 2022-12-11
Attending: INTERNAL MEDICINE
Payer: COMMERCIAL

## 2022-12-11 ENCOUNTER — APPOINTMENT (OUTPATIENT)
Dept: CARDIOLOGY | Facility: CLINIC | Age: 42
DRG: 186 | End: 2022-12-11
Attending: INTERNAL MEDICINE
Payer: COMMERCIAL

## 2022-12-11 ENCOUNTER — HOSPITAL ENCOUNTER (INPATIENT)
Facility: CLINIC | Age: 42
LOS: 4 days | Discharge: HOME | DRG: 186 | End: 2022-12-15
Attending: INTERNAL MEDICINE | Admitting: INTERNAL MEDICINE
Payer: COMMERCIAL

## 2022-12-11 ENCOUNTER — APPOINTMENT (OUTPATIENT)
Dept: SPEECH THERAPY | Facility: CLINIC | Age: 42
DRG: 186 | End: 2022-12-11
Attending: INTERNAL MEDICINE
Payer: COMMERCIAL

## 2022-12-11 ENCOUNTER — HOSPITAL ENCOUNTER (INPATIENT)
Facility: CLINIC | Age: 42
LOS: 1 days | Discharge: ANOTHER HOSPITAL | DRG: 314 | End: 2022-12-11
Attending: EMERGENCY MEDICINE | Admitting: STUDENT IN AN ORGANIZED HEALTH CARE EDUCATION/TRAINING PROGRAM
Payer: COMMERCIAL

## 2022-12-11 ENCOUNTER — APPOINTMENT (OUTPATIENT)
Dept: CARDIOLOGY | Facility: CLINIC | Age: 42
DRG: 314 | End: 2022-12-11
Attending: HOSPITALIST
Payer: COMMERCIAL

## 2022-12-11 VITALS
TEMPERATURE: 98.3 F | BODY MASS INDEX: 31.83 KG/M2 | HEIGHT: 72 IN | WEIGHT: 235 LBS | SYSTOLIC BLOOD PRESSURE: 119 MMHG | DIASTOLIC BLOOD PRESSURE: 74 MMHG | OXYGEN SATURATION: 92 % | HEART RATE: 88 BPM | RESPIRATION RATE: 26 BRPM

## 2022-12-11 DIAGNOSIS — K92.9 GASTRIC ANASTOMOTIC STRICTURE: ICD-10-CM

## 2022-12-11 DIAGNOSIS — R09.02 HYPOXIA: ICD-10-CM

## 2022-12-11 DIAGNOSIS — I31.39 PERICARDIAL EFFUSION: Primary | ICD-10-CM

## 2022-12-11 DIAGNOSIS — E87.6 HYPOKALEMIA: ICD-10-CM

## 2022-12-11 DIAGNOSIS — J18.9 PNEUMONIA DUE TO INFECTIOUS ORGANISM, UNSPECIFIED LATERALITY, UNSPECIFIED PART OF LUNG: ICD-10-CM

## 2022-12-11 DIAGNOSIS — K31.89 GASTRIC ANASTOMOTIC STRICTURE: ICD-10-CM

## 2022-12-11 DIAGNOSIS — I21.3 ST ELEVATION MI (STEMI) (H): ICD-10-CM

## 2022-12-11 DIAGNOSIS — I31.39 PERICARDIAL EFFUSION: ICD-10-CM

## 2022-12-11 DIAGNOSIS — J90 PLEURAL EFFUSION: ICD-10-CM

## 2022-12-11 PROBLEM — Z98.890 STATUS POST CORONARY ANGIOGRAM: Status: ACTIVE | Noted: 2022-12-11

## 2022-12-11 LAB
ALBUMIN SERPL BCG-MCNC: 3.7 G/DL (ref 3.5–5.2)
ALP SERPL-CCNC: 160 U/L (ref 35–104)
ALT SERPL W P-5'-P-CCNC: 45 U/L (ref 10–35)
ANION GAP SERPL CALCULATED.3IONS-SCNC: 15 MMOL/L (ref 7–15)
APPEARANCE FLD: ABNORMAL
AST SERPL W P-5'-P-CCNC: 19 U/L (ref 10–35)
BASOPHILS # BLD AUTO: 0 10E3/UL (ref 0–0.2)
BASOPHILS NFR BLD AUTO: 0 %
BILIRUB SERPL-MCNC: 0.5 MG/DL
BUN SERPL-MCNC: 5.2 MG/DL (ref 6–20)
CALCIUM SERPL-MCNC: 9.7 MG/DL (ref 8.6–10)
CELL COUNT BODY FLUID SOURCE: ABNORMAL
CHLORIDE SERPL-SCNC: 99 MMOL/L (ref 98–107)
COLOR FLD: YELLOW
CREAT SERPL-MCNC: 0.74 MG/DL (ref 0.51–0.95)
DEPRECATED HCO3 PLAS-SCNC: 23 MMOL/L (ref 22–29)
EOSINOPHIL # BLD AUTO: 0 10E3/UL (ref 0–0.7)
EOSINOPHIL NFR BLD AUTO: 0 %
ERYTHROCYTE [DISTWIDTH] IN BLOOD BY AUTOMATED COUNT: 18.8 % (ref 10–15)
GFR SERPL CREATININE-BSD FRML MDRD: >90 ML/MIN/1.73M2
GLUCOSE BODY FLUID SOURCE: NORMAL
GLUCOSE FLD-MCNC: 74 MG/DL
GLUCOSE SERPL-MCNC: 101 MG/DL (ref 70–99)
HCG SERPL QL: NEGATIVE
HCO3 BLDV-SCNC: 24 MMOL/L (ref 21–28)
HCT VFR BLD AUTO: 39.2 % (ref 35–47)
HGB BLD-MCNC: 12.1 G/DL (ref 11.7–15.7)
HOLD SPECIMEN: NORMAL
IMM GRANULOCYTES # BLD: 0.1 10E3/UL
IMM GRANULOCYTES NFR BLD: 1 %
LACTATE BLD-SCNC: 0.7 MMOL/L
LD BODY BODY FLUID SOURCE: NORMAL
LDH FLD L TO P-CCNC: 1119 U/L
LDH SERPL L TO P-CCNC: 160 U/L (ref 0–250)
LIPASE SERPL-CCNC: 44 U/L (ref 13–60)
LVEF ECHO: NORMAL
LYMPHOCYTES # BLD AUTO: 1.7 10E3/UL (ref 0.8–5.3)
LYMPHOCYTES NFR BLD AUTO: 19 %
LYMPHOCYTES NFR FLD MANUAL: NORMAL %
MCH RBC QN AUTO: 26.8 PG (ref 26.5–33)
MCHC RBC AUTO-ENTMCNC: 30.9 G/DL (ref 31.5–36.5)
MCV RBC AUTO: 87 FL (ref 78–100)
MONOCYTES # BLD AUTO: 0.9 10E3/UL (ref 0–1.3)
MONOCYTES NFR BLD AUTO: 10 %
MONOS+MACROS NFR FLD MANUAL: 8 %
MRSA DNA SPEC QL NAA+PROBE: NEGATIVE
NEUTROPHILS # BLD AUTO: 6.5 10E3/UL (ref 1.6–8.3)
NEUTROPHILS NFR BLD AUTO: 70 %
NEUTS BAND NFR FLD MANUAL: 92 %
NRBC # BLD AUTO: 0 10E3/UL
NRBC BLD AUTO-RTO: 0 /100
NT-PROBNP SERPL-MCNC: 392 PG/ML (ref 0–450)
PCO2 BLDV: 41 MM HG (ref 40–50)
PH BLDV: 7.36 [PH] (ref 7.32–7.43)
PLATELET # BLD AUTO: 263 10E3/UL (ref 150–450)
PO2 BLDV: 23 MM HG (ref 25–47)
POTASSIUM SERPL-SCNC: 3.5 MMOL/L (ref 3.4–5.3)
PROT FLD-MCNC: 4.3 G/DL
PROT SERPL-MCNC: 7 G/DL (ref 6.4–8.3)
PROT SERPL-MCNC: 7.1 G/DL (ref 6.4–8.3)
PROTEIN BODY FLUID SOURCE: NORMAL
RBC # BLD AUTO: 4.51 10E6/UL (ref 3.8–5.2)
SA TARGET DNA: POSITIVE
SAO2 % BLDV: 38 % (ref 94–100)
SARS-COV-2 RNA RESP QL NAA+PROBE: NEGATIVE
SODIUM SERPL-SCNC: 137 MMOL/L (ref 136–145)
TROPONIN T SERPL HS-MCNC: 22 NG/L
WBC # BLD AUTO: 9.3 10E3/UL (ref 4–11)
WBC # FLD AUTO: 1995 /UL

## 2022-12-11 PROCEDURE — 120N000001 HC R&B MED SURG/OB

## 2022-12-11 PROCEDURE — 87205 SMEAR GRAM STAIN: CPT | Performed by: INTERNAL MEDICINE

## 2022-12-11 PROCEDURE — 999N000065 XR CHEST PORT 1 VIEW

## 2022-12-11 PROCEDURE — 99234 HOSP IP/OBS SM DT SF/LOW 45: CPT | Performed by: STUDENT IN AN ORGANIZED HEALTH CARE EDUCATION/TRAINING PROGRAM

## 2022-12-11 PROCEDURE — 99222 1ST HOSP IP/OBS MODERATE 55: CPT | Mod: 24 | Performed by: SURGERY

## 2022-12-11 PROCEDURE — 88112 CYTOPATH CELL ENHANCE TECH: CPT | Mod: TC | Performed by: INTERNAL MEDICINE

## 2022-12-11 PROCEDURE — 93321 DOPPLER ECHO F-UP/LMTD STD: CPT

## 2022-12-11 PROCEDURE — 84703 CHORIONIC GONADOTROPIN ASSAY: CPT | Performed by: EMERGENCY MEDICINE

## 2022-12-11 PROCEDURE — 272N000001 HC OR GENERAL SUPPLY STERILE: Performed by: INTERNAL MEDICINE

## 2022-12-11 PROCEDURE — 36415 COLL VENOUS BLD VENIPUNCTURE: CPT | Performed by: INTERNAL MEDICINE

## 2022-12-11 PROCEDURE — 83880 ASSAY OF NATRIURETIC PEPTIDE: CPT | Performed by: EMERGENCY MEDICINE

## 2022-12-11 PROCEDURE — 36415 COLL VENOUS BLD VENIPUNCTURE: CPT | Performed by: EMERGENCY MEDICINE

## 2022-12-11 PROCEDURE — 99223 1ST HOSP IP/OBS HIGH 75: CPT | Mod: AI | Performed by: INTERNAL MEDICINE

## 2022-12-11 PROCEDURE — 250N000013 HC RX MED GY IP 250 OP 250 PS 637: Performed by: STUDENT IN AN ORGANIZED HEALTH CARE EDUCATION/TRAINING PROGRAM

## 2022-12-11 PROCEDURE — 84484 ASSAY OF TROPONIN QUANT: CPT | Performed by: EMERGENCY MEDICINE

## 2022-12-11 PROCEDURE — 89051 BODY FLUID CELL COUNT: CPT | Performed by: INTERNAL MEDICINE

## 2022-12-11 PROCEDURE — C1894 INTRO/SHEATH, NON-LASER: HCPCS | Performed by: INTERNAL MEDICINE

## 2022-12-11 PROCEDURE — 87149 DNA/RNA DIRECT PROBE: CPT | Performed by: EMERGENCY MEDICINE

## 2022-12-11 PROCEDURE — 250N000009 HC RX 250: Performed by: INTERNAL MEDICINE

## 2022-12-11 PROCEDURE — 88305 TISSUE EXAM BY PATHOLOGIST: CPT | Mod: TC | Performed by: INTERNAL MEDICINE

## 2022-12-11 PROCEDURE — 99152 MOD SED SAME PHYS/QHP 5/>YRS: CPT | Performed by: INTERNAL MEDICINE

## 2022-12-11 PROCEDURE — 258N000003 HC RX IP 258 OP 636: Performed by: EMERGENCY MEDICINE

## 2022-12-11 PROCEDURE — 250N000011 HC RX IP 250 OP 636: Performed by: INTERNAL MEDICINE

## 2022-12-11 PROCEDURE — 33017 PRCRD DRG 6YR+ W/O CGEN CAR: CPT | Performed by: INTERNAL MEDICINE

## 2022-12-11 PROCEDURE — 80053 COMPREHEN METABOLIC PANEL: CPT | Performed by: EMERGENCY MEDICINE

## 2022-12-11 PROCEDURE — 92610 EVALUATE SWALLOWING FUNCTION: CPT | Mod: GN

## 2022-12-11 PROCEDURE — 84157 ASSAY OF PROTEIN OTHER: CPT | Performed by: INTERNAL MEDICINE

## 2022-12-11 PROCEDURE — 272N000451 HC KIT SHRLOCK 5FR POWER PICC DOUBLE LUMEN

## 2022-12-11 PROCEDURE — 99285 EMERGENCY DEPT VISIT HI MDM: CPT | Mod: CS,25

## 2022-12-11 PROCEDURE — 93325 DOPPLER ECHO COLOR FLOW MAPG: CPT | Mod: 26 | Performed by: INTERNAL MEDICINE

## 2022-12-11 PROCEDURE — 82945 GLUCOSE OTHER FLUID: CPT | Performed by: INTERNAL MEDICINE

## 2022-12-11 PROCEDURE — 87641 MR-STAPH DNA AMP PROBE: CPT | Performed by: STUDENT IN AN ORGANIZED HEALTH CARE EDUCATION/TRAINING PROGRAM

## 2022-12-11 PROCEDURE — 87077 CULTURE AEROBIC IDENTIFY: CPT | Performed by: EMERGENCY MEDICINE

## 2022-12-11 PROCEDURE — 250N000011 HC RX IP 250 OP 636: Performed by: EMERGENCY MEDICINE

## 2022-12-11 PROCEDURE — 93325 DOPPLER ECHO COLOR FLOW MAPG: CPT

## 2022-12-11 PROCEDURE — 93308 TTE F-UP OR LMTD: CPT

## 2022-12-11 PROCEDURE — 87040 BLOOD CULTURE FOR BACTERIA: CPT | Performed by: INTERNAL MEDICINE

## 2022-12-11 PROCEDURE — U0005 INFEC AGEN DETEC AMPLI PROBE: HCPCS | Performed by: EMERGENCY MEDICINE

## 2022-12-11 PROCEDURE — 71275 CT ANGIOGRAPHY CHEST: CPT

## 2022-12-11 PROCEDURE — 99207 PR NO BILLABLE SERVICE THIS VISIT: CPT | Performed by: HOSPITALIST

## 2022-12-11 PROCEDURE — 93005 ELECTROCARDIOGRAM TRACING: CPT

## 2022-12-11 PROCEDURE — 83615 LACTATE (LD) (LDH) ENZYME: CPT | Performed by: INTERNAL MEDICINE

## 2022-12-11 PROCEDURE — 99291 CRITICAL CARE FIRST HOUR: CPT | Mod: 25 | Performed by: INTERNAL MEDICINE

## 2022-12-11 PROCEDURE — 82803 BLOOD GASES ANY COMBINATION: CPT

## 2022-12-11 PROCEDURE — 84155 ASSAY OF PROTEIN SERUM: CPT | Performed by: STUDENT IN AN ORGANIZED HEALTH CARE EDUCATION/TRAINING PROGRAM

## 2022-12-11 PROCEDURE — C9803 HOPD COVID-19 SPEC COLLECT: HCPCS

## 2022-12-11 PROCEDURE — 0W9D30Z DRAINAGE OF PERICARDIAL CAVITY WITH DRAINAGE DEVICE, PERCUTANEOUS APPROACH: ICD-10-PCS | Performed by: INTERNAL MEDICINE

## 2022-12-11 PROCEDURE — 258N000003 HC RX IP 258 OP 636: Performed by: INTERNAL MEDICINE

## 2022-12-11 PROCEDURE — 83690 ASSAY OF LIPASE: CPT | Performed by: EMERGENCY MEDICINE

## 2022-12-11 PROCEDURE — 210N000002 HC R&B HEART CARE

## 2022-12-11 PROCEDURE — 250N000013 HC RX MED GY IP 250 OP 250 PS 637: Performed by: INTERNAL MEDICINE

## 2022-12-11 PROCEDURE — 84311 SPECTROPHOTOMETRY: CPT | Performed by: INTERNAL MEDICINE

## 2022-12-11 PROCEDURE — C9113 INJ PANTOPRAZOLE SODIUM, VIA: HCPCS | Performed by: INTERNAL MEDICINE

## 2022-12-11 PROCEDURE — 83615 LACTATE (LD) (LDH) ENZYME: CPT | Performed by: STUDENT IN AN ORGANIZED HEALTH CARE EDUCATION/TRAINING PROGRAM

## 2022-12-11 PROCEDURE — 96360 HYDRATION IV INFUSION INIT: CPT | Mod: 59

## 2022-12-11 PROCEDURE — 258N000003 HC RX IP 258 OP 636: Performed by: HOSPITALIST

## 2022-12-11 PROCEDURE — 93308 TTE F-UP OR LMTD: CPT | Mod: 26 | Performed by: INTERNAL MEDICINE

## 2022-12-11 PROCEDURE — 36569 INSJ PICC 5 YR+ W/O IMAGING: CPT

## 2022-12-11 PROCEDURE — 85025 COMPLETE CBC W/AUTO DIFF WBC: CPT | Performed by: EMERGENCY MEDICINE

## 2022-12-11 PROCEDURE — 93321 DOPPLER ECHO F-UP/LMTD STD: CPT | Mod: 26 | Performed by: INTERNAL MEDICINE

## 2022-12-11 PROCEDURE — 99153 MOD SED SAME PHYS/QHP EA: CPT | Performed by: INTERNAL MEDICINE

## 2022-12-11 PROCEDURE — 87070 CULTURE OTHR SPECIMN AEROBIC: CPT | Performed by: INTERNAL MEDICINE

## 2022-12-11 RX ORDER — HYDROMORPHONE HCL IN WATER/PF 6 MG/30 ML
0.2 PATIENT CONTROLLED ANALGESIA SYRINGE INTRAVENOUS
Status: DISCONTINUED | OUTPATIENT
Start: 2022-12-11 | End: 2022-12-15 | Stop reason: HOSPADM

## 2022-12-11 RX ORDER — CALCIUM CARBONATE 500 MG/1
1 TABLET, CHEWABLE ORAL PRN
COMMUNITY
End: 2024-01-23

## 2022-12-11 RX ORDER — BUPROPION HYDROCHLORIDE 75 MG/1
75 TABLET ORAL 2 TIMES DAILY
Status: DISCONTINUED | OUTPATIENT
Start: 2022-12-11 | End: 2022-12-15 | Stop reason: HOSPADM

## 2022-12-11 RX ORDER — LIDOCAINE 40 MG/G
CREAM TOPICAL
Status: DISCONTINUED | OUTPATIENT
Start: 2022-12-11 | End: 2022-12-13

## 2022-12-11 RX ORDER — ACETAMINOPHEN 325 MG/1
650 TABLET ORAL EVERY 4 HOURS PRN
Status: DISCONTINUED | OUTPATIENT
Start: 2022-12-11 | End: 2022-12-11 | Stop reason: DRUGHIGH

## 2022-12-11 RX ORDER — LIDOCAINE 40 MG/G
CREAM TOPICAL
Status: DISCONTINUED | OUTPATIENT
Start: 2022-12-11 | End: 2022-12-11 | Stop reason: HOSPADM

## 2022-12-11 RX ORDER — SODIUM CHLORIDE, SODIUM LACTATE, POTASSIUM CHLORIDE, CALCIUM CHLORIDE 600; 310; 30; 20 MG/100ML; MG/100ML; MG/100ML; MG/100ML
INJECTION, SOLUTION INTRAVENOUS CONTINUOUS
Status: DISCONTINUED | OUTPATIENT
Start: 2022-12-11 | End: 2022-12-11 | Stop reason: HOSPADM

## 2022-12-11 RX ORDER — AMOXICILLIN 250 MG
2 CAPSULE ORAL 2 TIMES DAILY PRN
Status: DISCONTINUED | OUTPATIENT
Start: 2022-12-11 | End: 2022-12-11 | Stop reason: HOSPADM

## 2022-12-11 RX ORDER — NALOXONE HYDROCHLORIDE 0.4 MG/ML
0.4 INJECTION, SOLUTION INTRAMUSCULAR; INTRAVENOUS; SUBCUTANEOUS
Status: ACTIVE | OUTPATIENT
Start: 2022-12-11 | End: 2022-12-11

## 2022-12-11 RX ORDER — BUPROPION HYDROCHLORIDE 150 MG/1
150 TABLET ORAL EVERY MORNING
Status: DISCONTINUED | OUTPATIENT
Start: 2022-12-11 | End: 2022-12-11 | Stop reason: HOSPADM

## 2022-12-11 RX ORDER — NITROGLYCERIN 0.4 MG/1
0.4 TABLET SUBLINGUAL EVERY 5 MIN PRN
Status: DISCONTINUED | OUTPATIENT
Start: 2022-12-11 | End: 2022-12-15 | Stop reason: HOSPADM

## 2022-12-11 RX ORDER — ACETAMINOPHEN 500 MG
1000 TABLET ORAL EVERY 6 HOURS PRN
COMMUNITY
End: 2023-03-10

## 2022-12-11 RX ORDER — METHOCARBAMOL 500 MG/1
500 TABLET, FILM COATED ORAL 4 TIMES DAILY PRN
Status: DISCONTINUED | OUTPATIENT
Start: 2022-12-11 | End: 2022-12-15 | Stop reason: HOSPADM

## 2022-12-11 RX ORDER — ONDANSETRON 4 MG/1
4 TABLET, ORALLY DISINTEGRATING ORAL EVERY 6 HOURS PRN
Status: DISCONTINUED | OUTPATIENT
Start: 2022-12-11 | End: 2022-12-15 | Stop reason: HOSPADM

## 2022-12-11 RX ORDER — FENTANYL CITRATE 50 UG/ML
INJECTION, SOLUTION INTRAMUSCULAR; INTRAVENOUS
Status: DISCONTINUED | OUTPATIENT
Start: 2022-12-11 | End: 2022-12-11 | Stop reason: HOSPADM

## 2022-12-11 RX ORDER — PIPERACILLIN SODIUM, TAZOBACTAM SODIUM 4; .5 G/20ML; G/20ML
4.5 INJECTION, POWDER, LYOPHILIZED, FOR SOLUTION INTRAVENOUS EVERY 6 HOURS
Status: DISCONTINUED | OUTPATIENT
Start: 2022-12-11 | End: 2022-12-13

## 2022-12-11 RX ORDER — ONDANSETRON 2 MG/ML
4 INJECTION INTRAMUSCULAR; INTRAVENOUS EVERY 6 HOURS PRN
Status: DISCONTINUED | OUTPATIENT
Start: 2022-12-11 | End: 2022-12-11 | Stop reason: HOSPADM

## 2022-12-11 RX ORDER — IOPAMIDOL 755 MG/ML
500 INJECTION, SOLUTION INTRAVASCULAR ONCE
Status: COMPLETED | OUTPATIENT
Start: 2022-12-11 | End: 2022-12-11

## 2022-12-11 RX ORDER — ACETAMINOPHEN 325 MG/1
650 TABLET ORAL EVERY 6 HOURS PRN
Status: DISCONTINUED | OUTPATIENT
Start: 2022-12-11 | End: 2022-12-11 | Stop reason: HOSPADM

## 2022-12-11 RX ORDER — ACETAMINOPHEN 325 MG/1
650 TABLET ORAL EVERY 6 HOURS PRN
Status: DISCONTINUED | OUTPATIENT
Start: 2022-12-11 | End: 2022-12-11

## 2022-12-11 RX ORDER — PROCHLORPERAZINE MALEATE 10 MG
10 TABLET ORAL EVERY 6 HOURS PRN
Status: DISCONTINUED | OUTPATIENT
Start: 2022-12-11 | End: 2022-12-11 | Stop reason: HOSPADM

## 2022-12-11 RX ORDER — OXYCODONE HYDROCHLORIDE 5 MG/1
10 TABLET ORAL EVERY 4 HOURS PRN
Status: DISCONTINUED | OUTPATIENT
Start: 2022-12-11 | End: 2022-12-15 | Stop reason: HOSPADM

## 2022-12-11 RX ORDER — LORAZEPAM 2 MG/ML
0.5 INJECTION INTRAMUSCULAR EVERY 6 HOURS PRN
Status: DISCONTINUED | OUTPATIENT
Start: 2022-12-11 | End: 2022-12-15 | Stop reason: HOSPADM

## 2022-12-11 RX ORDER — NALOXONE HYDROCHLORIDE 0.4 MG/ML
0.2 INJECTION, SOLUTION INTRAMUSCULAR; INTRAVENOUS; SUBCUTANEOUS
Status: ACTIVE | OUTPATIENT
Start: 2022-12-11 | End: 2022-12-11

## 2022-12-11 RX ORDER — LIDOCAINE 40 MG/G
CREAM TOPICAL
Status: DISCONTINUED | OUTPATIENT
Start: 2022-12-11 | End: 2022-12-11

## 2022-12-11 RX ORDER — FLUMAZENIL 0.1 MG/ML
0.2 INJECTION, SOLUTION INTRAVENOUS
Status: ACTIVE | OUTPATIENT
Start: 2022-12-11 | End: 2022-12-11

## 2022-12-11 RX ORDER — LIDOCAINE 40 MG/G
CREAM TOPICAL
Status: DISCONTINUED | OUTPATIENT
Start: 2022-12-11 | End: 2022-12-15 | Stop reason: HOSPADM

## 2022-12-11 RX ORDER — PROCHLORPERAZINE 25 MG
25 SUPPOSITORY, RECTAL RECTAL EVERY 12 HOURS PRN
Status: DISCONTINUED | OUTPATIENT
Start: 2022-12-11 | End: 2022-12-11 | Stop reason: HOSPADM

## 2022-12-11 RX ORDER — ACETAMINOPHEN 650 MG/1
650 SUPPOSITORY RECTAL EVERY 6 HOURS PRN
Status: DISCONTINUED | OUTPATIENT
Start: 2022-12-11 | End: 2022-12-11 | Stop reason: HOSPADM

## 2022-12-11 RX ORDER — ACETAMINOPHEN 650 MG/1
650 SUPPOSITORY RECTAL EVERY 6 HOURS PRN
Status: DISCONTINUED | OUTPATIENT
Start: 2022-12-11 | End: 2022-12-15 | Stop reason: HOSPADM

## 2022-12-11 RX ORDER — ACETAMINOPHEN 500 MG
1000 TABLET ORAL EVERY 6 HOURS PRN
Status: DISCONTINUED | OUTPATIENT
Start: 2022-12-11 | End: 2022-12-15 | Stop reason: HOSPADM

## 2022-12-11 RX ORDER — HYDROXYZINE HYDROCHLORIDE 25 MG/1
25 TABLET, FILM COATED ORAL 3 TIMES DAILY PRN
Status: DISCONTINUED | OUTPATIENT
Start: 2022-12-11 | End: 2022-12-15 | Stop reason: HOSPADM

## 2022-12-11 RX ORDER — FENTANYL CITRATE 50 UG/ML
25 INJECTION, SOLUTION INTRAMUSCULAR; INTRAVENOUS
Status: DISCONTINUED | OUTPATIENT
Start: 2022-12-11 | End: 2022-12-15 | Stop reason: HOSPADM

## 2022-12-11 RX ORDER — ONDANSETRON 4 MG/1
4 TABLET, ORALLY DISINTEGRATING ORAL EVERY 6 HOURS PRN
Status: DISCONTINUED | OUTPATIENT
Start: 2022-12-11 | End: 2022-12-11 | Stop reason: HOSPADM

## 2022-12-11 RX ORDER — ATROPINE SULFATE 0.1 MG/ML
0.5 INJECTION INTRAVENOUS
Status: ACTIVE | OUTPATIENT
Start: 2022-12-11 | End: 2022-12-11

## 2022-12-11 RX ORDER — ONDANSETRON 2 MG/ML
4 INJECTION INTRAMUSCULAR; INTRAVENOUS EVERY 6 HOURS PRN
Status: DISCONTINUED | OUTPATIENT
Start: 2022-12-11 | End: 2022-12-15 | Stop reason: HOSPADM

## 2022-12-11 RX ORDER — OXYCODONE HYDROCHLORIDE 5 MG/1
5 TABLET ORAL EVERY 4 HOURS PRN
Status: DISCONTINUED | OUTPATIENT
Start: 2022-12-11 | End: 2022-12-15 | Stop reason: HOSPADM

## 2022-12-11 RX ORDER — AMOXICILLIN 250 MG
1 CAPSULE ORAL 2 TIMES DAILY PRN
Status: DISCONTINUED | OUTPATIENT
Start: 2022-12-11 | End: 2022-12-11 | Stop reason: HOSPADM

## 2022-12-11 RX ORDER — CEFAZOLIN SODIUM 1 G/50ML
2500 SOLUTION INTRAVENOUS ONCE
Status: COMPLETED | OUTPATIENT
Start: 2022-12-11 | End: 2022-12-11

## 2022-12-11 RX ORDER — SODIUM CHLORIDE 9 MG/ML
75 INJECTION, SOLUTION INTRAVENOUS CONTINUOUS
Status: ACTIVE | OUTPATIENT
Start: 2022-12-11 | End: 2022-12-11

## 2022-12-11 RX ADMIN — ACETAMINOPHEN 650 MG: 325 TABLET, FILM COATED ORAL at 07:48

## 2022-12-11 RX ADMIN — HYDROXYZINE HYDROCHLORIDE 25 MG: 25 TABLET, FILM COATED ORAL at 20:31

## 2022-12-11 RX ADMIN — THIAMINE HCL TAB 100 MG 100 MG: 100 TAB at 17:17

## 2022-12-11 RX ADMIN — VANCOMYCIN HYDROCHLORIDE 2500 MG: 5 INJECTION, POWDER, LYOPHILIZED, FOR SOLUTION INTRAVENOUS at 08:23

## 2022-12-11 RX ADMIN — SODIUM CHLORIDE 100 ML: 9 INJECTION, SOLUTION INTRAVENOUS at 05:53

## 2022-12-11 RX ADMIN — HYDROMORPHONE HYDROCHLORIDE 0.2 MG: 0.2 INJECTION, SOLUTION INTRAMUSCULAR; INTRAVENOUS; SUBCUTANEOUS at 16:42

## 2022-12-11 RX ADMIN — LIDOCAINE HYDROCHLORIDE ANHYDROUS 1 ML: 10 INJECTION, SOLUTION INFILTRATION at 22:47

## 2022-12-11 RX ADMIN — SODIUM CHLORIDE, POTASSIUM CHLORIDE, SODIUM LACTATE AND CALCIUM CHLORIDE: 600; 310; 30; 20 INJECTION, SOLUTION INTRAVENOUS at 11:07

## 2022-12-11 RX ADMIN — HYDROMORPHONE HYDROCHLORIDE 1 MG: 2 TABLET ORAL at 11:26

## 2022-12-11 RX ADMIN — LORAZEPAM 0.5 MG: 2 INJECTION INTRAMUSCULAR; INTRAVENOUS at 22:12

## 2022-12-11 RX ADMIN — Medication 500 MCG: at 17:18

## 2022-12-11 RX ADMIN — HYOSCYAMINE SULFATE 0.12 MG: 0.12 TABLET SUBLINGUAL at 19:27

## 2022-12-11 RX ADMIN — PIPERACILLIN AND TAZOBACTAM 4.5 G: 4; .5 INJECTION, POWDER, FOR SOLUTION INTRAVENOUS at 20:25

## 2022-12-11 RX ADMIN — HYOSCYAMINE SULFATE 0.12 MG: 0.12 TABLET SUBLINGUAL at 21:17

## 2022-12-11 RX ADMIN — HYDROMORPHONE HYDROCHLORIDE 0.2 MG: 0.2 INJECTION, SOLUTION INTRAMUSCULAR; INTRAVENOUS; SUBCUTANEOUS at 19:27

## 2022-12-11 RX ADMIN — HYDROMORPHONE HYDROCHLORIDE 0.2 MG: 0.2 INJECTION, SOLUTION INTRAMUSCULAR; INTRAVENOUS; SUBCUTANEOUS at 21:17

## 2022-12-11 RX ADMIN — ONDANSETRON 4 MG: 4 TABLET, ORALLY DISINTEGRATING ORAL at 21:17

## 2022-12-11 RX ADMIN — HYDROMORPHONE HYDROCHLORIDE 1 MG: 2 TABLET ORAL at 07:48

## 2022-12-11 RX ADMIN — TAZOBACTAM SODIUM AND PIPERACILLIN SODIUM 4.5 G: 500; 4 INJECTION, SOLUTION INTRAVENOUS at 07:46

## 2022-12-11 RX ADMIN — SODIUM CHLORIDE 75 ML/HR: 9 INJECTION, SOLUTION INTRAVENOUS at 15:00

## 2022-12-11 RX ADMIN — HYDROMORPHONE HYDROCHLORIDE 0.2 MG: 0.2 INJECTION, SOLUTION INTRAMUSCULAR; INTRAVENOUS; SUBCUTANEOUS at 14:39

## 2022-12-11 RX ADMIN — ACETAMINOPHEN 1000 MG: 500 TABLET ORAL at 20:30

## 2022-12-11 RX ADMIN — IOPAMIDOL 73 ML: 755 INJECTION, SOLUTION INTRAVENOUS at 05:52

## 2022-12-11 RX ADMIN — PIPERACILLIN AND TAZOBACTAM 4.5 G: 4; .5 INJECTION, POWDER, FOR SOLUTION INTRAVENOUS at 16:17

## 2022-12-11 RX ADMIN — HYDROXYZINE HYDROCHLORIDE 25 MG: 25 TABLET, FILM COATED ORAL at 17:17

## 2022-12-11 RX ADMIN — BUPROPION HYDROCHLORIDE 75 MG: 75 TABLET, FILM COATED ORAL at 20:31

## 2022-12-11 RX ADMIN — VANCOMYCIN HYDROCHLORIDE 1250 MG: 10 INJECTION, POWDER, LYOPHILIZED, FOR SOLUTION INTRAVENOUS at 21:21

## 2022-12-11 RX ADMIN — PANTOPRAZOLE SODIUM 40 MG: 40 INJECTION, POWDER, FOR SOLUTION INTRAVENOUS at 14:40

## 2022-12-11 ASSESSMENT — ACTIVITIES OF DAILY LIVING (ADL)
ADLS_ACUITY_SCORE: 35
ADLS_ACUITY_SCORE: 37
ADLS_ACUITY_SCORE: 35
ADLS_ACUITY_SCORE: 37
ADLS_ACUITY_SCORE: 35

## 2022-12-11 ASSESSMENT — ENCOUNTER SYMPTOMS
COUGH: 0
SHORTNESS OF BREATH: 1
PALPITATIONS: 1
FEVER: 0

## 2022-12-11 NOTE — ED TRIAGE NOTES
SOB and chest pain for several days.  Pt has had multiple GI surgeries recently.  Seen at [facility] ER 12/9 and found to have pleural effusion.  SPO2 83% at home.  Pt reports pain with inspiration.

## 2022-12-11 NOTE — PROCEDURES
[facility]    Operative Note    Pre-operative diagnosis: S/P laparoscopic sleeve gastrectomy [Z98.84]  Esophageal dysphagia [R13.19]   Post-operative diagnosis * No post-op diagnosis entered *   Procedure: Upper gastrointestinal endoscopy.   Interpretation of fluoroscopy.   Placement of a 10 cm length fully covered double flared (5-mm flares) 23-mm Wallflex stent across the area of interest (see findings).    Surgeon: Surgeon(s) and Role:     * Daniel Aragon MD - Primary     * Misti Wilhelm MD - Resident - Assisting   Anesthesia: General    Estimated blood loss: Minimal   Drains: None   Specimens: * No specimens in log *   Findings: None.  1. Prior to stenting, the stricture measured 17 mm and was located at the incisura.  2. A stent was placed across the stricture with the proximal flare located at the midbody of sleeve.  3. The stent was visualized after placement using fluoroscopy and endoscopy.    Complications: None.   Implants: None.     INDICATIONS FOR PROCEDURE  Leah Yanez is a 42 year old female who underwent prior sleeve gastrectomy surgery.    There is a diagnosis of gastrointestinal stricture.  Stent placement indicated.    Height: 182.9 cm (6'), Weight: 106.6 kg (235 lb 0.2 oz), and currently the Body mass index is 31.87 kg/m .    After understanding the risks and benefits of proceeding with an esophageal stent placement, she agreed to an operation as outlined by me.    I reviewed the risks of surgery with Leah Yanez and these include but are not limited to the following:    POTENTIAL COMPLICATIONS  Complications have been reported in the literature for esophageal stent placement with both silicone stents and expandable metal stents.     PROCEDURAL COMPLICATIONS:    Bleeding    Esophageal perforation    Pain    Aspiration    POST-STENT PLACEMENT COMPLICATIONS:    Stent migration    Perforation    Bleeding    Pain/foreign body  sensation    Occlusion due to lesion growth    Obstruction related to food volume    Infection    Reflux    Esophagitis    Esophageal ulceration    Edema    Fever    Fistula formation outside of normal disease progression    Death with cause outside of normal disease progression    DESCRIPTION OF PROCEDURE:   The patient was brought to the operating room, placed supine on the operating room table, and general anesthesia was induced.    A timeout was conducted with all members of the surgical team present to verify that the procedure and patient were correct.    The procedure was started by intubating the esophagus with a 10-mm adult endoscope.     The scope was advanced into the gastric sleeve.     In this manner, the stricture was traversed.      A 0.035mm amplatz stiff wire was advanced through the gastroscope and under fluoroscopic guidance. The gastroscope was removed with the wire left in place and confirmation of positioning of the wire was by fluoroscopy.    Next, the stent described above was then placed across the stricture under fluoroscopic guidance.     The proximal flare was positioned at mid-body of sleeve. The stent was deployed under radiologic guidance.    The gastroscope was passed back into the esophagus and the stent was visualized and positioning was confirmed with fluoroscopy.  The gastroscope was removed and the procedure was complete.    I was present for all critical portions of the operation.    Daniel Aragon MD  Surgery  [phone] (hospital )  [phone] (clinic nurses)

## 2022-12-11 NOTE — PROGRESS NOTES
Clinical Swallow Evaluation (CSE):    Clinical swallow evaluation for oropharyngeal swallow mechanism completed at bedside - no signs/sx oropharyngeal dysphagia or aspiration during the swallow.     Highly suspect post-prandial aspiration risk related to known esophageal deficits including recurring stenosis, cavanaugh's esophagus, reflux (CT chest from today noted reflux in esophagus), likely esophageal retention/stasis (observed on Upper GI Xray from 11/2/22). Recommend GI consult.     Continue current diet of advance diet as tolerated/clear liquids (anticipate oropharyngeally she would tolerate regular diet though recommend medical team/GI input) - must be upright for all PO, reflux precautions, remain upright 60 minutes post PO, single sips at a time, slow rate, consider smaller more frequent intake vs larger cosumption.     12/11/22 1501   Appointment Info   Signing Clinician's Name / Credentials (SLP) Vida Mccullough MS CCC-SLP   General Information   Onset of Illness/Injury or Date of Surgery 12/11/22   Referring Physician Dr. Milner   Patient/Family Therapy Goal Statement (SLP) To improve swallowing/pain   Pertinent History of Current Problem   Pt admitted with acute hypoxic respiratory failire, bilateral pleural effusions (moderate L, small R), atelectasis, concern for possible PNA, moderate pericardial effusion. SLP consulted given reports of dysphagia (globus sensation, food sticks, she has to vomit after eating often), concerns for aspiration.     No hx oropharyngeal dysphagia/SLP services.     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     Laparascopic sleeve gastrectomy and hiatal hernia repair on 10/25/2022.     XR Upper GI 11/2/22: 1. Status post sleeve gastrectomy without evidence of leak. 2. Prolonged stasis of contrast in the distal esophagus. The gastric lumen appears very narrow which may be accentuated by postoperative  "edema. Contrast easily passed from the prepyloric stomach into the proximal duodenum.     EGD 11/23/22: Normal gastroesophageal junction; Gastric stenosis was found at the incisura and this was successfully dilated to 19mm; Normal examined duodenum. Per GI: May need stent or repeat dilation.     CT/chest 12/11/22: \"Reflux in the distal esophagus.\"     General Observations   Pt alert/positioned upright. RN present and provided IV medications given significant chest pain. Despite pain pt able to be positioned upright and participate in swallow eval without difficulty. Very pleasant. Very cold with multiple blankets covering her therefore 1:1 assist provided for PO trials. Voice is strong/clear.     Pain Assessment   Patient Currently in Pain   (Yes RN in room and addressing, see their flowsheet)   Type of Evaluation   Type of Evaluation Swallow Evaluation   Oral Motor   Oral Musculature generally intact   Structural Abnormalities none present   Mucosal Quality dry  (pt reports chronic xerostomia)   General Swallowing Observations   Respiratory Support (General Swallowing Observations) nasal cannula   Current Diet/Method of Nutritional Intake (General Swallowing Observations, NIS) clear liquid diet  (ADAT)   Swallowing Evaluation Clinical swallow evaluation   Clinical Swallow Evaluation   Clinical Swallow Evaluation Textures Trialed thin liquids   Clinical Swallow Eval: Thin Liquid Texture Trial   Mode of Presentation, Thin Liquids spoon;straw   Volume of Liquid or Food Presented ice chips x6, x8 sips of thin water via straw   Oral Phase of Swallow WFL   Pharyngeal Phase of Swallow intact   Diagnostic Statement no overt clinical signs/sx aspiration noted   Esophageal Phase of Swallow   Patient reports or presents with symptoms of esophageal dysphagia Yes   Swallowing Recommendations   Diet Consistency Recommendations   (clear liquids per medical team)   Mode of Delivery Recommendations bolus size, small;slow rate of " intake   Recommended Feeding/Eating Techniques (Swallow Eval) maintain upright sitting position for eating;maintain upright posture during/after eating for 30 minutes;provide 6 smaller meals throughout day;moisten oral mucosa prior to intake   Medication Administration Recommendations, Swallowing (SLP) whole as tolerated, crush and/or non oral if needed given suspected esophageal dysphagia   General Therapy Interventions   Planned Therapy Interventions Dysphagia Treatment   Clinical Impression   Criteria for Skilled Therapeutic Interventions Met (SLP Eval) Yes, treatment indicated   SLP Diagnosis potential pharyngeal vs esophageal dysphagia, though highly supsect esophageal   Risks & Benefits of therapy have been explained evaluation/treatment results reviewed;care plan/treatment goals reviewed;risks/benefits reviewed;current/potential barriers reviewed;participants voiced agreement with care plan;participants included;patient;spouse/significant other   Clinical Impression Comments   Clinical swallow evaluation completed. Pt currently presents with suspected functional oropharyngeal swallow, though potential pharyngeal vs esophageal dysphagia --- highly suspect esophageal in nature given extensive hx. Oromotor WFL, mastication of ice chips WFL, oral containment WFL, oral transit WFL, timing of pharyngeal swallow with laryngeal elevation appears WFL, no overt clinical signs/sx aspiration noted. Pt does not appear to be at an aspiration risk during the swallow related to oropharyngeal swallow function, though highly suspect post-prandial aspiration risk related to known esophageal deficits including potential recurring stenosis, cavanaugh's esophagus, reflux (CT chest from today noted reflux in esophagus), likely esophageal retention/stasis (observed on Upper GI Xray from 11/2/22). Recommend GI consult.     SLP Total Evaluation Time   Eval: oral/pharyngeal swallow function, clinical swallow Minutes (60855) 20   SLP  Goals   Therapy Frequency (SLP Eval) 4 times/wk   SLP Predicted Duration/Target Date for Goal Attainment 12/18/22   SLP Goals Swallow   SLP: Safely tolerate diet without signs/symptoms of aspiration Regular diet;Thin liquids;Independently   SLP Discharge Planning   SLP Plan monitor GI consult, strategies, ?VFSS if indicated though suspect EGD vs esophagram more appropriate   SLP Discharge Recommendation home with assist   SLP Rationale for DC Rec Anticipate short-term SLP services given suspicion of esophageal dysphaiga vs oropharyngeal   SLP Brief overview of current status  Clinical swallow evaluation for oropharyngeal swallow mechanism completed at bedside - no signs/sx oropharyngeal dysphagia or aspiration during the swallow. Highly suspect post-prandial aspiration risk related to known esophageal deficits including potential recurring stenosis, cavanaugh's esophagus, reflux (CT chest from today noted reflux in esophagus), likely esophageal retention/stasis (observed on Upper GI Xray from 11/2/22). Recommend GI consult. OK to continue clear liquid diet, recommend upright for all PO, remain upright 60 minutes post PO, single sips at a time, slow rate, consider smaller more frequent intake vs larger cosumption.   Total Session Time   Total Session Time (sum of timed and untimed services) 20

## 2022-12-11 NOTE — PROGRESS NOTES
Requested to place new PIV, existing IV causing frequent downstream occlusion readings, existing IV near a/c.  Dressing removed from existing PIV, catheter found kinked, straightened, flushes easily, patient denies discomfort, redressed.  Primary nursing staff updated.

## 2022-12-11 NOTE — CONSULTS
[facility]    Cardiology Consultation     Leah Yanez MRN#: [id number]   YOB: 1980 Age: 42 year old     Date of Admission:  12/11/2022    Consult Indication:  Pericardial effusion    History of Present Illness     Patient is a pleasant 42-year-old female with a past medical history significant for recent laparoscopic sleeve gastrectomy (10/25/2022), recent hospitalizations for pneumonia, esophageal dysphagia and recent dilatation of esophageal stricture (12/2/2022), Raynaud's, who presents with dyspnea and chest discomfort.    Patient reports that she been doing reasonably well since her recent hospitalizations for pneumonia until Thursday, when she developed significant chest pressure and dyspnea.  Symptoms have been progressive since then and so presented to the ED.  In the ED initial blood pressure was 159/90 mmHg, O2 sat 88%.  CT PE study was negative for PE, however did demonstrate moderate left and small right pleural effusions.  There was a moderate pericardial effusion.    TTE 12/10/2022 demonstrated normal biventricular function, however there was a moderate pericardial effusion up to 1.4 cm, with echocardiographic evidence of early tamponade physiology.  Last TTE 11/3/2022 demonstrated no pericardial effusion.    Labs notable for potassium 3.5, creatinine 0.74, hemoglobin 12.1, platelets 263.    On exam patient appears uncomfortable, dyspneic, complaining of chest pressure.  Heart rate in the 90s to 100 beats per minute range.  O2 sats are in the mid 90s, however she does have Raynaud's and so the waveform on the oximeter is suboptimal.      Assessment & Plan      # Hypoxic respiratory failure, likely multifactorial in etiology however clinical concern is highest for cardiac tamponade as the driving factor, moderate pericardial effusion new from TTE 11/3/2022  # Bilateral pleural effusions, recent hospitalizations for pneumonia  # Complex GI history of gastric  sleeve 10/25/2022 and gastric anastomotic stricture requiring dilatation    - Reviewed echocardiogram findings with my colleague Dr. Sahu with interventional cardiology.  Greatly appreciate assistance/discussion.  - Patient remains uncomfortable with progressive dyspnea and chest pressure, though with normal blood pressure, heart rate in the 90s beats per minute range.  She does have some variability in blood pressure consistent with borderline pulsus paradoxus.  Echocardiogram findings are consistent with early tamponade physiology.  As such, recommend pericardiocentesis.  Discussed with patient and her  (on speaker phone), and they are in agreement.  - Discussed with Dr. Whittington, and Dr. Bowden, appreciate discussion/collaboration    Please do not hesitate to page with any questions or concerns.     Moose Gomez MD, [facility]  Cardiology  December 11, 2022    Voice recognition software utilized.     Critical Care: Total critical care time spent: 40    I spent a total of 40 minutes (excluding procedure time) personally providing and directing critical care services at the bedside and on the unit for this patient.     Past Medical History   Past Medical History:   Diagnosis Date     Anti-cardiolipin antibody positive      Griggs esophagus      Concussion 2016     Depressive disorder, not elsewhere classified 03/01/2006    Resolved 8/07     Gastroesophageal reflux disease with esophagitis      Hiatal hernia      History of pulmonary embolism      Obesity      Raynaud's syndrome     past history of admission for gangrene of one finger       Past Surgical History   Past Surgical History:   Procedure Laterality Date     COMBINED ESOPHAGOSCOPY, GASTROSCOPY, DUODENOSCOPY (EGD), REMOVE ESOPHAGEAL STENT N/A 12/2/2022    Procedure: ESOPHAGOGASTRODUODENOSCOPY, WITH ESOPHAGEAL STENT REMOVAL and Balloon Dialation;  Surgeon: Daniel Aragon MD;  [facility]     ESOPHAGOSCOPY, GASTROSCOPY,  DUODENOSCOPY (EGD), COMBINED N/A 12/29/2021    Procedure: ESOPHAGOGASTRODUODENOSCOPY, WITH BIOPSY;  Surgeon: Esteban Rose DO;  Location: [facility] OR     ESOPHAGOSCOPY, GASTROSCOPY, DUODENOSCOPY (EGD), COMBINED N/A 11/14/2022    Procedure: Upper endoscopy; gastric stricture dilation, interpretation of fluoroscopy nasojejunal feeding tube;  Surgeon: Daniel Aragon MD;  Location: [facility] OR     ESOPHAGOSCOPY, GASTROSCOPY, DUODENOSCOPY (EGD), COMBINED N/A 11/23/2022    Procedure: ESOPHAGOGASTRODUODENOSCOPY (EGD);  Surgeon: Daniel Aragon MD;  Location: [facility] GI     LAPAROSCOPIC GASTRIC SLEEVE N/A 10/25/2022    Procedure: GASTRECTOMY, SLEEVE, LAPAROSCOPIC;  Surgeon: Daniel Aragon MD;  [facility]: [facility] OR     LAPAROSCOPIC HERNIORRHAPHY HIATAL N/A 10/25/2022    Procedure: HERNIORRHAPHY, HIATAL, LAPAROSCOPIC;  Surgeon: Daniel Aragon MD;  Location: [facility] OR     LAPAROSCOPY DIAGNOSTIC (GENERAL) N/A 11/4/2022    Procedure: LAPAROSCOPY, DIAGNOSTIC, BY GENERAL SURGERY, evacuation of abdominl hematoma;  Surgeon: Daniel Aragon MD;  Location: [facility] OR     PICC TRIPLE LUMEN PLACEMENT Left 11/06/2022    51cm (1cm external), Basilic vein     TONSILLECTOMY & ADENOIDECTOMY       ZZC NONSPECIFIC PROCEDURE      T&A as a child     ZZC NONSPECIFIC PROCEDURE      Tubes in ears bilaterally       Prior to Admission Medications   Prior to Admission Medications   Prescriptions Last Dose Informant Patient Reported? Taking?   Calcium Citrate-Vitamin D (CALCIUM CITRATE CHEWY BITE) 500-12.5 MG-MCG CHEW   No No   Sig: Take 1 chew tab by mouth 3 times daily   Patient not taking: Reported on 12/7/2022   Cyanocobalamin (B-12) 500 MCG SUBL   No No   Sig: Place 1 tablet under the tongue daily   Patient not taking: Reported on 12/7/2022   Multiple Vitamins-Iron (MULTIVITAMIN/IRON PO)   Yes No   Sig: Take 1 tablet by mouth daily   Vitamin D3 (CHOLECALCIFEROL) 25 mcg (1000 units) tablet   Yes No   Sig: Take 4 tablets by mouth daily    Patient not taking: Reported on 12/7/2022   buPROPion (WELLBUTRIN XL) 150 MG 24 hr tablet   Yes No   Sig: Take 150 mg by mouth every morning   cyanocobalamin (VITAMIN B-12) 500 MCG tablet   Yes No   Sig: Take 1,000 mcg by mouth daily   Patient not taking: Reported on 12/7/2022   enoxaparin ANTICOAGULANT (LOVENOX) 40 MG/0.4ML syringe   No No   Sig: Inject 0.4 mLs (40 mg) Subcutaneous 2 times daily for 28 days   fish oil-omega-3 fatty acids 1000 MG capsule   Yes No   Sig: Take 2 g by mouth every morning   Patient not taking: Reported on 12/7/2022   hydrOXYzine (ATARAX) 25 MG tablet   No No   Sig: Take 1 tablet (25 mg) by mouth 3 times daily as needed for itching   Patient not taking: Reported on 12/7/2022   hyoscyamine SL (LEVSIN/SL) 0.125 MG sublingual tablet   No No   Sig: Take 1 tablet (0.125 mg) by mouth 4 times daily (before meals and nightly)   hyoscyamine SL (LEVSIN/SL) 0.125 MG sublingual tablet   No No   Sig: Take 1 tablet (0.125 mg) by mouth 4 times daily (before meals and nightly)   methocarbamol (ROBAXIN) 500 MG tablet   No No   Sig: Take 1 tablet (500 mg) by mouth 4 times daily as needed for muscle spasms   Patient not taking: Reported on 12/7/2022   omeprazole (PRILOSEC) 40 MG DR capsule   No No   Sig: Take 1 capsule (40 mg) by mouth 2 times daily   ondansetron (ZOFRAN ODT) 4 MG ODT tab   No No   Sig: Take 1 tablet (4 mg) by mouth every 6 hours as needed for nausea   ondansetron (ZOFRAN ODT) 4 MG ODT tab   No No   Sig: Take 1 tablet (4 mg) by mouth every 6 hours as needed for nausea or vomiting   oxyCODONE (ROXICODONE) 5 MG tablet   No No   Sig: Take 1 tablet (5 mg) by mouth every 6 hours as needed for moderate to severe pain   Patient not taking: Reported on 12/7/2022   scopolamine (TRANSDERM) 1 MG/3DAYS 72 hr patch   No No   Sig: Place 1 patch onto the skin every 72 hours for 30 days   Patient not taking: Reported on 12/7/2022   senna-docusate (SENOKOT-S/PERICOLACE) 8.6-50 MG tablet   No No   Sig:  "Take 2 tablets by mouth daily as needed for constipation (While taking narcotic pain medications.  Stop taking if having loose stools.)   thiamine (B-1) 100 MG tablet   No No   Sig: Take 1 tablet (100 mg) by mouth daily   Patient not taking: Reported on 12/7/2022   traMADol (ULTRAM) 50 MG tablet   No No   Sig: Take 1 tablet (50 mg) by mouth every 6 hours as needed for severe pain (7-10)      Facility-Administered Medications: None     Current Facility-Administered Medications   Medication Dose Route Frequency     buPROPion  150 mg Oral QAM     pantoprazole  40 mg Intravenous Daily with breakfast     piperacillin-tazobactam  3.375 g Intravenous Q6H     sodium chloride (PF)  3 mL Intracatheter Q8H     Current Facility-Administered Medications   Medication Last Rate     lactated ringers 100 mL/hr at 12/11/22 1107     Allergies   Allergies   Allergen Reactions     Azithromycin      Erythromycin GI Disturbance     When \"very young\"       Social History    reports that she has never smoked. She has never used smokeless tobacco. She reports current alcohol use. She reports that she does not use drugs.    Family History   I have reviewed this patient's family history and updated it with pertinent information if needed.  Family History   Problem Relation Age of Onset     Hypertension Mother         arthritis     Heart Disease Father         MI, Lipids     Acute Myocardial Infarction Father      Cancer - colorectal Maternal Grandmother         arthritis     Hypertension Maternal Grandfather         arthritis, macular degeneration     Colon Cancer Paternal Grandmother      Alzheimer Disease Paternal Grandfather      Anesthesia Reaction No family hx of      Clotting Disorder No family hx of           Review of Systems   A comprehensive review of system was performed and is negative other than that noted in the HPI or here.     Physical Exam   Vital Signs with Ranges  Temp:  [98.3  F (36.8  C)-98.6  F (37  C)] 98.3  F (36.8 "  C)  Pulse:  [] 88  Resp:  [26-28] 26  BP: (119-159)/(74-98) 119/74  SpO2:  [86 %-96 %] 92 %  Wt Readings from Last 4 Encounters:   22 106.6 kg (235 lb)   22 106.6 kg (235 lb 0.2 oz)   22 111 kg (244 lb 12.8 oz)   22 125.9 kg (277 lb 8 oz)     No intake/output data recorded.    Vital signs were personally reviewed:  Temperatures:  Current - Temp: 98.3  F (36.8  C); Max - Temp  Av.5  F (36.9  C)  Min: 98.3  F (36.8  C)  Max: 98.6  F (37  C)  Respiration range: Resp  Av  Min: 26  Max: 28  Pulse range: Pulse  Av  Min: 88  Max: 116  Blood pressure range: Systolic (24hrs), Av , Min:119 , Max:159   ; Diastolic (24hrs), Av, Min:74, Max:98    Pulse oximetry range: SpO2  Av.6 %  Min: 86 %  Max: 96 %  No intake or output data in the 24 hours ending 22 1139  235 lbs 0 oz  Body mass index is 31 kg/m .   Body surface area is 2.34 meters squared.    Physical Exam:   General/Constitutional: appears stated age, dyspneic  Head: normocephalic, atraumatic  Eyes - No scleral icterus  Neck: supple, trachea midline  Respiratory: diminished bilatearlly   Cardiovascular: JVP elevated to mid neck at 20', RRR, no BLE edema  Gastrointestinal: no guarding, non-rigid   Neurologic: awake, alert, moves all extremities  Skin: no jaundice, warm on limited exam  Psychiatric: affect is normal, answers questions appropriately, oriented to self and place      Recent Labs   Lab 22  0542 22  0541   WBC  --  9.3   HGB  --  12.1   MCV  --  87   PLT  --  263   NA  --  137   POTASSIUM  --  3.5   CHLORIDE  --  99   CO2  --  23   BUN  --  5.2*   CR  --  0.74   GFRESTIMATED  --  >90   ANIONGAP  --  15   KIMBERLYN  --  9.7   GLC  --  101*   ALBUMIN  --  3.7   PROTTOTAL 7.1 7.0   BILITOTAL  --  0.5   ALKPHOS  --  160*   ALT  --  45*   AST  --  19   LIPASE 44  --      Recent Labs   Lab Test 22  1156   CHOL 186   HDL 37*   *   TRIG 216*     Recent Labs   Lab 22  0541   WBC  9.3   HGB 12.1   HCT 39.2   MCV 87        Recent Labs   Lab 12/11/22  0543   PH 7.36   HCO3V 24     Recent Labs   Lab 12/11/22  0541   NTBNPI 392     No results for input(s): DD in the last 168 hours.  No results for input(s): SED, CRP in the last 168 hours.  Recent Labs   Lab 12/11/22  0541        No results for input(s): TSH in the last 168 hours.  No results for input(s): COLOR, APPEARANCE, URINEGLC, URINEBILI, URINEKETONE, SG, UBLD, URINEPH, PROTEIN, UROBILINOGEN, NITRITE, LEUKEST, RBCU, WBCU in the last 168 hours.    Imaging:  Recent Results (from the past 48 hour(s))   CT Chest Pulmonary Embolism w Contrast    Narrative    EXAM: CT CHEST PULMONARY EMBOLISM W CONTRAST  LOCATION: [facility]  DATE/TIME: 12/11/2022 6:02 AM    INDICATION: Shortness of breath. Hypoxia post gastric bypass surgery on 12/2/2022.  COMPARISON: CT pulmonary angiogram 11/1/2022.   TECHNIQUE: CT chest pulmonary angiogram during arterial phase injection of IV contrast. Multiplanar reformats and MIP reconstructions were performed. Dose reduction techniques were used.   CONTRAST: 73 Isovue-370 IV.    FINDINGS:  ANGIOGRAM CHEST: No pulmonary emboli on either side. Normal caliber thoracic aorta without aneurysm or dissection. No CT evidence of right heart strain.    LUNGS AND PLEURA: Moderate left and small right pleural effusions with associated passive atelectasis in the adjacent lungs, similar to prior. Vague opacities have developed in the lungs which could represent an infectious or an inflammatory process.   Differential diagnostic possibilities would include COVID-19 infection.    MEDIASTINUM/AXILLAE: Normal cardiac size. Moderate pericardial effusion has developed. No suspicious adenopathy. Reflux in the distal esophagus. Trachea is midline.    CORONARY ARTERY CALCIFICATION: None.    UPPER ABDOMEN: Postoperative changes of bariatric surgery. Fatty liver. Dependent gallstones without biliary dilatation  or adjacent inflammation.    MUSCULOSKELETAL: Minor right convex thoracic curve.      Impression    IMPRESSION:  1.  No pulmonary emboli on either side. Moderate left and small right effusions with associated passive atelectasis in the adjacent lungs, unchanged. Vague infiltrates have developed in the lungs, most likely representing an infectious or an inflammatory   process. Differential diagnostic possibilities would include COVID-19 infection.    2.  Normal caliber thoracic aorta without aneurysm or dissection. Normal cardiac size. Moderate pericardial effusion has developed.    3.  Reflux in the distal esophagus. Postoperative changes of bariatric surgery. Fatty liver. Cholelithiasis.    4.  Minor right convex thoracic curve.   POC US ECHO LIMITED    Impression    Limited Bedside Cardiac Ultrasound, performed and interpreted by me.   Indication: Shortness of Breath.  subcostal views were acquired.   Image quality was satisfactory.    Findings:    Abnormal moderate pericardial effusion with no findings of tamponade no right heart collapse    IMPRESSION: Abnormal limited cardiac ultrasound showing .  Moderate pericardial effusion.  No signs of tamponade     Echocardiogram Limited   Result Value    LVEF  60-65%    Narrative    [id number]  [id number]  [id number]  [id number]^[email or web address]^PROSPER     [facility]  Echocardiography Laboratory  [address] [place] [address]     Name: ASIYA PRECIADO  MRN: [id number]  : 1980  Study Date: 2022 09:52 AM  Age: 42 yrs  Gender: Female  Patient Location: [facility]  Reason For Study: Pericardial Effusion  Ordering Physician: PROSPER LOZADA  Performed By: Jacqueline Johnson RDCS     BSA: 2.3 m2  Height: 73 in  Weight: 235 lb  HR: 90  BP: 159/98 mmHg  ______________________________________________________________________________  Procedure  Limited Portable Echo Adult.  ______________________________________________________________________________  Interpretation  Summary     1. Moderate circumferential pericardial effusion, up to 1.4 cm in thickness.  There is echocardiographic evidence of early tamponade. See findings for  further details.  2. Normal LV size with grossly normal LVEF and wall motion.  3. No significant valvular abnormalities.     Compared to the prior study from 11/3/2022, the pericardial effusion is new.     Evaluation for clinical evidence of cardiac tamponade is indicated.  ______________________________________________________________________________  Left Ventricle  The left ventricle is normal in size. Left ventricular systolic function is  normal. The visual ejection fraction is 60-65%. Endomyocardial border is not  well seen. No obvious WMA seen, but cannot be ruled out with current image  quality. Echo contrast would have been helpful.     Right Ventricle  The right ventricle is mildly dilated. Right ventricular function cannot be  assessed due to poor image quality.     Mitral Valve  There is trace mitral regurgitation. There is no mitral valve stenosis.     Tricuspid Valve  There is trace tricuspid regurgitation. IVC diameter >2.1 cm collapsing <50%  with sniff suggests a high RA pressure estimated at 15 mmHg or greater.     Aortic Valve  Normal tricuspid aortic valve. No aortic regurgitation is present. No aortic  stenosis is present.     Pulmonic Valve  The pulmonic valve is not well visualized.     Pericardium  Moderate circumferential pericardial effusion, up to 1.4 cm in thickness. No  apparent loculations. There may be some fibrinous material within the effusion  overlying the RV. There is echocardiographic evidence of early tamponade.  Favoring tamponade, there is a dilated IVC with < 50% respiratory variation,  as well as > 25% variation of the mitral inflow with respiration. However,  tricuspid inflow variation with respiration is minimal, and there is no  evidence of significant diastolic RV collapse. RA is not sufficiently seen  "to  evaluate for RA collapse.  ______________________________________________________________________________  Doppler Measurements & Calculations  TR max onel: 251.5 cm/sec  TR max P.3 mmHg     ______________________________________________________________________________  Report approved by: MD Sourav Trujillo 2022 10:52 AM           Clinically Significant Risk Factors Present on Admission               # Drug Induced Coagulation Defect: home medication list includes an anticoagulant medication    # Obesity: Estimated body mass index is 31 kg/m  as calculated from the following:    Height as of this encounter: 1.854 m (6' 1\").    Weight as of this encounter: 106.6 kg (235 lb).    Cardiovascular: Pericardial Effusion: Cardiac tamponade    "

## 2022-12-11 NOTE — PHARMACY-VANCOMYCIN DOSING SERVICE
"Pharmacy Vancomycin Initial Note  Date of Service 2022  Patient's  1980  42 year old, female    Indication: [facility]-Associated Pneumonia    Current estimated CrCl = Estimated Creatinine Clearance: 137.4 mL/min (based on SCr of 0.74 mg/dL).    Creatinine for last 3 days  2022:  5:41 AM Creatinine 0.74 mg/dL        Vancomycin IV Administrations (past 72 hours)                   vancomycin (VANCOCIN) 2,500 mg in sodium chloride 0.9 % 500 mL intermittent infusion (mg) 2,500 mg New Bag 22 0823                Nephrotoxins and other renal medications (From now, onward)    Start     Dose/Rate Route Frequency Ordered Stop    22 2000  vancomycin (VANCOCIN) 1,250 mg in 0.9% NaCl 250 mL intermittent infusion         1,250 mg  over 90 Minutes Intravenous EVERY 12 HOURS 22 1502      22 1500  piperacillin-tazobactam (ZOSYN) 4.5 g vial to attach to  mL bag        Note to Pharmacy: For [facility], [facility] and [facility]: For Zosyn-naive patients, use the \"Zosyn initial dose + extended infusion\" order panel.    4.5 g  over 30 Minutes Intravenous EVERY 6 HOURS 22 1454 22 1459        [employer] Prediction of Planned Initial Vancomycin Regimen  Regimen: 1250 mg IV every 12 hours.  Start time: 16:00 on 2022  Exposure target: AUC24 (range)400-600 mg/L.hr   AUC24,ss: 464 mg/L.hr  Probability of AUC24 > 400: 65 %  Ctrough,ss: 14.4 mg/L  Probability of Ctrough,ss > 20: 25 %  Probability of nephrotoxicity (Lodise NATALIE ): 10 %          Plan:  1. Start vancomycin  1250 mg IV q12h.  2500mg load already given  2. Vancomycin monitoring method: AUC  3. Vancomycin therapeutic monitoring goal: 400-600 mg*h/L  4. Pharmacy will check vancomycin levels as appropriate in 1-3 Days.    5. Serum creatinine levels will be ordered every 48 hours.      Leobardo Butler [facility]    "

## 2022-12-11 NOTE — H&P
[facility]    History and Physical - Hospitalist Service       Date of Admission:  12/11/2022    Assessment & Plan      Leah Yanez is a 42 year old female with history of depression, GERD, PE, renal syndrome transferred from [facility] on 12/11/2022 with acute hypoxic failure secondary to bilateral pleural effusion and pericardial effusion/cardiac tamponade    #Moderate pericardial effusion with pericardial tamponade  -Appreciate emergent pericardiocentesis and drain placement by interventional cardiology today. (See their procedure note for details)  -Monitor closely and repeat echocardiogram tomorrow.  Further management as per cardiology team  -Follow-up on the fluid studies    #Bilateral pleural effusions  -We will get thoracentesis to better evaluate the etiology (she had bilateral HCAP in early November)   -Considering her complicated recent medical course, we will rule out sepsis.  Follow-up on sputum culture, blood culture, fluid cultures.  -Continue empiric vancomycin and ceftriaxone for now    # Chest pain: Admission CT negative for PE.  Differential being pleurisy from pneumonia and effusion  Admission troponin of 22 (EKG did not show any evidence of pericarditis.  Does have some T wave inversion in lateral leads).  We will recheck troponin (although may be elevated due to pericardial drain placement).  We will trend  -Other differential being radiated pain from her postsurgical complications causing reflux, esophagitis.  We will have surgery team review patient    #Status post laparoscopic sleeve gastrectomy  #Gastric anastomotic stricture and globus sensation  -underwent laparoscopic sleeve gastrectomy and hiatal hernia repair on 10/25/22 with Dr. Aragon.  Patient was hospitalized about a week ago with [facility], had EGD performed on 12/1 and esophageal stenting and balloon dilation performed in 12/2  -We will get bariatric surgery review  "tomorrow. Currently abdomen is soft, nontender.  If any clinical change, she will need abdominal imaging as well (her pain could be radiated)  - SLP    #Cholelithiasis-incidental on CT.  Monitor LFT     Diet: Advance Diet as Tolerated: Clear Liquid Diet    DVT Prophylaxis: Pneumatic Compression Devices (she was on enoxaparin 40 mg twice daily since her surgery: Will hold for now, as status post pericardial drain placement today and needs thoracentesis.  May need to reevaluate after that)   Holder Catheter: Not present  Central Lines: None  Cardiac Monitoring: ACTIVE order. Indication: Post- PCI/Angiogram (24 hours)  Code Status:   Full     Clinically Significant Risk Factors Present on Admission               # Drug Induced Coagulation Defect: home medication list includes an anticoagulant medication      # Acute Respiratory Failure: Documented O2 saturation < 91%.  Continue supplemental oxygen as needed     # Obesity: Estimated body mass index is 31 kg/m  as calculated from the following:    Height as of an earlier encounter on 12/11/22: 1.854 m (6' 1\").    Weight as of an earlier encounter on 12/11/22: 106.6 kg (235 lb).           Disposition Plan      Expected Discharge Date: 12/13/2022                The patient's care was discussed with the Bedside Nurse, Patient, Patient's Family and Cardiology Team.    Pradeep Milner MD, MD  Hospitalist Service  [facility]  Securely message with the Vocera Web Console (learn more here)  Text page via [employer] Paging/Directory     \"This dictation was performed with voice recognition software and may contain errors,  omissions and inadvertent word substitution.\"    ______________________________________________________________________    Chief Complaint   SOb, chest pain     History is obtained from the patient, patient's  and the review of EMR    History of Present Illness   Leah Yanez is a 42 year old female with history of depression, GERD, " PE, renal syndrome transferred from [facility] on 12/11/2022 with acute hypoxic failure secondary to bilateral pleural effusion and pericardial effusion/cardiac tamponade    As per patient (and review of EMR)  Patient initially had a complicated course from 11/1-11/16 after her laparoscopic sleeve gastrectomy and hiatal hernia repair on 10/25/2022 with Dr. Aragon.  Postoperatively complicated by acute blood loss anemia, dysphagia and pain issues but then was readmitted with bilateral lower lobe pneumonia and hypoxemia and intra-abdominal hematoma requiring exploratory laparoscopy 11/4 and evacuation of hematoma and NOEL drain placement.  She was also intubated and required brief ICU stay.  Due to the persistent dysphagia she underwent EGD that showed gastric stricture and dilatation was performed 11/14.  She returns for EGD and stent placement and gastric stricture on 12/01 but the stent had to be removed on postop day 1 due to significant pain.    Since Thursday she has been having severe pain in the center of her chest radiating to the left side and shoulder.  It has been associated with worsening shortness of breath so presented to [facility] today.  It is also associated with chills and rigors  In the [facility] she was found to have bilateral pleural effusion and concern for pericardial tamponade, so she was transferred to [facility] for emergent pericardiocentesis.  Currently patient seen in CCU: Still complaining of severe pain in the lower chest with radiation to the shoulder.  Having chills  Denies any dysuria frequency of urination  Had last bowel movement 2 days ago and passed some gas yesterday.    Review of Systems    The 10 point Review of Systems is negative other than noted in the HPI or here.     Past Medical History    I have reviewed this patient's medical history and updated it with pertinent information if needed.   Past Medical History:   Diagnosis Date      Anti-cardiolipin antibody positive      Griggs esophagus      Concussion 2016     Depressive disorder, not elsewhere classified 03/01/2006    Resolved 8/07     Gastroesophageal reflux disease with esophagitis      Hiatal hernia      History of pulmonary embolism      Obesity      Raynaud's syndrome     past history of admission for gangrene of one finger       Past Surgical History   I have reviewed this patient's surgical history and updated it with pertinent information if needed.  Past Surgical History:   Procedure Laterality Date     COMBINED ESOPHAGOSCOPY, GASTROSCOPY, DUODENOSCOPY (EGD), REMOVE ESOPHAGEAL STENT N/A 12/2/2022    Procedure: ESOPHAGOGASTRODUODENOSCOPY, WITH ESOPHAGEAL STENT REMOVAL and Balloon Dialation;  Surgeon: Daniel Aragon MD;  Location: [facility] OR     ESOPHAGOSCOPY, GASTROSCOPY, DUODENOSCOPY (EGD), COMBINED N/A 12/29/2021    Procedure: ESOPHAGOGASTRODUODENOSCOPY, WITH BIOPSY;  Surgeon: Esteban Rose DO;  Location: [facility] OR     ESOPHAGOSCOPY, GASTROSCOPY, DUODENOSCOPY (EGD), COMBINED N/A 11/14/2022    Procedure: Upper endoscopy; gastric stricture dilation, interpretation of fluoroscopy nasojejunal feeding tube;  Surgeon: Daniel Aragon MD;  Location: [facility] OR     ESOPHAGOSCOPY, GASTROSCOPY, DUODENOSCOPY (EGD), COMBINED N/A 11/23/2022    Procedure: ESOPHAGOGASTRODUODENOSCOPY (EGD);  Surgeon: Daniel Aragon MD;  Location: [facility] GI     LAPAROSCOPIC GASTRIC SLEEVE N/A 10/25/2022    Procedure: GASTRECTOMY, SLEEVE, LAPAROSCOPIC;  Surgeon: Daniel Aragon MD;  Location: [facility] OR     LAPAROSCOPIC HERNIORRHAPHY HIATAL N/A 10/25/2022    Procedure: HERNIORRHAPHY, HIATAL, LAPAROSCOPIC;  Surgeon: Daniel Aragon MD;  Location: [facility] OR     LAPAROSCOPY DIAGNOSTIC (GENERAL) N/A 11/4/2022    Procedure: LAPAROSCOPY, DIAGNOSTIC, BY GENERAL SURGERY, evacuation of abdominl hematoma;  Surgeon: Daniel Aragon MD;  Location: [facility] OR     PICC TRIPLE LUMEN PLACEMENT Left 11/06/2022    51cm  (1cm external), Basilic vein     TONSILLECTOMY & ADENOIDECTOMY       [facility] NONSPECIFIC PROCEDURE      T&A as a child     [facility] NONSPECIFIC PROCEDURE      Tubes in ears bilaterally       Social History   I have reviewed this patient's social history and updated it with pertinent information if needed.  Social History     Tobacco Use     Smoking status: Never     Smokeless tobacco: Never   Vaping Use     Vaping Use: Never used   Substance Use Topics     Alcohol use: Yes     Comment: Alcoholic Drinks/day: social     Drug use: No       Family History   I have reviewed this patient's family history and updated it with pertinent information if needed.  Family History   Problem Relation Age of Onset     Hypertension Mother         arthritis     Heart Disease Father         MI, Lipids     Acute Myocardial Infarction Father      Cancer - colorectal Maternal Grandmother         arthritis     Hypertension Maternal Grandfather         arthritis, macular degeneration     Colon Cancer Paternal Grandmother      Alzheimer Disease Paternal Grandfather      Anesthesia Reaction No family hx of      Clotting Disorder No family hx of        Prior to Admission Medications   Prior to Admission Medications   Prescriptions Last Dose Informant Patient Reported? Taking?   Calcium Citrate-Vitamin D (CALCIUM CITRATE CHEWY BITE) 500-12.5 MG-MCG CHEW   No No   Sig: Take 1 chew tab by mouth 3 times daily   Patient not taking: Reported on 12/7/2022   Cyanocobalamin (B-12) 500 MCG SUBL   No No   Sig: Place 1 tablet under the tongue daily   Patient not taking: Reported on 12/7/2022   Multiple Vitamins-Iron (MULTIVITAMIN/IRON PO)   Yes No   Sig: Take 1 tablet by mouth daily   Vitamin D3 (CHOLECALCIFEROL) 25 mcg (1000 units) tablet   Yes No   Sig: Take 4 tablets by mouth daily   Patient not taking: Reported on 12/7/2022   buPROPion (WELLBUTRIN XL) 150 MG 24 hr tablet   Yes No   Sig: Take 150 mg by mouth every morning   cyanocobalamin (VITAMIN B-12) 500 MCG  tablet   Yes No   Sig: Take 1,000 mcg by mouth daily   Patient not taking: Reported on 12/7/2022   enoxaparin ANTICOAGULANT (LOVENOX) 40 MG/0.4ML syringe   No No   Sig: Inject 0.4 mLs (40 mg) Subcutaneous 2 times daily for 28 days   fish oil-omega-3 fatty acids 1000 MG capsule   Yes No   Sig: Take 2 g by mouth every morning   Patient not taking: Reported on 12/7/2022   hydrOXYzine (ATARAX) 25 MG tablet   No No   Sig: Take 1 tablet (25 mg) by mouth 3 times daily as needed for itching   Patient not taking: Reported on 12/7/2022   hyoscyamine SL (LEVSIN/SL) 0.125 MG sublingual tablet   No No   Sig: Take 1 tablet (0.125 mg) by mouth 4 times daily (before meals and nightly)   hyoscyamine SL (LEVSIN/SL) 0.125 MG sublingual tablet   No No   Sig: Take 1 tablet (0.125 mg) by mouth 4 times daily (before meals and nightly)   methocarbamol (ROBAXIN) 500 MG tablet   No No   Sig: Take 1 tablet (500 mg) by mouth 4 times daily as needed for muscle spasms   Patient not taking: Reported on 12/7/2022   omeprazole (PRILOSEC) 40 MG DR capsule   No No   Sig: Take 1 capsule (40 mg) by mouth 2 times daily   ondansetron (ZOFRAN ODT) 4 MG ODT tab   No No   Sig: Take 1 tablet (4 mg) by mouth every 6 hours as needed for nausea   ondansetron (ZOFRAN ODT) 4 MG ODT tab   No No   Sig: Take 1 tablet (4 mg) by mouth every 6 hours as needed for nausea or vomiting   oxyCODONE (ROXICODONE) 5 MG tablet   No No   Sig: Take 1 tablet (5 mg) by mouth every 6 hours as needed for moderate to severe pain   Patient not taking: Reported on 12/7/2022   scopolamine (TRANSDERM) 1 MG/3DAYS 72 hr patch   No No   Sig: Place 1 patch onto the skin every 72 hours for 30 days   Patient not taking: Reported on 12/7/2022   senna-docusate (SENOKOT-S/PERICOLACE) 8.6-50 MG tablet   No No   Sig: Take 2 tablets by mouth daily as needed for constipation (While taking narcotic pain medications.  Stop taking if having loose stools.)   thiamine (B-1) 100 MG tablet   No No   Sig:  "Take 1 tablet (100 mg) by mouth daily   Patient not taking: Reported on 12/7/2022   traMADol (ULTRAM) 50 MG tablet   No No   Sig: Take 1 tablet (50 mg) by mouth every 6 hours as needed for severe pain (7-10)      Facility-Administered Medications: None     Allergies   Allergies   Allergen Reactions     Azithromycin      Erythromycin GI Disturbance     When \"very young\"       Physical Exam   Vital Signs:           Resp: 16   O2 Device: Nasal cannula    Weight: 0 lbs 0 oz    Constitutional: awake, alert, cooperative, no apparent distress, and appears stated age  Eyes: Lids and lashes normal, pupils equal, round, extra ocular muscles intact, sclera clear, conjunctiva normal  ENT: normocepalic, without obvious abnormality  Hematologic / Lymphatic: no cervical lymphadenopathy  Respiratory: Tachypnea.  Decreased both bases .  No gross crackles or wheeze anteriorly  Cardiovascular: normal S1 and S2.  Tachycardia  GI: Soft, nontender  Genitounirinary: not examined  Skin: no bruising or bleeding  Musculoskeletal: no lower extremity pitting edema present  Neurologic: Awake, alert, oriented to name, place and time.    Neuropsychiatric: General: normal, calm and normal eye contact    Data   Data reviewed today: I reviewed all medications, new labs and imaging results over the last 24 hours. I personally reviewed the chest CT image(s) showing as below.    Recent Results (from the past 24 hour(s))   CT Chest Pulmonary Embolism w Contrast    Narrative    EXAM: CT CHEST PULMONARY EMBOLISM W CONTRAST  LOCATION: [facility]  DATE/TIME: 12/11/2022 6:02 AM    INDICATION: Shortness of breath. Hypoxia post gastric bypass surgery on 12/2/2022.  COMPARISON: CT pulmonary angiogram 11/1/2022.   TECHNIQUE: CT chest pulmonary angiogram during arterial phase injection of IV contrast. Multiplanar reformats and MIP reconstructions were performed. Dose reduction techniques were used.   CONTRAST: 73 Isovue-370 " IV.    FINDINGS:  ANGIOGRAM CHEST: No pulmonary emboli on either side. Normal caliber thoracic aorta without aneurysm or dissection. No CT evidence of right heart strain.    LUNGS AND PLEURA: Moderate left and small right pleural effusions with associated passive atelectasis in the adjacent lungs, similar to prior. Vague opacities have developed in the lungs which could represent an infectious or an inflammatory process.   Differential diagnostic possibilities would include COVID-19 infection.    MEDIASTINUM/AXILLAE: Normal cardiac size. Moderate pericardial effusion has developed. No suspicious adenopathy. Reflux in the distal esophagus. Trachea is midline.    CORONARY ARTERY CALCIFICATION: None.    UPPER ABDOMEN: Postoperative changes of bariatric surgery. Fatty liver. Dependent gallstones without biliary dilatation or adjacent inflammation.    MUSCULOSKELETAL: Minor right convex thoracic curve.      Impression    IMPRESSION:  1.  No pulmonary emboli on either side. Moderate left and small right effusions with associated passive atelectasis in the adjacent lungs, unchanged. Vague infiltrates have developed in the lungs, most likely representing an infectious or an inflammatory   process. Differential diagnostic possibilities would include COVID-19 infection.    2.  Normal caliber thoracic aorta without aneurysm or dissection. Normal cardiac size. Moderate pericardial effusion has developed.    3.  Reflux in the distal esophagus. Postoperative changes of bariatric surgery. Fatty liver. Cholelithiasis.    4.  Minor right convex thoracic curve.   POC US ECHO LIMITED    Impression    Limited Bedside Cardiac Ultrasound, performed and interpreted by me.   Indication: Shortness of Breath.  subcostal views were acquired.   Image quality was satisfactory.    Findings:    Abnormal moderate pericardial effusion with no findings of tamponade no right heart collapse    IMPRESSION: Abnormal limited cardiac ultrasound showing .   Moderate pericardial effusion.  No signs of tamponade     Echocardiogram Limited   Result Value    LVEF  60-65%    [facility]    [id number]  [id number]  [id number]  [id number]^KIERRA^PROSPER     [facility]  Echocardiography Laboratory  [address] [place] [address]     Name: ASIYA PRECIADO  MRN: [id number]  : 1980  Study Date: 2022 09:52 AM  Age: 42 yrs  Gender: Female  Patient [facility]: [facility]  Reason For Study: Pericardial Effusion  Ordering Physician: PROSPER LOZADA  Performed By: Jacqueline Johnson RDCS     BSA: 2.3 m2  Height: 73 in  Weight: 235 lb  HR: 90  BP: 159/98 mmHg  ______________________________________________________________________________  Procedure  Limited Portable Echo Adult.  ______________________________________________________________________________  Interpretation Summary     1. Moderate circumferential pericardial effusion, up to 1.4 cm in thickness.  There is echocardiographic evidence of early tamponade. See findings for  further details.  2. Normal LV size with grossly normal LVEF and wall motion.  3. No significant valvular abnormalities.     Compared to the prior study from 11/3/2022, the pericardial effusion is new.     Evaluation for clinical evidence of cardiac tamponade is indicated.  ______________________________________________________________________________  Left Ventricle  The left ventricle is normal in size. Left ventricular systolic function is  normal. The visual ejection fraction is 60-65%. Endomyocardial border is not  well seen. No obvious WMA seen, but cannot be ruled out with current image  quality. Echo contrast would have been helpful.     Right Ventricle  The right ventricle is mildly dilated. Right ventricular function cannot be  assessed due to poor image quality.     Mitral Valve  There is trace mitral regurgitation. There is no mitral valve stenosis.     Tricuspid Valve  There is trace tricuspid regurgitation. IVC diameter >2.1 cm  collapsing <50%  with sniff suggests a high RA pressure estimated at 15 mmHg or greater.     Aortic Valve  Normal tricuspid aortic valve. No aortic regurgitation is present. No aortic  stenosis is present.     Pulmonic Valve  The pulmonic valve is not well visualized.     Pericardium  Moderate circumferential pericardial effusion, up to 1.4 cm in thickness. No  apparent loculations. There may be some fibrinous material within the effusion  overlying the RV. There is echocardiographic evidence of early tamponade.  Favoring tamponade, there is a dilated IVC with < 50% respiratory variation,  as well as > 25% variation of the mitral inflow with respiration. However,  tricuspid inflow variation with respiration is minimal, and there is no  evidence of significant diastolic RV collapse. RA is not sufficiently seen to  evaluate for RA collapse.  ______________________________________________________________________________  Doppler Measurements & Calculations  TR max onel: 251.5 cm/sec  TR max P.3 mmHg     ______________________________________________________________________________  Report approved by: MD Sourav Trujillo 2022 10:52 AM         Cardiac Catheterization    Narrative    1.  Successful pericardiocentesis via apical approach.  160 ml of   straw-colored fluid was removed; a sample of which was sent to the lab for   analysis.   Echocardiogram Limited    Narrative    [id number]  [id number]  [id number]  [id number]^NOREEN^SOURAV^ELIDA     [facility]  Echocardiography Laboratory  [address] [place], [place] [address]     Name: ASIYA PRECIADO  MRN: [id number]  : 1980  Study Date: 2022 12:32 PM  Age: 42 yrs  Gender: Female  Patient Location: [facility]  Reason For Study: Pericardial Effusion  Ordering Physician: SOURAV SORTO  Performed By: Ivan Rasmussen     BSA: 2.3 m2  Height: 73 in  Weight: 235 lb  BP: 119/74  mmHg  ______________________________________________________________________________  Procedure  Limited Portable Echo Adult.  ______________________________________________________________________________  Interpretation Summary     Limited intra-procedural study.     Pre-procedure: Small-moderate circumferential effusion. See full TTE earlier  today for echocardiographic assessment of tamponade.     Post-procedure: 160 cc of straw-colored fluid removed. No detectable residual  effusion is seen.  ______________________________________________________________________________  ______________________________________________________________________________  Report approved by: MD Sourav Trujillo 12/11/2022 01:48 PM     ______________________________________________________________________________        [facility]   Lab 12/11/22  0541      POTASSIUM 3.5   CHLORIDE 99   KIMBERLYN 9.7   CO2 23   BUN 5.2*   CR 0.74   *     CBC  Recent Labs   Lab 12/11/22  0541   WBC 9.3   RBC 4.51   HGB 12.1   HCT 39.2   MCV 87   MCH 26.8   MCHC 30.9*   RDW 18.8*        INRNo lab results found in last 7 days.  LFTs  Recent Labs   Lab 12/11/22  0542 12/11/22  0541   ALKPHOS  --  160*   AST  --  19   ALT  --  45*   BILITOTAL  --  0.5   PROTTOTAL 7.1 7.0   ALBUMIN  --  3.7      PANC  Recent Labs   Lab 12/11/22  0542   LIPASE 44     Inflammatory Markers  Recent Labs   Lab Test 11/06/22  0616 11/01/22  1734   .00* 324.00*

## 2022-12-11 NOTE — PROGRESS NOTES
EP- Cardiology Progress Note           Assessment and Plan:     41 yo F with Hx of recent laparoscopic sleeve gastrectomy (10/25/2022), esophageal stricture diltation (12/2/2022), and recent PNA, who p/w pericardial tamponade.     She underwent pericardiocentesis (160 cc of straw-colored fluid removed) with no residual effusion after procedure.      Plan:  1.  Pericardial tamponade.  Resolved. She is hemodynamic stable. Continue pericardial drain.  Repeat limited echo tomorrow       Raymond Hernández MD      Physical Exam:  Vitals: Resp 16   LMP 11/14/2022 (Approximate)     No intake or output data in the 24 hours ending 12/11/22 1430  There were no vitals filed for this visit.                           Review of Systems:   As per subjective, otherwise 5 systems reviewed and negative.         Medications:         pantoprazole  40 mg Intravenous Daily with breakfast    sodium chloride (PF)  3 mL Intracatheter Q8H     PRN Meds: sodium chloride 0.9%, acetaminophen **OR** acetaminophen, atropine, fentaNYL, flumazenil, HOLD MEDICATION, HYDROmorphone, lidocaine 4%, lidocaine (buffered or not buffered), melatonin, midazolam, naloxone **OR** naloxone **OR** naloxone **OR** naloxone, ondansetron **OR** ondansetron, oxyCODONE **OR** oxyCODONE, oxyCODONE IR, sodium chloride (PF)             Data:     Recent Labs   Lab 12/11/22  0542 12/11/22  0541   WBC  --  9.3   HGB  --  12.1   MCV  --  87   PLT  --  263   NA  --  137   POTASSIUM  --  3.5   CHLORIDE  --  99   CO2  --  23   BUN  --  5.2*   CR  --  0.74   ANIONGAP  --  15   KIMBERLYN  --  9.7   GLC  --  101*   ALBUMIN  --  3.7   PROTTOTAL 7.1 7.0   BILITOTAL  --  0.5   ALKPHOS  --  160*   ALT  --  45*   AST  --  19   LIPASE 44  --

## 2022-12-11 NOTE — ED PROVIDER NOTES
History   Chief Complaint:  Shortness of Breath and Chest Pain      HPI   Leah Yanez is a 42 year old female with history of a PE currently on Lovenox who presents for evaluation of shortness of breath and chest pain. The patient had a laparoscopic sleeve gastrectomy performed on 10/25/22. She was admitted to the hospital between 11/1 and 11/16 for hospital acquired pneumonia and she had a laparoscopy performed on 11/4, a gastric stricture dilation performed on 11/14, and an EGD performed on 11/21. She was admitted between 11/30 and 12/2 and had a stent placement at gastric stricture on 11/30 and stent removal on 12/2. She was seen in the ED at [place] for new chest pain at which time she had a chest X-ray that showed some pleural effusion. Since then she has developed increased chest pain that is worse with deep breathing, new shortness of breath, and palpitations. She reports that her room air oxygen saturations at home have been as low as 83%. Due to concern for this she came into the ED for evaluation. She has not had any fever or cough. She is currently on Lovenox.     Review of Systems   Constitutional: Negative for fever.   Respiratory: Positive for shortness of breath. Negative for cough.    Cardiovascular: Positive for chest pain and palpitations.   All other systems reviewed and are negative.      Allergies:  Azithromycin  Erythromycin     Medications:  Wellbutrin XL   Lovenox  Hydroxyzine  Hyoscyamine   Robaxin  Omeprazole  Zofran ODT  Oxycodone  Scopolamine  Senna-docusate  Thiamine  Tramadol    Lovenox     Past Medical History:     Esophageal dysphagia  Pulmonary embolism  Raynaud's phenomenon   GERD   Hiatal hernia  Griggs's esophagus     Past Surgical History:    Combined EGD remove esophageal stent  EGD combined  Laparoscopic gastric sleeve   Laparoscopic hiatal herniorrhaphy  Laparoscopy diagnostic   Tonsillectomy and adenoidectomy  PE tubes     Family History:    Hypertension -  Mother  Heart disease - Father    Social History:  Marital status:   The patient presents to the ED accompanied by her .   PCP: Susan - [facility]     Physical Exam     Patient Vitals for the past 24 hrs:   BP Temp Pulse Resp SpO2 Weight   12/11/22 0642 -- -- -- -- -- 106.6 kg (235 lb)   12/11/22 0546 -- -- -- -- 91 % --   12/11/22 0544 -- -- -- -- (!) 86 % --   12/11/22 0543 -- -- -- -- (!) 86 % --   12/11/22 0542 -- -- -- -- 95 % --   12/11/22 0541 -- -- -- -- 93 % --   12/11/22 0539 -- -- -- -- 92 % --   12/11/22 0538 -- -- -- -- (!) 87 % --   12/11/22 0511 -- -- -- -- 96 % --   12/11/22 0510 (!) 159/98 98.6  F (37  C) 116 28 (!) 88 % --       Physical Exam  Constitutional:  Oriented to person, place, and time. Minor respiratory distress.   HENT:   Head:    Normocephalic.   Mouth/Throat:   Oropharynx is clear and moist.   Eyes:    EOM are normal. Pupils are equal, round, and reactive to light.   Neck:    Neck supple.   Cardiovascular:  Normal rate, regular rhythm and normal heart sounds.      Exam reveals no gallop and no friction rub.       No murmur heard.  Pulmonary/Chest:  Minor respiratory distress. Mild increased work of breathing.   No wheezes. No rales.      No reproducible chest wall pain.  Abdominal:   Soft. No distension. No tenderness. No rebound and no guarding.   Musculoskeletal:  Normal range of motion. 2+ distal equal pulses.  No leg calf tenderness, swelling or edema.   Neurological:   Alert and oriented to person, place, and time.           Moves all 4 extremities spontaneously    Skin:    No rash noted. No pallor.       Emergency Department Course   ECG  ECG taken at 5:37:55, ECG read at 0539  Sinus tachycardia, Nonspecific T wave abnormality, Abnormal ECG   Rate 113 bpm. TX interval 130 ms. QRS duration 82 ms. QT/QTc 308/422 ms. P-R-T axes 14 28 -15.      Imaging:                                         CT Chest Pulmonary Embolism w Contrast   Final Result    IMPRESSION:   1.  No pulmonary emboli on either side. Moderate left and small right effusions with associated passive atelectasis in the adjacent lungs, unchanged. Vague infiltrates have developed in the lungs, most likely representing an infectious or an inflammatory    process. Differential diagnostic possibilities would include COVID-19 infection.      2.  Normal caliber thoracic aorta without aneurysm or dissection. Normal cardiac size. Moderate pericardial effusion has developed.      3.  Reflux in the distal esophagus. Postoperative changes of bariatric surgery. Fatty liver. Cholelithiasis.      4.  Minor right convex thoracic curve.      US Thoracentesis    (Results Pending)   Report per radiology    Laboratory:  Labs Ordered and Resulted from Time of ED Arrival to Time of ED Departure   COMPREHENSIVE METABOLIC PANEL - Abnormal       Result Value    Sodium 137      Potassium 3.5      Chloride 99      Carbon Dioxide (CO2) 23      Anion Gap 15      Urea Nitrogen 5.2 (*)     Creatinine 0.74      Calcium 9.7      Glucose 101 (*)     Alkaline Phosphatase 160 (*)     AST 19      ALT 45 (*)     Protein Total 7.0      Albumin 3.7      Bilirubin Total 0.5      GFR Estimate >90     TROPONIN T, HIGH SENSITIVITY - Abnormal    Troponin T, High Sensitivity 22 (*)    CBC WITH PLATELETS AND DIFFERENTIAL - Abnormal    WBC Count 9.3      RBC Count 4.51      Hemoglobin 12.1      Hematocrit 39.2      MCV 87      MCH 26.8      MCHC 30.9 (*)     RDW 18.8 (*)     Platelet Count 263      % Neutrophils 70      % Lymphocytes 19      % Monocytes 10      % Eosinophils 0      % Basophils 0      % Immature Granulocytes 1      NRBCs per 100 WBC 0      Absolute Neutrophils 6.5      Absolute Lymphocytes 1.7      Absolute Monocytes 0.9      Absolute Eosinophils 0.0      Absolute Basophils 0.0      Absolute Immature Granulocytes 0.1      Absolute NRBCs 0.0     ISTAT GASES LACTATE VENOUS POCT - Abnormal    Lactic Acid POCT 0.7       Bicarbonate Venous POCT 24      O2 Sat, Venous POCT 38 (*)     pCO2V Venous POCT 41      pH Venous POCT 7.36      pO2 Venous POCT 23 (*)    NT PROBNP INPATIENT - Normal    N terminal Pro BNP Inpatient 392     HCG QUALITATIVE PREGNANCY - Normal    hCG Serum Qualitative Negative     COVID-19 VIRUS (CORONAVIRUS) BY PCR   LIPASE   GLUCOSE FLUID   LACTATE DEHYDROGENASE FLUID   LACTATE DEHYDROGENASE   PROTEIN TOTAL   PROTEIN FLUID   BLOOD CULTURE   BLOOD CULTURE   AEROBIC BACTERIAL CULTURE ROUTINE   MRSA MSSA PCR, NASAL SWAB   CELL COUNT WITH DIFFERENTIAL FLUID      Procedures:  POC US ECHO LIMITED   Final Result   Limited Bedside Cardiac Ultrasound, performed and interpreted by me.    Indication: Shortness of Breath.   subcostal views were acquired.    Image quality was satisfactory.      Findings:     Abnormal moderate pericardial effusion with no findings of tamponade no right heart collapse      IMPRESSION: Abnormal limited cardiac ultrasound showing .  Moderate pericardial effusion.  No signs of tamponade       Emergency Department Course:     Reviewed:  I reviewed nursing notes, vitals and past medical history    Assessments:  0537: I obtained history and examined the patient as noted above.   0625: I rechecked the patient and explained findings.   A POC US was performed as outlined in the procedure note above.  The patient tolerated well and there were no complications.     Consults:  0634: I spoke with Dr. Castillo of the hospitalist service regarding patient's presentation, findings, and plan of care.     Interventions:  Medications   piperacillin-tazobactam (ZOSYN) intermittent infusion 4.5 g (has no administration in time range)   0.9% sodium chloride BOLUS (0 mLs Intravenous Stopped 12/11/22 0642)   iopamidol (ISOVUE-370) solution 500 mL (73 mLs Intravenous Given 12/11/22 0552)        Disposition:  The patient was admitted to the hospital under the care of Dr. Castillo.     Impression & Plan           Medical Decision Makin-year-old female with prolonged stay in October post gastric bypass.  She is here with increasing shortness of breath and chest pain.  Differential includes pneumonia, pulmonary embolism, pleural effusion, or other causes.  Work-up and CT show bilateral pleural effusions and infiltrates suggesting pneumonia.  She is otherwise comfortable on 2 L nasal cannula and vitally stable.  CT showed moderate pericardial effusion therefore bedside ultrasound was performed with no signs of tamponade please see procedure note.  Antibiotics have been started for hospital-acquired pneumonia and patient will be admitted for further monitoring and work-up.      Covid-19  Leah Yanez was evaluated during a global COVID-19 pandemic, which necessitated consideration that the patient might be at risk for infection with the SARS-CoV-2 virus that causes COVID-19.   Applicable protocols for evaluation were followed during the patient's care.   COVID-19 was considered as part of the patient's evaluation. The plan for testing is:  a test was obtained during this visit.      Diagnosis:    ICD-10-CM    1. Pleural effusion  J90       2. Pneumonia due to infectious organism, unspecified laterality, unspecified part of lung  J18.9       3. Hypoxia  R09.02       4. Pericardial effusion  I31.39           Scribe Disclosure:  Som RICHARDSON, am serving as a scribe at 5:34 AM on 2022 to document services personally performed by Hernando Bauer MD based on my observations and the provider's statements to me.           Hernando Bauer MD  [phone]

## 2022-12-11 NOTE — DISCHARGE SUMMARY
"[facility]    Discharge Summary  Hospitalist    Date of Admission:  12/11/2022  Date of Discharge:  12/11/2022  Discharging Provider: Juan Whittington MD  Date of Service (when I saw the patient): 12/11/22    Discharge Diagnoses   #Pericardial effusion with signs of early tamponade  #Acute hypoxemic failure secondary to suspected aspiration pneumonia  #Dysphagia and odynophagia  #Pleural effusion  #Obesity status post laparoscopic sleeve gastrectomy  #History of hospital-acquired pneumonia    History of Present Illness   \"Ms. Leah Yanez is a 42 year old female admitted on 12/11/2022 with acute hypoxic respiratory failure, found to have bilateral pleural effusions with concern for possible pneumonia.      Patient presenting with couple days of worsening shortness of breath in addition to low-grade fevers. Denies any chest pain, pressure or palpitations. She has no abdominal pain, and reports that her nausea and vomiting have improved. She has a complex GI history as outlined below.  She reports that since the surgery she has had a very hard time with eating and drinking and reports that she has a globus sensation.  Aside from that she does not describe other new symptoms and has not had any sick contacts or other exposures lately.     Of note, she underwent laparoscopic sleeve gastrectomy and hiatal hernia repair on 10/25/22 with Dr. Aragon.  Patient was hospitalized about a week ago with [facility], had EGD performed on 12/1 and esophageal stenting and balloon dilation performed in 12/2. Since the procedure she reports that she has been having a globus sensation and having difficulty eating and drinking.  Although in the last week is active and able to keep down some food.\"    Hospital Course   Leah Yanez was admitted on 12/11/2022.  The following problems were addressed during her hospitalization:    #Pericardial Effusion with signs of early tamponade: Patient had bedside " echocardiogram done in the ER that showed moderate pericardial effusion but no clear evidence of tamponade.  Formal echocardiogram was completed on the a.m. of 12/11-did show evidence of early tamponade.  In the setting of patient having shortness of breath, chest discomfort cardiology was consulted.  Decision was made to activate Cath Lab for pericardial effusion and transferred to [facility].  She will transfer directly from the ER to the Cath Lab.  Dr. Sahu, interventional radiologist has accepted for transfer.     #Acute hypoxemic respiratory failure secondary to likely aspiration penumonia:   -Patient needing 2 to 3 L to maintain saturations.  She was started on Zosyn therapy.  N.p.o. and swallow was consulted.  GI was also consulted given her complicated history in the setting of odynophasia and dysphagia with very poor p.o. intake.  She was also going to have a thoracentesis performed by IR.  Prior to evaluation, patient was urgently transferred to [facility] for pericardial effusion.  Continued work-up at [facility] after transfer.    #S/P laparoscopic sleeve gastrectomy. Nausea with vomiting.. Gastric anastomotic stricture. Globus sensationL Patient underwent laparoscopic sleeve gastrectomy and hiatal hernia repair on 10/25/22 with Dr. Aragon.  Patient was hospitalized about a week ago with [facility], had EGD performed on 12/1 and esophageal stenting.  He subsequently had chest discomfort and stent was removed with balloon dilation on 12/2.. Since the procedure she reports that she has been having a globus sensation and having difficulty eating and drinking.   -N.p.o. with speech and GI evaluation plan.  Transferred prior to this being done.  Patient will likely need close follow-up with her outpatient surgeon.     #Incidental Cholelithiasis    Juan Whittington MD    Pending Results   These results will be followed up by Hospital medicine at [facility]  Unresulted Labs Ordered in the Past 30  Days of this Admission     Date and Time Order Name Status Description    12/11/2022  6:57 AM Blood Culture Hand, Left In process     12/11/2022  6:39 AM MRSA MSSA PCR, Nasal Swab In process     12/11/2022  6:25 AM Blood Culture Line, venous In process           Code Status   Full Code       Primary Care Physician   [facility]    Physical Exam   Temp: 98.3  F (36.8  C) Temp src: Oral BP: 119/74 Pulse: 88   Resp: 26 SpO2: 92 % O2 Device: Nasal cannula Oxygen Delivery: 3 LPM  Vitals:    12/11/22 0642   Weight: 106.6 kg (235 lb)     Vital Signs with Ranges  Temp:  [98.3  F (36.8  C)-98.6  F (37  C)] 98.3  F (36.8  C)  Pulse:  [] 88  Resp:  [26-28] 26  BP: (119-159)/(74-98) 119/74  SpO2:  [86 %-96 %] 92 %  No intake/output data recorded.    Patient appears fatigued.  Tachypnea noted.  Diminished breath sounds more at the left base.  Coarse.  Tachycardia noted.  Distant heart sounds.  Extremities warm and well perfused.  Obese without significant abdominal tenderness.  Soft.  No rebound or guarding.  Answers appropriately.  Moves all extremities.  No tremor noted.    Discharge Disposition   Transferred to [facility] as above    Consultations This Hospital Stay   PHARMACY TO [facility]  SPEECH LANGUAGE PATH ADULT IP CONSULT  GASTROENTEROLOGY IP CONSULT  CARDIOLOGY IP CONSULT    Time Spent on this Encounter   I, Juan Whittington MD, personally saw the patient today and spent greater than 30 minutes discharging this patient.    Discharge Orders   No discharge procedures on file.  Discharge Medications   Current Discharge Medication List      CONTINUE these medications which have NOT CHANGED    Details   buPROPion (WELLBUTRIN XL) 150 MG 24 hr tablet Take 150 mg by mouth every morning      Calcium Citrate-Vitamin D (CALCIUM CITRATE CHEWY BITE) 500-12.5 MG-MCG CHEW Take 1 chew tab by mouth 3 times daily  Qty: 90 tablet, Refills: 11    Associated Diagnoses: S/P laparoscopic sleeve gastrectomy       Cyanocobalamin (B-12) 500 MCG SUBL Place 1 tablet under the tongue daily  Qty: 30 tablet, Refills: 11    Associated Diagnoses: S/P laparoscopic sleeve gastrectomy      cyanocobalamin (VITAMIN B-12) 500 MCG tablet Take 1,000 mcg by mouth daily      enoxaparin ANTICOAGULANT (LOVENOX) 40 MG/0.4ML syringe Inject 0.4 mLs (40 mg) Subcutaneous 2 times daily for 28 days  Qty: 22.4 mL, Refills: 0    Associated Diagnoses: S/P laparoscopic sleeve gastrectomy      fish oil-omega-3 fatty acids 1000 MG capsule Take 2 g by mouth every morning      hydrOXYzine (ATARAX) 25 MG tablet Take 1 tablet (25 mg) by mouth 3 times daily as needed for itching  Qty: 10 tablet, Refills: 0    Associated Diagnoses: Gastric anastomotic stricture      !! hyoscyamine SL (LEVSIN/SL) 0.125 MG sublingual tablet Take 1 tablet (0.125 mg) by mouth 4 times daily (before meals and nightly)  Qty: 50 tablet, Refills: 0    Associated Diagnoses: Gastric anastomotic stricture      !! hyoscyamine SL (LEVSIN/SL) 0.125 MG sublingual tablet Take 1 tablet (0.125 mg) by mouth 4 times daily (before meals and nightly)  Qty: 50 tablet, Refills: 3    Associated Diagnoses: Gastric anastomotic stricture      methocarbamol (ROBAXIN) 500 MG tablet Take 1 tablet (500 mg) by mouth 4 times daily as needed for muscle spasms  Qty: 15 tablet, Refills: 0    Associated Diagnoses: Hiatal hernia      Multiple Vitamins-Iron (MULTIVITAMIN/IRON PO) Take 1 tablet by mouth daily      omeprazole (PRILOSEC) 40 MG DR capsule Take 1 capsule (40 mg) by mouth 2 times daily  Qty: 180 capsule, Refills: 3    Associated Diagnoses: Esophageal dysphagia; Esophageal stricture      !! ondansetron (ZOFRAN ODT) 4 MG ODT tab Take 1 tablet (4 mg) by mouth every 6 hours as needed for nausea or vomiting  Qty: 50 tablet, Refills: 0    Associated Diagnoses: Gastric anastomotic stricture      !! ondansetron (ZOFRAN ODT) 4 MG ODT tab Take 1 tablet (4 mg) by mouth every 6 hours as needed for nausea  Qty: 40  "tablet, Refills: 3    Associated Diagnoses: Gastric anastomotic stricture      oxyCODONE (ROXICODONE) 5 MG tablet Take 1 tablet (5 mg) by mouth every 6 hours as needed for moderate to severe pain  Qty: 12 tablet, Refills: 0    Associated Diagnoses: S/P laparoscopic sleeve gastrectomy      scopolamine (TRANSDERM) 1 MG/3DAYS 72 hr patch Place 1 patch onto the skin every 72 hours for 30 days  Qty: 10 patch, Refills: 1    Associated Diagnoses: Gastric anastomotic stricture      senna-docusate (SENOKOT-S/PERICOLACE) 8.6-50 MG tablet Take 2 tablets by mouth daily as needed for constipation (While taking narcotic pain medications.  Stop taking if having loose stools.)  Qty: 30 tablet, Refills: 1    Associated Diagnoses: Class 2 severe obesity with serious comorbidity and body mass index (BMI) of 38.0 to 38.9 in adult, unspecified obesity type (H); At high risk for postoperative complications      thiamine (B-1) 100 MG tablet Take 1 tablet (100 mg) by mouth daily  Qty: 30 tablet, Refills: 1    Associated Diagnoses: S/P laparoscopic sleeve gastrectomy; Nausea with vomiting      traMADol (ULTRAM) 50 MG tablet Take 1 tablet (50 mg) by mouth every 6 hours as needed for severe pain (7-10)  Qty: 50 tablet, Refills: 0    Associated Diagnoses: Gastric anastomotic stricture      Vitamin D3 (CHOLECALCIFEROL) 25 mcg (1000 units) tablet Take 4 tablets by mouth daily       !! - Potential duplicate medications found. Please discuss with provider.        Allergies   Allergies   Allergen Reactions     Azithromycin      Erythromycin GI Disturbance     When \"very young\"     Data   Most Recent 3 CBC's:Recent Labs   Lab Test 12/11/22  0541 11/21/22  1551 11/13/22  0612   WBC 9.3 8.9 9.2   HGB 12.1 10.7* 7.4*   MCV 87 86 88    550* 546*      Most Recent 3 BMP's:  Recent Labs   Lab Test 12/11/22  0541 12/02/22  0626 11/30/22  1245 11/16/22  0730 11/16/22  0701 11/15/22  0915 11/15/22  0444     --   --   --  140  --  136   POTASSIUM " 3.5  --   --   --  3.5  --  4.5  4.5   CHLORIDE 99  --   --   --  101  --  98   CO2 23  --   --   --  22  --  22   BUN 5.2*  --   --   --  21.3*  --  14.3   CR 0.74  --   --   --  0.84  --  0.77   ANIONGAP 15  --   --   --  17*  --  16*   KIMBERLYN 9.7  --   --   --  9.0  --  9.1   * 89 111*   < > 98   < > 113*    < > = values in this interval not displayed.     Most Recent 2 LFT's:  Recent Labs   Lab Test 12/11/22  0541 11/13/22  0612   AST 19 25   ALT 45* 17   ALKPHOS 160* 129*   BILITOTAL 0.5 0.3     Most Recent INR's and Anticoagulation Dosing History:  Anticoagulation Dose History     Recent Dosing and Labs Latest Ref Rng & Units 11/21/2022    INR 0.85 - 1.15 1.04        Most Recent 3 Troponin's:  Recent Labs   Lab Test 09/01/21  1927   TROPONIN <0.015     Most Recent Cholesterol Panel:  Recent Labs   Lab Test 09/01/22  1156   CHOL 186   *   HDL 37*   TRIG 216*     Most Recent 6 Bacteria Isolates From Any Culture (See EPIC Reports for Culture Details):No lab results found.  Most Recent TSH, T4 and A1c Labs:  Recent Labs   Lab Test 10/25/22  0906   A1C 5.5

## 2022-12-11 NOTE — ED NOTES
"[facility]  ED Nurse Handoff Report    Leah Yanez is a 42 year old female   ED Chief complaint: Shortness of Breath and Chest Pain  . ED Diagnosis:   Final diagnoses:   Pleural effusion   Pneumonia due to infectious organism, unspecified laterality, unspecified part of lung   Hypoxia   Pericardial effusion     Allergies:   Allergies   Allergen Reactions     Azithromycin      Erythromycin GI Disturbance     When \"very young\"       Code Status: Full Code  Activity level - Baseline/Home:  Independent. Activity Level - Current:   Stand by Assist. Lift room needed: No. Bariatric: No   Needed: No   Isolation: No. Infection: Not Applicable.     Vital Signs:   Vitals:    12/11/22 0543 12/11/22 0544 12/11/22 0546 12/11/22 0642   BP:       Pulse:       Resp:       Temp:       SpO2: (!) 86% (!) 86% 91%    Weight:    106.6 kg (235 lb)       Cardiac Rhythm:  ,      Pain level:    Patient confused: No. Patient Falls Risk: No.   Elimination Status: Has voided   Patient Report - Initial Complaint: SOB and chest pain for several days.  Pt has had multiple GI surgeries recently.  Se[facility] 12/9 and found to have pleural effusion.  SPO2 83% at home.  Pt reports pain with inspiration     Focused Assessment: patient on 3Ls  Right PIV.    Tests Performed:   POC US ECHO LIMITED   Final Result   Limited Bedside Cardiac Ultrasound, performed and interpreted by me.    Indication: Shortness of Breath.   subcostal views were acquired.    Image quality was satisfactory.      Findings:     Abnormal moderate pericardial effusion with no findings of tamponade no right heart collapse      IMPRESSION: Abnormal limited cardiac ultrasound showing .  Moderate pericardial effusion.  No signs of tamponade         CT Chest Pulmonary Embolism w Contrast   Final Result   IMPRESSION:   1.  No pulmonary emboli on either side. Moderate left and small right effusions with associated passive atelectasis in the adjacent " lungs, unchanged. Vague infiltrates have developed in the lungs, most likely representing an infectious or an inflammatory    process. Differential diagnostic possibilities would include COVID-19 infection.      2.  Normal caliber thoracic aorta without aneurysm or dissection. Normal cardiac size. Moderate pericardial effusion has developed.      3.  Reflux in the distal esophagus. Postoperative changes of bariatric surgery. Fatty liver. Cholelithiasis.      4.  Minor right convex thoracic curve.      US Thoracentesis    (Results Pending)     . Abnormal Results:   POC US ECHO LIMITED   Final Result   Limited Bedside Cardiac Ultrasound, performed and interpreted by me.    Indication: Shortness of Breath.   subcostal views were acquired.    Image quality was satisfactory.      Findings:     Abnormal moderate pericardial effusion with no findings of tamponade no right heart collapse      IMPRESSION: Abnormal limited cardiac ultrasound showing .  Moderate pericardial effusion.  No signs of tamponade         CT Chest Pulmonary Embolism w Contrast   Final Result   IMPRESSION:   1.  No pulmonary emboli on either side. Moderate left and small right effusions with associated passive atelectasis in the adjacent lungs, unchanged. Vague infiltrates have developed in the lungs, most likely representing an infectious or an inflammatory    process. Differential diagnostic possibilities would include COVID-19 infection.      2.  Normal caliber thoracic aorta without aneurysm or dissection. Normal cardiac size. Moderate pericardial effusion has developed.      3.  Reflux in the distal esophagus. Postoperative changes of bariatric surgery. Fatty liver. Cholelithiasis.      4.  Minor right convex thoracic curve.      US Thoracentesis    (Results Pending)     Labs Ordered and Resulted from Time of ED Arrival to Time of ED Departure   COMPREHENSIVE METABOLIC PANEL - Abnormal       Result Value    Sodium 137      Potassium 3.5       Chloride 99      Carbon Dioxide (CO2) 23      Anion Gap 15      Urea Nitrogen 5.2 (*)     Creatinine 0.74      Calcium 9.7      Glucose 101 (*)     Alkaline Phosphatase 160 (*)     AST 19      ALT 45 (*)     Protein Total 7.0      Albumin 3.7      Bilirubin Total 0.5      GFR Estimate >90     TROPONIN T, HIGH SENSITIVITY - Abnormal    Troponin T, High Sensitivity 22 (*)    CBC WITH PLATELETS AND DIFFERENTIAL - Abnormal    WBC Count 9.3      RBC Count 4.51      Hemoglobin 12.1      Hematocrit 39.2      MCV 87      MCH 26.8      MCHC 30.9 (*)     RDW 18.8 (*)     Platelet Count 263      % Neutrophils 70      % Lymphocytes 19      % Monocytes 10      % Eosinophils 0      % Basophils 0      % Immature Granulocytes 1      NRBCs per 100 WBC 0      Absolute Neutrophils 6.5      Absolute Lymphocytes 1.7      Absolute Monocytes 0.9      Absolute Eosinophils 0.0      Absolute Basophils 0.0      Absolute Immature Granulocytes 0.1      Absolute NRBCs 0.0     ISTAT GASES LACTATE VENOUS POCT - Abnormal    Lactic Acid POCT 0.7      Bicarbonate Venous POCT 24      O2 Sat, Venous POCT 38 (*)     pCO2V Venous POCT 41      pH Venous POCT 7.36      pO2 Venous POCT 23 (*)    NT PROBNP INPATIENT - Normal    N terminal Pro BNP Inpatient 392     HCG QUALITATIVE PREGNANCY - Normal    hCG Serum Qualitative Negative     LIPASE - Normal    Lipase 44     LACTATE DEHYDROGENASE - Normal    Lactate Dehydrogenase 160     COVID-19 VIRUS (CORONAVIRUS) BY PCR   GLUCOSE FLUID   LACTATE DEHYDROGENASE FLUID   PROTEIN TOTAL   PROTEIN FLUID   BLOOD CULTURE   AEROBIC BACTERIAL CULTURE ROUTINE   MRSA MSSA PCR, NASAL SWAB   BLOOD CULTURE   CELL COUNT WITH DIFFERENTIAL FLUID        Treatments provided: see  MAR  Family Comments:   OBS brochure/video discussed/provided to patient:  N/A  ED Medications:   Medications   piperacillin-tazobactam (ZOSYN) intermittent infusion 4.5 g (has no administration in time range)   vancomycin (VANCOCIN) 2,500 mg in  sodium chloride 0.9 % 500 mL intermittent infusion (has no administration in time range)   0.9% sodium chloride BOLUS (0 mLs Intravenous Stopped 12/11/22 0642)   iopamidol (ISOVUE-370) solution 500 mL (73 mLs Intravenous Given 12/11/22 0552)     Drips infusing:  No  For the majority of the shift, the patient's behavior Green. Interventions performed were support and reassurance.    Sepsis treatment initiated: No     Patient tested for COVID 19 prior to admission: YES    ED Nurse Name/Phone Number: Lillian Moore RN,   7:01 AM

## 2022-12-11 NOTE — PRE-PROCEDURE
GENERAL PRE-PROCEDURE:     Written consent obtained?: Yes    Risks and benefits: Risks, benefits and alternatives were discussed    Consent given by:  Patient  Patient states understanding of procedure being performed: Yes    Patient's understanding of procedure matches consent: Yes    Procedure consent matches procedure scheduled: Yes    Expected level of sedation:  Moderate  Appropriately NPO:  Not NPO, but emergent condition outweighs risk  ASA Class:  4  Mallampati  :  Grade 2- soft palate, base of uvula, tonsillar pillars, and portion of posterior pharyngeal wall visible  Lungs:  Lungs clear with good breath sounds bilaterally  Heart:  Normal heart sounds and rate  History & Physical reviewed:  History and physical reviewed and no updates needed  Statement of review:  I have reviewed the lab findings, diagnostic data, medications, and the plan for sedation

## 2022-12-11 NOTE — PHARMACY-ADMISSION MEDICATION HISTORY
Pharmacy Medication History  Admission medication history interview status for the 12/11/2022  admission is complete. See EPIC admission navigator for prior to admission medications     Location of Interview: Patient room  Medication history sources: Patient and Surescripts    Significant changes made to the medication list:  Added apap, tums  Modified   Removed: fish oil    In the past week, patient estimated taking medication this percent of the time: 50-90% due to nausea    Additional medication history information:   Not taking: calcium with vit d.  Was not stocked and so changed to tums  wellbutrin XL 150mg daily is to be changed to immediate release 75mg bid due to gastric bypass syrgery.  Has been taking Senna S daily for the past week due to constipation  Pt has had trouble taking her meds due to nausea  Vit D was put on hold prior to gastric surgery    Medication reconciliation completed by provider prior to medication history? No    Time spent in this activity: 20 minutes    Prior to Admission medications    Medication Sig Last Dose Taking? Auth Provider Long Term End Date   acetaminophen (TYLENOL) 500 MG tablet Take 1,000 mg by mouth every 6 hours as needed for mild pain 12/10/2022 Yes Unknown, Entered By History No    buPROPion (WELLBUTRIN XL) 150 MG 24 hr tablet Take 150 mg by mouth every morning Switch to Bupropion 75mg immediate release twice daily when ordered 12/10/2022 Yes Unknown, Entered By History     calcium carbonate (TUMS) 500 MG chewable tablet Take 1 chew tab by mouth 3 times daily 12/10/2022 Yes Unknown, Entered By History     Cyanocobalamin (B-12) 500 MCG SUBL Place 1 tablet under the tongue daily 12/10/2022 Yes Rosanna Phipps NP No    enoxaparin ANTICOAGULANT (LOVENOX) 40 MG/0.4ML syringe Inject 0.4 mLs (40 mg) Subcutaneous 2 times daily for 28 days 12/10/2022 at pm Yes Daniel Aragon MD  12/14/22   hydrOXYzine (ATARAX) 25 MG tablet Take 1 tablet (25 mg) by mouth 3 times daily as  needed for itching 12/10/2022 Yes Daniel Aragon MD     hyoscyamine SL (LEVSIN/SL) 0.125 MG sublingual tablet Take 1 tablet (0.125 mg) by mouth 4 times daily (before meals and nightly) 12/10/2022 Yes Daniel Aragon MD     Multiple Vitamins-Iron (MULTIVITAMIN/IRON PO) Take 1 tablet by mouth daily Past Week Yes Unknown, Entered By History     omeprazole (PRILOSEC) 40 MG DR capsule Take 1 capsule (40 mg) by mouth 2 times daily 12/10/2022 Yes Esteban Rose DO  12/29/22   ondansetron (ZOFRAN ODT) 4 MG ODT tab Take 1 tablet (4 mg) by mouth every 6 hours as needed for nausea Unknown Yes Daniel Aragon MD     oxyCODONE (ROXICODONE) 5 MG tablet Take 1 tablet (5 mg) by mouth every 6 hours as needed for moderate to severe pain 12/11/2022 at 0300 Yes Rosanna Phipps NP     senna-docusate (SENOKOT-S/PERICOLACE) 8.6-50 MG tablet Take 2 tablets by mouth daily as needed for constipation (While taking narcotic pain medications.  Stop taking if having loose stools.) 12/10/2022 Yes Daniel Aragon MD     thiamine (B-1) 100 MG tablet Take 1 tablet (100 mg) by mouth daily 12/10/2022 Yes Rosanna Phipps NP     traMADol (ULTRAM) 50 MG tablet Take 1 tablet (50 mg) by mouth every 6 hours as needed for severe pain (7-10) 12/7/2022 Yes Daniel Aragon MD     Calcium Citrate-Vitamin D (CALCIUM CITRATE CHEWY BITE) 500-12.5 MG-MCG CHEW Take 1 chew tab by mouth 3 times daily  Patient not taking: Reported on 12/7/2022   Rosanna Phipps NP     hyoscyamine SL (LEVSIN/SL) 0.125 MG sublingual tablet Take 1 tablet (0.125 mg) by mouth 4 times daily (before meals and nightly)   Daniel Aragon MD     methocarbamol (ROBAXIN) 500 MG tablet Take 1 tablet (500 mg) by mouth 4 times daily as needed for muscle spasms  Patient not taking: Reported on 12/7/2022   Marc Oconnell MD     ondansetron (ZOFRAN ODT) 4 MG ODT tab Take 1 tablet (4 mg) by mouth every 6 hours as needed for nausea or vomiting Daniel Lennon MD      scopolamine (TRANSDERM) 1 MG/3DAYS 72 hr patch Place 1 patch onto the skin every 72 hours for 30 days  Patient not taking: Reported on 12/7/2022   Daniel Aragon MD  12/23/22   Vitamin D3 (CHOLECALCIFEROL) 25 mcg (1000 units) tablet Take 4 tablets by mouth daily  Patient not taking: Reported on 12/7/2022 on hold due to surgery  Unknown, Entered By History No        The information provided in this note is only as accurate as the sources available at the time of update(s)

## 2022-12-11 NOTE — OP NOTE
[facility]    Operative Note    Pre-operative diagnosis: Abdominal hematoma [S30.1XXA]   Post-operative diagnosis * No post-op diagnosis entered *   Procedure: Procedure(s):  LAPAROSCOPY, DIAGNOSTIC, BY GENERAL SURGERY, evacuation of abdominl hematoma   Surgeon: Surgeon(s) and Role:     * Daniel Aragon MD - Primary   Assistant Surgeon      Anesthesia: Dr. Taveras, chief resident    General    Estimated blood loss: 25 mL this operation, 1000 mL of blood/clots evacuated   Drains: 19Fr round césar left in left upper quadrant; exit at right upper quadrant   Specimens: * No specimens in log *   Findings: perisplenic and perigastric hematoma with clotted blood and minimal liquified blood, no evidence of active bleeding, sleeve gastrectomy staple line is hemostatic. liquid old blood in the pelvis. ..   Complications: None.   Implants: None.           COMORBIDITIES:   Past Medical History:   Diagnosis Date     Anti-cardiolipin antibody positive      Griggs esophagus      Concussion 2016     Depressive disorder, not elsewhere classified 03/01/2006    Resolved 8/07     Gastroesophageal reflux disease with esophagitis      Hiatal hernia      History of pulmonary embolism      Obesity      Raynaud's syndrome     past history of admission for gangrene of one finger       INDICATIONS FOR PROCEDURE  Leah Yanez is a 42 year old female who had sleeve gastrectomy and has had blood loss anemia with large intraabdominal hematoma.      Laparoscopy with hematoma evacuation indicated to treat persistent left upper quadrant abdominal pain.    I reviewed the risks of surgery with Leah and with her  and father, who were present prior to surgery.      OPERATIVE PROCEDURE:     Leah Yanez was brought to the operating room and prepared in routine fashion. Under the benefits of general anesthesia, the 15mm port site was finger dissected and 5mm port was passed into the  abdomen bluntly and pneumoperitoneum was established using carbon dioxide gas to a maximum pressure of 15 mmHg. A total of five (4x5mm and 1x12mm) ports were placed into the abdomen, all through previous incisions.     A liver retractor was placed through the rightmost port and this provided a view of the upper stomach. The operation was started by noting intraabdominal hematoma as expected.    I used a combination of irrigation and suction to remove about 1000cc total blood from the abdomen.    I noted zero evidence of free enteric/colonic contents in abdomen.    No bile noted.    Because of problems with swallowing, I also elected to remove one suture from prior hiatal hernia repair; I chose to remove one anterior stitch; afterwards, adequate opening at hiatus clearly present.    A drain, round césar 19Fr, was placed into abdomen in the left upper quadrant with exit at right upper quadrant; drain secured to skin with 3-0 nylon.    The liver retractor and all ports were removed from the abdomen under direct visualization.     All needle and sponge counts were correct x2 at the end of the operation, and I was present for all critical components of the procedure.     Skin incisions were closed using skin staples, and sterile dressings were placed.     Daniel Aragon MD  Surgery  [phone] (hospital )  [phone] (clinic nurses)

## 2022-12-11 NOTE — CONSULTS
[facility] General Surgery Consultation    Leah Yanez MRN# [id number]   YOB: 1980 Age: 42 year old      Date of Admission:  12/11/2022  Date of Consult: 12/11/2022         Assessment and Plan:   Patient is a 42 year old female status post recent laparoscopic sleeve gastrectomy and hiatal hernia repair with complicated postoperative course.  Now admitted with pericardial effusion and bilateral pleural effusions.    PLAN:  -Medical management per hospitalist and cardiology  -Okay to have clear liquids, as tolerated  -No indication for general surgical intervention at this time  -Will review case with bariatric surgeon in a.m.         Requesting Physician:               Chief Complaint:   chest pain, s/p gastric sleeve       History of Present Illness:   Leah Yanez is a 42 year old female who was seen in consultation at the request of  who presented with chest pain and shortness of breath.  The patient reports she underwent surgical intervention for reflux and Griggs's esophagitis.  Laparoscopic sleeve gastrectomy with primary suture repair of hiatal hernia was performed by  at the [place] on 10/25/2022.  Patient was subsequently readmitted for pneumonia on 11/1/2022.  She was found to have evidence of significant intra-abdominal hematoma and underwent laparoscopic evacuation of approximately 1 L blood and clot on 11/4/2022.  EGD was then completed on 11/14/2022 and it was documented that there was no evidence of stricture of the esophagus or stomach.  The patient did undergo dilation of the body of her sleeve and NJ tube was placed for supplemental feeds.  Patient reports persistent issues with nausea/vomiting following the above procedures but denies any significant abdominal pain.  Her feeding tube was then removed and the patient underwent EGD on 11/30/2022.  She was found to have evidence of mild gastric stricture and 23 x 100 stent was  placed.  On 12/2/2022, the patient underwent repeat EGD with esophageal dilation and removal of gastric stent.  The patient reports that she was able to resume oral intake over the past week.  She was able to ingest protein shake, egg, and pudding over the course of the week.  On Thursday, the patient reports developing significant chest pressure.  She was also having low-grade fevers and shortness of breath.  She initially presented to the emergency department in [place] on Friday.  She then went to the emergency department at [facility].  Further work-up with CT scan demonstrated bilateral pleural effusions and moderate pericardial effusion.  The patient was initially admitted to [facility] but was then noted to have signs of early cardiac tamponade.  She was transferred to [facility] and underwent emergent pericardiocentesis with drain placement.  Ultrasound-guided thoracentesis is also planned.  At the time of my examination, the patient reports that she is still having significant pain throughout her chest, which is worse with inspiration.  She denies any abdominal pain.          Physical Exam:   Blood pressure (!) 145/91, temperature 98.4  F (36.9  C), temperature source Oral, resp. rate 18, last menstrual period 11/14/2022, not currently breastfeeding.  0 lbs 0 oz  General: Vital signs reviewed, in no apparent distress  Eyes: Anicteric  HENT: Normocephalic, atraumatic, trachea midline   Respiratory: Breathing mildly labored  Cardiovascular: Regular rhythm, mildly tachycardic  GI: Abdomen soft, nondistended, nontender  Musculoskeletal: No gross deformities  Neurologic: Grossly nonfocal exam  Psychiatric: Normal mood, affect and insight  Integumentary: Warm and dry         Past Medical History:     Past Medical History:   Diagnosis Date     Anti-cardiolipin antibody positive      Griggs esophagus      Concussion 2016     Depressive disorder, not elsewhere classified 03/01/2006    Resolved 8/07     Gastroesophageal  reflux disease with esophagitis      Hiatal hernia      History of pulmonary embolism      Obesity      Raynaud's syndrome     past history of admission for gangrene of one finger            Past Surgical History:     Past Surgical History:   Procedure Laterality Date     COMBINED ESOPHAGOSCOPY, GASTROSCOPY, DUODENOSCOPY (EGD), REMOVE ESOPHAGEAL STENT N/A 12/2/2022    Procedure: ESOPHAGOGASTRODUODENOSCOPY, WITH ESOPHAGEAL STENT REMOVAL and Balloon Dialation;  Surgeon: Daniel Aragon MD;  Location: [facility] OR     ESOPHAGOSCOPY, GASTROSCOPY, DUODENOSCOPY (EGD), COMBINED N/A 12/29/2021    Procedure: ESOPHAGOGASTRODUODENOSCOPY, WITH BIOPSY;  Surgeon: Esteban Rose DO;  Location: [facility] OR     ESOPHAGOSCOPY, GASTROSCOPY, DUODENOSCOPY (EGD), COMBINED N/A 11/14/2022    Procedure: Upper endoscopy; gastric stricture dilation, interpretation of fluoroscopy nasojejunal feeding tube;  Surgeon: Daniel Aragon MD;  Location: [facility] OR     ESOPHAGOSCOPY, GASTROSCOPY, DUODENOSCOPY (EGD), COMBINED N/A 11/23/2022    Procedure: ESOPHAGOGASTRODUODENOSCOPY (EGD);  Surgeon: Daniel Aragon MD;  Location: [facility] GI     LAPAROSCOPIC GASTRIC SLEEVE N/A 10/25/2022    Procedure: GASTRECTOMY, SLEEVE, LAPAROSCOPIC;  Surgeon: Daniel Aragon MD;  Location: [facility] OR     LAPAROSCOPIC HERNIORRHAPHY HIATAL N/A 10/25/2022    Procedure: HERNIORRHAPHY, HIATAL, LAPAROSCOPIC;  Surgeon: Daniel Aragon MD;  Location: [facility] OR     LAPAROSCOPY DIAGNOSTIC (GENERAL) N/A 11/4/2022    Procedure: LAPAROSCOPY, DIAGNOSTIC, BY GENERAL SURGERY, evacuation of abdominl hematoma;  Surgeon: Daniel Aragon MD;  Location: [facility] OR     PICC TRIPLE LUMEN PLACEMENT Left 11/06/2022    51cm (1cm external), Basilic vein     TONSILLECTOMY & ADENOIDECTOMY       ZZ NONSPECIFIC PROCEDURE      T&A as a child     ZZC NONSPECIFIC PROCEDURE      Tubes in ears bilaterally            Current Medications:           buPROPion  75 mg Oral BID     cyanocobalamin  500 mcg  Sublingual Daily     hyoscyamine  0.125 mg Sublingual 4x Daily AC & HS     pantoprazole  40 mg Intravenous Daily with breakfast     piperacillin-tazobactam  4.5 g Intravenous Q6H     sodium chloride (PF)  10 mL Intracatheter Q8H     sodium chloride (PF)  3 mL Intracatheter Q8H     thiamine  100 mg Oral Daily     vancomycin  1,250 mg Intravenous Q12H       sodium chloride 0.9%, [DISCONTINUED] acetaminophen **OR** acetaminophen, acetaminophen, atropine, fentaNYL, flumazenil, HOLD MEDICATION, HYDROmorphone, hydrOXYzine, lidocaine 4%, lidocaine 4%, lidocaine (buffered or not buffered), lidocaine (buffered or not buffered), LORazepam, melatonin, methocarbamol, midazolam, naloxone **OR** naloxone **OR** naloxone **OR** naloxone, ondansetron **OR** ondansetron, oxyCODONE **OR** oxyCODONE, oxyCODONE IR, sodium chloride (PF), sodium chloride (PF)         Home Medications:     Prior to Admission medications    Medication Sig Last Dose Taking? Auth Provider Long Term End Date   acetaminophen (TYLENOL) 500 MG tablet Take 1,000 mg by mouth every 6 hours as needed for mild pain 12/10/2022 Yes Unknown, Entered By History No    buPROPion (WELLBUTRIN XL) 150 MG 24 hr tablet Take 150 mg by mouth every morning Switch to Bupropion 75mg immediate release twice daily when ordered 12/10/2022 Yes Unknown, Entered By History     calcium carbonate (TUMS) 500 MG chewable tablet Take 1 chew tab by mouth 3 times daily 12/10/2022 Yes Unknown, Entered By History     Cyanocobalamin (B-12) 500 MCG SUBL Place 1 tablet under the tongue daily 12/10/2022 Yes Rosanna Phipps, SETH No    enoxaparin ANTICOAGULANT (LOVENOX) 40 MG/0.4ML syringe Inject 0.4 mLs (40 mg) Subcutaneous 2 times daily for 28 days 12/10/2022 at pm Yes Daniel Aragon MD  12/14/22   hydrOXYzine (ATARAX) 25 MG tablet Take 1 tablet (25 mg) by mouth 3 times daily as needed for itching 12/10/2022 Yes Daniel Aragon MD     hyoscyamine SL (LEVSIN/SL) 0.125 MG sublingual tablet Take  1 tablet (0.125 mg) by mouth 4 times daily (before meals and nightly) 12/10/2022 Yes Daniel Aragon MD     Multiple Vitamins-Iron (MULTIVITAMIN/IRON PO) Take 1 tablet by mouth daily Past Week Yes Unknown, Entered By History     omeprazole (PRILOSEC) 40 MG DR capsule Take 1 capsule (40 mg) by mouth 2 times daily 12/10/2022 Yes Esteban Rose DO  12/29/22   ondansetron (ZOFRAN ODT) 4 MG ODT tab Take 1 tablet (4 mg) by mouth every 6 hours as needed for nausea Unknown Yes Daniel Aragon MD     oxyCODONE (ROXICODONE) 5 MG tablet Take 1 tablet (5 mg) by mouth every 6 hours as needed for moderate to severe pain 12/11/2022 at 0300 Yes Rosanna Phipps NP     senna-docusate (SENOKOT-S/PERICOLACE) 8.6-50 MG tablet Take 2 tablets by mouth daily as needed for constipation (While taking narcotic pain medications.  Stop taking if having loose stools.) 12/10/2022 Yes Daniel Aragon MD     thiamine (B-1) 100 MG tablet Take 1 tablet (100 mg) by mouth daily 12/10/2022 Yes Rosanna Phipps NP     traMADol (ULTRAM) 50 MG tablet Take 1 tablet (50 mg) by mouth every 6 hours as needed for severe pain (7-10) 12/7/2022 Yes Daniel Aragon MD     Calcium Citrate-Vitamin D (CALCIUM CITRATE CHEWY BITE) 500-12.5 MG-MCG CHEW Take 1 chew tab by mouth 3 times daily  Patient not taking: Reported on 12/7/2022   Rosanna Phipps NP     hyoscyamine SL (LEVSIN/SL) 0.125 MG sublingual tablet Take 1 tablet (0.125 mg) by mouth 4 times daily (before meals and nightly)   Daniel Aragon MD     methocarbamol (ROBAXIN) 500 MG tablet Take 1 tablet (500 mg) by mouth 4 times daily as needed for muscle spasms  Patient not taking: Reported on 12/7/2022   Marc Oconnell MD     ondansetron (ZOFRAN ODT) 4 MG ODT tab Take 1 tablet (4 mg) by mouth every 6 hours as needed for nausea or vomiting duplicate  Daniel Aragon MD     scopolamine (TRANSDERM) 1 MG/3DAYS 72 hr patch Place 1 patch onto the skin every 72 hours for 30 days  Patient not  "taking: Reported on 12/7/2022   Daniel Aragon MD  12/23/22   Vitamin D3 (CHOLECALCIFEROL) 25 mcg (1000 units) tablet Take 4 tablets by mouth daily  Patient not taking: Reported on 12/7/2022 on hold due to surgery  Unknown, Entered By History No             Allergies:     Allergies   Allergen Reactions     Azithromycin      Erythromycin GI Disturbance     When \"very young\"            Family History:     Family History   Problem Relation Age of Onset     Hypertension Mother         arthritis     Heart Disease Father         MI, Lipids     Acute Myocardial Infarction Father      Cancer - colorectal Maternal Grandmother         arthritis     Hypertension Maternal Grandfather         arthritis, macular degeneration     Colon Cancer Paternal Grandmother      Alzheimer Disease Paternal Grandfather      Anesthesia Reaction No family hx of      Clotting Disorder No family hx of            Social History:   Leah Yanez  reports that she has never smoked. She has never used smokeless tobacco. She reports current alcohol use. She reports that she does not use drugs.          Review of Systems:   Constitutional: Low-grade fevers and chills  Eyes: Mildly blurred vision  HENT: Reports mild headaches, No rhinorrhea, No sore throat  Respiratory: Shortness of breath  Cardiovascular: Chest pain  Gastrointestinal: No abdominal pain or nausea/vomiting  Genitourinary: No hematuria or dysuria  Musculoskeletal: Denies arthralgias or myalgias  Neurologic: No numbness or tingling  Integumentary: Facial skin changes         Labs/Imaging   All new lab and imaging data was reviewed.   Recent Labs   Lab 12/11/22  0541   WBC 9.3   HGB 12.1   HCT 39.2   MCV 87        Recent Labs   Lab 12/11/22  0542 12/11/22  0541   NA  --  137   POTASSIUM  --  3.5   CHLORIDE  --  99   CO2  --  23   ANIONGAP  --  15   GLC  --  101*   BUN  --  5.2*   CR  --  0.74   GFRESTIMATED  --  >90   KIMBERLYN  --  9.7   PROTTOTAL 7.1 7.0   ALBUMIN  --  3.7 "   BILITOTAL  --  0.5   ALKPHOS  --  160*   AST  --  19   ALT  --  45*     Recent Labs   Lab 12/11/22  0543   LACT 0.7     Recent Labs   Lab 12/11/22  0542   LIPASE 44       I have personally reviewed the imaging studies-   CT CHEST PULMONARY EMBOLISM W CONTRAST  LOCATION: [facility]  DATE/TIME: 12/11/2022 6:02 AM     FINDINGS:  ANGIOGRAM CHEST: No pulmonary emboli on either side. Normal caliber thoracic aorta without aneurysm or dissection. No CT evidence of right heart strain.     LUNGS AND PLEURA: Moderate left and small right pleural effusions with associated passive atelectasis in the adjacent lungs, similar to prior. Vague opacities have developed in the lungs which could represent an infectious or an inflammatory process.   Differential diagnostic possibilities would include COVID-19 infection.     MEDIASTINUM/AXILLAE: Normal cardiac size. Moderate pericardial effusion has developed. No suspicious adenopathy. Reflux in the distal esophagus. Trachea is midline.     CORONARY ARTERY CALCIFICATION: None.     UPPER ABDOMEN: Postoperative changes of bariatric surgery. Fatty liver. Dependent gallstones without biliary dilatation or adjacent inflammation.     MUSCULOSKELETAL: Minor right convex thoracic curve.                                                                      IMPRESSION:  1.  No pulmonary emboli on either side. Moderate left and small right effusions with associated passive atelectasis in the adjacent lungs, unchanged. Vague infiltrates have developed in the lungs, most likely representing an infectious or an inflammatory   process. Differential diagnostic possibilities would include COVID-19 infection.     2.  Normal caliber thoracic aorta without aneurysm or dissection. Normal cardiac size. Moderate pericardial effusion has developed.     3.  Reflux in the distal esophagus. Postoperative changes of bariatric surgery. Fatty liver. Cholelithiasis.     4.  Minor right convex  thoracic curve.      Crystal Wright MD

## 2022-12-11 NOTE — H&P
[facility]  History and Physical - Hospitalist Service  Date of Admission:  12/11/2022    Assessment & Plan     Ms. Leah Yanez is a 42 year old female admitted on 12/11/2022 with acute hypoxic respiratory failure, found to have bilateral pleural effusions and concern for possible pneumonia.  Pending thoracentesis and clinical improvement.    Acute hypoxic respiratory failure  Pleural effusion, moderate L and small R   Atelectasis   Concern for possible pnuemonia   Patient presenting with couple days of worsening shortness of breath in addition to low-grade fevers.  She has a complex GI history as outlined below.  She reports that since the surgery she has had a very hard time with eating and drinking and reports that she has a globus sensation.  Aside from that she does not describe other new symptoms and has not had any sick contacts or other exposures lately.  No pulmonary emboli on either side. Moderate left and small right effusions with associated passive atelectasis in the adjacent lungs, unchanged. Vague infiltrates have developed in the lungs, most likely representing an infectious or an inflammatory process. Differential diagnostic possibilities would include COVID-19 infection.  At this time was concerned for a infectious process as a cause of her effusions.  She does not report that she has had pleural effusions in the past while in the ICU and receiving lots of fluids.  She did not have a thoracentesis performed at that time.  We will obtain a thoracentesis to better classify effusions.  Given her globus sensation and concern for possible aspiration we will also involve SLP.  Patient and her  understood and agreed with the plan.  - COVID/Flu swab pending  - 2 L NC, continue oxygen support to maintain saturations as needed, wean as tolerated  -Incentive spirometry  -Antibiotics, broad spectrum antibiotics started in the emergency department vancomycin and Zosyn    -Thoracentesis per IR with studies, pending  -SLP eval to rule out aspiration events in the setting of below    Moderate pericardial effusion   Noted on CT scan.  Bedside ultrasound in the emergency department without any evidence of decreased cardiac function.  - no evidence of hemodynamic compromise , will continue to monitor   -No indication for urgent pericardial window for drainage    S/P laparoscopic sleeve gastrectomy   Nausea with vomiting , improved   Gastric anastomotic stricture   Globus sensation  Patient underwent laparoscopic sleeve gastrectomy and hiatal hernia repair on 10/25/22 with Dr. Aragon.  Patient was hospitalized about a week ago with [facility], had EGD performed on 12/1 and esophageal stenting and balloon dilation performed in 12/2. Since the procedure she reports that she has been having a globus sensation and having difficulty eating and drinking.   - SLP evaluation     Cholelithiasis -incidentally noted on CT     Obesity Estimated body mass index is 31.87 kg/m        Diet: NPO for Medical/Clinical Reasons Except for: Meds, Ice Chips  DVT Prophylaxis: Pneumatic Compression Devices  Holder Catheter: Not present  Central Lines: None  Cardiac Monitoring: ACTIVE order. Indication: Tachyarrhythmias, acute (48 hours)  Code Status: Full Code    Clinically Significant Risk Factors Present on Admission               # Drug Induced Coagulation Defect: home medication list includes an anticoagulant medication      # Acute Respiratory Failure: Documented O2 saturation < 91%.  Continue supplemental oxygen as needed          Disposition Plan      Expected Discharge Date: 12/13/2022                The patient's care was discussed with the Bedside Nurse, Patient and Patient's Family.    Diana Castillo DO  Hospitalist Service  [facility]  Securely message with the Vocera Web Console (learn more here)  Text page via [employer] Paging/Directory      ______________________________________________________________________    Chief Complaint   Shortness of breath    History is obtained from the patient, electronic health record, emergency department physician and patient's significant other    History of Present Illness   Ms. Leah Yanez is a 42 year old female admitted on 12/11/2022 with acute hypoxic respiratory failure, found to have bilateral pleural effusions with concern for possible pneumonia.     Patient presenting with couple days of worsening shortness of breath in addition to low-grade fevers. Denies any chest pain, pressure or palpitations. She has no abdominal pain, and reports that her nausea and vomiting have improved. She has a complex GI history as outlined below.  She reports that since the surgery she has had a very hard time with eating and drinking and reports that she has a globus sensation.  Aside from that she does not describe other new symptoms and has not had any sick contacts or other exposures lately.    Of note, she underwent laparoscopic sleeve gastrectomy and hiatal hernia repair on 10/25/22 with Dr. Aragon.  Patient was hospitalized about a week ago with [facility], had EGD performed on 12/1 and esophageal stenting and balloon dilation performed in 12/2. Since the procedure she reports that she has been having a globus sensation and having difficulty eating and drinking.  Although in the last week is active and able to keep down some food.    She reports no other concerns or complaints today.    Review of Systems    The 10 point Review of Systems is negative other than noted in the HPI or here.     Past Medical History    I have reviewed this patient's medical history and updated it with pertinent information if needed.   Past Medical History:   Diagnosis Date     Anti-cardiolipin antibody positive      Griggs esophagus      Concussion 2016     Depressive disorder, not elsewhere classified 03/01/2006    Resolved  8/07     Gastroesophageal reflux disease with esophagitis      Hiatal hernia      History of pulmonary embolism      Obesity      Raynaud's syndrome     past history of admission for gangrene of one finger       Past Surgical History   I have reviewed this patient's surgical history and updated it with pertinent information if needed.  Past Surgical History:   Procedure Laterality Date     COMBINED ESOPHAGOSCOPY, GASTROSCOPY, DUODENOSCOPY (EGD), REMOVE ESOPHAGEAL STENT N/A 12/2/2022    Procedure: ESOPHAGOGASTRODUODENOSCOPY, WITH ESOPHAGEAL STENT REMOVAL and Balloon Dialation;  Surgeon: Daniel Aragon MD;  Location: [facility] OR     ESOPHAGOSCOPY, GASTROSCOPY, DUODENOSCOPY (EGD), COMBINED N/A 12/29/2021    Procedure: ESOPHAGOGASTRODUODENOSCOPY, WITH BIOPSY;  Surgeon: Esteban Rose DO;  Location: [facility] OR     ESOPHAGOSCOPY, GASTROSCOPY, DUODENOSCOPY (EGD), COMBINED N/A 11/14/2022    Procedure: Upper endoscopy; gastric stricture dilation, interpretation of fluoroscopy nasojejunal feeding tube;  Surgeon: Daniel Aragon MD;  Location: [facility] OR     ESOPHAGOSCOPY, GASTROSCOPY, DUODENOSCOPY (EGD), COMBINED N/A 11/23/2022    Procedure: ESOPHAGOGASTRODUODENOSCOPY (EGD);  Surgeon: Daniel Aragon MD;  Location: [facility] GI     LAPAROSCOPIC GASTRIC SLEEVE N/A 10/25/2022    Procedure: GASTRECTOMY, SLEEVE, LAPAROSCOPIC;  Surgeon: Daniel Aragon MD;  Location: [facility] OR     LAPAROSCOPIC HERNIORRHAPHY HIATAL N/A 10/25/2022    Procedure: HERNIORRHAPHY, HIATAL, LAPAROSCOPIC;  Surgeon: Daniel Aragon MD;  Location: [facility] OR     LAPAROSCOPY DIAGNOSTIC (GENERAL) N/A 11/4/2022    Procedure: LAPAROSCOPY, DIAGNOSTIC, BY GENERAL SURGERY, evacuation of abdominl hematoma;  Surgeon: Daniel Aragon MD;  Location: [facility] OR     PICC TRIPLE LUMEN PLACEMENT Left 11/06/2022    51cm (1cm external), Basilic vein     TONSILLECTOMY & ADENOIDECTOMY       ZZC NONSPECIFIC PROCEDURE      T&A as a child     ZZC NONSPECIFIC PROCEDURE      Tubes  in ears bilaterally       Social History   I have reviewed this patient's social history and updated it with pertinent information if needed.  Social History     Tobacco Use     Smoking status: Never     Smokeless tobacco: Never   Vaping Use     Vaping Use: Never used   Substance Use Topics     Alcohol use: Yes     Comment: Alcoholic Drinks/day: social     Drug use: No       Family History   I have reviewed this patient's family history and updated it with pertinent information if needed.  Family History   Problem Relation Age of Onset     Hypertension Mother         arthritis     Heart Disease Father         MI, Lipids     Acute Myocardial Infarction Father      Cancer - colorectal Maternal Grandmother         arthritis     Hypertension Maternal Grandfather         arthritis, macular degeneration     Colon Cancer Paternal Grandmother      Alzheimer Disease Paternal Grandfather      Anesthesia Reaction No family hx of      Clotting Disorder No family hx of        Prior to Admission Medications   Prior to Admission Medications   Prescriptions Last Dose Informant Patient Reported? Taking?   Calcium Citrate-Vitamin D (CALCIUM CITRATE CHEWY BITE) 500-12.5 MG-MCG CHEW   No No   Sig: Take 1 chew tab by mouth 3 times daily   Patient not taking: Reported on 12/7/2022   Cyanocobalamin (B-12) 500 MCG SUBL   No No   Sig: Place 1 tablet under the tongue daily   Patient not taking: Reported on 12/7/2022   Multiple Vitamins-Iron (MULTIVITAMIN/IRON PO)   Yes No   Sig: Take 1 tablet by mouth daily   Vitamin D3 (CHOLECALCIFEROL) 25 mcg (1000 units) tablet   Yes No   Sig: Take 4 tablets by mouth daily   Patient not taking: Reported on 12/7/2022   buPROPion (WELLBUTRIN XL) 150 MG 24 hr tablet   Yes No   Sig: Take 150 mg by mouth every morning   cyanocobalamin (VITAMIN B-12) 500 MCG tablet   Yes No   Sig: Take 1,000 mcg by mouth daily   Patient not taking: Reported on 12/7/2022   enoxaparin ANTICOAGULANT (LOVENOX) 40 MG/0.4ML syringe    No No   Sig: Inject 0.4 mLs (40 mg) Subcutaneous 2 times daily for 28 days   fish oil-omega-3 fatty acids 1000 MG capsule   Yes No   Sig: Take 2 g by mouth every morning   Patient not taking: Reported on 12/7/2022   hydrOXYzine (ATARAX) 25 MG tablet   No No   Sig: Take 1 tablet (25 mg) by mouth 3 times daily as needed for itching   Patient not taking: Reported on 12/7/2022   hyoscyamine SL (LEVSIN/SL) 0.125 MG sublingual tablet   No No   Sig: Take 1 tablet (0.125 mg) by mouth 4 times daily (before meals and nightly)   hyoscyamine SL (LEVSIN/SL) 0.125 MG sublingual tablet   No No   Sig: Take 1 tablet (0.125 mg) by mouth 4 times daily (before meals and nightly)   methocarbamol (ROBAXIN) 500 MG tablet   No No   Sig: Take 1 tablet (500 mg) by mouth 4 times daily as needed for muscle spasms   Patient not taking: Reported on 12/7/2022   omeprazole (PRILOSEC) 40 MG DR capsule   No No   Sig: Take 1 capsule (40 mg) by mouth 2 times daily   ondansetron (ZOFRAN ODT) 4 MG ODT tab   No No   Sig: Take 1 tablet (4 mg) by mouth every 6 hours as needed for nausea   ondansetron (ZOFRAN ODT) 4 MG ODT tab   No No   Sig: Take 1 tablet (4 mg) by mouth every 6 hours as needed for nausea or vomiting   oxyCODONE (ROXICODONE) 5 MG tablet   No No   Sig: Take 1 tablet (5 mg) by mouth every 6 hours as needed for moderate to severe pain   Patient not taking: Reported on 12/7/2022   scopolamine (TRANSDERM) 1 MG/3DAYS 72 hr patch   No No   Sig: Place 1 patch onto the skin every 72 hours for 30 days   Patient not taking: Reported on 12/7/2022   senna-docusate (SENOKOT-S/PERICOLACE) 8.6-50 MG tablet   No No   Sig: Take 2 tablets by mouth daily as needed for constipation (While taking narcotic pain medications.  Stop taking if having loose stools.)   thiamine (B-1) 100 MG tablet   No No   Sig: Take 1 tablet (100 mg) by mouth daily   Patient not taking: Reported on 12/7/2022   traMADol (ULTRAM) 50 MG tablet   No No   Sig: Take 1 tablet (50 mg) by  "mouth every 6 hours as needed for severe pain (7-10)      Facility-Administered Medications: None     Allergies   Allergies   Allergen Reactions     Azithromycin      Erythromycin GI Disturbance     When \"very young\"       Physical Exam   Vital Signs: Temp: 98.6  F (37  C)   BP: (!) 159/98 Pulse: 116   Resp: 28 SpO2: 91 % O2 Device: Nasal cannula Oxygen Delivery: 3 LPM  Weight: 235 lbs 0 oz    Constitutional: awake, alert, cooperative, no apparent distress, and appears stated age  HEENT: Normocephalic, atraumatic  No thrush or pharyngeal redness or other abnormalities   Respiratory: increased work of breathing, good air exchange, clear to auscultation bilaterally, no crackles or wheezing appreciated   Cardiovascular: Regular rate and rhythm, no murmurs appreciated  GI: Soft and minimally tender to palpation diffusely, nondistended, no rebound or guarding  Skin: normal skin color, texture, turgor  Musculoskeletal: No deformities, no edema present  Neurologic: Alert and oriented, no focal deficits   Neuropsychiatric: General: normal, calm and normal eye contact     Data   Data reviewed today: I reviewed all medications, new labs and imaging results over the last 24 hours. I personally reviewed the EKG tracing showing sinus tach with non specific T wave abnormalities . CT PE reviewed with findings as below.    Recent Labs   Lab 12/11/22  0542 12/11/22  0541   WBC  --  9.3   HGB  --  12.1   MCV  --  87   PLT  --  263   NA  --  137   POTASSIUM  --  3.5   CHLORIDE  --  99   CO2  --  23   BUN  --  5.2*   CR  --  0.74   ANIONGAP  --  15   KIMBERLYN  --  9.7   GLC  --  101*   ALBUMIN  --  3.7   PROTTOTAL 7.1 7.0   BILITOTAL  --  0.5   ALKPHOS  --  160*   ALT  --  45*   AST  --  19   LIPASE 44  --      9.3    \    12.1    /    263   N 70    L N/A    137    99    5.2 (L) /   ------------------------------------ 101 (H)   ALT 45 (H)   AST 19    (H)   ALB 3.7   Ca 9.7  3.5    23    0.74 \    % RETIC N/A      Troponin " N/A    BNP N/A    CK N/A  INR N/A   PTT N/A    D-dimer N/A    Fibrinogen N/A    Antithrombin N/A  Ferritin N/A  CRP N/A    IL-6 N/A  Recent Results (from the past 24 hour(s))   CT Chest Pulmonary Embolism w Contrast    Narrative    EXAM: CT CHEST PULMONARY EMBOLISM W CONTRAST  LOCATION: [facility]  DATE/TIME: 12/11/2022 6:02 AM    INDICATION: Shortness of breath. Hypoxia post gastric bypass surgery on 12/2/2022.  COMPARISON: CT pulmonary angiogram 11/1/2022.   TECHNIQUE: CT chest pulmonary angiogram during arterial phase injection of IV contrast. Multiplanar reformats and MIP reconstructions were performed. Dose reduction techniques were used.   CONTRAST: 73 Isovue-370 IV.    FINDINGS:  ANGIOGRAM CHEST: No pulmonary emboli on either side. Normal caliber thoracic aorta without aneurysm or dissection. No CT evidence of right heart strain.    LUNGS AND PLEURA: Moderate left and small right pleural effusions with associated passive atelectasis in the adjacent lungs, similar to prior. Vague opacities have developed in the lungs which could represent an infectious or an inflammatory process.   Differential diagnostic possibilities would include COVID-19 infection.    MEDIASTINUM/AXILLAE: Normal cardiac size. Moderate pericardial effusion has developed. No suspicious adenopathy. Reflux in the distal esophagus. Trachea is midline.    CORONARY ARTERY CALCIFICATION: None.    UPPER ABDOMEN: Postoperative changes of bariatric surgery. Fatty liver. Dependent gallstones without biliary dilatation or adjacent inflammation.    MUSCULOSKELETAL: Minor right convex thoracic curve.      Impression    IMPRESSION:  1.  No pulmonary emboli on either side. Moderate left and small right effusions with associated passive atelectasis in the adjacent lungs, unchanged. Vague infiltrates have developed in the lungs, most likely representing an infectious or an inflammatory   process. Differential diagnostic possibilities  would include COVID-19 infection.    2.  Normal caliber thoracic aorta without aneurysm or dissection. Normal cardiac size. Moderate pericardial effusion has developed.    3.  Reflux in the distal esophagus. Postoperative changes of bariatric surgery. Fatty liver. Cholelithiasis.    4.  Minor right convex thoracic curve.   POC US ECHO LIMITED    Impression    Limited Bedside Cardiac Ultrasound, performed and interpreted by me.   Indication: Shortness of Breath.  subcostal views were acquired.   Image quality was satisfactory.    Findings:    Abnormal moderate pericardial effusion with no findings of tamponade no right heart collapse    IMPRESSION: Abnormal limited cardiac ultrasound showing .  Moderate pericardial effusion.  No signs of tamponade

## 2022-12-12 ENCOUNTER — APPOINTMENT (OUTPATIENT)
Dept: GENERAL RADIOLOGY | Facility: CLINIC | Age: 42
DRG: 186 | End: 2022-12-12
Attending: INTERNAL MEDICINE
Payer: COMMERCIAL

## 2022-12-12 ENCOUNTER — APPOINTMENT (OUTPATIENT)
Dept: CARDIOLOGY | Facility: CLINIC | Age: 42
DRG: 186 | End: 2022-12-12
Attending: INTERNAL MEDICINE
Payer: COMMERCIAL

## 2022-12-12 ENCOUNTER — APPOINTMENT (OUTPATIENT)
Dept: ULTRASOUND IMAGING | Facility: CLINIC | Age: 42
DRG: 186 | End: 2022-12-12
Attending: INTERNAL MEDICINE
Payer: COMMERCIAL

## 2022-12-12 PROBLEM — N92.4 PREMENOPAUSAL MENORRHAGIA: Status: ACTIVE | Noted: 2022-12-12

## 2022-12-12 PROBLEM — R76.8 OTHER SPECIFIED ABNORMAL IMMUNOLOGICAL FINDINGS IN SERUM: Status: ACTIVE | Noted: 2022-12-09

## 2022-12-12 PROBLEM — F33.1 MODERATE EPISODE OF RECURRENT MAJOR DEPRESSIVE DISORDER (H): Status: ACTIVE | Noted: 2021-12-10

## 2022-12-12 PROBLEM — R76.0 RAISED ANTIBODY TITER: Status: ACTIVE | Noted: 2022-06-20

## 2022-12-12 PROBLEM — K44.9 DIAPHRAGMATIC HERNIA WITHOUT OBSTRUCTION OR GANGRENE: Status: ACTIVE | Noted: 2022-06-20

## 2022-12-12 LAB
ALBUMIN SERPL-MCNC: 2 G/DL (ref 3.4–5)
ALP SERPL-CCNC: 107 U/L (ref 40–150)
ALT SERPL W P-5'-P-CCNC: 29 U/L (ref 0–50)
ANION GAP SERPL CALCULATED.3IONS-SCNC: 9 MMOL/L (ref 3–14)
APPEARANCE FLD: ABNORMAL
AST SERPL W P-5'-P-CCNC: 10 U/L (ref 0–45)
ATRIAL RATE - MUSE: 113 BPM
BILIRUB SERPL-MCNC: 0.7 MG/DL (ref 0.2–1.3)
BUN SERPL-MCNC: 6 MG/DL (ref 7–30)
CALCIUM SERPL-MCNC: 8.5 MG/DL (ref 8.5–10.1)
CELL COUNT BODY FLUID SOURCE: ABNORMAL
CHLORIDE BLD-SCNC: 106 MMOL/L (ref 94–109)
CO2 SERPL-SCNC: 24 MMOL/L (ref 20–32)
COLOR FLD: YELLOW
CREAT SERPL-MCNC: 0.85 MG/DL (ref 0.52–1.04)
DIASTOLIC BLOOD PRESSURE - MUSE: NORMAL MMHG
ERYTHROCYTE [DISTWIDTH] IN BLOOD BY AUTOMATED COUNT: 18.7 % (ref 10–15)
GFR SERPL CREATININE-BSD FRML MDRD: 87 ML/MIN/1.73M2
GLUCOSE BLD-MCNC: 86 MG/DL (ref 70–99)
GRAM STAIN RESULT: NORMAL
GRAM STAIN RESULT: NORMAL
HCT VFR BLD AUTO: 32.5 % (ref 35–47)
HGB BLD-MCNC: 10.1 G/DL (ref 11.7–15.7)
INTERPRETATION ECG - MUSE: NORMAL
LD BODY BODY FLUID SOURCE: NORMAL
LDH FLD L TO P-CCNC: 97 U/L
LVEF ECHO: NORMAL
LYMPHOCYTES NFR FLD MANUAL: 82 %
MCH RBC QN AUTO: 27 PG (ref 26.5–33)
MCHC RBC AUTO-ENTMCNC: 31.1 G/DL (ref 31.5–36.5)
MCV RBC AUTO: 87 FL (ref 78–100)
MONOS+MACROS NFR FLD MANUAL: 9 %
NEUTS BAND NFR FLD MANUAL: 9 %
P AXIS - MUSE: 14 DEGREES
PH BODY FLUID SOURCE: NORMAL
PH FLD: 7 PH
PLATELET # BLD AUTO: 219 10E3/UL (ref 150–450)
POTASSIUM BLD-SCNC: 2.9 MMOL/L (ref 3.4–5.3)
POTASSIUM BLD-SCNC: 3.6 MMOL/L (ref 3.4–5.3)
PR INTERVAL - MUSE: 130 MS
PROT FLD-MCNC: 3.6 G/DL
PROT SERPL-MCNC: 5.6 G/DL (ref 6.8–8.8)
PROTEIN BODY FLUID SOURCE: NORMAL
QRS DURATION - MUSE: 82 MS
QT - MUSE: 308 MS
QTC - MUSE: 422 MS
R AXIS - MUSE: 28 DEGREES
RBC # BLD AUTO: 3.74 10E6/UL (ref 3.8–5.2)
SODIUM SERPL-SCNC: 139 MMOL/L (ref 133–144)
SYSTOLIC BLOOD PRESSURE - MUSE: NORMAL MMHG
T AXIS - MUSE: -15 DEGREES
VANCOMYCIN SERPL-MCNC: 15.8 MG/L
VENTRICULAR RATE- MUSE: 113 BPM
WBC # BLD AUTO: 6.6 10E3/UL (ref 4–11)
WBC # FLD AUTO: 388 /UL

## 2022-12-12 PROCEDURE — 2894A VOIDCORRECT: CPT | Performed by: SURGERY

## 2022-12-12 PROCEDURE — C9113 INJ PANTOPRAZOLE SODIUM, VIA: HCPCS | Performed by: INTERNAL MEDICINE

## 2022-12-12 PROCEDURE — 80202 ASSAY OF VANCOMYCIN: CPT | Performed by: STUDENT IN AN ORGANIZED HEALTH CARE EDUCATION/TRAINING PROGRAM

## 2022-12-12 PROCEDURE — 87040 BLOOD CULTURE FOR BACTERIA: CPT | Performed by: INTERNAL MEDICINE

## 2022-12-12 PROCEDURE — 93321 DOPPLER ECHO F-UP/LMTD STD: CPT | Mod: 26 | Performed by: INTERNAL MEDICINE

## 2022-12-12 PROCEDURE — 255N000002 HC RX 255 OP 636: Performed by: STUDENT IN AN ORGANIZED HEALTH CARE EDUCATION/TRAINING PROGRAM

## 2022-12-12 PROCEDURE — 258N000003 HC RX IP 258 OP 636: Performed by: INTERNAL MEDICINE

## 2022-12-12 PROCEDURE — 93308 TTE F-UP OR LMTD: CPT | Mod: 26 | Performed by: INTERNAL MEDICINE

## 2022-12-12 PROCEDURE — 99232 SBSQ HOSP IP/OBS MODERATE 35: CPT | Performed by: STUDENT IN AN ORGANIZED HEALTH CARE EDUCATION/TRAINING PROGRAM

## 2022-12-12 PROCEDURE — 83986 ASSAY PH BODY FLUID NOS: CPT | Performed by: INTERNAL MEDICINE

## 2022-12-12 PROCEDURE — 250N000013 HC RX MED GY IP 250 OP 250 PS 637: Performed by: INTERNAL MEDICINE

## 2022-12-12 PROCEDURE — 93321 DOPPLER ECHO F-UP/LMTD STD: CPT

## 2022-12-12 PROCEDURE — 250N000009 HC RX 250: Performed by: RADIOLOGY

## 2022-12-12 PROCEDURE — 93325 DOPPLER ECHO COLOR FLOW MAPG: CPT

## 2022-12-12 PROCEDURE — 250N000011 HC RX IP 250 OP 636: Performed by: STUDENT IN AN ORGANIZED HEALTH CARE EDUCATION/TRAINING PROGRAM

## 2022-12-12 PROCEDURE — 87205 SMEAR GRAM STAIN: CPT | Performed by: INTERNAL MEDICINE

## 2022-12-12 PROCEDURE — 85027 COMPLETE CBC AUTOMATED: CPT | Performed by: INTERNAL MEDICINE

## 2022-12-12 PROCEDURE — 80053 COMPREHEN METABOLIC PANEL: CPT | Performed by: INTERNAL MEDICINE

## 2022-12-12 PROCEDURE — 84157 ASSAY OF PROTEIN OTHER: CPT | Performed by: INTERNAL MEDICINE

## 2022-12-12 PROCEDURE — 89051 BODY FLUID CELL COUNT: CPT | Performed by: INTERNAL MEDICINE

## 2022-12-12 PROCEDURE — 272N000706 US THORACENTESIS PORTABLE

## 2022-12-12 PROCEDURE — 87070 CULTURE OTHR SPECIMN AEROBIC: CPT | Performed by: INTERNAL MEDICINE

## 2022-12-12 PROCEDURE — 83615 LACTATE (LD) (LDH) ENZYME: CPT | Performed by: INTERNAL MEDICINE

## 2022-12-12 PROCEDURE — 210N000002 HC R&B HEART CARE

## 2022-12-12 PROCEDURE — 999N000065 XR UPPER GI WATER SOLUBLE

## 2022-12-12 PROCEDURE — 0W9B3ZZ DRAINAGE OF LEFT PLEURAL CAVITY, PERCUTANEOUS APPROACH: ICD-10-PCS | Performed by: RADIOLOGY

## 2022-12-12 PROCEDURE — 250N000011 HC RX IP 250 OP 636: Performed by: INTERNAL MEDICINE

## 2022-12-12 PROCEDURE — 93325 DOPPLER ECHO COLOR FLOW MAPG: CPT | Mod: 26 | Performed by: INTERNAL MEDICINE

## 2022-12-12 PROCEDURE — 87015 SPECIMEN INFECT AGNT CONCNTJ: CPT | Performed by: INTERNAL MEDICINE

## 2022-12-12 PROCEDURE — 87075 CULTR BACTERIA EXCEPT BLOOD: CPT | Performed by: INTERNAL MEDICINE

## 2022-12-12 PROCEDURE — 84132 ASSAY OF SERUM POTASSIUM: CPT | Performed by: STUDENT IN AN ORGANIZED HEALTH CARE EDUCATION/TRAINING PROGRAM

## 2022-12-12 PROCEDURE — 999N000128 HC STATISTIC PERIPHERAL IV START W/O US GUIDANCE

## 2022-12-12 RX ORDER — SUCRALFATE ORAL 1 G/10ML
1 SUSPENSION ORAL
Status: DISCONTINUED | OUTPATIENT
Start: 2022-12-12 | End: 2022-12-15 | Stop reason: HOSPADM

## 2022-12-12 RX ORDER — POTASSIUM CHLORIDE 29.8 MG/ML
20 INJECTION INTRAVENOUS
Status: COMPLETED | OUTPATIENT
Start: 2022-12-12 | End: 2022-12-12

## 2022-12-12 RX ORDER — DOCUSATE SODIUM 100 MG/1
100 CAPSULE, LIQUID FILLED ORAL 2 TIMES DAILY PRN
Status: DISCONTINUED | OUTPATIENT
Start: 2022-12-12 | End: 2022-12-15 | Stop reason: HOSPADM

## 2022-12-12 RX ORDER — LIDOCAINE HYDROCHLORIDE 10 MG/ML
10 INJECTION, SOLUTION EPIDURAL; INFILTRATION; INTRACAUDAL; PERINEURAL ONCE
Status: COMPLETED | OUTPATIENT
Start: 2022-12-12 | End: 2022-12-12

## 2022-12-12 RX ADMIN — HYDROMORPHONE HYDROCHLORIDE 0.2 MG: 0.2 INJECTION, SOLUTION INTRAMUSCULAR; INTRAVENOUS; SUBCUTANEOUS at 00:40

## 2022-12-12 RX ADMIN — PIPERACILLIN AND TAZOBACTAM 4.5 G: 4; .5 INJECTION, POWDER, FOR SOLUTION INTRAVENOUS at 14:58

## 2022-12-12 RX ADMIN — HYOSCYAMINE SULFATE 0.12 MG: 0.12 TABLET SUBLINGUAL at 21:39

## 2022-12-12 RX ADMIN — HYDROXYZINE HYDROCHLORIDE 25 MG: 25 TABLET, FILM COATED ORAL at 20:39

## 2022-12-12 RX ADMIN — Medication 500 MCG: at 09:04

## 2022-12-12 RX ADMIN — LIDOCAINE HYDROCHLORIDE 10 ML: 10 INJECTION, SOLUTION EPIDURAL; INFILTRATION; INTRACAUDAL; PERINEURAL at 10:17

## 2022-12-12 RX ADMIN — PIPERACILLIN AND TAZOBACTAM 4.5 G: 4; .5 INJECTION, POWDER, FOR SOLUTION INTRAVENOUS at 04:07

## 2022-12-12 RX ADMIN — BUPROPION HYDROCHLORIDE 75 MG: 75 TABLET, FILM COATED ORAL at 09:04

## 2022-12-12 RX ADMIN — OXYCODONE HYDROCHLORIDE 5 MG: 5 TABLET ORAL at 09:04

## 2022-12-12 RX ADMIN — POTASSIUM CHLORIDE 20 MEQ: 29.8 INJECTION, SOLUTION INTRAVENOUS at 12:05

## 2022-12-12 RX ADMIN — OXYCODONE HYDROCHLORIDE 5 MG: 5 TABLET ORAL at 20:38

## 2022-12-12 RX ADMIN — PIPERACILLIN AND TAZOBACTAM 4.5 G: 4; .5 INJECTION, POWDER, FOR SOLUTION INTRAVENOUS at 20:38

## 2022-12-12 RX ADMIN — HYDROMORPHONE HYDROCHLORIDE 0.2 MG: 0.2 INJECTION, SOLUTION INTRAMUSCULAR; INTRAVENOUS; SUBCUTANEOUS at 09:04

## 2022-12-12 RX ADMIN — HYDROMORPHONE HYDROCHLORIDE 0.2 MG: 0.2 INJECTION, SOLUTION INTRAMUSCULAR; INTRAVENOUS; SUBCUTANEOUS at 13:04

## 2022-12-12 RX ADMIN — HYDROMORPHONE HYDROCHLORIDE 0.2 MG: 0.2 INJECTION, SOLUTION INTRAMUSCULAR; INTRAVENOUS; SUBCUTANEOUS at 17:30

## 2022-12-12 RX ADMIN — HYDROMORPHONE HYDROCHLORIDE 0.2 MG: 0.2 INJECTION, SOLUTION INTRAMUSCULAR; INTRAVENOUS; SUBCUTANEOUS at 04:39

## 2022-12-12 RX ADMIN — LORAZEPAM 0.5 MG: 2 INJECTION INTRAMUSCULAR; INTRAVENOUS at 10:33

## 2022-12-12 RX ADMIN — VANCOMYCIN HYDROCHLORIDE 1250 MG: 10 INJECTION, POWDER, LYOPHILIZED, FOR SOLUTION INTRAVENOUS at 21:37

## 2022-12-12 RX ADMIN — HUMAN ALBUMIN MICROSPHERES AND PERFLUTREN 9 ML: 10; .22 INJECTION, SOLUTION INTRAVENOUS at 11:33

## 2022-12-12 RX ADMIN — PANTOPRAZOLE SODIUM 40 MG: 40 INJECTION, POWDER, FOR SOLUTION INTRAVENOUS at 09:06

## 2022-12-12 RX ADMIN — HYDROMORPHONE HYDROCHLORIDE 0.2 MG: 0.2 INJECTION, SOLUTION INTRAMUSCULAR; INTRAVENOUS; SUBCUTANEOUS at 23:14

## 2022-12-12 RX ADMIN — VANCOMYCIN HYDROCHLORIDE 1250 MG: 10 INJECTION, POWDER, LYOPHILIZED, FOR SOLUTION INTRAVENOUS at 07:45

## 2022-12-12 RX ADMIN — Medication 1 MG: at 20:38

## 2022-12-12 RX ADMIN — HYDROMORPHONE HYDROCHLORIDE 0.2 MG: 0.2 INJECTION, SOLUTION INTRAMUSCULAR; INTRAVENOUS; SUBCUTANEOUS at 06:41

## 2022-12-12 RX ADMIN — HYOSCYAMINE SULFATE 0.12 MG: 0.12 TABLET SUBLINGUAL at 09:04

## 2022-12-12 RX ADMIN — SUCRALFATE ORAL 1 G: 1 SUSPENSION ORAL at 17:16

## 2022-12-12 RX ADMIN — POTASSIUM CHLORIDE 20 MEQ: 29.8 INJECTION, SOLUTION INTRAVENOUS at 10:34

## 2022-12-12 RX ADMIN — THIAMINE HCL TAB 100 MG 100 MG: 100 TAB at 09:03

## 2022-12-12 RX ADMIN — PIPERACILLIN AND TAZOBACTAM 4.5 G: 4; .5 INJECTION, POWDER, FOR SOLUTION INTRAVENOUS at 09:41

## 2022-12-12 RX ADMIN — PANTOPRAZOLE SODIUM 40 MG: 40 INJECTION, POWDER, FOR SOLUTION INTRAVENOUS at 17:16

## 2022-12-12 RX ADMIN — POTASSIUM CHLORIDE 20 MEQ: 29.8 INJECTION, SOLUTION INTRAVENOUS at 09:09

## 2022-12-12 RX ADMIN — BUPROPION HYDROCHLORIDE 75 MG: 75 TABLET, FILM COATED ORAL at 20:39

## 2022-12-12 RX ADMIN — SUCRALFATE ORAL 1 G: 1 SUSPENSION ORAL at 21:39

## 2022-12-12 RX ADMIN — HYOSCYAMINE SULFATE 0.12 MG: 0.12 TABLET SUBLINGUAL at 17:16

## 2022-12-12 ASSESSMENT — ACTIVITIES OF DAILY LIVING (ADL)
ADLS_ACUITY_SCORE: 37
ADLS_ACUITY_SCORE: 36
ADLS_ACUITY_SCORE: 36
ADLS_ACUITY_SCORE: 35
ADLS_ACUITY_SCORE: 37
ADLS_ACUITY_SCORE: 37

## 2022-12-12 NOTE — PLAN OF CARE
Goal Outcome Evaluation:       Admitted from [facility] via cath lab today, had chest pain, SOB, pleural effusions bilaterally and possible PNA.   Has complex GI  surg hx, c/o of lots of pain incidental finding of cholethiasis.  Dilaudid q2 hrs IV prn.   Oxygen  at 3 L oxy mask, de-satting  on NC.   Left pleural drain with minimal additional drainage since arrived to unit.   Poor vascular access, lab tried to draw 3 x time, (different techs) no success, request made to Dr Milner Midline or PICC, Vascular access team recommends PICC, due to Midline high probability going bad and needing a PICC anyway.   Needs IMC orders, higher level nursing cares indicated.   After pain meds x 2, she is comfortable but has high anxiety and Lorazepam requested for PICC placement and anxiety in general. She has home hydroxyzine prn for anxiety as well.   Cxry done, see results.  Plan for US guided maritza in am.

## 2022-12-12 NOTE — PROVIDER NOTIFICATION
MD Notification    Notified Person: MD    Notified Person Name:Dr. Green    Notification Date/Time:12/11/22 1975    Notification Interaction:text    Purpose of Notification:Pt is a hard stick, orders for PICC line and IMC?Thank you. Mariana SWAN *[id number]     Orders Received:    Comments:

## 2022-12-12 NOTE — PROGRESS NOTES
I came by 2 times today.  Patient is awake getting an esophagogram.    Echocardiogram today shows trivial residual pericardial effusion with early features of constrictive physiology.    1.  Leave drain in situ overnight.  Will remove tomorrow.  2.  Start colchicine 0.6 mg once daily for 4 to 6 weeks.  This can be restarted tomorrow as patient is having an esophageal procedure today.    Dr. YESSENIA Moore MD [facility]  Cardiology

## 2022-12-12 NOTE — PROGRESS NOTES
Cardiology attempted to see patient multiple times today but was out of room undergoing testing. Will see tomorrow.     Viviane Collins PA-C  [facility]

## 2022-12-12 NOTE — PROGRESS NOTES
[facility]    Medicine Progress Note - Hospitalist Service    Date of Admission:  12/11/2022  Date of Service: 12/12/2022    Assessment & Plan       Leah Yanez is a 42 year old female with history of depression, GERD, PE, renal syndrome transferred from [facility] on 12/11/2022 with acute hypoxic failure secondary to bilateral pleural effusion and pericardial effusion/cardiac tamponade    #Moderate pericardial effusion with pericardial tamponade  -Appreciate emergent pericardiocentesis and drain placement by interventional cardiology 12/11. (See their procedure note for details)  -Monitor closely and repeat limited echocardiogram today.  Further management as per cardiology team  -Follow-up on the fluid studies  -Thoracentesis today -> 300 mL of  ellis colored fluid was drained without immediate  complications.    #Bilateral pleural effusions  -We will get thoracentesis to better evaluate the etiology (she had bilateral HCAP in early November)   -Considering her complicated recent medical course, we will rule out sepsis.  Follow-up on sputum culture, blood culture, fluid cultures.  -Continue empiric vancomycin and ceftriaxone for now given ongoing fevers    # Chest pain: Admission CT negative for PE.  Differential being pleurisy from pneumonia and effusion  Admission troponin of 22 (EKG did not show any evidence of pericarditis.  Does have some T wave inversion in lateral leads).  We will recheck troponin (although may be elevated due to pericardial drain placement).  We will trend  -Other differential being radiated pain from her postsurgical complications causing reflux, esophagitis.  -Surgery has been consulted and reviewing case with bariatric surgery   -[facility] consulted as well for reflux / dysphagia     #Status post laparoscopic sleeve gastrectomy  #Gastric anastomotic stricture and globus sensation  -underwent laparoscopic sleeve gastrectomy and hiatal hernia repair  "on 10/25/22 with Dr. Aragon.  Patient was hospitalized about a week ago with [facility], had EGD performed on 12/1 and esophageal stenting and balloon dilation performed in 12/2  -We will get bariatric surgery review tomorrow. Currently abdomen is soft, nontender.  If any clinical change, she will need abdominal imaging as well (her pain could be radiated)  - SLP following    #Cholelithiasis-incidental on CT.  Monitor LFT    #Major Depression. continue PTA Wellbutrin       Diet: Advance Diet as Tolerated: Clear Liquid Diet    DVT Prophylaxis: Pneumatic Compression Devices  Holder Catheter: Not present  Central Lines: PRESENT  PICC 12/11/22 Double Lumen Right Basilic-Site Assessment: WDL  Cardiac Monitoring: ACTIVE order. Indication: Tachyarrhythmias, acute (48 hours)  Code Status: Full Code      Disposition Plan      Expected Discharge Date: 12/15/2022                The patient's care was discussed with the Bedside Nurse and Patient.    Nabor Feng MD  Hospitalist Service  [facility]  Securely message with the Vocera Web Console (learn more here)  Text page via [employer] Paging/Directory         Clinically Significant Risk Factors Present on Admission        # Hypokalemia: Lowest K = 2.9 mmol/L in last 2 days, will replace as needed    # Hypercalcemia: corrected calcium is >10.1, will monitor as appropriate    # Hypoalbuminemia: Lowest albumin = 2 g/dL at 12/12/2022  6:49 AM, will monitor as appropriate  # Drug Induced Coagulation Defect: home medication list includes an anticoagulant medication         # Obesity: Estimated body mass index is 31 kg/m  as calculated from the following:    Height as of an earlier encounter on 12/11/22: 1.854 m (6' 1\").    Weight as of an earlier encounter on 12/11/22: 106.6 kg (235 lb).           ______________________________________________________________________    Interval History     Doing well post thoracentesis  Chest discomfort stable  100.9F " fever last night  No nausea / vomiting   No new complaints    Data reviewed today: I reviewed all medications, new labs and imaging results over the last 24 hours. I personally reviewed no images or EKG's today.    Physical Exam   Vital Signs: Temp: 98.2  F (36.8  C) Temp src: Oral BP: 113/72 Pulse: 85   Resp: 24 SpO2: 95 % O2 Device: Oxymask Oxygen Delivery: 3 LPM  Weight: 0 lbs 0 oz     Constitutional: awake, alert, cooperative, no apparent distress.   Respiratory: CTABL   Cardiovascular: RRR with no m/r/g   GI: Normal bowel sounds, soft, non-distended, non-tender.   Skin: normal skin color, texture, turgor   Neurologic: Awake, alert, oriented to name, place and time. Motor is 5 out of 5 bilaterally. Sensory is intact.   Neuropsychiatric: normal mood and affect    ----------------------------------------------------------------------------------------    Data   Recent Labs   Lab 22  0649 22  0542 22  0541   WBC 6.6  --  9.3   HGB 10.1*  --  12.1   MCV 87  --  87     --  263     --  137   POTASSIUM 2.9*  --  3.5   CHLORIDE 106  --  99   CO2 24  --  23   BUN 6*  --  5.2*   CR 0.85  --  0.74   ANIONGAP 9  --  15   KIMBERLYN 8.5  --  9.7   GLC 86  --  101*   ALBUMIN 2.0*  --  3.7   PROTTOTAL 5.6* 7.1 7.0   BILITOTAL 0.7  --  0.5   ALKPHOS 107  --  160*   ALT 29  --  45*   AST 10  --  19   LIPASE  --  44  --      Recent Results (from the past 24 hour(s))   Cardiac Catheterization    Narrative    1.  Successful pericardiocentesis via apical approach.  160 ml of   straw-colored fluid was removed; a sample of which was sent to the lab for   analysis.   Echocardiogram Limited    Narrative    [id number]  [id number]  [id number]  [id number]^NOREEN^ELIUD^[facility]  Echocardiography Laboratory  [address] [place], [place] [address]     Name: ASIYA PRECIADO  MRN: [id number]  : 1980  Study Date: 2022 12:32 PM  Age: 42 yrs  Gender: Female  Patient Location:  [facility]  Reason For Study: Pericardial Effusion  Ordering Physician: ELIUD SORTO  Performed By: Ivan Rasmussen     BSA: 2.3 m2  Height: 73 in  Weight: 235 lb  BP: 119/74 mmHg  ______________________________________________________________________________  Procedure  Limited Portable Echo Adult.  ______________________________________________________________________________  Interpretation Summary     Limited intra-procedural study.     Pre-procedure: Small-moderate circumferential effusion. See full TTE earlier  today for echocardiographic assessment of tamponade.     Post-procedure: 160 cc of straw-colored fluid removed. No detectable residual  effusion is seen.  ______________________________________________________________________________  ______________________________________________________________________________  Report approved by: MD Eliud Trujillo 12/11/2022 01:48 PM     ______________________________________________________________________________      XR Chest Port 1 View    Narrative    EXAM: XR CHEST PORT 1 VIEW  LOCATION: [facility]  DATE/TIME: 12/11/2022 6:43 PM    INDICATION: Pericardial effusion. s p pericardiocentesis  COMPARISON: CT from earlier today.       Impression    IMPRESSION: Minimal change. Bibasilar infiltrates and pleural effusions persists, left worse than right. Heart size difficult to assess likely upper limits of normal. No pneumothorax.     XR Chest Port 1 View    Narrative    EXAM: XR CHEST PORT 1 VIEW  LOCATION: [facility]  DATE/TIME: 12/11/2022 11:50 PM    INDICATION: RN placed PICC   verify tip placement  COMPARISON: Portable chest radiograph 12/11/2022      Impression    IMPRESSION: Right upper extremity PICC line with tip terminating at the superior cavoatrial junction. Bibasilar infiltrates and pleural effusions persist, left worse than right. Heart size difficult to assess likely upper limits of normal. No    pneumothorax.     Medications       buPROPion  75 mg Oral BID     cyanocobalamin  500 mcg Sublingual Daily     hyoscyamine  0.125 mg Sublingual 4x Daily AC & HS     pantoprazole  40 mg Intravenous Daily with breakfast     piperacillin-tazobactam  4.5 g Intravenous Q6H     potassium chloride  20 mEq Intravenous Q1H     sodium chloride (PF)  10 mL Intracatheter Q8H     sodium chloride (PF)  10-40 mL Intracatheter Q7 Days     sodium chloride (PF)  3 mL Intracatheter Q8H     sodium chloride (PF)  3 mL Intracatheter Q8H     thiamine  100 mg Oral Daily     vancomycin  1,250 mg Intravenous Q12H

## 2022-12-12 NOTE — CONSULTS
GASTROENTEROLOGY CONSULTATION      Leah Yanez  [address]  [place] [address]  42 year old female     Admission Date/Time: 12/11/2022  Primary Care Provider: Susan - [place], [facility]     We were asked to see the patient in consultation by Dr. Feng for evaluation of dysphagia, nausea/vomiting.    ASSESSMENT:    1.  Dysphagia, reflux, recurrent vomiting- this started after her sleeve gastrectomy at the [facility].  Intermittent notes state that she has been able to at least tolerate clear liquids, however, more recently its been difficult for her.  Suspect this is related to possible stricturing disease within her sleeve gastrectomy, based on review of the operative reports.  Given her recent pericardial tamponade and current cardiac drain, we will pursue an upper GI series to evaluate her anatomy further and when she is stable to pursue an upper endoscopy with anesthesia, then this will be performed.    RECOMMENDATIONS:  - Twice a day PPI  - Okay to try clear liquids if patient can tolerate this  - We will try Carafate 4 times daily, to see if there is any symptomatic improvement with this  - Upper GI series  - When patient is stable to undergo upper endoscopy, this will be done.    Juan Barnard MD   [facility]  [phone]      ________________________________________________________________________        HPI:  Leah Yanez is a 42 year old female who has a history of Griggs's esophagus, depression, gastroesophageal reflux disease, hiatal hernia, pulmonary embolism with anticardiolipin antibody positive, obesity, Raynaud's.    The patient presented to [facility] yesterday with severe chest pain radiating to her left side and shoulder and increasing shortness of breath.  There is associated chills and rigors.  She was found to have bilateral pleural effusion and concern for pericardial tamponade, thus transferred to [facility] with an emergent  pericardiocentesis and ultimately had a cardiac drain placed.    From a GI standpoint the patient has had difficulty swallowing since her sleeve gastrectomy surgery, see details below for her clinical course since that surgery.  She reports that she has liquid esophageal dysphagia and subsequent emesis, as well as, difficulty with pills and solid food, though she has not had solid food in quite some time.  She does report an acidic pain in her esophagus and a globus sensation.    [facility] records reviewed:  - Laparoscopic sleeve gastrectomy with hiatal hernia repair, October 25, 2022 (Dr. Aragon), discharged on October 30, 2022.    - Hospitalized November 1 to November 16, 2022- brought to the OR (Dr. Aragon), November 4, 2022 for evacuation of abdominal hematoma, no active bleeding intraoperatively.  Operative note reports that due to problems with swallowing, one anterior suture was removed from the previous hiatal hernia repair.  Diagnostic bronchoscopy performed while intubated, November 5, 2022.  On November 14 patient had an intraoperative upper endoscopy which was advanced to the sleeve the stomach.  There was no stricture in the esophagus.  Z-line at 41 cm, no hiatal hernia.  Scope was advanced to the pylorus.  A  balloon dilator was used to dilate the body of the sleeve up to 18 mm.  Stent was not placed due to the open nature of the sleeve.  A pediatric scope was then used through the nares to advance past the pylorus through the duodenum to the proximal jejunum and feeding tube placed.  Also, bilateral lower lobe pneumonia new oxygen requirement during this hospitalization.  Intubated with brief ICU stay. On discharge able to maintain O2 sats on room air.    - Hospitalized from November 30 to December 2, 2022- Upper endoscopy (Dr. Aragon), November 30, showed mild gastric stricture.  No evidence of esophageal stricture.  Placement of 23 x 100 endomax covered stent across the gastric  stricture.  Upper endoscopy, December 2, 2022 (Dr. Aragon) gastroesophageal junction was normal.  Benign-appearing intrinsic mild stenosis found at the incisura, traversed with 10 mm scope.  Dilator was passed and dilated up to 19 mm for 5 minutes.  Initially had to 17 mm and this was relatively tight.  Duodenum normal.  Gastric stent removed during this procedure.     ROS: A comprehensive ten point review of systems was negative aside from those in mentioned in the HPI.      PAST MEDICAL HISTORY:  Past Medical History:   Diagnosis Date     Anti-cardiolipin antibody positive      Griggs esophagus      Concussion 2016     Depressive disorder, not elsewhere classified 03/01/2006    Resolved 8/07     Gastroesophageal reflux disease with esophagitis      Hiatal hernia      History of pulmonary embolism      Obesity      Raynaud's syndrome     past history of admission for gangrene of one finger     PAST SURGICAL HISTORY:  Past Surgical History:   Procedure Laterality Date     COMBINED ESOPHAGOSCOPY, GASTROSCOPY, DUODENOSCOPY (EGD), REMOVE ESOPHAGEAL STENT N/A 12/2/2022    Procedure: ESOPHAGOGASTRODUODENOSCOPY, WITH ESOPHAGEAL STENT REMOVAL and Balloon Dialation;  Surgeon: Daniel Aragon MD;  Location:  OR     ESOPHAGOSCOPY, GASTROSCOPY, DUODENOSCOPY (EGD), COMBINED N/A 12/29/2021    Procedure: ESOPHAGOGASTRODUODENOSCOPY, WITH BIOPSY;  Surgeon: Esteban Rose DO;  Location: [facility] OR     ESOPHAGOSCOPY, GASTROSCOPY, DUODENOSCOPY (EGD), COMBINED N/A 11/14/2022    Procedure: Upper endoscopy; gastric stricture dilation, interpretation of fluoroscopy nasojejunal feeding tube;  Surgeon: Daniel Aragon MD;  Location:  OR     ESOPHAGOSCOPY, GASTROSCOPY, DUODENOSCOPY (EGD), COMBINED N/A 11/23/2022    Procedure: ESOPHAGOGASTRODUODENOSCOPY (EGD);  Surgeon: Daniel Aragon MD;  Location:  GI     LAPAROSCOPIC GASTRIC SLEEVE N/A 10/25/2022    Procedure: GASTRECTOMY, SLEEVE, LAPAROSCOPIC;  Surgeon: Margo  "Daniel Torres MD;  Location: [facility] OR     LAPAROSCOPIC HERNIORRHAPHY HIATAL N/A 10/25/2022    Procedure: HERNIORRHAPHY, HIATAL, LAPAROSCOPIC;  Surgeon: Daniel Aragon MD;  Location: [facility] OR     LAPAROSCOPY DIAGNOSTIC (GENERAL) N/A 11/4/2022    Procedure: LAPAROSCOPY, DIAGNOSTIC, BY GENERAL SURGERY, evacuation of abdominl hematoma;  Surgeon: Daniel Aragon MD;  Location: [facility] OR     PICC TRIPLE LUMEN PLACEMENT Left 11/06/2022    51cm (1cm external), Basilic vein     TONSILLECTOMY & ADENOIDECTOMY       ZZC NONSPECIFIC PROCEDURE      T&A as a child     ZZC NONSPECIFIC PROCEDURE      Tubes in ears bilaterally     SOCIAL HISTORY:  Social History     Tobacco Use     Smoking status: Never     Smokeless tobacco: Never   Vaping Use     Vaping Use: Never used   Substance Use Topics     Alcohol use: Yes     Comment: Alcoholic Drinks/day: social     Drug use: No     FAMILY HISTORY:  Family History   Problem Relation Age of Onset     Hypertension Mother         arthritis     Heart Disease Father         MI, Lipids     Acute Myocardial Infarction Father      Cancer - colorectal Maternal Grandmother         arthritis     Hypertension Maternal Grandfather         arthritis, macular degeneration     Colon Cancer Paternal Grandmother      Alzheimer Disease Paternal Grandfather      Anesthesia Reaction No family hx of      Clotting Disorder No family hx of      ALLERGIES:   Allergies   Allergen Reactions     Azithromycin      Erythromycin GI Disturbance     When \"very young\"     MEDICATIONS:   Prior to Admission medications    Medication Sig Start Date End Date Taking? Authorizing Provider   acetaminophen (TYLENOL) 500 MG tablet Take 1,000 mg by mouth every 6 hours as needed for mild pain   Yes Unknown, Entered By History   buPROPion (WELLBUTRIN XL) 150 MG 24 hr tablet Take 150 mg by mouth every morning Switch to Bupropion 75mg immediate release twice daily when ordered   Yes Unknown, Entered By History   calcium carbonate " (TUMS) 500 MG chewable tablet Take 1 chew tab by mouth 3 times daily   Yes Unknown, Entered By History   Cyanocobalamin (B-12) 500 MCG SUBL Place 1 tablet under the tongue daily 12/6/22  Yes Rosanna Phipps NP   enoxaparin ANTICOAGULANT (LOVENOX) 40 MG/0.4ML syringe Inject 0.4 mLs (40 mg) Subcutaneous 2 times daily for 28 days 11/16/22 12/14/22 Yes Daniel Aragon MD   hydrOXYzine (ATARAX) 25 MG tablet Take 1 tablet (25 mg) by mouth 3 times daily as needed for itching 12/2/22  Yes Daniel Aragon MD   hyoscyamine SL (LEVSIN/SL) 0.125 MG sublingual tablet Take 1 tablet (0.125 mg) by mouth 4 times daily (before meals and nightly) 11/30/22  Yes Daniel Aragon MD   Multiple Vitamins-Iron (MULTIVITAMIN/IRON PO) Take 1 tablet by mouth daily   Yes Unknown, Entered By History   omeprazole (PRILOSEC) 40 MG DR capsule Take 1 capsule (40 mg) by mouth 2 times daily 12/29/21 12/29/22 Yes Esteban Rose DO   ondansetron (ZOFRAN ODT) 4 MG ODT tab Take 1 tablet (4 mg) by mouth every 6 hours as needed for nausea 11/23/22  Yes Daniel Aragon MD   oxyCODONE (ROXICODONE) 5 MG tablet Take 1 tablet (5 mg) by mouth every 6 hours as needed for moderate to severe pain 10/26/22  Yes Rosanna Phipps NP   senna-docusate (SENOKOT-S/PERICOLACE) 8.6-50 MG tablet Take 2 tablets by mouth daily as needed for constipation (While taking narcotic pain medications.  Stop taking if having loose stools.) 10/19/22  Yes Daniel Aragon MD   thiamine (B-1) 100 MG tablet Take 1 tablet (100 mg) by mouth daily 12/6/22  Yes Rosanna Phipps NP   traMADol (ULTRAM) 50 MG tablet Take 1 tablet (50 mg) by mouth every 6 hours as needed for severe pain (7-10) 11/30/22  Yes Daniel Aragon MD   Calcium Citrate-Vitamin D (CALCIUM CITRATE CHEWY BITE) 500-12.5 MG-MCG CHEW Take 1 chew tab by mouth 3 times daily  Patient not taking: Reported on 12/7/2022 12/6/22   Rosanna Phipps, SETH   hyoscyamine SL (LEVSIN/SL) 0.125 MG sublingual tablet Take 1  tablet (0.125 mg) by mouth 4 times daily (before meals and nightly) 12/2/22   Daniel Aragon MD   methocarbamol (ROBAXIN) 500 MG tablet Take 1 tablet (500 mg) by mouth 4 times daily as needed for muscle spasms  Patient not taking: Reported on 12/7/2022 11/2/22   Marc Oconnell MD   ondansetron (ZOFRAN ODT) 4 MG ODT tab Take 1 tablet (4 mg) by mouth every 6 hours as needed for nausea or vomiting 12/2/22   Daniel Aragon MD   scopolamine (TRANSDERM) 1 MG/3DAYS 72 hr patch Place 1 patch onto the skin every 72 hours for 30 days  Patient not taking: Reported on 12/7/2022 11/23/22 12/23/22  Daniel Aragon MD   Vitamin D3 (CHOLECALCIFEROL) 25 mcg (1000 units) tablet Take 4 tablets by mouth daily  Patient not taking: Reported on 12/7/2022    Unknown, Entered By History     PHYSICAL EXAM:   /81 (BP Location: Left arm)   Pulse 103   Temp 98.2  F (36.8  C) (Oral)   Resp 24   LMP 11/14/2022 (Approximate)   SpO2 96%    GEN: Alert, NAD.  ORIN: AT, anicteric, OP without erythema, exudate, or ulcers.    LYMPH: No LAD noted.  HRT: RRR, no EDUIN  LUNGS: CTA  ABD: +BS, non-tender, non-distended, no rebound or guarding.  SKIN: No rash, jaundice   NEURO: MS intact       ADDITIONAL DATA:   I reviewed the patient's new clinical lab test results.   Recent Labs   Lab Test 12/12/22  0649 12/11/22  0541 11/21/22  1551   WBC 6.6 9.3 8.9   HGB 10.1* 12.1 10.7*   MCV 87 87 86    263 550*   INR  --   --  1.04     Recent Labs   Lab Test 12/12/22  0649 12/11/22  0541 12/02/22  0626 11/16/22  0730 11/16/22  0701    137  --   --  140   POTASSIUM 2.9* 3.5  --   --  3.5   CHLORIDE 106 99  --   --  101   CO2 24 23  --   --  22   BUN 6* 5.2*  --   --  21.3*   CR 0.85 0.74  --   --  0.84   ANIONGAP 9 15  --   --  17*   KIMBERLYN 8.5 9.7  --   --  9.0   GLC 86 101* 89   < > 98    < > = values in this interval not displayed.     Recent Labs   Lab Test 12/12/22  0649 12/11/22  0542 12/11/22  0541 11/13/22  0612  11/07/22  0645 11/06/22  0926 11/06/22  0616 11/02/22  0636 11/01/22  1734 10/24/22  1447   ALBUMIN 2.0*  --  3.7 2.9*   < >  --  2.3*   < > 3.5  --    BILITOTAL 0.7  --  0.5 0.3   < >  --  0.5   < > 1.5*  --    ALT 29  --  45* 17   < >  --  19   < > 32  --    AST 10  --  19 25   < >  --  36*   < > 41*  --    PROTEIN  --   --   --   --   --  50*  --   --   --  Negative   LIPASE  --  44  --   --   --   --  65*  --  68*  --     < > = values in this interval not displayed.        I reviewed the patient's new imaging results.     EXAM: CT CHEST PULMONARY EMBOLISM W CONTRAST  LOCATION: [facility]  DATE/TIME: 12/11/2022 6:02 AM     INDICATION: Shortness of breath. Hypoxia post gastric bypass surgery on 12/2/2022.  COMPARISON: CT pulmonary angiogram 11/1/2022.   TECHNIQUE: CT chest pulmonary angiogram during arterial phase injection of IV contrast. Multiplanar reformats and MIP reconstructions were performed. Dose reduction techniques were used.   CONTRAST: 73 Isovue-370 IV.     FINDINGS:  ANGIOGRAM CHEST: No pulmonary emboli on either side. Normal caliber thoracic aorta without aneurysm or dissection. No CT evidence of right heart strain.     LUNGS AND PLEURA: Moderate left and small right pleural effusions with associated passive atelectasis in the adjacent lungs, similar to prior. Vague opacities have developed in the lungs which could represent an infectious or an inflammatory process.   Differential diagnostic possibilities would include COVID-19 infection.     MEDIASTINUM/AXILLAE: Normal cardiac size. Moderate pericardial effusion has developed. No suspicious adenopathy. Reflux in the distal esophagus. Trachea is midline.     CORONARY ARTERY CALCIFICATION: None.     UPPER ABDOMEN: Postoperative changes of bariatric surgery. Fatty liver. Dependent gallstones without biliary dilatation or adjacent inflammation.     MUSCULOSKELETAL: Minor right convex thoracic curve.                                                                       IMPRESSION:  1.  No pulmonary emboli on either side. Moderate left and small right effusions with associated passive atelectasis in the adjacent lungs, unchanged. Vague infiltrates have developed in the lungs, most likely representing an infectious or an inflammatory   process. Differential diagnostic possibilities would include COVID-19 infection.     2.  Normal caliber thoracic aorta without aneurysm or dissection. Normal cardiac size. Moderate pericardial effusion has developed.     3.  Reflux in the distal esophagus. Postoperative changes of bariatric surgery. Fatty liver. Cholelithiasis.     4.  Minor right convex thoracic curve.

## 2022-12-12 NOTE — PROGRESS NOTES
Patient recently sedated, sound asleep. I did not wake her. I visited with her brother in law.   History and imaging reviewed. Sleeve gastrectomy at [facility] on 10/25/2022. Post op Challenges with pneumonia along with persistent narrow sleeve leading to dysphagia. EGD and stenting done. Now with cardiac problems, being treated in CCU. Presented at [facility] with Respiratory failure. Transferred here.   Suggest: Working on her cardiopulmonary problems. When she is ready for further GI evaluation I would favor an UGI before another endoscopy.  Clear liquids fine for now as tolerated. We will follow with you.

## 2022-12-12 NOTE — PROGRESS NOTES
Lab unable to draw blood cultures at 2330, (see this author's note today at 1930), and cultures not to be drawn from any existing site per nursing staff / protocol.  New PIV established in patient's right hand, 22ga, cultures drawn as first lab through sterile extension, IV maintained per nursing staff request.  Nursing staff to request VAT assistance 12/12 if another set of blood cultures needed.

## 2022-12-12 NOTE — PROCEDURES
[facility]    Double Lumen PICC Placement    Date/Time: 12/11/2022 11:02 PM  Performed by: Ginny Do RN  Authorized by: Pradeep Milner MD   Indications: vascular access      UNIVERSAL PROTOCOL   Site Marked: Yes  Prior Images Obtained and Reviewed:  Yes  Required items: Required blood products, implants, devices and special equipment available    Patient identity confirmed:  Verbally with patient, provided demographic data and hospital-assigned identification number  NA - No sedation, light sedation, or local anesthesia  Confirmation Checklist:  Patient's identity using two indicators, relevant allergies, procedure was appropriate and matched the consent or emergent situation and correct equipment/implants were available  Time out: Immediately prior to the procedure a time out was called    Universal Protocol: the Joint Commission Universal Protocol was followed    Preparation: Patient was prepped and draped in usual sterile fashion       ANESTHESIA    Anesthesia: Local infiltration  Local Anesthetic:  Lidocaine 1% without epinephrine  Anesthetic Total (mL):  1      SEDATION    Patient Sedated: No        Preparation: skin prepped with 2% chlorhexidine  Skin prep agent: skin prep agent completely dried prior to procedure  Sterile barriers: maximum sterile barriers were used: cap, mask, sterile gown, sterile gloves, and large sterile sheet  Hand hygiene: hand hygiene performed prior to central venous catheter insertion  Type of line used: PICC  Catheter type: double lumen  Lumen type: valved and power PICC  Lumen Identification: Purple and Red  Catheter size: 5 Fr  Brand: Bard  Lot number: [id number]  Placement method: MST, ultrasound and tip navigation system  Number of attempts: 1  Difficulty threading catheter: no  Successful placement: yes  Orientation: right    Location: basilic vein  Tip Location: SVC  Arm circumference: adults 10 cm  Extremity circumference: 35  Visible  catheter length: 2  Total catheter length: 46  Dressing and securement: alcohol impregnated caps, subcutaneous anchor securement system, sterile dressing applied and chlorhexidine disc applied  Post procedure assessment: blood return through all ports and placement verified by x-ray

## 2022-12-12 NOTE — PROGRESS NOTES
Patient with known history difficult vascular access, reported that lab has been unable to draw today despite multiple staff attempting.  Patient originally with midline order.  Since one purpose of extended duration IV access for patient is lab draws, VAT recommends PICC over Midline, for reliability of lab draws.  Primary nursing staff to obtain order for PICC.

## 2022-12-12 NOTE — PLAN OF CARE
A&OX4, temp 100, tylenol given. Recheck  98,  anxious, complains of pain on the incisional site and abdomen. Dilaudid given. Atarax and ativan given. 2 L oxymask overnight.  Tele- SR, on clear liquid diet. PICC line placed,  on vanco and zosyn. Zofran given for nausea.

## 2022-12-13 LAB
ALBUMIN SERPL-MCNC: 1.7 G/DL (ref 3.4–5)
ALP SERPL-CCNC: 89 U/L (ref 40–150)
ALT SERPL W P-5'-P-CCNC: 20 U/L (ref 0–50)
ANION GAP SERPL CALCULATED.3IONS-SCNC: 8 MMOL/L (ref 3–14)
AST SERPL W P-5'-P-CCNC: 10 U/L (ref 0–45)
BILIRUB SERPL-MCNC: 0.4 MG/DL (ref 0.2–1.3)
BUN SERPL-MCNC: 7 MG/DL (ref 7–30)
CALCIUM SERPL-MCNC: 7.4 MG/DL (ref 8.5–10.1)
CHLORIDE BLD-SCNC: 115 MMOL/L (ref 94–109)
CO2 SERPL-SCNC: 22 MMOL/L (ref 20–32)
CREAT SERPL-MCNC: 1.01 MG/DL (ref 0.52–1.04)
CRP SERPL-MCNC: 161 MG/L (ref 0–8)
ENTEROCOCCUS FAECALIS: NOT DETECTED
ENTEROCOCCUS FAECIUM: NOT DETECTED
ERYTHROCYTE [DISTWIDTH] IN BLOOD BY AUTOMATED COUNT: 18.6 % (ref 10–15)
GFR SERPL CREATININE-BSD FRML MDRD: 71 ML/MIN/1.73M2
GLUCOSE BLD-MCNC: 79 MG/DL (ref 70–99)
HCT VFR BLD AUTO: 30.9 % (ref 35–47)
HGB BLD-MCNC: 9.5 G/DL (ref 11.7–15.7)
LISTERIA SPECIES (DETECTED/NOT DETECTED): NOT DETECTED
MCH RBC QN AUTO: 27.1 PG (ref 26.5–33)
MCHC RBC AUTO-ENTMCNC: 30.7 G/DL (ref 31.5–36.5)
MCV RBC AUTO: 88 FL (ref 78–100)
PATH REPORT.COMMENTS IMP SPEC: NORMAL
PATH REPORT.COMMENTS IMP SPEC: NORMAL
PATH REPORT.FINAL DX SPEC: NORMAL
PATH REPORT.GROSS SPEC: NORMAL
PATH REPORT.MICROSCOPIC SPEC OTHER STN: NORMAL
PATH REPORT.RELEVANT HX SPEC: NORMAL
PLATELET # BLD AUTO: 254 10E3/UL (ref 150–450)
POTASSIUM BLD-SCNC: 2.9 MMOL/L (ref 3.4–5.3)
POTASSIUM BLD-SCNC: 3.6 MMOL/L (ref 3.4–5.3)
PROT SERPL-MCNC: 4.8 G/DL (ref 6.8–8.8)
RBC # BLD AUTO: 3.5 10E6/UL (ref 3.8–5.2)
SODIUM SERPL-SCNC: 145 MMOL/L (ref 133–144)
STAPHYLOCOCCUS AUREUS: NOT DETECTED
STAPHYLOCOCCUS EPIDERMIDIS: NOT DETECTED
STAPHYLOCOCCUS LUGDUNENSIS: NOT DETECTED
STAPHYLOCOCCUS SPECIES: NOT DETECTED
STREPTOCOCCUS AGALACTIAE: NOT DETECTED
STREPTOCOCCUS ANGINOSUS GROUP: NOT DETECTED
STREPTOCOCCUS PNEUMONIAE: NOT DETECTED
STREPTOCOCCUS PYOGENES: NOT DETECTED
STREPTOCOCCUS SPECIES: NOT DETECTED
WBC # BLD AUTO: 6.2 10E3/UL (ref 4–11)

## 2022-12-13 PROCEDURE — 85014 HEMATOCRIT: CPT | Performed by: STUDENT IN AN ORGANIZED HEALTH CARE EDUCATION/TRAINING PROGRAM

## 2022-12-13 PROCEDURE — 80053 COMPREHEN METABOLIC PANEL: CPT | Performed by: STUDENT IN AN ORGANIZED HEALTH CARE EDUCATION/TRAINING PROGRAM

## 2022-12-13 PROCEDURE — 250N000013 HC RX MED GY IP 250 OP 250 PS 637: Performed by: STUDENT IN AN ORGANIZED HEALTH CARE EDUCATION/TRAINING PROGRAM

## 2022-12-13 PROCEDURE — 250N000011 HC RX IP 250 OP 636: Performed by: INTERNAL MEDICINE

## 2022-12-13 PROCEDURE — 99232 SBSQ HOSP IP/OBS MODERATE 35: CPT | Performed by: STUDENT IN AN ORGANIZED HEALTH CARE EDUCATION/TRAINING PROGRAM

## 2022-12-13 PROCEDURE — 250N000013 HC RX MED GY IP 250 OP 250 PS 637: Performed by: PHYSICIAN ASSISTANT

## 2022-12-13 PROCEDURE — C9113 INJ PANTOPRAZOLE SODIUM, VIA: HCPCS | Performed by: INTERNAL MEDICINE

## 2022-12-13 PROCEDURE — 250N000013 HC RX MED GY IP 250 OP 250 PS 637: Performed by: INTERNAL MEDICINE

## 2022-12-13 PROCEDURE — 258N000003 HC RX IP 258 OP 636: Performed by: INTERNAL MEDICINE

## 2022-12-13 PROCEDURE — 250N000011 HC RX IP 250 OP 636: Performed by: STUDENT IN AN ORGANIZED HEALTH CARE EDUCATION/TRAINING PROGRAM

## 2022-12-13 PROCEDURE — 88112 CYTOPATH CELL ENHANCE TECH: CPT | Mod: 26 | Performed by: PATHOLOGY

## 2022-12-13 PROCEDURE — 88305 TISSUE EXAM BY PATHOLOGIST: CPT | Mod: 26 | Performed by: PATHOLOGY

## 2022-12-13 PROCEDURE — 84132 ASSAY OF SERUM POTASSIUM: CPT | Performed by: STUDENT IN AN ORGANIZED HEALTH CARE EDUCATION/TRAINING PROGRAM

## 2022-12-13 PROCEDURE — 86140 C-REACTIVE PROTEIN: CPT | Performed by: STUDENT IN AN ORGANIZED HEALTH CARE EDUCATION/TRAINING PROGRAM

## 2022-12-13 PROCEDURE — 2894A VOIDCORRECT: CPT | Performed by: SURGERY

## 2022-12-13 PROCEDURE — 99233 SBSQ HOSP IP/OBS HIGH 50: CPT | Mod: 24 | Performed by: INTERNAL MEDICINE

## 2022-12-13 PROCEDURE — 210N000002 HC R&B HEART CARE

## 2022-12-13 RX ORDER — POLYETHYLENE GLYCOL 3350 17 G/17G
17 POWDER, FOR SOLUTION ORAL 2 TIMES DAILY PRN
Status: DISCONTINUED | OUTPATIENT
Start: 2022-12-13 | End: 2022-12-15 | Stop reason: HOSPADM

## 2022-12-13 RX ORDER — FUROSEMIDE 10 MG/ML
40 INJECTION INTRAMUSCULAR; INTRAVENOUS ONCE
Status: COMPLETED | OUTPATIENT
Start: 2022-12-13 | End: 2022-12-13

## 2022-12-13 RX ORDER — POTASSIUM CHLORIDE 29.8 MG/ML
20 INJECTION INTRAVENOUS
Status: COMPLETED | OUTPATIENT
Start: 2022-12-13 | End: 2022-12-13

## 2022-12-13 RX ORDER — PROCHLORPERAZINE 25 MG
25 SUPPOSITORY, RECTAL RECTAL EVERY 12 HOURS PRN
Status: DISCONTINUED | OUTPATIENT
Start: 2022-12-13 | End: 2022-12-15 | Stop reason: HOSPADM

## 2022-12-13 RX ORDER — POTASSIUM CHLORIDE 1500 MG/1
20 TABLET, EXTENDED RELEASE ORAL ONCE
Status: COMPLETED | OUTPATIENT
Start: 2022-12-13 | End: 2022-12-13

## 2022-12-13 RX ORDER — PROCHLORPERAZINE MALEATE 5 MG
5 TABLET ORAL EVERY 6 HOURS PRN
Status: DISCONTINUED | OUTPATIENT
Start: 2022-12-13 | End: 2022-12-15 | Stop reason: HOSPADM

## 2022-12-13 RX ORDER — COLCHICINE 0.6 MG/1
0.6 TABLET ORAL DAILY
Status: DISCONTINUED | OUTPATIENT
Start: 2022-12-13 | End: 2022-12-14

## 2022-12-13 RX ORDER — POTASSIUM CHLORIDE 1500 MG/1
40 TABLET, EXTENDED RELEASE ORAL ONCE
Status: COMPLETED | OUTPATIENT
Start: 2022-12-13 | End: 2022-12-13

## 2022-12-13 RX ADMIN — BUPROPION HYDROCHLORIDE 75 MG: 75 TABLET, FILM COATED ORAL at 21:53

## 2022-12-13 RX ADMIN — THIAMINE HCL TAB 100 MG 100 MG: 100 TAB at 09:06

## 2022-12-13 RX ADMIN — ONDANSETRON 4 MG: 2 INJECTION INTRAMUSCULAR; INTRAVENOUS at 09:43

## 2022-12-13 RX ADMIN — SUCRALFATE ORAL 1 G: 1 SUSPENSION ORAL at 18:30

## 2022-12-13 RX ADMIN — SUCRALFATE ORAL 1 G: 1 SUSPENSION ORAL at 09:05

## 2022-12-13 RX ADMIN — DOCUSATE SODIUM 100 MG: 100 CAPSULE, LIQUID FILLED ORAL at 03:07

## 2022-12-13 RX ADMIN — PANTOPRAZOLE SODIUM 40 MG: 40 INJECTION, POWDER, FOR SOLUTION INTRAVENOUS at 18:29

## 2022-12-13 RX ADMIN — PANTOPRAZOLE SODIUM 40 MG: 40 INJECTION, POWDER, FOR SOLUTION INTRAVENOUS at 09:07

## 2022-12-13 RX ADMIN — Medication 500 MCG: at 09:05

## 2022-12-13 RX ADMIN — SUCRALFATE ORAL 1 G: 1 SUSPENSION ORAL at 13:02

## 2022-12-13 RX ADMIN — POTASSIUM CHLORIDE 20 MEQ: 29.8 INJECTION, SOLUTION INTRAVENOUS at 12:56

## 2022-12-13 RX ADMIN — HYOSCYAMINE SULFATE 0.12 MG: 0.12 TABLET SUBLINGUAL at 18:30

## 2022-12-13 RX ADMIN — OXYCODONE HYDROCHLORIDE 10 MG: 5 TABLET ORAL at 15:02

## 2022-12-13 RX ADMIN — SUCRALFATE ORAL 1 G: 1 SUSPENSION ORAL at 21:53

## 2022-12-13 RX ADMIN — HYOSCYAMINE SULFATE 0.12 MG: 0.12 TABLET SUBLINGUAL at 09:06

## 2022-12-13 RX ADMIN — POTASSIUM CHLORIDE 20 MEQ: 29.8 INJECTION, SOLUTION INTRAVENOUS at 14:58

## 2022-12-13 RX ADMIN — POLYETHYLENE GLYCOL 3350 17 G: 17 POWDER, FOR SOLUTION ORAL at 14:52

## 2022-12-13 RX ADMIN — COLCHICINE 0.6 MG: 0.6 TABLET ORAL at 13:02

## 2022-12-13 RX ADMIN — HYDROXYZINE HYDROCHLORIDE 25 MG: 25 TABLET, FILM COATED ORAL at 14:17

## 2022-12-13 RX ADMIN — POTASSIUM CHLORIDE 20 MEQ: 29.8 INJECTION, SOLUTION INTRAVENOUS at 14:50

## 2022-12-13 RX ADMIN — PIPERACILLIN AND TAZOBACTAM 4.5 G: 4; .5 INJECTION, POWDER, FOR SOLUTION INTRAVENOUS at 02:56

## 2022-12-13 RX ADMIN — OXYCODONE HYDROCHLORIDE 10 MG: 5 TABLET ORAL at 09:06

## 2022-12-13 RX ADMIN — PIPERACILLIN AND TAZOBACTAM 4.5 G: 4; .5 INJECTION, POWDER, FOR SOLUTION INTRAVENOUS at 09:06

## 2022-12-13 RX ADMIN — DOCUSATE SODIUM 100 MG: 100 CAPSULE, LIQUID FILLED ORAL at 09:43

## 2022-12-13 RX ADMIN — FUROSEMIDE 40 MG: 10 INJECTION, SOLUTION INTRAMUSCULAR; INTRAVENOUS at 14:51

## 2022-12-13 RX ADMIN — HYOSCYAMINE SULFATE 0.12 MG: 0.12 TABLET SUBLINGUAL at 21:53

## 2022-12-13 RX ADMIN — HYDROMORPHONE HYDROCHLORIDE 0.2 MG: 0.2 INJECTION, SOLUTION INTRAMUSCULAR; INTRAVENOUS; SUBCUTANEOUS at 15:31

## 2022-12-13 RX ADMIN — VANCOMYCIN HYDROCHLORIDE 1250 MG: 10 INJECTION, POWDER, LYOPHILIZED, FOR SOLUTION INTRAVENOUS at 10:44

## 2022-12-13 RX ADMIN — OXYCODONE HYDROCHLORIDE 10 MG: 5 TABLET ORAL at 03:07

## 2022-12-13 RX ADMIN — BUPROPION HYDROCHLORIDE 75 MG: 75 TABLET, FILM COATED ORAL at 09:06

## 2022-12-13 RX ADMIN — OXYCODONE HYDROCHLORIDE 10 MG: 5 TABLET ORAL at 21:59

## 2022-12-13 RX ADMIN — HYOSCYAMINE SULFATE 0.12 MG: 0.12 TABLET SUBLINGUAL at 13:02

## 2022-12-13 ASSESSMENT — ACTIVITIES OF DAILY LIVING (ADL)
ADLS_ACUITY_SCORE: 36
ADLS_ACUITY_SCORE: 41
ADLS_ACUITY_SCORE: 36
ADLS_ACUITY_SCORE: 41
ADLS_ACUITY_SCORE: 36
ADLS_ACUITY_SCORE: 41
ADLS_ACUITY_SCORE: 36
ADLS_ACUITY_SCORE: 41
ADLS_ACUITY_SCORE: 36

## 2022-12-13 NOTE — PROVIDER NOTIFICATION
Updated regarding blood cultures positive for gram-positive cocci    Continue on vancomycin and Zosyn    Await verigene    Lisandro Driscoll MD  3:59 AM

## 2022-12-13 NOTE — PROVIDER NOTIFICATION
MD Notification    Notified Person: MD    Notified Person Name:Dr. Feliz    Notification Date/Time:12/12/22 2049    Notification Interaction:text/page    Purpose of Notification:Pt is requesting stool softner, thanks  *[id number]    Orders Received:    Comments:

## 2022-12-13 NOTE — PLAN OF CARE
A&OX4, VSS, anxious with activity, atarax given. Oxycodone and dilaudid given for abdominal/ incisional pain.PICC line in place, up to the bathroom with SBA.Tele- SR. Blood culture positive for gram cocci, on vancomycin and zosyn.  Melatonin given, pt slept intermittent.

## 2022-12-13 NOTE — PROVIDER NOTIFICATION
MD Notification    Notified Person: MD    Notified Person Name:Dr. Feng    Notification Date/Time:12/13/2022 0700    Notification Interaction:webpage    Purpose of Notification:Blood culture undetected.    Orders Received:    Comments:

## 2022-12-13 NOTE — PLAN OF CARE
Leah is alert, oriented, pleasant, anxious. She reports discomfort with breathing, christopher with inspiration. Thoracentesis today with 300 mL of output. Reports improved ease of breathing after. Dilaudid and oxycodone helpful. Suspect anxiety is playing a part in her discomfort.  Pericardial drain in place with no output today. Drain to be removed tomorrow.      Oxygen weaned to room air at rest with SaO2 in mid/high 90s. Dyspneic with exertion. Up with minimal assist.

## 2022-12-13 NOTE — PROGRESS NOTES
Antimicrobial Stewardship Team Note    Antimicrobial Stewardship Program - A joint venture between [facility] and [facility] to optimize antibiotic management.     Patient: Leah Yanez  MRN: [id number]  Allergies: Azithromycin and Erythromycin    Brief Summary: Leah Yanez is a 42 year old female admitted on 12/11/22 with acute hypoxic failure 2/2 bilateral pleural effusion and pericardial effusion/cardiac tamponade. PMH significant for depression, GERD, PE, renal syndrome, and recent laparoscopic sleeve gastrectomy (10/25/22). Patient underwent emergent pericardiocentesis and drain placement on 12/11.     Patient was started on Zosyn and vancomycin with concern for sepsis. CXR on 12/11 with bibasilar infiltrates and pleural effusions, left worse than right. Underwent thoracentesis on 12/12 with 300mL of ellis colored fluid drained. Blood culture from 12/11 growing gram positive cocci in clusters, organism not identified by Verigene and only 1/2 positive. MRSA nares PCR negative. Pericardial fluid with 1995 total nucleated cells, 92% neutrophils, culture no growth to date. Pleural fluid with 388 total nucleated cells, 82% lymphocytes, culture negative. WBC count normal this admission, stable on 2 LPM O2, fever of 100.9 on 12/11 but afebrile since then.         Active Anti-infective Medications   (From admission, onward)                 Start     Stop    12/11/22 2000  vancomycin  1,250 mg,   Intravenous,   EVERY 12 HOURS        Hospital-Acquired Pneumonia        --    12/11/22 1500  piperacillin-tazobactam  4.5 g,   Intravenous,   EVERY 6 HOURS        Hospital-Acquired Pneumonia        12/18/22 0926                  Assessment: Acute hypoxic failure 2/2 bilateral pleural effusion and pericardial effusion/cardiac tamponade  Patient presented with acute hypoxic failure, found to have pericardial effusion and cardiac tamponade requiring emergent pericardiocentesis. She was  started on broad spectrum antibiotics with Zosyn and vancomycin given concern for possible sepsis although pleural fluid and pericardial fluid with no growth to date and otherwise clinically stable with no leukocytosis or fevers. Suspect positive blood culture from 12/11 is a contaminant as was only in 1 bottle, not identified as a common organism by [facility], and repeat blood cultures remain negative. Patient has received adequate 48h empiric course with no infectious source identified, recommend stopping antibiotics at this time and monitoring.    Recommendations:  Stop vancomycin and Zosyn.    Discussed with ID Staff MD Roxanna Naranjo, PharmD, BCIDP    Vital Signs/Clinical Features:  Vitals         12/11 0700  12/12 0659 12/12 0700  12/13 0659 12/13 0700  12/13 1248   Most Recent      Temp ( F) 98.4 -  100.9    97.6 -  98.6      99.1     99.1 (37.3) 12/13 0739    Pulse 74 -  116    71 -  107    75 -  76     76 12/13 0842    Resp 16 -  39    16 -  24    18 -  21     21 12/13 0842    /65 -  145/91    110/72 -  123/79    110/74 -  115/79     115/79 12/13 0842    SpO2 (%) 93 -  98    87 -  99       97 12/13 0631            Labs  Estimated Creatinine Clearance: 100.6 mL/min (based on SCr of 1.01 mg/dL).  Recent Labs   Lab Test 11/13/22  0612 11/15/22  0444 11/16/22  0701 12/11/22  0541 12/12/22  0649 12/13/22  0640   CR 0.84 0.77 0.84 0.74 0.85 1.01       Recent Labs   Lab Test 11/12/22  0437 11/13/22  0612 11/21/22  1551 12/11/22  0541 12/12/22  0649 12/13/22  0640   WBC 9.0 9.2 8.9 9.3 6.6 6.2   HGB 7.2* 7.4* 10.7* 12.1 10.1* 9.5*   HCT 24.2* 25.3* 35.9 39.2 32.5* 30.9*   MCV 87 88 86 87 87 88   * 546* 550* 263 219 254       Recent Labs   Lab Test 11/11/22  0332 11/12/22  0437 11/13/22  0612 12/11/22  0541 12/12/22  0649 12/13/22  0640   BILITOTAL 0.3 0.4 0.3 0.5 0.7 0.4   ALKPHOS 124* 121* 129* 160* 107 89   ALBUMIN 2.7* 2.7* 2.9* 3.7 2.0* 1.7*   AST 38* 30 25 19 10 10   ALT 19 18 17  45* 29 20       Recent Labs   Lab Test 11/01/22  1734 11/04/22  1741 11/04/22  1916 11/06/22  0616 12/11/22  0543 12/13/22  0640   PCAL 0.13*  --   --  0.22*  --   --    LACT 1.3 0.5 0.5  --  0.7  --    .00*  --   --  417.00*  --  161.0*       Recent Labs   Lab Test 12/12/22  1828   VANCOMYCIN 15.8       Culture Results:  7-Day Micro Results       Procedure Component Value Units Date/Time    Pericardial Fluid Aerobic Bacterial Culture Routine     Order Status: Sent Lab Status: No result     Specimen: Pericardial Fluid from Pericardium     Gram stain     Order Status: Sent Lab Status: No result     Specimen: Pericardial Fluid     Anaerobic Bacterial Culture Routine [[id number]] Collected: 12/12/22 1040    Order Status: Sent Lab Status: In process Updated: 12/12/22 1057    Specimen: Pleural fluid from Pleural Cavity, Left     Gram Stain [[id number]] Collected: 12/12/22 1040    Order Status: Completed Lab Status: Final result Updated: 12/12/22 1549    Specimen: Pleural fluid from Pleural Cavity, Left      Gram Stain Result No organisms seen      3+ WBC seen     Comment: Predominantly mononuclear cells       Pleural fluid Aerobic Bacterial Culture Routine [[id number]] Collected: 12/12/22 1040    Order Status: Completed Lab Status: Preliminary result Updated: 12/13/22 0713    Specimen: Pleural fluid from Pleural Cavity, Left      Culture No growth, less than 1 day    Blood Culture Line, venous [[id number]]  (Normal) Collected: 12/12/22 0031    Order Status: Completed Lab Status: Preliminary result Updated: 12/13/22 0516    Specimen: Blood from Line, venous      Culture No growth after 1 day    Gram stain [[id number]] Collected: 12/11/22 1856    Order Status: Canceled Lab Status: No result     Specimen: Pericardial Fluid from Pericardium     Blood Culture Hand, Left [[id number]]  (Normal) Collected: 12/11/22 1605    Order Status: Completed Lab Status: Preliminary result Updated: 12/12/22 2116     Specimen: Blood from Hand, Left      Culture No growth after 1 day    Narrative:      Only an Aerobic Blood Culture Bottle was collected, interpret results with caution.    Respiratory Aerobic Bacterial Culture     Order Status: Sent Lab Status: No result     Specimen: Sputum from Expectorate     Gram stain     Order Status: Sent Lab Status: No result     Specimen: Sputum     Pericardial Fluid Aerobic Bacterial Culture Routine with Gram Stain [[id number]] Collected: 12/11/22 1300    Order Status: Completed Lab Status: Preliminary result Updated: 12/13/22 0907    Specimen: Pericardial Fluid from Pericardium      Culture No growth after 1 day     Gram Stain Result No organisms seen      4+ WBC seen     Comment: Predominantly PMNs       Narrative:      Gram Stain quantification of host cells and microbiological organisms was done on a cytocentrifuged preparation.    Blood Culture Hand, Left [[id number]]  (Abnormal) Collected: 12/11/22 0744    Order Status: Completed Lab Status: Preliminary result Updated: 12/13/22 0654    Specimen: Blood from Hand, Left      Culture Positive on the 1st day of incubation      Gram positive cocci in clusters     Comment: 1 of 1 bottles       Narrative:      Only an Aerobic Blood Culture Bottle was collected, interpret results with caution.      Verigene GP Panel [[id number]]  (Normal) Collected: 12/11/22 0744    Order Status: Completed Lab Status: Final result Updated: 12/13/22 0645    Specimen: Blood from Hand, Left      Staphylococcus species Not Detected     Staphylococcus aureus Not Detected     Staphylococcus epidermidis Not Detected     Staphylococcus lugdunensis Not Detected     Enterococcus faecalis Not Detected     Enterococcus faecium Not Detected     Streptococcus species Not Detected     Streptococcus agalactiae Not Detected     Streptococcus anginosus group Not Detected     Streptococcus pneumoniae Not Detected     Streptococcus pyogenes Not Detected     Listeria  species Not Detected    Narrative:      Specimen tested with Verigene multiplex, gram-positive blood culture nucleic acid test for the following targets: Staphylococcus aureus, Staphylococcus epidermidis, Staphylococcus lugdunensis, other Staphylococcus species, Enterococcus faecalis, Enterococcus faecium, Streptococcus species, Streptococcus agalactiae, Streptococcus anginosus group, Streptococcus pneumoniae, Streptococcus pyogenes, Listeria species, mecA (methicillin resistance), and Kang/vanB (vancomycin resistance).  Final identification and antimicrobial susceptibility testing will be verified by standard methods.      MRSA MSSA PCR, Nasal Swab [[id number]] Collected: 12/11/22 0655    Order Status: Completed Lab Status: Final result Updated: 12/11/22 1153    Specimen: Swab from Nose      MRSA Target DNA Negative     SA Target DNA Positive    Narrative:      [employer]  Xpert SA Nasal Complete assay performed in the [employer]  Dx System is a qualitative in vitro diagnostic test designed for rapid detection of Staphylococcus aureus (SA) and methicillin-resistant Staphylococcus aureus (MRSA) from nasal swabs in patients at risk for nasal colonization. The test utilizes automated real-time polymerase chain reaction (PCR) to detect MRSA/SA DNA. The Xpert SA Nasal Complete assay is intended to aid in the prevention and control of MRSA/SA infections in healthcare settings. The assay is not intended to diagnose, guide or monitor treatment for MRSA/SA infections, or provide results of susceptibility to methicillin. A negative result does not preclude MRSA/SA nasal colonization.     Blood Culture Line, venous [[id number]]  (Normal) Collected: 12/11/22 0654    Order Status: Completed Lab Status: Preliminary result Updated: 12/13/22 1016    Specimen: Blood from Line, venous      Culture No growth after 2 days    Pleural fluid Aerobic Bacterial Culture Routine with Gram Stain     Order Status: Canceled Lab Status: No  result     Specimen: Pleural fluid from Pleural Cavity     Blood Culture Peripheral Blood [[id number]]     Order Status: Canceled Lab Status: No result     Specimen: Peripheral Blood             Recent Labs   Lab Test 10/24/22  1447 11/06/22  0926   URINEPH 5.0 6.0   NITRITE Negative Negative   LEUKEST Small* Trace*   WBCU 5-10* 8*                   Recent Labs   Lab Test 11/14/22  1320   CDBPCT Negative       Imaging: Cardiac Catheterization    Result Date: 12/11/2022  1.  Successful pericardiocentesis via apical approach.  160 ml of straw-colored fluid was removed; a sample of which was sent to the lab for analysis.    US Thoracentesis Portable    Result Date: 12/12/2022  ULTRASOUND GUIDED THORACENTESIS  12/12/2022 10:50 AM HISTORY: Left greater than right FINDINGS: Ultrasound was used to evaluate for the presence and best approach for drainage of a pleural effusion. Written and oral informed consent was obtained. A pause for the cause procedure to verify the correct patient and correct procedure. The skin overlying the left chest posteriorly was prepped and draped in the usual sterile fashion. The subcutaneous tissues were anesthetized with 10 mL 1% lidocaine. A catheter was advanced into the pleural space and 300 mL of  ellis colored fluid was drained. Patient was monitored by nurse under my direct supervision throughout the exam. Ultrasound images were permanently stored.  There were no immediate complications. Patient left the ultrasound suite in satisfactory condition.     IMPRESSION: Technically successful thoracentesis without immediate complications. VIPUL WALDROP MD   SYSTEM ID:  [id number]    XR Chest Port 1 View    Result Date: 12/11/2022  EXAM: XR CHEST PORT 1 VIEW LOCATION: [facility] DATE/TIME: 12/11/2022 11:50 PM INDICATION: RN placed PICC   verify tip placement COMPARISON: Portable chest radiograph 12/11/2022     IMPRESSION: Right upper extremity PICC line with tip  terminating at the superior cavoatrial junction. Bibasilar infiltrates and pleural effusions persist, left worse than right. Heart size difficult to assess likely upper limits of normal. No pneumothorax.    XR Chest Port 1 View    Result Date: 2022  EXAM: XR CHEST PORT 1 VIEW LOCATION: [facility] DATE/TIME: 2022 6:43 PM INDICATION: Pericardial effusion. s p pericardiocentesis COMPARISON: CT from earlier today.     IMPRESSION: Minimal change. Bibasilar infiltrates and pleural effusions persists, left worse than right. Heart size difficult to assess likely upper limits of normal. No pneumothorax.     XR Upper GI Water Soluble    Result Date: 2022  ESOPHAGRAM 2022 4:10 PM HISTORY: Evaluate UGI anatomy and assess for any stricture at the sleeve gastrectomy. COMPARISON: None. TECHNIQUE: A single contrast water soluble esophagram was performed. FLUOROSCOPY TIME: 1.7 minutes. SPOT FILMS: 8 FINDINGS: There was some delay in passage through the gastric sleeve, no definite fixed stricture demonstrated. VIPUL WALDROP MD   SYSTEM ID:  [id number]    Echocardiogram Limited    Result Date: 2022  [id number]^SOWMYA^SERAFIN^COSTA  [facility] [address] [place] [address]  Name: ASIYA PRECIADO MRN: [id number] : 1980 Study Date: 2022 11:06 AM Age: 42 yrs Gender: Female Patient Location: [facility] Reason For Study: Pericardial Effusion Ordering Physician: SERAFIN DENG Performed By: Jeanne Blount  BSA: 2.3 m2 Height: 73 in Weight: 235 lb HR: 90 BP: 119/74 mmHg ______________________________________________________________________________ Procedure Limited Portable Echo Adult. Optison (NDC #5488-8838) given intravenously. ______________________________________________________________________________ Interpretation Summary  Left ventricular systolic function is normal. The visual ejection  fraction is 55-60%. The right ventricle is normal in structure, function and size. Small pericardial effusion adjacent to the anterolateral wall. Trivial elsewhere. Drain is present anterior space. Septal bounce is present with mild variation across TV and MV inflow velocity profiles. Dilation of the inferior vena cava is present with abnormal respiratory variation in diameter. Findings suggestive of effusive constrictive physiology but no gentry tamponade.  Compared to TTE dated 12/11/2022, focal effusion is present mostly within the lateral pericardial space. There is still evidence of mild constrictive physiology without evidence of RV or RA collapse. ______________________________________________________________________________ Left Ventricle The left ventricle is normal in structure, function and size. Left ventricular systolic function is normal. The visual ejection fraction is 55-60%. No regional wall motion abnormalities noted.  Right Ventricle The right ventricle is normal in structure, function and size.  Atria Normal left atrial size. Right atrial size is normal.  Mitral Valve The mitral valve is not well visualized.  Tricuspid Valve The tricuspid valve is not well visualized. Right ventricular systolic pressure could not be approximated due to inadequate tricuspid regurgitation.  Aortic Valve The aortic valve is not well visualized.  Pulmonic Valve The pulmonic valve is not well visualized.  Vessels Dilation of the inferior vena cava is present with abnormal respiratory variation in diameter.  Pericardium Small pericardial effusion. Septal bounce present. The mitral valve inflow Doppler reveals no significant respiratory variation. The tricuspid valve inflow Doppler reveals no significant respiratory variation.  ______________________________________________________________________________ Report approved by: Merlene Frank 12/12/2022 12:54 PM   ______________________________________________________________________________      Echocardiogram Limited    Result Date: 2022  [id number]^NOREEN^SOURAV^ELIDA  [facility] [address] [place] [address]  Name: ASIYA PRECIADO MRN: [id number] : 1980 Study Date: 2022 12:32 PM Age: 42 yrs Gender: Female Patient [place] [facility] Reason For Study: Pericardial Effusion Ordering Physician: SOURAV SORTO Performed By: Ivan Rasmussen  BSA: 2.3 m2 Height: 73 in Weight: 235 lb BP: 119/74 mmHg ______________________________________________________________________________ Procedure Limited Portable Echo Adult. ______________________________________________________________________________ Interpretation Summary  Limited intra-procedural study.  Pre-procedure: Small-moderate circumferential effusion. See full TTE earlier today for echocardiographic assessment of tamponade.  Post-procedure: 160 cc of straw-colored fluid removed. No detectable residual effusion is seen. ______________________________________________________________________________ ______________________________________________________________________________ Report approved by: MD Sourav Trujillo 2022 01:48 PM  ______________________________________________________________________________      Echocardiogram Limited    Result Date: 2022  [id number]^KIERRA^PROSPER  [facility] [address] [place] [address]  Name: ASIYA PRECIADO MRN: [id number] : 1980 Study Date: 2022 09:52 AM Age: 42 yrs Gender: Female Patient Location: [facility] Reason For Study: Pericardial Effusion Ordering Physician: PRSOPER LOZADA Performed By: Jacqueline Johnson RDCS  BSA: 2.3 m2 Height: 73 in Weight: 235 lb HR: 90 BP: 159/98 mmHg  ______________________________________________________________________________ Procedure Limited Portable Echo Adult. ______________________________________________________________________________ Interpretation Summary  1. Moderate circumferential pericardial effusion, up to 1.4 cm in thickness. There is echocardiographic evidence of early tamponade. See findings for further details. 2. Normal LV size with grossly normal LVEF and wall motion. 3. No significant valvular abnormalities.  Compared to the prior study from 11/3/2022, the pericardial effusion is new.  Evaluation for clinical evidence of cardiac tamponade is indicated. ______________________________________________________________________________ Left Ventricle The left ventricle is normal in size. Left ventricular systolic function is normal. The visual ejection fraction is 60-65%. Endomyocardial border is not well seen. No obvious WMA seen, but cannot be ruled out with current image quality. Echo contrast would have been helpful.  Right Ventricle The right ventricle is mildly dilated. Right ventricular function cannot be assessed due to poor image quality.  Mitral Valve There is trace mitral regurgitation. There is no mitral valve stenosis.  Tricuspid Valve There is trace tricuspid regurgitation. IVC diameter >2.1 cm collapsing <50% with sniff suggests a high RA pressure estimated at 15 mmHg or greater.  Aortic Valve Normal tricuspid aortic valve. No aortic regurgitation is present. No aortic stenosis is present.  Pulmonic Valve The pulmonic valve is not well visualized.  Pericardium Moderate circumferential pericardial effusion, up to 1.4 cm in thickness. No apparent loculations. There may be some fibrinous material within the effusion overlying the RV. There is echocardiographic evidence of early tamponade. Favoring tamponade, there is a dilated IVC with < 50% respiratory variation, as well as > 25% variation of the mitral inflow with respiration.  However, tricuspid inflow variation with respiration is minimal, and there is no evidence of significant diastolic RV collapse. RA is not sufficiently seen to evaluate for RA collapse. ______________________________________________________________________________ Doppler Measurements & Calculations TR max onel: 251.5 cm/sec TR max P.3 mmHg  ______________________________________________________________________________ Report approved by: MD Sourav Trujillo 2022 10:52 AM       CT Chest Pulmonary Embolism w Contrast    Result Date: 2022  EXAM: CT CHEST PULMONARY EMBOLISM W CONTRAST LOCATION: [facility] DATE/TIME: 2022 6:02 AM INDICATION: Shortness of breath. Hypoxia post gastric bypass surgery on 2022. COMPARISON: CT pulmonary angiogram 2022. TECHNIQUE: CT chest pulmonary angiogram during arterial phase injection of IV contrast. Multiplanar reformats and MIP reconstructions were performed. Dose reduction techniques were used. CONTRAST: 73 Isovue-370 IV. FINDINGS: ANGIOGRAM CHEST: No pulmonary emboli on either side. Normal caliber thoracic aorta without aneurysm or dissection. No CT evidence of right heart strain. LUNGS AND PLEURA: Moderate left and small right pleural effusions with associated passive atelectasis in the adjacent lungs, similar to prior. Vague opacities have developed in the lungs which could represent an infectious or an inflammatory process. Differential diagnostic possibilities would include COVID-19 infection. MEDIASTINUM/AXILLAE: Normal cardiac size. Moderate pericardial effusion has developed. No suspicious adenopathy. Reflux in the distal esophagus. Trachea is midline. CORONARY ARTERY CALCIFICATION: None. UPPER ABDOMEN: Postoperative changes of bariatric surgery. Fatty liver. Dependent gallstones without biliary dilatation or adjacent inflammation. MUSCULOSKELETAL: Minor right convex thoracic curve.     IMPRESSION: 1.  No pulmonary emboli  on either side. Moderate left and small right effusions with associated passive atelectasis in the adjacent lungs, unchanged. Vague infiltrates have developed in the lungs, most likely representing an infectious or an inflammatory  process. Differential diagnostic possibilities would include COVID-19 infection. 2.  Normal caliber thoracic aorta without aneurysm or dissection. Normal cardiac size. Moderate pericardial effusion has developed. 3.  Reflux in the distal esophagus. Postoperative changes of bariatric surgery. Fatty liver. Cholelithiasis. 4.  Minor right convex thoracic curve.    POC US ECHO LIMITED    Limited Bedside Cardiac Ultrasound, performed and interpreted by me. Indication: Shortness of Breath. subcostal views were acquired. Image quality was satisfactory. Findings:  Abnormal moderate pericardial effusion with no findings of tamponade no right heart collapse IMPRESSION: Abnormal limited cardiac ultrasound showing .  Moderate pericardial effusion.  No signs of tamponade

## 2022-12-13 NOTE — PROGRESS NOTES
Weight Loss Surgery.   Some mild fever. She feels well. Swallowing water with less problem than before.   Bariatric history reviewed again.   UGI done, images reviewed:  no marked obstruction. Sleeve looks OK.   She is sitting up. Thoracentesis helped her breathing.   Labs reviewed.  Imp: I would hold on upper endoscopy and see how her swallowing goes. I encouraged her to only drink when sitting upright. We will see about slow diet advancement as time goes by. For now bariatric clear liquids should be OK.

## 2022-12-13 NOTE — PHARMACY-VANCOMYCIN DOSING SERVICE
"Pharmacy Vancomycin Note  Date of Service 2022  Patient's  1980   42 year old, female    Indication: Healthcare-Associated Pneumonia  Day of Therapy: 2  Current vancomycin regimen:  1250 mg IV q12h  Current vancomycin monitoring method: AUC  Current vancomycin therapeutic monitoring goal: 400-600 mg*h/L    InsightRX Prediction of Current Vancomycin Regimen  Loading dose: N/A  Regimen: 1250 mg IV every 12 hours.  Start time: 20:21 on 2022  Exposure target: AUC24 (range)400-600 mg/L.hr   AUC24,ss: 491 mg/L.hr  Probability of AUC24 > 400: 87 %  Ctrough,ss: 15.5 mg/L  Probability of Ctrough,ss > 20: 16 %  Probability of nephrotoxicity (Lodise NATALIE ): 11 %    Current estimated CrCl = Estimated Creatinine Clearance: 119.6 mL/min (based on SCr of 0.85 mg/dL).    Creatinine for last 3 days  2022:  5:41 AM Creatinine 0.74 mg/dL  2022:  6:49 AM Creatinine 0.85 mg/dL    Recent Vancomycin Levels (past 3 days)  2022:  6:28 PM Vancomycin 15.8 mg/L    Vancomycin IV Administrations (past 72 hours)                   vancomycin (VANCOCIN) 1,250 mg in 0.9% NaCl 250 mL intermittent infusion (mg) 1,250 mg New Bag 22 0745     1,250 mg New Bag 22 2121    vancomycin (VANCOCIN) 2,500 mg in sodium chloride 0.9 % 500 mL intermittent infusion (mg) 2,500 mg New Bag 22 0823                Nephrotoxins and other renal medications (From now, onward)    Start     Dose/Rate Route Frequency Ordered Stop    22 2000  vancomycin (VANCOCIN) 1,250 mg in 0.9% NaCl 250 mL intermittent infusion         1,250 mg  over 90 Minutes Intravenous EVERY 12 HOURS 22 1502      22 1500  piperacillin-tazobactam (ZOSYN) 4.5 g vial to attach to  mL bag        Note to Pharmacy: For [facility], [facility] and [facility]: For Zosyn-naive patients, use the \"Zosyn initial dose + extended infusion\" order panel.    4.5 g  over 30 Minutes Intravenous EVERY 6 HOURS 22 1454 22 1459             Contrast " Orders - past 72 hours (72h ago, onward)    Start     Dose/Rate Route Frequency Stop    12/12/22 1630  iohexol (OMNIPAQUE) 240 mg/mL solution 50 mL         50 mL Oral ONCE 12/12/22 1606    12/12/22 1600  iohexol (OMNIPAQUE) 240 mg/mL solution 50 mL  Status:  Discontinued         50 mL Per J Tube ONCE 12/12/22 1608    12/12/22 1200  perflutren diluted 1mL to 2mL with saline (OPTISON) diluted injection 9 mL        Note to Pharmacy: [facility] # 5677-3031-53    9 mL Intravenous ONCE 12/12/22 1133          Interpretation of levels and current regimen:  Vancomycin level is reflective of -600    Has serum creatinine changed greater than 50% in last 72 hours: No    Urine output:  unable to determine    Renal Function: Stable    Plan:  1. Continue Current Dose  2. Vancomycin monitoring method: AUC  3. Vancomycin therapeutic monitoring goal: 400-600 mg*h/L  4. Pharmacy will check vancomycin levels as appropriate in 1-3 Days.  5. Serum creatinine levels will be ordered daily for the first week of therapy and at least twice weekly for subsequent weeks.    Akil Aliheyder, [facility]

## 2022-12-13 NOTE — PROVIDER NOTIFICATION
MD Notification    Notified Person: MD    Notified Person Name:Dr. Driscoll    Notification Date/Time:12/13/1981 0201    Notification Interaction:text/page    Purpose of Notification:Pt has STAT orders for Respiratory Aerobic Bacteria Culture. Pt unable to cough, has prericardial drain and has pain with deep breathing. Please advice, thanks    Orders Received:    Comments:

## 2022-12-13 NOTE — PROGRESS NOTES
GASTROENTEROLOGY PROGRESS NOTE     IMPRESSION:  1.  Dysphagia, reflux, recurrent vomiting- this started after her sleeve gastrectomy at the [facility].  Intermittent notes state that she has been able to at least tolerate clear liquids, however, more recently its been difficult for her.    - The UGI series shows some delay in transit through her sleeve, but no stricture. She is tolerating liquids today.  - Agree with Dr. Sandoval that we should hold off on endoscopy at this time and assess ability to take oral intake. It can be considered in future, if ongoing symptoms.    RECOMMENDATIONS:  - Agree with clear liquid diet, advance per surgery  - Continue pantoprazole 40mg IV twice day.    Juan Barnard MD  [facility]  [phone]      ________________________________________________________________________      SUBJECTIVE:  Patient feels better today. Able to tolerate clear liquids.       OBJECTIVE:  /79 (BP Location: Left arm)   Pulse 76   Temp 99.1  F (37.3  C) (Oral)   Resp 21   Wt 106.3 kg (234 lb 6.4 oz)   LMP 2022 (Approximate)   SpO2 97%   BMI 30.93 kg/m    Temp (24hrs), Av.4  F (36.9  C), Min:97.6  F (36.4  C), Max:99.1  F (37.3  C)    Patient Vitals for the past 72 hrs:   Weight   22 0311 106.3 kg (234 lb 6.4 oz)        PHYSICAL EXAM  GEN: Alert, NAD.    HRT: reg  LUNGS: CTA  ABD: +BS, non-tender, non-distended, no rebound or guarding.    Additional Data:  I have reviewed the patient's new clinical lab results:     Recent Labs   Lab Test 22  0640 22  0649 22  0541 22  1551   WBC 6.2 6.6 9.3 8.9   HGB 9.5* 10.1* 12.1 10.7*   MCV 88 87 87 86    219 263 550*   INR  --   --   --  1.04     Recent Labs   Lab Test 22  0640 22  1459 22  0649 22  0541   *  --  139 137   POTASSIUM 2.9* 3.6 2.9* 3.5   CHLORIDE 115*  --  106 99   CO2 22  --  24 23   BUN 7  --  6* 5.2*   CR 1.01  --  0.85 0.74   ANIONGAP 8  --   9 15   KIMBERLYN 7.4*  --  8.5 9.7   GLC 79  --  86 101*     Recent Labs   Lab Test 12/13/22  0640 12/12/22  0649 12/11/22  0542 12/11/22  0541 11/07/22  0645 11/06/22  0926 11/06/22  0616 11/02/22  0636 11/01/22  1734 10/24/22  1447   ALBUMIN 1.7* 2.0*  --  3.7   < >  --  2.3*   < > 3.5  --    BILITOTAL 0.4 0.7  --  0.5   < >  --  0.5   < > 1.5*  --    ALT 20 29  --  45*   < >  --  19   < > 32  --    AST 10 10  --  19   < >  --  36*   < > 41*  --    PROTEIN  --   --   --   --   --  50*  --   --   --  Negative   LIPASE  --   --  44  --   --   --  65*  --  68*  --     < > = values in this interval not displayed.      ESOPHAGRAM 12/12/2022 4:10 PM      HISTORY: Evaluate UGI anatomy and assess for any stricture at the  sleeve gastrectomy.     COMPARISON: None.     TECHNIQUE: A single contrast water soluble esophagram was performed.      FLUOROSCOPY TIME: 1.7 minutes.     SPOT FILMS: 8     FINDINGS: There was some delay in passage through the gastric sleeve,  no definite fixed stricture demonstrated.

## 2022-12-13 NOTE — PROGRESS NOTES
[facility]    Medicine Progress Note - Hospitalist Service    Date of Admission:  12/11/2022  Date of Service: 12/13/2022    Assessment & Plan       Leah Yanez is a 42 year old female with history of depression, GERD, PE, renal syndrome transferred from [facility] on 12/11/2022 with acute hypoxic failure secondary to bilateral pleural effusion and pericardial effusion/cardiac tamponade    #Moderate pericardial effusion with pericardial tamponade  -Appreciate emergent pericardiocentesis and drain placement by interventional cardiology 12/11. (See their procedure note for details)  -Monitor closely and repeat limited echocardiogram today.  Further management as per cardiology team  - Follow-up on the fluid studies  - Blood culture from 12/11 growing gram positive cocci in clusters, organism not identified by Verigene and only 1/2 positive -> suspect contaminant. MRSA nares PCR negative.   - Pericardial fluid with 1995 total nucleated cells, 92% neutrophils, cultures no growth to date.   - WBC count normal this admission, stable on 2 LPM O2, fever of 100.9 on 12/11 but afebrile since then.    #Bilateral pleural effusions  -We had thoracentesis done to better evaluate the etiology (she had bilateral HCAP in early November)   - Follow-up on sputum culture, blood culture, fluid cultures -> pleural fluid 12/12 with 388 total nucleated cells, 82% lymphocytes, culture negative.   - Thoracentesis 12/12-> 300 mL of  ellis colored fluid was drained without immediate  Complications  - Will stop empiric vancomycin and ceftriaxone for now given cultures have been largely unremarkable    # Chest pain: Admission CT negative for PE.  Differential being pleurisy from pneumonia and effusion  Admission troponin of 22 (EKG did not show any evidence of pericarditis.  Does have some T wave inversion in lateral leads).  We will recheck troponin (although may be elevated due to pericardial drain  placement).  We will trend  -Other differential being radiated pain from her postsurgical complications causing reflux, esophagitis.  - Surgery has been consulted and reviewing case with bariatric surgery -> UGI done, images reviewed:  no marked obstruction.  - [facility] consulted as well for reflux / dysphagia ->  clear liquid diet, advance per surgery  - Continue pantoprazole 40mg IV twice day.  - Holding off EGD for now    #Status post laparoscopic sleeve gastrectomy  #Gastric anastomotic stricture and globus sensation  -underwent laparoscopic sleeve gastrectomy and hiatal hernia repair on 10/25/22 with Dr. Aragon.  Patient was hospitalized about a week ago with [facility], had EGD performed on 12/1 and esophageal stenting and balloon dilation performed in 12/2  -We will get bariatric surgery review tomorrow. Currently abdomen is soft, nontender.  If any clinical change, she will need abdominal imaging as well (her pain could be radiated)  - SLP following    #Cholelithiasis-incidental on CT.  Monitor LFT    #Major Depression. continue PTA Wellbutrin       Diet: Advance Diet as Tolerated: Clear Liquid Diet    DVT Prophylaxis: Pneumatic Compression Devices  Holder Catheter: Not present  Central Lines: PRESENT  PICC 12/11/22 Double Lumen Right Basilic-Site Assessment: WDL  Cardiac Monitoring: ACTIVE order. Indication: Tachyarrhythmias, acute (48 hours)  Code Status: Full Code      Disposition Plan      Expected Discharge Date: 12/18/2022                The patient's care was discussed with the Bedside Nurse and Patient.    Nabor Feng MD  Hospitalist Service  [facility]  Securely message with the Vocera Web Console (learn more here)  Text page via [employer] Paging/Directory         Clinically Significant Risk Factors        # Hypokalemia: Lowest K = 2.9 mmol/L in last 2 days, will replace as needed    # Hypercalcemia: corrected calcium is >10.1, will monitor as appropriate    #  "Hypoalbuminemia: Lowest albumin = 1.7 g/dL at 12/13/2022  6:40 AM, will monitor as appropriate            # Obesity: Estimated body mass index is 30.93 kg/m  as calculated from the following:    Height as of an earlier encounter on 12/11/22: 1.854 m (6' 1\").    Weight as of this encounter: 106.3 kg (234 lb 6.4 oz)., PRESENT ON ADMISSION         ______________________________________________________________________    Interval History     No acute events overnight  Doing well post thoracentesis  Chest discomfort stable  Reports overall she is feeling improved  Tolerating clear liquid diet today with no nausea / vomiting   No new complaints  O2 needs are declining and overall she reports respiratory status is improving  No fevers recorded  Hasnt had a BM for few days per RN    Data reviewed today: I reviewed all medications, new labs and imaging results over the last 24 hours. I personally reviewed no images or EKG's today.    Physical Exam   Vital Signs: Temp: 99.1  F (37.3  C) Temp src: Oral BP: 115/79 Pulse: 76   Resp: 21 SpO2: 97 % O2 Device: Oxymask Oxygen Delivery: 2 LPM  Weight: 234 lbs 6.4 oz     Constitutional: awake, alert, cooperative, no apparent distress.   Respiratory: CTABL   Cardiovascular: RRR with no m/r/g   GI: Normal bowel sounds, soft, non-distended, non-tender.   Skin: normal skin color, texture, turgor   Neurologic: Awake, alert, oriented to name, place and time. Motor is 5 out of 5 bilaterally. Sensory is intact.   Neuropsychiatric: normal mood and affect  ----------------------------------------------------------------------------------------    Data   Recent Labs   Lab 12/13/22  0640 12/12/22  1459 12/12/22  0649 12/11/22  0542 12/11/22  0541   WBC 6.2  --  6.6  --  9.3   HGB 9.5*  --  10.1*  --  12.1   MCV 88  --  87  --  87     --  219  --  263   *  --  139  --  137   POTASSIUM 2.9* 3.6 2.9*  --  3.5   CHLORIDE 115*  --  106  --  99   CO2 22  --  24  --  23   BUN 7  --  6*  --  " 5.2*   CR 1.01  --  0.85  --  0.74   ANIONGAP 8  --  9  --  15   KIMBERLYN 7.4*  --  8.5  --  9.7   GLC 79  --  86  --  101*   ALBUMIN 1.7*  --  2.0*  --  3.7   PROTTOTAL 4.8*  --  5.6* 7.1 7.0   BILITOTAL 0.4  --  0.7  --  0.5   ALKPHOS 89  --  107  --  160*   ALT 20  --  29  --  45*   AST 10  --  10  --  19   LIPASE  --   --   --  44  --      Recent Results (from the past 24 hour(s))   XR Upper GI Water Soluble    Narrative    ESOPHAGRAM 12/12/2022 4:10 PM     HISTORY: Evaluate UGI anatomy and assess for any stricture at the  sleeve gastrectomy.    COMPARISON: None.    TECHNIQUE: A single contrast water soluble esophagram was performed.     FLUOROSCOPY TIME: 1.7 minutes.    SPOT FILMS: 8    FINDINGS: There was some delay in passage through the gastric sleeve,  no definite fixed stricture demonstrated.    VIPUL WALDROP MD         SYSTEM ID:  [id number]     Medications       buPROPion  75 mg Oral BID     colchicine  0.6 mg Oral Daily     cyanocobalamin  500 mcg Sublingual Daily     furosemide  40 mg Intravenous Once     hyoscyamine  0.125 mg Sublingual 4x Daily AC & HS     pantoprazole  40 mg Intravenous BID AC     sodium chloride (PF)  10 mL Intracatheter Q8H     sodium chloride (PF)  10-40 mL Intracatheter Q7 Days     sodium chloride (PF)  3 mL Intracatheter Q8H     sucralfate  1 g Oral 4x Daily AC & HS     thiamine  100 mg Oral Daily

## 2022-12-14 ENCOUNTER — APPOINTMENT (OUTPATIENT)
Dept: SPEECH THERAPY | Facility: CLINIC | Age: 42
DRG: 186 | End: 2022-12-14
Attending: INTERNAL MEDICINE
Payer: COMMERCIAL

## 2022-12-14 ENCOUNTER — APPOINTMENT (OUTPATIENT)
Dept: GENERAL RADIOLOGY | Facility: CLINIC | Age: 42
DRG: 186 | End: 2022-12-14
Attending: STUDENT IN AN ORGANIZED HEALTH CARE EDUCATION/TRAINING PROGRAM
Payer: COMMERCIAL

## 2022-12-14 ENCOUNTER — APPOINTMENT (OUTPATIENT)
Dept: PHYSICAL THERAPY | Facility: CLINIC | Age: 42
DRG: 186 | End: 2022-12-14
Attending: STUDENT IN AN ORGANIZED HEALTH CARE EDUCATION/TRAINING PROGRAM
Payer: COMMERCIAL

## 2022-12-14 ENCOUNTER — TELEPHONE (OUTPATIENT)
Dept: SURGERY | Facility: CLINIC | Age: 42
End: 2022-12-14

## 2022-12-14 ENCOUNTER — APPOINTMENT (OUTPATIENT)
Dept: CARDIOLOGY | Facility: CLINIC | Age: 42
DRG: 186 | End: 2022-12-14
Attending: PHYSICIAN ASSISTANT
Payer: COMMERCIAL

## 2022-12-14 PROBLEM — N17.0 ACUTE KIDNEY FAILURE WITH TUBULAR NECROSIS (H): Status: ACTIVE | Noted: 2022-12-14

## 2022-12-14 LAB
ADENOSINE DEAMINASE PCAR-CCNC: 22 U/L
ANION GAP SERPL CALCULATED.3IONS-SCNC: 10 MMOL/L (ref 3–14)
BUN SERPL-MCNC: 8 MG/DL (ref 7–30)
CALCIUM SERPL-MCNC: 9.3 MG/DL (ref 8.5–10.1)
CHLORIDE BLD-SCNC: 105 MMOL/L (ref 94–109)
CO2 SERPL-SCNC: 26 MMOL/L (ref 20–32)
CREAT SERPL-MCNC: 1.24 MG/DL (ref 0.52–1.04)
CRP SERPL-MCNC: 107 MG/L (ref 0–8)
ERYTHROCYTE [DISTWIDTH] IN BLOOD BY AUTOMATED COUNT: 18.3 % (ref 10–15)
GFR SERPL CREATININE-BSD FRML MDRD: 55 ML/MIN/1.73M2
GLUCOSE BLD-MCNC: 82 MG/DL (ref 70–99)
HCT VFR BLD AUTO: 31 % (ref 35–47)
HGB BLD-MCNC: 9.8 G/DL (ref 11.7–15.7)
LVEF ECHO: NORMAL
MCH RBC QN AUTO: 26.8 PG (ref 26.5–33)
MCHC RBC AUTO-ENTMCNC: 31.6 G/DL (ref 31.5–36.5)
MCV RBC AUTO: 85 FL (ref 78–100)
PLATELET # BLD AUTO: 283 10E3/UL (ref 150–450)
POTASSIUM BLD-SCNC: 3.2 MMOL/L (ref 3.4–5.3)
POTASSIUM BLD-SCNC: 3.4 MMOL/L (ref 3.4–5.3)
RBC # BLD AUTO: 3.65 10E6/UL (ref 3.8–5.2)
SODIUM SERPL-SCNC: 141 MMOL/L (ref 133–144)
WBC # BLD AUTO: 6.2 10E3/UL (ref 4–11)

## 2022-12-14 PROCEDURE — 86140 C-REACTIVE PROTEIN: CPT | Performed by: STUDENT IN AN ORGANIZED HEALTH CARE EDUCATION/TRAINING PROGRAM

## 2022-12-14 PROCEDURE — 97530 THERAPEUTIC ACTIVITIES: CPT | Mod: GP

## 2022-12-14 PROCEDURE — 84132 ASSAY OF SERUM POTASSIUM: CPT | Performed by: STUDENT IN AN ORGANIZED HEALTH CARE EDUCATION/TRAINING PROGRAM

## 2022-12-14 PROCEDURE — 250N000013 HC RX MED GY IP 250 OP 250 PS 637: Performed by: STUDENT IN AN ORGANIZED HEALTH CARE EDUCATION/TRAINING PROGRAM

## 2022-12-14 PROCEDURE — 71046 X-RAY EXAM CHEST 2 VIEWS: CPT

## 2022-12-14 PROCEDURE — 93325 DOPPLER ECHO COLOR FLOW MAPG: CPT | Mod: 26 | Performed by: INTERNAL MEDICINE

## 2022-12-14 PROCEDURE — 82310 ASSAY OF CALCIUM: CPT | Performed by: STUDENT IN AN ORGANIZED HEALTH CARE EDUCATION/TRAINING PROGRAM

## 2022-12-14 PROCEDURE — 250N000013 HC RX MED GY IP 250 OP 250 PS 637: Performed by: INTERNAL MEDICINE

## 2022-12-14 PROCEDURE — 97161 PT EVAL LOW COMPLEX 20 MIN: CPT | Mod: GP

## 2022-12-14 PROCEDURE — 85027 COMPLETE CBC AUTOMATED: CPT | Performed by: STUDENT IN AN ORGANIZED HEALTH CARE EDUCATION/TRAINING PROGRAM

## 2022-12-14 PROCEDURE — C8924 2D TTE W OR W/O FOL W/CON,FU: HCPCS

## 2022-12-14 PROCEDURE — C9113 INJ PANTOPRAZOLE SODIUM, VIA: HCPCS | Performed by: INTERNAL MEDICINE

## 2022-12-14 PROCEDURE — 250N000011 HC RX IP 250 OP 636: Performed by: INTERNAL MEDICINE

## 2022-12-14 PROCEDURE — 97116 GAIT TRAINING THERAPY: CPT | Mod: GP

## 2022-12-14 PROCEDURE — 210N000002 HC R&B HEART CARE

## 2022-12-14 PROCEDURE — 99232 SBSQ HOSP IP/OBS MODERATE 35: CPT | Performed by: STUDENT IN AN ORGANIZED HEALTH CARE EDUCATION/TRAINING PROGRAM

## 2022-12-14 PROCEDURE — 92526 ORAL FUNCTION THERAPY: CPT | Mod: GN

## 2022-12-14 PROCEDURE — 250N000011 HC RX IP 250 OP 636: Performed by: STUDENT IN AN ORGANIZED HEALTH CARE EDUCATION/TRAINING PROGRAM

## 2022-12-14 PROCEDURE — 255N000002 HC RX 255 OP 636: Performed by: STUDENT IN AN ORGANIZED HEALTH CARE EDUCATION/TRAINING PROGRAM

## 2022-12-14 PROCEDURE — 93321 DOPPLER ECHO F-UP/LMTD STD: CPT | Mod: 26 | Performed by: INTERNAL MEDICINE

## 2022-12-14 PROCEDURE — 93308 TTE F-UP OR LMTD: CPT | Mod: 26 | Performed by: INTERNAL MEDICINE

## 2022-12-14 RX ORDER — COLCHICINE 0.6 MG/1
0.6 TABLET ORAL 2 TIMES DAILY
Status: DISCONTINUED | OUTPATIENT
Start: 2022-12-14 | End: 2022-12-15 | Stop reason: HOSPADM

## 2022-12-14 RX ORDER — PANTOPRAZOLE SODIUM 40 MG/1
40 TABLET, DELAYED RELEASE ORAL
Status: DISCONTINUED | OUTPATIENT
Start: 2022-12-14 | End: 2022-12-15 | Stop reason: HOSPADM

## 2022-12-14 RX ORDER — MULTIPLE VITAMINS W/ MINERALS TAB 9MG-400MCG
1 TAB ORAL 2 TIMES DAILY
Status: DISCONTINUED | OUTPATIENT
Start: 2022-12-14 | End: 2022-12-15 | Stop reason: HOSPADM

## 2022-12-14 RX ORDER — POTASSIUM CHLORIDE 29.8 MG/ML
20 INJECTION INTRAVENOUS
Status: DISPENSED | OUTPATIENT
Start: 2022-12-14 | End: 2022-12-14

## 2022-12-14 RX ADMIN — Medication 1 TABLET: at 16:07

## 2022-12-14 RX ADMIN — ONDANSETRON 4 MG: 4 TABLET, ORALLY DISINTEGRATING ORAL at 17:43

## 2022-12-14 RX ADMIN — POTASSIUM CHLORIDE 20 MEQ: 29.8 INJECTION, SOLUTION INTRAVENOUS at 11:49

## 2022-12-14 RX ADMIN — SUCRALFATE ORAL 1 G: 1 SUSPENSION ORAL at 16:05

## 2022-12-14 RX ADMIN — ONDANSETRON 4 MG: 4 TABLET, ORALLY DISINTEGRATING ORAL at 09:39

## 2022-12-14 RX ADMIN — Medication 500 MCG: at 11:36

## 2022-12-14 RX ADMIN — BUPROPION HYDROCHLORIDE 75 MG: 75 TABLET, FILM COATED ORAL at 20:18

## 2022-12-14 RX ADMIN — HUMAN ALBUMIN MICROSPHERES AND PERFLUTREN 3 ML: 10; .22 INJECTION, SOLUTION INTRAVENOUS at 13:46

## 2022-12-14 RX ADMIN — PANTOPRAZOLE SODIUM 40 MG: 40 INJECTION, POWDER, FOR SOLUTION INTRAVENOUS at 07:57

## 2022-12-14 RX ADMIN — OXYCODONE HYDROCHLORIDE 10 MG: 5 TABLET ORAL at 11:48

## 2022-12-14 RX ADMIN — THIAMINE HCL TAB 100 MG 100 MG: 100 TAB at 11:36

## 2022-12-14 RX ADMIN — MULTIPLE VITAMINS W/ MINERALS TAB 1 TABLET: TAB at 16:05

## 2022-12-14 RX ADMIN — OXYCODONE HYDROCHLORIDE 10 MG: 5 TABLET ORAL at 22:51

## 2022-12-14 RX ADMIN — BUPROPION HYDROCHLORIDE 75 MG: 75 TABLET, FILM COATED ORAL at 11:36

## 2022-12-14 RX ADMIN — COLCHICINE 0.6 MG: 0.6 TABLET ORAL at 11:36

## 2022-12-14 RX ADMIN — HYOSCYAMINE SULFATE 0.12 MG: 0.12 TABLET SUBLINGUAL at 16:05

## 2022-12-14 RX ADMIN — HYOSCYAMINE SULFATE 0.12 MG: 0.12 TABLET SUBLINGUAL at 22:53

## 2022-12-14 RX ADMIN — SUCRALFATE ORAL 1 G: 1 SUSPENSION ORAL at 11:48

## 2022-12-14 RX ADMIN — Medication 125 MCG: at 16:10

## 2022-12-14 RX ADMIN — HYOSCYAMINE SULFATE 0.12 MG: 0.12 TABLET SUBLINGUAL at 11:48

## 2022-12-14 RX ADMIN — SUCRALFATE ORAL 1 G: 1 SUSPENSION ORAL at 22:53

## 2022-12-14 RX ADMIN — ONDANSETRON 4 MG: 2 INJECTION INTRAMUSCULAR; INTRAVENOUS at 22:53

## 2022-12-14 RX ADMIN — SUCRALFATE ORAL 1 G: 1 SUSPENSION ORAL at 07:57

## 2022-12-14 RX ADMIN — HYOSCYAMINE SULFATE 0.12 MG: 0.12 TABLET SUBLINGUAL at 07:57

## 2022-12-14 RX ADMIN — COLCHICINE 0.6 MG: 0.6 TABLET ORAL at 20:18

## 2022-12-14 RX ADMIN — MICONAZOLE NITRATE: 2 POWDER TOPICAL at 20:23

## 2022-12-14 RX ADMIN — ACETAMINOPHEN 1000 MG: 500 TABLET ORAL at 17:43

## 2022-12-14 ASSESSMENT — ACTIVITIES OF DAILY LIVING (ADL)
VISION_MANAGEMENT: GLASSES
ADLS_ACUITY_SCORE: 38
ADLS_ACUITY_SCORE: 41
CHANGE_IN_FUNCTIONAL_STATUS_SINCE_ONSET_OF_CURRENT_ILLNESS/INJURY: YES
ADLS_ACUITY_SCORE: 38
ADLS_ACUITY_SCORE: 41
ADLS_ACUITY_SCORE: 38
WEAR_GLASSES_OR_BLIND: YES
TOILETING_ISSUES: NO
ADLS_ACUITY_SCORE: 41
DIFFICULTY_EATING/SWALLOWING: YES
ADLS_ACUITY_SCORE: 41
ADLS_ACUITY_SCORE: 27
ADLS_ACUITY_SCORE: 41
DRESSING/BATHING_DIFFICULTY: NO
WALKING_OR_CLIMBING_STAIRS_DIFFICULTY: NO
ADLS_ACUITY_SCORE: 41
EATING/SWALLOWING: SWALLOWING LIQUIDS;SWALLOWING SOLID FOOD
FALL_HISTORY_WITHIN_LAST_SIX_MONTHS: NO
CONCENTRATING,_REMEMBERING_OR_MAKING_DECISIONS_DIFFICULTY: NO
DOING_ERRANDS_INDEPENDENTLY_DIFFICULTY: NO
ADLS_ACUITY_SCORE: 38
ADLS_ACUITY_SCORE: 27

## 2022-12-14 NOTE — PROGRESS NOTES
12/14/22 1000   Appointment Info   Signing Clinician's Name / Credentials (PT) Nani Otoole PT   Living Environment   People in Home spouse   Current Living Arrangements house   Home Accessibility stairs to enter home;stairs within home   Number of Stairs, Main Entrance 1   Stair Railings, Main Entrance none   Number of Stairs, Within Home, Primary greater than 10 stairs   Stair Railings, Within Home, Primary railing on right side (ascending);railings safe and in good condition   Transportation Anticipated family or friend will provide   Self-Care   Usual Activity Tolerance good   Current Activity Tolerance fair   Regular Exercise No   Equipment Currently Used at Home none   Fall history within last six months no   General Information   Onset of Illness/Injury or Date of Surgery 12/11/22   Referring Physician Dr. Feng   Patient/Family Therapy Goals Statement (PT) To go home   Pertinent History of Current Problem (include personal factors and/or comorbidities that impact the POC) Pt is a 42 year old female with history of depression, GERD, PE, renal syndrome transferred from [facility] on 12/11/2022 with acute hypoxic failure secondary to bilateral pleural effusion and pericardial effusion/cardiac tamponade   Existing Precautions/Restrictions fall   Cognition   Affect/Mental Status (Cognition) WFL   Orientation Status (Cognition) oriented x 4   Pain Assessment   Patient Currently in Pain No   Range of Motion (ROM)   Range of Motion ROM is WFL   Strength (Manual Muscle Testing)   Strength Comments Strength grossly 4/5 bilaterally   Bed Mobility   Comment, (Bed Mobility) SBA   Transfers   Comment, (Transfers) CGA   Gait/Stairs (Locomotion)   Comment, (Gait/Stairs) 25' no AD, CGA, wide HERLINDA   Clinical Impression   Criteria for Skilled Therapeutic Intervention Yes, treatment indicated   PT Diagnosis (PT) Impaired endurance   Influenced by the following impairments Decreased endurance   Functional  limitations due to impairments Difficulty with bed mobility, transfers, ambulation, stair management   Clinical Presentation (PT Evaluation Complexity) Stable/Uncomplicated   Clinical Presentation Rationale VSS, pain controlled   Clinical Decision Making (Complexity) low complexity   Planned Therapy Interventions (PT) balance training;bed mobility training;gait training;stair training;strengthening;patient/family education;transfer training   Anticipated Equipment Needs at Discharge (PT) walker, rolling   Risk & Benefits of therapy have been explained evaluation/treatment results reviewed;care plan/treatment goals reviewed;risks/benefits reviewed;current/potential barriers reviewed;participants voiced agreement with care plan;participants included;patient   PT Total Evaluation Time   PT Eval, Low Complexity Minutes (08836) 10   Plan of Care Review   Plan of Care Reviewed With patient   Physical Therapy Goals   PT Frequency Daily   PT Predicted Duration/Target Date for Goal Attainment 12/21/22   PT Goals Bed Mobility;Transfers;Gait;Stairs   PT: Bed Mobility Independent;Supine to/from sit   PT: Transfers Independent;Sit to/from stand;Bed to/from chair;Assistive device   PT: Gait Modified independent;Rolling walker;150 feet   PT: Stairs Modified independent;Greater than 10 stairs;Rail on right   Interventions   Interventions Quick Adds Gait Training;Therapeutic Activity;Therapeutic Procedure   Therapeutic Procedure/Exercise   Ther. Procedure: strength, endurance, ROM, flexibillity Minutes (85733) 3   Symptoms Noted During/After Treatment none   Treatment Detail/Skilled Intervention Pt educated on and performed the following seated exercises for endurance and strength: ankle pumps, LAQs, marching.   Therapeutic Activity   Therapeutic Activities: dynamic activities to improve functional performance Minutes (39740) 12   Symptoms Noted During/After Treatment Fatigue   Treatment Detail/Skilled Intervention Pt received  supine in bed, agreeable to therapy. Time spent monitoring VS, which were stable on RA throughout session. Pt educated on PT POC, role of PT in acute care setting, therapy goals and discharge recommendations. Pt performs bed mobility with SBA, cues for technique. Once sitting at EOB, able to scoot forward with SBA, good sitting balance at EOB. Pt performs sit to/from stand x 6 reps with CGA progressing to SBA with cues for hand placement and weight shift. At end of therapy, pt seated in chair, all needs in reach, alarm on, RN aware.   Gait Training   Gait Training Minutes (35535) 10   Symptoms Noted During/After Treatment (Gait Training) fatigue   Treatment Detail/Skilled Intervention Pt performs the following pregait activities with FWW and CGA: weight shifting, marching. Pt ambulates 125' x 1 with FWW and CGA. Pt requires one standing rest breaks. Discussed effort scale and signs/symptoms of activity intolerance with ambulation.   PT Discharge Planning   PT Plan Progress independence, monitor VS, increase ambulation distance, stairs   PT Discharge Recommendation (DC Rec) home   PT Rationale for DC Rec At baseline, pt lives with her spouse in a home with steps to enter and steps to bedroom. This date, pt requires SBA to CGA for mobility. Anticipate with further medical management and therapy, pt will progress to independence and be safe to discharge home with family.   PT Brief overview of current status SBA to CGA   Total Session Time   Timed Code Treatment Minutes 25   Total Session Time (sum of timed and untimed services) 35

## 2022-12-14 NOTE — PROGRESS NOTES
[facility].  Inpatient Cardiology Progress Note.  Date of Service: 12/13/2022       INTERVAL HISTORY:  Patient has had no further output from the pericardial drain.  She still has pleuritic chest discomfort from the pneumonia and pleuritis.    On exam -heart sounds are regular.  No audible murmur.  No pericardial friction rub.    DIAGNOSES:  1.  Status post pericardiocentesis for pericardial tamponade on 12/11/2022.    ASSESSMENT/PLAN:  42 year old female status post recent laparoscopic sleeve gastrectomy (10/25/2022), esophageal stricture diltation (12/2/2022), and recent pneumonia who presented with pericardial tamponade and underwent pericardiocentesis on 12/11/2022 (160 cc of straw-colored fluid removed) with no residual effusion after procedure.      I personally reviewed and independently interpreted echocardiogram images, ECG tracings and pericardiocentesis procedural note.    1.  I removed the pericardial drain.  No complications.  2.  Recommend colchicine 0.6 mg 2 times daily for 2 weeks.  3.  Cardiology will sign off.  Routine echocardiogram and MINA follow-up in 4 to 6 weeks.    Total time today: 45 minutes.  High complexity medical decision making.      Gisella Moore MD, MD FACC  Cardiology.        VITAL SIGNS:  Temp: 98.4  F (36.9  C) Temp src: Oral BP: 115/68 Pulse: 77   Resp: 24 SpO2: 94 % O2 Device: None (Room air) Oxygen Delivery: 2 LPM  12/09 0700 - 12/14 0659  In: 1700 [P.O.:1700]  Out: 400 [Drains:400]  Net: 1300  Vitals:    12/13/22 0311 12/14/22 0500   Weight: 106.3 kg (234 lb 6.4 oz) 105.3 kg (232 lb 1.6 oz)

## 2022-12-14 NOTE — PROGRESS NOTES
"  GASTROENTEROLOGY PROGRESS NOTE     IMPRESSION:  1.  Dysphagia, reflux, recurrent vomiting- this started after her sleeve gastrectomy at the [facility].  Tolerating clear liquids, some nausea today  - The UGI series shows some delay in transit through her sleeve, but no stricture. She is tolerating liquids.  - Agree with Dr. Sandoval that we should hold off on endoscopy at this time and assess ability to take oral intake. It can be considered in future, if ongoing symptoms.  - We discussed that this will be a long process and there will be times of improvement and some times of regression.    RECOMMENDATIONS:  1. Advance diet per surgeyr  2. PPI BID IV while inpatient, then orally  3. We will sign off, please call with any questions or concerns.     Juan Barnard MD  [facility]  [phone]      ________________________________________________________________________      SUBJECTIVE:  Patient is frustrated that she is nauseated today.       OBJECTIVE:  /83 (BP Location: Left arm)   Pulse 81   Temp 98.4  F (36.9  C) (Oral)   Resp 20   Ht 1.854 m (6' 0.99\")   Wt 105.3 kg (232 lb 1.6 oz)   LMP 2022 (Approximate)   SpO2 94%   BMI 30.63 kg/m    Temp (24hrs), Av  F (36.7  C), Min:97.3  F (36.3  C), Max:98.8  F (37.1  C)    Patient Vitals for the past 72 hrs:   Weight   22 0500 105.3 kg (232 lb 1.6 oz)   22 0311 106.3 kg (234 lb 6.4 oz)        PHYSICAL EXAM  GEN: Alert, NAD.    Remainder of exam deferred    Additional Data:  I have reviewed the patient's new clinical lab results:     Recent Labs   Lab Test 22  0548 22  0640 22  0649 22  0541 22  1551   WBC 6.2 6.2 6.6   < > 8.9   HGB 9.8* 9.5* 10.1*   < > 10.7*   MCV 85 88 87   < > 86    254 219   < > 550*   INR  --   --   --   --  1.04    < > = values in this interval not displayed.     Recent Labs   Lab Test 22  0548 22  1903 22  0640 22  1459 " 12/12/22  0649     --  145*  --  139   POTASSIUM 3.2* 3.6 2.9*   < > 2.9*   CHLORIDE 105  --  115*  --  106   CO2 26 -- 22 -- 24   BUN 8  --  7  --  6*   CR 1.24*  --  1.01  --  0.85   ANIONGAP 10  --  8  --  9   KIMBERLYN 9.3  --  7.4*  --  8.5   GLC 82  --  79  --  86    < > = values in this interval not displayed.     Recent Labs   Lab Test 12/13/22  0640 12/12/22  0649 12/11/22  0542 12/11/22  0541 11/07/22  0645 11/06/22  0926 11/06/22  0616 11/02/22  0636 11/01/22  1734 10/24/22  1447   ALBUMIN 1.7* 2.0*  --  3.7   < >  --  2.3*   < > 3.5  --    BILITOTAL 0.4 0.7  --  0.5   < >  --  0.5   < > 1.5*  --    ALT 20 29  --  45*   < >  --  19   < > 32  --    AST 10 10  --  19   < >  --  36*   < > 41*  --    PROTEIN  --   --   --   --   --  50*  --   --   --  Negative   LIPASE  --   --  44  --   --   --  65*  --  68*  --     < > = values in this interval not displayed.

## 2022-12-14 NOTE — PLAN OF CARE
Goal Outcome Evaluation:           Overall Patient Progress: no changeOverall Patient Progress: no change    Outcome Evaluation: attempted to visit with pt x2 today, busy with other cares. reviewed chart, diet advanced to bariatric soft. RD ordered ensure Max and appropriate post-weight loss surgery vitamins/minerals.    Tammy Cleveland RD, LD

## 2022-12-14 NOTE — PROGRESS NOTES
No fever today. Pulse OK. She had some nausea and early satiety with sips of water. She is frustrated. I encouraged her to walk, sit up when drinking. She wants to try thicker liquids. This should be fine - I will order.   Pericardial drain out.

## 2022-12-14 NOTE — PLAN OF CARE
Alert and oriented x 4. VS stable, on RA and some lingering complaints of pain from pericardial drain. She took Oxycodone x 2 and dilaudid x 1 with good pain control. She was up walking in the room multiple times today and does get very short of breath with any activity. Tele SR with rates in the 80's. She was able to drink 500ml of fluid today and did not have over nausea. Plan to increase activity and oral intake tomorrow.

## 2022-12-14 NOTE — CONSULTS
"CLINICAL NUTRITION SERVICES  -  ASSESSMENT NOTE      Recommendations Ordered by Registered Dietitian (RD):   - ordered Ensure Max @ 2 pm, pt to order additional PRN  - ordered BID thera-vit-m, vitamin D3, B complex, iron per post-bariatric surgery practice guidelines   Malnutrition:   % Weight Loss:  Unable to assess - recent wt loss was intentional, s/p bariatric surgery 10/2022  % Intake:  </= 75% for >/= 1 month (severe malnutrition) - suspected  Subcutaneous Fat Loss:  Unable to assess  Muscle Loss:  Unable to assess  Fluid Retention:  None noted    Malnutrition Diagnosis: Unable to determine due to lack of nutrition hx and NFPA       REASON FOR ASSESSMENT  Leah Yanez is a 42 year old female seen by Registered Dietitian for Provider Order - \"malnutrition\"      NUTRITION HISTORY  - Information obtained from chart review   - Unable to obtain nutrition history from pt. Attempted to visit with pt this AM and again this afternoon. Pt was busy with other cares both attempts  - PMH of depression, GERD, PE, renal syndrome, Griggs's esophagus, hiatal hernia, pulmonary embolism with anticardiolipin antibody positive, obesity s/p laparoscopic sleeve gastrectomy (10/25/22), Raynaud's  - pt followed by bariatric RD team  - per chart review, EGD was then completed on 11/14/2022 and it was documented that there was no evidence of stricture of the esophagus or stomach.  The patient did undergo dilation of the body of her sleeve and NJ tube was placed for supplemental feeds.  Patient reports persistent issues with nausea/vomiting following the above procedures but denies any significant abdominal pain.  Her feeding tube was then removed and the patient underwent EGD on 11/30/2022. On 12/2/2022, the patient underwent repeat EGD with esophageal dilation and removal of gastric stent.  The patient reports that she was able to resume oral intake over the past week.  She was able to ingest protein shake, egg, and pudding over " "the course of the week.   - per RD note on 12/6:  11/14/22 - EGD with dilation of gastric stricture, NJ tube placement and initiation of tube feedings.   11/28/22 - NJT removed at home  11/30/22 -  stent placement at gastric stricture  12/2/22 - stent removal       CURRENT NUTRITION ORDERS  Diet Order:  Bariatric soft diet    Current Intake/Tolerance:  - per chart review, pt is tolerated CLD. Pt requested thickened liquids  - per nursing flow sheet, no documented intakes yet this admit  - per health touch, no orders yet this admit      NUTRITION FOCUSED PHYSICAL ASSESSMENT FOR DIAGNOSING MALNUTRITION)  No: Patient not available                 ANTHROPOMETRICS  Height: 6' .992\"  Weight: 232 lbs 1.6 oz  Body mass index is 30.63 kg/m .  Weight Status:  Obesity Grade I BMI 30-34.9  IBW: 80 kg (using IBW + 10% for large frame).    % IBW: 132%  Weight History: recent wt loss was intentional, pt underwent bariatric surgery on 10/25/22  12/13/22 : 106.3 kg (234 lb 6.4 oz)   12/11/22 : 106.6 kg (235 lb)   11/30/22 : 106.6 kg (235 lb 0.2 oz)   11/16/22 : 111 kg (244 lb 12.8 oz)   11/01/22 : 125.9 kg (277 lb 8 oz)   10/25/22 : 122.6 kg (270 lb 4.5 oz)   10/19/22 : 123.7 kg (272 lb 12.8 oz)   08/18/22 : 126.1 kg (278 lb)   08/10/22 : 127.8 kg (281 lb 11.2 oz)   06/20/22 : 130.2 kg (287 lb)    LABS  Labs reviewed: K+ 3.2 (L), creatinine 1.24 (H)    MEDICATIONS  Medications reviewed: vitamin B12, protonix, thiamine    PER CHART REVIEW:  General:  - 12/11, per surgery note: No indication for general surgical intervention at this time    GI/Nutrition:  - last BM x1 on 12/12  - 12/13, per surgery note: hold on upper endoscopy and see how her swallowing goes. I encouraged her to only drink when sitting upright. We will see about slow diet advancement as time goes by. For now bariatric clear liquids should be OK.   - 12/12, per GI note: patient has had difficulty swallowing since her sleeve gastrectomy surgery  - 12/11, per SLP eval = " no signs/sx oropharyngeal dysphagia or aspiration during the swallow. Highly suspect post-prandial aspiration risk related to known esophageal deficits including recurring stenosis, cavanaugh's esophagus, reflux (CT chest from today noted reflux in esophagus), likely esophageal retention/stasis (observed on Upper GI Xray from 11/2/22).       ASSESSED NUTRITION NEEDS PER APPROVED PRACTICE GUIDELINES:  Dosing Weight: 86.3 kg (adjusted, based on most recent wt of 105.3 kg on 12/14)  Estimated Energy Needs: 2370-0366 kcals ([place] [employer] Jeor)  Justification: maintenance - per bariatric practice guidelines  Estimated Protein Needs: 104-130 grams protein (1.2-1.5 g pro/Kg)  Justification: preservation of lean body mass  Estimated Fluid Needs: 1 mL/Kcal  Justification: maintenance OR per provider pending fluid status    MALNUTRITION:  % Weight Loss:  Unable to assess - recent wt loss was intentional, s/p bariatric surgery 10/2022  % Intake:  </= 75% for >/= 1 month (severe malnutrition) - suspected  Subcutaneous Fat Loss:  Unable to assess  Muscle Loss:  Unable to assess  Fluid Retention:  None noted    Malnutrition Diagnosis: Unable to determine due to lack of nutrition hx and NFPA      NUTRITION DIAGNOSIS:  Inadequate oral intake related to difficulty swallowing as evidenced by suspected minimal PO intake over past month and recent need for NJ to provide supplemental nutrition      NUTRITION INTERVENTIONS  Recommendations / Nutrition Prescription  - ordered Ensure Max @ 2 pm, pt to order additional PRN  - ordered BID thera-vit-m, vitamin D3, B complex, iron per post-bariatric surgery practice guidelines  - Nutrition education: Per Provider order if indicated       Implementation  Medical Food Supplement  Multivitamin/Mineral    Nutrition Goals  Patient to consume 75% of nutritionally adequate meals TID with supplements over the next 3-7 days.    MONITORING AND EVALUATION:  Progress towards goals will be monitored and  evaluated per protocol and Practice Guidelines      Tammy Cleveland RD, LD

## 2022-12-14 NOTE — TELEPHONE ENCOUNTER
Called patient to check in as she is hospitalized at [facility].  Left voicemail.  Dr Aragon called patient earlier today and also no answer.    Will follow up tomorrow.    Vanessa Agustin PA-C

## 2022-12-14 NOTE — PROGRESS NOTES
[facility]    Medicine Progress Note - Hospitalist Service    Date of Admission:  12/11/2022  Date of Service: 12/14/2022    Assessment & Plan       Leah Yanez is a 42 year old female with history of depression, GERD, PE, renal syndrome transferred from [facility] on 12/11/2022 with acute hypoxic failure secondary to bilateral pleural effusion and pericardial effusion/cardiac tamponade    #Moderate pericardial effusion with pericardial tamponade  -Appreciate emergent pericardiocentesis and drain placement by interventional cardiology 12/11. (See their procedure note for details)  Plan:  - Monitor closely and repeat limited echocardiogram today -> pending   - Colchicine .06 mg BID for 14 nidia  - Blood culture from 12/11 growing gram positive cocci in clusters, organism not identified by Verigene and only 1/2 positive -> suspect contaminant. MRSA nares PCR negative.   - Pericardial fluid with 1995 total nucleated cells, 92% neutrophils, cultures no growth to date.   - WBC count normal this admission, stable on 2 LPM O2, fever of 100.9 on 12/11 but afebrile since then.    #Bilateral pleural effusions  -We had thoracentesis done to better evaluate the etiology (she had bilateral HCAP in early November)   - Follow-up on sputum culture, blood culture, fluid cultures -> pleural fluid 12/12 with 388 total nucleated cells, 82% lymphocytes, culture negative.   - Thoracentesis 12/12-> 300 mL of  ellis colored fluid was drained without immediate  Complications  - Will stop empiric vancomycin and ceftriaxone for now given cultures have been largely unremarkable    # Chest pain: Admission CT negative for PE.  Differential being pleurisy from pneumonia and effusion  Admission troponin of 22 (EKG did not show any evidence of pericarditis.  Does have some T wave inversion in lateral leads).  We will recheck troponin (although may be elevated due to pericardial drain placement).  We will  trend  -Other differential being radiated pain from her postsurgical complications causing reflux, esophagitis.  - Surgery has been consulted and reviewing case with bariatric surgery -> UGI done, images reviewed:  no marked obstruction.  - [facility] consulted as well for reflux / dysphagia ->  clear liquid diet, advance per surgery  - Continue pantoprazole 40mg IV twice day.  - Holding off EGD for now    #Status post laparoscopic sleeve gastrectomy  #Gastric anastomotic stricture and globus sensation  -underwent laparoscopic sleeve gastrectomy and hiatal hernia repair on 10/25/22 with Dr. Aragon.  Patient was hospitalized about a week ago with [facility], had EGD performed on 12/1 and esophageal stenting and balloon dilation performed in 12/2  -We will get bariatric surgery review tomorrow. Currently abdomen is soft, nontender.  If any clinical change, she will need abdominal imaging as well (her pain could be radiated)  - SLP following    #Cholelithiasis-incidental on CT.  Monitor LFT    #Major Depression. continue PTA Wellbutrin       Diet: Bariatric Diet Soft  Snacks/Supplements Adult: Ensure Max Protein (bariatric); Between Meals    DVT Prophylaxis: Pneumatic Compression Devices  Holder Catheter: Not present  Central Lines: PRESENT  PICC 12/11/22 Double Lumen Right Basilic-Site Assessment: WDL  Cardiac Monitoring: ACTIVE order. Indication: Tachyarrhythmias, acute (48 hours)  Code Status: Full Code      Disposition Plan      Expected Discharge Date: 12/18/2022                The patient's care was discussed with the Bedside Nurse and Patient.    Nabor Feng MD  Hospitalist Service  [facility]  Securely message with the Vocera Web Console (learn more here)  Text page via [employer] Paging/Directory         Clinically Significant Risk Factors        # Hypokalemia: Lowest K = 2.9 mmol/L in last 2 days, will replace as needed    # Hypercalcemia: corrected calcium is >10.1, will monitor as  "appropriate    # Hypoalbuminemia: Lowest albumin = 1.7 g/dL at 12/13/2022  6:40 AM, will monitor as appropriate    # Acute Kidney Injury, unspecified: based on a >150% or 0.3 mg/dL increase in last creatinine compared to past 90 day average, will monitor renal function         # Obesity: Estimated body mass index is 30.63 kg/m  as calculated from the following:    Height as of this encounter: 1.854 m (6' 0.99\").    Weight as of this encounter: 105.3 kg (232 lb 1.6 oz)., PRESENT ON ADMISSION         ______________________________________________________________________    Interval History     No acute events overnight  Chest discomfort stable  Had some nausea and early satiety with sips of water.  No new complaints  O2 now weaned    Data reviewed today: I reviewed all medications, new labs and imaging results over the last 24 hours. I personally reviewed no images or EKG's today.    Physical Exam   Vital Signs: Temp: 98.4  F (36.9  C) Temp src: Oral BP: 119/83 Pulse: 81   Resp: 20 SpO2: 94 % O2 Device: None (Room air)    Weight: 232 lbs 1.6 oz     Constitutional: awake, alert, cooperative, no apparent distress.   Respiratory: CTABL   Cardiovascular: RRR with no m/r/g   GI: Normal bowel sounds, soft, non-distended, non-tender.   Skin: normal skin color, texture, turgor   Neurologic: Awake, alert, oriented to name, place and time. Motor is 5 out of 5 bilaterally. Sensory is intact.   Neuropsychiatric: normal mood and affect  ----------------------------------------------------------------------------------------    Data   Recent Labs   Lab 12/14/22  0548 12/13/22  1903 12/13/22  0640 12/12/22  1459 12/12/22  0649 12/11/22  0542   WBC 6.2  --  6.2  --  6.6  --    HGB 9.8*  --  9.5*  --  10.1*  --    MCV 85  --  88  --  87  --      --  254  --  219  --      --  145*  --  139  --    POTASSIUM 3.2* 3.6 2.9*   < > 2.9*  --    CHLORIDE 105  --  115*  --  106  --    CO2 26  --  22  --  24  --    BUN 8  --  7  --  " 6*  --    CR 1.24*  --  1.01  --  0.85  --    ANIONGAP 10  --  8  --  9  --    KIMBERLYN 9.3  --  7.4*  --  8.5  --    GLC 82  --  79  --  86  --    ALBUMIN  --   --  1.7*  --  2.0*  --    PROTTOTAL  --   --  4.8*  --  5.6* 7.1   BILITOTAL  --   --  0.4  --  0.7  --    ALKPHOS  --   --  89  --  107  --    ALT  --   --  20  --  29  --    AST  --   --  10  --  10  --    LIPASE  --   --   --   --   --  44    < > = values in this interval not displayed.     Recent Results (from the past 24 hour(s))   XR Chest 2 Views    Narrative    CHEST TWO VIEWS 12/14/2022 12:29 PM     HISTORY: Shortness of breath, follow up pleural effusion.    COMPARISON: December 11, 2022       Impression    IMPRESSION: Minimal left effusion and associated compressive  atelectasis is improved from previous. Right lung clear. No definite  right effusion. The cardiac silhouette is not enlarged. Pulmonary  vasculature is unremarkable.     Medications       buPROPion  75 mg Oral BID     colchicine  0.6 mg Oral BID     cyanocobalamin  500 mcg Sublingual Daily     ferrous fumarate 65 mg ([place]. FE)-Vitamin C 125 mg  1 tablet Oral Daily     folic acid-vit B6-vit B12  1 tablet Oral Daily     hyoscyamine  0.125 mg Sublingual 4x Daily AC & HS     miconazole   Topical BID     multivitamin w/minerals  1 tablet Oral BID 09 12     pantoprazole  40 mg Intravenous BID AC     sodium chloride (PF)  10-40 mL Intracatheter Q7 Days     sodium chloride (PF)  3 mL Intracatheter Q8H     sucralfate  1 g Oral 4x Daily AC & HS     thiamine  100 mg Oral Daily     cholecalciferol  125 mcg Oral Daily

## 2022-12-14 NOTE — PLAN OF CARE
[id number]   A&Ox4. Tele SR 80s. VSS, RA, and insertion site pain noted with removal of pericardial drain, responding well to intervention. Insertion site covered with gauze and tegaderm and is clean, dry, and intact. Reports SOB with activity. Laying comfortably in bed.

## 2022-12-14 NOTE — PROGRESS NOTES
"SPIRITUAL HEALTH SERVICES Progress Note  [facility]    Saw pt Leah Yanez, and her friend Brenda, briefly for a length of stay visit.      Patient/Family Understanding of Illness and Goals of Care - not discussed      Distress and Loss - Leah shared regarding her self-image related to having a lengthy hospitalization over the last 7 weeks in multiple  hospitals. \"I went from being one of the most chill people to feeling anxious much of the time.\"      Strengths, Coping, and Resources -     Leah expressed her gratitude for the opportunity to speak \"with the doctor who encouraged the use of meditation.\" She also shared that she has felt \"very cared for and listened to\" during her time at [facility].      Meaning, Beliefs, and Spirituality - Leah shared that she doesn't \"subscribe to any particular spirituality or [employer],\" but would welcome a follow up visit tomorrow for support.      Plan of Care - Will plan a visit tomorrow for follow up. [facility] remains available for support.    Barbara Quintero MDiv  Associate   Pager [phone]  [facility] pager [phone]  [facility] phone [phone]      "

## 2022-12-15 ENCOUNTER — APPOINTMENT (OUTPATIENT)
Dept: PHYSICAL THERAPY | Facility: CLINIC | Age: 42
DRG: 186 | End: 2022-12-15
Attending: INTERNAL MEDICINE
Payer: COMMERCIAL

## 2022-12-15 ENCOUNTER — APPOINTMENT (OUTPATIENT)
Dept: SPEECH THERAPY | Facility: CLINIC | Age: 42
DRG: 186 | End: 2022-12-15
Attending: INTERNAL MEDICINE
Payer: COMMERCIAL

## 2022-12-15 VITALS
DIASTOLIC BLOOD PRESSURE: 81 MMHG | OXYGEN SATURATION: 96 % | HEART RATE: 86 BPM | HEIGHT: 72 IN | SYSTOLIC BLOOD PRESSURE: 121 MMHG | TEMPERATURE: 98.2 F | BODY MASS INDEX: 30.94 KG/M2 | WEIGHT: 228.4 LBS | RESPIRATION RATE: 18 BRPM

## 2022-12-15 LAB
ANION GAP SERPL CALCULATED.3IONS-SCNC: 9 MMOL/L (ref 3–14)
BACTERIA BLD CULT: ABNORMAL
BACTERIA BLD CULT: ABNORMAL
BUN SERPL-MCNC: 8 MG/DL (ref 7–30)
CALCIUM SERPL-MCNC: 9.2 MG/DL (ref 8.5–10.1)
CHLORIDE BLD-SCNC: 104 MMOL/L (ref 94–109)
CO2 SERPL-SCNC: 27 MMOL/L (ref 20–32)
CREAT SERPL-MCNC: 1.13 MG/DL (ref 0.52–1.04)
CRP SERPL-MCNC: 69.7 MG/L (ref 0–8)
ERYTHROCYTE [DISTWIDTH] IN BLOOD BY AUTOMATED COUNT: 17.9 % (ref 10–15)
GFR SERPL CREATININE-BSD FRML MDRD: 62 ML/MIN/1.73M2
GLUCOSE BLD-MCNC: 86 MG/DL (ref 70–99)
HCT VFR BLD AUTO: 33.7 % (ref 35–47)
HGB BLD-MCNC: 10.2 G/DL (ref 11.7–15.7)
MCH RBC QN AUTO: 26.4 PG (ref 26.5–33)
MCHC RBC AUTO-ENTMCNC: 30.3 G/DL (ref 31.5–36.5)
MCV RBC AUTO: 87 FL (ref 78–100)
PLATELET # BLD AUTO: 282 10E3/UL (ref 150–450)
POTASSIUM BLD-SCNC: 3.3 MMOL/L (ref 3.4–5.3)
RBC # BLD AUTO: 3.87 10E6/UL (ref 3.8–5.2)
SODIUM SERPL-SCNC: 140 MMOL/L (ref 133–144)
WBC # BLD AUTO: 5.7 10E3/UL (ref 4–11)

## 2022-12-15 PROCEDURE — 86140 C-REACTIVE PROTEIN: CPT | Performed by: STUDENT IN AN ORGANIZED HEALTH CARE EDUCATION/TRAINING PROGRAM

## 2022-12-15 PROCEDURE — 85014 HEMATOCRIT: CPT | Performed by: STUDENT IN AN ORGANIZED HEALTH CARE EDUCATION/TRAINING PROGRAM

## 2022-12-15 PROCEDURE — 250N000011 HC RX IP 250 OP 636: Performed by: STUDENT IN AN ORGANIZED HEALTH CARE EDUCATION/TRAINING PROGRAM

## 2022-12-15 PROCEDURE — 97116 GAIT TRAINING THERAPY: CPT | Mod: GP

## 2022-12-15 PROCEDURE — 82310 ASSAY OF CALCIUM: CPT | Performed by: STUDENT IN AN ORGANIZED HEALTH CARE EDUCATION/TRAINING PROGRAM

## 2022-12-15 PROCEDURE — 99024 POSTOP FOLLOW-UP VISIT: CPT | Mod: 25 | Performed by: SURGERY

## 2022-12-15 PROCEDURE — 99239 HOSP IP/OBS DSCHRG MGMT >30: CPT | Performed by: STUDENT IN AN ORGANIZED HEALTH CARE EDUCATION/TRAINING PROGRAM

## 2022-12-15 PROCEDURE — 250N000011 HC RX IP 250 OP 636: Performed by: INTERNAL MEDICINE

## 2022-12-15 PROCEDURE — 250N000013 HC RX MED GY IP 250 OP 250 PS 637: Performed by: STUDENT IN AN ORGANIZED HEALTH CARE EDUCATION/TRAINING PROGRAM

## 2022-12-15 PROCEDURE — 250N000013 HC RX MED GY IP 250 OP 250 PS 637: Performed by: INTERNAL MEDICINE

## 2022-12-15 PROCEDURE — 92526 ORAL FUNCTION THERAPY: CPT | Mod: GN

## 2022-12-15 PROCEDURE — 97530 THERAPEUTIC ACTIVITIES: CPT | Mod: GP

## 2022-12-15 RX ORDER — POTASSIUM CHLORIDE 7.45 MG/ML
10 INJECTION INTRAVENOUS
Status: COMPLETED | OUTPATIENT
Start: 2022-12-15 | End: 2022-12-15

## 2022-12-15 RX ORDER — NALOXONE HYDROCHLORIDE 0.4 MG/ML
0.2 INJECTION, SOLUTION INTRAMUSCULAR; INTRAVENOUS; SUBCUTANEOUS
Status: DISCONTINUED | OUTPATIENT
Start: 2022-12-15 | End: 2022-12-15 | Stop reason: HOSPADM

## 2022-12-15 RX ORDER — NALOXONE HYDROCHLORIDE 0.4 MG/ML
0.4 INJECTION, SOLUTION INTRAMUSCULAR; INTRAVENOUS; SUBCUTANEOUS
Status: DISCONTINUED | OUTPATIENT
Start: 2022-12-15 | End: 2022-12-15 | Stop reason: HOSPADM

## 2022-12-15 RX ORDER — SUCRALFATE ORAL 1 G/10ML
1 SUSPENSION ORAL 4 TIMES DAILY
Qty: 414 ML | Refills: 0 | Status: ON HOLD | OUTPATIENT
Start: 2022-12-15 | End: 2022-12-23

## 2022-12-15 RX ORDER — ONDANSETRON 4 MG/1
4 TABLET, ORALLY DISINTEGRATING ORAL EVERY 6 HOURS PRN
Qty: 25 TABLET | Refills: 0 | Status: SHIPPED | OUTPATIENT
Start: 2022-12-15 | End: 2023-03-10

## 2022-12-15 RX ORDER — PANTOPRAZOLE SODIUM 40 MG/1
40 TABLET, DELAYED RELEASE ORAL
Qty: 60 TABLET | Refills: 0 | Status: ON HOLD | OUTPATIENT
Start: 2022-12-15 | End: 2022-12-23

## 2022-12-15 RX ORDER — POTASSIUM CHLORIDE 1500 MG/1
20 TABLET, EXTENDED RELEASE ORAL 2 TIMES DAILY
Qty: 14 TABLET | Refills: 0 | Status: SHIPPED | OUTPATIENT
Start: 2022-12-15 | End: 2022-12-22

## 2022-12-15 RX ORDER — COLCHICINE 0.6 MG/1
0.6 TABLET ORAL 2 TIMES DAILY
Qty: 24 TABLET | Refills: 0 | Status: SHIPPED | OUTPATIENT
Start: 2022-12-15 | End: 2022-12-27

## 2022-12-15 RX ADMIN — SUCRALFATE ORAL 1 G: 1 SUSPENSION ORAL at 09:49

## 2022-12-15 RX ADMIN — PANTOPRAZOLE SODIUM 40 MG: 40 TABLET, DELAYED RELEASE ORAL at 09:48

## 2022-12-15 RX ADMIN — POTASSIUM CHLORIDE 10 MEQ: 7.46 INJECTION, SOLUTION INTRAVENOUS at 09:50

## 2022-12-15 RX ADMIN — SUCRALFATE ORAL 1 G: 1 SUSPENSION ORAL at 12:52

## 2022-12-15 RX ADMIN — POTASSIUM CHLORIDE 10 MEQ: 7.46 INJECTION, SOLUTION INTRAVENOUS at 06:58

## 2022-12-15 RX ADMIN — Medication 125 MCG: at 09:49

## 2022-12-15 RX ADMIN — MULTIPLE VITAMINS W/ MINERALS TAB 1 TABLET: TAB at 09:49

## 2022-12-15 RX ADMIN — THIAMINE HCL TAB 100 MG 100 MG: 100 TAB at 09:48

## 2022-12-15 RX ADMIN — ONDANSETRON 4 MG: 4 TABLET, ORALLY DISINTEGRATING ORAL at 09:55

## 2022-12-15 RX ADMIN — POTASSIUM CHLORIDE 10 MEQ: 7.46 INJECTION, SOLUTION INTRAVENOUS at 11:35

## 2022-12-15 RX ADMIN — Medication 500 MCG: at 09:48

## 2022-12-15 RX ADMIN — ACETAMINOPHEN 1000 MG: 500 TABLET ORAL at 11:35

## 2022-12-15 RX ADMIN — POTASSIUM CHLORIDE 10 MEQ: 7.46 INJECTION, SOLUTION INTRAVENOUS at 08:59

## 2022-12-15 RX ADMIN — COLCHICINE 0.6 MG: 0.6 TABLET ORAL at 09:49

## 2022-12-15 RX ADMIN — HYOSCYAMINE SULFATE 0.12 MG: 0.12 TABLET SUBLINGUAL at 09:49

## 2022-12-15 RX ADMIN — BUPROPION HYDROCHLORIDE 75 MG: 75 TABLET, FILM COATED ORAL at 09:48

## 2022-12-15 RX ADMIN — Medication 1 TABLET: at 09:49

## 2022-12-15 RX ADMIN — HYOSCYAMINE SULFATE 0.12 MG: 0.12 TABLET SUBLINGUAL at 12:52

## 2022-12-15 ASSESSMENT — ACTIVITIES OF DAILY LIVING (ADL)
ADLS_ACUITY_SCORE: 27

## 2022-12-15 NOTE — PROGRESS NOTES
Slight improvement in eating. Took some cream of wheat diluted. Encouraged to walk, take what liquids appeal to her. No large volume of intake needed yet.  Good discussion about plans

## 2022-12-15 NOTE — PROGRESS NOTES
Speech Language Therapy Discharge Summary    Reason for therapy discharge:    All goals and outcomes met, no further needs identified.    Progress towards therapy goal(s). See goals on Care Plan in Epic electronic health record for goal details.  Goals met    Therapy recommendation(s):    No further therapy is recommended.  OK to advance diet per MD, recommend upright for all PO, remain upright 60 minutes post PO, single sips at a time, slow rate, add moisture to dry foods, liquid wash as needed, consider smaller more frequent intake vs larger cosumption.    Ongoing treatment for esophageal impairments per GI.

## 2022-12-15 NOTE — DISCHARGE SUMMARY
[facility]  Hospitalist Discharge Summary      Date of Admission:  12/11/2022  Date of Discharge:  12/15/2022  Discharging Provider: Nabor Feng MD  Discharge Service: Hospitalist Service    Discharge Diagnoses     Moderate pericardial effusion with pericardial tamponade  Bilateral pleural effusions  Gastric anastomotic stricture and globus sensation    Follow-ups Needed After Discharge   Follow-up Appointments     Follow-up and recommended labs and tests       Follow up with primary care provider, [facility]   [place], within 7 days for hospital follow- up. Check BMP             Unresulted Labs Ordered in the Past 30 Days of this Admission     Date and Time Order Name Status Description    12/12/2022 10:43 AM Pleural fluid Aerobic Bacterial Culture Routine Preliminary     12/12/2022 10:43 AM Anaerobic Bacterial Culture Routine Preliminary     12/11/2022  2:50 PM Blood Culture Hand, Left Preliminary     12/11/2022  2:50 PM Blood Culture Line, venous Preliminary     12/11/2022  2:00 PM Pericardial Fluid Aerobic Bacterial Culture Routine with Gram Stain Preliminary     12/11/2022  6:25 AM Blood Culture Line, venous Preliminary         Discharge Disposition   Discharged to home  Condition at discharge: Stable    Hospital Course      Leah Yanez is a 42 year old female with history of depression, GERD, PE, renal syndrome transferred from [facility] on 12/11/2022 with acute hypoxic failure secondary to bilateral pleural effusion and pericardial effusion/cardiac tamponade    #Moderate pericardial effusion with pericardial tamponade  -Appreciate emergent pericardiocentesis and drain placement by interventional cardiology 12/11. (See their procedure note for details)  Plan:  - Monitor closely and repeat limited echocardiogram today -> pending   - Colchicine .06 mg BID for 14 days  - Blood culture from 12/11 growing gram positive cocci in clusters, organism not  identified by docBeatigene and only 1/2 positive -> suspect contaminant. MRSA nares PCR negative.   - Pericardial fluid with 1995 total nucleated cells, 92% neutrophils, cultures no growth to date.   - WBC count normal this admission, stable on 2 LPM O2, fever of 100.9 on 12/11 but afebrile since then.    #Bilateral pleural effusions  -We had thoracentesis done to better evaluate the etiology (she had bilateral HCAP in early November)   - Follow-up on sputum culture, blood culture, fluid cultures -> pleural fluid 12/12 with 388 total nucleated cells, 82% lymphocytes, culture negative.   - Thoracentesis 12/12-> 300 mL of  ellis colored fluid was drained without immediate  Complications  - Will stop empiric vancomycin and ceftriaxone for now given cultures have been largely unremarkable    # Chest pain: Admission CT negative for PE.  Differential being pleurisy from pneumonia and effusion  Admission troponin of 22 (EKG did not show any evidence of pericarditis.  Does have some T wave inversion in lateral leads).  We will recheck troponin (although may be elevated due to pericardial drain placement).  We will trend  -Other differential being radiated pain from her postsurgical complications causing reflux, esophagitis.  - Surgery has been consulted and reviewing case with bariatric surgery -> UGI done, images reviewed:  no marked obstruction.  - [facility] consulted as well for reflux / dysphagia ->  ADAT  - Continue pantoprazole 40mg twice day.    #Status post laparoscopic sleeve gastrectomy  #Gastric anastomotic stricture and globus sensation  -underwent laparoscopic sleeve gastrectomy and hiatal hernia repair on 10/25/22 with Dr. Aragon.  Patient was hospitalized about a week ago with [facility], had EGD performed on 12/1 and esophageal stenting and balloon dilation performed in 12/2  -We will get bariatric surgery review tomorrow. Currently abdomen is soft, nontender.  If any clinical change, she will need  abdominal imaging as well (her pain could be radiated)  - SLP following    #Cholelithiasis-incidental on CT.  Monitor LFT    #Major Depression. continue PTA Wellbutrin      Consultations This Hospital Stay   PHARMACY IP CONSULT  PHARMACY IP CONSULT  MEDICATION HISTORY IP PHARMACY CONSULT  SPEECH LANGUAGE PATH ADULT IP CONSULT  PHARMACY TO DOSE VANCO  SURGERY GENERAL IP CONSULT  VASCULAR ACCESS ADULT IP CONSULT  VASCULAR ACCESS ADULT IP CONSULT  VASCULAR ACCESS ADULT IP CONSULT  VASCULAR ACCESS ADULT IP CONSULT  GASTROENTEROLOGY IP CONSULT  PHYSICAL THERAPY ADULT IP CONSULT  NUTRITION SERVICES ADULT IP CONSULT  CARE MANAGEMENT / SOCIAL WORK IP CONSULT  SMOKING CESSATION PROGRAM IP CONSULT    Code Status   Full Code    Time Spent on this Encounter   I, Nabor Feng MD, personally saw the patient today and spent greater than 30 minutes discharging this patient.       Nabor Feng MD  [facility] CORONARY CARE UNIT  [address]  [facility] [address]  Phone: [phone]  ______________________________________________________________________    Physical Exam   Vital Signs: Temp: 98.2  F (36.8  C) Temp src: Oral BP: 121/81 Pulse: 86   Resp: 18 SpO2: 96 % O2 Device: None (Room air)    Weight: 228 lbs 6.4 oz  ----------------------------------------------------------------------------------------       Primary Care Physician   [facility]    Discharge Orders      Erythrocyte sedimentation rate auto     CRP inflammation     Follow-Up with Cardiology MINA      Follow-Up with Cardiology      Reason for your hospital stay    You had shortness of breath from fluid collection around your heart and difficulty swallowing     Activity    Your activity upon discharge: activity as tolerated     Follow-up and recommended labs and tests     Follow up with primary care provider, [facility], within 7 days for hospital follow- up. Check BMP     Echocardiogram  Limited    Administration of IV contrast will be tailored to this examination per the appropriate written protocol listed in the Echocardiography department Protocol Book, or by the supervising Cardiologist. This may result in an order change.    Use of contrast is at the discretion of the supervising Cardiologist.     Echocardiogram Limited    Administration of IV contrast will be tailored to this examination per the appropriate written protocol listed in the Echocardiography department Protocol Book, or by the supervising Cardiologist. This may result in an order change.    Use of contrast is at the discretion of the supervising Cardiologist.     Diet    Follow this diet upon discharge: Orders Placed This Encounter      Snacks/Supplements Adult: Ensure Max Protein (bariatric); Between Meals      Combination Diet Regular Diet       Significant Results and Procedures   Most Recent 3 CBC's:Recent Labs   Lab Test 12/15/22  0514 12/14/22  0548 12/13/22  0640   WBC 5.7 6.2 6.2   HGB 10.2* 9.8* 9.5*   MCV 87 85 88    283 254     Most Recent 3 BMP's:Recent Labs   Lab Test 12/15/22  0514 12/14/22  1838 12/14/22  0548 12/13/22  1903 12/13/22  0640     --  141  --  145*   POTASSIUM 3.3* 3.4 3.2*   < > 2.9*   CHLORIDE 104  --  105  --  115*   CO2 27  --  26  --  22   BUN 8  --  8  --  7   CR 1.13*  --  1.24*  --  1.01   ANIONGAP 9  --  10  --  8   KIMBERLYN 9.2  --  9.3  --  7.4*   GLC 86  --  82  --  79    < > = values in this interval not displayed.     Most Recent 2 LFT's:Recent Labs   Lab Test 12/13/22  0640 12/12/22  0649   AST 10 10   ALT 20 29   ALKPHOS 89 107   BILITOTAL 0.4 0.7     Most Recent 3 INR's:Recent Labs   Lab Test 11/21/22  1551   INR 1.04     Most Recent 3 Troponin's:Recent Labs   Lab Test 09/01/21  1927   TROPONIN <0.015     Most Recent 3 BNP's:Recent Labs   Lab Test 12/11/22  0541 11/06/22  0616 11/05/22  0603   NTBNPI 392 407 249     Most Recent ESR & CRP:Recent Labs   Lab Test 12/15/22  0514    CRP 69.7*   ,   Results for orders placed or performed during the hospital encounter of 12/11/22   US Thoracentesis Portable    Narrative    ULTRASOUND GUIDED THORACENTESIS  12/12/2022 10:50 AM     HISTORY: Left greater than right    FINDINGS: Ultrasound was used to evaluate for the presence and best  approach for drainage of a pleural effusion. Written and oral informed  consent was obtained. A pause for the cause procedure to verify the  correct patient and correct procedure. The skin overlying the left  chest posteriorly was prepped and draped in the usual sterile fashion.  The subcutaneous tissues were anesthetized with 10 mL 1% lidocaine. A  catheter was advanced into the pleural space and 300 mL of  ellis  colored fluid was drained. Patient was monitored by nurse under my  direct supervision throughout the exam. Ultrasound images were  permanently stored.  There were no immediate complications. Patient  left the ultrasound suite in satisfactory condition.      Impression    IMPRESSION: Technically successful thoracentesis without immediate  complications.    VIPUL WALDROP MD         SYSTEM ID:  [id number]   XR Chest Port 1 View    Narrative    EXAM: XR CHEST PORT 1 VIEW  LOCATION: [facility]  DATE/TIME: 12/11/2022 6:43 PM    INDICATION: Pericardial effusion. s p pericardiocentesis  COMPARISON: CT from earlier today.       Impression    IMPRESSION: Minimal change. Bibasilar infiltrates and pleural effusions persists, left worse than right. Heart size difficult to assess likely upper limits of normal. No pneumothorax.     XR Chest Port 1 View    Narrative    EXAM: XR CHEST PORT 1 VIEW  LOCATION: [facility]  DATE/TIME: 12/11/2022 11:50 PM    INDICATION: RN placed PICC   verify tip placement  COMPARISON: Portable chest radiograph 12/11/2022      Impression    IMPRESSION: Right upper extremity PICC line with tip terminating at the superior cavoatrial junction.  Bibasilar infiltrates and pleural effusions persist, left worse than right. Heart size difficult to assess likely upper limits of normal. No   pneumothorax.   XR Upper GI Water Soluble    Narrative    ESOPHAGRAM 2022 4:10 PM     HISTORY: Evaluate UGI anatomy and assess for any stricture at the  sleeve gastrectomy.    COMPARISON: None.    TECHNIQUE: A single contrast water soluble esophagram was performed.     FLUOROSCOPY TIME: 1.7 minutes.    SPOT FILMS: 8    FINDINGS: There was some delay in passage through the gastric sleeve,  no definite fixed stricture demonstrated.    VIPUL WALDROP MD         SYSTEM ID:  [id number]   XR Chest 2 Views    Narrative    CHEST TWO VIEWS 2022 12:29 PM     HISTORY: Shortness of breath, follow up pleural effusion.    COMPARISON: 2022       Impression    IMPRESSION: Minimal left effusion and associated compressive  atelectasis is improved from previous. Right lung clear. No definite  right effusion. The cardiac silhouette is not enlarged. Pulmonary  vasculature is unremarkable.    VIPUL WALDROP MD         SYSTEM ID:  [id number]   Echocardiogram Limited    Narrative    [id number]  [facility]  [id number]  [id number]^NOREEN^ELIUD^ELIDA     [facility]  Echocardiography Laboratory  [address] [place] [address]     Name: ASIYA PRECIADO  MRN: [id number]  : 1980  Study Date: 2022 12:32 PM  Age: 42 yrs  Gender: Female  [place] [facility] [place]  Reason For Study: Pericardial Effusion  Ordering Physician: ELIUD SORTO  Performed By: Ivan Rasmussen     BSA: 2.3 m2  Height: 73 in  Weight: 235 lb  BP: 119/74 mmHg  ______________________________________________________________________________  Procedure  Limited Portable Echo Adult.  ______________________________________________________________________________  Interpretation Summary     Limited intra-procedural study.     Pre-procedure: Small-moderate circumferential effusion.  See full TTE earlier  today for echocardiographic assessment of tamponade.     Post-procedure: 160 cc of straw-colored fluid removed. No detectable residual  effusion is seen.  ______________________________________________________________________________  ______________________________________________________________________________  Report approved by: MD Sourav Trujillo 2022 01:48 PM     ______________________________________________________________________________      Echocardiogram Limited     Value    LVEF  55-60%    Narrative    [id number]  [id number]  [id number]  [id number]^SOWMYA^SERAFIN^COSTA     [facility]  Echocardiography Laboratory  [address] [place] [id number]     Name: ASIYA PRECIADO TRACI  MRN: [id number]  : 1980  Study Date: 2022 11:06 AM  Age: 42 yrs  Gender: Female  Patient Location: [facility]  Reason For Study: Pericardial Effusion  Ordering Physician: SERAFIN DENG  Performed By: Jeanne Blount     BSA: 2.3 m2  Height: 73 in  Weight: 235 lb  HR: 90  BP: 119/74 mmHg  ______________________________________________________________________________  Procedure  Limited Portable Echo Adult. Optison (NDC #[id number]) given intravenously.  ______________________________________________________________________________  Interpretation Summary     Left ventricular systolic function is normal.  The visual ejection fraction is 55-60%.  The right ventricle is normal in structure, function and size.  Small pericardial effusion adjacent to the anterolateral wall. Trivial  elsewhere. Drain is present anterior space.  Septal bounce is present with mild variation across TV and MV inflow velocity  profiles. Dilation of the inferior vena cava is present with abnormal  respiratory variation in diameter.  Findings suggestive of effusive constrictive physiology but no gentry  tamponade.     Compared to TTE dated 2022, focal effusion is present mostly within the  lateral  pericardial space. There is still evidence of mild constrictive  physiology without evidence of RV or RA collapse.  ______________________________________________________________________________  Left Ventricle  The left ventricle is normal in structure, function and size. Left ventricular  systolic function is normal. The visual ejection fraction is 55-60%. No  regional wall motion abnormalities noted.     Right Ventricle  The right ventricle is normal in structure, function and size.     Atria  Normal left atrial size. Right atrial size is normal.     Mitral Valve  The mitral valve is not well visualized.     Tricuspid Valve  The tricuspid valve is not well visualized. Right ventricular systolic  pressure could not be approximated due to inadequate tricuspid regurgitation.     Aortic Valve  The aortic valve is not well visualized.     Pulmonic Valve  The pulmonic valve is not well visualized.     Vessels  Dilation of the inferior vena cava is present with abnormal respiratory  variation in diameter.     Pericardium  Small pericardial effusion. Septal bounce present. The mitral valve inflow  Doppler reveals no significant respiratory variation. The tricuspid valve  inflow Doppler reveals no significant respiratory variation.     ______________________________________________________________________________  Report approved by: Merlene Frank 2022 12:54 PM     ______________________________________________________________________________      Echocardiogram Limited     Value    LVEF  55-60%    Narrative    [id number]  [id number]  [id number]  [id number]^MOSHE^JENNIFFER^KAILYN     [facility]  Echocardiography Laboratory  [address] [place] [id number]     Name: ASIYA PRECIADO  MRN: [id number]  : 1980  Study Date: 2022 01:13 PM  Age: 42 yrs  Gender: Female  Patient Location: [facility]  Reason For Study: Pericardial Effusion  Ordering Physician: JENNIFFER MESA  Performed  By: Renetta Collins RDCS     BSA: 2.3 m2  Height: 72 in  Weight: 232 lb  ______________________________________________________________________________  Procedure  Limited Portable Echo Adult. Optison (NDC #[id number]) given intravenously.  ______________________________________________________________________________  Interpretation Summary     Small posterior pericardial effusion. Limited assessment, parameters for  constrictive physiology (ie tissue Doppler) not performed, however septal  bounce is present.  There is a small round 2.1 x 2. cm well circumscribed echodensity in the liver  on subcostal images, clinical correlation recommended.     Left ventricular size, global systolic function are normal, estimated LVEF 55-  60%.  Right ventricle is not well visualized, however global systolic function is  probably normal.     This study was compared to a TTE from 12/12/2022. The pericardial effusion is  smaller on this present study.  ______________________________________________________________________________  Left Ventricle  The left ventricle is normal in size. There is normal left ventricular wall  thickness. Left ventricular systolic function is normal. The visual ejection  fraction is 55-60%. Limited assessment, parameters for constrictive physiology  (ie tissue Doppler) not performed, however septal bounce is present.     Right Ventricle  The right ventricle is not well visualized. Right ventricle is not well  visualized, however global systolic function is probably normal.     Mitral Valve  The mitral valve is normal in structure and function.     Tricuspid Valve  The tricuspid valve is not well visualized, but is grossly normal. There is  trace tricuspid regurgitation.     Aortic Valve  The aortic valve is normal in structure and function.     Pericardium  Small posterior pericardial effusion. There is a small round 2.1 x 2. cm well  circumscribed echodensity in the liver on subcostal images,  clinical  correlation recommended.  ______________________________________________________________________________  Report approved by: Merlene Tapia 12/14/2022 04:30 PM     ______________________________________________________________________________      Cardiac Catheterization    Narrative    1.  Successful pericardiocentesis via apical approach.  160 ml of   straw-colored fluid was removed; a sample of which was sent to the lab for   analysis.       Discharge Medications   Current Discharge Medication List      START taking these medications    Details   colchicine (COLCYRS) 0.6 MG tablet Take 1 tablet (0.6 mg) by mouth 2 times daily for 12 days  Qty: 24 tablet, Refills: 0    Associated Diagnoses: Pericardial effusion      pantoprazole (PROTONIX) 40 MG EC tablet Take 1 tablet (40 mg) by mouth 2 times daily (before meals)  Qty: 60 tablet, Refills: 0    Associated Diagnoses: Gastric anastomotic stricture      potassium chloride ER (KLOR-CON M) 20 MEQ CR tablet Take 1 tablet (20 mEq) by mouth 2 times daily for 7 days  Qty: 14 tablet, Refills: 0    Associated Diagnoses: Hypokalemia      sucralfate (CARAFATE) 1 GM/10ML suspension Take 10 mLs (1 g) by mouth 4 times daily for 10 days  Qty: 414 mL, Refills: 0    Comments: Future refills by PCP Dr. OZUNA [facility] with phone number [phone].  Associated Diagnoses: Gastric anastomotic stricture         CONTINUE these medications which have CHANGED    Details   ondansetron (ZOFRAN ODT) 4 MG ODT tab Take 1 tablet (4 mg) by mouth every 6 hours as needed for nausea or vomiting  Qty: 25 tablet, Refills: 0    Associated Diagnoses: Gastric anastomotic stricture         CONTINUE these medications which have NOT CHANGED    Details   acetaminophen (TYLENOL) 500 MG tablet Take 1,000 mg by mouth every 6 hours as needed for mild pain      buPROPion (WELLBUTRIN XL) 150 MG 24 hr tablet Take 150 mg by mouth every morning Switch to Bupropion 75mg immediate  release twice daily when ordered      calcium carbonate (TUMS) 500 MG chewable tablet Take 1 chew tab by mouth 3 times daily      Cyanocobalamin (B-12) 500 MCG SUBL Place 1 tablet under the tongue daily  Qty: 30 tablet, Refills: 11    Associated Diagnoses: S/P laparoscopic sleeve gastrectomy      hydrOXYzine (ATARAX) 25 MG tablet Take 1 tablet (25 mg) by mouth 3 times daily as needed for itching  Qty: 10 tablet, Refills: 0    Associated Diagnoses: Gastric anastomotic stricture      !! hyoscyamine SL (LEVSIN/SL) 0.125 MG sublingual tablet Take 1 tablet (0.125 mg) by mouth 4 times daily (before meals and nightly)  Qty: 50 tablet, Refills: 3    Associated Diagnoses: Gastric anastomotic stricture      Multiple Vitamins-Iron (MULTIVITAMIN/IRON PO) Take 1 tablet by mouth daily      oxyCODONE (ROXICODONE) 5 MG tablet Take 1 tablet (5 mg) by mouth every 6 hours as needed for moderate to severe pain  Qty: 12 tablet, Refills: 0    Associated Diagnoses: S/P laparoscopic sleeve gastrectomy      senna-docusate (SENOKOT-S/PERICOLACE) 8.6-50 MG tablet Take 2 tablets by mouth daily as needed for constipation (While taking narcotic pain medications.  Stop taking if having loose stools.)  Qty: 30 tablet, Refills: 1    Associated Diagnoses: Class 2 severe obesity with serious comorbidity and body mass index (BMI) of 38.0 to 38.9 in adult, unspecified obesity type (H); At high risk for postoperative complications      thiamine (B-1) 100 MG tablet Take 1 tablet (100 mg) by mouth daily  Qty: 30 tablet, Refills: 1    Associated Diagnoses: S/P laparoscopic sleeve gastrectomy; Nausea with vomiting      traMADol (ULTRAM) 50 MG tablet Take 1 tablet (50 mg) by mouth every 6 hours as needed for severe pain (7-10)  Qty: 50 tablet, Refills: 0    Associated Diagnoses: Gastric anastomotic stricture      !! hyoscyamine SL (LEVSIN/SL) 0.125 MG sublingual tablet Take 1 tablet (0.125 mg) by mouth 4 times daily (before meals and nightly)  Qty: 50  "tablet, Refills: 0    Associated Diagnoses: Gastric anastomotic stricture       !! - Potential duplicate medications found. Please discuss with provider.      STOP taking these medications       Calcium Citrate-Vitamin D (CALCIUM CITRATE CHEWY BITE) 500-12.5 MG-MCG CHEW Comments:   Reason for Stopping:         enoxaparin ANTICOAGULANT (LOVENOX) 40 MG/0.4ML syringe Comments:   Reason for Stopping:         methocarbamol (ROBAXIN) 500 MG tablet Comments:   Reason for Stopping:         omeprazole (PRILOSEC) 40 MG DR capsule Comments:   Reason for Stopping:         scopolamine (TRANSDERM) 1 MG/3DAYS 72 hr patch Comments:   Reason for Stopping:         Vitamin D3 (CHOLECALCIFEROL) 25 mcg (1000 units) tablet Comments:   Reason for Stopping:             Allergies   Allergies   Allergen Reactions     Azithromycin      Erythromycin GI Disturbance     When \"very young\"     "

## 2022-12-15 NOTE — PLAN OF CARE
[id number]  Pain with pericardial drain insertion site - gave oxy 10mg. Abdominal fullness causing nausea and treated with Zofran. Meconizole placed on new vaginal rash. No pain with urination. Not tolerating very well with full liquid diet and does not have much of an appetite.   - Plan is to advance to bariatric sift this AM.

## 2022-12-15 NOTE — PLAN OF CARE
Alert and oriented x 4. VS stable, on RA and minimal complaints of pain. She took Oxycodone x 1 and Tylenol x 1 with good pain control. She was up walking in the [place] 3 times today with a walker and had some improvement in her exercise tolerance. Tele SR with rates in the 80's. She was able to drink 1000ml of fluid today and ate small amounts of cream of wheat. Plan to increase activity and oral intake tomorrow.

## 2022-12-15 NOTE — PROGRESS NOTES
"SPIRITUAL HEALTH SERVICES Progress Note  [facility] [place]    Saw pt Leah Yanez per follow up visit. She will be discharging this afternoon.      Patient/Family Understanding of Illness and Goals of Care - Leah shared about her lengthy hospitalization at the [facility] and needing to return to the hospital, first [facility] and then transfer to [facility], due to the \"accumulation of fluid around heart and in my lungs.\"       Distress and Loss -    Leah shared some of her feelings of regret about deciding to have an \"elective\" surgery of a sleeve gastrectomy at the same time that she has having a hernia repaired.    She named the impact on her self-image as she's struggled in the healing process.      Strengths, Coping, and Resources -     She shared that as a form of meditation and focused healing she is using the mantra \"Your body is healing. Your mind is brilliant. Your spirit is tranquil.\"    Leah shared her gratitude for her friends who paid for a housing cleaning service to clean her home yesterday. \"I get to go home this afternoon to a clean house.\"      Meaning, Beliefs, and Spirituality -     Leah shared that she was raised Pentec[employer]al and was a \"missionary [facility] In the last years she's been exploring a \"much broader understanding of spirituality.\"    Leah shared that she's has experienced unexpected \"healing of my mind and spirit\" with the support of the medical team who have \"given me more realistic expectations of the length of time healing will require.\"     I offered spiritual and emotional support through reflective listening that affirmed emotions, experience, and meaning and shared some practices for meditation and gratitude journaling.      Plan of Care - Nothing further at this time due to impending discharge.    Barbara Quintero MDiv  Associate   Pager [phone]  [facility] pager [phone]  [facility] phone [phone]    [facility] available 24/7 for " emergent requests/referrals, either by having the on-call  paged or by entering an ASAP/STAT consult in Epic (this will also page the on-call ).

## 2022-12-15 NOTE — PLAN OF CARE
Goal Outcome Evaluation:    A&Ox 4. VSS on RA. Tele NSR. Mild pain, declined interventions. Up SBA. Full liquid diet, anticipate transition to bariatric soft in AM. Poor intake. Voiding adequately. LS clear/diminished. BS active, intermittent nausea. Vaginal menses/rash, miconazole powder in use. AM K 3.3, refused po replaced, started IV replacement at 0700, recheck for 1330. Double lumen PICC SL. Discharge to home pending improvement.

## 2022-12-15 NOTE — PROGRESS NOTES
Pt pain controlled w/ tylenol. vitals stable. PICC removed by IV team, w/o complication. Reviewed/gave d/c instructions including medications and follow ups. Pt verbalized understanding. Leaving unit with all belongings. Waiting on spouse for transport home.

## 2022-12-16 ENCOUNTER — TELEPHONE (OUTPATIENT)
Dept: FAMILY MEDICINE | Facility: CLINIC | Age: 42
End: 2022-12-16

## 2022-12-16 ENCOUNTER — PATIENT OUTREACH (OUTPATIENT)
Dept: CARE COORDINATION | Facility: CLINIC | Age: 42
End: 2022-12-16

## 2022-12-16 ENCOUNTER — TELEPHONE (OUTPATIENT)
Dept: CARDIOLOGY | Facility: CLINIC | Age: 42
End: 2022-12-16

## 2022-12-16 LAB
BACTERIA BLD CULT: NO GROWTH
BACTERIA BLD CULT: NO GROWTH
BACTERIA PCAR CULT: NO GROWTH
GRAM STAIN RESULT: NORMAL
GRAM STAIN RESULT: NORMAL

## 2022-12-16 NOTE — PROGRESS NOTES
Clinic Care Coordination Contact  [facility]: Post-Discharge Note  SITUATION                                                      Admission:    Admission Date: 12/11/22   Reason for Admission: Status post coronary angiogram  Discharge:   Discharge Date: 12/15/22  Discharge Diagnosis: Moderate pericardial effusion with pericardial tamponade,  Bilateral pleural effusions,  Gastric anastomotic stricture and globus sensation    BACKGROUND                                                      Per hospital discharge summary and inpatient provider notes:    Leah Yanez is a 42 year old female with history of depression, GERD, PE, renal syndrome transferred from [facility] on 12/11/2022 with acute hypoxic failure secondary to bilateral pleural effusion and pericardial effusion/cardiac tamponade    Patient initially had a complicated course from 11/1-11/16 after her laparoscopic sleeve gastrectomy and hiatal hernia repair on 10/25/2022 with Dr. Aragon.  Postoperatively complicated by acute blood loss anemia, dysphagia and pain issues but then was readmitted with bilateral lower lobe pneumonia and hypoxemia and intra-abdominal hematoma requiring exploratory laparoscopy 11/4 and evacuation of hematoma and NOEL drain placement.  She was also intubated and required brief ICU stay.  Due to the persistent dysphagia she underwent EGD that showed gastric stricture and dilatation was performed 11/14.  She returns for EGD and stent placement and gastric stricture on 12/01 but the stent had to be removed on postop day 1 due to significant pain.     Since Thursday she has been having severe pain in the center of her chest radiating to the left side and shoulder.  It has been associated with worsening shortness of breath so presented to [facility] today.  It is also associated with chills and rigors.   In [facility] she was found to have bilateral pleural effusion and concern for pericardial tamponade, so  "she was transferred to [facility] for emergent pericardiocentesis. Currently patient seen in CCU: Still complaining of severe pain in the lower chest with radiation to the shoulder.    ASSESSMENT      Discharge Assessment  How are you doing now that you are home?: \"I'm doing okay\"  How are your symptoms? (Red Flag symptoms escalate to triage hotline per guidelines): Improved  Do you feel your condition is stable enough to be safe at home until your provider visit?: Yes  Does the patient have their discharge instructions? : Yes  Does the patient have questions regarding their discharge instructions? : No  Were you started on any new medications or were there changes to any of your previous medications? : Yes  Does the patient have all of their medications?: Yes  Do you have questions regarding any of your medications? : Yes (see comment) (Pt was given generic version of one of her medications and doesn't feel it is working as well as it shoud. CHW recommended speaking with her primary care clinic about medication optimization)  Do you have all of your needed medical supplies or equipment (DME)?  (i.e. oxygen tank, CPAP, cane, etc.): Yes  Discharge follow-up appointment scheduled within 14 calendar days? : No  Is patient agreeable to assistance with scheduling? : Yes    Post-op (CHW CTA Only)  If the patient had a surgery or procedure, do they have any questions for a nurse?: Yes (see comment) (Pt is wondering about medication optimization and if she can take protein supplements)    PLAN                                                      Outpatient Plan:      Follow up with primary care provider, [facility], within 7 days for hospital follow- up. Check BMP     Future Appointments   Date Time Provider Department Center   1/12/2023  8:30 AM [id number] RHCVCC RS   1/12/2023  9:45 AM [facility]   1/16/2023  4:00 PM Viviane Collins PA-C RUUMHT UMP PSA CLIN "         For any urgent concerns, please contact our 24 hour nurse triage line: [phone] ([phone])       Vanna Rausch  Community Health Worker  [facility]  Ph: [phone]

## 2022-12-16 NOTE — PLAN OF CARE
Physical Therapy Discharge Summary    Reason for therapy discharge:    Discharged to home.    Progress towards therapy goal(s). See goals on Care Plan in [facility] electronic health record for goal details.  Goals partially met.  Barriers to achieving goals:   discharge from facility.    Therapy recommendation(s):    No further therapy is recommended.

## 2022-12-16 NOTE — TELEPHONE ENCOUNTER
Patient was admitted to [facility] on 12/11/22 after being transferred from [facility] for urgent pericardiocentesis due to pericardial effusion with early echo evidence of tamponade s/p recent PNA and pleuritis.     PMH: with no known cardiac history, status post recent laparoscopic sleeve gastrectomy (10/25/2022), esophageal stricture dilatation (12/2/2022), depression, GERD, PE, renal syndrome and recent pneumonia.    12/11/22: Pericardiocentesis drained 160 mg straw colored fluid. Removed 12/13/22. Started on Colchicine.    12/11/22: Echo showed EF of 60%. Moderate circumferential pericardial effusion, up to 1.4 cm in thickness with echocardiographic evidence of early tamponade. Normal LV size with grossly normal LVEF and wall motion. No significant valvular abnormalities.    12/14/22: Limited echo showed EF of 60% with small posterior pericardial effusion. The pericardial effusion is smaller on this present study.     Pt was started on Protonix, Carafate, Colchicine and KCL. PTA Prilosec was discontinued at time of discharge.    Pt is scheduled for an echo on 1/12/23 at 0830, labs at 0945 and an OV scheduled on 1/16/23 at 1555 with MINA Viviane Collins at our [facility].    Writer called pt and she has nasal congestion and cough. States she gets a sharp pain to her left chest only with cough, yawn or deep breathing. States unchanged from when she was in the hospital. Denies any increased SOB, although states her Apple watch shows 02 sats at 89% when supine, while laying in bed. States 91-97% while awake, and 97% now and denies SOB. Encouraged pt to call her PMD today to address further and for 02 sat parameters.    Reviewed medication changes. Encouraged pt to take Protonix 30-60 minutes prior to AM meal or meds.    Reviewed follow up appts as above.    Pt verbalized understanding of all instructions without further questions. GEOVANNY Yates RN.

## 2022-12-16 NOTE — TELEPHONE ENCOUNTER
Routed to Dr Gomes.  Please review and advise if appropriate to keep hospital follow up appt as VIRTUAL?    1.  Pt looking to establish primary care in .  Needs hospital follow up within 7 days. (discharged 12/15/22).  Only hospital follow up appt available within time frame, is 12/19/22 virtual w/ Dr Gomes.  Pt scheduled.    Scheduled BMP for 12/20/22 per discharge instructions.    2.  Pt had question re: carafate.  In hospital was taking Carafate pink liquid to coat esophagus.  Discharged on sucralfate and told to let tablet dissolve in water, pt states not working like solution in hospital.  Advised to discuss with Pharmacist.    3.  Trying to increase caloric intake.  Take sips of PRO shakes.  Wondering about adding a PRO pill as well, if ok with other meds she takes.  Advised pt to contact prescribing providers of those meds (ie. Cardiology, etc) to inquire or to wait til hospital follow up appt to discuss.    4.  Nutrition referral?  Due to low caloric intake, cavanaugh's esophagus, pt feels like food gets stuck, etc.    Advised to call back with new or worsening symptoms.    Elmira Medeiros RN, BSN  [facility]

## 2022-12-17 LAB
BACTERIA BLD CULT: NO GROWTH
BACTERIA PLR CULT: NO GROWTH

## 2022-12-18 PROCEDURE — 99285 EMERGENCY DEPT VISIT HI MDM: CPT | Mod: 25

## 2022-12-19 ENCOUNTER — APPOINTMENT (OUTPATIENT)
Dept: GENERAL RADIOLOGY | Facility: CLINIC | Age: 42
DRG: 326 | End: 2022-12-19
Attending: EMERGENCY MEDICINE
Payer: COMMERCIAL

## 2022-12-19 ENCOUNTER — HOSPITAL ENCOUNTER (INPATIENT)
Facility: CLINIC | Age: 42
LOS: 2 days | Discharge: HOME | DRG: 326 | End: 2022-12-23
Attending: EMERGENCY MEDICINE | Admitting: INTERNAL MEDICINE
Payer: COMMERCIAL

## 2022-12-19 ENCOUNTER — APPOINTMENT (OUTPATIENT)
Dept: CARDIOLOGY | Facility: CLINIC | Age: 42
DRG: 326 | End: 2022-12-19
Attending: INTERNAL MEDICINE
Payer: COMMERCIAL

## 2022-12-19 DIAGNOSIS — K21.00 GASTROESOPHAGEAL REFLUX DISEASE WITH ESOPHAGITIS WITHOUT HEMORRHAGE: ICD-10-CM

## 2022-12-19 DIAGNOSIS — K92.9 GASTRIC ANASTOMOTIC STRICTURE: ICD-10-CM

## 2022-12-19 DIAGNOSIS — R07.9 CHEST PAIN, UNSPECIFIED TYPE: ICD-10-CM

## 2022-12-19 DIAGNOSIS — R13.19 ESOPHAGEAL DYSPHAGIA: Primary | ICD-10-CM

## 2022-12-19 DIAGNOSIS — I31.39 PERICARDIAL EFFUSION: ICD-10-CM

## 2022-12-19 DIAGNOSIS — R19.5 LOOSE STOOLS: ICD-10-CM

## 2022-12-19 DIAGNOSIS — I30.9 ACUTE PERICARDITIS, UNSPECIFIED TYPE: ICD-10-CM

## 2022-12-19 DIAGNOSIS — K31.89 GASTRIC ANASTOMOTIC STRICTURE: ICD-10-CM

## 2022-12-19 LAB
ALBUMIN SERPL-MCNC: 2.9 G/DL (ref 3.4–5)
ALP SERPL-CCNC: 142 U/L (ref 40–150)
ALT SERPL W P-5'-P-CCNC: 25 U/L (ref 0–50)
ANION GAP SERPL CALCULATED.3IONS-SCNC: 11 MMOL/L (ref 3–14)
AST SERPL W P-5'-P-CCNC: 25 U/L (ref 0–45)
ATRIAL RATE - MUSE: 106 BPM
BACTERIA PLR CULT: NORMAL
BASOPHILS # BLD AUTO: 0 10E3/UL (ref 0–0.2)
BASOPHILS NFR BLD AUTO: 1 %
BILIRUB SERPL-MCNC: 0.5 MG/DL (ref 0.2–1.3)
BUN SERPL-MCNC: 8 MG/DL (ref 7–30)
CALCIUM SERPL-MCNC: 9.6 MG/DL (ref 8.5–10.1)
CHLORIDE BLD-SCNC: 104 MMOL/L (ref 94–109)
CO2 SERPL-SCNC: 24 MMOL/L (ref 20–32)
CREAT SERPL-MCNC: 1.24 MG/DL (ref 0.52–1.04)
CRP SERPL-MCNC: 65.4 MG/L (ref 0–8)
DIASTOLIC BLOOD PRESSURE - MUSE: NORMAL MMHG
EOSINOPHIL # BLD AUTO: 0.1 10E3/UL (ref 0–0.7)
EOSINOPHIL NFR BLD AUTO: 1 %
ERYTHROCYTE [DISTWIDTH] IN BLOOD BY AUTOMATED COUNT: 17.6 % (ref 10–15)
ERYTHROCYTE [SEDIMENTATION RATE] IN BLOOD BY WESTERGREN METHOD: 43 MM/HR (ref 0–20)
FLUAV RNA SPEC QL NAA+PROBE: NEGATIVE
FLUBV RNA RESP QL NAA+PROBE: NEGATIVE
GFR SERPL CREATININE-BSD FRML MDRD: 55 ML/MIN/1.73M2
GLUCOSE BLD-MCNC: 98 MG/DL (ref 70–99)
HCT VFR BLD AUTO: 41.1 % (ref 35–47)
HGB BLD-MCNC: 12.9 G/DL (ref 11.7–15.7)
IMM GRANULOCYTES # BLD: 0 10E3/UL
IMM GRANULOCYTES NFR BLD: 0 %
INR PPP: 1.09 (ref 0.85–1.15)
INTERPRETATION ECG - MUSE: NORMAL
LIPASE SERPL-CCNC: 196 U/L (ref 73–393)
LVEF ECHO: NORMAL
LYMPHOCYTES # BLD AUTO: 1.7 10E3/UL (ref 0.8–5.3)
LYMPHOCYTES NFR BLD AUTO: 20 %
MCH RBC QN AUTO: 26.7 PG (ref 26.5–33)
MCHC RBC AUTO-ENTMCNC: 31.4 G/DL (ref 31.5–36.5)
MCV RBC AUTO: 85 FL (ref 78–100)
MONOCYTES # BLD AUTO: 0.8 10E3/UL (ref 0–1.3)
MONOCYTES NFR BLD AUTO: 10 %
NEUTROPHILS # BLD AUTO: 5.8 10E3/UL (ref 1.6–8.3)
NEUTROPHILS NFR BLD AUTO: 68 %
NRBC # BLD AUTO: 0 10E3/UL
NRBC BLD AUTO-RTO: 0 /100
P AXIS - MUSE: 12 DEGREES
PLATELET # BLD AUTO: 394 10E3/UL (ref 150–450)
POTASSIUM BLD-SCNC: 3.8 MMOL/L (ref 3.4–5.3)
PR INTERVAL - MUSE: 116 MS
PROT SERPL-MCNC: 7.2 G/DL (ref 6.8–8.8)
QRS DURATION - MUSE: 86 MS
QT - MUSE: 368 MS
QTC - MUSE: 488 MS
R AXIS - MUSE: 101 DEGREES
RBC # BLD AUTO: 4.83 10E6/UL (ref 3.8–5.2)
RSV RNA SPEC NAA+PROBE: NEGATIVE
SARS-COV-2 RNA RESP QL NAA+PROBE: NEGATIVE
SODIUM SERPL-SCNC: 139 MMOL/L (ref 133–144)
SYSTOLIC BLOOD PRESSURE - MUSE: NORMAL MMHG
T AXIS - MUSE: 25 DEGREES
TROPONIN I SERPL HS-MCNC: 6 NG/L
VENTRICULAR RATE- MUSE: 106 BPM
WBC # BLD AUTO: 8.5 10E3/UL (ref 4–11)

## 2022-12-19 PROCEDURE — G0378 HOSPITAL OBSERVATION PER HR: HCPCS

## 2022-12-19 PROCEDURE — 96376 TX/PRO/DX INJ SAME DRUG ADON: CPT

## 2022-12-19 PROCEDURE — 86140 C-REACTIVE PROTEIN: CPT | Performed by: EMERGENCY MEDICINE

## 2022-12-19 PROCEDURE — 250N000013 HC RX MED GY IP 250 OP 250 PS 637: Performed by: INTERNAL MEDICINE

## 2022-12-19 PROCEDURE — 99220 PR INITIAL OBSERVATION CARE,LEVEL III: CPT | Performed by: INTERNAL MEDICINE

## 2022-12-19 PROCEDURE — 96375 TX/PRO/DX INJ NEW DRUG ADDON: CPT

## 2022-12-19 PROCEDURE — 999N000127 HC STATISTIC PERIPHERAL IV START W US GUIDANCE

## 2022-12-19 PROCEDURE — 250N000011 HC RX IP 250 OP 636: Performed by: INTERNAL MEDICINE

## 2022-12-19 PROCEDURE — 93325 DOPPLER ECHO COLOR FLOW MAPG: CPT | Mod: 26 | Performed by: INTERNAL MEDICINE

## 2022-12-19 PROCEDURE — 250N000009 HC RX 250: Performed by: INTERNAL MEDICINE

## 2022-12-19 PROCEDURE — 83690 ASSAY OF LIPASE: CPT | Performed by: EMERGENCY MEDICINE

## 2022-12-19 PROCEDURE — 80053 COMPREHEN METABOLIC PANEL: CPT | Performed by: EMERGENCY MEDICINE

## 2022-12-19 PROCEDURE — 87637 SARSCOV2&INF A&B&RSV AMP PRB: CPT | Performed by: INTERNAL MEDICINE

## 2022-12-19 PROCEDURE — 272N000433 HC KIT CATH IV 18 OR 20G CM, POWERGLIDE W MAX BARRIER

## 2022-12-19 PROCEDURE — 93321 DOPPLER ECHO F-UP/LMTD STD: CPT | Mod: 26 | Performed by: INTERNAL MEDICINE

## 2022-12-19 PROCEDURE — 93325 DOPPLER ECHO COLOR FLOW MAPG: CPT

## 2022-12-19 PROCEDURE — 36569 INSJ PICC 5 YR+ W/O IMAGING: CPT

## 2022-12-19 PROCEDURE — 85610 PROTHROMBIN TIME: CPT | Performed by: EMERGENCY MEDICINE

## 2022-12-19 PROCEDURE — 999N000040 HC STATISTIC CONSULT NO CHARGE VASC ACCESS

## 2022-12-19 PROCEDURE — C9803 HOPD COVID-19 SPEC COLLECT: HCPCS

## 2022-12-19 PROCEDURE — 36415 COLL VENOUS BLD VENIPUNCTURE: CPT | Performed by: EMERGENCY MEDICINE

## 2022-12-19 PROCEDURE — 85652 RBC SED RATE AUTOMATED: CPT | Performed by: EMERGENCY MEDICINE

## 2022-12-19 PROCEDURE — 93005 ELECTROCARDIOGRAM TRACING: CPT

## 2022-12-19 PROCEDURE — 84484 ASSAY OF TROPONIN QUANT: CPT | Performed by: EMERGENCY MEDICINE

## 2022-12-19 PROCEDURE — 71046 X-RAY EXAM CHEST 2 VIEWS: CPT

## 2022-12-19 PROCEDURE — 93308 TTE F-UP OR LMTD: CPT | Mod: 26 | Performed by: INTERNAL MEDICINE

## 2022-12-19 PROCEDURE — 93308 TTE F-UP OR LMTD: CPT

## 2022-12-19 PROCEDURE — 96374 THER/PROPH/DIAG INJ IV PUSH: CPT

## 2022-12-19 PROCEDURE — 99221 1ST HOSP IP/OBS SF/LOW 40: CPT | Mod: 25 | Performed by: INTERNAL MEDICINE

## 2022-12-19 PROCEDURE — 85025 COMPLETE CBC W/AUTO DIFF WBC: CPT | Performed by: EMERGENCY MEDICINE

## 2022-12-19 PROCEDURE — 258N000003 HC RX IP 258 OP 636: Performed by: INTERNAL MEDICINE

## 2022-12-19 PROCEDURE — 250N000011 HC RX IP 250 OP 636: Performed by: EMERGENCY MEDICINE

## 2022-12-19 RX ORDER — HYDROMORPHONE HYDROCHLORIDE 1 MG/ML
.3-.5 INJECTION, SOLUTION INTRAMUSCULAR; INTRAVENOUS; SUBCUTANEOUS
Status: DISCONTINUED | OUTPATIENT
Start: 2022-12-19 | End: 2022-12-23 | Stop reason: HOSPADM

## 2022-12-19 RX ORDER — AMOXICILLIN 250 MG
1 CAPSULE ORAL 2 TIMES DAILY PRN
Status: DISCONTINUED | OUTPATIENT
Start: 2022-12-19 | End: 2022-12-23 | Stop reason: HOSPADM

## 2022-12-19 RX ORDER — HYDROMORPHONE HYDROCHLORIDE 1 MG/ML
0.5 INJECTION, SOLUTION INTRAMUSCULAR; INTRAVENOUS; SUBCUTANEOUS
Status: DISCONTINUED | OUTPATIENT
Start: 2022-12-19 | End: 2022-12-19

## 2022-12-19 RX ORDER — NALOXONE HYDROCHLORIDE 0.4 MG/ML
0.2 INJECTION, SOLUTION INTRAMUSCULAR; INTRAVENOUS; SUBCUTANEOUS
Status: DISCONTINUED | OUTPATIENT
Start: 2022-12-19 | End: 2022-12-23 | Stop reason: HOSPADM

## 2022-12-19 RX ORDER — LIDOCAINE 40 MG/G
CREAM TOPICAL
Status: DISCONTINUED | OUTPATIENT
Start: 2022-12-19 | End: 2022-12-19

## 2022-12-19 RX ORDER — PROCHLORPERAZINE 25 MG
25 SUPPOSITORY, RECTAL RECTAL EVERY 12 HOURS PRN
Status: DISCONTINUED | OUTPATIENT
Start: 2022-12-19 | End: 2022-12-23 | Stop reason: HOSPADM

## 2022-12-19 RX ORDER — BISACODYL 10 MG
10 SUPPOSITORY, RECTAL RECTAL DAILY PRN
Status: DISCONTINUED | OUTPATIENT
Start: 2022-12-19 | End: 2022-12-23 | Stop reason: HOSPADM

## 2022-12-19 RX ORDER — BUPROPION HYDROCHLORIDE 150 MG/1
150 TABLET ORAL EVERY MORNING
Status: DISCONTINUED | OUTPATIENT
Start: 2022-12-20 | End: 2022-12-23 | Stop reason: HOSPADM

## 2022-12-19 RX ORDER — ONDANSETRON 2 MG/ML
4 INJECTION INTRAMUSCULAR; INTRAVENOUS EVERY 30 MIN PRN
Status: DISCONTINUED | OUTPATIENT
Start: 2022-12-19 | End: 2022-12-19

## 2022-12-19 RX ORDER — PANTOPRAZOLE SODIUM 40 MG/1
40 TABLET, DELAYED RELEASE ORAL
Status: DISCONTINUED | OUTPATIENT
Start: 2022-12-19 | End: 2022-12-23 | Stop reason: HOSPADM

## 2022-12-19 RX ORDER — ACETAMINOPHEN 650 MG/1
650 SUPPOSITORY RECTAL EVERY 6 HOURS PRN
Status: DISCONTINUED | OUTPATIENT
Start: 2022-12-19 | End: 2022-12-23 | Stop reason: HOSPADM

## 2022-12-19 RX ORDER — ACETAMINOPHEN 500 MG
1000 TABLET ORAL EVERY 6 HOURS PRN
Status: DISCONTINUED | OUTPATIENT
Start: 2022-12-19 | End: 2022-12-23 | Stop reason: HOSPADM

## 2022-12-19 RX ORDER — NALOXONE HYDROCHLORIDE 0.4 MG/ML
0.4 INJECTION, SOLUTION INTRAMUSCULAR; INTRAVENOUS; SUBCUTANEOUS
Status: DISCONTINUED | OUTPATIENT
Start: 2022-12-19 | End: 2022-12-23 | Stop reason: HOSPADM

## 2022-12-19 RX ORDER — ONDANSETRON 4 MG/1
4 TABLET, ORALLY DISINTEGRATING ORAL EVERY 6 HOURS PRN
Status: DISCONTINUED | OUTPATIENT
Start: 2022-12-19 | End: 2022-12-23 | Stop reason: HOSPADM

## 2022-12-19 RX ORDER — AMOXICILLIN 250 MG
2 CAPSULE ORAL 2 TIMES DAILY PRN
Status: DISCONTINUED | OUTPATIENT
Start: 2022-12-19 | End: 2022-12-23 | Stop reason: HOSPADM

## 2022-12-19 RX ORDER — HYDROXYZINE HYDROCHLORIDE 25 MG/1
25 TABLET, FILM COATED ORAL 3 TIMES DAILY PRN
Status: DISCONTINUED | OUTPATIENT
Start: 2022-12-19 | End: 2022-12-23 | Stop reason: HOSPADM

## 2022-12-19 RX ORDER — OXYCODONE HYDROCHLORIDE 5 MG/1
5 TABLET ORAL EVERY 6 HOURS PRN
Status: DISCONTINUED | OUTPATIENT
Start: 2022-12-19 | End: 2022-12-23 | Stop reason: HOSPADM

## 2022-12-19 RX ORDER — CALCIUM CARBONATE 500 MG/1
500 TABLET, CHEWABLE ORAL 3 TIMES DAILY
Status: DISCONTINUED | OUTPATIENT
Start: 2022-12-19 | End: 2022-12-23 | Stop reason: HOSPADM

## 2022-12-19 RX ORDER — SUCRALFATE ORAL 1 G/10ML
1 SUSPENSION ORAL 4 TIMES DAILY
Status: DISCONTINUED | OUTPATIENT
Start: 2022-12-19 | End: 2022-12-23 | Stop reason: HOSPADM

## 2022-12-19 RX ORDER — METOCLOPRAMIDE HYDROCHLORIDE 5 MG/ML
10 INJECTION INTRAMUSCULAR; INTRAVENOUS EVERY 6 HOURS
Status: DISCONTINUED | OUTPATIENT
Start: 2022-12-19 | End: 2022-12-23 | Stop reason: HOSPADM

## 2022-12-19 RX ORDER — KETOROLAC TROMETHAMINE 30 MG/ML
30 INJECTION, SOLUTION INTRAMUSCULAR; INTRAVENOUS ONCE
Status: COMPLETED | OUTPATIENT
Start: 2022-12-19 | End: 2022-12-19

## 2022-12-19 RX ORDER — ONDANSETRON 2 MG/ML
4 INJECTION INTRAMUSCULAR; INTRAVENOUS EVERY 6 HOURS PRN
Status: DISCONTINUED | OUTPATIENT
Start: 2022-12-19 | End: 2022-12-23 | Stop reason: HOSPADM

## 2022-12-19 RX ORDER — COLCHICINE 0.6 MG/1
0.6 TABLET ORAL 2 TIMES DAILY
Status: DISCONTINUED | OUTPATIENT
Start: 2022-12-19 | End: 2022-12-23 | Stop reason: HOSPADM

## 2022-12-19 RX ORDER — HYDROMORPHONE HCL IN WATER/PF 6 MG/30 ML
0.2 PATIENT CONTROLLED ANALGESIA SYRINGE INTRAVENOUS
Status: DISCONTINUED | OUTPATIENT
Start: 2022-12-19 | End: 2022-12-19

## 2022-12-19 RX ORDER — LIDOCAINE 40 MG/G
CREAM TOPICAL
Status: DISCONTINUED | OUTPATIENT
Start: 2022-12-19 | End: 2022-12-23 | Stop reason: HOSPADM

## 2022-12-19 RX ORDER — ACETAMINOPHEN 325 MG/1
650 TABLET ORAL EVERY 6 HOURS PRN
Status: DISCONTINUED | OUTPATIENT
Start: 2022-12-19 | End: 2022-12-19

## 2022-12-19 RX ORDER — SODIUM CHLORIDE 9 MG/ML
INJECTION, SOLUTION INTRAVENOUS CONTINUOUS
Status: DISCONTINUED | OUTPATIENT
Start: 2022-12-19 | End: 2022-12-23 | Stop reason: HOSPADM

## 2022-12-19 RX ORDER — PROCHLORPERAZINE MALEATE 10 MG
10 TABLET ORAL EVERY 6 HOURS PRN
Status: DISCONTINUED | OUTPATIENT
Start: 2022-12-19 | End: 2022-12-23 | Stop reason: HOSPADM

## 2022-12-19 RX ADMIN — CALCIUM CARBONATE (ANTACID) CHEW TAB 500 MG 500 MG: 500 CHEW TAB at 15:39

## 2022-12-19 RX ADMIN — PROCHLORPERAZINE EDISYLATE 10 MG: 5 INJECTION INTRAMUSCULAR; INTRAVENOUS at 04:56

## 2022-12-19 RX ADMIN — PANTOPRAZOLE SODIUM 40 MG: 40 TABLET, DELAYED RELEASE ORAL at 08:04

## 2022-12-19 RX ADMIN — SODIUM CHLORIDE: 9 INJECTION, SOLUTION INTRAVENOUS at 22:06

## 2022-12-19 RX ADMIN — HYDROMORPHONE HYDROCHLORIDE 0.5 MG: 1 INJECTION, SOLUTION INTRAMUSCULAR; INTRAVENOUS; SUBCUTANEOUS at 20:58

## 2022-12-19 RX ADMIN — HYOSCYAMINE SULFATE 0.12 MG: 0.12 TABLET ORAL at 15:40

## 2022-12-19 RX ADMIN — HYDROMORPHONE HYDROCHLORIDE 0.5 MG: 1 INJECTION, SOLUTION INTRAMUSCULAR; INTRAVENOUS; SUBCUTANEOUS at 18:14

## 2022-12-19 RX ADMIN — SUCRALFATE 1 G: 1 SUSPENSION ORAL at 20:57

## 2022-12-19 RX ADMIN — SUCRALFATE 1 G: 1 SUSPENSION ORAL at 15:42

## 2022-12-19 RX ADMIN — HYOSCYAMINE SULFATE 0.12 MG: 0.12 TABLET ORAL at 22:04

## 2022-12-19 RX ADMIN — ACETAMINOPHEN 650 MG: 325 TABLET, FILM COATED ORAL at 05:27

## 2022-12-19 RX ADMIN — CALCIUM CARBONATE (ANTACID) CHEW TAB 500 MG 500 MG: 500 CHEW TAB at 20:57

## 2022-12-19 RX ADMIN — THIAMINE HCL TAB 100 MG 100 MG: 100 TAB at 15:39

## 2022-12-19 RX ADMIN — LIDOCAINE HYDROCHLORIDE 1 ML: 10 INJECTION, SOLUTION INFILTRATION; PERINEURAL at 17:35

## 2022-12-19 RX ADMIN — METOCLOPRAMIDE 10 MG: 5 INJECTION, SOLUTION INTRAMUSCULAR; INTRAVENOUS at 18:13

## 2022-12-19 RX ADMIN — COLCHICINE 0.6 MG: 0.6 TABLET ORAL at 20:57

## 2022-12-19 RX ADMIN — PROCHLORPERAZINE MALEATE 10 MG: 10 TABLET ORAL at 17:04

## 2022-12-19 RX ADMIN — OXYCODONE HYDROCHLORIDE 5 MG: 5 TABLET ORAL at 13:06

## 2022-12-19 RX ADMIN — HYDROMORPHONE HYDROCHLORIDE 0.5 MG: 1 INJECTION, SOLUTION INTRAMUSCULAR; INTRAVENOUS; SUBCUTANEOUS at 03:22

## 2022-12-19 RX ADMIN — KETOROLAC TROMETHAMINE 30 MG: 30 INJECTION, SOLUTION INTRAMUSCULAR; INTRAVENOUS at 18:38

## 2022-12-19 RX ADMIN — PANTOPRAZOLE SODIUM 40 MG: 40 TABLET, DELAYED RELEASE ORAL at 15:39

## 2022-12-19 RX ADMIN — SODIUM CHLORIDE: 9 INJECTION, SOLUTION INTRAVENOUS at 04:52

## 2022-12-19 RX ADMIN — ONDANSETRON 4 MG: 2 INJECTION INTRAMUSCULAR; INTRAVENOUS at 01:23

## 2022-12-19 RX ADMIN — HYDROMORPHONE HYDROCHLORIDE 0.2 MG: 0.2 INJECTION, SOLUTION INTRAMUSCULAR; INTRAVENOUS; SUBCUTANEOUS at 12:59

## 2022-12-19 RX ADMIN — COLCHICINE 0.6 MG: 0.6 TABLET ORAL at 08:04

## 2022-12-19 RX ADMIN — OXYCODONE HYDROCHLORIDE 5 MG: 5 TABLET ORAL at 05:27

## 2022-12-19 RX ADMIN — HYDROMORPHONE HYDROCHLORIDE 0.5 MG: 1 INJECTION, SOLUTION INTRAMUSCULAR; INTRAVENOUS; SUBCUTANEOUS at 01:22

## 2022-12-19 RX ADMIN — LIDOCAINE HYDROCHLORIDE 1 ML: 10 INJECTION, SOLUTION INFILTRATION; PERINEURAL at 17:36

## 2022-12-19 RX ADMIN — Medication 500 MCG: at 15:40

## 2022-12-19 ASSESSMENT — ACTIVITIES OF DAILY LIVING (ADL)
ADLS_ACUITY_SCORE: 33
ADLS_ACUITY_SCORE: 35
ADLS_ACUITY_SCORE: 33
ADLS_ACUITY_SCORE: 35
ADLS_ACUITY_SCORE: 34
ADLS_ACUITY_SCORE: 34
ADLS_ACUITY_SCORE: 33

## 2022-12-19 ASSESSMENT — ENCOUNTER SYMPTOMS
VOMITING: 1
ARTHRALGIAS: 1
FEVER: 0
SHORTNESS OF BREATH: 0
ABDOMINAL PAIN: 0
COUGH: 0

## 2022-12-19 NOTE — ED NOTES
"[facility]  ED Nurse Handoff Report    ED Chief complaint: Chest Pain      ED Diagnosis:   Final diagnoses:   Pericardial effusion   Chest pain, unspecified type       Code Status: Full Code    Allergies:   Allergies   Allergen Reactions     Azithromycin      Erythromycin GI Disturbance     When \"very young\"       Patient Story: Pt is a 42 year old female with history of pulmonary embolism, pericardial effusion, pleural effusion, and acute kidney injury who presents with chest pain. The patient reports onset of chest pain around 2000 yesterday that is similar to the pain she experienced when she was diagnosed with a pericardial effusion last weekend. Notes that the pain is mostly on her right side and is radiating to her shoulder. Adds that she has been vomiting today. Of note, she was prescribed colchicine after her diagnosis and discharge last week and has been taking it as prescribed.        Focused Assessment:  Pt is awake, alert and orientated X 4.     Treatments and/or interventions provided: Labs, Xray,     Patient's response to treatments and/or interventions:     To be done/followed up on inpatient unit:      Does this patient have any cognitive concerns?: AOX 3    Activity level - Baseline/Home:  Independent  Activity Level - Current:   Independent    Patient's Preferred language: English   Needed?: No    Isolation: None  Infection: Not Applicable  Patient tested for COVID 19 prior to admission: YES  Bariatric?: No    Vital Signs:   Vitals:    12/19/22 0007 12/19/22 0008 12/19/22 0157 12/19/22 0227   BP: 126/88      Pulse:   78 77   Resp:   15 17   Temp:       TempSrc:       SpO2:   93% 92%   Weight:  103.4 kg (228 lb)     Height:  1.829 m (6')         Cardiac Rhythm:     Was the PSS-3 completed:   Yes  What interventions are required if any?               Family Comments:  left for the night   OBS brochure/video discussed/provided to patient/family: No              Name " of person given brochure if not patient:               Relationship to patient:     For the majority of the shift this patient's behavior was Green.   Behavioral interventions performed were .    ED NURSE PHONE NUMBER: [phone]

## 2022-12-19 NOTE — H&P
[facility]    History and Physical - Hospitalist Service       Date of Admission:  12/19/2022  Dictation #: [id number]  Brief Summary (see dictation for more details): 42F hx pericarditis with pericardial effusion s/p pericardiocentesis on 12/11 returns with similar symptoms.  Bedside US in ED shows small pericardial effusion, will obtain formal TTE and cardiology consult.  Will also check COVID panel as potential exposure but feel this is less likely as not typical symptoms.      Clinically Significant Risk Factors Present on Admission           # Hypercalcemia: corrected calcium is >10.1, will monitor as appropriate    # Hypoalbuminemia: Lowest albumin = 2.9 g/dL at 12/19/2022  2:07 AM, will monitor as appropriate          # Obesity: Estimated body mass index is 30.92 kg/m  as calculated from the following:    Height as of this encounter: 1.829 m (6').    Weight as of this encounter: 103.4 kg (228 lb).             Noe Grider,   Hospitalist Service  [facility]  Securely message with the Vocera Web Console (learn more here)  Text page via [employer] Paging/Directory

## 2022-12-19 NOTE — PROVIDER NOTIFICATION
MD Notification    Notified Person: MD    Notified Person Name: Reba Iqbal    Notification Date/Time: 0521    Notification Interaction: amcom    Purpose of Notification: L.B. 5508  May I know what's the patient's diet, and pls put it in the order. thanks Brook RN *[phone]    Orders Received:    Comments:

## 2022-12-19 NOTE — PROGRESS NOTES
Orientation/Cognitive: AOx4  Observation Goals (Met/ Not Met): not met  Mobility Level/Assist Equipment: standby assist  Fall Risk (Y/N): no  Behavior Concerns: calm and cooperative  Pain Management: complains of headache 5/10 and chest pain radiating to left shoulder 6/10 pain - gave Oxycodone 5mg and Tylenol 650mg, patient asleep.  Tele/VS/O2: vitally stable and on room air; on telemetry: NSR  ABNL Lab/BG: none  Diet: regular  Bowel/Bladder: continent bowel and bladder  Skin Concerns: none  Drains/Devices: PIV on right AC - infusing, running at NS 75ml/hr  Tests/Procedures for next shift: echocardiogram complete  Anticipated DC date & active delays: TBD  Patient Stated Goal for Today: pain control  Per patient, nausea almost gone after administration of Compazine 10mg IV, able to tolerate oral medicine. Patient resting well.    PRIMARY DIAGNOSIS: CHEST PAIN  OUTPATIENT/OBSERVATION GOALS TO BE MET BEFORE DISCHARGE:  1. Ruled out acute coronary syndrome (negative or stable Troponin):  Yes , troponin 6, US Echo findings: small pericardial effusion.  2. Pain Status: Improved-controlled with oral pain medications. Has PRN Oxycodone and Tylenol given at 0530, patient able to sleep.  3. Appropriate provocative testing performed: Yes, patient scheduled for echocardiogram complete today  - Stress Test Procedure: NA  - Interpretation of cardiac rhythm per telemetry tech: NA    4. Cleared by Consultants (if applicable):No  5. Return to near baseline physical activity: No  Discharge Planner Nurse   Safe discharge environment identified: No  Barriers to discharge: No       Entered by: Alice Jacobsen RN 12/19/2022 6:04 AM     Please review provider order for any additional goals.   Nurse to notify provider when observation goals have been met and patient is ready for discharge.

## 2022-12-19 NOTE — CONSULTS
[facility]  Cardiology Consultation     Date of Admission:  12/19/2022    Primary Care Physician   [facility] [place]    Reason for Consult   Reason for consult: I was asked to evaluate this patient for pericarditis.    History of Present Illness   Leah Yanez is a 42 year old woman who is readmitted with pericarditic pain.  She was recently seen in the [facility] on the 11th of this month with pericarditic chest pain with pericardial effusion with tamponade physiology.  She underwent emergent pericardiocentesis and drainage.  She also had a complicated laparoscopic sleeve gastrectomy on October 25, 2022 complicated by esophageal dysphagia with recent dilatation of esophageal stricture at [place] on December 2.  She has Raynaud's phenomenon.  On a previous hospital stay she was treated with colchicine with a good response.  Last evening she has had recurrent symptoms.  She states symptoms are worse this morning despite the use of narcotics.  He clearly describes a pleuritic type chest pain with pain that is quite sharp in nature peaking at her deep inspiration radiating up into her shoulder.  She has had no fevers or chills.  She has been taking her colchicine religiously she states that she is even been taking it 3 or 4 times a day.  She is having problems with diarrhea.    He is going through a stressful period with her mother with dementia.    Assessment & Plan   Leah Yanez is a 42 year old female who was admitted on 12/19/2022 recurrent pericarditic pain despite being on colchicine.  We have been reluctant to start nonsteroidals due to her recent GI interventions.  I have discussed the case with Dr. Milner and recommend starting a nonsteroidal if okay by GI.  If they will not allow us to start a nonsteroidal then we should her on steroids which runs the problem of recurrent long-term pericarditis.  Patient CRP is up at 65.4    Fortunately repeat  echocardiogram does not show any significant reaccumulation of pericardial fluid.  This is just a matter of pain control at this time.    Acute renal failure.  I suspect this is due to intravascular volume depletion.  Patient states she is having difficulty eating and even taking in fluids.  She states she thinks she is dehydrated.    I have given her 1 shot of 30 mg of Toradol for relief but will have to find out something for long-term relief.    Clinically Significant Risk Factors Present on Admission          # Hypercalcemia: corrected calcium is >10.1, will monitor as appropriate    # Hypoalbuminemia: Lowest albumin = 2.9 g/dL at 12/19/2022  2:07 AM, will monitor as appropriate      # Obesity: Estimated body mass index is 30.11 kg/m  as calculated from the following:    Height as of this encounter: 1.829 m (6').    Weight as of this encounter: 100.7 kg (222 lb 0.1 oz).    Cardiovascular: Pericardial Effusion: Pericardial effusion                Nephrology: Acute kidney failure, unspecified                               Deepak Conroy MD, MD    Patient Active Problem List   Diagnosis     Food impaction of esophagus, initial encounter     Esophageal dysphagia     Heartburn     History of pulmonary embolism     Anti-cardiolipin antibody positive     Raynaud's phenomenon     Gastroesophageal reflux disease with esophagitis     Hiatal hernia     Griggs's esophagus     Class 2 severe obesity with serious comorbidity and body mass index (BMI) of 38.0 to 38.9 in adult (H)     Morbid obesity (H)     Dehydration     Hypoxia     Pneumonia of both lower lobes due to infectious organism     Hypoxemia     Other pneumonia, unspecified organism     Encounter for screening laboratory testing for severe acute respiratory syndrome coronavirus 2 (SARS-CoV-2)     Pericardial effusion     Pleural effusion     Pneumonia due to infectious organism, unspecified laterality, unspecified part of lung     Status post coronary  angiogram     Diaphragmatic hernia without obstruction or gangrene     Atypical squamous cells of undetermined significance (ASCUS) on Papanicolaou smear of cervix     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   Past Medical History:   Diagnosis Date     Anti-cardiolipin antibody positive      Griggs esophagus      Concussion 2016     Depressive disorder, not elsewhere classified 03/01/2006    Resolved 8/07     Gastroesophageal reflux disease with esophagitis      Hiatal hernia      History of pulmonary embolism      Obesity      Raynaud's syndrome     past history of admission for gangrene of one finger       Past Surgical History   I have reviewed this patient's surgical history and updated it with pertinent information if needed.  Past Surgical History:   Procedure Laterality Date     COMBINED ESOPHAGOSCOPY, GASTROSCOPY, DUODENOSCOPY (EGD), REMOVE ESOPHAGEAL STENT N/A 12/2/2022    Procedure: ESOPHAGOGASTRODUODENOSCOPY, WITH ESOPHAGEAL STENT REMOVAL and Balloon Dialation;  Surgeon: Daniel Aragon MD;  Location: [facility] OR     CV PERICARDIOCENTESIS N/A 12/11/2022    Procedure: Pericardiocentesis;  Surgeon: Sourav Sahu MD;  Location:  [facility]ARDI[facility] CATH LAB     ESOPHAGOSCOPY, GASTROSCOPY, DUODENOSCOPY (EGD), COMBINED N/A 12/29/2021    Procedure: ESOPHAGOGASTRODUODENOSCOPY, WITH BIOPSY;  Surgeon: Esteban Rose DO;  [place] [facility] [place]     ESOPHAGOSCOPY, GASTROSCOPY, DUODENOSCOPY (EGD), COMBINED N/A 11/14/2022    Procedure: Upper endoscopy; gastric stricture dilation, interpretation of  fluoroscopy nasojejunal feeding tube;  Surgeon: Daniel Aragon MD;  Location: [facility] OR     ESOPHAGOSCOPY, GASTROSCOPY, DUODENOSCOPY (EGD), COMBINED N/A 11/23/2022    Procedure: ESOPHAGOGASTRODUODENOSCOPY (EGD);  Surgeon: Daneil Aragon MD;  Location: [facility] GI     LAPAROSCOPIC GASTRIC SLEEVE N/A 10/25/2022    Procedure: GASTRECTOMY, SLEEVE, LAPAROSCOPIC;  Surgeon: Daniel Aragon MD;  Location: [facility] OR     LAPAROSCOPIC HERNIORRHAPHY HIATAL N/A 10/25/2022    Procedure: HERNIORRHAPHY, HIATAL, LAPAROSCOPIC;  Surgeon: Daniel Aragon MD;  Location: [facility] OR     LAPAROSCOPY DIAGNOSTIC (GENERAL) N/A 11/4/2022    Procedure: LAPAROSCOPY, DIAGNOSTIC, BY GENERAL SURGERY, evacuation of abdominl hematoma;  Surgeon: Daniel Aragon MD;  Location: [facility] OR     PICC TRIPLE LUMEN PLACEMENT Left 11/06/2022    51cm (1cm external), Basilic vein     TONSILLECTOMY & ADENOIDECTOMY       ZZC NONSPECIFIC PROCEDURE      T&A as a child     ZZC NONSPECIFIC PROCEDURE      Tubes in ears bilaterally       Prior to Admission Medications   Prior to Admission Medications   Prescriptions Last Dose Informant Patient Reported? Taking?   CALCIUM CITRATE-VITAMIN D3 PO   Yes No   Cyanocobalamin (B-12) 500 MCG SUBL 12/18/2022 at AM Self No Yes   Sig: Place 1 tablet under the tongue daily   Multiple Vitamins-Iron (MULTIVITAMIN/IRON PO) 12/18/2022 at AM Self Yes Yes   Sig: Take 1 tablet by mouth daily   acetaminophen (TYLENOL) 500 MG tablet  at PRN Self Yes Yes   Sig: Take 1,000 mg by mouth every 6 hours as needed for mild pain   buPROPion (WELLBUTRIN XL) 150 MG 24 hr tablet 12/18/2022 at AM Self Yes Yes   Sig: Take 150 mg by mouth every morning Switch to Bupropion 75mg immediate release twice daily when ordered   calcium carbonate (TUMS) 500 MG chewable tablet 12/18/2022 at x2 doses Self Yes Yes   Sig: Take 1 chew tab by mouth 3 times daily   colchicine (COLCYRS) 0.6 MG tablet 12/18/2022 at AM Self No Yes   Sig: Take 1 tablet (0.6 mg) by  mouth 2 times daily for 12 days   hydrOXYzine (ATARAX) 25 MG tablet  at PRN Self No Yes   Sig: Take 1 tablet (25 mg) by mouth 3 times daily as needed for itching   hyoscyamine SL (LEVSIN/SL) 0.125 MG sublingual tablet 12/18/2022 at x3 doses Self No Yes   Sig: Take 1 tablet (0.125 mg) by mouth 4 times daily (before meals and nightly)   ondansetron (ZOFRAN ODT) 4 MG ODT tab  at PRN Self No Yes   Sig: Take 1 tablet (4 mg) by mouth every 6 hours as needed for nausea or vomiting   oxyCODONE (ROXICODONE) 5 MG tablet  at PRN Self No Yes   Sig: Take 1 tablet (5 mg) by mouth every 6 hours as needed for moderate to severe pain   pantoprazole (PROTONIX) 40 MG EC tablet 12/18/2022 at AM Self No Yes   Sig: Take 1 tablet (40 mg) by mouth 2 times daily (before meals)   potassium chloride ER (KLOR-CON M) 20 MEQ CR tablet 12/18/2022 at AM Self No Yes   Sig: Take 1 tablet (20 mEq) by mouth 2 times daily for 7 days   senna-docusate (SENOKOT-S/PERICOLACE) 8.6-50 MG tablet  at PRN Self No Yes   Sig: Take 2 tablets by mouth daily as needed for constipation (While taking narcotic pain medications.  Stop taking if having loose stools.)   sucralfate (CARAFATE) 1 GM/10ML suspension Past Week Self No Yes   Sig: Take 10 mLs (1 g) by mouth 4 times daily for 10 days   thiamine (B-1) 100 MG tablet 12/18/2022 at AM Self No Yes   Sig: Take 1 tablet (100 mg) by mouth daily   traMADol (ULTRAM) 50 MG tablet  at PRN Self No Yes   Sig: Take 1 tablet (50 mg) by mouth every 6 hours as needed for severe pain (7-10)      Facility-Administered Medications: None     Current Facility-Administered Medications   Medication Dose Route Frequency     [START ON 12/20/2022] buPROPion  150 mg Oral QAM     calcium carbonate  500 mg Oral TID     colchicine  0.6 mg Oral BID     cyanocobalamin  500 mcg Sublingual Daily     hyoscyamine  0.125 mg Oral 4x Daily AC & HS     ketorolac  30 mg Intravenous Once     pantoprazole  40 mg Oral BID AC     sucralfate  1 g Oral 4x Daily  "    thiamine  100 mg Oral Daily     Current Facility-Administered Medications   Medication Last Rate     sodium chloride 75 mL/hr at 12/19/22 0457     Allergies   Allergies   Allergen Reactions     Azithromycin      Erythromycin GI Disturbance     When \"very young\"       Social History    reports that she has never smoked. She has never used smokeless tobacco. She reports current alcohol use. She reports that she does not use drugs.    Family History   Family History   Problem Relation Age of Onset     Hypertension Mother         arthritis     Heart Disease Father         MI, Lipids     Acute Myocardial Infarction Father      Cancer - colorectal Maternal Grandmother         arthritis     Hypertension Maternal Grandfather         arthritis, macular degeneration     Colon Cancer Paternal Grandmother      Alzheimer Disease Paternal Grandfather      Anesthesia Reaction No family hx of      Clotting Disorder No family hx of        Review of Systems   The comprehensive 10 point Review of Systems is negative other than noted in the HPI or here.     Physical Exam   Vital Signs with Ranges  Temp:  [96.2  F (35.7  C)-98  F (36.7  C)] 98  F (36.7  C)  Pulse:  [] 75  Resp:  [15-19] 19  BP: (120-131)/(70-91) 128/80  SpO2:  [92 %-97 %] 95 %  Wt Readings from Last 4 Encounters:   12/19/22 100.7 kg (222 lb 0.1 oz)   12/15/22 103.6 kg (228 lb 6.4 oz)   12/11/22 106.6 kg (235 lb)   11/30/22 106.6 kg (235 lb 0.2 oz)     No intake/output data recorded.      Vitals: /80 (BP Location: Left arm)   Pulse 75   Temp 98  F (36.7  C) (Oral)   Resp 19   Ht 1.829 m (6')   Wt 100.7 kg (222 lb 0.1 oz)   LMP 11/14/2022 (Approximate)   SpO2 95%   BMI 30.11 kg/m      Patient isun comfortable and in mild to moderate distress.   Pupils are equal round and reactive.  Sclerae anicteric conjunctiva is noninjected  Neck is supple there are no carotid bruits or jugular venous distention.  Resp lungs are clear to auscultation.    MS There " is no kyphosis or scoliosis  Cardiac exam reveals a regular rate and rhythm I hear no murmur, rub or gallop.  GI abdomen is soft nontender with normoactive bowel sounds.  Extremities are without edema.  There are 2+ pulses.  Neurologic exam is nonfocal.  Awake, alert and oriented ×3  Psych. Pt is calm  Skin is warm and dry.      No lab results found in last 7 days.    Invalid input(s): TROPONINIES    Recent Labs   Lab 12/19/22  0207 12/19/22  0110 12/15/22  0514 12/14/22  1838 12/14/22  0548 12/13/22  1903 12/13/22  0640   WBC  --  8.5 5.7  --  6.2  --  6.2   HGB  --  12.9 10.2*  --  9.8*  --  9.5*   MCV  --  85 87  --  85  --  88   PLT  --  394 282  --  283  --  254   INR  --  1.09  --   --   --   --   --      --  140  --  141  --  145*   POTASSIUM 3.8  --  3.3* 3.4 3.2*   < > 2.9*   CHLORIDE 104  --  104  --  105  --  115*   CO2 24  --  27  --  26  --  22   BUN 8  --  8  --  8  --  7   CR 1.24*  --  1.13*  --  1.24*  --  1.01   GFRESTIMATED 55*  --  62  --  55*  --  71   ANIONGAP 11  --  9  --  10  --  8   KIMBERLYN 9.6  --  9.2  --  9.3  --  7.4*   GLC 98  --  86  --  82  --  79   ALBUMIN 2.9*  --   --   --   --   --  1.7*   PROTTOTAL 7.2  --   --   --   --   --  4.8*   BILITOTAL 0.5  --   --   --   --   --  0.4   ALKPHOS 142  --   --   --   --   --  89   ALT 25  --   --   --   --   --  20   AST 25  --   --   --   --   --  10   LIPASE 196  --   --   --   --   --   --     < > = values in this interval not displayed.     Recent Labs   Lab Test 09/01/22  1156   CHOL 186   HDL 37*   *   TRIG 216*     Recent Labs   Lab 12/19/22  0110 12/15/22  0514 12/14/22  0548   WBC 8.5 5.7 6.2   HGB 12.9 10.2* 9.8*   HCT 41.1 33.7* 31.0*   MCV 85 87 85    282 283     No results for input(s): PH, PHV, PO2, PO2V, SAT, PCO2, PCO2V, HCO3, HCO3V in the last 168 hours.  No results for input(s): NTBNPI, NTBNP in the last 168 hours.  No results for input(s): DD in the last 168 hours.  Recent Labs   Lab 12/19/22  0207  22  0110 12/15/22  0514 22  0548   SED  --  43*  --   --    CRP 65.4*  --  69.7* 107.0*     Recent Labs   Lab 22  0110 12/15/22  0514 22  0548    282 283     No results for input(s): TSH in the last 168 hours.  No results for input(s): COLOR, APPEARANCE, URINEGLC, URINEBILI, URINEKETONE, SG, UBLD, URINEPH, PROTEIN, UROBILINOGEN, NITRITE, LEUKEST, RBCU, WBCU in the last 168 hours.    Imaging:  Recent Results (from the past 48 hour(s))   POC US ECHO LIMITED    [facility] Procedure Note      Limited Bedside ED Cardiac Ultrasound:    PROCEDURE: PERFORMED BY: Dr. Jin Samuel MD  INDICATIONS/SYMPTOM:  Chest Pain  PROBE: Cardiac phased array probe  BODY LOCATION: Chest  FINDINGS:   The ultrasound was performed utilizing the parasternal long axis, parasternal short axis and apical 4 chamber views.  Cardiac contractility:  Present  Gross estimation of cardiac kinesis: normal  Pericardial Effusion:  Small  RV:LV ratio: LV > RV  INTERPRETATION:    Chamber size and motion were grossly normal with LV > RV, normal cardiac kinesis.  Pericardial effusion was found.    IMAGE DOCUMENTATION: Images were archived to PACs system.       XR Chest 2 Views    Narrative    EXAM: XR CHEST 2 VIEWS  LOCATION: [facility]  DATE/TIME: 2022 1:46 AM    INDICATION: chest pain  COMPARISON: 2022      Impression    IMPRESSION: Heart size within normal limits. The right lung is now clear. There is persistent airspace infiltrate and a small effusion at the left lung base. No pneumothorax.   Echocardiogram Limited   Result Value    LVEF  55%    Narrative    [id number]  [id number]  [id number]  [id number]^NOEL^[facility]  Echocardiography Laboratory  [facility] [place] [address]     Name: ASIYA PRECIADO  MRN: [id number]  : 1980  Study Date: 2022 09:03 AM  Age: 42 yrs  Gender: Female  Patient Location:  [facility]  Reason For Study: Pericardial Effusion  Ordering Physician: SUMMER COHEN  Performed By: Jeanne Blount     BSA: 2.3 m2  Height: 72 in  Weight: 228 lb  HR: 69  BP: 126/88 mmHg  ______________________________________________________________________________  Procedure  Limited Portable Echo Adult.  ______________________________________________________________________________  Interpretation Summary     1. The left ventricle is normal in structure, function and size. The visual  ejection fraction is estimated at 55%.  2. The right ventricle is normal in structure, function and size.  3. No valve disease.  4. Trivial pericardial effusion     No changes when compared to echo from 22.  ______________________________________________________________________________  Left Ventricle  The left ventricle is normal in structure, function and size. The visual  ejection fraction is estimated at 55%. Septal motion is consistent with  conduction abnormality.     Right Ventricle  The right ventricle is normal in structure, function and size.     Mitral Valve  The mitral valve is normal in structure and function.     Tricuspid Valve  No tricuspid regurgitation. Right ventricular systolic pressure could not be  approximated due to inadequate tricuspid regurgitation.     Aortic Valve  The aortic valve is normal in structure and function.     Vessels  Normal ascending, transverse (arch), and descending aorta. The inferior vena  cava was normal in size with preserved respiratory variability.     Pericardium  Trivial pericardial effusion.     Rhythm  Sinus rhythm was noted.     ______________________________________________________________________________  Doppler Measurements & Calculations  MV E max onel: 58.6 cm/sec  MV A max onel: 44.2 cm/sec  MV E/A: 1.3  MV dec slope: 219.0 cm/sec2  MV dec time: 0.27 sec  E/E' av.8     Lateral E/e': 4.9  Medial E/e': 6.6      ______________________________________________________________________________  Report approved by: Merlene Meza 12/19/2022 10:49 AM             Echo:  No results found for this or any previous visit (from the past 4320 hour(s)).

## 2022-12-19 NOTE — PLAN OF CARE
Goal Outcome Evaluation:    Goal Outcome Evaluation:    1. TTE completed? Met; unchanged since last one completed on 12/14/22    2. Cardiology consult completed? Not met    3. Pain controlled? Not met; restarted on IV dilaudid d/t minimal pain relief from PO meds alone    Pt A&Ox4. VSS on Rm Air. C/o intermittent sharp pain to L upper chest radiating to L lateral neck radiating down L arm. Pt states pain w/ deep breathing. PO pain medications not effective; restarted on IV pain medication. SBA w/ transfers. Regular diet; poor appetite. PO fluids encouraged. PIV infusing NS @ 75 mL/hr to LUE. Pt had PIV to RUE infusing w/ NS this a.m. TTE ordered at bedside, imaging staff infused IV contrast (Optison) per imaging tech, but tech reported unable to view on ECHO. Upon evaluation by writer, arm is edematous, no redness, pt denies pain. PIV removed, ice applied. Extravasation sheet filled out, placed on pt chart. Image of affected area under Media tab. Area to be assessed tomorrow morning 12/20/22 @ 9:30 am per protocol to assess @ 24hr romero & 48hr romero.

## 2022-12-19 NOTE — PROGRESS NOTES
Patient admitted early hours of this morning.  See excellent H&P by Dr. Grider.      History was reviewed.  Had recurrence of chest pain last night although less in intensity.    /80 (BP Location: Left arm)   Pulse 75   Temp 98  F (36.7  C) (Oral)   Resp 19   Ht 1.829 m (6')   Wt 100.7 kg (222 lb 0.1 oz)   LMP 11/14/2022 (Approximate)   SpO2 95%   BMI 30.11 kg/m    Gen- pleasant lying in bed  HEENT- NAD, JOHANNY  Neck- supple, no JVD elevation, no thyromegaly  CVS- I+II+ no m/r/g.  No pericardial or pleural rub appreciated  RS- CTAB.  Decreased at base  Abdo- soft, no tenderness . No g/r/r  Ext- trace edema   CNS- no gross focal deficit    BMPRecent Labs   Lab 12/19/22  0207 12/15/22  0514 12/14/22  1838 12/14/22  0548 12/13/22  1903 12/13/22  0640    140  --  141  --  145*   POTASSIUM 3.8 3.3* 3.4 3.2*   < > 2.9*   CHLORIDE 104 104  --  105  --  115*   KIMBERLYN 9.6 9.2  --  9.3  --  7.4*   CO2 24 27  --  26  --  22   BUN 8 8  --  8  --  7   CR 1.24* 1.13*  --  1.24*  --  1.01   GLC 98 86  --  82  --  79    < > = values in this interval not displayed.     CBC  Recent Labs   Lab 12/19/22  0110 12/15/22  0514 12/14/22  0548 12/13/22  0640   WBC 8.5 5.7 6.2 6.2   RBC 4.83 3.87 3.65* 3.50*   HGB 12.9 10.2* 9.8* 9.5*   HCT 41.1 33.7* 31.0* 30.9*   MCV 85 87 85 88   MCH 26.7 26.4* 26.8 27.1   MCHC 31.4* 30.3* 31.6 30.7*   RDW 17.6* 17.9* 18.3* 18.6*    282 283 254     INR  Recent Labs   Lab 12/19/22  0110   INR 1.09     LFTs  Recent Labs   Lab 12/19/22  0207 12/13/22  0640   ALKPHOS 142 89   AST 25 10   ALT 25 20   BILITOTAL 0.5 0.4   PROTTOTAL 7.2 4.8*   ALBUMIN 2.9* 1.7*      PANC  Recent Labs   Lab 12/19/22  0207   LIPASE 196     A/P:  -As documented in H&P  -Await repeat echocardiogram and cardiology review    - GI review. Reluctant for NSAID's considering her surgical history and JEFFRY     Pradeep Milner MD, FACP   Internal Medicine/ Hospitalist (Pager- [phone])

## 2022-12-19 NOTE — PROVIDER NOTIFICATION
"MD Notification    Notified Person: MD    Notified Person Name: Leatha Milner     Notification Date/Time: 12/19/2020    Notification Interaction: Vocera Paging     Purpose of Notification: \"Minimal relief w/ IV dilaudid. Also patient had IV contrast through PIV in R arm today and the contrast infiltrated into her R arm. Vascular consult placed d/t difficult stick. Vascular access team placed PIV to L arm; working for one dose of IV dilaudid and then that also went bad. She needs IV pain meds. Vascular access came up & looked for another site for PIV and could not find one. They recommended patient get a midline.\"     Orders Received: Awaiting    Comments:      "

## 2022-12-19 NOTE — PLAN OF CARE
Goal Outcome Evaluation:    1. TTE completed? Met; unchanged since last one completed on 12/14/22    2. Cardiology consult completed? Not met    3. Pain controlled? Not met; restarted on IV dilaudid d/t minimal pain relief from PO meds alone

## 2022-12-19 NOTE — PROGRESS NOTES
PRIMARY DIAGNOSIS: CHEST PAIN  OUTPATIENT/OBSERVATION GOALS TO BE MET BEFORE DISCHARGE:  1. Ruled out acute coronary syndrome (negative or stable Troponin):  Yes , troponin 6, US Echo findings: small pericardial effusion.  2. Pain Status: Improved-controlled with oral pain medications. Has PRN Oxycodone and Tylenol given at 0530, patient able to sleep.  3. Appropriate provocative testing performed: Yes, patient scheduled for echocardiogram complete today  - Stress Test Procedure: NA  - Interpretation of cardiac rhythm per telemetry tech: NA    4. Cleared by Consultants (if applicable):No  5. Return to near baseline physical activity: No  Discharge Planner Nurse   Safe discharge environment identified: No  Barriers to discharge: No       Entered by: Alice Jacobsen RN 12/19/2022 5:59 AM     Please review provider order for any additional goals.   Nurse to notify provider when observation goals have been met and patient is ready for discharge.

## 2022-12-19 NOTE — PHARMACY-ADMISSION MEDICATION HISTORY
Pharmacy Medication History  Admission medication history interview status for the 12/19/2022  admission is complete. See EPIC admission navigator for prior to admission medications     Location of Interview: Patient room  Medication history sources: Patient, Surescripts, Care Everywhere and discharge med list from 12/15/22    Significant changes made to the medication list:  none    In the past week, patient estimated taking medication this percent of the time: greater than 90%    Additional medication history information:   Patient stopped taking sucralfate in the last few days because her suspension was changed to tablets that were being dissolved (for insurance reasons) and it was becoming difficult for the patient to take around her meal schedule.    Medication reconciliation completed by provider prior to medication history? Yes    Time spent in this activity: 15 mins    Prior to Admission medications    Medication Sig Last Dose Taking? Auth Provider Long Term End Date   acetaminophen (TYLENOL) 500 MG tablet Take 1,000 mg by mouth every 6 hours as needed for mild pain  at PRN Yes Unknown, Entered By History No    buPROPion (WELLBUTRIN XL) 150 MG 24 hr tablet Take 150 mg by mouth every morning Switch to Bupropion 75mg immediate release twice daily when ordered 12/18/2022 at AM Yes Unknown, Entered By History     calcium carbonate (TUMS) 500 MG chewable tablet Take 1 chew tab by mouth 3 times daily 12/18/2022 at x2 doses Yes Unknown, Entered By History     colchicine (COLCYRS) 0.6 MG tablet Take 1 tablet (0.6 mg) by mouth 2 times daily for 12 days 12/18/2022 at AM Yes Nabor Feng MD  12/27/22   Cyanocobalamin (B-12) 500 MCG SUBL Place 1 tablet under the tongue daily 12/18/2022 at AM Yes Rosanna Phipps NP No    hydrOXYzine (ATARAX) 25 MG tablet Take 1 tablet (25 mg) by mouth 3 times daily as needed for itching  at PRN Yes Daniel Aragon MD     hyoscyamine SL (LEVSIN/SL) 0.125 MG sublingual tablet Take 1  tablet (0.125 mg) by mouth 4 times daily (before meals and nightly) 12/18/2022 at x3 doses Yes Nabor Feng MD     Multiple Vitamins-Iron (MULTIVITAMIN/IRON PO) Take 1 tablet by mouth daily 12/18/2022 at AM Yes Unknown, Entered By History     ondansetron (ZOFRAN ODT) 4 MG ODT tab Take 1 tablet (4 mg) by mouth every 6 hours as needed for nausea or vomiting  at PRN Yes Nabor Feng MD     oxyCODONE (ROXICODONE) 5 MG tablet Take 1 tablet (5 mg) by mouth every 6 hours as needed for moderate to severe pain  at PRN Yes Rosanna Phipps NP     pantoprazole (PROTONIX) 40 MG EC tablet Take 1 tablet (40 mg) by mouth 2 times daily (before meals) 12/18/2022 at AM Yes Nabor Feng MD     potassium chloride ER (KLOR-CON M) 20 MEQ CR tablet Take 1 tablet (20 mEq) by mouth 2 times daily for 7 days 12/18/2022 at AM Yes Nabor Feng MD  12/22/22   senna-docusate (SENOKOT-S/PERICOLACE) 8.6-50 MG tablet Take 2 tablets by mouth daily as needed for constipation (While taking narcotic pain medications.  Stop taking if having loose stools.)  at PRN Yes Daniel Aragon MD     sucralfate (CARAFATE) 1 GM/10ML suspension Take 10 mLs (1 g) by mouth 4 times daily for 10 days Past Week Yes Nabor Feng MD  12/25/22   thiamine (B-1) 100 MG tablet Take 1 tablet (100 mg) by mouth daily 12/18/2022 at AM Yes Rosanna Phipps NP     traMADol (ULTRAM) 50 MG tablet Take 1 tablet (50 mg) by mouth every 6 hours as needed for severe pain (7-10)  at PRN Yes Daniel Aragon MD         The information provided in this note is only as accurate as the sources available at the time of update(s)     Viviane Masterson PharmD

## 2022-12-19 NOTE — H&P
Admitted: 12/19/2022    PRIMARY CARE PHYSICIAN:  None currently.    CODE STATUS:  Full code.    CHIEF COMPLAINT:  Chest pain.    HISTORY OF PRESENT ILLNESS:  Ms. Yanez is a 42-year-old female with a past medical history significant for recently diagnosed pericarditis as well as status post laparoscopic sleeve gastrectomy in October, who presents to the Emergency Department with the above concerns.  History is obtained through discussion with the ED physician as well as the patient.  The patient was admitted to [facility] from 12/11/2022 to 12/15/2022.  Noted to have a pericardial effusion requiring emergent pericardiocentesis with drain placement on 12/01/2022.  She was subsequently started on colchicine, which she has been taking diligently.  She also was noted to have bilateral pleural effusion, status post thoracentesis on 12/12/2022.  She was discharged to home on 12/15/2022 and states that her chest pain had completely resolved and she was actually feeling quite comfortable.  She was very encouraged by all this and did well for a couple of days, but then last evening Sunday, 12/18/2022, she started having some chest discomfort again, this time, focused more on the center and left where as it was diffuse before, but felt exactly like the symptoms that she had back on 12/11/2022.  The pain is worse with taking a deep breath and goes up into her shoulder.  She has not had any fevers or chills.  She does cough periodically, but this is more because she has a sensation of something stuck in her throat when she tries to swallow.  She does not feel particularly short of breath.  No other medication changes since discharge.    The patient did share with me that her mother has Alzheimer's dementia and is in a facility and it sounds like had a turn for the worse and was very near death a couple of days ago.  She did go to visit her mother and so was potentially exposed to COVID at that point as there  is an outbreak in her facility, but she is not having any specific symptoms of COVID at this point and wore an N95 mask.    The patient also is status post laparoscopic sleeve gastrectomy on 10/25/2022.  She did require esophageal stenting on 12/01/2022, but did not tolerate that well, so had esophageal stent removal and balloon dilation on 12/02/2022.  She has had some nausea and emesis.  She denies any diarrhea.  She feels fairly dehydrated at this point.    PAST MEDICAL HISTORY:     1.  Status post laparoscopic sleeve gastrectomy 10/25/2022.  2.  Gastroesophageal reflux disease with Griggs's esophagus.  3.  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  4.  Raynaud's.  5.  Depression.  6.  Pericardial effusion, status post emergent pericardiocentesis 12/11/2022.    MEDICATION:    Medications Prior to Admission   Medication Sig Dispense Refill Last Dose     acetaminophen (TYLENOL) 500 MG tablet Take 1,000 mg by mouth every 6 hours as needed for mild pain    at PRN     buPROPion (WELLBUTRIN XL) 150 MG 24 hr tablet Take 150 mg by mouth every morning Switch to Bupropion 75mg immediate release twice daily when ordered   12/18/2022 at AM     calcium carbonate (TUMS) 500 MG chewable tablet Take 1 chew tab by mouth 3 times daily   12/18/2022 at x2 doses     colchicine (COLCYRS) 0.6 MG tablet Take 1 tablet (0.6 mg) by mouth 2 times daily for 12 days 24 tablet 0 12/18/2022 at AM     Cyanocobalamin (B-12) 500 MCG SUBL Place 1 tablet under the tongue daily 30 tablet 11 12/18/2022 at AM     hydrOXYzine (ATARAX) 25 MG tablet Take 1 tablet (25 mg) by mouth 3 times daily as needed for itching 10 tablet 0  at PRN     hyoscyamine SL (LEVSIN/SL) 0.125 MG sublingual tablet Take 1 tablet (0.125 mg) by mouth 4 times daily (before meals and nightly) 50 tablet 1 12/18/2022 at x3 doses     Multiple Vitamins-Iron (MULTIVITAMIN/IRON PO) Take 1 tablet by mouth daily   12/18/2022 at AM     ondansetron (ZOFRAN ODT) 4 MG ODT tab Take  1 tablet (4 mg) by mouth every 6 hours as needed for nausea or vomiting 25 tablet 0  at PRN     oxyCODONE (ROXICODONE) 5 MG tablet Take 1 tablet (5 mg) by mouth every 6 hours as needed for moderate to severe pain 12 tablet 0  at PRN     pantoprazole (PROTONIX) 40 MG EC tablet Take 1 tablet (40 mg) by mouth 2 times daily (before meals) 60 tablet 0 12/18/2022 at AM     potassium chloride ER (KLOR-CON M) 20 MEQ CR tablet Take 1 tablet (20 mEq) by mouth 2 times daily for 7 days 14 tablet 0 12/18/2022 at AM     senna-docusate (SENOKOT-S/PERICOLACE) 8.6-50 MG tablet Take 2 tablets by mouth daily as needed for constipation (While taking narcotic pain medications.  Stop taking if having loose stools.) 30 tablet 1  at PRN     sucralfate (CARAFATE) 1 GM/10ML suspension Take 10 mLs (1 g) by mouth 4 times daily for 10 days 414 mL 0 Past Week     thiamine (B-1) 100 MG tablet Take 1 tablet (100 mg) by mouth daily 30 tablet 1 12/18/2022 at AM     traMADol (ULTRAM) 50 MG tablet Take 1 tablet (50 mg) by mouth every 6 hours as needed for severe pain (7-10) 50 tablet 0  at PRN     CALCIUM CITRATE-VITAMIN D3 PO             SOCIAL HISTORY:  The patient denies any use of tobacco or alcohol.    FAMILY HISTORY:  Father had a myocardial infarction.    ALLERGIES:  ERYTHROMYCIN AND AZITHROMYCIN.    REVIEW OF SYSTEMS:  A complete review of systems reviewed and negative, save for the pertinent positives which are recorded in the HPI.    PHYSICAL EXAMINATION:    VITAL SIGNS:  Show blood pressure of 126/88, heart rate 77, respirations 17, saturating 92% on room air, temperature 96.2 degrees Fahrenheit.  GENERAL:  The patient is lying in bed and appears fairly comfortable.  HEENT:  Pupils equal, round, reactive to light, extraocular muscle function intact.  No scleral icterus.  Oropharynx is clear.  NECK:  No lymphadenopathy or thyromegaly.  CARDIOVASCULAR:  Heart is regular rate and rhythm without any appreciable murmur, rub or  gallop.  PULMONARY:  Lungs are clear to auscultation bilaterally without wheeze or rhonchi.  GASTROINTESTINAL:  Positive bowel sounds, soft, nontender and nondistended.  SKIN:  No new rashes or lesions.  LYMPHATICS:  No peripheral edema.  PSYCHIATRIC:  Alert and oriented x 3, normal affect.  NEUROLOGIC:  Cranial nerves II through XII are grossly intact.  No focal deficits.    LABORATORY DATA:  CBC, BMP, LFTs unremarkable save for a creatinine of 1.24.  CRP 65.9, slightly improved from 69.7 on 12/15/2022.  Lipase 196.  Troponin  6.      Chest x-ray shows persistent airspace infiltrates and small left base effusion.    EKG shows sinus tachycardia with some diffuse ST-T wave flattening with some inversions; however, does not appear significantly changed from prior EKG on 12/01/2022.    IMPRESSION AND PLAN:  Ms. Yanez is a 42-year-old female with a past medical history significant for laparoscopic sleeve gastrectomy 10/25/2022, GERD with Griggs's esophagus and recent diagnosis of pericarditis with pericardial effusion, who returns to the Emergency Departments with recurrent symptoms of her pericarditis.  1.  Pericarditis with pericardial effusion:  She is status post emergent pericardiocentesis with drain placement on 12/11/2022 has been maintained on colchicine.  Her symptoms do seem to have recurred and she feels exactly the same as her prior.  We will obtain formal ultrasound noting that the bedside ultrasound in the ED showed just minimal pericardial effusion.  We will have cardiology to assess her as well.  We will check a COVID screen given potential exposure, though I feel this is less likely.  2.  Recent bilateral pleural effusions:  She has small residual at the left base.  This does not seem to be causing significant symptoms.  3.  Depression:  We will continue with Wellbutrin.  4.  Gastroesophageal reflux disease, Griggs's esophagus:  Continue with Protonix.  5.  Status post laparoscopic sleeve  gastrectomy on 10/25/2022:  Follow up with her surgeon and PCP as indicated.  6.  Disposition:  The patient was brought under observation status for repeat echocardiogram and Cardiology consult, but expect a short hospital stay.    Noe Grider DO        D: 2022   T: 2022   MT: MARTINEZ    Name:     ASIYA PRECIADO  MRN:      [id number]        Account:     [id number]   :      1980           Admitted:    2022       Document: [id number]

## 2022-12-19 NOTE — PLAN OF CARE
Goal Outcome Evaluation:  PRIMARY DIAGNOSIS: CHEST PAIN  OUTPATIENT/OBSERVATION GOALS TO BE MET BEFORE DISCHARGE:  1. Ruled out acute coronary syndrome (negative or stable Troponin):  Yes  2. Pain Status: No improvement noted. Consider adjustment in pain regimen.  3. Appropriate provocative testing performed: N/A  - Stress Test Procedure: Echo  - Interpretation of cardiac rhythm per telemetry tech: Sinus Tach    4. Cleared by Consultants (if applicable):No  5. Return to near baseline physical activity: No  Discharge Planner Nurse   Safe discharge environment identified: No  Barriers to discharge: Yes       Entered by: Azam Norwood RN 12/19/2022 5:28 PM     Please review provider order for any additional goals.   Nurse to notify provider when observation goals have been met and patient is ready for discharge.

## 2022-12-19 NOTE — PROGRESS NOTES
RECEIVING UNIT ED HANDOFF REVIEW    ED Nurse Handoff Report was reviewed by: Alice Jacobsen RN on December 19, 2022 at 4:03 AM

## 2022-12-19 NOTE — ED PROVIDER NOTES
History   Chief Complaint:  Chest Pain    The history is provided by the patient and the spouse.      Leah Yanez is a 42 year old female with history of pulmonary embolism, pericardial effusion, pleural effusion, and acute kidney injury who presents with chest pain. The patient reports onset of chest pain around 2000 yesterday that is similar to the pain she experienced when she was diagnosed with a pericardial effusion last weekend. Notes that the pain is mostly on her right side and is radiating to her shoulder. Adds that she has been vomiting today. Pain is worse when she is lying flat and better when upright.  Of note, she was prescribed colchicine after her diagnosis and discharge last week and has been taking it as prescribed.     Review of Systems   Constitutional: Negative for fever.   Respiratory: Negative for cough and shortness of breath.    Cardiovascular: Positive for chest pain.   Gastrointestinal: Positive for vomiting. Negative for abdominal pain.   Musculoskeletal: Positive for arthralgias.   All other systems reviewed and are negative.    Allergies:  Azithromycin  Erythromycin    Medications:  Wellbutrin XL   Lovenox  Hydroxyzine  Hyoscyamine   Robaxin  Omeprazole  Zofran ODT  Oxycodone  Scopolamine  Senna-docusate  Thiamine  Tramadol    Lovenox     Past Medical History:     Esophageal dysphagia  Pulmonary embolism  Raynaud's phenomenon   GERD   Hiatal hernia  Griggs's esophagus  Hiatal hernia  Obesity  Pneumonia  Hypoxemia  Pericardial effusion  Pleural effusion  Diaphragmatic hernia  Acute kidney failure with tubular necrosis     Past Surgical History:    Pericardiocentesis   Laparoscopic herniorrhaphy hiatal  Laparoscopic gastric sleeve  Triple lumen placement, basilic vein  Tonsillectomy and adenoidectomy     Family History:    Mother- hypertension, arthritis  Father- MI, lipids    Social History:  Presents with spouse  Presents via private vehicle     Physical Exam     Patient Vitals  for the past 24 hrs:   BP Temp Temp src Pulse SpO2 Height Weight   12/19/22 0008 -- -- -- -- -- 1.829 m (6') 103.4 kg (228 lb)   12/19/22 0007 126/88 -- -- -- -- -- --   12/19/22 0006 -- (!) 96.2  F (35.7  C) Temporal 103 97 % -- --     Physical Exam  General: Appears uncomfortable  Head: No signs of trauma.   CV: Mild tachycardia and regular rhythm.    Resp: Effort normal and breath sounds normal. No respiratory distress.   GI: Soft. There is no tenderness.  No rebound or guarding.  Normal bowel sounds.  No CVA tenderness.  MSK: Normal range of motion. no edema. No Calf tenderness.  Neuro: The patient is alert and oriented. Speech normal.  Skin: Skin is warm and dry. No rash noted.   Psych: normal mood and affect. behavior is normal.       Emergency Department Course   ECG  ECG taken at 005, ECG read at 0010  Sinus tachycardia  Rightward axis  Nonspecific ST and T wave abnormaltiy  Rate 106 bpm. OH interval 116 ms. QRS duration 86 ms. QT/QTc 368/488 ms. P-R-T axes 12 101 25.     Imaging:  XR Chest 2 Views   Final Result   IMPRESSION: Heart size within normal limits. The right lung is now clear. There is persistent airspace infiltrate and a small effusion at the left lung base. No pneumothorax.      POC US ECHO LIMITED   Final Result   [facility] Procedure Note        Limited Bedside ED Cardiac Ultrasound:      PROCEDURE: PERFORMED BY: Dr. Jin Samuel MD   INDICATIONS/SYMPTOM:  Chest Pain   PROBE: Cardiac phased array probe   BODY LOCATION: Chest   FINDINGS:    The ultrasound was performed utilizing the parasternal long axis, parasternal short axis and apical 4 chamber views.   Cardiac contractility:  Present   Gross estimation of cardiac kinesis: normal   Pericardial Effusion:  Small   RV:LV ratio: LV > RV   INTERPRETATION:    Chamber size and motion were grossly normal with LV > RV, normal cardiac kinesis.  Pericardial effusion was found.     IMAGE DOCUMENTATION: Images were archived to PACs system.            Echocardiogram Complete    (Results Pending)     Report per myself and radiology    Laboratory:  Labs Ordered and Resulted from Time of ED Arrival to Time of ED Departure   COMPREHENSIVE METABOLIC PANEL - Abnormal       Result Value    Sodium 139      Potassium 3.8      Chloride 104      Carbon Dioxide (CO2) 24      Anion Gap 11      Urea Nitrogen 8      Creatinine 1.24 (*)     Calcium 9.6      Glucose 98      Alkaline Phosphatase 142      AST 25      ALT 25      Protein Total 7.2      Albumin 2.9 (*)     Bilirubin Total 0.5      GFR Estimate 55 (*)    ERYTHROCYTE SEDIMENTATION RATE AUTO - Abnormal    Erythrocyte Sedimentation Rate 43 (*)    CRP INFLAMMATION - Abnormal    CRP Inflammation 65.4 (*)    CBC WITH PLATELETS AND DIFFERENTIAL - Abnormal    WBC Count 8.5      RBC Count 4.83      Hemoglobin 12.9      Hematocrit 41.1      MCV 85      MCH 26.7      MCHC 31.4 (*)     RDW 17.6 (*)     Platelet Count 394      % Neutrophils 68      % Lymphocytes 20      % Monocytes 10      % Eosinophils 1      % Basophils 1      % Immature Granulocytes 0      NRBCs per 100 WBC 0      Absolute Neutrophils 5.8      Absolute Lymphocytes 1.7      Absolute Monocytes 0.8      Absolute Eosinophils 0.1      Absolute Basophils 0.0      Absolute Immature Granulocytes 0.0      Absolute NRBCs 0.0     LIPASE - Normal    Lipase 196     TROPONIN I - Normal    Troponin I High Sensitivity 6     INR - Normal    INR 1.09            Emergency Department Course:  Reviewed:  I reviewed nursing notes, vitals, past medical history and Care Everywhere    Assessments:  0016 I obtained history and examined the patient as noted above.     Interventions:  0122 Dilaudid, 0.5 mg, IV  0123 Ondansetron, 4 mg, IV    Disposition:  The patient was admitted to the hospital under the care of Dr. Grider.     Impression & Plan     Medical Decision Making:  Patient presents with chest pain.  She has had a complicated recent history and was just in the hospital  for pericardial and pleural effusions.  She states that this evening she had return of the pain that felt very much the same as when she had the pericardial effusion.  On my evaluation she was mildly tachycardic, but her vital signs are otherwise appropriate.  She did have increased pain when I lie her flat and improved with her sitting up.  I did a bedside ultrasound which did show small pericardial effusion, but it did not appear significantly large and there is no clear signs of tamponade at this time.  Blood work was obtained that did show some elevation of her inflammatory markers, but these seem to be stable from when she was last in the hospital.  Given her complicated recent history and return of symptoms, I did decide to admit the patient for continued monitoring and evaluation.      Diagnosis:    ICD-10-CM    1. Pericardial effusion  I31.39       2. Chest pain, unspecified type  R07.9         Scribe Disclosure:  Carla RICHARDSON, am serving as a scribe at 12:12 AM on 12/19/2022 to document services personally performed by Jin Samuel MD based on my observations and the provider's statements to me.      Jin Samuel MD  12/19/22 0618

## 2022-12-19 NOTE — ED TRIAGE NOTES
C/o of chest pain that started around 2000, History of pericardial effusion last weekend. Pt was discharged home 4 days ago, pt felt this is the same pain

## 2022-12-20 LAB
ANION GAP SERPL CALCULATED.3IONS-SCNC: 8 MMOL/L (ref 3–14)
BUN SERPL-MCNC: 11 MG/DL (ref 7–30)
CALCIUM SERPL-MCNC: 8.9 MG/DL (ref 8.5–10.1)
CHLORIDE BLD-SCNC: 106 MMOL/L (ref 94–109)
CO2 SERPL-SCNC: 23 MMOL/L (ref 20–32)
CREAT SERPL-MCNC: 1.17 MG/DL (ref 0.52–1.04)
CRP SERPL-MCNC: 168 MG/L (ref 0–8)
GFR SERPL CREATININE-BSD FRML MDRD: 59 ML/MIN/1.73M2
GLUCOSE BLD-MCNC: 107 MG/DL (ref 70–99)
POTASSIUM BLD-SCNC: 3.4 MMOL/L (ref 3.4–5.3)
SODIUM SERPL-SCNC: 137 MMOL/L (ref 133–144)

## 2022-12-20 PROCEDURE — 258N000003 HC RX IP 258 OP 636: Performed by: INTERNAL MEDICINE

## 2022-12-20 PROCEDURE — 99232 SBSQ HOSP IP/OBS MODERATE 35: CPT | Performed by: INTERNAL MEDICINE

## 2022-12-20 PROCEDURE — 82310 ASSAY OF CALCIUM: CPT | Performed by: INTERNAL MEDICINE

## 2022-12-20 PROCEDURE — 250N000013 HC RX MED GY IP 250 OP 250 PS 637: Performed by: INTERNAL MEDICINE

## 2022-12-20 PROCEDURE — 96376 TX/PRO/DX INJ SAME DRUG ADON: CPT

## 2022-12-20 PROCEDURE — 250N000011 HC RX IP 250 OP 636: Performed by: INTERNAL MEDICINE

## 2022-12-20 PROCEDURE — 86140 C-REACTIVE PROTEIN: CPT | Performed by: INTERNAL MEDICINE

## 2022-12-20 PROCEDURE — 250N000013 HC RX MED GY IP 250 OP 250 PS 637: Performed by: PHYSICIAN ASSISTANT

## 2022-12-20 PROCEDURE — 99222 1ST HOSP IP/OBS MODERATE 55: CPT | Mod: 24 | Performed by: SURGERY

## 2022-12-20 PROCEDURE — 99232 SBSQ HOSP IP/OBS MODERATE 35: CPT | Mod: 24 | Performed by: INTERNAL MEDICINE

## 2022-12-20 PROCEDURE — G0378 HOSPITAL OBSERVATION PER HR: HCPCS

## 2022-12-20 RX ORDER — LIDOCAINE 40 MG/G
CREAM TOPICAL
Status: CANCELLED | OUTPATIENT
Start: 2022-12-20

## 2022-12-20 RX ORDER — INDOMETHACIN 25 MG/1
25 CAPSULE ORAL
Qty: 90 CAPSULE | Refills: 3 | Status: SHIPPED | OUTPATIENT
Start: 2022-12-20 | End: 2022-12-23

## 2022-12-20 RX ORDER — METOCLOPRAMIDE 10 MG/1
10 TABLET ORAL
Qty: 56 TABLET | Refills: 0 | Status: SHIPPED | OUTPATIENT
Start: 2022-12-20 | End: 2023-03-10

## 2022-12-20 RX ORDER — INDOMETHACIN 25 MG/1
25 CAPSULE ORAL
Status: DISCONTINUED | OUTPATIENT
Start: 2022-12-20 | End: 2022-12-22

## 2022-12-20 RX ORDER — INDOMETHACIN 25 MG/1
25 CAPSULE ORAL
Qty: 90 CAPSULE | Refills: 0 | Status: SHIPPED | OUTPATIENT
Start: 2022-12-20 | End: 2022-12-20

## 2022-12-20 RX ADMIN — HYDROMORPHONE HYDROCHLORIDE 0.5 MG: 1 INJECTION, SOLUTION INTRAMUSCULAR; INTRAVENOUS; SUBCUTANEOUS at 05:14

## 2022-12-20 RX ADMIN — SUCRALFATE 1 G: 1 SUSPENSION ORAL at 15:41

## 2022-12-20 RX ADMIN — SUCRALFATE 1 G: 1 SUSPENSION ORAL at 08:23

## 2022-12-20 RX ADMIN — Medication 500 MCG: at 08:23

## 2022-12-20 RX ADMIN — PANTOPRAZOLE SODIUM 40 MG: 40 TABLET, DELAYED RELEASE ORAL at 07:13

## 2022-12-20 RX ADMIN — SODIUM CHLORIDE: 9 INJECTION, SOLUTION INTRAVENOUS at 08:26

## 2022-12-20 RX ADMIN — COLCHICINE 0.6 MG: 0.6 TABLET ORAL at 08:23

## 2022-12-20 RX ADMIN — BUPROPION HYDROCHLORIDE 150 MG: 150 TABLET, FILM COATED, EXTENDED RELEASE ORAL at 08:23

## 2022-12-20 RX ADMIN — HYOSCYAMINE SULFATE 0.12 MG: 0.12 TABLET ORAL at 10:35

## 2022-12-20 RX ADMIN — HYOSCYAMINE SULFATE 0.12 MG: 0.12 TABLET ORAL at 07:13

## 2022-12-20 RX ADMIN — COLCHICINE 0.6 MG: 0.6 TABLET ORAL at 19:37

## 2022-12-20 RX ADMIN — HYDROMORPHONE HYDROCHLORIDE 0.5 MG: 1 INJECTION, SOLUTION INTRAMUSCULAR; INTRAVENOUS; SUBCUTANEOUS at 22:36

## 2022-12-20 RX ADMIN — SUCRALFATE 1 G: 1 SUSPENSION ORAL at 11:35

## 2022-12-20 RX ADMIN — CALCIUM CARBONATE (ANTACID) CHEW TAB 500 MG 500 MG: 500 CHEW TAB at 14:55

## 2022-12-20 RX ADMIN — SUCRALFATE 1 G: 1 SUSPENSION ORAL at 19:37

## 2022-12-20 RX ADMIN — CALCIUM CARBONATE (ANTACID) CHEW TAB 500 MG 500 MG: 500 CHEW TAB at 08:23

## 2022-12-20 RX ADMIN — HYOSCYAMINE SULFATE 0.12 MG: 0.12 TABLET ORAL at 15:41

## 2022-12-20 RX ADMIN — SODIUM CHLORIDE: 9 INJECTION, SOLUTION INTRAVENOUS at 21:24

## 2022-12-20 RX ADMIN — METOCLOPRAMIDE 10 MG: 5 INJECTION, SOLUTION INTRAMUSCULAR; INTRAVENOUS at 10:35

## 2022-12-20 RX ADMIN — METOCLOPRAMIDE 10 MG: 5 INJECTION, SOLUTION INTRAMUSCULAR; INTRAVENOUS at 15:41

## 2022-12-20 RX ADMIN — PANTOPRAZOLE SODIUM 40 MG: 40 TABLET, DELAYED RELEASE ORAL at 15:41

## 2022-12-20 RX ADMIN — CALCIUM CARBONATE (ANTACID) CHEW TAB 500 MG 500 MG: 500 CHEW TAB at 19:37

## 2022-12-20 RX ADMIN — OXYCODONE HYDROCHLORIDE 5 MG: 5 TABLET ORAL at 08:25

## 2022-12-20 RX ADMIN — HYOSCYAMINE SULFATE 0.12 MG: 0.12 TABLET ORAL at 21:23

## 2022-12-20 RX ADMIN — HYDROMORPHONE HYDROCHLORIDE 0.5 MG: 1 INJECTION, SOLUTION INTRAMUSCULAR; INTRAVENOUS; SUBCUTANEOUS at 01:31

## 2022-12-20 RX ADMIN — HYDROMORPHONE HYDROCHLORIDE 0.5 MG: 1 INJECTION, SOLUTION INTRAMUSCULAR; INTRAVENOUS; SUBCUTANEOUS at 14:59

## 2022-12-20 RX ADMIN — INDOMETHACIN 25 MG: 25 CAPSULE ORAL at 19:37

## 2022-12-20 RX ADMIN — THIAMINE HCL TAB 100 MG 100 MG: 100 TAB at 08:23

## 2022-12-20 RX ADMIN — METOCLOPRAMIDE 10 MG: 5 INJECTION, SOLUTION INTRAMUSCULAR; INTRAVENOUS at 21:23

## 2022-12-20 RX ADMIN — INDOMETHACIN 25 MG: 25 CAPSULE ORAL at 14:55

## 2022-12-20 RX ADMIN — METOCLOPRAMIDE 10 MG: 5 INJECTION, SOLUTION INTRAMUSCULAR; INTRAVENOUS at 05:09

## 2022-12-20 ASSESSMENT — ACTIVITIES OF DAILY LIVING (ADL)
ADLS_ACUITY_SCORE: 34

## 2022-12-20 NOTE — H&P (VIEW-ONLY)
[facility] General Surgery Consultation    Leah Yanez MRN# [id number]   YOB: 1980 Age: 42 year old      Date of Admission:  12/19/2022  Date of Consult: 12/20/2022         Assessment and Plan:   Patient is a 42 year old female with a complicated medical history including recent laparoscopic sleeve gastrectomy with hiatal hernia repair with Dr. Aragon at the [facility] on 10/25/2022 and then return to OR due to hematoma  On 11/4/2022 with persistent post operative issues with poor PO tolerance. She has had many endoscopies with narrowing noted and had dilation as well as trial of stent placement with Dr. Aragon and then ultimately was admitted due to chest pain and was found to have a large pericardial effusion with tamonade physiology and this was treated and she was discharged. She returned due to ongoing chest pain and SOB and has pericarditis and is being followed/treated by cardiology. Surgery was consulted to discuss possible gastrostomy vs jejunostomy due to ongoing poor PO intake. At this point, I would not recommend a surgical feeding tube and discussed reasons why with Leah. She is high risk for complications with further surgical procedures and ideally we would try a nasojejunal tube or with further endoscopic intervention/zofran/medical management, she will be able to tolerate adequate PO intake. If these measures all fail, could consider laparoscopic jejunostomy tube placement. Pt would like to transition her bariatric care to the team here at [facility] and we will place follow up instructions in discharge orders for her to call to do this.     PLAN:  Discussed with patient and RAIMUNDO Borrero with GI at bedside - plan for EDG tomorrow  Trial of zofran  Consider nasojejunal tube initially (though pt reports she tried this previously and did not tolerate well)  Will work on establishing care with our Bariatric team         Requesting Physician:      Dr. Sommer,  Dr. Milner        Chief Complaint:     Chief Complaint   Patient presents with     Chest Pain          History of Present Illness:   Leah Yanez is a 42 year old female who was seen in consultation at the request of Dr. Sommer who presented with complicated medical history including recent laparoscopic sleeve gastrectomy with hiatal hernia repair with Dr. Aragon at the [facility] on 10/25/2022 and then return to OR due to hematoma  On 11/4/2022 with persistent post operative issues with poor PO tolerance. She has had many endoscopies with narrowing noted and had dilation as well as trial of stent placement with Dr. Aragon and then ultimately was admitted due to chest pain and was found to have a large pericardial effusion with tamonade physiology and this was treated and she was discharged. She returned due to ongoing chest pain and SOB and has pericarditis and is being followed/treated by cardiology. She reports she has not been able to tolerate PO since her surgery. She has nausea/emesis with most PO intake; although this has improved some over the past few days and she has been tolerating more PO. She reports after her hematoma washout she did have a nasojejunal feeding tube and she did not tolerate it well and she couldn't each and felt she was gagging on the tube and that she does not think she would want to try that again.           Physical Exam:   Blood pressure 124/87, pulse 89, temperature 98.8  F (37.1  C), temperature source Oral, resp. rate 18, height 1.829 m (6'), weight 100.7 kg (222 lb 0.1 oz), last menstrual period 11/14/2022, SpO2 94 %, not currently breastfeeding.  222 lbs .05 oz  General: sitting up in bed, appears comfortable  Psych: Alert and Oriented.  Normal affect  Neurological: grossly intact  Eyes: Sclera clear  Respiratory:  nonlabored breathing  Cardiovascular:  Regular Rate and Rhythm   GI: soft, nt, nd   Lymphatic/Hematologic/Immune:  No femoral or cervical  lymphadenopathy.  Integumentary:  No rashes         Past Medical History:     Past Medical History:   Diagnosis Date     Anti-cardiolipin antibody positive      Griggs esophagus      Concussion 2016     Depressive disorder, not elsewhere classified 03/01/2006    Resolved 8/07     Gastroesophageal reflux disease with esophagitis      Hiatal hernia      History of pulmonary embolism      Obesity      Raynaud's syndrome     past history of admission for gangrene of one finger            Past Surgical History:     Past Surgical History:   Procedure Laterality Date     COMBINED ESOPHAGOSCOPY, GASTROSCOPY, DUODENOSCOPY (EGD), REMOVE ESOPHAGEAL STENT N/A 12/2/2022    Procedure: ESOPHAGOGASTRODUODENOSCOPY, WITH ESOPHAGEAL STENT REMOVAL and Balloon Dialation;  Surgeon: Daniel Aragon MD;  Location: [facility] OR     CV PERICARDIOCENTESIS N/A 12/11/2022    Procedure: Pericardiocentesis;  Surgeon: Sourav Sahu MD;  Location: [facility] CARDIAC CATH LAB     ESOPHAGOSCOPY, GASTROSCOPY, DUODENOSCOPY (EGD), COMBINED N/A 12/29/2021    Procedure: ESOPHAGOGASTRODUODENOSCOPY, WITH BIOPSY;  Surgeon: Esteban Rose DO;  Location: [facility] OR     ESOPHAGOSCOPY, GASTROSCOPY, DUODENOSCOPY (EGD), COMBINED N/A 11/14/2022    Procedure: Upper endoscopy; gastric stricture dilation, interpretation of fluoroscopy nasojejunal feeding tube;  Surgeon: Daniel Aragon MD;  Location: [facility] OR     ESOPHAGOSCOPY, GASTROSCOPY, DUODENOSCOPY (EGD), COMBINED N/A 11/23/2022    Procedure: ESOPHAGOGASTRODUODENOSCOPY (EGD);  Surgeon: Daniel Aragon MD;  Location: [facility] GI     LAPAROSCOPIC GASTRIC SLEEVE N/A 10/25/2022    Procedure: GASTRECTOMY, SLEEVE, LAPAROSCOPIC;  Surgeon: Daniel Aragon MD;  Location: [facility] OR     LAPAROSCOPIC HERNIORRHAPHY HIATAL N/A 10/25/2022    Procedure: HERNIORRHAPHY, HIATAL, LAPAROSCOPIC;  Surgeon: Daniel Aragon MD;  Location: [facility] OR     LAPAROSCOPY DIAGNOSTIC (GENERAL) N/A 11/4/2022    Procedure: LAPAROSCOPY,  DIAGNOSTIC, BY GENERAL SURGERY, evacuation of abdominl hematoma;  Surgeon: Daniel Aragon MD;  Location: [facility] OR     PICC TRIPLE LUMEN PLACEMENT Left 11/06/2022    51cm (1cm external), Basilic vein     TONSILLECTOMY & ADENOIDECTOMY       [facility] NONSPECIFIC PROCEDURE      T&A as a child     [facility] NONSPECIFIC PROCEDURE      Tubes in ears bilaterally            Current Medications:           buPROPion  150 mg Oral QAM     calcium carbonate  500 mg Oral TID     colchicine  0.6 mg Oral BID     cyanocobalamin  500 mcg Sublingual Daily     hyoscyamine  0.125 mg Oral 4x Daily AC & HS     indomethacin  25 mg Oral TID w/meals     metoclopramide  10 mg Intravenous Q6H     pantoprazole  40 mg Oral BID AC     sodium chloride (PF)  10 mL Intracatheter Q8H     sodium chloride (PF)  10 mL Intracatheter Q8H     sucralfate  1 g Oral 4x Daily     thiamine  100 mg Oral Daily       [DISCONTINUED] acetaminophen **OR** acetaminophen, acetaminophen, bisacodyl, HYDROmorphone, hydrOXYzine, lidocaine 4%, lidocaine (buffered or not buffered), melatonin, naloxone **OR** naloxone **OR** naloxone **OR** naloxone, ondansetron **OR** ondansetron, oxyCODONE, prochlorperazine **OR** prochlorperazine **OR** prochlorperazine, senna-docusate **OR** senna-docusate, sodium chloride (PF), sodium chloride (PF)         Home Medications:     Prior to Admission medications    Medication Sig Last Dose Taking? Auth Provider Long Term End Date   acetaminophen (TYLENOL) 500 MG tablet Take 1,000 mg by mouth every 6 hours as needed for mild pain  at PRN Yes Unknown, Entered By History No    buPROPion (WELLBUTRIN XL) 150 MG 24 hr tablet Take 150 mg by mouth every morning Switch to Bupropion 75mg immediate release twice daily when ordered 12/18/2022 at AM Yes Unknown, Entered By History     calcium carbonate (TUMS) 500 MG chewable tablet Take 1 chew tab by mouth 3 times daily 12/18/2022 at x2 doses Yes Unknown, Entered By History     colchicine (COLCYRS) 0.6 MG tablet  Take 1 tablet (0.6 mg) by mouth 2 times daily for 12 days 12/18/2022 at AM Yes Nabor Feng MD  12/27/22   Cyanocobalamin (B-12) 500 MCG SUBL Place 1 tablet under the tongue daily 12/18/2022 at AM Yes Rosanna Phipps NP No    hydrOXYzine (ATARAX) 25 MG tablet Take 1 tablet (25 mg) by mouth 3 times daily as needed for itching  at PRN Yes Daniel Aragon MD     hyoscyamine SL (LEVSIN/SL) 0.125 MG sublingual tablet Take 1 tablet (0.125 mg) by mouth 4 times daily (before meals and nightly) 12/18/2022 at x3 doses Yes Nabor Feng MD     Multiple Vitamins-Iron (MULTIVITAMIN/IRON PO) Take 1 tablet by mouth daily 12/18/2022 at AM Yes Unknown, Entered By History     ondansetron (ZOFRAN ODT) 4 MG ODT tab Take 1 tablet (4 mg) by mouth every 6 hours as needed for nausea or vomiting  at PRN Yes Nabor Feng MD     oxyCODONE (ROXICODONE) 5 MG tablet Take 1 tablet (5 mg) by mouth every 6 hours as needed for moderate to severe pain  at PRN Yes Rosanna Phipps NP     pantoprazole (PROTONIX) 40 MG EC tablet Take 1 tablet (40 mg) by mouth 2 times daily (before meals) 12/18/2022 at AM Yes Nabor Feng MD     potassium chloride ER (KLOR-CON M) 20 MEQ CR tablet Take 1 tablet (20 mEq) by mouth 2 times daily for 7 days 12/18/2022 at AM Yes Nabor Feng MD  12/22/22   senna-docusate (SENOKOT-S/PERICOLACE) 8.6-50 MG tablet Take 2 tablets by mouth daily as needed for constipation (While taking narcotic pain medications.  Stop taking if having loose stools.)  at PRN Yes Daniel Aragon MD     sucralfate (CARAFATE) 1 GM/10ML suspension Take 10 mLs (1 g) by mouth 4 times daily for 10 days Past Week Yes Nabor Feng MD  12/25/22   thiamine (B-1) 100 MG tablet Take 1 tablet (100 mg) by mouth daily 12/18/2022 at AM Yes Rosanna Phipps NP     traMADol (ULTRAM) 50 MG tablet Take 1 tablet (50 mg) by mouth every 6 hours as needed for severe pain (7-10)  at PRN Yes Daniel Aragon MD     CALCIUM CITRATE-VITAMIN D3 PO    Reported, Patient No   "           Allergies:     Allergies   Allergen Reactions     Azithromycin      Erythromycin GI Disturbance     When \"very young\"            Family History:     Family History   Problem Relation Age of Onset     Hypertension Mother         arthritis     Heart Disease Father         MI, Lipids     Acute Myocardial Infarction Father      Cancer - colorectal Maternal Grandmother         arthritis     Hypertension Maternal Grandfather         arthritis, macular degeneration     Colon Cancer Paternal Grandmother      Alzheimer Disease Paternal Grandfather      Anesthesia Reaction No family hx of      Clotting Disorder No family hx of            Social History:   Leah Yanez  reports that she has never smoked. She has never used smokeless tobacco. She reports current alcohol use. She reports that she does not use drugs.          Review of Systems:   The 10 point Review of Systems is negative other than noted in the HPI.         Labs/Imaging   All new lab and imaging data was reviewed.   I have personally reviewed the imaging studies including her endoscopies and upper GI on 12/12/22.         Anu Hunter MD    "

## 2022-12-20 NOTE — PROGRESS NOTES
Goal Outcome Evaluation:  PRIMARY DIAGNOSIS: CHEST PAIN  OUTPATIENT/OBSERVATION GOALS TO BE MET BEFORE DISCHARGE:  1. Ruled out acute coronary syndrome (negative or stable Troponin):  Yes  2. Pain Status: No improvement noted. Consider adjustment in pain regimen.  3. Appropriate provocative testing performed: N/A  - Stress Test Procedure: Echo  - Interpretation of cardiac rhythm per telemetry tech: Sinus Tach     4. Cleared by Consultants (if applicable):No  5. Return to near baseline physical activity: No     Nurse to notify provider when observation goals have been met and pt is ready for discharge.

## 2022-12-20 NOTE — PROGRESS NOTES
Orientation/Cognitive: A&Ox4  Observation Goals (Met/ Not Met): Not Met  Mobility Level/Assist Equipment: SBA  Fall Risk (Y/N): Y  Behavior Concerns: None  Pain Management: Constant chest pain w/ partial relief w/ IV dilaudid  Tele/VS/O2: NSR VSS on RA  ABNL Lab/BG: Cr- 1.24, CRP 65.4  Diet: Reg  Bowel/Bladder: Cont. B&B  Skin Concerns: None  Drains/Devices: Midline Catheter placed 12/19- NS running @ 75ml/hr  Tests/Procedures for next shift: Possibly pending GI consult.  Anticipated DC date & active delays: Pending  Patient Stated Goal for Today: Manage pain

## 2022-12-20 NOTE — PLAN OF CARE
Goal Outcome Evaluation:       Cognitive Concerns/ Orientation : A&Ox4  BEHAVIOR-N/A   ABNL VS/O2: VSs on room air  MOBILITY: Up with stand by assist  PAIN MANAGMENT: IV Dilaudid   DIET: Reg  BOWEL/BLADDER: Con of B&B  ABNL LAB/BG: Cr- 1.24, CRP 65.4  DRAIN/DEVICES: Midline  SKIN: N/A  TESTS/PROCEDURES: N/A  D/C DATE: Pending  Observation Goals (Met/ Not Met): Not Met

## 2022-12-20 NOTE — CONSULTS
Consult Date: 12/19/2022    GI CONSULTATION    REASON FOR CONSULTATION:  Discuss utility of feeding tube and use of NSAIDS in the management of pericarditis.    HISTORY OF PRESENT ILLNESS:  Leah Yanez is a pleasant 42-year-old female with history of esophageal reflux, Griggs's esophagus, esophageal stricture, chronic dysphagia.  She is also post-laparoscopic sleeve gastrectomy and hiatal herniorrhaphy on 10/25/2022.  Reason for surgery was unsuccessful weight loss.    She has had a  number of EGDs at the [facility] for dysphagia prior to her surgery.  She was found to have long segment Griggs's  a mild esophageal stricture that was dilated to 20 mm and a 4 cm hiatal hernia.  It is not clear that dilation of her esophagus was helpful in controlling her dysphagia    Unfortunately, she has had difficulty maintaining her nutrition and eating properly since her surgery.  She has had postprandial abdominal pain, chest pain, nausea, vomiting.  She underwent an EGD on 11/23/2022.  Her GE junction was normal at that point.  A gastric stenosis was found at the incisura as expected and was dilated to 19 mm.  Unfortunately,dilation of the stricture did not improve her symptoms.  On 11/30/2022, she underwent another EGD.  At that time, she had a stent placement across her gastric stricture. She did not tolerate this very well as it caused a significant amount of pain.  Therefore, the next day she had a repeat EGD done by Dr. Aragon where the stent was removed.  At that point, the gastric stricture measured 17 mm in diameter.    Because of continued poor po intake she underwent an upper GI and esophagram on 12/12/2022.  There was some delay in passage through the gastric sleeve but no definitive fixed stricture. No comment on esophageal dysmotility.    She was readmitted to [facility] early this morning with difficulty with chest pain and shortness of breath.  This is thought to be due to recurrent pericarditis.  She was  seen by cardiology and Dr. Conroy wanted GI input on the safety of treating her with an NSAID versus steroids.    She is very frustrated at her lack of ability to maintain her nutrition.  She has lost about 50 pounds since her surgery.  She can only eat small amounts at a time.  She has alternating diarrhea and constipation.  She thinks that the diarrhea is due to colchicine.  She is interested in having a feeding tube placed.    PAST MEDICAL HISTORY:   1.  Morbid obesity.  2.  Long segment Griggs's.  3.  Acid reflux.  4.  Post sleeve gastrectomy and hiatal hernia repair.  5.  Chronic dysphagia.  6.  Depression.  7.  Raynaud's.  8.  History of pulmonary embolism..  9.  Post-emergent pericardiocentesis.    SOCIAL HISTORY:  The patient does not use any tobacco, alcohol, or illicit drugs.    FAMILY HISTORY:  Coronary artery disease in her father.    REVIEW OF SYSTEMS:  A 10-point review of systems was done.  Other than the HPI was positive for headaches.  She also reports coughing, coughing up phlegm.    PHYSICAL EXAMINATION:    VITAL SIGNS:  Temperature 98, pulse 75, respiratory rate 19, pulse ox 95%, weight 222 pounds, /80.  GENERAL:  The patient is awake, alert and oriented, in no acute distress.  HEENT:  Normocephalic, atraumatic.  Pupils equal, round, reactive to light.  Extraocular eye muscles are intact.  Sclerae are anicteric.  Oropharynx is clear.  Mucous membranes are moist.  NECK:  Supple without lymphadenopathy.  There is no thyromegaly.  LUNGS:  Clear to auscultation bilaterally.  HEART:  Regular rate and rhythm.  ABDOMEN:  Soft with mild right upper quadrant tenderness.  No rebound or guarding.  Bowel sounds are normal.  EXTREMITIES:  Warm without lower extremity edema.  There is no clubbing or cyanosis.  DERMATOLOGIC:  Reveals no obvious rashes.  NEUROLOGIC:  Cranial nerves II-XII grossly intact.    ASSESSMENT:   1.  This is a 42-year-old female with recurrent pericardial effusion due to  pericarditis. Her clinical course is complicated by significant feeding difficulties post sleeve gastrectomy and hiatal hernia repair.  She has had dilation of her gastric stricture and trial of stenting. Neither were beneficial. I suspect her post prandial symptoms are due to underlying dysmotility which will be difficult to treat.   Thus, it is unclear what could be done endoscopically to alleviate her symptoms.  She is interested in a feeding tube.  I suspect that she may not tolerate G-tube feedings and may respond better to a J-tube.  I think it is reasonable to consider a feeding tube until her feeding difficulties can be alleviated and her symptoms are under better control. Surgical reversal of her gastric sleeve could be considered as a last resort.     2. It would be permissible  from a GI standpoint to use NSAIDs for treatment of her pericarditis as she has no known history of peptic ulcer disease.    3.  Dysphagia.  This is persistent despite multiple dilations and esophagram was unremarkable.  Would recommend outpatient esophageal manometry.    PLAN:   1.  Diet as tolerated.  2.  A surgical consult to discuss G-tube versus J-tube placement and also to give any input regarding her symptoms.  3.  Could consider a short interval trial of Reglan as a promotility agent. Could consider Zelnorm for long term use but is not on formulary at [facility].  4. Continue PPI and Carafate  5. Recommend outpatient follow with Dr. Rose at [facility] who specializes in esophageal motility disorders.   6. Consider EGD this hospital stay for further deliniation of upper GI anatomy.     Total time 45 min.    Leticia Sommer MD        D: 2022   T: 2022   MT: AYDEE    Name:     ASIYA PRECIADO  MRN:      [id number]        Account:      [id number]   :      1980           Consult Date: 2022     Document: [id number]

## 2022-12-20 NOTE — PLAN OF CARE
Goal Outcome Evaluation:    PRIMARY DIAGNOSIS: CHEST PAIN  OUTPATIENT/OBSERVATION GOALS TO BE MET BEFORE DISCHARGE:  1. Ruled out acute coronary syndrome (negative or stable Troponin):  Yes  2. Pain Status: Improved but still requiring IV narcotics.  3. Appropriate provocative testing performed: Yes  - Stress Test Procedure: Echo  - Interpretation of cardiac rhythm per telemetry tech: NSR    4. Cleared by Consultants (if applicable):No  5. Return to near baseline physical activity: Yes  Discharge Planner Nurse   Safe discharge environment identified: Yes  Barriers to discharge: Yes       Entered by: Azam Norwood RN 12/20/2022 5:06 PM     Please review provider order for any additional goals.   Nurse to notify provider when observation goals have been met and patient is ready for discharge.

## 2022-12-20 NOTE — DISCHARGE SUMMARY
[facility]  Hospitalist Discharge Summary      Date of Admission:  12/19/2022  Date of Discharge:  12/20/2022  Discharging Provider: Pradeep Milner MD, MD  Discharge Service: Hospitalist Service    Discharge Diagnoses     # Pericarditis with pericardial effusion:     # Recent bilateral pleural effusions:    # Depression:    # Gastroesophageal reflux disease, Griggs's esophagus:    #Status post laparoscopic sleeve gastrectomy on 10/25/2022:    # JEFFRY    Follow-ups Needed After Discharge   Follow-up Appointments     Follow-up and recommended labs and tests       If you would like to establish care with our bariatric team in [place],   call our office at [phone] to schedule an appointment. Dr. Anu Hunter (general surgeon you saw during your hospital stay) recommends   that you see establish care under Dr. Arun Lo. He is one of our   bariatric surgeons. We have 2 physician assistants who work in our Weight   Loss Clinic and you may see one of them initially. Our clinic's name is   [facility]. The address is [address] [place] [address], [address], [place], [address]         Follow-up and recommended labs and tests       Follow up with primary care provider, [facility], within 7 days for hospital follow- up.  The following   labs/tests are recommended: BMP.  She is started on indomethacin please   follow-up creatinine closely.      Follow up with cardiology team within 1-2 weeks    Follow up with [facility] as recommended             Unresulted Labs Ordered in the Past 30 Days of this Admission     No orders found for last 31 day(s).        Discharge Disposition   Discharged to home  Condition at discharge: Stable      Hospital Course   Ms. Yanez is a 42-year-old female with a past medical history significant for laparoscopic sleeve gastrectomy 10/25/2022, GERD with Griggs's esophagus and recent diagnosis of pericarditis with pericardial effusion,  who returns to the Emergency Departments with recurrent symptoms of her pericarditis.     # Pericarditis with pericardial effusion:  She is status post emergent pericardiocentesis with drain placement on 12/11/2022 has been maintained on colchicine.  -Appreciate cardiology following patient.  She showed significant improvement with trial of IV ketorolac yesterday.  Discussed with cardiology and started on indomethacin today.  Will need to monitor kidney function closely.  Initially she was plan to be monitored overnight and have EGD on 12/21 but due to the death of her mom she wanted to go home. She was discharged to closely follow-up with PMD, GI and cardiology as outpatient     # Recent bilateral pleural effusions:  She has small residual at the left base.  This does not seem to be causing significant symptoms.  # Depression:  continue with Wellbutrin.  # Gastroesophageal reflux disease, Griggs's esophagus:  Continue with Protonix and Carafate  #Status post laparoscopic sleeve gastrectomy on 10/25/2022:    -She has been having significant issues with ongoing nutrition and keeping food and fluids down.  Appreciate [facility] review.  Patient started on a short trial of Reglan as a promotility agent.  -Plan for EGD as outpatient and f/u with [facility] and general surgery as outpatient  # JEFFRY-continued on IV fluids as inpatient  Consultations This Hospital Stay   CARDIOLOGY IP CONSULT  VASCULAR ACCESS ADULT IP CONSULT  GASTROENTEROLOGY IP CONSULT  VASCULAR ACCESS ADULT IP CONSULT  SURGERY GENERAL IP CONSULT  VASCULAR ACCESS ADULT IP CONSULT  SURGERY GENERAL IP CONSULT    Code Status   Full Code    Time Spent on this Encounter   I, Pradeep Milner MD, MD, personally saw the patient today and spent greater than 30 minutes discharging this patient.       Pradeep Milner MD, MD  [facility] EXTENDED RECOVERY AND SHORT STAY  [address] [facility] [address]  Phone:  [phone]  ______________________________________________________________________    Physical Exam   Vital Signs: Temp: 98.8  F (37.1  C) Temp src: Oral BP: 124/87 Pulse: 89   Resp: 18 SpO2: 94 % O2 Device: None (Room air)    Weight: 222 lbs .05 oz  Gen- pleasant lying in bed  HEENT- NAD, JOHANNY  Neck- supple, no JVD elevation, no thyromegaly  CVS- I+II+ no m/r/g.  No pericardial or pleural rub appreciated  RS- CTAB.  Decreased at base  Abdo- soft, no tenderness . No g/r/r  Ext- trace edema   CNS- no gross focal deficit     Primary Care Physician   [facility]    Discharge Orders      Follow-up and recommended labs and tests     If you would like to establish care with our bariatric team in [place], call our office at [phone] to schedule an appointment. Dr. Anu Hunter (general surgeon you saw during your hospital stay) recommends that you see establish care under Dr. Arun Lo. He is one of our bariatric surgeons. We have 2 physician assistants who work in our Weight Loss Clinic and you may see one of them initially. Our clinic's name is [facility]. The address is [address] [place] [address], [address], [place], [place], [address]     Reason for your hospital stay    Ms. Yanez is a 42-year-old female with a past medical history significant for laparoscopic sleeve gastrectomy 10/25/2022, GERD with Griggs's esophagus and recent diagnosis of pericarditis with pericardial effusion, who returns to the Emergency Departments with recurrent symptoms of her pericarditis.  She was managed conservatively and showed significant improvement with starting of NSAID.     Follow-up and recommended labs and tests     Follow up with primary care provider, [facility], within 7 days for hospital follow- up.  The following labs/tests are recommended: BMP.  She is started on indomethacin please follow-up creatinine closely.      Follow up with cardiology team within 1-2  weeks    Follow up with [facility] as recommended     Activity    Your activity upon discharge: activity as tolerated     When to contact your care team    Call your primary doctor if you have any of the following: temperature greater than 100.4 or less than 96,  increased shortness of breath, increased swelling, or increased pain.     Diet    Follow this diet upon discharge: Orders Placed This Encounter      Regular Diet Adult       Significant Results and Procedures   Results for orders placed or performed during the hospital encounter of 22   XR Chest 2 Views    Narrative    EXAM: XR CHEST 2 VIEWS  LOCATION: [facility]  DATE/TIME: 2022 1:46 AM    INDICATION: chest pain  COMPARISON: 2022      Impression    IMPRESSION: Heart size within normal limits. The right lung is now clear. There is persistent airspace infiltrate and a small effusion at the left lung base. No pneumothorax.   POC US ECHO LIMITED    Impression    [facility] Procedure Note      Limited Bedside ED Cardiac Ultrasound:    PROCEDURE: PERFORMED BY: Dr. Jin Samuel MD  INDICATIONS/SYMPTOM:  Chest Pain  PROBE: Cardiac phased array probe  BODY LOCATION: Chest  FINDINGS:   The ultrasound was performed utilizing the parasternal long axis, parasternal short axis and apical 4 chamber views.  Cardiac contractility:  Present  Gross estimation of cardiac kinesis: normal  Pericardial Effusion:  Small  RV:LV ratio: LV > RV  INTERPRETATION:    Chamber size and motion were grossly normal with LV > RV, normal cardiac kinesis.  Pericardial effusion was found.    IMAGE DOCUMENTATION: Images were archived to PACs system.       Echocardiogram Limited     Value    LVEF  55%    Narrative    [id number]  [id number]  [id number]  [id number]^NOEL^SUMMER     [facility]  Echocardiography Laboratory  [address] [place] [address]     Name: ASIYA PRECIADO  MRN: [id number]  : 1980  Study  Date: 2022 09:03 AM  Age: 42 yrs  Gender: Female  Patient Location: [facility]  Reason For Study: Pericardial Effusion  Ordering Physician: SUMMER COHEN  Performed By: Jeanne Blount     BSA: 2.3 m2  Height: 72 in  Weight: 228 lb  HR: 69  BP: 126/88 mmHg  ______________________________________________________________________________  Procedure  Limited Portable Echo Adult.  ______________________________________________________________________________  Interpretation Summary     1. The left ventricle is normal in structure, function and size. The visual  ejection fraction is estimated at 55%.  2. The right ventricle is normal in structure, function and size.  3. No valve disease.  4. Trivial pericardial effusion     No changes when compared to echo from 22.  ______________________________________________________________________________  Left Ventricle  The left ventricle is normal in structure, function and size. The visual  ejection fraction is estimated at 55%. Septal motion is consistent with  conduction abnormality.     Right Ventricle  The right ventricle is normal in structure, function and size.     Mitral Valve  The mitral valve is normal in structure and function.     Tricuspid Valve  No tricuspid regurgitation. Right ventricular systolic pressure could not be  approximated due to inadequate tricuspid regurgitation.     Aortic Valve  The aortic valve is normal in structure and function.     Vessels  Normal ascending, transverse (arch), and descending aorta. The inferior vena  cava was normal in size with preserved respiratory variability.     Pericardium  Trivial pericardial effusion.     Rhythm  Sinus rhythm was noted.     ______________________________________________________________________________  Doppler Measurements & Calculations  MV E max onel: 58.6 cm/sec  MV A max onel: 44.2 cm/sec  MV E/A: 1.3  MV dec slope: 219.0 cm/sec2  MV dec time: 0.27 sec  E/E' av.8     Lateral E/e': 4.9  Medial  E/e': 6.6     ______________________________________________________________________________  Report approved by: Merlene Meza 12/19/2022 10:49 AM               Discharge Medications   Current Discharge Medication List      START taking these medications    Details   indomethacin (INDOCIN) 25 MG capsule Take 1 capsule (25 mg) by mouth 3 times daily (with meals) Have your kidney function monitored with your PMD  Qty: 90 capsule, Refills: 3    Associated Diagnoses: Pericardial effusion      metoclopramide (REGLAN) 10 MG tablet Take 1 tablet (10 mg) by mouth 4 times daily (before meals and nightly)  Qty: 56 tablet, Refills: 0    Associated Diagnoses: Gastroesophageal reflux disease with esophagitis without hemorrhage         CONTINUE these medications which have NOT CHANGED    Details   acetaminophen (TYLENOL) 500 MG tablet Take 1,000 mg by mouth every 6 hours as needed for mild pain      buPROPion (WELLBUTRIN XL) 150 MG 24 hr tablet Take 150 mg by mouth every morning Switch to Bupropion 75mg immediate release twice daily when ordered      calcium carbonate (TUMS) 500 MG chewable tablet Take 1 chew tab by mouth 3 times daily      colchicine (COLCYRS) 0.6 MG tablet Take 1 tablet (0.6 mg) by mouth 2 times daily for 12 days  Qty: 24 tablet, Refills: 0    Associated Diagnoses: Pericardial effusion      Cyanocobalamin (B-12) 500 MCG SUBL Place 1 tablet under the tongue daily  Qty: 30 tablet, Refills: 11    Associated Diagnoses: S/P laparoscopic sleeve gastrectomy      hydrOXYzine (ATARAX) 25 MG tablet Take 1 tablet (25 mg) by mouth 3 times daily as needed for itching  Qty: 10 tablet, Refills: 0    Associated Diagnoses: Gastric anastomotic stricture      hyoscyamine SL (LEVSIN/SL) 0.125 MG sublingual tablet Take 1 tablet (0.125 mg) by mouth 4 times daily (before meals and nightly)  Qty: 50 tablet, Refills: 1    Comments: Future refills by PCP Dr. SULMA Brand [facility] with phone number  [phone].  Associated Diagnoses: Gastric anastomotic stricture      Multiple Vitamins-Iron (MULTIVITAMIN/IRON PO) Take 1 tablet by mouth daily      ondansetron (ZOFRAN ODT) 4 MG ODT tab Take 1 tablet (4 mg) by mouth every 6 hours as needed for nausea or vomiting  Qty: 25 tablet, Refills: 0    Associated Diagnoses: Gastric anastomotic stricture      oxyCODONE (ROXICODONE) 5 MG tablet Take 1 tablet (5 mg) by mouth every 6 hours as needed for moderate to severe pain  Qty: 12 tablet, Refills: 0    Associated Diagnoses: S/P laparoscopic sleeve gastrectomy      pantoprazole (PROTONIX) 40 MG EC tablet Take 1 tablet (40 mg) by mouth 2 times daily (before meals)  Qty: 60 tablet, Refills: 0    Associated Diagnoses: Gastric anastomotic stricture      potassium chloride ER (KLOR-CON M) 20 MEQ CR tablet Take 1 tablet (20 mEq) by mouth 2 times daily for 7 days  Qty: 14 tablet, Refills: 0    Associated Diagnoses: Hypokalemia      senna-docusate (SENOKOT-S/PERICOLACE) 8.6-50 MG tablet Take 2 tablets by mouth daily as needed for constipation (While taking narcotic pain medications.  Stop taking if having loose stools.)  Qty: 30 tablet, Refills: 1    Associated Diagnoses: Class 2 severe obesity with serious comorbidity and body mass index (BMI) of 38.0 to 38.9 in adult, unspecified obesity type (H); At high risk for postoperative complications      sucralfate (CARAFATE) 1 GM/10ML suspension Take 10 mLs (1 g) by mouth 4 times daily for 10 days  Qty: 414 mL, Refills: 0    Comments: Future refills by PCP Dr. OZUNA [facility] with phone number [phone].  Associated Diagnoses: Gastric anastomotic stricture      thiamine (B-1) 100 MG tablet Take 1 tablet (100 mg) by mouth daily  Qty: 30 tablet, Refills: 1    Associated Diagnoses: S/P laparoscopic sleeve gastrectomy; Nausea with vomiting      traMADol (ULTRAM) 50 MG tablet Take 1 tablet (50 mg) by mouth every 6 hours as needed for severe pain (7-10)  Qty: 50  "tablet, Refills: 0    Associated Diagnoses: Gastric anastomotic stricture      CALCIUM CITRATE-VITAMIN D3 PO            Allergies   Allergies   Allergen Reactions     Azithromycin      Erythromycin GI Disturbance     When \"very young\"     "

## 2022-12-20 NOTE — PLAN OF CARE
Goal Outcome Evaluation:    A&Ox4. VSS on RA. Tele NSR. Denies SOB. Left chest and shoulder pain managed with prn Oxycodone. L/s diminished, increased chest pain with deep breathing. On scheduled Reglan. Tolerated 25% of lunch. On regular diet. CMS intact. Up SBA. GI, Surgery team, and Cardiology following.     Pt decided to stay tonight for Endoscopy tomorrow. Dr Milner notified. Next shift nurse to notify [facility].

## 2022-12-20 NOTE — PROCEDURES
[facility]    Single Lumen Midline Placement    Date/Time: 12/19/2022 5:40 PM  Performed by: Shruthi Lofton RN  Authorized by: Pradeep Milner MD   Indications: vascular access      UNIVERSAL PROTOCOL   Site Marked: Yes  Prior Images Obtained and Reviewed:  Yes  Required items: Required blood products, implants, devices and special equipment available    Patient identity confirmed:  Arm band, verbally with patient and hospital-assigned identification number  NA - No sedation, light sedation, or local anesthesia  Confirmation Checklist:  Patient's identity using two indicators, relevant allergies, procedure was appropriate and matched the consent or emergent situation and correct equipment/implants were available  Time out: Immediately prior to the procedure a time out was called    Universal Protocol: the Joint Commission Universal Protocol was followed    Preparation: Patient was prepped and draped in usual sterile fashion    ESBL (mL):  2     ANESTHESIA    Anesthesia: See MAR for details  Local Anesthetic:  Lidocaine 1% without epinephrine  Anesthetic Total (mL):  2      SEDATION    Patient Sedated: No        Preparation: skin prepped with ChloraPrep  Skin prep agent: skin prep agent completely dried prior to procedure  Sterile barriers: maximum sterile barriers were used: cap, mask, sterile gown, sterile gloves, and large sterile sheet  Hand hygiene: hand hygiene performed prior to central venous catheter insertion  Type of line used: Midline  Catheter type: single lumen  Lumen type: non-valved  Catheter size: 4 Fr  Brand: Bard  Lot number: [id number]  Placement method: venipuncture, MST and ultrasound  Number of attempts: 1  Difficulty threading catheter: no  Successful placement: yes  Orientation: left    Location: brachial vein (medial)  Tip Location: distal to axillary vein  Arm circumference: adults 10 cm  Extremity circumference: 36  Visible catheter length: 0  Internal length: 20  cm  Total catheter length: 20  Dressing and securement: site cleansed, blood removed, chlorhexidine disc applied, statlock and occlusive dressing applied  Post procedure assessment: blood return through all ports  PROCEDURE Describe Procedure: Patient instructed on midline catheter. Patient verbalized an understanding and gave consent for placement. RN placed 4FR midline in left brachial vein. Patient tolerated well.Blood return noted. RN caring for patient is aware that Midline is ok to use.

## 2022-12-20 NOTE — PROGRESS NOTES
[facility]  Cardiology Progress Note    Deepak Conroy MD   12/20/2022          Assessment and Plan:     42-year-old woman now readmitted with recurrent pericarditis failing colchicine only.  We avoided nonsteroidals the first time due to her recent endoscopic gastric sleeve surgery.  GI now states it is okay to proceed with nonsteroidals.  I did give her 1 dose of IV ketorolac yesterday with an excellent response.  We will start Indocin today.  I have discussed with the hospitalist who agree.  We will continue colchicine twice daily.  I did discuss with patient how the nonsteroidal should be a big benefit for her as they are a pain pill but also affect the inflammation.  I would continue nonsteroidals until she is pain-free and CRP has normalized.  I would continue colchicine for 3 months.    Pericardial effusion has not reaccumulated to any significant degree.             Interval History:     Patient feeling better and in better spirits.              Medications:       sodium chloride 75 mL/hr at 12/20/22 0826       buPROPion  150 mg Oral QAM     calcium carbonate  500 mg Oral TID     colchicine  0.6 mg Oral BID     cyanocobalamin  500 mcg Sublingual Daily     hyoscyamine  0.125 mg Oral 4x Daily AC & HS     indomethacin  25 mg Oral TID w/meals     metoclopramide  10 mg Intravenous Q6H     pantoprazole  40 mg Oral BID AC     sodium chloride (PF)  10 mL Intracatheter Q8H     sodium chloride (PF)  10 mL Intracatheter Q8H     sucralfate  1 g Oral 4x Daily     thiamine  100 mg Oral Daily                   Physical Exam:   Blood pressure 124/87, pulse 89, temperature 98.8  F (37.1  C), temperature source Oral, resp. rate 18, height 1.829 m (6'), weight 100.7 kg (222 lb 0.1 oz), last menstrual period 11/14/2022, SpO2 94 %, not currently breastfeeding.  Vitals:    12/19/22 0008 12/19/22 0428   Weight: 103.4 kg (228 lb) 100.7 kg (222 lb 0.1 oz)     Vital Signs with Ranges  Temp:  [98.1  F (36.7   C)-98.8  F (37.1  C)] 98.8  F (37.1  C)  Pulse:  [79-89] 89  Resp:  [16-18] 18  BP: (108-124)/(57-87) 124/87  SpO2:  [92 %-95 %] 94 %  I/O's Last 24 hours  I/O last 3 completed shifts:  In: 110 [P.O.:110]  Out: -        General:  Patient comfortable, in no apparent distress. .  Neck:  No JVD, no carotid bruits.  Lungs:  Clear to auscultation bilaterally.  Cardiac:  Regular rate and rhythm, no murmurs, rub, or gallops.  GI: Abdomen is soft, nontender.  Musculoskeletal.  There is no kyphosis or scoliosis  Extremities:  Without edema.  2+ pulses.  Skin:  Warm, dry.  Neurologic:  No focal deficits. Awake, alert, oriented x3  Psych.  Patient is calm         Data:        Recent Labs   Lab Test 12/20/22  1012 12/19/22  0207 12/15/22  0514 12/11/22  0542 12/11/22  0541 11/06/22  1954 11/06/22  0616 11/05/22  1150 11/05/22  0603    139 140   < > 137   < > 143  --  140   POTASSIUM 3.4 3.8 3.3*   < > 3.5   < > 3.5  --  3.7   CHLORIDE 106 104 104   < > 99   < > 108*  --  107   CO2 23 24 27   < > 23   < > 23  --  20*   BUN 11 8 8   < > 5.2*   < > 5.1*  --  3.4*   CR 1.17* 1.24* 1.13*   < > 0.74   < > 0.97*  --  0.86   ANIONGAP 8 11 9   < > 15   < > 12  --  13   KIMBERLYN 8.9 9.6 9.2   < > 9.7   < > 8.3*  --  8.0*   * 98 86   < > 101*   < > 120*   < > 124*   ALBUMIN  --  2.9*  --    < > 3.7   < > 2.3*  --  2.2*   PROTTOTAL  --  7.2  --    < > 7.0   < > 5.6*  --  5.4*   GFRESTIMATED 59* 55* 62   < > >90   < > 74  --  86   NTBNPI  --   --   --   --  392  --  407  --  249    < > = values in this interval not displayed.     Recent Labs   Lab Test 12/19/22  0110 12/15/22  0514 12/14/22  0548   HGB 12.9 10.2* 9.8*     No lab results found.  Recent Labs   Lab Test 09/01/21  1927   TROPONIN <0.015     Recent Labs   Lab Test 12/11/22  0543 11/08/22  0410   PH 7.36 7.47*   PO2  --  70*   PCO2  --  46*   HCO3  --  33*   ZCAH  --  8.4*     Recent Labs   Lab Test 09/01/22  1156   CHOL 186   HDL 37*   *   TRIG 216*              >  25 minutes spent

## 2022-12-20 NOTE — PROVIDER NOTIFICATION
Annia paged for    Pt reported her mom (who has Dementia) just passed away and wondering if she can just go home and come back for Endoscopy later.         Response:  MD okay with discharging home today.   Will let GI know for output endoscopy.    No need to contact surgery team and Cardiology team per Dr Milner.       Spoke with Isi from GI.   Okay to discharge and output follow up Dr Rose at [facility] or [facility].

## 2022-12-20 NOTE — UTILIZATION REVIEW
"    Concurrent stay review; Secondary Review Determination      Under the authority of the Utilization Management Committee, the utilization review process indicated a secondary review on the above patient. The review outcome is based on review of the medical records, discussions with staff, and applying clinical experience noted on the date of the review.      (x) Observation Status Appropriate - Concurrent stay review      RATIONALE FOR DETERMINATION:      42-year-old female with a past medical history significant for laparoscopic sleeve gastrectomy 10/25/2022, GERD with Griggs's esophagus and recent diagnosis of pericarditis with pericardial effusion, who returns to the Emergency Departments with recurrent symptoms of her pericarditis.  The patient was being treated with colchicine and admitted due to persistent pain.      VS unremarkable.  No fever    Labs notable for elevated CRP of 160    TTE shows normal EF with \"trivial pericardial effusion\".    Patient was seen by cardiology who recommend treatment with NSAIDS    The patient received 2 doses of IV Dilaudid earlier today.  She is also receiving oral oxycodone    For now continue observation status.  If the patient continues to have pain that requires IV narcotics into tomorrow and is unable to discharge by tomorrow given ongoing pain, consider reevaluation for inpatient status.       Patient is clinically improving and there is no clear indication to change patient's status to inpatient. The severity of illness, intensity of service provided, expected LOS and risk for adverse outcome make the care appropriate for observation.      The information on this document is developed by the utilization review team in order for the business office to ensure compliance. This only denotes the appropriateness of proper admission status and does not reflect the quality of care rendered.   The definitions of Inpatient Status and Observation Status used in making the " determination above are those provided in the CMS Coverage Manual, Chapter 1 and Chapter 6, section 70.4.      Sincerely,      Juan Anand MD  Utilization Review   Physician Advisor   [facility].

## 2022-12-20 NOTE — CONSULTS
[facility] General Surgery Consultation    Leah Yanez MRN# [id number]   YOB: 1980 Age: 42 year old      Date of Admission:  12/19/2022  Date of Consult: 12/20/2022         Assessment and Plan:   Patient is a 42 year old female with a complicated medical history including recent laparoscopic sleeve gastrectomy with hiatal hernia repair with Dr. Aragon at the [facility] on 10/25/2022 and then return to OR due to hematoma  On 11/4/2022 with persistent post operative issues with poor PO tolerance. She has had many endoscopies with narrowing noted and had dilation as well as trial of stent placement with Dr. Aragon and then ultimately was admitted due to chest pain and was found to have a large pericardial effusion with tamonade physiology and this was treated and she was discharged. She returned due to ongoing chest pain and SOB and has pericarditis and is being followed/treated by cardiology. Surgery was consulted to discuss possible gastrostomy vs jejunostomy due to ongoing poor PO intake. At this point, I would not recommend a surgical feeding tube and discussed reasons why with Leah. She is high risk for complications with further surgical procedures and ideally we would try a nasojejunal tube or with further endoscopic intervention/zofran/medical management, she will be able to tolerate adequate PO intake. If these measures all fail, could consider laparoscopic jejunostomy tube placement. Pt would like to transition her bariatric care to the team here at [facility] and we will place follow up instructions in discharge orders for her to call to do this.     PLAN:  Discussed with patient and RAIMUNDO Borrero with GI at bedside - plan for EDG tomorrow  Trial of zofran  Consider nasojejunal tube initially (though pt reports she tried this previously and did not tolerate well)  Will work on establishing care with our Bariatric team         Requesting Physician:      Dr. Sommer,  Dr. Milner        Chief Complaint:     Chief Complaint   Patient presents with     Chest Pain          History of Present Illness:   Leah Yanez is a 42 year old female who was seen in consultation at the request of Dr. Sommer who presented with complicated medical history including recent laparoscopic sleeve gastrectomy with hiatal hernia repair with Dr. Aragon at the [facility] on 10/25/2022 and then return to OR due to hematoma  On 11/4/2022 with persistent post operative issues with poor PO tolerance. She has had many endoscopies with narrowing noted and had dilation as well as trial of stent placement with Dr. Aragon and then ultimately was admitted due to chest pain and was found to have a large pericardial effusion with tamonade physiology and this was treated and she was discharged. She returned due to ongoing chest pain and SOB and has pericarditis and is being followed/treated by cardiology. She reports she has not been able to tolerate PO since her surgery. She has nausea/emesis with most PO intake; although this has improved some over the past few days and she has been tolerating more PO. She reports after her hematoma washout she did have a nasojejunal feeding tube and she did not tolerate it well and she couldn't each and felt she was gagging on the tube and that she does not think she would want to try that again.           Physical Exam:   Blood pressure 124/87, pulse 89, temperature 98.8  F (37.1  C), temperature source Oral, resp. rate 18, height 1.829 m (6'), weight 100.7 kg (222 lb 0.1 oz), last menstrual period 11/14/2022, SpO2 94 %, not currently breastfeeding.  222 lbs .05 oz  General: sitting up in bed, appears comfortable  Psych: Alert and Oriented.  Normal affect  Neurological: grossly intact  Eyes: Sclera clear  Respiratory:  nonlabored breathing  Cardiovascular:  Regular Rate and Rhythm   GI: soft, nt, nd   Lymphatic/Hematologic/Immune:  No femoral or cervical  lymphadenopathy.  Integumentary:  No rashes         Past Medical History:     Past Medical History:   Diagnosis Date     Anti-cardiolipin antibody positive      Griggs esophagus      Concussion 2016     Depressive disorder, not elsewhere classified 03/01/2006    Resolved 8/07     Gastroesophageal reflux disease with esophagitis      Hiatal hernia      History of pulmonary embolism      Obesity      Raynaud's syndrome     past history of admission for gangrene of one finger            Past Surgical History:     Past Surgical History:   Procedure Laterality Date     COMBINED ESOPHAGOSCOPY, GASTROSCOPY, DUODENOSCOPY (EGD), REMOVE ESOPHAGEAL STENT N/A 12/2/2022    Procedure: ESOPHAGOGASTRODUODENOSCOPY, WITH ESOPHAGEAL STENT REMOVAL and Balloon Dialation;  Surgeon: Daniel Aragon MD;  Location: [facility] OR     CV PERICARDIOCENTESIS N/A 12/11/2022    Procedure: Pericardiocentesis;  Surgeon: Sourav Sahu MD;  Location: [facility] CARDIAC CATH LAB     ESOPHAGOSCOPY, GASTROSCOPY, DUODENOSCOPY (EGD), COMBINED N/A 12/29/2021    Procedure: ESOPHAGOGASTRODUODENOSCOPY, WITH BIOPSY;  Surgeon: Esteban Rose DO;  Location: [facility] OR     ESOPHAGOSCOPY, GASTROSCOPY, DUODENOSCOPY (EGD), COMBINED N/A 11/14/2022    Procedure: Upper endoscopy; gastric stricture dilation, interpretation of fluoroscopy nasojejunal feeding tube;  Surgeon: Daniel Aragon MD;  Location: [facility] OR     ESOPHAGOSCOPY, GASTROSCOPY, DUODENOSCOPY (EGD), COMBINED N/A 11/23/2022    Procedure: ESOPHAGOGASTRODUODENOSCOPY (EGD);  Surgeon: Daniel Aragon MD;  Location: [facility] GI     LAPAROSCOPIC GASTRIC SLEEVE N/A 10/25/2022    Procedure: GASTRECTOMY, SLEEVE, LAPAROSCOPIC;  Surgeon: Daniel Aragon MD;  Location: [facility] OR     LAPAROSCOPIC HERNIORRHAPHY HIATAL N/A 10/25/2022    Procedure: HERNIORRHAPHY, HIATAL, LAPAROSCOPIC;  Surgeon: Daniel Aragon MD;  Location: [facility] OR     LAPAROSCOPY DIAGNOSTIC (GENERAL) N/A 11/4/2022    Procedure: LAPAROSCOPY,  DIAGNOSTIC, BY GENERAL SURGERY, evacuation of abdominl hematoma;  Surgeon: Daniel Aragon MD;  Location: [facility] OR     PICC TRIPLE LUMEN PLACEMENT Left 11/06/2022    51cm (1cm external), Basilic vein     TONSILLECTOMY & ADENOIDECTOMY       [facility] NONSPECIFIC PROCEDURE      T&A as a child     [facility] NONSPECIFIC PROCEDURE      Tubes in ears bilaterally            Current Medications:           buPROPion  150 mg Oral QAM     calcium carbonate  500 mg Oral TID     colchicine  0.6 mg Oral BID     cyanocobalamin  500 mcg Sublingual Daily     hyoscyamine  0.125 mg Oral 4x Daily AC & HS     indomethacin  25 mg Oral TID w/meals     metoclopramide  10 mg Intravenous Q6H     pantoprazole  40 mg Oral BID AC     sodium chloride (PF)  10 mL Intracatheter Q8H     sodium chloride (PF)  10 mL Intracatheter Q8H     sucralfate  1 g Oral 4x Daily     thiamine  100 mg Oral Daily       [DISCONTINUED] acetaminophen **OR** acetaminophen, acetaminophen, bisacodyl, HYDROmorphone, hydrOXYzine, lidocaine 4%, lidocaine (buffered or not buffered), melatonin, naloxone **OR** naloxone **OR** naloxone **OR** naloxone, ondansetron **OR** ondansetron, oxyCODONE, prochlorperazine **OR** prochlorperazine **OR** prochlorperazine, senna-docusate **OR** senna-docusate, sodium chloride (PF), sodium chloride (PF)         Home Medications:     Prior to Admission medications    Medication Sig Last Dose Taking? Auth Provider Long Term End Date   acetaminophen (TYLENOL) 500 MG tablet Take 1,000 mg by mouth every 6 hours as needed for mild pain  at PRN Yes Unknown, Entered By History No    buPROPion (WELLBUTRIN XL) 150 MG 24 hr tablet Take 150 mg by mouth every morning Switch to Bupropion 75mg immediate release twice daily when ordered 12/18/2022 at AM Yes Unknown, Entered By History     calcium carbonate (TUMS) 500 MG chewable tablet Take 1 chew tab by mouth 3 times daily 12/18/2022 at x2 doses Yes Unknown, Entered By History     colchicine (COLCYRS) 0.6 MG tablet  Take 1 tablet (0.6 mg) by mouth 2 times daily for 12 days 12/18/2022 at AM Yes Nabor Feng MD  12/27/22   Cyanocobalamin (B-12) 500 MCG SUBL Place 1 tablet under the tongue daily 12/18/2022 at AM Yes Rosanna Phipps NP No    hydrOXYzine (ATARAX) 25 MG tablet Take 1 tablet (25 mg) by mouth 3 times daily as needed for itching  at PRN Yes Daniel Aragon MD     hyoscyamine SL (LEVSIN/SL) 0.125 MG sublingual tablet Take 1 tablet (0.125 mg) by mouth 4 times daily (before meals and nightly) 12/18/2022 at x3 doses Yes Nabor Feng MD     Multiple Vitamins-Iron (MULTIVITAMIN/IRON PO) Take 1 tablet by mouth daily 12/18/2022 at AM Yes Unknown, Entered By History     ondansetron (ZOFRAN ODT) 4 MG ODT tab Take 1 tablet (4 mg) by mouth every 6 hours as needed for nausea or vomiting  at PRN Yes Nabor Feng MD     oxyCODONE (ROXICODONE) 5 MG tablet Take 1 tablet (5 mg) by mouth every 6 hours as needed for moderate to severe pain  at PRN Yes Rosanna Phipps NP     pantoprazole (PROTONIX) 40 MG EC tablet Take 1 tablet (40 mg) by mouth 2 times daily (before meals) 12/18/2022 at AM Yes Nabor Feng MD     potassium chloride ER (KLOR-CON M) 20 MEQ CR tablet Take 1 tablet (20 mEq) by mouth 2 times daily for 7 days 12/18/2022 at AM Yes Nabor Feng MD  12/22/22   senna-docusate (SENOKOT-S/PERICOLACE) 8.6-50 MG tablet Take 2 tablets by mouth daily as needed for constipation (While taking narcotic pain medications.  Stop taking if having loose stools.)  at PRN Yes Daniel Aragon MD     sucralfate (CARAFATE) 1 GM/10ML suspension Take 10 mLs (1 g) by mouth 4 times daily for 10 days Past Week Yes Nabor Feng MD  12/25/22   thiamine (B-1) 100 MG tablet Take 1 tablet (100 mg) by mouth daily 12/18/2022 at AM Yes Rosanna Phipps NP     traMADol (ULTRAM) 50 MG tablet Take 1 tablet (50 mg) by mouth every 6 hours as needed for severe pain (7-10)  at PRN Yes Daniel Aragon MD     CALCIUM CITRATE-VITAMIN D3 PO    Reported, Patient No   "           Allergies:     Allergies   Allergen Reactions     Azithromycin      Erythromycin GI Disturbance     When \"very young\"            Family History:     Family History   Problem Relation Age of Onset     Hypertension Mother         arthritis     Heart Disease Father         MI, Lipids     Acute Myocardial Infarction Father      Cancer - colorectal Maternal Grandmother         arthritis     Hypertension Maternal Grandfather         arthritis, macular degeneration     Colon Cancer Paternal Grandmother      Alzheimer Disease Paternal Grandfather      Anesthesia Reaction No family hx of      Clotting Disorder No family hx of            Social History:   Leah Yanez  reports that she has never smoked. She has never used smokeless tobacco. She reports current alcohol use. She reports that she does not use drugs.          Review of Systems:   The 10 point Review of Systems is negative other than noted in the HPI.         Labs/Imaging   All new lab and imaging data was reviewed.   I have personally reviewed the imaging studies including her endoscopies and upper GI on 12/12/22.         Anu Hunter MD    "

## 2022-12-20 NOTE — PROGRESS NOTES
[facility]  [facility]  Gastroenterology Progress note    Interval History:      Pt reports continued difficulty with po intake.  Nausea is improved.      Vital Signs:      /77 (BP Location: Left arm)   Pulse 85   Temp 98.1  F (36.7  C) (Oral)   Resp 18   Ht 1.829 m (6')   Wt 100.7 kg (222 lb 0.1 oz)   LMP 2022 (Approximate)   SpO2 92%   BMI 30.11 kg/m    Temp (24hrs), Av.3  F (36.8  C), Min:98  F (36.7  C), Max:98.8  F (37.1  C)    Patient Vitals for the past 72 hrs:   Weight   22 0428 100.7 kg (222 lb 0.1 oz)   22 0008 103.4 kg (228 lb)       Intake/Output Summary (Last 24 hours) at 2022 0820  Last data filed at 2022 0100  Gross per 24 hour   Intake 110 ml   Output --   Net 110 ml         Constitutional: NAD, comfortable  Cardiovascular: RRR, normal S1, S2   Respiratory: CTAB  Abdomen: soft, RUQ tenderness, nondistended    Additional Comments:  ROS, FH, SH: See initial GI consult for details.    Laboratory Data:  Recent Labs   Lab Test 22  0110 12/15/22  0514 22  0548 22  0541 22  1551   WBC 8.5 5.7 6.2   < > 8.9   HGB 12.9 10.2* 9.8*   < > 10.7*   MCV 85 87 85   < > 86    282 283   < > 550*   INR 1.09  --   --   --  1.04    < > = values in this interval not displayed.     Recent Labs   Lab Test 22  0207 12/15/22  0514 22  1838 22  0548    140  --  141   POTASSIUM 3.8 3.3* 3.4 3.2*   CHLORIDE 104 104  --  105   CO2 24 27  --  26   BUN 8 8  --  8   CR 1.24* 1.13*  --  1.24*   ANIONGAP 11 9  --  10   KIMBERLYN 9.6 9.2  --  9.3     Recent Labs   Lab Test 22  0207 22  0640 22  0649 22  0542 22  0645 22  0926 22  0616 22  1734 10/24/22  1447   ALBUMIN 2.9* 1.7* 2.0*  --    < >  --  2.3*   < >  --    BILITOTAL 0.5 0.4 0.7  --    < >  --  0.5   < >  --    ALT 25 20 29  --    < >  --  19   < >  --    AST 25 10 10  --    < >  --  36*   < >  --     ALKPHOS 142 89 107  --    < >  --  145*   < >  --    PROTEIN  --   --   --   --   --  50*  --   --  Negative   LIPASE 196  --   --  44  --   --  65*   < >  --     < > = values in this interval not displayed.         Assessment:  41 yo female with recurrent pericardial effusion secondary to pericarditis.  Patient also with significant feeding difficulties with ongoing dysphagia s/p sleeve gastrectomy and hiatal hernia repair.  She has had a number of upper endoscopies with dilation of her gastric stricture and a trial of stenting.  The gastric stricture has been patent.  Neither dilation nor stenting was helpful.  Upper GI series  shows some delay in passage through the gastric sleeve but no definite fixed stricture.  Post-prandial symptoms may be due to underlying dysmotility.  Patient has expressed interest in a feeding tube.    Plan:  -  Diet as tolerated.  -  Surgical consult to discuss G vs J tube placement.  -  Short trial of Reglan 10 mg q 6 hours as promotility agent.  Zelnorm could be considered for long term use but is not available at [facility].  -  Continue PPI/Carafate.  -  Follow up in our esophageal clinic will be arranged.  -  Consider EGD this hospital stay for further delineation of anatomy.    ADDENDUM:  OK to use NSAIDs for treatment of pericarditis.  EGD planned for tomorrow, however, patient's mother  unexpectedly and patient requesting discharge.  OK for discharge; discussed with Dr. Sommer.  Patient seen by Dr. Rose at [facility] and can follow up with him after discharge or if she would like to follow up with us, can call [phone].    Total Time Spent:  15 minutes    ALVARO Borrero  [facility]  Office:  [phone] call if needed after 5PM  Cell:  [phone], not available after 5PM at this number

## 2022-12-20 NOTE — PROGRESS NOTES
Observation goals  PRIOR TO DISCHARGE        Comments: TTE complete, cardiology consult complete, pain controlled: PARTIALLY MET.       Nurse to notify provider when observation goals have been met and patient is ready for discharge.

## 2022-12-20 NOTE — PROGRESS NOTES
[facility]    Medicine Progress Note - Hospitalist Service    Date of Admission:  12/19/2022    Assessment & Plan   Ms. Yanez is a 42-year-old female with a past medical history significant for laparoscopic sleeve gastrectomy 10/25/2022, GERD with Griggs's esophagus and recent diagnosis of pericarditis with pericardial effusion, who returns to the Emergency Departments with recurrent symptoms of her pericarditis.    # Pericarditis with pericardial effusion:  She is status post emergent pericardiocentesis with drain placement on 12/11/2022 has been maintained on colchicine.  -Appreciate cardiology following patient.  She showed significant improvement with trial of IV ketorolac yesterday.  Discussed with cardiology and started on indomethacin today.  Will need to monitor kidney function closely    # Recent bilateral pleural effusions:  She has small residual at the left base.  This does not seem to be causing significant symptoms.  # Depression:  We will continue with Wellbutrin.  # Gastroesophageal reflux disease, Griggs's esophagus:  Continue with Protonix and Carafate  #Status post laparoscopic sleeve gastrectomy on 10/25/2022:    -She has been having significant issues with ongoing nutrition and keeping food and fluids down.  Appreciate [facility] review.  Patient started on a short trial of Reglan as a promotility agent.  -Plan for EGD tomorrow  -Appreciate general surgery review    # JEFFRY-continue on IV fluids and monitor closely    -   Diet: Regular Diet Adult    DVT Prophylaxis: Pneumatic Compression Devices  Holder Catheter: Not present  Central Lines: PRESENT     Cardiac Monitoring: ACTIVE order. Indication: pericarditis  Code Status: Full Code      Disposition Plan      Expected Discharge Date: 12/22/2022,  3:00 PM      Discharge Comments: GI  and Cardiology following.  EGD tomorrow.        The patient's care was discussed with the Bedside Nurse, Patient and Cardiology  "Team.    Pradeep Milner MD, MD  Hospitalist Service  [facility]  Securely message with the [employer] Web Console (learn more here)  Text page via [employer] Paging/Directory     \"This dictation was performed with voice recognition software and may contain errors,  omissions and inadvertent word substitution.\"       Clinically Significant Risk Factors Present on Admission           # Hypercalcemia: corrected calcium is >10.1, will monitor as appropriate    # Hypoalbuminemia: Lowest albumin = 2.9 g/dL at 12/19/2022  2:07 AM, will monitor as appropriate          # Obesity: Estimated body mass index is 30.11 kg/m  as calculated from the following:    Height as of this encounter: 1.829 m (6').    Weight as of this encounter: 100.7 kg (222 lb 0.1 oz).       ______________________________________________________________________    Interval History   Feels pain is better  Denies any fever/chills  Denies any palpitations  Complains of continued decreased oral intake  4 point ROS done and negative unless mentioned    Data reviewed today: I reviewed all medications, new labs and imaging results over the last 24 hours. I personally reviewed no images or EKG's today.    Physical Exam   Vital Signs: Temp: 98.8  F (37.1  C) Temp src: Oral BP: 124/87 Pulse: 89   Resp: 18 SpO2: 94 % O2 Device: None (Room air)    Weight: 222 lbs .05 oz  Gen- pleasant lying in bed  HEENT- NAD, JOHANNY  Neck- supple, no JVD elevation, no thyromegaly  CVS- I+II+ no m/r/g.  No pericardial or pleural rub appreciated  RS- CTAB.  Decreased at base  Abdo- soft, no tenderness . No g/r/r  Ext- trace edema   CNS- no gross focal deficit    Data    BMPRecent Labs   Lab 12/20/22  1012 12/19/22  0207 12/15/22  0514 12/14/22  1838 12/14/22  0548    139 140  --  141   POTASSIUM 3.4 3.8 3.3* 3.4 3.2*   CHLORIDE 106 104 104  --  105   KIMBERLYN 8.9 9.6 9.2  --  9.3   CO2 23 24 27  --  26   BUN 11 8 8  --  8   CR 1.17* 1.24* 1.13*  --  1.24*   * " 98 86  --  82     CBC  Recent Labs   Lab 12/19/22  0110 12/15/22  0514 12/14/22  0548   WBC 8.5 5.7 6.2   RBC 4.83 3.87 3.65*   HGB 12.9 10.2* 9.8*   HCT 41.1 33.7* 31.0*   MCV 85 87 85   MCH 26.7 26.4* 26.8   MCHC 31.4* 30.3* 31.6   RDW 17.6* 17.9* 18.3*    282 283     INR  Recent Labs   Lab 12/19/22  0110   INR 1.09     LFTs  Recent Labs   Lab 12/19/22  0207   ALKPHOS 142   AST 25   ALT 25   BILITOTAL 0.5   PROTTOTAL 7.2   ALBUMIN 2.9*      PANC  Recent Labs   Lab 12/19/22  0207   LIPASE 196     Inflammatory Markers  Recent Labs   Lab Test 12/20/22  1012 12/19/22  0207 12/19/22  0110 12/15/22  0514 12/14/22  0548 12/13/22  0640 11/06/22  0616 11/01/22  1734   SED  --   --  43*  --   --   --   --   --    .0* 65.4*  --  69.7* 107.0* 161.0* 417.00* 324.00*       No results found for this or any previous visit (from the past 24 hour(s)).

## 2022-12-21 ENCOUNTER — ANESTHESIA (OUTPATIENT)
Dept: GASTROENTEROLOGY | Facility: CLINIC | Age: 42
DRG: 326 | End: 2022-12-21
Payer: COMMERCIAL

## 2022-12-21 ENCOUNTER — ANESTHESIA EVENT (OUTPATIENT)
Dept: GASTROENTEROLOGY | Facility: CLINIC | Age: 42
DRG: 326 | End: 2022-12-21
Payer: COMMERCIAL

## 2022-12-21 PROBLEM — K21.00 GASTROESOPHAGEAL REFLUX DISEASE WITH ESOPHAGITIS WITHOUT HEMORRHAGE: Status: ACTIVE | Noted: 2022-12-21

## 2022-12-21 PROBLEM — R07.9 CHEST PAIN, UNSPECIFIED TYPE: Status: ACTIVE | Noted: 2022-12-21

## 2022-12-21 LAB
ANION GAP SERPL CALCULATED.3IONS-SCNC: 9 MMOL/L (ref 3–14)
BUN SERPL-MCNC: 10 MG/DL (ref 7–30)
CALCIUM SERPL-MCNC: 8.7 MG/DL (ref 8.5–10.1)
CHLORIDE BLD-SCNC: 106 MMOL/L (ref 94–109)
CO2 SERPL-SCNC: 23 MMOL/L (ref 20–32)
CREAT SERPL-MCNC: 1.09 MG/DL (ref 0.52–1.04)
CRP SERPL-MCNC: 187 MG/L (ref 0–8)
ERYTHROCYTE [DISTWIDTH] IN BLOOD BY AUTOMATED COUNT: 17.3 % (ref 10–15)
GFR SERPL CREATININE-BSD FRML MDRD: 65 ML/MIN/1.73M2
GLUCOSE BLD-MCNC: 92 MG/DL (ref 70–99)
HCT VFR BLD AUTO: 32.6 % (ref 35–47)
HGB BLD-MCNC: 10 G/DL (ref 11.7–15.7)
MAGNESIUM SERPL-MCNC: 1.9 MG/DL (ref 1.6–2.3)
MCH RBC QN AUTO: 26.5 PG (ref 26.5–33)
MCHC RBC AUTO-ENTMCNC: 30.7 G/DL (ref 31.5–36.5)
MCV RBC AUTO: 86 FL (ref 78–100)
PLATELET # BLD AUTO: 279 10E3/UL (ref 150–450)
POTASSIUM BLD-SCNC: 3.1 MMOL/L (ref 3.4–5.3)
RBC # BLD AUTO: 3.78 10E6/UL (ref 3.8–5.2)
SODIUM SERPL-SCNC: 138 MMOL/L (ref 133–144)
UPPER GI ENDOSCOPY: NORMAL
WBC # BLD AUTO: 6.6 10E3/UL (ref 4–11)

## 2022-12-21 PROCEDURE — 80048 BASIC METABOLIC PNL TOTAL CA: CPT | Performed by: INTERNAL MEDICINE

## 2022-12-21 PROCEDURE — 43239 EGD BIOPSY SINGLE/MULTIPLE: CPT | Performed by: INTERNAL MEDICINE

## 2022-12-21 PROCEDURE — 83735 ASSAY OF MAGNESIUM: CPT | Performed by: INTERNAL MEDICINE

## 2022-12-21 PROCEDURE — 96376 TX/PRO/DX INJ SAME DRUG ADON: CPT

## 2022-12-21 PROCEDURE — 250N000011 HC RX IP 250 OP 636: Performed by: INTERNAL MEDICINE

## 2022-12-21 PROCEDURE — 258N000003 HC RX IP 258 OP 636: Performed by: NURSE ANESTHETIST, CERTIFIED REGISTERED

## 2022-12-21 PROCEDURE — 258N000003 HC RX IP 258 OP 636: Performed by: INTERNAL MEDICINE

## 2022-12-21 PROCEDURE — 370N000017 HC ANESTHESIA TECHNICAL FEE, PER MIN: Performed by: INTERNAL MEDICINE

## 2022-12-21 PROCEDURE — 0DB68ZX EXCISION OF STOMACH, VIA NATURAL OR ARTIFICIAL OPENING ENDOSCOPIC, DIAGNOSTIC: ICD-10-PCS | Performed by: INTERNAL MEDICINE

## 2022-12-21 PROCEDURE — 999N000010 HC STATISTIC ANES STAT CODE-CRNA PER MINUTE: Performed by: INTERNAL MEDICINE

## 2022-12-21 PROCEDURE — 120N000001 HC R&B MED SURG/OB

## 2022-12-21 PROCEDURE — 88305 TISSUE EXAM BY PATHOLOGIST: CPT | Mod: TC | Performed by: INTERNAL MEDICINE

## 2022-12-21 PROCEDURE — 0D764ZZ DILATION OF STOMACH, PERCUTANEOUS ENDOSCOPIC APPROACH: ICD-10-PCS | Performed by: INTERNAL MEDICINE

## 2022-12-21 PROCEDURE — 250N000009 HC RX 250: Performed by: NURSE ANESTHETIST, CERTIFIED REGISTERED

## 2022-12-21 PROCEDURE — 99226 PR SUBSEQUENT OBSERVATION CARE,LEVEL III: CPT | Performed by: INTERNAL MEDICINE

## 2022-12-21 PROCEDURE — 250N000013 HC RX MED GY IP 250 OP 250 PS 637: Performed by: INTERNAL MEDICINE

## 2022-12-21 PROCEDURE — 43249 ESOPH EGD DILATION <30 MM: CPT | Performed by: INTERNAL MEDICINE

## 2022-12-21 PROCEDURE — 0DB48ZX EXCISION OF ESOPHAGOGASTRIC JUNCTION, VIA NATURAL OR ARTIFICIAL OPENING ENDOSCOPIC, DIAGNOSTIC: ICD-10-PCS | Performed by: INTERNAL MEDICINE

## 2022-12-21 PROCEDURE — 250N000013 HC RX MED GY IP 250 OP 250 PS 637: Performed by: PHYSICIAN ASSISTANT

## 2022-12-21 PROCEDURE — 250N000011 HC RX IP 250 OP 636: Performed by: NURSE ANESTHETIST, CERTIFIED REGISTERED

## 2022-12-21 PROCEDURE — 86140 C-REACTIVE PROTEIN: CPT | Performed by: INTERNAL MEDICINE

## 2022-12-21 PROCEDURE — 85014 HEMATOCRIT: CPT | Performed by: INTERNAL MEDICINE

## 2022-12-21 PROCEDURE — G0378 HOSPITAL OBSERVATION PER HR: HCPCS

## 2022-12-21 RX ORDER — FENTANYL CITRATE 0.05 MG/ML
50 INJECTION, SOLUTION INTRAMUSCULAR; INTRAVENOUS EVERY 5 MIN PRN
Status: CANCELLED | OUTPATIENT
Start: 2022-12-21

## 2022-12-21 RX ORDER — HYDRALAZINE HYDROCHLORIDE 20 MG/ML
2.5-5 INJECTION INTRAMUSCULAR; INTRAVENOUS EVERY 10 MIN PRN
Status: CANCELLED | OUTPATIENT
Start: 2022-12-21

## 2022-12-21 RX ORDER — SODIUM CHLORIDE, SODIUM LACTATE, POTASSIUM CHLORIDE, CALCIUM CHLORIDE 600; 310; 30; 20 MG/100ML; MG/100ML; MG/100ML; MG/100ML
INJECTION, SOLUTION INTRAVENOUS CONTINUOUS
Status: CANCELLED | OUTPATIENT
Start: 2022-12-21

## 2022-12-21 RX ORDER — PROPOFOL 10 MG/ML
INJECTION, EMULSION INTRAVENOUS PRN
Status: DISCONTINUED | OUTPATIENT
Start: 2022-12-21 | End: 2022-12-21

## 2022-12-21 RX ORDER — HYDROMORPHONE HCL IN WATER/PF 6 MG/30 ML
0.2 PATIENT CONTROLLED ANALGESIA SYRINGE INTRAVENOUS EVERY 5 MIN PRN
Status: CANCELLED | OUTPATIENT
Start: 2022-12-21

## 2022-12-21 RX ORDER — ONDANSETRON 4 MG/1
4 TABLET, ORALLY DISINTEGRATING ORAL EVERY 30 MIN PRN
Status: CANCELLED | OUTPATIENT
Start: 2022-12-21

## 2022-12-21 RX ORDER — ONDANSETRON 2 MG/ML
4 INJECTION INTRAMUSCULAR; INTRAVENOUS EVERY 30 MIN PRN
Status: CANCELLED | OUTPATIENT
Start: 2022-12-21

## 2022-12-21 RX ORDER — POTASSIUM CHLORIDE 7.45 MG/ML
10 INJECTION INTRAVENOUS
Status: COMPLETED | OUTPATIENT
Start: 2022-12-21 | End: 2022-12-21

## 2022-12-21 RX ORDER — DIMENHYDRINATE 50 MG/ML
25 INJECTION, SOLUTION INTRAMUSCULAR; INTRAVENOUS
Status: CANCELLED | OUTPATIENT
Start: 2022-12-21

## 2022-12-21 RX ORDER — PROPOFOL 10 MG/ML
INJECTION, EMULSION INTRAVENOUS CONTINUOUS PRN
Status: DISCONTINUED | OUTPATIENT
Start: 2022-12-21 | End: 2022-12-21

## 2022-12-21 RX ORDER — FENTANYL CITRATE 0.05 MG/ML
25 INJECTION, SOLUTION INTRAMUSCULAR; INTRAVENOUS EVERY 5 MIN PRN
Status: CANCELLED | OUTPATIENT
Start: 2022-12-21

## 2022-12-21 RX ORDER — POTASSIUM CHLORIDE 1500 MG/1
40 TABLET, EXTENDED RELEASE ORAL ONCE
Status: COMPLETED | OUTPATIENT
Start: 2022-12-21 | End: 2022-12-21

## 2022-12-21 RX ORDER — LABETALOL HYDROCHLORIDE 5 MG/ML
10 INJECTION, SOLUTION INTRAVENOUS
Status: CANCELLED | OUTPATIENT
Start: 2022-12-21

## 2022-12-21 RX ORDER — SODIUM CHLORIDE, SODIUM LACTATE, POTASSIUM CHLORIDE, CALCIUM CHLORIDE 600; 310; 30; 20 MG/100ML; MG/100ML; MG/100ML; MG/100ML
INJECTION, SOLUTION INTRAVENOUS CONTINUOUS PRN
Status: DISCONTINUED | OUTPATIENT
Start: 2022-12-21 | End: 2022-12-21

## 2022-12-21 RX ORDER — HYDROXYZINE HYDROCHLORIDE 25 MG/1
25 TABLET, FILM COATED ORAL EVERY 6 HOURS PRN
Status: CANCELLED | OUTPATIENT
Start: 2022-12-21

## 2022-12-21 RX ORDER — HYDROMORPHONE HCL IN WATER/PF 6 MG/30 ML
0.4 PATIENT CONTROLLED ANALGESIA SYRINGE INTRAVENOUS EVERY 5 MIN PRN
Status: CANCELLED | OUTPATIENT
Start: 2022-12-21

## 2022-12-21 RX ORDER — LIDOCAINE HYDROCHLORIDE 20 MG/ML
INJECTION, SOLUTION INFILTRATION; PERINEURAL PRN
Status: DISCONTINUED | OUTPATIENT
Start: 2022-12-21 | End: 2022-12-21

## 2022-12-21 RX ORDER — ACETAMINOPHEN 325 MG/1
975 TABLET ORAL ONCE
Status: CANCELLED | OUTPATIENT
Start: 2022-12-21 | End: 2022-12-21

## 2022-12-21 RX ORDER — ONDANSETRON 2 MG/ML
INJECTION INTRAMUSCULAR; INTRAVENOUS PRN
Status: DISCONTINUED | OUTPATIENT
Start: 2022-12-21 | End: 2022-12-21

## 2022-12-21 RX ADMIN — HYDROMORPHONE HYDROCHLORIDE 0.5 MG: 1 INJECTION, SOLUTION INTRAMUSCULAR; INTRAVENOUS; SUBCUTANEOUS at 11:07

## 2022-12-21 RX ADMIN — INDOMETHACIN 25 MG: 25 CAPSULE ORAL at 19:18

## 2022-12-21 RX ADMIN — LIDOCAINE HYDROCHLORIDE 40 MG: 20 INJECTION, SOLUTION INFILTRATION; PERINEURAL at 08:30

## 2022-12-21 RX ADMIN — SUCRALFATE 1 G: 1 SUSPENSION ORAL at 10:03

## 2022-12-21 RX ADMIN — INDOMETHACIN 25 MG: 25 CAPSULE ORAL at 10:04

## 2022-12-21 RX ADMIN — SUCRALFATE 1 G: 1 SUSPENSION ORAL at 22:12

## 2022-12-21 RX ADMIN — POTASSIUM CHLORIDE 10 MEQ: 7.46 INJECTION, SOLUTION INTRAVENOUS at 22:07

## 2022-12-21 RX ADMIN — HYOSCYAMINE SULFATE 0.12 MG: 0.12 TABLET ORAL at 11:10

## 2022-12-21 RX ADMIN — ONDANSETRON 4 MG: 2 INJECTION INTRAMUSCULAR; INTRAVENOUS at 20:10

## 2022-12-21 RX ADMIN — SUCRALFATE 1 G: 1 SUSPENSION ORAL at 19:18

## 2022-12-21 RX ADMIN — ONDANSETRON 4 MG: 2 INJECTION INTRAMUSCULAR; INTRAVENOUS at 08:30

## 2022-12-21 RX ADMIN — ACETAMINOPHEN 1000 MG: 500 TABLET ORAL at 10:03

## 2022-12-21 RX ADMIN — HYDROMORPHONE HYDROCHLORIDE 0.5 MG: 1 INJECTION, SOLUTION INTRAMUSCULAR; INTRAVENOUS; SUBCUTANEOUS at 14:59

## 2022-12-21 RX ADMIN — PROPOFOL 150 MCG/KG/MIN: 10 INJECTION, EMULSION INTRAVENOUS at 08:30

## 2022-12-21 RX ADMIN — METOCLOPRAMIDE 10 MG: 5 INJECTION, SOLUTION INTRAMUSCULAR; INTRAVENOUS at 17:52

## 2022-12-21 RX ADMIN — HYDROMORPHONE HYDROCHLORIDE 0.5 MG: 1 INJECTION, SOLUTION INTRAMUSCULAR; INTRAVENOUS; SUBCUTANEOUS at 21:08

## 2022-12-21 RX ADMIN — SODIUM CHLORIDE: 9 INJECTION, SOLUTION INTRAVENOUS at 13:03

## 2022-12-21 RX ADMIN — COLCHICINE 0.6 MG: 0.6 TABLET ORAL at 10:04

## 2022-12-21 RX ADMIN — POTASSIUM CHLORIDE 10 MEQ: 7.46 INJECTION, SOLUTION INTRAVENOUS at 19:10

## 2022-12-21 RX ADMIN — HYDROMORPHONE HYDROCHLORIDE 0.5 MG: 1 INJECTION, SOLUTION INTRAMUSCULAR; INTRAVENOUS; SUBCUTANEOUS at 00:47

## 2022-12-21 RX ADMIN — HYDROMORPHONE HYDROCHLORIDE 0.5 MG: 1 INJECTION, SOLUTION INTRAMUSCULAR; INTRAVENOUS; SUBCUTANEOUS at 04:32

## 2022-12-21 RX ADMIN — OXYCODONE HYDROCHLORIDE 5 MG: 5 TABLET ORAL at 17:52

## 2022-12-21 RX ADMIN — POTASSIUM CHLORIDE 40 MEQ: 1500 TABLET, EXTENDED RELEASE ORAL at 11:11

## 2022-12-21 RX ADMIN — COLCHICINE 0.6 MG: 0.6 TABLET ORAL at 22:12

## 2022-12-21 RX ADMIN — BUPROPION HYDROCHLORIDE 150 MG: 150 TABLET, FILM COATED, EXTENDED RELEASE ORAL at 10:04

## 2022-12-21 RX ADMIN — PROCHLORPERAZINE EDISYLATE 10 MG: 5 INJECTION INTRAMUSCULAR; INTRAVENOUS at 11:02

## 2022-12-21 RX ADMIN — SODIUM CHLORIDE, POTASSIUM CHLORIDE, SODIUM LACTATE AND CALCIUM CHLORIDE: 600; 310; 30; 20 INJECTION, SOLUTION INTRAVENOUS at 08:28

## 2022-12-21 RX ADMIN — HYOSCYAMINE SULFATE 0.12 MG: 0.12 TABLET ORAL at 19:18

## 2022-12-21 RX ADMIN — POTASSIUM CHLORIDE 10 MEQ: 7.46 INJECTION, SOLUTION INTRAVENOUS at 20:11

## 2022-12-21 RX ADMIN — POTASSIUM CHLORIDE 10 MEQ: 7.46 INJECTION, SOLUTION INTRAVENOUS at 17:58

## 2022-12-21 RX ADMIN — METOCLOPRAMIDE 10 MG: 5 INJECTION, SOLUTION INTRAMUSCULAR; INTRAVENOUS at 03:17

## 2022-12-21 RX ADMIN — THIAMINE HCL TAB 100 MG 100 MG: 100 TAB at 10:05

## 2022-12-21 RX ADMIN — PROCHLORPERAZINE EDISYLATE 10 MG: 5 INJECTION INTRAMUSCULAR; INTRAVENOUS at 21:08

## 2022-12-21 RX ADMIN — METOCLOPRAMIDE 10 MG: 5 INJECTION, SOLUTION INTRAMUSCULAR; INTRAVENOUS at 10:10

## 2022-12-21 RX ADMIN — CALCIUM CARBONATE (ANTACID) CHEW TAB 500 MG 500 MG: 500 CHEW TAB at 14:58

## 2022-12-21 RX ADMIN — HYOSCYAMINE SULFATE 0.12 MG: 0.12 TABLET ORAL at 22:12

## 2022-12-21 RX ADMIN — HYDROMORPHONE HYDROCHLORIDE 0.5 MG: 1 INJECTION, SOLUTION INTRAMUSCULAR; INTRAVENOUS; SUBCUTANEOUS at 23:03

## 2022-12-21 RX ADMIN — CALCIUM CARBONATE (ANTACID) CHEW TAB 500 MG 500 MG: 500 CHEW TAB at 10:04

## 2022-12-21 RX ADMIN — ONDANSETRON 4 MG: 4 TABLET, ORALLY DISINTEGRATING ORAL at 14:58

## 2022-12-21 RX ADMIN — Medication 500 MCG: at 10:04

## 2022-12-21 RX ADMIN — PANTOPRAZOLE SODIUM 40 MG: 40 TABLET, DELAYED RELEASE ORAL at 19:18

## 2022-12-21 RX ADMIN — METOCLOPRAMIDE 10 MG: 5 INJECTION, SOLUTION INTRAMUSCULAR; INTRAVENOUS at 22:04

## 2022-12-21 RX ADMIN — PROPOFOL 40 MG: 10 INJECTION, EMULSION INTRAVENOUS at 08:34

## 2022-12-21 ASSESSMENT — ACTIVITIES OF DAILY LIVING (ADL)
ADLS_ACUITY_SCORE: 34
SWALLOWING: 0-->SWALLOWS FOODS/LIQUIDS WITHOUT DIFFICULTY
ADLS_ACUITY_SCORE: 21
TOILETING_ISSUES: NO
FALL_HISTORY_WITHIN_LAST_SIX_MONTHS: NO
ADLS_ACUITY_SCORE: 34
CONCENTRATING,_REMEMBERING_OR_MAKING_DECISIONS_DIFFICULTY: NO
ADLS_ACUITY_SCORE: 34
SWALLOWING: 0-->SWALLOWS FOODS/LIQUIDS WITHOUT DIFFICULTY (DEVELOPMENTALLY APPROPRIATE)
DIFFICULTY_COMMUNICATING: NO
ADLS_ACUITY_SCORE: 34
VISION_MANAGEMENT: GLASSES
DRESSING/BATHING_DIFFICULTY: NO
ADLS_ACUITY_SCORE: 21
DIFFICULTY_EATING/SWALLOWING: NO
ADLS_ACUITY_SCORE: 34
EATING: 0-->INDEPENDENT
ADLS_ACUITY_SCORE: 34
EATING: 0-->INDEPENDENT
ADLS_ACUITY_SCORE: 34
WEAR_GLASSES_OR_BLIND: YES
ADLS_ACUITY_SCORE: 34
DOING_ERRANDS_INDEPENDENTLY_DIFFICULTY: NO
ADLS_ACUITY_SCORE: 21
ADLS_ACUITY_SCORE: 34
CHANGE_IN_FUNCTIONAL_STATUS_SINCE_ONSET_OF_CURRENT_ILLNESS/INJURY: YES
WALKING_OR_CLIMBING_STAIRS_DIFFICULTY: NO

## 2022-12-21 ASSESSMENT — LIFESTYLE VARIABLES: TOBACCO_USE: 0

## 2022-12-21 NOTE — PROGRESS NOTES
Orientation/Cognitive: A&Ox4  Observation Goals (Met/ Not Met): Partially Met  Mobility Level/Assist Equipment: SBA  Fall Risk (Y/N): Y  Behavior Concerns: None  Pain Management: Prn Oxy, Dilaudid and scheduled indocin for chest pain relating to pericardial effusion  Tele/VS/O2: NSR, VSS on RA  ABNL Lab/BG: CRP- 168, Cr 1.17  Diet: Reg- Will be NPO @ midnight  Bowel/Bladder: Cont. B&B  Skin Concerns: None  Drains/Devices: Midline to LUE infusing NS @ 75ml/hr  Tests/Procedures for next shift: Pending EGD for AM 12/21  Anticipated DC date & active delays: 12/21/22  Patient Stated Goal for Today: None

## 2022-12-21 NOTE — PLAN OF CARE
Goal Outcome Evaluation:    PRIMARY DIAGNOSIS: CHEST PAIN  OUTPATIENT/OBSERVATION GOALS TO BE MET BEFORE DISCHARGE:  1. Ruled out acute coronary syndrome (negative or stable Troponin):  Yes  2. Pain Status: Improved-controlled with oral pain medications.  3. Appropriate provocative testing performed: Yes  - Stress Test Procedure: Echo  - Interpretation of cardiac rhythm per telemetry tech: NSR    4. Cleared by Consultants (if applicable):No  5. Return to near baseline physical activity: Yes  Discharge Planner Nurse   Safe discharge environment identified: Yes  Barriers to discharge: Yes       Entered by: Azam Norwood RN 12/20/2022 8:18 PM     Please review provider order for any additional goals.   Nurse to notify provider when observation goals have been met and patient is ready for discharge.

## 2022-12-21 NOTE — PROVIDER NOTIFICATION
MD Notification    Notified Person: MD    Notified Person Name: Pradeep Milner    Notification Date/Time: 12/21 @ 0948    Notification Interaction: Vocera    Purpose of Notification: Pt's K+ was 3.1 this AM-do you want to replace it?    Orders Received: replace K+ - protocol added    Comments:

## 2022-12-21 NOTE — PLAN OF CARE
Goal Outcome Evaluation:        DATE & TIME: 2300-0730   Cognitive Concerns/ Orientation :Alert and oriented x 4   BEHAVIOR & AGGRESSION TOOL COLOR: Green     ABNL VS/O2: VSS on RA  MOBILITY: SBA  PAIN MANAGMENT: IV Dilaudid  DIET: NPO  BOWEL/BLADDER: Continent  ABNL LAB/BG: None  DRAIN/DEVICES: Nacl @75ml/hr  TELEMETRY RHYTHM: NSR  SKIN:WDLTESTS/PROCEDURES: EGD 12/21/22  D/C DATE: 12/21/22

## 2022-12-21 NOTE — PROGRESS NOTES
GI: EGD showed long segment Griggs's. Gastric stenosis at 50 cm, lumen about 14 mm-balloon dilated to 19 mm. Two superficial gastric ulcers just proximal to the stenosis, likely due to stasis. Gastric mucosa proximal to stenosis appeared indurated. Nodular mucosal in the cardia, bx taken. Duodenum normal    Suspect post prandial nausea and emesis due to gastric stenosis and likely some contribution of gastric dysmotility.    Recs  -Low fat diet may help motility  -BID PPI x 2 months then daily thereafter lifelong  -Carafate x 2 weeks  -Reglan QID. Would discontinue after 6 weeks consider switching to tegaserod (not available on [facility] formulary)  -Repeat EGD in 6 weeks to assess healing  -Avoid any meds that might impair GI motility  -Outpatient follow clinic follow up.   -Consider trial of [place] tube if continued symptoms    OK from GI standpoint to discharge today given death of mother. Will sign off. Call with questions    Leticia Sommer MD  [facility]  Phone [phone] until 5 pm  Office [phone] for after hours on call provider

## 2022-12-21 NOTE — ANESTHESIA CARE TRANSFER NOTE
Patient: Leah Yanez    Procedure: Procedure(s):  ESOPHAGOGASTRODUODENOSCOPY, WITH BIOPSY  ESOPHAGOGASTRODUODENOSCOPY, WITH BALLOON DILATION OF LESS THAN 30 MILLIMETERS       Diagnosis: Dysphasia [R47.02]  Diagnosis Additional Information: No value filed.    Anesthesia Type:   MAC     Note:    Oropharynx: oropharynx clear of all foreign objects  Level of Consciousness: awake and drowsy  Oxygen Supplementation: nasal cannula  Level of Supplemental Oxygen (L/min / FiO2): 2  Independent Airway: airway patency satisfactory and stable  Dentition: dentition unchanged  Vital Signs Stable: post-procedure vital signs reviewed and stable  Report to RN Given: handoff report given  Patient transferred to: PACU  Comments: To Endo PACU: [place] easily/VSS  Report to RN  Handoff Report: Identifed the Patient, Identified the Reponsible Provider, Reviewed the pertinent medical history, Discussed the surgical course, Reviewed Intra-OP anesthesia mangement and issues during anesthesia, Set expectations for post-procedure period and Allowed opportunity for questions and acknowledgement of understanding      Vitals:  Vitals Value Taken Time   /82 12/21/22 0905   Temp     Pulse 75 12/21/22 0906   Resp 20 12/21/22 0906   SpO2 98 % 12/21/22 0906   Vitals shown include unvalidated device data.    Electronically Signed By: ELISA Brown CRNA  December 21, 2022  9:07 AM

## 2022-12-21 NOTE — PROGRESS NOTES
Surgery Note    Chart reviewed and endoscopy findings noted. Information in discharge orders if pt chooses to establish care with our bariatric team here at [facility]. Surgery will sign off.     Anu Hunter MD  [facility]  [phone]

## 2022-12-21 NOTE — UTILIZATION REVIEW
[facility] Utilization Review  Admission Status; Secondary Review Determination     Admission Date: 12/19/2022 12:04 AM      Under the authority of the Utilization Management Committee, the utilization review process indicated a secondary review on the above patient.  The review outcome is based on review of the medical records, discussions with staff, and applying clinical experience noted on the date of the review.        (X)      Inpatient Status Appropriate - This patient's medical care is consistent with medical management for inpatient care and reasonable inpatient medical practice.          RATIONALE FOR DETERMINATION   42-year-old female with history of laparoscopic sleeve gastrectomy, GERD with Griggs's esophagus, pericarditis with pericardial effusion, admitted with recurrent symptoms of pericarditis.  Patient had emergent pericardiocentesis with drain placement on 12/11 and has been maintained on colchicine and needs to be on it for 3 months along with indomethacin.  Patient developed significant nausea and vomiting, with EGD on 12/21 showing gastric stenosis at 50 cm and balloon dilatation done, also 2 gastric ulcers found.  Patient also developed acute kidney injury, requiring IV fluid hydration.  Complex patient who requires IV antiemetics, IV fluid hydration, IV pain control, needs close monitoring in the hospital with ongoing interventions, recommend change to inpatient status, communicated to Dr. Milner      The severity of illness, intensity of service provided, expected LOS and risk for adverse outcome make the care complex, high risk and appropriate for hospital admission.The patient requires hospital based medical care which is anticipated to require a stay of 2 or more midnights.        The information on this document is developed by the utilization review team in order for the business office to ensure compliance.  This only denotes the appropriateness of proper admission status  and does not reflect the quality of care rendered.              Sincerely,       Raya Muñoz MD  Physician Advisor  Utilization Review-[place]    Phone: [phone]

## 2022-12-21 NOTE — PROVIDER NOTIFICATION
MD Notification    Notified Person: MD    Notified Person Name: Annia    Notification Date/Time: 12/21 @ 1148    Notification Interaction: Vocera    Purpose of Notification: Pt extremely nauseous, emesis x2, and in a lot of pain. pt requested and was given 1 dose of IV compazine and dilaudid.     Orders Received:    Comments:

## 2022-12-21 NOTE — ANESTHESIA POSTPROCEDURE EVALUATION
Patient: Leah Yanez    Procedure: Procedure(s):  ESOPHAGOGASTRODUODENOSCOPY, WITH BIOPSY  ESOPHAGOGASTRODUODENOSCOPY, WITH BALLOON DILATION OF LESS THAN 30 MILLIMETERS       Anesthesia Type:  MAC    Note:     Postop Pain Control: Uneventful            Sign Out: Well controlled pain   PONV: No   Neuro/Psych: Uneventful            Sign Out: Acceptable/Baseline neuro status   Airway/Respiratory: Uneventful            Sign Out: Acceptable/Baseline resp. status   CV/Hemodynamics: Uneventful            Sign Out: Acceptable CV status   Other NRE: NONE   DID A NON-ROUTINE EVENT OCCUR?            Last vitals:  Vitals Value Taken Time   /85 12/21/22 1328   Temp 37.2  C (98.9  F) 12/21/22 1328   Pulse 78 12/21/22 1328   Resp 18 12/21/22 1328   SpO2 95 % 12/21/22 1328       Electronically Signed By: Dewey Castañeda MD  December 21, 2022  1:57 PM

## 2022-12-21 NOTE — PROVIDER NOTIFICATION
MD Notification    Notified Person: MD    Notified Person Name: Dr. Pradeep Milner    Notification Date/Time: 1553    Notification Interaction: Vocera Messaging    Purpose of Notification: Pt unable to tolerate oral potassium. Asking for IV potassium for low K of 3.1.    Orders Received:    Comments:

## 2022-12-21 NOTE — PLAN OF CARE
Goal Outcome Evaluation:    Shift Note  Neuro: A&Ox4  CV: Tele-NSR  Respiratory: RA  GI/: Intermittent nausea/emesis-prn zofran and compazine given, which were effective  Skin: n/a  Activity: Up to bathroom SBA  Diet: Low Fat  IV: Midline   Drips: n/a  BG: n/a  Pain: Abdominal pain - prn IV dilaudid and po oxy and tylenol  Other: EGD completed today  Consults:  Plan: pain and nausea management

## 2022-12-21 NOTE — ANESTHESIA PREPROCEDURE EVALUATION
Anesthesia Pre-Procedure Evaluation    Patient: Leah Yanez   MRN: [id number] : 1980        Procedure : Procedure(s):  ESOPHAGOGASTRODUODENOSCOPY (EGD)          Past Medical History:   Diagnosis Date     Anti-cardiolipin antibody positive      Griggs esophagus      Concussion 2016     Depressive disorder, not elsewhere classified 2006    Resolved      Gastroesophageal reflux disease with esophagitis      Hiatal hernia      History of pulmonary embolism      Obesity      Raynaud's syndrome     past history of admission for gangrene of one finger      Past Surgical History:   Procedure Laterality Date     COMBINED ESOPHAGOSCOPY, GASTROSCOPY, DUODENOSCOPY (EGD), REMOVE ESOPHAGEAL STENT N/A 2022    Procedure: ESOPHAGOGASTRODUODENOSCOPY, WITH ESOPHAGEAL STENT REMOVAL and Balloon Dialation;  Surgeon: Daniel Aragon MD;  Location: [facility] OR     CV PERICARDIOCENTESIS N/A 2022    Procedure: Pericardiocentesis;  Surgeon: Sourav Sahu MD;  Location: [facility] CARDIAC CATH LAB     ESOPHAGOSCOPY, GASTROSCOPY, DUODENOSCOPY (EGD), COMBINED N/A 2021    Procedure: ESOPHAGOGASTRODUODENOSCOPY, WITH BIOPSY;  Surgeon: Esteban Rose DO;  Location: [facility] OR     ESOPHAGOSCOPY, GASTROSCOPY, DUODENOSCOPY (EGD), COMBINED N/A 2022    Procedure: Upper endoscopy; gastric stricture dilation, interpretation of fluoroscopy nasojejunal feeding tube;  Surgeon: Daniel Aragon MD;  Location: [facility] OR     ESOPHAGOSCOPY, GASTROSCOPY, DUODENOSCOPY (EGD), COMBINED N/A 2022    Procedure: ESOPHAGOGASTRODUODENOSCOPY (EGD);  Surgeon: Daniel Aragon MD;  Location: [facility] GI     LAPAROSCOPIC GASTRIC SLEEVE N/A 10/25/2022    Procedure: GASTRECTOMY, SLEEVE, LAPAROSCOPIC;  Surgeon: Daniel Aragon MD;  Location: [facility] OR     LAPAROSCOPIC HERNIORRHAPHY HIATAL N/A 10/25/2022    Procedure: HERNIORRHAPHY, HIATAL, LAPAROSCOPIC;  Surgeon: Daniel Aragon MD;  Location: [facility] OR     LAPAROSCOPY  "DIAGNOSTIC (GENERAL) N/A 11/4/2022    Procedure: LAPAROSCOPY, DIAGNOSTIC, BY GENERAL SURGERY, evacuation of abdominl hematoma;  Surgeon: Daniel Aragon MD;  Location: [facility] OR     PICC TRIPLE LUMEN PLACEMENT Left 11/06/2022    51cm (1cm external), Basilic vein     TONSILLECTOMY & ADENOIDECTOMY       ZZC NONSPECIFIC PROCEDURE      T&A as a child     ZZC NONSPECIFIC PROCEDURE      Tubes in ears bilaterally      Allergies   Allergen Reactions     Azithromycin      Erythromycin GI Disturbance     When \"very young\"      Social History     Tobacco Use     Smoking status: Never     Smokeless tobacco: Never   Substance Use Topics     Alcohol use: Yes     Comment: Alcoholic Drinks/day: social      Wt Readings from Last 1 Encounters:   12/19/22 100.7 kg (222 lb 0.1 oz)        Anesthesia Evaluation   Pt has had prior anesthetic. Type: General.    No history of anesthetic complications       ROS/MED HX  ENT/Pulmonary:    (-) tobacco use   Neurologic:       Cardiovascular: Comment: Raynaud's; h/o tamponade s/p pericardiocentesis on 12/1/22    (+) -----Previous cardiac testing   Echo: Date: 12/19/22 Results:  Interpretation Summary  1. The left ventricle is normal in structure, function and size. The visual ejection fraction is estimated at 55%.  2. The right ventricle is normal in structure, function and size.  3. No valve disease.  4. Trivial pericardial effusion      Left Ventricle  The left ventricle is normal in structure, function and size. The visual ejection fraction is estimated at 55%. Septal motion is consistent with conduction abnormality.    Right Ventricle  The right ventricle is normal in structure, function and size.    Mitral Valve  The mitral valve is normal in structure and function.    Tricuspid Valve  No tricuspid regurgitation. Right ventricular systolic pressure could not be approximated due to inadequate tricuspid regurgitation.    Aortic Valve  The aortic valve is normal in structure and " function.    Vessels  Normal ascending, transverse (arch), and descending aorta. The inferior vena cava was normal in size with preserved respiratory variability.    Pericardium  Trivial pericardial effusion.    Rhythm  Sinus rhythm was noted.  Stress Test: Date: Results:    ECG Reviewed: Date: 12/19/22 Results:  NSR  Cath: Date: Results:      METS/Exercise Tolerance:     Hematologic:     (+) History of blood clots (PE),     Musculoskeletal:       GI/Hepatic: Comment: S/p gastric sleeve    (+) GERD, esophageal disease (Dysphagia; Griggs's), hiatal hernia (s/p repair),     Renal/Genitourinary:     (+) renal disease, type: CRI,     Endo:     (+) Obesity (Class 1),     Psychiatric/Substance Use:     (+) psychiatric history depression H/O chronic opiod use  (Oxy 5's q6 prn).     Infectious Disease:       Malignancy:       Other:            Physical Exam    Airway        Mallampati: II   TM distance: > 3 FB   Neck ROM: full   Mouth opening: > 3 cm    Respiratory Devices and Support         Dental  no notable dental history   Comment: Gums bleeding spontaneously 2/2 dehydration        Cardiovascular          Rhythm and rate: regular and normal     Pulmonary           breath sounds clear to auscultation           OUTSIDE LABS:  CBC:   Lab Results   Component Value Date    WBC 8.5 12/19/2022    WBC 5.7 12/15/2022    HGB 12.9 12/19/2022    HGB 10.2 (L) 12/15/2022    HCT 41.1 12/19/2022    HCT 33.7 (L) 12/15/2022     12/19/2022     12/15/2022     BMP:   Lab Results   Component Value Date     12/20/2022     12/19/2022    POTASSIUM 3.4 12/20/2022    POTASSIUM 3.8 12/19/2022    CHLORIDE 106 12/20/2022    CHLORIDE 104 12/19/2022    CO2 23 12/20/2022    CO2 24 12/19/2022    BUN 11 12/20/2022    BUN 8 12/19/2022    CR 1.17 (H) 12/20/2022    CR 1.24 (H) 12/19/2022     (H) 12/20/2022    GLC 98 12/19/2022     COAGS:   Lab Results   Component Value Date    PTT 32 11/21/2022    INR 1.09 12/19/2022      POC:   Lab Results   Component Value Date    HCG Negative 10/25/2022    HCGS Negative 12/11/2022     HEPATIC:   Lab Results   Component Value Date    ALBUMIN 2.9 (L) 12/19/2022    PROTTOTAL 7.2 12/19/2022    ALT 25 12/19/2022    AST 25 12/19/2022    ALKPHOS 142 12/19/2022    BILITOTAL 0.5 12/19/2022     OTHER:   Lab Results   Component Value Date    PH 7.36 12/11/2022    LACT 0.7 12/11/2022    A1C 5.5 10/25/2022    KIMBERLYN 8.9 12/20/2022    PHOS 3.1 11/16/2022    MAG 2.2 11/16/2022    LIPASE 196 12/19/2022    TSH 0.85 07/24/2003    .0 (H) 12/20/2022    SED 43 (H) 12/19/2022       Anesthesia Plan    ASA Status:  3   NPO Status:  NPO Appropriate    Anesthesia Type: MAC.     - Reason for MAC: straight local not clinically adequate              Consents    Anesthesia Plan(s) and associated risks, benefits, and realistic alternatives discussed. Questions answered and patient/representative(s) expressed understanding.    - Discussed:     - Discussed with:  Patient         Postoperative Care    Pain management: Multi-modal analgesia.   PONV prophylaxis: Ondansetron (or other 5HT-3)     Comments:                Dewey Castañeda MD

## 2022-12-21 NOTE — INTERVAL H&P NOTE
I have reviewed the surgical (or preoperative) H&P that is linked to this encounter, and examined the patient. There are no significant changes    Clinical Conditions Present on Arrival:  Clinically Significant Risk Factors Present on Admission          # Hypokalemia: Lowest K = 2.9 mmol/L in last 30 days, will replace as needed       # Hypoalbuminemia: Lowest albumin = 2.9 g/dL at 12/19/2022  2:07 AM, will monitor as appropriate     # Obesity: Estimated body mass index is 30.11 kg/m  as calculated from the following:    Height as of this encounter: 1.829 m (6').    Weight as of this encounter: 100.7 kg (222 lb 0.1 oz).

## 2022-12-21 NOTE — PROGRESS NOTES
PRIMARY DIAGNOSIS: CHEST PAIN  OUTPATIENT/OBSERVATION GOALS TO BE MET BEFORE DISCHARGE:  1. Ruled out acute coronary syndrome (negative or stable Troponin):  Yes  2. Pain Status: Improved but still requiring IV narcotics.  3. Appropriate provocative testing performed: Yes  - Stress Test Procedure: Echo  - Interpretation of cardiac rhythm per telemetry tech: NSR    4. Cleared by Consultants (if applicable):No  5. Return to near baseline physical activity: No  Discharge Planner Nurse   Safe discharge environment identified: Yes  Barriers to discharge: Yes - Continuing need for IV pain medication and IV antiemetics.         Entered by: Pamela Swift RN 12/21/2022 12:13 PM     /84   Pulse 76   Temp 97.7  F (36.5  C) (Oral)   Resp 20   Ht 1.829 m (6')   Wt 100.7 kg (222 lb 0.1 oz)   LMP 11/14/2022 (Approximate)   SpO2 96%   BMI 30.11 kg/m       Pt A&Ox4, VSS, on RA. Emesis x2, along with nausea and pain. IV Dilaudid and Compazine given x1, which were effective. K+ 3.1-being replaced.   Please review provider order for any additional goals.   Nurse to notify provider when observation goals have been met and patient is ready for discharge.

## 2022-12-21 NOTE — PROGRESS NOTES
"[facility]    Medicine Progress Note - Hospitalist Service    Date of Admission:  12/19/2022    Assessment & Plan   Ms. Yanez is a 42-year-old female with a past medical history significant for laparoscopic sleeve gastrectomy 10/25/2022, GERD with Griggs's esophagus and recent diagnosis of pericarditis with pericardial effusion, who returns to the Emergency Departments with recurrent symptoms of her pericarditis.    # Pericarditis with pericardial effusion:  She is status post emergent pericardiocentesis with drain placement on 12/11/2022 has been maintained on colchicine.  -Appreciate cardiology following patient.  She showed significant improvement with trial of IV ketorolac and now started on indomethacin.  Will need to monitor kidney function closely (plan to continue indomethacin and colchicine for 3 months)    # Recent bilateral pleural effusions:  She has small residual at the left base.  This does not seem to be causing significant symptoms.  # Depression:  We will continue with Wellbutrin.    # Gastroesophageal reflux disease, Griggs's esophagus:   #Status post laparoscopic sleeve gastrectomy on 10/25/2022:    -She has been having significant issues with ongoing nutrition and keeping food and fluids down.  Appreciate [facility] review.  Patient started on a short trial of Reglan as a promotility agent.  - Continue with Protonix and Carafate  - s/p EGD 12/21 \"Gastric stenosis at 50 cm, lumen about 14 mm-balloon dilated to 19 mm. Two superficial gastric ulcers just proximal to the stenosis, likely due to stasis. Gastric mucosa proximal to stenosis appeared indurated. Nodular mucosal in the cardia, bx taken. Duodenum normal\"   Significant nausea and vomiting.  Continue antiemetics and monitor for oral intake before discharge    -Appreciate general surgery review    # JEFFRY-continue on IV fluids and monitor closely    -   Diet: Diet  Low Fat Diet    DVT Prophylaxis: Pneumatic " "Compression Devices  Holder Catheter: Not present  Central Lines: PRESENT       Cardiac Monitoring: ACTIVE order. Indication: pericarditis  Code Status: Full Code      Disposition Plan      Expected Discharge Date: 12/22/2022        Discharge Comments: GI  and Cardiology following.  EGD tomorrow.        The patient's care was discussed with the Bedside Nurse, Patient and Cardiology Team.    Pradeep Milner MD, MD  Hospitalist Service  [facility]  Securely message with the Vocera Web Console (learn more here)  Text page via [employer] Paging/Directory     \"This dictation was performed with voice recognition software and may contain errors,  omissions and inadvertent word substitution.\"       Clinically Significant Risk Factors Present on Admission        # Hypokalemia: Lowest K = 3.1 mmol/L in last 2 days, will replace as needed       # Hypoalbuminemia: Lowest albumin = 2.9 g/dL at 12/19/2022  2:07 AM, will monitor as appropriate          # Obesity: Estimated body mass index is 30.11 kg/m  as calculated from the following:    Height as of this encounter: 1.829 m (6').    Weight as of this encounter: 100.7 kg (222 lb 0.1 oz).       ______________________________________________________________________    Interval History   Had EGD and dilatation of gastric stenosis.  Has been having significant nausea and episodes of vomiting since then  Also has been having some loose stools  Breathing stable    4 point ROS done and negative unless mentioned    Data reviewed today: I reviewed all medications, new labs and imaging results over the last 24 hours. I personally reviewed no images or EKG's today.    Physical Exam   Vital Signs: Temp: 98.9  F (37.2  C) Temp src: Oral BP: 119/85 Pulse: 78   Resp: 18 SpO2: 95 % O2 Device: None (Room air)    Weight: 222 lbs .05 oz  Gen- pleasant lying in bed  HEENT- NAD, JOHANNY  CVS- I+II+ no m/r/g.  No pericardial or pleural rub appreciated  RS- CTAB.  Decreased at " base  Abdo- soft, no tenderness . No g/r/r  Ext- trace edema   CNS- no gross focal deficit    Data    BMP  Recent Labs   Lab 12/21/22  0619 12/20/22  1012 12/19/22  0207 12/15/22  0514    137 139 140   POTASSIUM 3.1* 3.4 3.8 3.3*   CHLORIDE 106 106 104 104   KIMBERLYN 8.7 8.9 9.6 9.2   CO2 23 23 24 27   BUN 10 11 8 8   CR 1.09* 1.17* 1.24* 1.13*   GLC 92 107* 98 86     CBC  Recent Labs   Lab 12/21/22  0619 12/19/22  0110 12/15/22  0514   WBC 6.6 8.5 5.7   RBC 3.78* 4.83 3.87   HGB 10.0* 12.9 10.2*   HCT 32.6* 41.1 33.7*   MCV 86 85 87   MCH 26.5 26.7 26.4*   MCHC 30.7* 31.4* 30.3*   RDW 17.3* 17.6* 17.9*    394 282     INR  Recent Labs   Lab 12/19/22  0110   INR 1.09     LFTs  Recent Labs   Lab 12/19/22  0207   ALKPHOS 142   AST 25   ALT 25   BILITOTAL 0.5   PROTTOTAL 7.2   ALBUMIN 2.9*      PANC  Recent Labs   Lab 12/19/22  0207   LIPASE 196     Inflammatory Markers    Recent Labs   Lab Test 12/21/22  0619 12/20/22  1012 12/19/22  0207 12/19/22  0110 12/15/22  0514 12/14/22  0548 12/13/22  0640 11/06/22  0616 11/01/22  1734   SED  --   --   --  43*  --   --   --   --   --    .0* 168.0* 65.4*  --  69.7* 107.0* 161.0* 417.00* 324.00*       No results found for this or any previous visit (from the past 24 hour(s)).

## 2022-12-22 ENCOUNTER — APPOINTMENT (OUTPATIENT)
Dept: ULTRASOUND IMAGING | Facility: CLINIC | Age: 42
DRG: 326 | End: 2022-12-22
Attending: INTERNAL MEDICINE
Payer: COMMERCIAL

## 2022-12-22 LAB
MAGNESIUM SERPL-MCNC: 1.8 MG/DL (ref 1.6–2.3)
POTASSIUM BLD-SCNC: 3.6 MMOL/L (ref 3.4–5.3)
POTASSIUM BLD-SCNC: 3.8 MMOL/L (ref 3.4–5.3)

## 2022-12-22 PROCEDURE — 250N000013 HC RX MED GY IP 250 OP 250 PS 637: Performed by: PHYSICIAN ASSISTANT

## 2022-12-22 PROCEDURE — 120N000001 HC R&B MED SURG/OB

## 2022-12-22 PROCEDURE — 84132 ASSAY OF SERUM POTASSIUM: CPT | Performed by: INTERNAL MEDICINE

## 2022-12-22 PROCEDURE — 99226 PR SUBSEQUENT OBSERVATION CARE,LEVEL III: CPT | Performed by: INTERNAL MEDICINE

## 2022-12-22 PROCEDURE — 250N000011 HC RX IP 250 OP 636: Performed by: HOSPITALIST

## 2022-12-22 PROCEDURE — 76705 ECHO EXAM OF ABDOMEN: CPT

## 2022-12-22 PROCEDURE — 250N000011 HC RX IP 250 OP 636: Performed by: INTERNAL MEDICINE

## 2022-12-22 PROCEDURE — 99231 SBSQ HOSP IP/OBS SF/LOW 25: CPT | Mod: 24 | Performed by: INTERNAL MEDICINE

## 2022-12-22 PROCEDURE — 250N000013 HC RX MED GY IP 250 OP 250 PS 637: Performed by: INTERNAL MEDICINE

## 2022-12-22 PROCEDURE — 258N000003 HC RX IP 258 OP 636: Performed by: INTERNAL MEDICINE

## 2022-12-22 PROCEDURE — 83735 ASSAY OF MAGNESIUM: CPT | Performed by: INTERNAL MEDICINE

## 2022-12-22 RX ORDER — KETOROLAC TROMETHAMINE 30 MG/ML
30 INJECTION, SOLUTION INTRAMUSCULAR; INTRAVENOUS ONCE
Status: COMPLETED | OUTPATIENT
Start: 2022-12-22 | End: 2022-12-22

## 2022-12-22 RX ORDER — INDOMETHACIN 25 MG/1
50 CAPSULE ORAL
Status: DISCONTINUED | OUTPATIENT
Start: 2022-12-22 | End: 2022-12-23 | Stop reason: HOSPADM

## 2022-12-22 RX ORDER — KETOROLAC TROMETHAMINE 30 MG/ML
30 INJECTION, SOLUTION INTRAMUSCULAR; INTRAVENOUS EVERY 6 HOURS PRN
Status: DISCONTINUED | OUTPATIENT
Start: 2022-12-22 | End: 2022-12-23 | Stop reason: HOSPADM

## 2022-12-22 RX ADMIN — METOCLOPRAMIDE 10 MG: 5 INJECTION, SOLUTION INTRAMUSCULAR; INTRAVENOUS at 03:29

## 2022-12-22 RX ADMIN — HYOSCYAMINE SULFATE 0.12 MG: 0.12 TABLET ORAL at 11:02

## 2022-12-22 RX ADMIN — HYDROMORPHONE HYDROCHLORIDE 0.5 MG: 1 INJECTION, SOLUTION INTRAMUSCULAR; INTRAVENOUS; SUBCUTANEOUS at 03:29

## 2022-12-22 RX ADMIN — PROCHLORPERAZINE EDISYLATE 10 MG: 5 INJECTION INTRAMUSCULAR; INTRAVENOUS at 16:55

## 2022-12-22 RX ADMIN — INDOMETHACIN 50 MG: 25 CAPSULE ORAL at 17:45

## 2022-12-22 RX ADMIN — CALCIUM CARBONATE (ANTACID) CHEW TAB 500 MG 500 MG: 500 CHEW TAB at 22:09

## 2022-12-22 RX ADMIN — HYDROMORPHONE HYDROCHLORIDE 0.5 MG: 1 INJECTION, SOLUTION INTRAMUSCULAR; INTRAVENOUS; SUBCUTANEOUS at 00:54

## 2022-12-22 RX ADMIN — ONDANSETRON 4 MG: 2 INJECTION INTRAMUSCULAR; INTRAVENOUS at 08:43

## 2022-12-22 RX ADMIN — HYOSCYAMINE SULFATE 0.12 MG: 0.12 TABLET ORAL at 22:09

## 2022-12-22 RX ADMIN — COLCHICINE 0.6 MG: 0.6 TABLET ORAL at 22:09

## 2022-12-22 RX ADMIN — PROCHLORPERAZINE EDISYLATE 10 MG: 5 INJECTION INTRAMUSCULAR; INTRAVENOUS at 11:01

## 2022-12-22 RX ADMIN — SODIUM CHLORIDE: 9 INJECTION, SOLUTION INTRAVENOUS at 03:42

## 2022-12-22 RX ADMIN — SODIUM CHLORIDE: 9 INJECTION, SOLUTION INTRAVENOUS at 17:03

## 2022-12-22 RX ADMIN — HYDROMORPHONE HYDROCHLORIDE 0.5 MG: 1 INJECTION, SOLUTION INTRAMUSCULAR; INTRAVENOUS; SUBCUTANEOUS at 19:50

## 2022-12-22 RX ADMIN — Medication 500 MCG: at 08:51

## 2022-12-22 RX ADMIN — BUPROPION HYDROCHLORIDE 150 MG: 150 TABLET, FILM COATED, EXTENDED RELEASE ORAL at 08:51

## 2022-12-22 RX ADMIN — ACETAMINOPHEN 500 MG: 500 TABLET ORAL at 08:37

## 2022-12-22 RX ADMIN — METOCLOPRAMIDE 10 MG: 5 INJECTION, SOLUTION INTRAMUSCULAR; INTRAVENOUS at 22:09

## 2022-12-22 RX ADMIN — CALCIUM CARBONATE (ANTACID) CHEW TAB 500 MG 500 MG: 500 CHEW TAB at 13:59

## 2022-12-22 RX ADMIN — OXYCODONE HYDROCHLORIDE 5 MG: 5 TABLET ORAL at 09:04

## 2022-12-22 RX ADMIN — COLCHICINE 0.6 MG: 0.6 TABLET ORAL at 08:51

## 2022-12-22 RX ADMIN — PANTOPRAZOLE SODIUM 40 MG: 40 TABLET, DELAYED RELEASE ORAL at 17:45

## 2022-12-22 RX ADMIN — HYOSCYAMINE SULFATE 0.12 MG: 0.12 TABLET ORAL at 17:44

## 2022-12-22 RX ADMIN — PANTOPRAZOLE SODIUM 40 MG: 40 TABLET, DELAYED RELEASE ORAL at 06:32

## 2022-12-22 RX ADMIN — SUCRALFATE 1 G: 1 SUSPENSION ORAL at 12:58

## 2022-12-22 RX ADMIN — HYOSCYAMINE SULFATE 0.12 MG: 0.12 TABLET ORAL at 06:32

## 2022-12-22 RX ADMIN — KETOROLAC TROMETHAMINE 30 MG: 30 INJECTION, SOLUTION INTRAMUSCULAR at 03:47

## 2022-12-22 RX ADMIN — SUCRALFATE 1 G: 1 SUSPENSION ORAL at 22:09

## 2022-12-22 RX ADMIN — SUCRALFATE 1 G: 1 SUSPENSION ORAL at 08:51

## 2022-12-22 RX ADMIN — CALCIUM CARBONATE (ANTACID) CHEW TAB 500 MG 500 MG: 500 CHEW TAB at 08:51

## 2022-12-22 RX ADMIN — METOCLOPRAMIDE 10 MG: 5 INJECTION, SOLUTION INTRAMUSCULAR; INTRAVENOUS at 17:45

## 2022-12-22 RX ADMIN — SUCRALFATE 1 G: 1 SUSPENSION ORAL at 17:45

## 2022-12-22 RX ADMIN — METOCLOPRAMIDE 10 MG: 5 INJECTION, SOLUTION INTRAMUSCULAR; INTRAVENOUS at 09:05

## 2022-12-22 RX ADMIN — THIAMINE HCL TAB 100 MG 100 MG: 100 TAB at 08:51

## 2022-12-22 RX ADMIN — INDOMETHACIN 50 MG: 25 CAPSULE ORAL at 13:59

## 2022-12-22 RX ADMIN — INDOMETHACIN 25 MG: 25 CAPSULE ORAL at 10:57

## 2022-12-22 ASSESSMENT — ACTIVITIES OF DAILY LIVING (ADL)
ADLS_ACUITY_SCORE: 21
ADLS_ACUITY_SCORE: 21
ADLS_ACUITY_SCORE: 20
ADLS_ACUITY_SCORE: 20
ADLS_ACUITY_SCORE: 21
ADLS_ACUITY_SCORE: 21
ADLS_ACUITY_SCORE: 20
ADLS_ACUITY_SCORE: 21
ADLS_ACUITY_SCORE: 20
ADLS_ACUITY_SCORE: 21

## 2022-12-22 NOTE — PROGRESS NOTES
[facility]    Internal Medicine Hospitalist Progress Note  12/22/2022  I evaluated patient on the above date.    Devendra Soares Jr., MD  [phone] (p)  Text Page  Vocera        Assessment & Plan New actions/orders today (12/22/2022) are underlined.    Ms. Yanez is a 42-year-old female with a past medical history significant for obesity s/p laparoscopic sleeve gastrectomy (10/25/2022); GERD with Griggs's esophagus; and recent hospitalization for pericarditis with pericardial effusion requiring pericardiocentesis (hospitalized 12/11-12/15/2022); who presented 12/18/2022 with recurrent symptoms of her pericarditis.    On initial evaluation 12/18, was afebrile, tachycardic, BP normal. ECG showed sinus tachycardia with diffuse NSTWA. Labs showed BMP with cr 1.24, otherwise normal; CBC unremarkable; LFT's unremarkable; CRP 65.4; trop negative. CXR 12/19 showed persistent airspace infiltrate and a small effusion at the left lung base.    # Pericarditis with pericardial effusion, now trivial effusion.  * She is status post emergent pericardiocentesis with drain placement on 12/11/2022 (subsequently removed) and has been maintained on colchicine.  * Initial presentation as above. Cardiology consulted on admit.  * Echo 12/19 showed trivial pericardial effusion.   * Had significant improvement with trial of IV ketorolac and subsequently started on indomethacin.   * Early am 12/22 had worsened pain, improved with IV ketorolac.  - Continue colchicine 0.6 mg BID and indomethacin 25 mg TID for 3 months.  - Will need to monitor kidney function closely.    # RUQ abdominal pain, question related to issues below.  # Gastric stenosis s/p dilatation 12/21/2022.  # Superficial gastric ulcers, likely due to stasis.  # Status post laparoscopic sleeve gastrectomy on 10/25/2022.  # Gastroesophageal reflux disease.  # Griggs's esophagus.  * This hospitalization, noted that pt has been having significant issues  "with ongoing nutrition and keeping food and fluids down. GI and Surgery consulted.  * S/p EGD 12/21 that showed \"Gastric stenosis at 50 cm, lumen about 14 mm-balloon dilated to 19 mm. Two superficial gastric ulcers just proximal to the stenosis, likely due to stasis. Gastric mucosa proximal to stenosis appeared indurated. Nodular mucosal in the cardia, bx taken. Duodenum normal\"   * Started on a short trial of metoclopramide as a promotility agent.  * On 12/22, pt noted continued abdominal pain more in RUQ now.  - Check abdominal US.  - Increase activity.  - Continue metoclopramide QID for 6 weeks, then consider change to tegaserod per GI.  - Continue pantoprazole BID for 2 months, then daily lifelong per GI.  - Continue sucralfate for 2 weeks per GI.  - Follow-up EGD in 6 weeks per GI.    # JEFFRY, suspect prerenal.  * Cr 1.24 on admit 12/19. Started on IVF's.  Recent Labs   Lab 12/21/22  0619 12/20/22  1012 12/19/22  0207   CR 1.09* 1.17* 1.24*   - Monitor BMP.  - Avoid nephrotoxic medications.    # Malnutrition due to above GI issues.  * Noted by Nutritions, see their documentation.  - I d/w Nutrition 12/22 and felt that TPN or enteral nutrition were reasonable at this point.  - I d/w pt. Overall, would favor NJ tube with home TF's rather than TPN. Per pt, Surgery would not favor a surgical g- or j-tube.  - As new GI meds started just yesterday 12/21, will continue to monitor for improvement.  - Continue diet as tolerated; if no improvement with above measures, then plan NJ tube and home TF's.  - Continue IVF's.    # Anemia, suspect dilutional component.  * Hgb 12.9 on admit.   * Hgb 10.0 on 12/21. No overt clinical signs of major bleeding.  Recent Labs   Lab 12/21/22  0619 12/19/22  0110   HGB 10.0* 12.9   - Monitor CBC.  - Consider prbc transfusion if hgb </= 7.0 or if significant bleeding with hemodynamic instability or if symptomatic.    # Recent bilateral pleural effusions.  * Admit CXR showed she had a small " "residual at the left base.   - Monitor clinically.    # Depression/anxiety.  - Continue bupropion XL.    Clinically Significant Risk Factors Present on Admission        # Hypokalemia: Lowest K = 3.1 mmol/L in last 2 days, will replace as needed       # Hypoalbuminemia: Lowest albumin = 2.9 g/dL at 12/19/2022  2:07 AM, will monitor as appropriate          # Obesity: Estimated body mass index is 30.11 kg/m  as calculated from the following:    Height as of this encounter: 1.829 m (6').    Weight as of this encounter: 100.7 kg (222 lb 0.1 oz).             COVID-19 testing.  COVID-19 PCR Results    COVID-19 PCR Results 12/27/21 2/21/22 3/7/22 10/21/22 10/26/22 11/1/22 11/30/22 12/11/22 12/19/22   SARS CoV2 PCR Negative Negative Negative Negative Negative Negative Negative Negative Negative      Comments are available for some flowsheets but are not being displayed.         COVID-19 Antibody Results, Testing for Immunity    COVID-19 Antibody Results, Testing for Immunity   No data to display.             Diet: Diet  Low Fat Diet    Prophylaxis: PCD's, ambulation.   Holder Catheter: Not present  Central Lines: PRESENT     Code Status: Full Code    Disposition Plan   Expected discharge: 1-2d recommended to prior living arrangement pending consistent/stable source of nutrition established.  Entered: Devendra Soares MD 12/22/2022, 11:04 AM     I spent approximately 35 minutes bedside and on the inpatient unit today managing the care of patient. Over 50% of my time on the unit was spent in extensive chart review, counseling the patient/family and/or coordinating care regarding services listed in this note.    Interval History   Had worsened chest pain overnight improved with IV ketorolac.  Has RUQ abdominal pain today, new.  Also notes feels like stomach was \"punched\" after EGD yesterday.  Otherwise, still having trouble with oral intake related to pain and nausea.    -Data reviewed today: I reviewed all new labs and " imaging over the last 24 hours. I personally reviewed the EKG tracing showing findings as above.    Physical Exam    , Blood pressure 113/81, pulse 75, temperature 97.9  F (36.6  C), temperature source Oral, resp. rate 18, height 1.829 m (6'), weight 100.7 kg (222 lb 0.1 oz), last menstrual period 11/14/2022, SpO2 96 %, not currently breastfeeding. O2 Device: Oxymask Oxygen Delivery: 1.5 LPM  Vitals:    12/19/22 0008 12/19/22 0428   Weight: 103.4 kg (228 lb) 100.7 kg (222 lb 0.1 oz)     Vital Signs with Ranges  Temp:  [97.9  F (36.6  C)-98.9  F (37.2  C)] 97.9  F (36.6  C)  Pulse:  [75-85] 75  Resp:  [16-20] 18  BP: (113-133)/(81-95) 113/81  SpO2:  [88 %-96 %] 96 %  Patient Vitals for the past 24 hrs:   BP Temp Temp src Pulse Resp SpO2   12/22/22 0759 113/81 97.9  F (36.6  C) Oral 75 18 96 %   12/22/22 0410 -- -- -- -- 18 --   12/22/22 0329 (!) 129/91 98.6  F (37  C) Oral 83 20 96 %   12/22/22 0115 -- -- -- -- 16 --   12/22/22 0054 -- -- -- -- 16 94 %   12/22/22 0036 125/83 98.2  F (36.8  C) Oral 85 16 (!) 88 %   12/21/22 2259 (!) 133/95 -- -- -- -- --   12/21/22 1930 -- 98.1  F (36.7  C) Oral 80 18 93 %   12/21/22 1521 127/88 98.2  F (36.8  C) Oral 77 18 93 %   12/21/22 1328 119/85 98.9  F (37.2  C) Oral 78 18 95 %     I/O's Last 24 hours  I/O last 3 completed shifts:  In: 200 [I.V.:200]  Out: 0     Constitutional: Awake, alert, fatigued.  Respiratory: Diminished in bases. No crackles or wheezes.  Cardiovascular: RRR, no m/r/g.  GI: Mild distension, tender more in right side, no r/g, few BS.  Skin/Integumen:   Other:        Data   Recent Labs   Lab 12/22/22  0635 12/22/22  0043 12/21/22  0619 12/20/22  1012 12/19/22  0207 12/19/22  0207 12/19/22  0110   WBC  --   --  6.6  --   --   --  8.5   HGB  --   --  10.0*  --   --   --  12.9   MCV  --   --  86  --   --   --  85   PLT  --   --  279  --   --   --  394   INR  --   --   --   --   --   --  1.09   NA  --   --  138 137  --  139  --    POTASSIUM 3.8 3.6 3.1* 3.4   <  > 3.8  --    CHLORIDE  --   --  106 106  --  104  --    CO2  --   --  23 23  --  24  --    BUN  --   --  10 11  --  8  --    CR  --   --  1.09* 1.17*  --  1.24*  --    ANIONGAP  --   --  9 8  --  11  --    KIMBERLYN  --   --  8.7 8.9  --  9.6  --    GLC  --   --  92 107*  --  98  --    ALBUMIN  --   --   --   --   --  2.9*  --    PROTTOTAL  --   --   --   --   --  7.2  --    BILITOTAL  --   --   --   --   --  0.5  --    ALKPHOS  --   --   --   --   --  142  --    ALT  --   --   --   --   --  25  --    AST  --   --   --   --   --  25  --    LIPASE  --   --   --   --   --  196  --    TROPONINIS  --   --   --   --   --  6  --     < > = values in this interval not displayed.     Recent Labs   Lab Test 12/21/22  0619 12/20/22  1012 12/19/22  0207 12/15/22  0514 12/14/22  0548   GLC 92 107* 98 86 82     Recent Labs   Lab 12/21/22  0619 12/20/22  1012 12/19/22  0207 12/19/22  0110   WBC 6.6  --   --  8.5   .0* 168.0* 65.4*  --          No results found for this or any previous visit (from the past 24 hour(s)).    Medications   All medications were reviewed.    sodium chloride 75 mL/hr at 12/22/22 0342       buPROPion  150 mg Oral QAM     calcium carbonate  500 mg Oral TID     colchicine  0.6 mg Oral BID     cyanocobalamin  500 mcg Sublingual Daily     hyoscyamine  0.125 mg Oral 4x Daily AC & HS     indomethacin  25 mg Oral TID w/meals     metoclopramide  10 mg Intravenous Q6H     pantoprazole  40 mg Oral BID AC     sodium chloride (PF)  10 mL Intracatheter Q8H     sodium chloride (PF)  10 mL Intracatheter Q8H     sucralfate  1 g Oral 4x Daily     thiamine  100 mg Oral Daily     [DISCONTINUED] acetaminophen **OR** acetaminophen, acetaminophen, bisacodyl, HYDROmorphone, hydrOXYzine, lidocaine 4%, lidocaine (buffered or not buffered), melatonin, naloxone **OR** naloxone **OR** naloxone **OR** naloxone, ondansetron **OR** ondansetron, oxyCODONE, prochlorperazine **OR** prochlorperazine **OR** prochlorperazine, senna-docusate  **OR** senna-docusate, sodium chloride (PF), sodium chloride (PF)

## 2022-12-22 NOTE — PROVIDER NOTIFICATION
MD Notification    Notified Person: MD    Notified Person Name:  Aracelis    Notification Date/Time: 12/22/22, 0335    Notification Interaction: Amcom    Purpose of Notification: pt requesting dose of Toradol for pain. This was given Monday & very effective. Pt says pain level has been worsening since kumar. PRN IV Dilaudid not as effective.     Orders Received: One-time dose Toradol     Comments:

## 2022-12-22 NOTE — PROGRESS NOTES
[facility]  Cardiology Progress Note    Deepak Conroy MD   12/22/2022          Assessment and Plan:     42-year-old woman now readmitted with recurrent pericarditis failing colchicine only.  We avoided nonsteroidals the first time due to her recent endoscopic gastric sleeve surgery.  GI now states it is okay to proceed with nonsteroidals.  I did give her 1 dose of IV ketorolac monday with an excellent response.  I started Indocin 25 3 times daily with meals on Tuesday.  I have discussed with the hospitalist who agree.  We  continued colchicine twice daily.  I did discuss with patient how the nonsteroidal should be a big benefit for her as they are a pain pill but also affect the inflammation.  I would continue nonsteroidals until she is pain-free and CRP has normalized.  I would continue colchicine for 3 months.    I was told she was going to be discharged because of her mother's passing.  And I signed off.  However she was not discharged.  I saw her ambulating in the [place] clearly still in some distress.  She is still having some pleuritic chest pain and required Toradol last evening.  I will increase her Indocin to 50 mg 3 times daily with meals and write for Toradol as needed.  Hopefully she can be discharged soon.    Pericardial effusion has not reaccumulated.    Will follow-up in our clinic post discharge.             Interval History:     Patient feeling better and in better spirits.              Medications:       sodium chloride 75 mL/hr at 12/22/22 0342       buPROPion  150 mg Oral QAM     calcium carbonate  500 mg Oral TID     colchicine  0.6 mg Oral BID     cyanocobalamin  500 mcg Sublingual Daily     hyoscyamine  0.125 mg Oral 4x Daily AC & HS     indomethacin  50 mg Oral TID w/meals     metoclopramide  10 mg Intravenous Q6H     pantoprazole  40 mg Oral BID AC     sodium chloride (PF)  10 mL Intracatheter Q8H     sodium chloride (PF)  10 mL Intracatheter Q8H     sucralfate  1 g  Oral 4x Daily     thiamine  100 mg Oral Daily                   Physical Exam:   Blood pressure 113/81, pulse 75, temperature 97.9  F (36.6  C), temperature source Oral, resp. rate 18, height 1.829 m (6'), weight 100.7 kg (222 lb 0.1 oz), last menstrual period 11/14/2022, SpO2 96 %, not currently breastfeeding.  Vitals:    12/19/22 0008 12/19/22 0428   Weight: 103.4 kg (228 lb) 100.7 kg (222 lb 0.1 oz)     Vital Signs with Ranges  Temp:  [97.9  F (36.6  C)-98.9  F (37.2  C)] 97.9  F (36.6  C)  Pulse:  [75-85] 75  Resp:  [16-20] 18  BP: (113-133)/(81-95) 113/81  SpO2:  [88 %-96 %] 96 %  I/O's Last 24 hours  I/O last 3 completed shifts:  In: 200 [I.V.:200]  Out: 0        General:  Patient comfortable, in no apparent distress. .  Neck:  No JVD, no carotid bruits.  Lungs:  Clear to auscultation bilaterally.  Cardiac:  Regular rate and rhythm, no murmurs, rub, or gallops.  GI: Abdomen is soft, nontender.  Musculoskeletal.  There is no kyphosis or scoliosis  Extremities:  Without edema.  2+ pulses.  Skin:  Warm, dry.  Neurologic:  No focal deficits. Awake, alert, oriented x3  Psych.  Patient is calm         Data:        Recent Labs   Lab Test 12/22/22  0635 12/22/22  0043 12/21/22  0619 12/20/22  1012 12/19/22  0207 12/11/22  0542 12/11/22  0541 11/06/22  1954 11/06/22  0616 11/05/22  1150 11/05/22  0603   NA  --   --  138 137 139   < > 137   < > 143  --  140   POTASSIUM 3.8 3.6 3.1* 3.4 3.8   < > 3.5   < > 3.5  --  3.7   CHLORIDE  --   --  106 106 104   < > 99   < > 108*  --  107   CO2  --   --  23 23 24   < > 23   < > 23  --  20*   BUN  --   --  10 11 8   < > 5.2*   < > 5.1*  --  3.4*   CR  --   --  1.09* 1.17* 1.24*   < > 0.74   < > 0.97*  --  0.86   ANIONGAP  --   --  9 8 11   < > 15   < > 12  --  13   KIMBERLYN  --   --  8.7 8.9 9.6   < > 9.7   < > 8.3*  --  8.0*   GLC  --   --  92 107* 98   < > 101*   < > 120*   < > 124*   ALBUMIN  --   --   --   --  2.9*   < > 3.7   < > 2.3*  --  2.2*   PROTTOTAL  --   --   --   --  7.2    < > 7.0   < > 5.6*  --  5.4*   GFRESTIMATED  --   --  65 59* 55*   < > >90   < > 74  --  86   NTBNPI  --   --   --   --   --   --  392  --  407  --  249    < > = values in this interval not displayed.     Recent Labs   Lab Test 12/21/22  0619 12/19/22  0110 12/15/22  0514   HGB 10.0* 12.9 10.2*     No lab results found.  Recent Labs   Lab Test 09/01/21  1927   TROPONIN <0.015     Recent Labs   Lab Test 12/11/22  0543 11/08/22  0410   PH 7.36 7.47*   PO2  --  70*   PCO2  --  46*   HCO3  --  33*   ZACH  --  8.4*     Recent Labs   Lab Test 09/01/22  1156   CHOL 186   HDL 37*   *   TRIG 216*

## 2022-12-22 NOTE — PROGRESS NOTES
Orientation/Cognitive: A&Ox4  Observation Goals (Met/ Not Met): Inpatient  Mobility Level/Assist Equipment: SBA  Fall Risk (Y/N): Y  Behavior Concerns: None  Pain Management: Prn Oxy, Dilaudid and scheduled indocin for chest pain relating to pericardial effusion  Tele/VS/O2: NSR, VSS on RA  ABNL Lab/BG: CRP- 187, K- 3.1  Diet: Low Fat Diet  Bowel/Bladder: Cont. B&B  Skin Concerns: None  Drains/Devices: Midline to LUE infusing NS @ 75ml/hr  Tests/Procedures for next shift: Need potassium re-check @ 0000 12/22/22  Anticipated DC date & active delays: TBD  Patient Stated Goal for Today: None

## 2022-12-22 NOTE — PLAN OF CARE
Orientation: A/O  Observation Goals (met & not met): IP  Activity Level: IND/SBA  Fall Risk: Yes  Behavior & Aggression Tool Color: Green  Pain Management: tyl 325 mg x1, oxy x1, indocin sched, ketorolac PRN avail  ABNL VS/O2: VSS RA;  tele NSR  ABNL Lab/BG: , hgb 10, hematocrit 32.6  Diet: Regular  Bowel/Bladder: Continent  Drains/Devices: midline infusing NS @ 75/hr  Tests/Procedures for next shift: none  Anticipated DC date: pending    Pt reports nausea, zofran IV not effective. Relieved with compazine IV.

## 2022-12-22 NOTE — PLAN OF CARE
Orientation: A/O x4  Observation Goals (met & not met): n/a - inpatient  Activity Level: SBA  Fall Risk: Yes  Behavior & Aggression Tool Color: Green  Pain Management: PRN IV Dilaudid given x2, pt requested Toradol as it had been very effective when she received it previously; writer paged on-call provider and one received order for one-time dose of Toradol. Pt reported pain level significantly improved after receiving.  ABNL VS/O2: VSS, placed on 2L O2 via oxymask; sats upper-80s on RA; tele NSR  ABNL Lab/BG:   Diet: Regular  Bowel/Bladder: Continent, pt reports intermittent nausea, tolerated sips of water, declined need for PRNs  Drains/Devices: midline infusing NS @ 75/hr  Tests/Procedures for next shift: none  Anticipated DC date: pending

## 2022-12-22 NOTE — PROGRESS NOTES
"CLINICAL NUTRITION SERVICES  -  ASSESSMENT NOTE      RECOMMENDATIONS FOR MD/PROVIDER TO ORDER:   Could consider change to bariatric soft foods, however, patient is on low fat right now and appreciates the lifted restriction to try new food items   Recommend micronutrients - BID thera-vit-m, vitamin D3, B complex, iron per post-bariatric surgery practice guidelines    Recommend discuss options for alternate means of nutrition -- NJT, J-tube vs TPN. Patient has been unable to tolerate much PO for ~2 months s/p bariatric surgery   Today she is tearful in discussions around the potential need for nasoenteric FT replaced. She expresses how difficult this was to tolerate but seems open to replacing if absolutely needed      Recommendations Ordered by Registered Dietitian (RD):   Ensure Max daily as accepted, pt does not like this very much   Continue antiemetics p/t PO intake    Malnutrition:   % Weight Loss:  > 5% in 1 month (severe malnutrition)- weight loss 2/2 to nature of bariatric surgery in addition to limited PO 2/2 complications   % Intake:  </= 50% for >/= 1 month (severe malnutrition)  Subcutaneous Fat Loss:  None observed   Muscle Loss:  Temporal region up to mild depletion and Clavicle bone region up to mild depletion  Fluid Retention:  None noted    Malnutrition Diagnosis: Severe malnutrition  In Context of:  Acute illness or injury          REASON FOR ASSESSMENT  Leah Yanez is a 42 year old female seen by Registered Dietitian for Positive Admission Nutrition Risk Screen (34 lbs or more)   Admitted and screened while Observation status 12/19  Flipped inpatient 12/21    Readmits with recurrent symptoms of pericarditis     NUTRITION HISTORY  Chart reviewed and discussed with patient at bedside  She was home for a few days in between hospitalization last week and states that she has had only 1.5 days of \"pretty good\" food intake   Overall is tearful when discussing how difficult eating has been since " "bariatric surgery 2 months ago. She was traumatized by previous short term FT but reports openness to alternate means of nutrition if truly necessary   Generally has softer foods at baseline and has a tough time tolerating and liking protein supplements -- she does like [employer] chocolate milk (not the protein shake version) and drinks this when able     Most recent bariatric RD note and assessment from 12/6/22 - protein/calorie/micronutrient goals were discussed along with protein drink recipes and diet progression handouts     Patient seen by the RD here 1 week ago and the following history was recorded ~  - PMH of depression, GERD, PE, renal syndrome, Griggs's esophagus, hiatal hernia, pulmonary embolism with anticardiolipin antibody positive, obesity s/p laparoscopic sleeve gastrectomy (10/25/22), Raynaud's  - pt followed by bariatric RD team  - per chart review, EGD was then completed on 11/14/2022 and it was documented that there was no evidence of stricture of the esophagus or stomach.  The patient did undergo dilation of the body of her sleeve and NJ tube was placed for supplemental feeds.  Patient reports persistent issues with nausea/vomiting following the above procedures but denies any significant abdominal pain.  Her feeding tube was then removed and the patient underwent EGD on 11/30/2022. On 12/2/2022, the patient underwent repeat EGD with esophageal dilation and removal of gastric stent.  The patient reports that she was able to resume oral intake over the past week.  She was able to ingest protein shake, egg, and pudding over the course of the week.   - per RD note on 12/6:  11/14/22 - EGD with dilation of gastric stricture, NJ tube placement and initiation of tube feedings.   11/28/22 - NJT removed at home  11/30/22 -  stent placement at gastric stricture  12/2/22 - stent removal     Per H&P --   \"She reports she has not been able to tolerate PO since her surgery. She has nausea/emesis with most " "PO intake; although this has improved some over the past few days and she has been tolerating more PO. She reports after her hematoma washout she did have a nasojejunal feeding tube and she did not tolerate it well and she couldn't each and felt she was gagging on the tube and that she does not think she would want to try that again.\"    \"Surgery was consulted to discuss possible gastrostomy vs jejunostomy due to ongoing poor PO intake. At this point, I would not recommend a surgical feeding tube and discussed reasons why with Leah. She is high risk for complications with further surgical procedures and ideally we would try a nasojejunal tube or with further endoscopic intervention/zofran/medical management, she will be able to tolerate adequate PO intake. If these measures all fail, could consider laparoscopic jejunostomy tube placement.\"      CURRENT NUTRITION ORDERS  Diet Order:     Low fat     Current Intake/Tolerance:  Intermittent nausea, tolerating sips of water and bites of banana and yogurt - having abdominal pain as well   States that she tolerated \"one triangle\" of a cheese quesadilla yesterday which went OK  Getting IV compazine during our visit, this has been working better than Zofran   Willing to try Ensure Max again but will only accept when very cold (chocolate preferred)  Is tearful when discussing nutrition and eating overall     EGD done with dilation of gastric stenosis yesterday 12/21   D/w MD regarding nutrition support options. Per his note this afternoon, new GI meds started 12/21 with f/u EGD in 6 weeks - continuing to monitor for improvement. Pt expressed to MD that home TF with NJT preferred if needed over TPN, and surgery previously advised against G or J tube     NUTRITION FOCUSED PHYSICAL ASSESSMENT FOR DIAGNOSING MALNUTRITION)  Yes         Observed:    Mild muscle losses observed in 2 regions, more likely masked by adiposity     Obtained from Chart/Interdisciplinary Team:  None noted " "    ANTHROPOMETRICS  Height: 6' 0\"  Weight: 222 lbs .05 oz  Body mass index is 30.11 kg/m .  Weight Status:  Obesity Grade I BMI 30-34.9  IBW: 72.7 kg  % IBW: 138%  Weight History: Weight down with elective bariatric surgery - this is intentional. Suspect more significant wt loss more recently given ongoing limited PO intake as noted above. Wt is down 55 lbs in 1.5 months (20%)  Wt Readings from Last 10 Encounters:   12/19/22 100.7 kg (222 lb 0.1 oz)   12/15/22 103.6 kg (228 lb 6.4 oz)   12/11/22 106.6 kg (235 lb)   11/30/22 106.6 kg (235 lb 0.2 oz)   11/16/22 111 kg (244 lb 12.8 oz)   11/01/22 125.9 kg (277 lb 8 oz)   10/25/22 122.6 kg (270 lb 4.5 oz)   10/19/22 123.7 kg (272 lb 12.8 oz)   08/18/22 126.1 kg (278 lb)   08/10/22 127.8 kg (281 lb 11.2 oz)       LABS  Labs reviewed  K/Mg WNL   (H)  Recent Labs   Lab 12/21/22  0619 12/20/22  1012 12/19/22  0207   GLC 92 107* 98     Lab Results   Component Value Date    URINEKETONE Negative 11/06/2022       MEDICATIONS  Medications reviewed  Calcium carbonate   Vitamin B 12  Reglan   Thiamine   NaCl 75 mL/hr     ASSESSED NUTRITION NEEDS PER APPROVED PRACTICE GUIDELINES:    Dosing Weight 80 kg (adjusted)  Estimated Energy Needs: 9457-9631+ kcals (20-25+ Kcal/Kg)  Justification: s/p bariatric surgery with recent repletion and maintenance needs   Estimated Protein Needs: + grams protein (1.2-1.5 g pro/Kg)  Justification: Repletion, hypercatabolism with acute illness, obesity guidelines and preservation of lean body mass  Estimated Fluid Needs: >1 mL/kcal or per MD      MALNUTRITION:  % Weight Loss:  > 5% in 1 month (severe malnutrition)- weight loss 2/2 to nature of bariatric surgery in addition to limited PO 2/2 complications   % Intake:  </= 50% for >/= 1 month (severe malnutrition)  Subcutaneous Fat Loss:  None observed   Muscle Loss:  Temporal region up to mild depletion and Clavicle bone region up to mild depletion  Fluid Retention:  None " noted    Malnutrition Diagnosis: Severe malnutrition  In Context of:  Acute illness or injury    NUTRITION DIAGNOSIS:  Inadequate protein-energy intake related to poor appetite/intake with nausea, vomiting and abdominal pain s/p bariatric surgery (inaddition to hypocaloric/restricted intakes expected from procedure) as evidenced by limited oral intakes for 2 months post-op      NUTRITION INTERVENTIONS  Recommendations / Nutrition Prescription  Diet as tolerated, consider bariatric soft foods if PO/appetite improves  Consider alternate means of nutrition (EN vs PN)   Ensure Max daily as able       Implementation  Nutrition education: Per Provider order if indicated   Composition of Meals and Snacks, Medical Food Supplement, Multivitamin/Mineral and Collaboration and Referral of Nutrition care: as above; spoke with MD      Nutrition Goals  Patient will consume 75% meals and or supplements 3x/day vs nutrition support       MONITORING AND EVALUATION:  Progress towards goals will be monitored and evaluated per protocol and Practice Guidelines      Brit Kingsley RD, LD  Clinical Dietitian      Review the multicolored written discharge instruction sheet.  A responsible adult should be with you the first 24 hours after surgery.   Call doctor for:   pain that is getting worse or is not controlled with pain medicine.   if you have not urinated  within 8 hours after surgery or have difficulty urinating.

## 2022-12-23 VITALS
WEIGHT: 222 LBS | SYSTOLIC BLOOD PRESSURE: 144 MMHG | BODY MASS INDEX: 30.07 KG/M2 | TEMPERATURE: 97.8 F | RESPIRATION RATE: 18 BRPM | HEIGHT: 72 IN | DIASTOLIC BLOOD PRESSURE: 92 MMHG | HEART RATE: 68 BPM | OXYGEN SATURATION: 96 %

## 2022-12-23 LAB
ANION GAP SERPL CALCULATED.3IONS-SCNC: 8 MMOL/L (ref 3–14)
BASOPHILS # BLD AUTO: 0 10E3/UL (ref 0–0.2)
BASOPHILS NFR BLD AUTO: 0 %
BUN SERPL-MCNC: 7 MG/DL (ref 7–30)
CALCIUM SERPL-MCNC: 8.5 MG/DL (ref 8.5–10.1)
CHLORIDE BLD-SCNC: 106 MMOL/L (ref 94–109)
CO2 SERPL-SCNC: 24 MMOL/L (ref 20–32)
CREAT SERPL-MCNC: 0.91 MG/DL (ref 0.52–1.04)
EOSINOPHIL # BLD AUTO: 0.1 10E3/UL (ref 0–0.7)
EOSINOPHIL NFR BLD AUTO: 1 %
ERYTHROCYTE [DISTWIDTH] IN BLOOD BY AUTOMATED COUNT: 17.2 % (ref 10–15)
GFR SERPL CREATININE-BSD FRML MDRD: 80 ML/MIN/1.73M2
GLUCOSE BLD-MCNC: 93 MG/DL (ref 70–99)
HCT VFR BLD AUTO: 30.5 % (ref 35–47)
HGB BLD-MCNC: 9.4 G/DL (ref 11.7–15.7)
IMM GRANULOCYTES # BLD: 0 10E3/UL
IMM GRANULOCYTES NFR BLD: 0 %
LYMPHOCYTES # BLD AUTO: 1.3 10E3/UL (ref 0.8–5.3)
LYMPHOCYTES NFR BLD AUTO: 24 %
MAGNESIUM SERPL-MCNC: 1.6 MG/DL (ref 1.6–2.3)
MCH RBC QN AUTO: 26.8 PG (ref 26.5–33)
MCHC RBC AUTO-ENTMCNC: 30.8 G/DL (ref 31.5–36.5)
MCV RBC AUTO: 87 FL (ref 78–100)
MONOCYTES # BLD AUTO: 0.6 10E3/UL (ref 0–1.3)
MONOCYTES NFR BLD AUTO: 12 %
NEUTROPHILS # BLD AUTO: 3.5 10E3/UL (ref 1.6–8.3)
NEUTROPHILS NFR BLD AUTO: 63 %
NRBC # BLD AUTO: 0 10E3/UL
NRBC BLD AUTO-RTO: 0 /100
PATH REPORT.COMMENTS IMP SPEC: NORMAL
PATH REPORT.COMMENTS IMP SPEC: NORMAL
PATH REPORT.FINAL DX SPEC: NORMAL
PATH REPORT.GROSS SPEC: NORMAL
PATH REPORT.MICROSCOPIC SPEC OTHER STN: NORMAL
PATH REPORT.RELEVANT HX SPEC: NORMAL
PHOTO IMAGE: NORMAL
PLATELET # BLD AUTO: 284 10E3/UL (ref 150–450)
POTASSIUM BLD-SCNC: 3.2 MMOL/L (ref 3.4–5.3)
RBC # BLD AUTO: 3.51 10E6/UL (ref 3.8–5.2)
SODIUM SERPL-SCNC: 138 MMOL/L (ref 133–144)
WBC # BLD AUTO: 5.5 10E3/UL (ref 4–11)

## 2022-12-23 PROCEDURE — 85025 COMPLETE CBC W/AUTO DIFF WBC: CPT | Performed by: INTERNAL MEDICINE

## 2022-12-23 PROCEDURE — 250N000011 HC RX IP 250 OP 636: Performed by: INTERNAL MEDICINE

## 2022-12-23 PROCEDURE — 99217 PR OBSERVATION CARE DISCHARGE: CPT | Performed by: INTERNAL MEDICINE

## 2022-12-23 PROCEDURE — 88342 IMHCHEM/IMCYTCHM 1ST ANTB: CPT | Mod: 26 | Performed by: PATHOLOGY

## 2022-12-23 PROCEDURE — 83735 ASSAY OF MAGNESIUM: CPT | Performed by: INTERNAL MEDICINE

## 2022-12-23 PROCEDURE — 88305 TISSUE EXAM BY PATHOLOGIST: CPT | Mod: 26 | Performed by: PATHOLOGY

## 2022-12-23 PROCEDURE — 999N000040 HC STATISTIC CONSULT NO CHARGE VASC ACCESS

## 2022-12-23 PROCEDURE — 250N000013 HC RX MED GY IP 250 OP 250 PS 637: Performed by: INTERNAL MEDICINE

## 2022-12-23 PROCEDURE — 80048 BASIC METABOLIC PNL TOTAL CA: CPT | Performed by: INTERNAL MEDICINE

## 2022-12-23 PROCEDURE — 258N000003 HC RX IP 258 OP 636: Performed by: INTERNAL MEDICINE

## 2022-12-23 RX ORDER — LOPERAMIDE HCL 1 MG/5ML
2 SOLUTION ORAL 4 TIMES DAILY PRN
Qty: 118 ML | Refills: 0 | Status: SHIPPED | OUTPATIENT
Start: 2022-12-23 | End: 2023-01-16

## 2022-12-23 RX ORDER — POTASSIUM CHLORIDE 1500 MG/1
40 TABLET, EXTENDED RELEASE ORAL ONCE
Status: COMPLETED | OUTPATIENT
Start: 2022-12-23 | End: 2022-12-23

## 2022-12-23 RX ORDER — PANTOPRAZOLE SODIUM 40 MG/1
40 TABLET, DELAYED RELEASE ORAL
Qty: 60 TABLET | Refills: 3 | Status: SHIPPED | OUTPATIENT
Start: 2022-12-23 | End: 2023-07-26

## 2022-12-23 RX ORDER — POTASSIUM CHLORIDE 7.45 MG/ML
10 INJECTION INTRAVENOUS
Status: COMPLETED | OUTPATIENT
Start: 2022-12-23 | End: 2022-12-23

## 2022-12-23 RX ORDER — INDOMETHACIN 50 MG/1
50 CAPSULE ORAL
Qty: 30 CAPSULE | Refills: 2 | Status: SHIPPED | OUTPATIENT
Start: 2022-12-23 | End: 2023-01-16

## 2022-12-23 RX ORDER — SUCRALFATE ORAL 1 G/10ML
1 SUSPENSION ORAL 4 TIMES DAILY
Qty: 414 ML | Refills: 0 | Status: SHIPPED | OUTPATIENT
Start: 2022-12-23 | End: 2023-01-16

## 2022-12-23 RX ADMIN — SUCRALFATE 1 G: 1 SUSPENSION ORAL at 07:57

## 2022-12-23 RX ADMIN — PROCHLORPERAZINE EDISYLATE 10 MG: 5 INJECTION INTRAMUSCULAR; INTRAVENOUS at 08:29

## 2022-12-23 RX ADMIN — INDOMETHACIN 50 MG: 25 CAPSULE ORAL at 11:24

## 2022-12-23 RX ADMIN — METOCLOPRAMIDE 10 MG: 5 INJECTION, SOLUTION INTRAMUSCULAR; INTRAVENOUS at 03:02

## 2022-12-23 RX ADMIN — METOCLOPRAMIDE 10 MG: 5 INJECTION, SOLUTION INTRAMUSCULAR; INTRAVENOUS at 09:46

## 2022-12-23 RX ADMIN — HYDROMORPHONE HYDROCHLORIDE 0.5 MG: 1 INJECTION, SOLUTION INTRAMUSCULAR; INTRAVENOUS; SUBCUTANEOUS at 03:28

## 2022-12-23 RX ADMIN — COLCHICINE 0.6 MG: 0.6 TABLET ORAL at 07:56

## 2022-12-23 RX ADMIN — POTASSIUM CHLORIDE 10 MEQ: 7.46 INJECTION, SOLUTION INTRAVENOUS at 14:26

## 2022-12-23 RX ADMIN — POTASSIUM CHLORIDE 40 MEQ: 1500 TABLET, EXTENDED RELEASE ORAL at 07:56

## 2022-12-23 RX ADMIN — SUCRALFATE 1 G: 1 SUSPENSION ORAL at 11:24

## 2022-12-23 RX ADMIN — CALCIUM CARBONATE (ANTACID) CHEW TAB 500 MG 500 MG: 500 CHEW TAB at 13:24

## 2022-12-23 RX ADMIN — INDOMETHACIN 50 MG: 25 CAPSULE ORAL at 07:56

## 2022-12-23 RX ADMIN — POTASSIUM CHLORIDE 10 MEQ: 7.46 INJECTION, SOLUTION INTRAVENOUS at 13:24

## 2022-12-23 RX ADMIN — HYOSCYAMINE SULFATE 0.12 MG: 0.12 TABLET ORAL at 07:56

## 2022-12-23 RX ADMIN — HYOSCYAMINE SULFATE 0.12 MG: 0.12 TABLET ORAL at 11:24

## 2022-12-23 RX ADMIN — KETOROLAC TROMETHAMINE 30 MG: 30 INJECTION, SOLUTION INTRAMUSCULAR; INTRAVENOUS at 02:48

## 2022-12-23 RX ADMIN — CALCIUM CARBONATE (ANTACID) CHEW TAB 500 MG 500 MG: 500 CHEW TAB at 07:56

## 2022-12-23 RX ADMIN — SODIUM CHLORIDE: 9 INJECTION, SOLUTION INTRAVENOUS at 05:58

## 2022-12-23 RX ADMIN — BUPROPION HYDROCHLORIDE 150 MG: 150 TABLET, FILM COATED, EXTENDED RELEASE ORAL at 07:57

## 2022-12-23 RX ADMIN — POTASSIUM CHLORIDE 10 MEQ: 7.46 INJECTION, SOLUTION INTRAVENOUS at 12:26

## 2022-12-23 RX ADMIN — PANTOPRAZOLE SODIUM 40 MG: 40 TABLET, DELAYED RELEASE ORAL at 07:57

## 2022-12-23 RX ADMIN — PROCHLORPERAZINE MALEATE 10 MG: 10 TABLET ORAL at 16:38

## 2022-12-23 RX ADMIN — THIAMINE HCL TAB 100 MG 100 MG: 100 TAB at 07:56

## 2022-12-23 RX ADMIN — ONDANSETRON 4 MG: 4 TABLET, ORALLY DISINTEGRATING ORAL at 11:26

## 2022-12-23 RX ADMIN — Medication 500 MCG: at 07:56

## 2022-12-23 RX ADMIN — POTASSIUM CHLORIDE 10 MEQ: 7.46 INJECTION, SOLUTION INTRAVENOUS at 11:23

## 2022-12-23 ASSESSMENT — ACTIVITIES OF DAILY LIVING (ADL)
ADLS_ACUITY_SCORE: 20

## 2022-12-23 NOTE — PROGRESS NOTES
Orientation/Cognitive: A&Ox4  Observation Goals (Met/ Not Met): Inpatient  Mobility Level/Assist Equipment: Ind/SBA  Fall Risk (Y/N): N  Behavior Concerns: None  Pain Management: Scheduled Oxy, Dilaudid, Indocin  Tele/VS/O2: b/p elevated to 130/94, stable on RA  ABNL Lab/BG: CRP- 187  Diet: Low Fat  Bowel/Bladder: Cont. B&B  Skin Concerns: None  Drains/Devices: Midline - NS @ 75ml/hr  Tests/Procedures for next shift: None  Anticipated DC date & active delays: Pending  Patient Stated Goal for Today: None

## 2022-12-23 NOTE — PROGRESS NOTES
Shift: 9935-8030  Orientation/Cognitive: AOx4  Observation Goals (Met/ Not Met): inpatient  Mobility Level/Assist Equipment: SBA - independent most of the time  Fall Risk (Y/N): no  Behavior Concerns: calm and cooperative  Pain Management: chest pain of 5/10, hurts when deep breathing per patient - gave Ketorolac 30mg - pain just went down to 4/10. Gave Dilaudid 0.5mg for chest pain 4/10 - patient asleep, pain went down to 3/10, tolerable for patient.  Tele/VS/O2: vitally stable and on room air; /100   ABNL Lab/BG: Potassium 3.2 - ordered Potassium 40meqs scheduled at 0730 with re-check potassium at 1200.  Diet: low fat diet  Bowel/Bladder: continent bowel and bladder  Skin Concerns: none  Drains/Devices: PICC on left AC  Tests/Procedures for next shift: none  Anticipated DC date & active delays: TBD  Patient Stated Goal for Today: pain and nausea control.

## 2022-12-23 NOTE — PLAN OF CARE
Discharge instructions discussed and questions answered. Discharge medications sent with patient.

## 2022-12-23 NOTE — DISCHARGE SUMMARY
"[facility]  Discharge Summary        Leah Yanez MRN# [id number]   YOB: 1980 Age: 42 year old     Date of Admission: 12/19/2022  Date of Discharge: 12/23/2022  Admitting Physician: Pradeep Milner MD  Discharge Physician: Devendra Soares MD     Primary Provider: [facility]  Primary Care Physician Phone Number: [phone]         Discharge Diagnoses:   1. Pericarditis with pericardial effusion, now trivial effusion.  2. Gastric stenosis s/p dilatation 12/21/2022.  3. Superficial gastric ulcers, likely due to stasis.  4. Cholelithiasis.  5. RUQ abdominal pain.  6. Status post laparoscopic sleeve gastrectomy on 10/25/2022.  7. Gastroesophageal reflux disease.  8. JEFFRY, suspect prerenal.  9. Severe malnutrition due to above GI issues.  10. Anemia, suspect dilutional component.  11. Loose stools, possibly from meds.  12. Hypokalemia.    13. Hypomagnesemia.  14. Hypoalbuminemia.        Other Chronic Medical Problems:      1. Depression/anxiety.  2. Griggs's esophagus.  3. Obesity.       Allergies:         Allergies   Allergen Reactions     Azithromycin      Erythromycin GI Disturbance     When \"very young\"           Discharge Medications:        Current Discharge Medication List      START taking these medications    Details   indomethacin (INDOCIN) 50 MG capsule Take 1 capsule (50 mg) by mouth 3 times daily (with meals) for 30 doses  Qty: 30 capsule, Refills: 2    Associated Diagnoses: Acute pericarditis, unspecified type      loperamide (IMODIUM) 1 MG/5ML solution Take 10 mLs (2 mg) by mouth 4 times daily as needed for diarrhea  Qty: 118 mL, Refills: 0    Associated Diagnoses: Loose stools      metoclopramide (REGLAN) 10 MG tablet Take 1 tablet (10 mg) by mouth 4 times daily (before meals and nightly)  Qty: 56 tablet, Refills: 0    Associated Diagnoses: Gastroesophageal reflux disease with esophagitis without hemorrhage         CONTINUE these " medications which have CHANGED    Details   pantoprazole (PROTONIX) 40 MG EC tablet Take 1 tablet (40 mg) by mouth 2 times daily (before meals) ; twice daily for 2 months, then daily indefinitely.  Qty: 60 tablet, Refills: 3    Associated Diagnoses: Gastric anastomotic stricture         CONTINUE these medications which have NOT CHANGED    Details   acetaminophen (TYLENOL) 500 MG tablet Take 1,000 mg by mouth every 6 hours as needed for mild pain      buPROPion (WELLBUTRIN XL) 150 MG 24 hr tablet Take 150 mg by mouth every morning Switch to Bupropion 75mg immediate release twice daily when ordered      calcium carbonate (TUMS) 500 MG chewable tablet Take 1 chew tab by mouth 3 times daily      colchicine (COLCYRS) 0.6 MG tablet Take 1 tablet (0.6 mg) by mouth 2 times daily for 12 days  Qty: 24 tablet, Refills: 0    Associated Diagnoses: Pericardial effusion      Cyanocobalamin (B-12) 500 MCG SUBL Place 1 tablet under the tongue daily  Qty: 30 tablet, Refills: 11    Associated Diagnoses: S/P laparoscopic sleeve gastrectomy      hydrOXYzine (ATARAX) 25 MG tablet Take 1 tablet (25 mg) by mouth 3 times daily as needed for itching  Qty: 10 tablet, Refills: 0    Associated Diagnoses: Gastric anastomotic stricture      hyoscyamine SL (LEVSIN/SL) 0.125 MG sublingual tablet Take 1 tablet (0.125 mg) by mouth 4 times daily (before meals and nightly)  Qty: 50 tablet, Refills: 1    Comments: Future refills by PCP Dr. OZUNA [facility] with phone number [phone].  Associated Diagnoses: Gastric anastomotic stricture      Multiple Vitamins-Iron (MULTIVITAMIN/IRON PO) Take 1 tablet by mouth daily      ondansetron (ZOFRAN ODT) 4 MG ODT tab Take 1 tablet (4 mg) by mouth every 6 hours as needed for nausea or vomiting  Qty: 25 tablet, Refills: 0    Associated Diagnoses: Gastric anastomotic stricture      oxyCODONE (ROXICODONE) 5 MG tablet Take 1 tablet (5 mg) by mouth every 6 hours as needed for moderate to severe  pain  Qty: 12 tablet, Refills: 0    Associated Diagnoses: S/P laparoscopic sleeve gastrectomy      senna-docusate (SENOKOT-S/PERICOLACE) 8.6-50 MG tablet Take 2 tablets by mouth daily as needed for constipation (While taking narcotic pain medications.  Stop taking if having loose stools.)  Qty: 30 tablet, Refills: 1    Associated Diagnoses: Class 2 severe obesity with serious comorbidity and body mass index (BMI) of 38.0 to 38.9 in adult, unspecified obesity type (H); At high risk for postoperative complications      sucralfate (CARAFATE) 1 GM/10ML suspension Take 10 mLs (1 g) by mouth 4 times daily for 10 days  Qty: 414 mL, Refills: 0    Comments: Future refills by PCP Dr. OZUNA [facility] with phone number [phone].  Associated Diagnoses: Gastric anastomotic stricture      thiamine (B-1) 100 MG tablet Take 1 tablet (100 mg) by mouth daily  Qty: 30 tablet, Refills: 1    Associated Diagnoses: S/P laparoscopic sleeve gastrectomy; Nausea with vomiting      CALCIUM CITRATE-VITAMIN D3 PO          STOP taking these medications       potassium chloride ER (KLOR-CON M) 20 MEQ CR tablet Comments:   Reason for Stopping:         traMADol (ULTRAM) 50 MG tablet Comments:   Reason for Stopping:                   Discharge Instructions and Follow-Up:      Discharge Orders      Follow-up and recommended labs and tests     If you would like to establish care with our bariatric team in [place], call our office at [phone] to schedule an appointment. Dr. Anu Hunter (general surgeon you saw during your hospital stay) recommends that you see establish care under Dr. Arun Lo. He is one of our bariatric surgeons. We have 2 physician assistants who work in our [facility] and you may see one of them initially. Our clinic's name is [facility]. The address is [address] [place] [address] [address], [place], [place], [address]     Activity    Your activity upon discharge: activity as tolerated     When to  contact your care team    Call your primary doctor if you have any of the following: temperature greater than 100.4 or less than 96,  increased shortness of breath, increased swelling, or increased pain.     Reason for your hospital stay    1. Pericarditis with pericardial effusion, now trivial effusion.  2. Gastric stenosis s/p dilatation 12/21/2022.  3. Superficial gastric ulcers, likely due to stasis.  4. Cholelithiasis.  5. RUQ abdominal pain.  6. Status post laparoscopic sleeve gastrectomy on 10/25/2022.  7. Gastroesophageal reflux disease.  8. JEFFRY, suspect prerenal.  9. Severe malnutrition due to above GI issues.  10. Anemia, suspect dilutional component.  11. Loose stools, possibly from meds.  12. Hypokalemia.    13. Hypomagnesemia.  14. Hypoalbuminemia.     Follow-up and recommended labs and tests     Follow up with primary care provider, [facility], within 7 days for hospital follow- up.  The following labs/tests are recommended: BMP, CBC. She was started on indomethacin please follow-up creatinine closely.      Follow-up with Cardiology team within 1-2 weeks.  Follow-up with [facility] in 6-8 weeks.    Follow up with [facility] as recommended     Diet    Follow this diet upon discharge: Orders Placed This Encounter      Snacks/Supplements Adult: Ensure Max Protein (bariatric); With Meals      Diet      Low Fat Diet             Consultations This Hospital Stay:      CARDIOLOGY IP CONSULT  VASCULAR ACCESS ADULT IP CONSULT  GASTROENTEROLOGY IP CONSULT  VASCULAR ACCESS ADULT IP CONSULT  SURGERY GENERAL IP CONSULT  VASCULAR ACCESS ADULT IP CONSULT  SURGERY GENERAL IP CONSULT        Admission History:      Please see the H&P by Pradeep Milner MD on 12/19/2022 for complete details. Briefly, Ms. Yanez is a 42-year-old female with a past medical history significant for obesity s/p laparoscopic sleeve gastrectomy (10/25/2022); GERD with Griggs's esophagus; and recent hospitalization for  "pericarditis with pericardial effusion requiring pericardiocentesis (hospitalized 12/11-12/15/2022); who presented 12/18/2022 with recurrent symptoms of her pericarditis.    On initial evaluation 12/18, was afebrile, tachycardic, BP normal. ECG showed sinus tachycardia with diffuse NSTWA. Labs showed BMP with cr 1.24, otherwise normal; CBC unremarkable; LFT's unremarkable; CRP 65.4; trop negative. CXR 12/19 showed persistent airspace infiltrate and a small effusion at the left lung base.        Problem Oriented Hospital Course:        # Pericarditis with pericardial effusion, now trivial effusion.  * She is status post emergent pericardiocentesis with drain placement on 12/11/2022 (subsequently removed) and has been maintained on colchicine.  * Initial presentation as above. Cardiology consulted on admit.  * Echo 12/19 showed trivial pericardial effusion.   * Had significant improvement with trial of IV ketorolac and subsequently started on indomethacin.   * Early am 12/22 had worsened pain, improved with IV ketorolac. Indomethacin increased 12/22.  - Continue colchicine 0.6 mg BID and indomethacin 50 mg TID for 3 months.  - Will need to monitor kidney function and GI symptoms closely.  - Follow-up with Cardiology.    # RUQ abdominal pain, question related to issues below.  # Gastric stenosis s/p dilatation 12/21/2022.  # Superficial gastric ulcers, likely due to stasis.  # Cholelithiasis.  # Status post laparoscopic sleeve gastrectomy on 10/25/2022.  # Gastroesophageal reflux disease.  # Griggs's esophagus.  * This hospitalization, noted that pt has been having significant issues with ongoing nutrition and keeping food and fluids down. GI and Surgery consulted.  * S/p EGD 12/21 that showed \"Gastric stenosis at 50 cm, lumen about 14 mm-balloon dilated to 19 mm. Two superficial gastric ulcers just proximal to the stenosis, likely due to stasis. Gastric mucosa proximal to stenosis appeared indurated. Nodular mucosal " "in the cardia, bx taken. Duodenum normal\"   * Started on a short trial of metoclopramide as a promotility agent.  * On 12/22, pt noted continued abdominal pain more in RUQ now. RUQ US 12/22 showed cholelithiasis, no acute findings.  * On 12/23, pt noted she was eating more, keeping food down; was having nausea with potassium. R sided abdominal pain improved.  - Continue metoclopramide QID for 6 weeks, then consider change to tegaserod per GI.  - Continue pantoprazole BID for 2 months, then daily lifelong per GI.  - Continue sucralfate for 2 weeks per GI.  - Follow-up EGD in 6 weeks per GI.    # JEFFRY, suspect prerenal.  * Cr 1.24 on admit 12/19. Started on IVF's.  Recent Labs   Lab 12/23/22  0559 12/21/22  0619 12/20/22  1012 12/19/22  0207   CR 0.91 1.09* 1.17* 1.24*   - Monitor BMP.  - Avoid nephrotoxic medications.    # Malnutrition due to above GI issues.  * Noted by Nutritions, see their documentation.  * I d/w Nutrition 12/22 and felt that TPN or enteral nutrition were reasonable; I d/w pt an doverall, if needed, would favor NJ tube with home TF's rather than TPN. Per pt, Surgery would not favor a surgical g- or j-tube. However, as new GI meds started just 12/21 and oral intake improved, decided to continue to monitor for improvement outpatient.  - Continue diet as tolerated; consider NJ tube if pt unable to meet her nutritional needs.    # Anemia, suspect dilutional component.  * Hgb 12.9 on admit.   * Hgb 10.0 on 12/21. No overt clinical signs of major bleeding.  Recent Labs   Lab 12/23/22  0559 12/21/22  0619 12/19/22  0110   HGB 9.4* 10.0* 12.9   - Monitor CBC.  - Consider prbc transfusion if hgb </= 7.0 or if significant bleeding with hemodynamic instability or if symptomatic.    # Loose stools, possibly from meds.  - Discharge on PRN loperamide, but use judiciously as could potentially worsen other GI symptoms.    # Recent bilateral pleural effusions.  * Admit CXR showed she had a small residual at the " left base.   - Monitor clinically.    # Depression/anxiety.  - Continue bupropion XL.    Clinically Significant Risk Factors        # Hypokalemia: Lowest K = 3.2 mmol/L in last 2 days, will replace as needed     # Hypomagnesemia: Lowest Mg = 1.6 mg/dL in last 2 days, will replace as needed   # Hypoalbuminemia: Lowest albumin = 2.9 g/dL at 12/19/2022  2:07 AM, will monitor as appropriate            # Obesity: Estimated body mass index is 30.11 kg/m  as calculated from the following:    Height as of this encounter: 1.829 m (6').    Weight as of this encounter: 100.7 kg (222 lb 0.1 oz)., PRESENT ON ADMISSION  # Severe Malnutrition: based on nutrition assessment, PRESENT ON ADMISSION             Pending Results:        Unresulted Labs Ordered in the Past 30 Days of this Admission     Date and Time Order Name Status Description    12/21/2022  8:56 AM Surgical Pathology Exam In process                 Discharge Disposition:      Discharged to home.        Discharge Time:      Approximately 35 minutes.          Condition and Physical on Discharge:    See progress note on the same date as this discharge summary.          Key Imaging Studies, Lab Findings and Procedures/Surgeries:        Results for orders placed or performed during the hospital encounter of 12/19/22   XR Chest 2 Views    Narrative    EXAM: XR CHEST 2 VIEWS  LOCATION: [facility]  DATE/TIME: 12/19/2022 1:46 AM    INDICATION: chest pain  COMPARISON: 12/11/2022      Impression    IMPRESSION: Heart size within normal limits. The right lung is now clear. There is persistent airspace infiltrate and a small effusion at the left lung base. No pneumothorax.   POC US ECHO LIMITED    Impression    [facility] Procedure Note      Limited Bedside ED Cardiac Ultrasound:    PROCEDURE: PERFORMED BY: Dr. Jin Samuel MD  INDICATIONS/SYMPTOM:  Chest Pain  PROBE: Cardiac phased array probe  BODY LOCATION: Chest  FINDINGS:   The ultrasound  was performed utilizing the parasternal long axis, parasternal short axis and apical 4 chamber views.  Cardiac contractility:  Present  Gross estimation of cardiac kinesis: normal  Pericardial Effusion:  Small  RV:LV ratio: LV > RV  INTERPRETATION:    Chamber size and motion were grossly normal with LV > RV, normal cardiac kinesis.  Pericardial effusion was found.    IMAGE DOCUMENTATION: Images were archived to PACs system.       US Abdomen Limited    Narrative    US ABDOMEN LIMITED 2022 1:36 PM    CLINICAL HISTORY: RUQ pain, eval for signs of cholecystitis  TECHNIQUE: Limited abdominal ultrasound.    COMPARISON: 11/3/2022 and 2022    FINDINGS:    GALLBLADDER: Multiple mobile stones and sludge in the gallbladder  lumen. No gallbladder wall thickening or pericholecystic fluid.  Sonographic Dougherty sign was negative.    BILE DUCTS: There is no intrahepatic or extrahepatic biliary  dilatation. The common duct measures 1.4mm.    LIVER: Normal.    RIGHT KIDNEY: Normal. No hydronephrosis.    PANCREAS: The visualized portions of the pancreas are normal.      Impression    IMPRESSION:  1.  Cholelithiasis. No evidence of acute cholecystitis. No biliary  ductal dilatation.    RICK STEINER MD         SYSTEM ID:  [id number]   Echocardiogram Limited     Value    LVEF  55%    Narrative    [id number]  [id number]  [id number]  [id number]^NOEL^SUMMER     [facility]  Echocardiography Laboratory  [address] [place] [address]     Name: ASIYA PRECIADO  MRN: [id number]  : 1980  Study Date: 2022 09:03 AM  Age: 42 yrs  Gender: Female  Patient Location: [facility]  Reason For Study: Pericardial Effusion  Ordering Physician: SUMMER COHEN  Performed By: Jeanne Blount     BSA: 2.3 m2  Height: 72 in  Weight: 228 lb  HR: 69  BP: 126/88 mmHg  ______________________________________________________________________________  Procedure  Limited Portable Echo  Adult.  ______________________________________________________________________________  Interpretation Summary     1. The left ventricle is normal in structure, function and size. The visual  ejection fraction is estimated at 55%.  2. The right ventricle is normal in structure, function and size.  3. No valve disease.  4. Trivial pericardial effusion     No changes when compared to echo from 22.  ______________________________________________________________________________  Left Ventricle  The left ventricle is normal in structure, function and size. The visual  ejection fraction is estimated at 55%. Septal motion is consistent with  conduction abnormality.     Right Ventricle  The right ventricle is normal in structure, function and size.     Mitral Valve  The mitral valve is normal in structure and function.     Tricuspid Valve  No tricuspid regurgitation. Right ventricular systolic pressure could not be  approximated due to inadequate tricuspid regurgitation.     Aortic Valve  The aortic valve is normal in structure and function.     Vessels  Normal ascending, transverse (arch), and descending aorta. The inferior vena  cava was normal in size with preserved respiratory variability.     Pericardium  Trivial pericardial effusion.     Rhythm  Sinus rhythm was noted.     ______________________________________________________________________________  Doppler Measurements & Calculations  MV E max onel: 58.6 cm/sec  MV A max onel: 44.2 cm/sec  MV E/A: 1.3  MV dec slope: 219.0 cm/sec2  MV dec time: 0.27 sec  E/E' av.8     Lateral E/e': 4.9  Medial E/e': 6.6     ______________________________________________________________________________  Report approved by: Merlene Meza 2022 10:49 AM           EGD 2022:  Findings:        There were esophageal mucosal changes secondary to established        long-segment Griggs's disease present in the middle third of the        esophagus and in the lower  third of the esophagus. The maximum        longitudinal extent of these mucosal changes was 10 cm in length. GE        junction at 41 cm. No esophageal stenosis.        Two non-bleeding superficial gastric ulcers with no stigmata of bleeding        were found in the gastric body and on the posterior wall of the stomach        just proximal to the stenosis. The largest lesion was 10 mm in largest        dimension. Random gastric biopsies were taken with a cold forceps for        histology/H pylori. The gastric mucosa in the proximal body was        indurated likely due to stasis. Nodular mucosa in the cardia biopsied.        Single erosion in the antrum        Evidence of a sleeve gastrectomy was found in the gastric body. This was        characterized by mild stenosis. A TTS dilator was passed through the        scope. Dilation with an 14-19 mm balloon dilator was performed to 19 mm.        No gross lesions were noted in the entire examined duodenum.                                                                                     Impression:               - Esophageal mucosal changes secondary to                             established long-segment Griggs's disease.                             - Non-bleeding gastric ulcers with no stigmata of                             bleeding. Biopsied. Likely due to stasis                             - A sleeve gastrectomy was found, characterized by                             mild stenosis. Dilated to 19 mm.                             - Nodular mucosa in the cardia bx taken.                             - Suspect symptoms are due to stenosis due to                             gastric sleeve and underlying dysmotility.   Recommendation:           - [facility] patient to [facility] for [facility] care.                             - Low fat diet today.                             - Await pathology results.                             - Repeat upper endoscopy in 6 weeks to check                              healing.                             - Return to GI clinic at appointment to be                             scheduled.

## 2022-12-23 NOTE — PROGRESS NOTES
[facility]    Internal Medicine Hospitalist Progress Note  12/23/2022  I evaluated patient on the above date.    Devendra Soares Jr., MD  [phone] (p)  Text Page  Vocera        Assessment & Plan New actions/orders today (12/23/2022) are underlined.    Ms. Yanez is a 42-year-old female with a past medical history significant for obesity s/p laparoscopic sleeve gastrectomy (10/25/2022); GERD with Griggs's esophagus; and recent hospitalization for pericarditis with pericardial effusion requiring pericardiocentesis (hospitalized 12/11-12/15/2022); who presented 12/18/2022 with recurrent symptoms of her pericarditis.    On initial evaluation 12/18, was afebrile, tachycardic, BP normal. ECG showed sinus tachycardia with diffuse NSTWA. Labs showed BMP with cr 1.24, otherwise normal; CBC unremarkable; LFT's unremarkable; CRP 65.4; trop negative. CXR 12/19 showed persistent airspace infiltrate and a small effusion at the left lung base.    # Pericarditis with pericardial effusion, now trivial effusion.  * She is status post emergent pericardiocentesis with drain placement on 12/11/2022 (subsequently removed) and has been maintained on colchicine.  * Initial presentation as above. Cardiology consulted on admit.  * Echo 12/19 showed trivial pericardial effusion.   * Had significant improvement with trial of IV ketorolac and subsequently started on indomethacin.   * Early am 12/22 had worsened pain, improved with IV ketorolac. Indomethacin increased 12/22.  - Continue colchicine 0.6 mg BID and indomethacin 50 mg TID for 3 months.  - Will need to monitor kidney function closely.    # RUQ abdominal pain, question related to issues below.  # Gastric stenosis s/p dilatation 12/21/2022.  # Superficial gastric ulcers, likely due to stasis.  # Cholelithiasis.  # Status post laparoscopic sleeve gastrectomy on 10/25/2022.  # Gastroesophageal reflux disease.  # Griggs's esophagus.  * This hospitalization,  "noted that pt has been having significant issues with ongoing nutrition and keeping food and fluids down. GI and Surgery consulted.  * S/p EGD 12/21 that showed \"Gastric stenosis at 50 cm, lumen about 14 mm-balloon dilated to 19 mm. Two superficial gastric ulcers just proximal to the stenosis, likely due to stasis. Gastric mucosa proximal to stenosis appeared indurated. Nodular mucosal in the cardia, bx taken. Duodenum normal\"   * Started on a short trial of metoclopramide as a promotility agent.  * On 12/22, pt noted continued abdominal pain more in RUQ now. RUQ US 12/22 showed cholelithiasis, no acute findings.  * On 12/23, pt noted she was eating more, keeping food down; was having nausea with potassium. R sided abdominal pain improved.  - Continue metoclopramide QID for 6 weeks, then consider change to tegaserod per GI.  - Continue pantoprazole BID for 2 months, then daily lifelong per GI.  - Continue sucralfate for 2 weeks per GI.  - Follow-up EGD in 6 weeks per GI.    # JEFFRY, suspect prerenal.  * Cr 1.24 on admit 12/19. Started on IVF's.  Recent Labs   Lab 12/23/22  0559 12/21/22  0619 12/20/22  1012 12/19/22  0207   CR 0.91 1.09* 1.17* 1.24*   - Monitor BMP.  - Avoid nephrotoxic medications.    # Malnutrition due to above GI issues.  * Noted by Nutritions, see their documentation.  * I d/w Nutrition 12/22 and felt that TPN or enteral nutrition were reasonable; I d/w pt an doverall, if needed, would favor NJ tube with home TF's rather than TPN. Per pt, Surgery would not favor a surgical g- or j-tube. However, as new GI meds started just 12/21 and oral intake improved, decided to continue to monitor for improvement outpatient.  - Continue diet as tolerated; consider NJ tube if pt unable to meet her nutritional needs.    # Anemia, suspect dilutional component.  * Hgb 12.9 on admit.   * Hgb 10.0 on 12/21. No overt clinical signs of major bleeding.  Recent Labs   Lab 12/23/22  0559 12/21/22  0619 12/19/22  0110 "   HGB 9.4* 10.0* 12.9   - Monitor CBC.  - Consider prbc transfusion if hgb </= 7.0 or if significant bleeding with hemodynamic instability or if symptomatic.    # Loose stools, possibly from meds.  - Discharge on PRN loperamide, but use judiciously as could potentially worsen other GI symptoms.    # Recent bilateral pleural effusions.  * Admit CXR showed she had a small residual at the left base.   - Monitor clinically.    # Depression/anxiety.  - Continue bupropion XL.    Clinically Significant Risk Factors        # Hypokalemia: Lowest K = 3.2 mmol/L in last 2 days, will replace as needed     # Hypomagnesemia: Lowest Mg = 1.6 mg/dL in last 2 days, will replace as needed   # Hypoalbuminemia: Lowest albumin = 2.9 g/dL at 12/19/2022  2:07 AM, will monitor as appropriate            # Obesity: Estimated body mass index is 30.11 kg/m  as calculated from the following:    Height as of this encounter: 1.829 m (6').    Weight as of this encounter: 100.7 kg (222 lb 0.1 oz)., PRESENT ON ADMISSION  # Severe Malnutrition: based on nutrition assessment, PRESENT ON ADMISSION         COVID-19 testing.  COVID-19 PCR Results    COVID-19 PCR Results 12/27/21 2/21/22 3/7/22 10/21/22 10/26/22 11/1/22 11/30/22 12/11/22 12/19/22   SARS CoV2 PCR Negative Negative Negative Negative Negative Negative Negative Negative Negative      Comments are available for some flowsheets but are not being displayed.         COVID-19 Antibody Results, Testing for Immunity    COVID-19 Antibody Results, Testing for Immunity   No data to display.             Diet: Diet  Low Fat Diet  Snacks/Supplements Adult: Ensure Max Protein (bariatric); With Meals    Prophylaxis: PCD's, ambulation.   Holder Catheter: Not present  Central Lines: PRESENT       Code Status: Full Code    Disposition Plan   Expected discharge: Today recommended to prior living arrangement.  Entered: Devenrda Soares MD 12/23/2022, 11:12 AM         Interval History   Has nausea with  potassium supplement.  Otherwise, ate better yesterday and today, keeping food down.  Notes loose stools.  Ambulating OK.  Feels capable to go home today.    -Data reviewed today: I reviewed all new labs and imaging over the last 24 hours. I personally reviewed no images or EKG's today.    Physical Exam    , Blood pressure (!) 138/94, pulse 66, temperature 97.6  F (36.4  C), temperature source Oral, resp. rate 18, height 1.829 m (6'), weight 100.7 kg (222 lb 0.1 oz), last menstrual period 11/14/2022, SpO2 95 %, not currently breastfeeding. O2 Device: None (Room air) Oxygen Delivery: 2 LPM (for comfort per patient's request)  Vitals:    12/19/22 0008 12/19/22 0428   Weight: 103.4 kg (228 lb) 100.7 kg (222 lb 0.1 oz)     Vital Signs with Ranges  Temp:  [97.6  F (36.4  C)-98  F (36.7  C)] 97.6  F (36.4  C)  Pulse:  [64-78] 66  Resp:  [17-18] 18  BP: (130-147)/() 138/94  SpO2:  [93 %-97 %] 95 %  Patient Vitals for the past 24 hrs:   BP Temp Temp src Pulse Resp SpO2   12/23/22 0750 (!) 138/94 97.6  F (36.4  C) Oral 66 18 95 %   12/23/22 0255 (!) 143/100 98  F (36.7  C) Oral 64 18 93 %   12/22/22 2350 (!) 133/96 97.8  F (36.6  C) Oral 68 17 94 %   12/22/22 1931 (!) 130/94 97.7  F (36.5  C) Oral 77 18 94 %   12/22/22 1647 (!) 147/102 98  F (36.7  C) Oral 78 18 97 %   12/22/22 1149 (!) 147/103 98  F (36.7  C) Oral 76 18 --     I/O's Last 24 hours  I/O last 3 completed shifts:  In: 100 [P.O.:100]  Out: -     Constitutional: Awake, alert, brighter.  Respiratory: Diminished in bases. No crackles or wheezes.  Cardiovascular: RRR, no m/r/g.  GI: Mild distension, nt, few BS.  Skin/Integumen:   Other:        Data   Recent Labs   Lab 12/23/22  0559 12/22/22  0635 12/22/22  0043 12/21/22  0619 12/20/22  1012 12/19/22  0207 12/19/22  0207 12/19/22  0110   WBC 5.5  --   --  6.6  --   --   --  8.5   HGB 9.4*  --   --  10.0*  --   --   --  12.9   MCV 87  --   --  86  --   --   --  85     --   --  279  --   --   --  394    INR  --   --   --   --   --   --   --  1.09     --   --  138 137   < > 139  --    POTASSIUM 3.2* 3.8 3.6 3.1* 3.4   < > 3.8  --    CHLORIDE 106  --   --  106 106   < > 104  --    CO2 24  --   --  23 23   < > 24  --    BUN 7  --   --  10 11   < > 8  --    CR 0.91  --   --  1.09* 1.17*   < > 1.24*  --    ANIONGAP 8  --   --  9 8   < > 11  --    KIMBERLYN 8.5  --   --  8.7 8.9   < > 9.6  --    GLC 93  --   --  92 107*   < > 98  --    ALBUMIN  --   --   --   --   --   --  2.9*  --    PROTTOTAL  --   --   --   --   --   --  7.2  --    BILITOTAL  --   --   --   --   --   --  0.5  --    ALKPHOS  --   --   --   --   --   --  142  --    ALT  --   --   --   --   --   --  25  --    AST  --   --   --   --   --   --  25  --    LIPASE  --   --   --   --   --   --  196  --    TROPONINIS  --   --   --   --   --   --  6  --     < > = values in this interval not displayed.     Recent Labs   Lab Test 12/23/22  0559 12/21/22  0619 12/20/22  1012 12/19/22  0207 12/15/22  0514   GLC 93 92 107* 98 86     Recent Labs   Lab 12/23/22  0559 12/21/22  0619 12/20/22  1012 12/19/22  0207 12/19/22  0110   WBC 5.5 6.6  --   --  8.5   CRP  --  187.0* 168.0* 65.4*  --          Recent Results (from the past 24 hour(s))   US Abdomen Limited    Narrative    US ABDOMEN LIMITED 12/22/2022 1:36 PM    CLINICAL HISTORY: RUQ pain, eval for signs of cholecystitis  TECHNIQUE: Limited abdominal ultrasound.    COMPARISON: 11/3/2022 and 11/2/2022    FINDINGS:    GALLBLADDER: Multiple mobile stones and sludge in the gallbladder  lumen. No gallbladder wall thickening or pericholecystic fluid.  Sonographic Dougherty sign was negative.    BILE DUCTS: There is no intrahepatic or extrahepatic biliary  dilatation. The common duct measures 1.4mm.    LIVER: Normal.    RIGHT KIDNEY: Normal. No hydronephrosis.    PANCREAS: The visualized portions of the pancreas are normal.      Impression    IMPRESSION:  1.  Cholelithiasis. No evidence of acute cholecystitis. No  biliary  ductal dilatation.    RICK STEINER MD         SYSTEM ID:  [id number]       Medications   All medications were reviewed.    sodium chloride 75 mL/hr at 12/23/22 0558       buPROPion  150 mg Oral QAM     calcium carbonate  500 mg Oral TID     colchicine  0.6 mg Oral BID     cyanocobalamin  500 mcg Sublingual Daily     hyoscyamine  0.125 mg Oral 4x Daily AC & HS     indomethacin  50 mg Oral TID w/meals     metoclopramide  10 mg Intravenous Q6H     pantoprazole  40 mg Oral BID AC     potassium chloride  10 mEq Intravenous Q1H     sodium chloride (PF)  10 mL Intracatheter Q8H     sodium chloride (PF)  10 mL Intracatheter Q8H     sucralfate  1 g Oral 4x Daily     thiamine  100 mg Oral Daily     [DISCONTINUED] acetaminophen **OR** acetaminophen, acetaminophen, bisacodyl, HYDROmorphone, hydrOXYzine, ketorolac, lidocaine 4%, lidocaine (buffered or not buffered), melatonin, naloxone **OR** naloxone **OR** naloxone **OR** naloxone, ondansetron **OR** ondansetron, oxyCODONE, prochlorperazine **OR** prochlorperazine **OR** prochlorperazine, senna-docusate **OR** senna-docusate, sodium chloride (PF), sodium chloride (PF)

## 2022-12-23 NOTE — PROGRESS NOTES
Medically clear to discharge. Completing IV potassium infusion. PICC removal and discharge teaching

## 2023-01-03 ENCOUNTER — TELEPHONE (OUTPATIENT)
Dept: ENDOCRINOLOGY | Facility: CLINIC | Age: 43
End: 2023-01-03

## 2023-01-03 ENCOUNTER — INFUSION THERAPY VISIT (OUTPATIENT)
Dept: INFUSION THERAPY | Facility: CLINIC | Age: 43
End: 2023-01-03
Attending: SURGERY
Payer: COMMERCIAL

## 2023-01-03 VITALS
TEMPERATURE: 98.3 F | RESPIRATION RATE: 16 BRPM | DIASTOLIC BLOOD PRESSURE: 83 MMHG | OXYGEN SATURATION: 97 % | HEART RATE: 66 BPM | SYSTOLIC BLOOD PRESSURE: 132 MMHG

## 2023-01-03 DIAGNOSIS — E86.0 DEHYDRATION: Primary | ICD-10-CM

## 2023-01-03 PROCEDURE — 250N000009 HC RX 250: Performed by: SURGERY

## 2023-01-03 PROCEDURE — 250N000011 HC RX IP 250 OP 636: Performed by: SURGERY

## 2023-01-03 PROCEDURE — 258N000003 HC RX IP 258 OP 636: Performed by: SURGERY

## 2023-01-03 PROCEDURE — 96361 HYDRATE IV INFUSION ADD-ON: CPT

## 2023-01-03 PROCEDURE — 96365 THER/PROPH/DIAG IV INF INIT: CPT

## 2023-01-03 RX ORDER — HEPARIN SODIUM (PORCINE) LOCK FLUSH IV SOLN 100 UNIT/ML 100 UNIT/ML
5 SOLUTION INTRAVENOUS
Status: CANCELLED | OUTPATIENT
Start: 2023-01-03

## 2023-01-03 RX ORDER — EPINEPHRINE 1 MG/ML
0.3 INJECTION, SOLUTION INTRAMUSCULAR; SUBCUTANEOUS EVERY 5 MIN PRN
Status: CANCELLED | OUTPATIENT
Start: 2023-01-03

## 2023-01-03 RX ORDER — METHYLPREDNISOLONE SODIUM SUCCINATE 125 MG/2ML
125 INJECTION, POWDER, LYOPHILIZED, FOR SOLUTION INTRAMUSCULAR; INTRAVENOUS
Status: CANCELLED
Start: 2023-01-03

## 2023-01-03 RX ORDER — POLYETHYLENE GLYCOL 3350 17 G/17G
1 POWDER, FOR SOLUTION ORAL DAILY PRN
COMMUNITY
End: 2023-03-10

## 2023-01-03 RX ORDER — MEPERIDINE HYDROCHLORIDE 25 MG/ML
25 INJECTION INTRAMUSCULAR; INTRAVENOUS; SUBCUTANEOUS EVERY 30 MIN PRN
Status: CANCELLED | OUTPATIENT
Start: 2023-01-03

## 2023-01-03 RX ORDER — HEPARIN SODIUM,PORCINE 10 UNIT/ML
5 VIAL (ML) INTRAVENOUS
Status: CANCELLED | OUTPATIENT
Start: 2023-01-03

## 2023-01-03 RX ORDER — ALBUTEROL SULFATE 90 UG/1
1-2 AEROSOL, METERED RESPIRATORY (INHALATION)
Status: CANCELLED
Start: 2023-01-03

## 2023-01-03 RX ORDER — DIPHENHYDRAMINE HYDROCHLORIDE 50 MG/ML
50 INJECTION INTRAMUSCULAR; INTRAVENOUS
Status: CANCELLED
Start: 2023-01-03

## 2023-01-03 RX ORDER — ALBUTEROL SULFATE 0.83 MG/ML
2.5 SOLUTION RESPIRATORY (INHALATION)
Status: CANCELLED | OUTPATIENT
Start: 2023-01-03

## 2023-01-03 RX ADMIN — FOLIC ACID: 5 INJECTION, SOLUTION INTRAMUSCULAR; INTRAVENOUS; SUBCUTANEOUS at 13:06

## 2023-01-03 RX ADMIN — SODIUM CHLORIDE 1000 ML: 9 INJECTION, SOLUTION INTRAVENOUS at 11:58

## 2023-01-03 NOTE — TELEPHONE ENCOUNTER
Vida krause/[facility] calling for clarification on the patients IV fluid order. She needs clarification on orders regarding patients recent health hx.    [phone] Vida

## 2023-01-03 NOTE — TELEPHONE ENCOUNTER
Patient was in infusion center today for infusion.  She received an infusion.  Infusion center is wondering what they are supposed to do due to patient's recent hospitalization.    Discussed situation with Dr. Aragon.  Infusion orders to be cancelled at this time.  Patient to follow up with Dr. Aragon regarding progress after hospitalization.    Contacted patient regarding infusions.  Notified patient that he will need a follow up appointment with Dr. Aragon before additional infusions will be authorized.  Patient states she is under care of [facility] now since her hospitalization and will be following up with them and [facility] for further care.  Dr. Aragon notified.  Infusion orders cancelled.  Infusion center notified.

## 2023-01-03 NOTE — PROGRESS NOTES
Infusion Nursing Note:  Leah Yanez presents today for IVF.    Patient seen by provider today: No   present during visit today: Not Applicable.    Note: Patient reports decreased urinary output compared to prior to her surgery and recently not drinking as much as she thinks that she should be. She denies chest pain at this time and no shortness of breath noted.     Call placed to Dr. Aragon's office to determine frequency and duration of IVF given her recent hospitalization. Elissa SWAN from Dr. Aragon's office is aware and will discuss with Dr. Aragon.    Elissa called back after the completion of the IVF to report that the plan has been discontinued for future fluids at this time.    Intravenous Access:  Peripheral IV placed.    Treatment Conditions:  Not Applicable.    Post Infusion Assessment:  Patient tolerated infusion without incident.  Blood return noted pre and post infusion.  Site patent and intact, free from redness, edema or discomfort.  No evidence of extravasations.  Access discontinued per protocol.     Discharge Plan:   Copy of AVS reviewed with patient and/or family.   Patient discharged in stable condition accompanied by: self.  Departure Mode: Ambulatory.      Vida Shepherd RN

## 2023-01-12 ENCOUNTER — LAB (OUTPATIENT)
Dept: LAB | Facility: CLINIC | Age: 43
End: 2023-01-12
Payer: COMMERCIAL

## 2023-01-12 DIAGNOSIS — I31.39 PERICARDIAL EFFUSION: ICD-10-CM

## 2023-01-12 LAB
CRP SERPL-MCNC: 80.91 MG/L
ERYTHROCYTE [SEDIMENTATION RATE] IN BLOOD BY WESTERGREN METHOD: 38 MM/HR (ref 0–20)

## 2023-01-12 PROCEDURE — 85652 RBC SED RATE AUTOMATED: CPT | Performed by: PHYSICIAN ASSISTANT

## 2023-01-12 PROCEDURE — 86140 C-REACTIVE PROTEIN: CPT | Performed by: PHYSICIAN ASSISTANT

## 2023-01-12 PROCEDURE — 36415 COLL VENOUS BLD VENIPUNCTURE: CPT | Performed by: PHYSICIAN ASSISTANT

## 2023-01-16 ENCOUNTER — OFFICE VISIT (OUTPATIENT)
Dept: CARDIOLOGY | Facility: CLINIC | Age: 43
End: 2023-01-16
Attending: PHYSICIAN ASSISTANT
Payer: COMMERCIAL

## 2023-01-16 VITALS
OXYGEN SATURATION: 98 % | SYSTOLIC BLOOD PRESSURE: 132 MMHG | HEART RATE: 70 BPM | HEIGHT: 72 IN | BODY MASS INDEX: 29.55 KG/M2 | DIASTOLIC BLOOD PRESSURE: 98 MMHG | WEIGHT: 218.2 LBS

## 2023-01-16 DIAGNOSIS — I30.9 ACUTE PERICARDITIS, UNSPECIFIED TYPE: ICD-10-CM

## 2023-01-16 DIAGNOSIS — I31.39 PERICARDIAL EFFUSION: ICD-10-CM

## 2023-01-16 PROCEDURE — 99215 OFFICE O/P EST HI 40 MIN: CPT | Performed by: PHYSICIAN ASSISTANT

## 2023-01-16 RX ORDER — INDOMETHACIN 50 MG/1
50 CAPSULE ORAL
Qty: 90 CAPSULE | Refills: 1 | Status: SHIPPED | OUTPATIENT
Start: 2023-01-16 | End: 2023-02-27

## 2023-01-16 RX ORDER — COLCHICINE 0.6 MG/1
0.6 TABLET ORAL DAILY
Qty: 30 TABLET | Refills: 1 | Status: SHIPPED | OUTPATIENT
Start: 2023-01-16 | End: 2023-02-27

## 2023-01-16 NOTE — PROGRESS NOTES
Cardiology Clinic Progress Note    Leah Yanez MRN# [id number]   YOB: 1980 Age: 42 year old     Primary cardiologist: Dr. Gomez         Assessment and Plan     In summary, Leah Yanez presents today for a hospital follow up visit, after a presumed infectious pericarditis / pericardial effusion with tamponade, s/p pericardiocentesis. She continues to have slight pleuritic pain and inflammatory markers remain elevated.  She did not tolerate colchicine 0.6 mg BID due to GI sxs. I am going to have her re-start colchicine at either 0.3 mg BID or 0.6 mg daily, and contact me if she re-develops diarrhea. She'll continue indocin at the current dose. She will see me back with repeat inflammatory markers as well as a cardiac MRI in about 1 month. If her inflammatory markers normalize and there is no inflammation on MRI, we will plan to then stop the Indocin and continue the tolerated dose of colchicine for a total of 3 months. She was told that she will need to have a negative home pregnancy test the day prior to the MRI. Of course, she will contact me sooner with any worsening chest symptoms.     Viviane Collins PA-C  [facility]         History of Presenting Illness     Leah Yanez is a pleasant 42 year old patient with a pertinent history of the following -   History significant for recent laparoscopic sleeve gastrectomy 10/2022, recent hospitalizations for pneumonia, esophageal dysphagia with dilation of esophageal stricture 12/2022, Griggs's esophagus, Raynaud's syndrome, obesity, who we met in the hospital in December when she presented with progressive chest pressure and shortness of breath.  She was hypoxic.  CT was negative for PE, but did demonstrate moderate left and small right pleural effusions with a pericardial effusion.  Echo revealed a moderate pericardial effusion with evidence of early tamponade physiology.  She underwent pericardiocentesis on December 11  with 160 cc of straw-colored fluid removed and no residual effusion postprocedure.  She was started on colchicine 0.6 mg twice daily.  However she then returned to the hospital a few days after discharge, with recurrent acute onset pleuritic chest pain with no signs of reaccumulation of effusion on her echo.  She responded well to Toradol, and was placed on Indocin 50 mg 3 times daily in addition to her colchicine.  She also underwent another EGD with dilation and biopsy during her stay for gastric stenosis.    Today, Leah returns to clinic stating she has just a slight intermittent neck/shoulder discomfort with deep inspiration. She has been off colchicine for about 1.5 weeks (ran out) and notes that she was having significant diarrhea on it, now BM's have normalized.   We reviewed her CRP, which remains elevated at 80, but is down significantly from 187 in the hospital.  Her ESR is also elevated at 38, down from 43 in the hospital.         Review of Systems     12-pt ROS is negative except for as noted in the HPI.          Physical Exam     Vitals: BP (!) 132/98 (BP Location: Right arm, Patient Position: Sitting, Cuff Size: Adult Regular)   Pulse 70   Ht 1.829 m (6')   Wt 99 kg (218 lb 3.2 oz)   SpO2 98%   BMI 29.59 kg/m    Wt Readings from Last 10 Encounters:   01/16/23 99 kg (218 lb 3.2 oz)   12/19/22 100.7 kg (222 lb 0.1 oz)   12/15/22 103.6 kg (228 lb 6.4 oz)   12/11/22 106.6 kg (235 lb)   11/30/22 106.6 kg (235 lb 0.2 oz)   11/16/22 111 kg (244 lb 12.8 oz)   11/01/22 125.9 kg (277 lb 8 oz)   10/25/22 122.6 kg (270 lb 4.5 oz)   10/19/22 123.7 kg (272 lb 12.8 oz)   08/18/22 126.1 kg (278 lb)       Constitutional:  Patient is pleasant, alert, cooperative, and in NAD.  HEENT:  NCAT. PERRLA. EOM's intact.   Neck:  CVP appears normal. No carotid bruits.   Pulmonary: Normal respiratory effort. CTAB.   Cardiac: RRR, normal S1/S2, no S3/S4, no murmur or rub.   Abdomen:  Non-tender abdomen, no hepatosplenomegaly  appreciated.   Vascular: Pulses in the upper and lower extremities are 2+ and equal bilaterally.  Extremities: No edema, erythema, cyanosis or tenderness appreciated.  Skin:  No rashes or lesions appreciated.   Neurological:  No gross motor or sensory deficits.   Psych: Appropriate affect.          Data   Labs reviewed:  Recent Labs   Lab Test 09/01/22  1156   *   HDL 37*   NHDL 149*   CHOL 186   TRIG 216*       Lab Results   Component Value Date    WBC 5.5 12/23/2022    WBC 5.0 06/26/2006    RBC 3.51 (L) 12/23/2022    RBC 5.30 (H) 06/26/2006    HGB 9.4 (L) 12/23/2022    HGB 14.9 06/26/2006    HCT 30.5 (L) 12/23/2022    HCT 44.6 06/26/2006    MCV 87 12/23/2022    MCV 84 06/26/2006    MCH 26.8 12/23/2022    MCH 28.0 06/26/2006    MCHC 30.8 (L) 12/23/2022    MCHC 33.4 06/26/2006    RDW 17.2 (H) 12/23/2022    RDW 12.7 06/26/2006     12/23/2022     06/26/2006       Lab Results   Component Value Date     12/23/2022     06/16/2006    POTASSIUM 3.2 (L) 12/23/2022    POTASSIUM 3.5 06/16/2006    CHLORIDE 106 12/23/2022    CHLORIDE 107 06/16/2006    CO2 24 12/23/2022    CO2 22 06/16/2006    ANIONGAP 8 12/23/2022    ANIONGAP 11 06/16/2006    GLC 93 12/23/2022    GLC 87 06/16/2006    BUN 7 12/23/2022    BUN 9 06/16/2006    CR 0.91 12/23/2022    CR 1.00 06/16/2006    GFRESTIMATED 80 12/23/2022    GFRESTIMATED 71 06/16/2006    GFRESTBLACK 86 06/16/2006    KIMBERLYN 8.5 12/23/2022    KIMBERLYN 9.1 06/16/2006      Lab Results   Component Value Date    AST 25 12/19/2022    ALT 25 12/19/2022       Lab Results   Component Value Date    A1C 5.5 10/25/2022       Lab Results   Component Value Date    INR 1.09 12/19/2022    INR 1.04 11/21/2022            Problem List     Patient Active Problem List   Diagnosis     Food impaction of esophagus, initial encounter     Esophageal dysphagia     Heartburn     History of pulmonary embolism     Anti-cardiolipin antibody positive     Raynaud's phenomenon     Gastroesophageal  reflux disease with esophagitis     Hiatal hernia     Griggs's esophagus     Class 2 severe obesity with serious comorbidity and body mass index (BMI) of 38.0 to 38.9 in adult (H)     Morbid obesity (H)     Dehydration     Hypoxia     Pneumonia of both lower lobes due to infectious organism     Hypoxemia     Other pneumonia, unspecified organism     Encounter for screening laboratory testing for severe acute respiratory syndrome coronavirus 2 (SARS-CoV-2)     Pericardial effusion     Pleural effusion     Pneumonia due to infectious organism, unspecified laterality, unspecified part of lung     Status post coronary angiogram     Diaphragmatic hernia without obstruction or gangrene     Atypical squamous cells of undetermined significance (ASCUS) on Papanicolaou smear of cervix     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)     Moderate episode of recurrent major depressive disorder (H)     Other specified abnormal immunological findings in serum     Raised antibody titer     Pulmonary embolism (H)     Premenopausal menorrhagia     Post concussion syndrome     Acute kidney failure with tubular necrosis (H)     Chest pain, unspecified type     Gastroesophageal reflux disease with esophagitis without hemorrhage            Medications     Current Outpatient Medications   Medication Sig Dispense Refill     acetaminophen (TYLENOL) 500 MG tablet Take 1,000 mg by mouth every 6 hours as needed for mild pain       buPROPion (WELLBUTRIN XL) 150 MG 24 hr tablet Take 150 mg by mouth every morning Switch to Bupropion 75mg immediate release twice daily when ordered       calcium carbonate (TUMS) 500 MG chewable tablet Take 1 chew tab by mouth 3 times daily       CALCIUM CITRATE-VITAMIN D3 PO Take by mouth daily       Cyanocobalamin (B-12) 500 MCG SUBL Place 1 tablet under the tongue daily 30 tablet 11     hydrOXYzine (ATARAX) 25 MG tablet Take 1 tablet (25 mg) by mouth 3 times daily as needed for itching 10 tablet 0      hyoscyamine SL (LEVSIN/SL) 0.125 MG sublingual tablet Take 1 tablet (0.125 mg) by mouth 4 times daily (before meals and nightly) 50 tablet 1     Multiple Vitamins-Iron (MULTIVITAMIN/IRON PO) Take 1 tablet by mouth daily       ondansetron (ZOFRAN ODT) 4 MG ODT tab Take 1 tablet (4 mg) by mouth every 6 hours as needed for nausea or vomiting 25 tablet 0     pantoprazole (PROTONIX) 40 MG EC tablet Take 1 tablet (40 mg) by mouth 2 times daily (before meals) ; twice daily for 2 months, then daily indefinitely. 60 tablet 3     polyethylene glycol (MIRALAX) 17 g packet Take 1 packet by mouth daily as needed       senna-docusate (SENOKOT-S/PERICOLACE) 8.6-50 MG tablet Take 2 tablets by mouth daily as needed for constipation (While taking narcotic pain medications.  Stop taking if having loose stools.) 30 tablet 1     thiamine (B-1) 100 MG tablet Take 1 tablet (100 mg) by mouth daily 30 tablet 1     indomethacin (INDOCIN) 50 MG capsule Take 1 capsule (50 mg) by mouth 3 times daily (with meals) for 30 doses 30 capsule 2     metoclopramide (REGLAN) 10 MG tablet Take 1 tablet (10 mg) by mouth 4 times daily (before meals and nightly) (Patient not taking: Reported on 1/16/2023) 56 tablet 0            Past Medical History     Past Medical History:   Diagnosis Date     Anti-cardiolipin antibody positive      Griggs esophagus      Concussion 2016     Depressive disorder, not elsewhere classified 03/01/2006    Resolved 8/07     Gastroesophageal reflux disease with esophagitis      Hiatal hernia      History of pulmonary embolism      Obesity      Raynaud's syndrome     past history of admission for gangrene of one finger     Past Surgical History:   Procedure Laterality Date     COMBINED ESOPHAGOSCOPY, GASTROSCOPY, DUODENOSCOPY (EGD), REMOVE ESOPHAGEAL STENT N/A 12/2/2022    Procedure: ESOPHAGOGASTRODUODENOSCOPY, WITH ESOPHAGEAL STENT REMOVAL and Balloon Dialation;  Surgeon: Daniel Aragon MD;  Location:  OR       PERICARDIOCENTESIS N/A 12/11/2022    Procedure: Pericardiocentesis;  Surgeon: Sourav Sahu MD;  Location:  [facility]ARD[facility] CATH LAB     ESOPHAGOSCOPY, GASTROSCOPY, DUODENOSCOPY (EGD), COMBINED N/A 12/29/2021    Procedure: ESOPHAGOGASTRODUODENOSCOPY, WITH BIOPSY;  Surgeon: Esteban Rose DO;  Location: [facility] OR     ESOPHAGOSCOPY, GASTROSCOPY, DUODENOSCOPY (EGD), COMBINED N/A 11/14/2022    Procedure: Upper endoscopy; gastric stricture dilation, interpretation of fluoroscopy nasojejunal feeding tube;  Surgeon: Daniel Aragon MD;  Location: [facility] OR     ESOPHAGOSCOPY, GASTROSCOPY, DUODENOSCOPY (EGD), COMBINED N/A 11/23/2022    Procedure: ESOPHAGOGASTRODUODENOSCOPY (EGD);  Surgeon: Daniel Aragon MD;  [facility]     ESOPHAGOSCOPY, GASTROSCOPY, DUODENOSCOPY (EGD), COMBINED N/A 12/21/2022    Procedure: ESOPHAGOGASTRODUODENOSCOPY, WITH BIOPSY;  Surgeon: Leticia Sommer MD;  Location: [facility]     LAPAROSCOPIC GASTRIC SLEEVE N/A 10/25/2022    Procedure: GASTRECTOMY, SLEEVE, LAPAROSCOPIC;  Surgeon: Daniel Aragon MD;  Location: [facility] OR     LAPAROSCOPIC HERNIORRHAPHY HIATAL N/A 10/25/2022    Procedure: HERNIORRHAPHY, HIATAL, LAPAROSCOPIC;  Surgeon: Daniel Aragon MD;  Location: [facility] OR     LAPAROSCOPY DIAGNOSTIC (GENERAL) N/A 11/4/2022    Procedure: LAPAROSCOPY, DIAGNOSTIC, BY GENERAL SURGERY, evacuation of abdominl hematoma;  Surgeon: Daniel Aragon MD;  Location: [facility] OR     PICC TRIPLE LUMEN PLACEMENT Left 11/06/2022    51cm (1cm external), Basilic vein     TONSILLECTOMY & ADENOIDECTOMY       ZZC NONSPECIFIC PROCEDURE      T&A as a child     ZZC NONSPECIFIC PROCEDURE      Tubes in ears bilaterally     Family History   Problem Relation Age of Onset     Hypertension Mother         arthritis     Heart Disease Father         MI, Lipids     Acute Myocardial Infarction Father      Cancer - colorectal Maternal Grandmother         arthritis     Hypertension Maternal Grandfather          arthritis, macular degeneration     Colon Cancer Paternal Grandmother      Alzheimer Disease Paternal Grandfather      Anesthesia Reaction No family hx of      Clotting Disorder No family hx of      Social History     Socioeconomic History     Marital status:      Spouse name: Not on file     Number of children: Not on file     Years of education: Not on file     Highest education level: Not on file   Occupational History     Not on file   Tobacco Use     Smoking status: Never     Smokeless tobacco: Never   Vaping Use     Vaping Use: Never used   Substance and Sexual Activity     Alcohol use: Yes     Comment: Alcoholic Drinks/day: social     Drug use: No     Sexual activity: Not Currently   Other Topics Concern     Not on file   Social History Narrative     Not on file     Social Determinants of Health     Financial Resource Strain: Not on file   Food Insecurity: Not on file   Transportation Needs: Not on file   Physical Activity: Not on file   Stress: Not on file   Social Connections: Not on file   Intimate Partner Violence: Not on file   Housing Stability: Not on file            Allergies   Azithromycin and Erythromycin    Today's clinic visit entailed:  Review of the result(s) of each unique test - echocardiogram, ESR, CRP  Ordering of each unique test  Prescription drug management  I spent a total of 55 minutes on the day of the visit.   Time spent doing chart review, history and exam, documentation and further activities per the note  Provider  Link to MDM Help Grid     The level of medical decision making during this visit was of moderate complexity.

## 2023-01-16 NOTE — PATIENT INSTRUCTIONS
Today's Plan:   - Please try the colchicine at either 0.3 mg twice daily or 0.6 mg daily. If you re-develop diarrhea, stop taking it and call me.   - Stay on the indocin at the current dose.   - In ~four weeks, have an MRI of the heart to look for evidence of ongoing pericardial inflammation, and another set of labs. You will need to have a negative home pregnancy test the day prior to the MRI. See me again after that.      If you have questions or concerns please call my nurse team at [phone].   Scheduling phone number: [phone]  For after hours urgent concerns call [phone] option 2.   Reminder: Please bring in all current medications, over the counter supplements and vitamin bottles to your next appointment.    It was a pleasure seeing you today!     Viviane Collins PA-C

## 2023-01-16 NOTE — LETTER
1/16/2023    [facility] [place]  [address]  [place] [address]    RE: Leah Yanez       Dear Colleague,     I had the pleasure of seeing Leah Yanez in the [facility].    Cardiology Clinic Progress Note    Leah Yanez MRN# [id number]   YOB: 1980 Age: 42 year old     Primary cardiologist: Dr. Gomez         Assessment and Plan     In summary, Leah Yanez presents today for a hospital follow up visit, after a presumed infectious pericarditis / pericardial effusion with tamponade, s/p pericardiocentesis. She continues to have slight pleuritic pain and inflammatory markers remain elevated.  She did not tolerate colchicine 0.6 mg BID due to GI sxs. I am going to have her re-start colchicine at either 0.3 mg BID or 0.6 mg daily, and contact me if she re-develops diarrhea. She'll continue indocin at the current dose. She will see me back with repeat inflammatory markers as well as a cardiac MRI in about 1 month. If her inflammatory markers normalize and there is no inflammation on MRI, we will plan to then stop the Indocin and continue the tolerated dose of colchicine for a total of 3 months. She was told that she will need to have a negative home pregnancy test the day prior to the MRI. Of course, she will contact me sooner with any worsening chest symptoms.     Viviane Collins PA-C  [facility]         History of Presenting Illness     Leah Yanez is a pleasant 42 year old patient with a pertinent history of the following -   History significant for recent laparoscopic sleeve gastrectomy 10/2022, recent hospitalizations for pneumonia, esophageal dysphagia with dilation of esophageal stricture 12/2022, Griggs's esophagus, Raynaud's syndrome, obesity, who we met in the hospital in December when she presented with progressive chest pressure and shortness of breath.  She was hypoxic.  CT was negative for PE, but did  demonstrate moderate left and small right pleural effusions with a pericardial effusion.  Echo revealed a moderate pericardial effusion with evidence of early tamponade physiology.  She underwent pericardiocentesis on December 11 with 160 cc of straw-colored fluid removed and no residual effusion postprocedure.  She was started on colchicine 0.6 mg twice daily.  However she then returned to the hospital a few days after discharge, with recurrent acute onset pleuritic chest pain with no signs of reaccumulation of effusion on her echo.  She responded well to Toradol, and was placed on Indocin 50 mg 3 times daily in addition to her colchicine.  She also underwent another EGD with dilation and biopsy during her stay for gastric stenosis.    Today, Leah returns to clinic stating she has just a slight intermittent neck/shoulder discomfort with deep inspiration. She has been off colchicine for about 1.5 weeks (ran out) and notes that she was having significant diarrhea on it, now BM's have normalized.   We reviewed her CRP, which remains elevated at 80, but is down significantly from 187 in the hospital.  Her ESR is also elevated at 38, down from 43 in the hospital.         Review of Systems     12-pt ROS is negative except for as noted in the HPI.          Physical Exam     Vitals: BP (!) 132/98 (BP Location: Right arm, Patient Position: Sitting, Cuff Size: Adult Regular)   Pulse 70   Ht 1.829 m (6')   Wt 99 kg (218 lb 3.2 oz)   SpO2 98%   BMI 29.59 kg/m    Wt Readings from Last 10 Encounters:   01/16/23 99 kg (218 lb 3.2 oz)   12/19/22 100.7 kg (222 lb 0.1 oz)   12/15/22 103.6 kg (228 lb 6.4 oz)   12/11/22 106.6 kg (235 lb)   11/30/22 106.6 kg (235 lb 0.2 oz)   11/16/22 111 kg (244 lb 12.8 oz)   11/01/22 125.9 kg (277 lb 8 oz)   10/25/22 122.6 kg (270 lb 4.5 oz)   10/19/22 123.7 kg (272 lb 12.8 oz)   08/18/22 126.1 kg (278 lb)       Constitutional:  Patient is pleasant, alert, cooperative, and in NAD.  HEENT:   NCAT. PERRLA. EOM's intact.   Neck:  CVP appears normal. No carotid bruits.   Pulmonary: Normal respiratory effort. CTAB.   Cardiac: RRR, normal S1/S2, no S3/S4, no murmur or rub.   Abdomen:  Non-tender abdomen, no hepatosplenomegaly appreciated.   Vascular: Pulses in the upper and lower extremities are 2+ and equal bilaterally.  Extremities: No edema, erythema, cyanosis or tenderness appreciated.  Skin:  No rashes or lesions appreciated.   Neurological:  No gross motor or sensory deficits.   Psych: Appropriate affect.          Data   Labs reviewed:  Recent Labs   Lab Test 09/01/22  1156   *   HDL 37*   NHDL 149*   CHOL 186   TRIG 216*       Lab Results   Component Value Date    WBC 5.5 12/23/2022    WBC 5.0 06/26/2006    RBC 3.51 (L) 12/23/2022    RBC 5.30 (H) 06/26/2006    HGB 9.4 (L) 12/23/2022    HGB 14.9 06/26/2006    HCT 30.5 (L) 12/23/2022    HCT 44.6 06/26/2006    MCV 87 12/23/2022    MCV 84 06/26/2006    MCH 26.8 12/23/2022    MCH 28.0 06/26/2006    MCHC 30.8 (L) 12/23/2022    MCHC 33.4 06/26/2006    RDW 17.2 (H) 12/23/2022    RDW 12.7 06/26/2006     12/23/2022     06/26/2006       Lab Results   Component Value Date     12/23/2022     06/16/2006    POTASSIUM 3.2 (L) 12/23/2022    POTASSIUM 3.5 06/16/2006    CHLORIDE 106 12/23/2022    CHLORIDE 107 06/16/2006    CO2 24 12/23/2022    CO2 22 06/16/2006    ANIONGAP 8 12/23/2022    ANIONGAP 11 06/16/2006    GLC 93 12/23/2022    GLC 87 06/16/2006    BUN 7 12/23/2022    BUN 9 06/16/2006    CR 0.91 12/23/2022    CR 1.00 06/16/2006    GFRESTIMATED 80 12/23/2022    GFRESTIMATED 71 06/16/2006    GFRESTBLACK 86 06/16/2006    KIMBERLYN 8.5 12/23/2022    KIMBERLYN 9.1 06/16/2006      Lab Results   Component Value Date    AST 25 12/19/2022    ALT 25 12/19/2022       Lab Results   Component Value Date    A1C 5.5 10/25/2022       Lab Results   Component Value Date    INR 1.09 12/19/2022    INR 1.04 11/21/2022            Problem List     Patient Active  Problem List   Diagnosis     Food impaction of esophagus, initial encounter     Esophageal dysphagia     Heartburn     History of pulmonary embolism     Anti-cardiolipin antibody positive     Raynaud's phenomenon     Gastroesophageal reflux disease with esophagitis     Hiatal hernia     Griggs's esophagus     Class 2 severe obesity with serious comorbidity and body mass index (BMI) of 38.0 to 38.9 in adult (H)     Morbid obesity (H)     Dehydration     Hypoxia     Pneumonia of both lower lobes due to infectious organism     Hypoxemia     Other pneumonia, unspecified organism     Encounter for screening laboratory testing for severe acute respiratory syndrome coronavirus 2 (SARS-CoV-2)     Pericardial effusion     Pleural effusion     Pneumonia due to infectious organism, unspecified laterality, unspecified part of lung     Status post coronary angiogram     Diaphragmatic hernia without obstruction or gangrene     Atypical squamous cells of undetermined significance (ASCUS) on Papanicolaou smear of cervix     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)     Moderate episode of recurrent major depressive disorder (H)     Other specified abnormal immunological findings in serum     Raised antibody titer     Pulmonary embolism (H)     Premenopausal menorrhagia     Post concussion syndrome     Acute kidney failure with tubular necrosis (H)     Chest pain, unspecified type     Gastroesophageal reflux disease with esophagitis without hemorrhage            Medications     Current Outpatient Medications   Medication Sig Dispense Refill     acetaminophen (TYLENOL) 500 MG tablet Take 1,000 mg by mouth every 6 hours as needed for mild pain       buPROPion (WELLBUTRIN XL) 150 MG 24 hr tablet Take 150 mg by mouth every morning Switch to Bupropion 75mg immediate release twice daily when ordered       calcium carbonate (TUMS) 500 MG chewable tablet Take 1 chew tab by mouth 3 times daily       CALCIUM CITRATE-VITAMIN D3 PO Take by  mouth daily       Cyanocobalamin (B-12) 500 MCG SUBL Place 1 tablet under the tongue daily 30 tablet 11     hydrOXYzine (ATARAX) 25 MG tablet Take 1 tablet (25 mg) by mouth 3 times daily as needed for itching 10 tablet 0     hyoscyamine SL (LEVSIN/SL) 0.125 MG sublingual tablet Take 1 tablet (0.125 mg) by mouth 4 times daily (before meals and nightly) 50 tablet 1     Multiple Vitamins-Iron (MULTIVITAMIN/IRON PO) Take 1 tablet by mouth daily       ondansetron (ZOFRAN ODT) 4 MG ODT tab Take 1 tablet (4 mg) by mouth every 6 hours as needed for nausea or vomiting 25 tablet 0     pantoprazole (PROTONIX) 40 MG EC tablet Take 1 tablet (40 mg) by mouth 2 times daily (before meals) ; twice daily for 2 months, then daily indefinitely. 60 tablet 3     polyethylene glycol (MIRALAX) 17 g packet Take 1 packet by mouth daily as needed       senna-docusate (SENOKOT-S/PERICOLACE) 8.6-50 MG tablet Take 2 tablets by mouth daily as needed for constipation (While taking narcotic pain medications.  Stop taking if having loose stools.) 30 tablet 1     thiamine (B-1) 100 MG tablet Take 1 tablet (100 mg) by mouth daily 30 tablet 1     indomethacin (INDOCIN) 50 MG capsule Take 1 capsule (50 mg) by mouth 3 times daily (with meals) for 30 doses 30 capsule 2     metoclopramide (REGLAN) 10 MG tablet Take 1 tablet (10 mg) by mouth 4 times daily (before meals and nightly) (Patient not taking: Reported on 1/16/2023) 56 tablet 0            Past Medical History     Past Medical History:   Diagnosis Date     Anti-cardiolipin antibody positive      Griggs esophagus      Concussion 2016     Depressive disorder, not elsewhere classified 03/01/2006    Resolved 8/07     Gastroesophageal reflux disease with esophagitis      Hiatal hernia      History of pulmonary embolism      Obesity      Raynaud's syndrome     past history of admission for gangrene of one finger     Past Surgical History:   Procedure Laterality Date     COMBINED ESOPHAGOSCOPY,  GASTROSCOPY, DUODENOSCOPY (EGD), REMOVE ESOPHAGEAL STENT N/A 12/2/2022    Procedure: ESOPHAGOGASTRODUODENOSCOPY, WITH ESOPHAGEAL STENT REMOVAL and Balloon Dialation;  Surgeon: Daniel Aragon MD;  Location: [facility] OR     CV PERICARDIOCENTESIS N/A 12/11/2022    Procedure: Pericardiocentesis;  Surgeon: Sourav Sahu MD;  Location:  HEART CARDIAC CATH LAB     ESOPHAGOSCOPY, GASTROSCOPY, DUODENOSCOPY (EGD), COMBINED N/A 12/29/2021    Procedure: ESOPHAGOGASTRODUODENOSCOPY, WITH BIOPSY;  Surgeon: Esteban Rose DO;  [place] [facility]     ESOPHAGOSCOPY, GASTROSCOPY, DUODENOSCOPY (EGD), COMBINED N/A 11/14/2022    Procedure: Upper endoscopy; gastric stricture dilation, interpretation of fluoroscopy nasojejunal feeding tube;  Surgeon: Daniel Aragon MD;  Location: [facility] OR     ESOPHAGOSCOPY, GASTROSCOPY, DUODENOSCOPY (EGD), COMBINED N/A 11/23/2022    Procedure: ESOPHAGOGASTRODUODENOSCOPY (EGD);  Surgeon: Daniel Aragon MD;  Location:  GI     ESOPHAGOSCOPY, GASTROSCOPY, DUODENOSCOPY (EGD), COMBINED N/A 12/21/2022    Procedure: ESOPHAGOGASTRODUODENOSCOPY, WITH BIOPSY;  Surgeon: Leticia Sommer MD;  Location: [facility]     LAPAROSCOPIC GASTRIC SLEEVE N/A 10/25/2022    Procedure: GASTRECTOMY, SLEEVE, LAPAROSCOPIC;  Surgeon: Daniel Aragon MD;  Location: [facility] OR     LAPAROSCOPIC HERNIORRHAPHY HIATAL N/A 10/25/2022    Procedure: HERNIORRHAPHY, HIATAL, LAPAROSCOPIC;  Surgeon: Daniel Aragon MD;  Location: [facility] OR     LAPAROSCOPY DIAGNOSTIC (GENERAL) N/A 11/4/2022    Procedure: LAPAROSCOPY, DIAGNOSTIC, BY GENERAL SURGERY, evacuation of abdominl hematoma;  Surgeon: Daniel Aragon MD;  Location: [facility] OR     PICC TRIPLE LUMEN PLACEMENT Left 11/06/2022    51cm (1cm external), Basilic vein     TONSILLECTOMY & ADENOIDECTOMY       ZZC NONSPECIFIC PROCEDURE      T&A as a child     ZZC NONSPECIFIC PROCEDURE      Tubes in ears bilaterally     Family History   Problem Relation Age of Onset      Hypertension Mother         arthritis     Heart Disease Father         MI, Lipids     Acute Myocardial Infarction Father      Cancer - colorectal Maternal Grandmother         arthritis     Hypertension Maternal Grandfather         arthritis, macular degeneration     Colon Cancer Paternal Grandmother      Alzheimer Disease Paternal Grandfather      Anesthesia Reaction No family hx of      Clotting Disorder No family hx of      Social History     Socioeconomic History     Marital status:      Spouse name: Not on file     Number of children: Not on file     Years of education: Not on file     Highest education level: Not on file   Occupational History     Not on file   Tobacco Use     Smoking status: Never     Smokeless tobacco: Never   Vaping Use     Vaping Use: Never used   Substance and Sexual Activity     Alcohol use: Yes     Comment: Alcoholic Drinks/day: social     Drug use: No     Sexual activity: Not Currently   Other Topics Concern     Not on file   Social History Narrative     Not on file     Social Determinants of Health     Financial Resource Strain: Not on file   Food Insecurity: Not on file   Transportation Needs: Not on file   Physical Activity: Not on file   Stress: Not on file   Social Connections: Not on file   Intimate Partner Violence: Not on file   Housing Stability: Not on file            Allergies   Azithromycin and Erythromycin    Today's clinic visit entailed:  Review of the result(s) of each unique test - echocardiogram, ESR, CRP  Ordering of each unique test  Prescription drug management  I spent a total of 55 minutes on the day of the visit.   Time spent doing chart review, history and exam, documentation and further activities per the note  Provider  Link to [facility] Help Grid     The level of medical decision making during this visit was of moderate complexity.    Thank you for allowing me to participate in the care of your patient.      Sincerely,     MICHE[facility]  [facility]    cc:   Viviane Collins PA-C  [address]  [place] [address]

## 2023-01-18 ENCOUNTER — TELEPHONE (OUTPATIENT)
Dept: SURGERY | Facility: CLINIC | Age: 43
End: 2023-01-18
Payer: COMMERCIAL
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
## 2023-01-18 NOTE — TELEPHONE ENCOUNTER
Patient would like to speak with a nurse regarding various health issues she's been having since her sleeve surgery 10/2022. Patient has been hospitalized numerous time since her surgery.     [phone] okay to leave VM

## 2023-01-19 ENCOUNTER — PATIENT OUTREACH (OUTPATIENT)
Dept: ENDOCRINOLOGY | Facility: CLINIC | Age: 43
End: 2023-01-19
Payer: COMMERCIAL

## 2023-01-19 NOTE — PROGRESS NOTES
Attempted to contact patient regarding health concerns. Received a message from [facility] that patient had concerns and their team is not willing to take her on as a patient.  They are recommending patient follow up with Dr. Aragon.  Left message and sent MyChart message.

## 2023-01-25 ENCOUNTER — OFFICE VISIT (OUTPATIENT)
Dept: FAMILY MEDICINE | Facility: CLINIC | Age: 43
End: 2023-01-25
Payer: COMMERCIAL

## 2023-01-25 VITALS
WEIGHT: 214.4 LBS | DIASTOLIC BLOOD PRESSURE: 85 MMHG | BODY MASS INDEX: 29.04 KG/M2 | TEMPERATURE: 97.6 F | RESPIRATION RATE: 16 BRPM | HEART RATE: 86 BPM | HEIGHT: 72 IN | SYSTOLIC BLOOD PRESSURE: 110 MMHG | OXYGEN SATURATION: 99 %

## 2023-01-25 DIAGNOSIS — D64.9 ANEMIA, UNSPECIFIED TYPE: Primary | ICD-10-CM

## 2023-01-25 DIAGNOSIS — E87.6 LOW BLOOD POTASSIUM: ICD-10-CM

## 2023-01-25 DIAGNOSIS — I31.39 PERICARDIAL EFFUSION: ICD-10-CM

## 2023-01-25 DIAGNOSIS — R13.19 ESOPHAGEAL DYSPHAGIA: ICD-10-CM

## 2023-01-25 LAB
ANION GAP SERPL CALCULATED.3IONS-SCNC: 17 MMOL/L (ref 7–15)
BUN SERPL-MCNC: 15.7 MG/DL (ref 6–20)
CALCIUM SERPL-MCNC: 10 MG/DL (ref 8.6–10)
CHLORIDE SERPL-SCNC: 106 MMOL/L (ref 98–107)
CREAT SERPL-MCNC: 1.08 MG/DL (ref 0.51–0.95)
DEPRECATED HCO3 PLAS-SCNC: 19 MMOL/L (ref 22–29)
ERYTHROCYTE [DISTWIDTH] IN BLOOD BY AUTOMATED COUNT: 17.2 % (ref 10–15)
GFR SERPL CREATININE-BSD FRML MDRD: 65 ML/MIN/1.73M2
GLUCOSE SERPL-MCNC: 101 MG/DL (ref 70–99)
HCT VFR BLD AUTO: 41.7 % (ref 35–47)
HGB BLD-MCNC: 12.8 G/DL (ref 11.7–15.7)
MCH RBC QN AUTO: 26.3 PG (ref 26.5–33)
MCHC RBC AUTO-ENTMCNC: 30.7 G/DL (ref 31.5–36.5)
MCV RBC AUTO: 86 FL (ref 78–100)
PLATELET # BLD AUTO: 382 10E3/UL (ref 150–450)
POTASSIUM SERPL-SCNC: 3.5 MMOL/L (ref 3.4–5.3)
RBC # BLD AUTO: 4.87 10E6/UL (ref 3.8–5.2)
SODIUM SERPL-SCNC: 142 MMOL/L (ref 136–145)
WBC # BLD AUTO: 6.1 10E3/UL (ref 4–11)

## 2023-01-25 PROCEDURE — 99203 OFFICE O/P NEW LOW 30 MIN: CPT | Performed by: NURSE PRACTITIONER

## 2023-01-25 PROCEDURE — 85027 COMPLETE CBC AUTOMATED: CPT | Performed by: NURSE PRACTITIONER

## 2023-01-25 PROCEDURE — 80048 BASIC METABOLIC PNL TOTAL CA: CPT | Performed by: NURSE PRACTITIONER

## 2023-01-25 PROCEDURE — 36415 COLL VENOUS BLD VENIPUNCTURE: CPT | Performed by: NURSE PRACTITIONER

## 2023-01-25 ASSESSMENT — PATIENT HEALTH QUESTIONNAIRE - PHQ9
SUM OF ALL RESPONSES TO PHQ QUESTIONS 1-9: 5
SUM OF ALL RESPONSES TO PHQ QUESTIONS 1-9: 5
10. IF YOU CHECKED OFF ANY PROBLEMS, HOW DIFFICULT HAVE THESE PROBLEMS MADE IT FOR YOU TO DO YOUR WORK, TAKE CARE OF THINGS AT HOME, OR GET ALONG WITH OTHER PEOPLE: SOMEWHAT DIFFICULT

## 2023-01-25 ASSESSMENT — PAIN SCALES - GENERAL: PAINLEVEL: MILD PAIN (3)

## 2023-01-25 NOTE — NURSING NOTE
"Chief Complaint   Patient presents with     Hospital F/U     Initial BP (!) 120/98 (BP Location: Right arm, Patient Position: Sitting, Cuff Size: Adult Large)   Pulse 86   Temp 97.6  F (36.4  C) (Oral)   Resp 16   Ht 1.822 m (5' 11.75\")   Wt 97.3 kg (214 lb 6.4 oz)   LMP 12/15/2022   SpO2 99%   BMI 29.28 kg/m   Estimated body mass index is 29.28 kg/m  as calculated from the following:    Height as of this encounter: 1.822 m (5' 11.75\").    Weight as of this encounter: 97.3 kg (214 lb 6.4 oz).  BP completed using cuff size large right arm    Lisa Magill, CMA    "

## 2023-01-25 NOTE — PROGRESS NOTES
Assessment & Plan     Anemia, unspecified type  Rechecking today; resolved.   - CBC with platelets; Future  - CBC with platelets    Low blood potassium  Rechecking today.   - Basic metabolic panel  (Ca, Cl, CO2, Creat, Gluc, K, Na, BUN); Future  - Basic metabolic panel  (Ca, Cl, CO2, Creat, Gluc, K, Na, BUN)    Esophageal dysphagia  Improved; has follow-up with [facility] next week.     Pericardial effusion  Symptoms improving, managed by cardiology          MED REC REQUIRED  Post Medication Reconciliation Status: discharge medications reconciled, continue medications without change    Follow-up 7 mos physical   No follow-ups on file.    ELISA Godfrey CNP  [facility] NAMRATA Lynn is a 42 year old, presenting for the following health issues:  Hospital F/U      [facility]       Hospital Follow-up Visit:    Hospital/Nursing Home/IP Rehab Facility: [facility]  Date of Admission: 12/19/22  Date of Discharge: 12/23/22  Reason(s) for Admission: breathing issues     Was your hospitalization related to COVID-19? No   Problems taking medications regularly:  None  Medication changes since discharge: YES   Problems adhering to non-medication therapy:  None    Summary of hospitalization:  [facility] discharge summary reviewed  Diagnostic Tests/Treatments reviewed.  Follow up needed: lab  Other Healthcare Providers Involved in Patient s Care:         Specialist appointment - cardiology, GI  Update since discharge: improved.     Has occasional vomiting, but much better than last month.   Has a spot on her L side that is sore to the touch.    Certain positions make this spot more sore.   Has [facility] follow-up next week.   She has noted some abdominal pain--worse with standing.    No nausea.     Plan of care communicated with patient               Review of Systems   Constitutional, HEENT, cardiovascular, pulmonary, gi and gu systems are negative, except as  "otherwise noted.      Objective    /85   Pulse 86   Temp 97.6  F (36.4  C) (Oral)   Resp 16   Ht 1.822 m (5' 11.75\")   Wt 97.3 kg (214 lb 6.4 oz)   LMP 12/15/2022   SpO2 99%   BMI 29.28 kg/m    Body mass index is 29.28 kg/m .  Physical Exam   GENERAL: healthy, alert and no distress  NECK: no adenopathy, no asymmetry, masses, or scars and thyroid normal to palpation  RESP: lungs clear to auscultation - no rales, rhonchi or wheezes  CV: regular rate and rhythm, normal S1 S2, no S3 or S4, no murmur, click or rub, no peripheral edema and peripheral pulses strong  MS: no gross musculoskeletal defects noted, mild tenderness over the L lateral lower ribs  SKIN: no suspicious lesions or rashes    No results found for this or any previous visit (from the past 24 hour(s)).              "

## 2023-02-16 ENCOUNTER — HOSPITAL ENCOUNTER (OUTPATIENT)
Dept: CARDIOLOGY | Facility: CLINIC | Age: 43
Discharge: HOME | End: 2023-02-16
Attending: PHYSICIAN ASSISTANT | Admitting: PHYSICIAN ASSISTANT
Payer: COMMERCIAL

## 2023-02-16 ENCOUNTER — LAB (OUTPATIENT)
Dept: LAB | Facility: CLINIC | Age: 43
End: 2023-02-16
Payer: COMMERCIAL

## 2023-02-16 DIAGNOSIS — I31.39 PERICARDIAL EFFUSION: ICD-10-CM

## 2023-02-16 LAB — CRP SERPL-MCNC: <3 MG/L

## 2023-02-16 PROCEDURE — 75561 CARDIAC MRI FOR MORPH W/DYE: CPT

## 2023-02-16 PROCEDURE — 75561 CARDIAC MRI FOR MORPH W/DYE: CPT | Mod: 26 | Performed by: INTERNAL MEDICINE

## 2023-02-16 PROCEDURE — 255N000002 HC RX 255 OP 636: Performed by: PHYSICIAN ASSISTANT

## 2023-02-16 PROCEDURE — 86140 C-REACTIVE PROTEIN: CPT | Performed by: PHYSICIAN ASSISTANT

## 2023-02-16 PROCEDURE — 36415 COLL VENOUS BLD VENIPUNCTURE: CPT | Performed by: PHYSICIAN ASSISTANT

## 2023-02-16 PROCEDURE — 258N000003 HC RX IP 258 OP 636: Performed by: PHYSICIAN ASSISTANT

## 2023-02-16 PROCEDURE — A9585 GADOBUTROL INJECTION: HCPCS | Performed by: PHYSICIAN ASSISTANT

## 2023-02-16 RX ORDER — CAFFEINE CITRATE 20 MG/ML
60 SOLUTION INTRAVENOUS
Status: DISCONTINUED | OUTPATIENT
Start: 2023-02-16 | End: 2023-02-17 | Stop reason: HOSPADM

## 2023-02-16 RX ORDER — DIPHENHYDRAMINE HYDROCHLORIDE 50 MG/ML
25-50 INJECTION INTRAMUSCULAR; INTRAVENOUS
Status: DISCONTINUED | OUTPATIENT
Start: 2023-02-16 | End: 2023-02-17 | Stop reason: HOSPADM

## 2023-02-16 RX ORDER — AMINOPHYLLINE 25 MG/ML
100 INJECTION, SOLUTION INTRAVENOUS ONCE
Status: DISCONTINUED | OUTPATIENT
Start: 2023-02-16 | End: 2023-02-17 | Stop reason: HOSPADM

## 2023-02-16 RX ORDER — ACYCLOVIR 200 MG/1
0-1 CAPSULE ORAL
Status: DISCONTINUED | OUTPATIENT
Start: 2023-02-16 | End: 2023-02-17 | Stop reason: HOSPADM

## 2023-02-16 RX ORDER — ALBUTEROL SULFATE 90 UG/1
2 AEROSOL, METERED RESPIRATORY (INHALATION) EVERY 5 MIN PRN
Status: DISCONTINUED | OUTPATIENT
Start: 2023-02-16 | End: 2023-02-17 | Stop reason: HOSPADM

## 2023-02-16 RX ORDER — DIAZEPAM 5 MG
5 TABLET ORAL EVERY 30 MIN PRN
Status: DISCONTINUED | OUTPATIENT
Start: 2023-02-16 | End: 2023-02-17 | Stop reason: HOSPADM

## 2023-02-16 RX ORDER — ONDANSETRON 2 MG/ML
4 INJECTION INTRAMUSCULAR; INTRAVENOUS
Status: DISCONTINUED | OUTPATIENT
Start: 2023-02-16 | End: 2023-02-17 | Stop reason: HOSPADM

## 2023-02-16 RX ORDER — DIPHENHYDRAMINE HCL 25 MG
25 CAPSULE ORAL
Status: DISCONTINUED | OUTPATIENT
Start: 2023-02-16 | End: 2023-02-17 | Stop reason: HOSPADM

## 2023-02-16 RX ORDER — GADOBUTROL 604.72 MG/ML
13 INJECTION INTRAVENOUS ONCE
Status: COMPLETED | OUTPATIENT
Start: 2023-02-16 | End: 2023-02-16

## 2023-02-16 RX ORDER — REGADENOSON 0.08 MG/ML
0.4 INJECTION, SOLUTION INTRAVENOUS ONCE
Status: DISCONTINUED | OUTPATIENT
Start: 2023-02-16 | End: 2023-02-17 | Stop reason: HOSPADM

## 2023-02-16 RX ORDER — METHYLPREDNISOLONE SODIUM SUCCINATE 125 MG/2ML
125 INJECTION, POWDER, LYOPHILIZED, FOR SOLUTION INTRAMUSCULAR; INTRAVENOUS
Status: DISCONTINUED | OUTPATIENT
Start: 2023-02-16 | End: 2023-02-17 | Stop reason: HOSPADM

## 2023-02-16 RX ADMIN — SODIUM CHLORIDE 100 ML: 9 INJECTION, SOLUTION INTRAVENOUS at 12:22

## 2023-02-16 RX ADMIN — GADOBUTROL 13 ML: 604.72 INJECTION INTRAVENOUS at 12:11

## 2023-02-20 NOTE — PROGRESS NOTES
Trey Gonsalves MD Olson, Lynn; Kaylee Muñoz  Cardiac MRI Core   Free breathing   IVC view   T2 mapping   T2 with and without FS   Haste   Contrast administered per weight based protocol.

## 2023-02-27 ENCOUNTER — CARE COORDINATION (OUTPATIENT)
Dept: CARDIOLOGY | Facility: CLINIC | Age: 43
End: 2023-02-27
Payer: COMMERCIAL

## 2023-02-27 DIAGNOSIS — I30.9 ACUTE PERICARDITIS, UNSPECIFIED TYPE: ICD-10-CM

## 2023-02-27 RX ORDER — INDOMETHACIN 25 MG/1
CAPSULE ORAL
Qty: 70 CAPSULE | Refills: 0 | Status: SHIPPED | OUTPATIENT
Start: 2023-02-27 | End: 2023-05-24

## 2023-02-27 RX ORDER — COLCHICINE 0.6 MG/1
0.6 TABLET ORAL DAILY
Qty: 30 TABLET | Refills: 1 | Status: SHIPPED | OUTPATIENT
Start: 2023-02-27 | End: 2023-05-24

## 2023-02-27 NOTE — PROGRESS NOTES
Spoke to patient, reviewed message from Viviane Collins regarding results, medication recommendations, and follow up. Prescriptions sent to pharmacy and scheduling phone number provided to set up visit with Dr Gomez in 3mos. She asks if she can add more cardio to her exercise regimen now, was thinking mostly just walking but she wanted to be sure it was OK to get her HR up a little now. Routed to Viviane Collins.

## 2023-02-27 NOTE — PROGRESS NOTES
Walking is OK, yes, but I want her to continue to avoid strenuous activity for another two weeks. Thanks!

## 2023-02-27 NOTE — PROGRESS NOTES
Spoke to patient and reviewed Viviane's message about walking. She verbalized understanding, no other questions.

## 2023-02-27 NOTE — PROGRESS NOTES
Reviewed patient's cardiac MRI and labs with Dr. Gomez. MRI shows recent pericarditis and CRP has normalized. If she is tolerating the current dose, he is recommending she continue colchicine for another 2 months (or 4 months total), then stop. Regarding indocin, begin to taper by reducing to 25mg TID with food for 2 weeks, then 25mg BID with food for 2 weeks then stop. She should see him in clinic in 3 months, or sooner of course if she develops any pericarditis symptoms. Thank you! - Viviane

## 2023-03-10 ENCOUNTER — VIRTUAL VISIT (OUTPATIENT)
Dept: FAMILY MEDICINE | Facility: CLINIC | Age: 43
End: 2023-03-10
Payer: COMMERCIAL

## 2023-03-10 ENCOUNTER — TELEPHONE (OUTPATIENT)
Dept: FAMILY MEDICINE | Facility: CLINIC | Age: 43
End: 2023-03-10

## 2023-03-10 DIAGNOSIS — F33.1 MODERATE EPISODE OF RECURRENT MAJOR DEPRESSIVE DISORDER (H): Primary | ICD-10-CM

## 2023-03-10 PROCEDURE — 99214 OFFICE O/P EST MOD 30 MIN: CPT | Mod: VID | Performed by: NURSE PRACTITIONER

## 2023-03-10 RX ORDER — BUPROPION HYDROCHLORIDE 150 MG/1
150 TABLET, EXTENDED RELEASE ORAL 2 TIMES DAILY
Qty: 180 TABLET | Refills: 0 | Status: SHIPPED | OUTPATIENT
Start: 2023-03-10 | End: 2023-06-09

## 2023-03-10 RX ORDER — BUPROPION HYDROCHLORIDE 300 MG/1
300 TABLET ORAL DAILY
Status: CANCELLED | OUTPATIENT
Start: 2023-03-10

## 2023-03-10 RX ORDER — BUPROPION HYDROCHLORIDE 300 MG/1
1 TABLET ORAL DAILY
COMMUNITY
Start: 2022-08-29 | End: 2023-05-24

## 2023-03-10 ASSESSMENT — ANXIETY QUESTIONNAIRES
IF YOU CHECKED OFF ANY PROBLEMS ON THIS QUESTIONNAIRE, HOW DIFFICULT HAVE THESE PROBLEMS MADE IT FOR YOU TO DO YOUR WORK, TAKE CARE OF THINGS AT HOME, OR GET ALONG WITH OTHER PEOPLE: NOT DIFFICULT AT ALL
6. BECOMING EASILY ANNOYED OR IRRITABLE: NOT AT ALL
GAD7 TOTAL SCORE: 0
3. WORRYING TOO MUCH ABOUT DIFFERENT THINGS: NOT AT ALL
GAD7 TOTAL SCORE: 0
5. BEING SO RESTLESS THAT IT IS HARD TO SIT STILL: NOT AT ALL
2. NOT BEING ABLE TO STOP OR CONTROL WORRYING: NOT AT ALL
1. FEELING NERVOUS, ANXIOUS, OR ON EDGE: NOT AT ALL
7. FEELING AFRAID AS IF SOMETHING AWFUL MIGHT HAPPEN: NOT AT ALL

## 2023-03-10 ASSESSMENT — PATIENT HEALTH QUESTIONNAIRE - PHQ9
SUM OF ALL RESPONSES TO PHQ QUESTIONS 1-9: 10
SUM OF ALL RESPONSES TO PHQ QUESTIONS 1-9: 10
5. POOR APPETITE OR OVEREATING: NOT AT ALL
10. IF YOU CHECKED OFF ANY PROBLEMS, HOW DIFFICULT HAVE THESE PROBLEMS MADE IT FOR YOU TO DO YOUR WORK, TAKE CARE OF THINGS AT HOME, OR GET ALONG WITH OTHER PEOPLE: SOMEWHAT DIFFICULT

## 2023-03-10 NOTE — PROGRESS NOTES
Leah is a 42 year old who is being evaluated via a billable video visit.      How would you like to obtain your AVS? [facility]  If the video visit is dropped, the invitation should be resent by: Text to cell phone: [phone]  Will anyone else be joining your video visit? No      Assessment & Plan     Moderate episode of recurrent major depressive disorder (H)  Spoke with pharmacist regarding SR vs XL dosing after bariatric surgery.  Unable to confirm but does look like she was to have been on SR previously though she was not and was prescribed 150 XL.  Will change to SR and increase dose.  Update via [facility] in 1 month if things are going well; schedule follow-up appt if need to discuss med changes.   - buPROPion (WELLBUTRIN SR) 150 MG 12 hr tablet; Take 1 tablet (150 mg) by mouth 2 times daily                 No follow-ups on file.    ELISA Godfrey [facility] PATRICIAMOLUCIANA Lynn is a 42 year old, presenting for the following health issues:  Depression and Video Visit      History of Present Illness       Mental Health Follow-up:  Patient presents to follow-up on Depression.Patient's depression since last visit has been:  Medium  The patient is not having other symptoms associated with depression.      Any significant life events: other  Patient is not feeling anxious or having panic attacks.  Patient has no concerns about alcohol or drug use.    She eats 2-3 servings of fruits and vegetables daily.She consumes 0 sweetened beverage(s) daily.She exercises with enough effort to increase her heart rate 9 or less minutes per day.  She exercises with enough effort to increase her heart rate 3 or less days per week.   She is taking medications regularly.    Today's PHQ-9         PHQ-9 Total Score: 10    PHQ-9 Q9 Thoughts of better off dead/self-harm past 2 weeks :   Not at all    How difficult have these problems made it for you to do your work, take care of things at home, or  get along with other people: Somewhat difficult     SAMMY-7 SCORE 3/10/2023   Total Score 0     PHQ 12/19/2022 1/25/2023 3/10/2023   PHQ-9 Total Score 18 5 10   Q9: Thoughts of better off dead/self-harm past 2 weeks Not at all Not at all Not at all     Slowly improving from pericarditis, weight loss surgery last Dec.   Feeling lack of motivation.   In a slow season for work.   Would like to increase dose.       Review of Systems   Constitutional, HEENT, cardiovascular, pulmonary, gi and gu systems are negative, except as otherwise noted.      Objective    Vitals - Patient Reported  Weight (Patient Reported): 93 kg (205 lb)  Pulse (Patient Reported): 65  Pain Score: No Pain (0)      Vitals:  No vitals were obtained today due to virtual visit.    Physical Exam   GENERAL: Healthy, alert and no distress  EYES: Eyes grossly normal to inspection.  No discharge or erythema, or obvious scleral/conjunctival abnormalities.  RESP: No audible wheeze, cough, or visible cyanosis.  No visible retractions or increased work of breathing.    SKIN: Visible skin clear. No significant rash, abnormal pigmentation or lesions.  NEURO: Cranial nerves grossly intact.  Mentation and speech appropriate for age.  PSYCH: Mentation appears normal, affect normal/bright, judgement and insight intact, normal speech and appearance well-groomed.    No results found for this or any previous visit (from the past 24 hour(s)).          Video-Visit Details    Type of service:  Video Visit     Originating Location (pt. Location): Home    Distant Location (provider location):  On-site  Platform used for Video Visit: Zair[employer]

## 2023-03-16 ENCOUNTER — HOSPITAL ENCOUNTER (OUTPATIENT)
Dept: CT IMAGING | Facility: CLINIC | Age: 43
Discharge: HOME | End: 2023-03-16
Attending: PHYSICIAN ASSISTANT | Admitting: PHYSICIAN ASSISTANT
Payer: COMMERCIAL

## 2023-03-16 DIAGNOSIS — Z71.3 DIETARY COUNSELING AND SURVEILLANCE: ICD-10-CM

## 2023-03-16 DIAGNOSIS — R10.9 BILATERAL FLANK PAIN: ICD-10-CM

## 2023-03-16 DIAGNOSIS — K22.70 BARRETT'S ESOPHAGUS WITHOUT DYSPLASIA: ICD-10-CM

## 2023-03-16 DIAGNOSIS — Z90.3 H/O GASTRIC SLEEVE: ICD-10-CM

## 2023-03-16 PROCEDURE — 74177 CT ABD & PELVIS W/CONTRAST: CPT

## 2023-03-16 PROCEDURE — 250N000011 HC RX IP 250 OP 636: Performed by: PHYSICIAN ASSISTANT

## 2023-03-16 RX ORDER — IOPAMIDOL 755 MG/ML
100 INJECTION, SOLUTION INTRAVASCULAR ONCE
Status: COMPLETED | OUTPATIENT
Start: 2023-03-16 | End: 2023-03-16

## 2023-03-16 RX ADMIN — IOPAMIDOL 100 ML: 755 INJECTION, SOLUTION INTRAVENOUS at 13:30

## 2023-05-09 ENCOUNTER — TELEPHONE (OUTPATIENT)
Dept: FAMILY MEDICINE | Facility: CLINIC | Age: 43
End: 2023-05-09
Payer: COMMERCIAL

## 2023-05-09 NOTE — CONFIDENTIAL NOTE
Patient Quality Outreach    Patient is due for the following:   IVD  -  LDL (Fasting)    Next Steps:   Schedule a lab only visit for Lipids    Type of outreach:    Sent [employer] message.      Questions for provider review:    None           Robert Guadalupe MA

## 2023-05-24 ENCOUNTER — OFFICE VISIT (OUTPATIENT)
Dept: FAMILY MEDICINE | Facility: CLINIC | Age: 43
End: 2023-05-24
Payer: COMMERCIAL

## 2023-05-24 VITALS
RESPIRATION RATE: 16 BRPM | HEART RATE: 73 BPM | TEMPERATURE: 98 F | DIASTOLIC BLOOD PRESSURE: 82 MMHG | WEIGHT: 191.4 LBS | SYSTOLIC BLOOD PRESSURE: 123 MMHG | BODY MASS INDEX: 25.92 KG/M2 | HEIGHT: 72 IN | OXYGEN SATURATION: 98 %

## 2023-05-24 DIAGNOSIS — R68.2 DRY MOUTH: Primary | ICD-10-CM

## 2023-05-24 DIAGNOSIS — R22.9 LOCALIZED SUPERFICIAL SWELLING, MASS, OR LUMP: ICD-10-CM

## 2023-05-24 LAB
ALBUMIN UR-MCNC: NEGATIVE MG/DL
ANION GAP SERPL CALCULATED.3IONS-SCNC: 13 MMOL/L (ref 7–15)
APPEARANCE UR: CLEAR
BACTERIA #/AREA URNS HPF: ABNORMAL /HPF
BASOPHILS # BLD AUTO: 0 10E3/UL (ref 0–0.2)
BASOPHILS NFR BLD AUTO: 0 %
BILIRUB UR QL STRIP: NEGATIVE
BUN SERPL-MCNC: 10.2 MG/DL (ref 6–20)
CALCIUM SERPL-MCNC: 9.5 MG/DL (ref 8.6–10)
CHLORIDE SERPL-SCNC: 105 MMOL/L (ref 98–107)
COLOR UR AUTO: YELLOW
CREAT SERPL-MCNC: 0.96 MG/DL (ref 0.51–0.95)
DEPRECATED HCO3 PLAS-SCNC: 23 MMOL/L (ref 22–29)
EOSINOPHIL # BLD AUTO: 0.1 10E3/UL (ref 0–0.7)
EOSINOPHIL NFR BLD AUTO: 1 %
ERYTHROCYTE [DISTWIDTH] IN BLOOD BY AUTOMATED COUNT: 13.6 % (ref 10–15)
ERYTHROCYTE [SEDIMENTATION RATE] IN BLOOD BY WESTERGREN METHOD: 33 MM/HR (ref 0–20)
GFR SERPL CREATININE-BSD FRML MDRD: 75 ML/MIN/1.73M2
GLUCOSE SERPL-MCNC: 85 MG/DL (ref 70–99)
GLUCOSE UR STRIP-MCNC: NEGATIVE MG/DL
HCT VFR BLD AUTO: 36.1 % (ref 35–47)
HGB BLD-MCNC: 12.2 G/DL (ref 11.7–15.7)
HGB UR QL STRIP: NEGATIVE
IMM GRANULOCYTES # BLD: 0 10E3/UL
IMM GRANULOCYTES NFR BLD: 0 %
KETONES UR STRIP-MCNC: NEGATIVE MG/DL
LEUKOCYTE ESTERASE UR QL STRIP: ABNORMAL
LYMPHOCYTES # BLD AUTO: 1.6 10E3/UL (ref 0.8–5.3)
LYMPHOCYTES NFR BLD AUTO: 25 %
MCH RBC QN AUTO: 29.5 PG (ref 26.5–33)
MCHC RBC AUTO-ENTMCNC: 33.8 G/DL (ref 31.5–36.5)
MCV RBC AUTO: 87 FL (ref 78–100)
MONOCYTES # BLD AUTO: 0.3 10E3/UL (ref 0–1.3)
MONOCYTES NFR BLD AUTO: 5 %
NEUTROPHILS # BLD AUTO: 4.3 10E3/UL (ref 1.6–8.3)
NEUTROPHILS NFR BLD AUTO: 68 %
NITRATE UR QL: NEGATIVE
PH UR STRIP: 5.5 [PH] (ref 5–7)
PLATELET # BLD AUTO: 269 10E3/UL (ref 150–450)
POTASSIUM SERPL-SCNC: 3.3 MMOL/L (ref 3.4–5.3)
RBC # BLD AUTO: 4.14 10E6/UL (ref 3.8–5.2)
RBC #/AREA URNS AUTO: ABNORMAL /HPF
SODIUM SERPL-SCNC: 141 MMOL/L (ref 136–145)
SP GR UR STRIP: 1.01 (ref 1–1.03)
SQUAMOUS #/AREA URNS AUTO: ABNORMAL /LPF
TSH SERPL DL<=0.005 MIU/L-ACNC: 1.78 UIU/ML (ref 0.3–4.2)
UROBILINOGEN UR STRIP-ACNC: 0.2 E.U./DL
WBC # BLD AUTO: 6.3 10E3/UL (ref 4–11)
WBC #/AREA URNS AUTO: ABNORMAL /HPF

## 2023-05-24 PROCEDURE — 85652 RBC SED RATE AUTOMATED: CPT | Performed by: PHYSICIAN ASSISTANT

## 2023-05-24 PROCEDURE — 85025 COMPLETE CBC W/AUTO DIFF WBC: CPT | Performed by: PHYSICIAN ASSISTANT

## 2023-05-24 PROCEDURE — 81001 URINALYSIS AUTO W/SCOPE: CPT | Performed by: PHYSICIAN ASSISTANT

## 2023-05-24 PROCEDURE — 36415 COLL VENOUS BLD VENIPUNCTURE: CPT | Performed by: PHYSICIAN ASSISTANT

## 2023-05-24 PROCEDURE — 86235 NUCLEAR ANTIGEN ANTIBODY: CPT | Mod: 59 | Performed by: PHYSICIAN ASSISTANT

## 2023-05-24 PROCEDURE — 86038 ANTINUCLEAR ANTIBODIES: CPT | Performed by: PHYSICIAN ASSISTANT

## 2023-05-24 PROCEDURE — 99214 OFFICE O/P EST MOD 30 MIN: CPT | Performed by: PHYSICIAN ASSISTANT

## 2023-05-24 PROCEDURE — 80048 BASIC METABOLIC PNL TOTAL CA: CPT | Performed by: PHYSICIAN ASSISTANT

## 2023-05-24 PROCEDURE — 84443 ASSAY THYROID STIM HORMONE: CPT | Performed by: PHYSICIAN ASSISTANT

## 2023-05-24 PROCEDURE — 86235 NUCLEAR ANTIGEN ANTIBODY: CPT | Performed by: PHYSICIAN ASSISTANT

## 2023-05-24 PROCEDURE — 86431 RHEUMATOID FACTOR QUANT: CPT | Performed by: PHYSICIAN ASSISTANT

## 2023-05-24 ASSESSMENT — PAIN SCALES - GENERAL: PAINLEVEL: NO PAIN (0)

## 2023-05-24 NOTE — PROGRESS NOTES
Assessment & Plan     Dry mouth  Ongoing issue for several years but worse lately. She has had several recent cavities and dentist recommended she be evaluated. She does have other autoimmune conditions - raynaud and lichen sclerosis, also FH autoimmune conditions - RA and/or ankylosing spondylitis in mom, RA in maternal GM. Mouth does look dry on exam. She denies significant eye symptoms but does use lubricating eye drops a few times per week. Consider Sjogren's and will evaluate with labs, consider follow-up with rheumatology pending results. She does take hyoscyamine but has only been taking it for the past few months. The dry mouth has been ongoing for several years and she does not feel that it has been worse since starting the hyoscyamine. She does feel that her stomach upset has improved and she is no longer needing the hyoscyamine as much so she will stop taking it and see if that helps at all with dry mouth.  - SSA Ro MARINA Antibody IgG; Future  - SSB La MARINA Antibody IgG; Future  - Anti Nuclear Sanajna IgG by IFA with Reflex; Future  - Rheumatoid factor; Future  - ESR: Erythrocyte sedimentation rate; Future  - CBC with platelets and differential; Future  - Basic metabolic panel  (Ca, Cl, CO2, Creat, Gluc, K, Na, BUN); Future  - TSH with free T4 reflex; Future  - SSA Ro MARINA Antibody IgG  - SSB La MARINA Antibody IgG  - Anti Nuclear Sanjana IgG by IFA with Reflex  - Rheumatoid factor  - ESR: Erythrocyte sedimentation rate  - CBC with platelets and differential  - Basic metabolic panel  (Ca, Cl, CO2, Creat, Gluc, K, Na, BUN)  - TSH with free T4 reflex  - UA Macroscopic with reflex to Microscopic and Culture; Future  - UA Macroscopic with reflex to Microscopic and Culture  - UA Microscopic with Reflex to Culture    Localized superficial swelling, mass, or lump  She has noticed this for the past 6-12 months, doesn't seem to be changing. Already saw dermatology and referred to PCP. Does not feel soft like cyst or lipoma,  has firm, bumpy feeling. Further evaluation with US, follow-up per results.  - US Upper Extremity Non Vascular Right; Future    Lillian Andrew PA-C  [facility] NAMRATA Lynn is a 43 year old, presenting for the following health issues:  Mouth Problem and Mass (On elbow)        5/24/2023     9:17 AM   Additional Questions   Roomed by DANIEL Banks     History of Present Illness       Reason for visit:  Dry mouth/sjogrens and lump on elbow  Symptom onset:  More than a month  Symptoms include:  Very dry mouth,effecting day to day and teeth  Symptom intensity:  Severe  Symptom progression:  Staying the same  Had these symptoms before:  Yes  Has tried/received treatment for these symptoms:  No  What makes it better:  Gum    She eats 2-3 servings of fruits and vegetables daily.She consumes 0 sweetened beverage(s) daily.She exercises with enough effort to increase her heart rate 10 to 19 minutes per day.  She exercises with enough effort to increase her heart rate 6 days per week.   She is taking medications regularly.    Today's PHQ-9         PHQ-9 Total Score: 5    PHQ-9 Q9 Thoughts of better off dead/self-harm past 2 weeks :   Not at all    How difficult have these problems made it for you to do your work, take care of things at home, or get along with other people: Somewhat difficult     Bump on right elbow for past year or so. No trauma or injury. Not growing that she can tell. Not painful. No overlying redness, warmth, tenderness, drainage. She saw dermatology recently who recommended having this looked at in clinic.    Dry mouth for many years  Always has to have water by her, constantly sipping water or chewing gum. Tried biotene but didn't help much.  Lips get stuck to her teeth, usually drinks water while eating.  Has had a lot of cavities recently and her dentist recommended that she get evaluated.    She does take hyoscyamine but has only been taking it for the past  few months. The dry mouth has been ongoing for several years and she does not feel that it has been worse since starting the hyoscyamine. She does feel that her stomach upset has improved and she is no longer needing the hyoscyamine as much so she will stop taking it and see if that helps at all with dry mouth.    Not necessarily significant concerns with dry eyes but she does use lubricating eye drops  once or twice a week. Eyes feel more dry when wearing contacts so usually wears glasses.    She has a history of raynaud, lichen sclerosus    Mom had what sounds like ankylosing spondylitis that affected her eyes - patient said it was something with HLA B27. She also thinks mom and maternal grandmother had rheumatoid arthritis.      Review of Systems   Constitutional, HEENT, cardiovascular, pulmonary, gi and gu systems are negative, except as otherwise noted.      Objective    /82 (BP Location: Right arm, Patient Position: Sitting, Cuff Size: Adult Regular)   Pulse 73   Temp 98  F (36.7  C) (Oral)   Resp 16   Ht 1.829 m (6')   Wt 86.8 kg (191 lb 6.4 oz)   LMP 05/19/2023 (Approximate)   SpO2 98%   BMI 25.96 kg/m    Body mass index is 25.96 kg/m .  Physical Exam   GENERAL: healthy, alert and no distress  EYES: Eyes grossly normal to inspection, PERRL and conjunctivae and sclerae normal  HENT: dry mucous membranes, no saliva pooling below tongue. Ear canals and TM's normal, nose and mouth without ulcers or lesions, oropharynx clear and   NECK: no adenopathy, no asymmetry, masses, or scars and thyroid normal to palpation  RESP: lungs clear to auscultation - no rales, rhonchi or wheezes  CV: regular rate and rhythm, normal S1 S2, no S3 or S4, no murmur, click or rub, no peripheral edema and peripheral pulses strong  MS: no gross musculoskeletal defects noted, no edema  SKIN: right forearm just distal to elbow - ~ 1 cm firm, mobile soft tissue mass, feels bumpy. No erythema, other discoloration, or skin  injury over the mass. Non-tender.  NEURO: Normal strength and tone, mentation intact and speech normal  PSYCH: mentation appears normal, affect normal/bright

## 2023-05-25 ENCOUNTER — OFFICE VISIT (OUTPATIENT)
Dept: URGENT CARE | Facility: URGENT CARE | Age: 43
End: 2023-05-25
Payer: COMMERCIAL

## 2023-05-25 ENCOUNTER — TELEPHONE (OUTPATIENT)
Dept: FAMILY MEDICINE | Facility: CLINIC | Age: 43
End: 2023-05-25
Payer: COMMERCIAL

## 2023-05-25 VITALS
HEART RATE: 112 BPM | TEMPERATURE: 97.9 F | DIASTOLIC BLOOD PRESSURE: 82 MMHG | WEIGHT: 191 LBS | SYSTOLIC BLOOD PRESSURE: 126 MMHG | OXYGEN SATURATION: 98 % | BODY MASS INDEX: 25.9 KG/M2

## 2023-05-25 DIAGNOSIS — R30.0 DYSURIA: ICD-10-CM

## 2023-05-25 DIAGNOSIS — N39.0 URINARY TRACT INFECTION WITHOUT HEMATURIA, SITE UNSPECIFIED: Primary | ICD-10-CM

## 2023-05-25 DIAGNOSIS — N30.01 ACUTE CYSTITIS WITH HEMATURIA: Primary | ICD-10-CM

## 2023-05-25 LAB
ALBUMIN UR-MCNC: 30 MG/DL
APPEARANCE UR: ABNORMAL
BACTERIA #/AREA URNS HPF: ABNORMAL /HPF
BILIRUB UR QL STRIP: NEGATIVE
COLOR UR AUTO: YELLOW
GLUCOSE UR STRIP-MCNC: NEGATIVE MG/DL
HGB UR QL STRIP: ABNORMAL
KETONES UR STRIP-MCNC: NEGATIVE MG/DL
LEUKOCYTE ESTERASE UR QL STRIP: ABNORMAL
NITRATE UR QL: NEGATIVE
PH UR STRIP: 5.5 [PH] (ref 5–7)
RBC #/AREA URNS AUTO: ABNORMAL /HPF
RHEUMATOID FACT SER NEPH-ACNC: <7 IU/ML
SP GR UR STRIP: 1.02 (ref 1–1.03)
UROBILINOGEN UR STRIP-ACNC: 0.2 E.U./DL
WBC #/AREA URNS AUTO: >100 /HPF

## 2023-05-25 PROCEDURE — 81001 URINALYSIS AUTO W/SCOPE: CPT | Performed by: PHYSICIAN ASSISTANT

## 2023-05-25 PROCEDURE — 87186 SC STD MICRODIL/AGAR DIL: CPT | Performed by: PHYSICIAN ASSISTANT

## 2023-05-25 PROCEDURE — 87086 URINE CULTURE/COLONY COUNT: CPT | Performed by: PHYSICIAN ASSISTANT

## 2023-05-25 PROCEDURE — 99213 OFFICE O/P EST LOW 20 MIN: CPT | Performed by: PHYSICIAN ASSISTANT

## 2023-05-25 RX ORDER — SULFAMETHOXAZOLE/TRIMETHOPRIM 800-160 MG
1 TABLET ORAL 2 TIMES DAILY
Qty: 6 TABLET | Refills: 0 | Status: SHIPPED | OUTPATIENT
Start: 2023-05-25 | End: 2023-05-28

## 2023-05-25 RX ORDER — CIPROFLOXACIN 500 MG/1
500 TABLET, FILM COATED ORAL 2 TIMES DAILY
Qty: 14 TABLET | Refills: 0 | Status: SHIPPED | OUTPATIENT
Start: 2023-05-25 | End: 2023-06-01

## 2023-05-25 ASSESSMENT — ENCOUNTER SYMPTOMS
DYSURIA: 1
VOMITING: 0
NAUSEA: 0
FEVER: 0

## 2023-05-25 NOTE — TELEPHONE ENCOUNTER
Recurring UTI's .  Sx starting again includes urgency, burning, increased frequency.  Denies blood in urine, fever, n/v.    Pt has been doing [employer] online for treatment:  4/27/23 treated with Nitrofurantoin 100 mg x 5 days, sx improved.  5/14/23 - sx returned.  Treated with Augmentin 500/125mg tab x 5 days.    Pt wants to be seen today because she said once her sx start they have progressed rapidly in past.  No appts available in RM, CR or EA on 5/25/23.  Pt says she will go to  this am.    Scheduled routine visit/establish care.    Elmira Medeiros RN, BSN  [facility]

## 2023-05-25 NOTE — PROGRESS NOTES
SUBJECTIVE:   Leah Yanez is a 43 year old female presenting with a chief complaint of   Chief Complaint   Patient presents with     UTI     Reoccurring  -Sx starting again includes urgency, burning, increased frequency.  Denies blood in urine, fever, n/v       She is an established patient of [facility].  Patient presents with the above.  She has performed 2 evisits and was prescribed 2 rounds of abx without a UA/urine culture.  She denies fevers, back pain.          Review of Systems   Constitutional: Negative for fever.   Gastrointestinal: Negative for nausea and vomiting.   Genitourinary: Positive for dysuria. Negative for vaginal discharge.   All other systems reviewed and are negative.      Past Medical History:   Diagnosis Date     Anti-cardiolipin antibody positive      Griggs esophagus      Concussion 2016     Depressive disorder 2021     Depressive disorder, not elsewhere classified 03/01/2006    Resolved 8/07     Gastroesophageal reflux disease with esophagitis      Hiatal hernia      History of pulmonary embolism      Obesity      Raynaud's syndrome     past history of admission for gangrene of one finger     Family History   Problem Relation Age of Onset     Hypertension Mother         arthritis     Depression Mother      Anxiety Disorder Mother      Alzheimer Disease Mother      Rheumatoid Arthritis Mother      Heart Disease Father         MI, Lipids     Acute Myocardial Infarction Father      Cancer - colorectal Maternal Grandmother         arthritis     Rheumatoid Arthritis Maternal Grandmother      Hypertension Maternal Grandfather         arthritis, macular degeneration     Colon Cancer Paternal Grandmother      Alzheimer Disease Paternal Grandfather      Anesthesia Reaction No family hx of      Clotting Disorder No family hx of      Current Outpatient Medications   Medication Sig Dispense Refill     buPROPion (WELLBUTRIN SR) 150 MG 12 hr tablet Take 1 tablet (150 mg) by mouth 2 times daily 180  tablet 0     calcium carbonate (TUMS) 500 MG chewable tablet Take 1 chew tab by mouth 3 times daily       ciprofloxacin (CIPRO) 500 MG tablet Take 1 tablet (500 mg) by mouth 2 times daily for 7 days 14 tablet 0     hyoscyamine SL (LEVSIN/SL) 0.125 MG sublingual tablet Take 1 tablet (0.125 mg) by mouth 4 times daily (before meals and nightly) 50 tablet 1     Multiple Vitamins-Iron (MULTIVITAMIN/IRON PO) Take 1 tablet by mouth daily       pantoprazole (PROTONIX) 40 MG EC tablet Take 1 tablet (40 mg) by mouth 2 times daily (before meals) ; twice daily for 2 months, then daily indefinitely. 60 tablet 3     Social History     Tobacco Use     Smoking status: Never     Smokeless tobacco: Never   Vaping Use     Vaping status: Never Used   Substance Use Topics     Alcohol use: Yes     Comment: 1 or 2 a month       OBJECTIVE  /82   Pulse 112   Temp 97.9  F (36.6  C) (Tympanic)   Wt 86.6 kg (191 lb)   LMP 05/19/2023 (Approximate)   SpO2 98%   BMI 25.90 kg/m      Physical Exam  Vitals and nursing note reviewed.   Constitutional:       Appearance: Normal appearance. She is obese.   Eyes:      Extraocular Movements: Extraocular movements intact.      Conjunctiva/sclera: Conjunctivae normal.   Cardiovascular:      Rate and Rhythm: Normal rate and regular rhythm.      Pulses: Normal pulses.      Heart sounds: Normal heart sounds.   Pulmonary:      Effort: Pulmonary effort is normal.      Breath sounds: Normal breath sounds.   Abdominal:      Tenderness: There is no right CVA tenderness or left CVA tenderness.   Skin:     General: Skin is warm and dry.   Neurological:      Mental Status: She is alert.   Psychiatric:         Mood and Affect: Mood normal.         Labs:  Results for orders placed or performed in visit on 05/25/23 (from the past 24 hour(s))   UA Macroscopic with reflex to Microscopic and Culture    Specimen: Urine, Clean Catch   Result Value Ref Range    Color Urine Yellow Colorless, Straw, Light Yellow,  Yellow    Appearance Urine Cloudy (A) Clear    Glucose Urine Negative Negative mg/dL    Bilirubin Urine Negative Negative    Ketones Urine Negative Negative mg/dL    Specific Gravity Urine 1.020 1.003 - 1.035    Blood Urine Large (A) Negative    pH Urine 5.5 5.0 - 7.0    Protein Albumin Urine 30 (A) Negative mg/dL    Urobilinogen Urine 0.2 0.2, 1.0 E.U./dL    Nitrite Urine Negative Negative    Leukocyte Esterase Urine Large (A) Negative   Urine Microscopic Exam   Result Value Ref Range    Bacteria Urine Few (A) None Seen /HPF    RBC Urine 25-50 (A) 0-2 /HPF /HPF    WBC Urine >100 (A) 0-5 /HPF /HPF       X-Ray was not done.    ASSESSMENT:      ICD-10-CM    1. Acute cystitis with hematuria  N30.01 ciprofloxacin (CIPRO) 500 MG tablet      2. Dysuria  R30.0 UA Macroscopic with reflex to Microscopic and Culture     Urine Microscopic Exam     Urine Culture           Medical Decision Making:    Differential Diagnosis:  UTI: UTI, Dysuria, Pyelonephritis, Kidney Stone and Urethritis    Serious Comorbid Conditions:  Adult:  reviewed    PLAN:    Rx for cipro.  Urine culture pending.  Increase fluid intake. Discussed reasons to seek immediate medical attention - specifically, fevers or vomiting.  Finish all medications.          Followup:    If not improving or if condition worsens, follow up with your Primary Care Provider, If not improving or if conditions worsens over the next 12-24 hours, go to the Emergency Department    There are no Patient Instructions on file for this visit.

## 2023-05-26 LAB
ANA PAT SER IF-IMP: ABNORMAL
ANA SER QL IF: POSITIVE
ANA TITR SER IF: ABNORMAL {TITER}
BACTERIA UR CULT: ABNORMAL

## 2023-05-27 LAB
ENA SS-A AB SER IA-ACNC: <0.5 U/ML
ENA SS-A AB SER IA-ACNC: NEGATIVE
ENA SS-B IGG SER IA-ACNC: <0.6 U/ML
ENA SS-B IGG SER IA-ACNC: NEGATIVE

## 2023-05-30 ENCOUNTER — MYC MEDICAL ADVICE (OUTPATIENT)
Dept: FAMILY MEDICINE | Facility: CLINIC | Age: 43
End: 2023-05-30
Payer: COMMERCIAL

## 2023-05-30 DIAGNOSIS — R76.8 POSITIVE ANA (ANTINUCLEAR ANTIBODY): ICD-10-CM

## 2023-05-30 DIAGNOSIS — E87.6 LOW SERUM POTASSIUM: Primary | ICD-10-CM

## 2023-05-30 DIAGNOSIS — R68.2 DRY MOUTH: ICD-10-CM

## 2023-05-30 NOTE — TELEPHONE ENCOUNTER
Routed to Lillian Andrew, please review KISHOR and advise.    Elmira Medeiros RN, BSN  [facility]

## 2023-05-31 ENCOUNTER — ANCILLARY PROCEDURE (OUTPATIENT)
Dept: ULTRASOUND IMAGING | Facility: CLINIC | Age: 43
End: 2023-05-31
Attending: PHYSICIAN ASSISTANT
Payer: COMMERCIAL

## 2023-05-31 DIAGNOSIS — R22.9 LOCALIZED SUPERFICIAL SWELLING, MASS, OR LUMP: ICD-10-CM

## 2023-05-31 PROCEDURE — 76882 US LMTD JT/FCL EVL NVASC XTR: CPT | Mod: RT

## 2023-06-02 ENCOUNTER — HOSPITAL ENCOUNTER (OUTPATIENT)
Dept: CARDIOLOGY | Facility: CLINIC | Age: 43
Discharge: HOME | End: 2023-06-02
Attending: PHYSICIAN ASSISTANT | Admitting: PHYSICIAN ASSISTANT
Payer: COMMERCIAL

## 2023-06-02 DIAGNOSIS — I31.39 PERICARDIAL EFFUSION: ICD-10-CM

## 2023-06-02 DIAGNOSIS — R22.9 LOCALIZED SUPERFICIAL SWELLING, MASS, OR LUMP: Primary | ICD-10-CM

## 2023-06-02 LAB — LVEF ECHO: NORMAL

## 2023-06-02 PROCEDURE — 93325 DOPPLER ECHO COLOR FLOW MAPG: CPT

## 2023-06-02 PROCEDURE — 93308 TTE F-UP OR LMTD: CPT

## 2023-06-02 PROCEDURE — 93321 DOPPLER ECHO F-UP/LMTD STD: CPT | Mod: 26 | Performed by: INTERNAL MEDICINE

## 2023-06-02 PROCEDURE — 93325 DOPPLER ECHO COLOR FLOW MAPG: CPT | Mod: 26 | Performed by: INTERNAL MEDICINE

## 2023-06-02 PROCEDURE — 93308 TTE F-UP OR LMTD: CPT | Mod: 26 | Performed by: INTERNAL MEDICINE

## 2023-06-05 ENCOUNTER — ANCILLARY PROCEDURE (OUTPATIENT)
Dept: GENERAL RADIOLOGY | Facility: CLINIC | Age: 43
End: 2023-06-05
Attending: PHYSICIAN ASSISTANT
Payer: COMMERCIAL

## 2023-06-05 DIAGNOSIS — R22.9 LOCALIZED SUPERFICIAL SWELLING, MASS, OR LUMP: ICD-10-CM

## 2023-06-05 PROCEDURE — 73090 X-RAY EXAM OF FOREARM: CPT | Mod: TC | Performed by: RADIOLOGY

## 2023-06-06 PROBLEM — R93.89: Status: ACTIVE | Noted: 2023-06-06

## 2023-06-07 DIAGNOSIS — F33.1 MODERATE EPISODE OF RECURRENT MAJOR DEPRESSIVE DISORDER (H): ICD-10-CM

## 2023-06-08 ENCOUNTER — OFFICE VISIT (OUTPATIENT)
Dept: CARDIOLOGY | Facility: CLINIC | Age: 43
End: 2023-06-08
Payer: COMMERCIAL

## 2023-06-08 VITALS
HEIGHT: 72 IN | BODY MASS INDEX: 25.42 KG/M2 | DIASTOLIC BLOOD PRESSURE: 70 MMHG | HEART RATE: 68 BPM | WEIGHT: 187.7 LBS | SYSTOLIC BLOOD PRESSURE: 110 MMHG

## 2023-06-08 DIAGNOSIS — I30.9 ACUTE PERICARDITIS, UNSPECIFIED TYPE: ICD-10-CM

## 2023-06-08 DIAGNOSIS — I31.39 PERICARDIAL EFFUSION: ICD-10-CM

## 2023-06-08 PROCEDURE — 99214 OFFICE O/P EST MOD 30 MIN: CPT | Performed by: INTERNAL MEDICINE

## 2023-06-08 NOTE — PROGRESS NOTES
Cardiology Clinic Progress Note:    June 8, 2023   Patient Name: Leah Yanez  Patient MRN: [id number]     Consult indication: cardiac tamponade, pericarditis     HPI:    I had the opportunity to see patient Leah Yanez in cardiology clinic for a follow up visit. Patient is followed by our colleague Lillian Andrew PA-C with Primary Care.     Patient is a pleasant 43-year-old female with a past medical history significant for laparoscopic sleeve gastrectomy (10/25/2022), recent hospitalizations for pneumonia, esophageal dysphagia and recent dilatation of esophageal stricture (12/2/2022), Raynaud's, pericarditis complicated by pericardial effusion with early tamponade physiology status post pericardiocentesis (12/11/2022), who presents for follow-up.    I had initially met patient in hospital 12/11/2022 when she presented with hypoxic respiratory failure, found to have pericardial effusion with tamponade physiology.  Ultimately patient was transferred from [facility] to [facility] and underwent pericardiocentesis 12/11/2022.  She was admitted again 12/19/2022 with pericarditis.  Has been treated with NSAIDs and colchicine, initially did not tolerate colchicine due to GI upset/diarrhea, though she did tolerate colchicine at an attenuated dose.  She has been followed by my colleague RAIMUNDO Paige.  Cardiac MRI 2/16/2023 demonstrated normal biventricular function, no pericardial effusion, free breathing revealed no evidence of ventricular interdependence or constrictive physiology.  Follow-up TTE 6/2/2023 demonstrates normal biventricular function, normal valves, no pericardial effusion.  Since then, patient reports that overall she feels significantly improved.  She denies any chest pain, chest pressure, dyspnea, she does still have some GI discomfort.  No fevers, chills, night sweats.    Assessment and Plan/Recommendations:    # Pericarditis, pericardial effusion with  cardiac tamponade s/p pericardiocentesis 12/2022, resolved with course of NSAID and colchicine therapy     -Overall patient is a stable cardiac health without symptoms concerning for recurrent pericarditis.  Recent TTE is reassuring, normal biventricular function, no pericardial effusion.  - Follow-up in cardiology clinic as needed    Thank you for allowing our team to participate in the care of Leah Yanez.  Please do not hesitate to call or page me with any questions or concerns.    Sincerely,     Moose Gomez MD, [facility]  Cardiology  Text Page   June 8, 2023    cc  Viviane Collins PA-C  [address], [address]  [place] [address]    Voice recognition software utilized.     Total time spent on this encounter today: 31 minutes, providing care in this encounter including, but not limited to, reviewing prior medical records, laboratory data, imaging studies, diagnostic studies, procedure notes, formulating an assessment and plan, recommendations, discussion and counseling with patient face to face, dictation.    Past Medical History:   The ASCVD Risk score ([place] DK, et al., 2019) failed to calculate for the following reasons:    The patient has a prior MI or stroke diagnosis  Past Medical History:   Diagnosis Date     Anti-cardiolipin antibody positive      Griggs esophagus      Concussion 2016     Depressive disorder 2021     Depressive disorder, not elsewhere classified 03/01/2006    Resolved 8/07     Gastroesophageal reflux disease with esophagitis      Hiatal hernia      History of pulmonary embolism      Obesity      Raynaud's syndrome     past history of admission for gangrene of one finger        Past Surgical History:   Past Surgical History:   Procedure Laterality Date     COMBINED ESOPHAGOSCOPY, GASTROSCOPY, DUODENOSCOPY (EGD), REMOVE ESOPHAGEAL STENT N/A 12/2/2022    Procedure: ESOPHAGOGASTRODUODENOSCOPY, WITH ESOPHAGEAL STENT REMOVAL and Balloon Dialation;  Surgeon: Margo  Daniel Torres MD;  Location: [facility] OR     CV PERICARDIOCENTESIS N/A 12/11/2022    Procedure: Pericardiocentesis;  Surgeon: Sourav Sahu MD;  Location:  [facility]ARDI[facility] CATH LAB     ESOPHAGOSCOPY, GASTROSCOPY, DUODENOSCOPY (EGD), COMBINED N/A 12/29/2021    Procedure: ESOPHAGOGASTRODUODENOSCOPY, WITH BIOPSY;  Surgeon: Esteban Rose DO;  Location: [facility] OR     ESOPHAGOSCOPY, GASTROSCOPY, DUODENOSCOPY (EGD), COMBINED N/A 11/14/2022    Procedure: Upper endoscopy; gastric stricture dilation, interpretation of fluoroscopy nasojejunal feeding tube;  Surgeon: Daniel Aragon MD;  Location: [facility] OR     ESOPHAGOSCOPY, GASTROSCOPY, DUODENOSCOPY (EGD), COMBINED N/A 11/23/2022    Procedure: ESOPHAGOGASTRODUODENOSCOPY (EGD);  Surgeon: Daniel Aragon MD;  Location:  GI     ESOPHAGOSCOPY, GASTROSCOPY, DUODENOSCOPY (EGD), COMBINED N/A 12/21/2022    Procedure: ESOPHAGOGASTRODUODENOSCOPY, WITH BIOPSY;  Surgeon: Leticia Sommer MD;  Location:  GI     LAPAROSCOPIC GASTRIC SLEEVE N/A 10/25/2022    Procedure: GASTRECTOMY, SLEEVE, LAPAROSCOPIC;  Surgeon: Daniel Aragon MD;  Location: [facility] OR     LAPAROSCOPIC HERNIORRHAPHY HIATAL N/A 10/25/2022    Procedure: HERNIORRHAPHY, HIATAL, LAPAROSCOPIC;  Surgeon: Daniel Aragon MD;  Location: [facility] OR     LAPAROSCOPY DIAGNOSTIC (GENERAL) N/A 11/4/2022    Procedure: LAPAROSCOPY, DIAGNOSTIC, BY GENERAL SURGERY, evacuation of abdominl hematoma;  Surgeon: Daniel Aragon MD;  Location: [facility] OR     PICC TRIPLE LUMEN PLACEMENT Left 11/06/2022    51cm (1cm external), Basilic vein     TONSILLECTOMY & ADENOIDECTOMY       [facility] NONSPECIFIC PROCEDURE      T&A as a child     ZZ NONSPECIFIC PROCEDURE      Tubes in ears bilaterally       Medications (outpatient):  Current Outpatient Medications   Medication Sig Dispense Refill     BIOTIN PO Take by mouth daily Using a patch       buPROPion (WELLBUTRIN SR) 150 MG 12 hr tablet Take 1 tablet (150 mg) by mouth 2 times daily  "180 tablet 0     calcium carbonate (TUMS) 500 MG chewable tablet Take 1 chew tab by mouth as needed       Multiple Vitamins-Iron (MULTIVITAMIN/IRON PO) Take 1 tablet by mouth daily Using a patch       pantoprazole (PROTONIX) 40 MG EC tablet Take 1 tablet (40 mg) by mouth 2 times daily (before meals) ; twice daily for 2 months, then daily indefinitely. 60 tablet 3     vitamin B complex with vitamin C (VITAMIN  B COMPLEX) tablet Take 1 tablet by mouth daily Using a patch       hyoscyamine SL (LEVSIN/SL) 0.125 MG sublingual tablet Take 1 tablet (0.125 mg) by mouth 4 times daily (before meals and nightly) (Patient not taking: Reported on 6/8/2023) 50 tablet 1       Allergies:  Allergies   Allergen Reactions     Azithromycin      Erythromycin GI Disturbance     When \"very young\"       Social History:   History   Drug Use No      History   Smoking Status     Never   Smokeless Tobacco     Never     Social History    Substance and Sexual Activity      Alcohol use: Yes        Comment: 1 or 2 a month or less       Family History:  Family History   Problem Relation Age of Onset     Hypertension Mother         arthritis     Depression Mother      Anxiety Disorder Mother      Alzheimer Disease Mother      Rheumatoid Arthritis Mother      Heart Disease Father         MI, Lipids     Acute Myocardial Infarction Father      Cancer - colorectal Maternal Grandmother         arthritis     Rheumatoid Arthritis Maternal Grandmother      Hypertension Maternal Grandfather         arthritis, macular degeneration     Colon Cancer Paternal Grandmother      Alzheimer Disease Paternal Grandfather      Anesthesia Reaction No family hx of      Clotting Disorder No family hx of        Review of Systems:   A complete review of systems was negative except as mentioned in the History of Present Illness.     Objective & Physical Exam:  /70   Pulse 68   Ht 1.829 m (6')   Wt 85.1 kg (187 lb 11.2 oz)   LMP 05/19/2023 (Approximate)   BMI 25.46 " kg/m    Wt Readings from Last 2 Encounters:   06/08/23 85.1 kg (187 lb 11.2 oz)   05/25/23 86.6 kg (191 lb)     Body mass index is 25.46 kg/m .   Body surface area is 2.08 meters squared.    Constitutional: appears stated age, in no apparent distress, appears to be well nourished  Head: normocephalic, atraumatic  Neck: supple, trachea midline  Pulmonary: clear to auscultation bilaterally, no wheezes, no rales, no increased work of breathing  Cardiovascular: JVP normal, regular rate, regular rhythm, normal S1 and S2, no S3, S4, no murmur appreciated, no lower extremity edema  Gastrointestinal: no guarding, non-rigid   Neurologic: awake, alert, moves all extremities  Skin: no jaundice, warm on limited exam  Psychiatric: affect is normal, answers questions appropriately, oriented to self and place    Data reviewed:  Lab Results   Component Value Date    WBC 6.3 05/24/2023    WBC 5.0 06/26/2006    RBC 4.14 05/24/2023    RBC 5.30 (H) 06/26/2006    HGB 12.2 05/24/2023    HGB 14.9 06/26/2006    HCT 36.1 05/24/2023    HCT 44.6 06/26/2006    MCV 87 05/24/2023    MCV 84 06/26/2006    MCH 29.5 05/24/2023    MCH 28.0 06/26/2006    MCHC 33.8 05/24/2023    MCHC 33.4 06/26/2006    RDW 13.6 05/24/2023    RDW 12.7 06/26/2006     05/24/2023     06/26/2006     Sodium   Date Value Ref Range Status   05/24/2023 141 136 - 145 mmol/L Final   06/16/2006 140 133 - 144 mmol/L Final     Potassium   Date Value Ref Range Status   05/24/2023 3.3 (L) 3.4 - 5.3 mmol/L Final   12/23/2022 3.2 (L) 3.4 - 5.3 mmol/L Final   06/16/2006 3.5 3.4 - 5.3 mmol/L Final     Chloride   Date Value Ref Range Status   05/24/2023 105 98 - 107 mmol/L Final   12/23/2022 106 94 - 109 mmol/L Final   06/16/2006 107 94 - 109 mmol/L Final     Carbon Dioxide   Date Value Ref Range Status   06/16/2006 22 20 - 32 mmol/L Final     Carbon Dioxide (CO2)   Date Value Ref Range Status   05/24/2023 23 22 - 29 mmol/L Final   12/23/2022 24 20 - 32 mmol/L Final     Anion  Gap   Date Value Ref Range Status   2023 13 7 - 15 mmol/L Final   2022 8 3 - 14 mmol/L Final   2006 11 6 - 17 mmol/L Final     Glucose   Date Value Ref Range Status   2023 85 70 - 99 mg/dL Final   2022 93 70 - 99 mg/dL Final   2006 87 60 - 110 mg/dL Final     Urea Nitrogen   Date Value Ref Range Status   2023 10.2 6.0 - 20.0 mg/dL Final   2022 7 7 - 30 mg/dL Final   2006 9 5 - 24 mg/dL Final     Creatinine   Date Value Ref Range Status   2023 0.96 (H) 0.51 - 0.95 mg/dL Final   2006 1.00 0.60 - 1.30 mg/dL Final     GFR Estimate   Date Value Ref Range Status   2023 75 >60 mL/min/1.73m2 Final     Comment:     eGFR calculated using  CKD-EPI equation.   2006 71 >60 mL/min/1.7m2 Final     Calcium   Date Value Ref Range Status   2023 9.5 8.6 - 10.0 mg/dL Final   2006 9.1 8.5 - 10.4 mg/dL Final     Bilirubin Total   Date Value Ref Range Status   2022 0.5 0.2 - 1.3 mg/dL Final     Alkaline Phosphatase   Date Value Ref Range Status   2022 142 40 - 150 U/L Final     ALT   Date Value Ref Range Status   2022 25 0 - 50 U/L Final     AST   Date Value Ref Range Status   2022 25 0 - 45 U/L Final     Recent Labs   Lab Test 22  1156   CHOL 186   HDL 37*   *   TRIG 216*      Lab Results   Component Value Date    A1C 5.5 10/25/2022    A1C 5.7 2022        Recent Results (from the past 4320 hour(s))   Echocardiogram Limited   Result Value    LVEF  60-65%    Narrative    [id number]  [id number]  [id number]  [id number]^MOSHE^JENNIFFER^[facility]  Echocardiography Laboratory  [address] [place] [address]     Name: ASIYA PRECIADO  MRN: [id number]  : 1980  Study Date: 2023 02:56 PM  Age: 43 yrs  Gender: Female  Patient Location: [facility]  Reason For Study: Pericardial effusion  Ordering Physician: JENNIFFER MESA  Referring Physician: JENNIFFER MESA  KAILYN  Performed By: Valerie Kohli     BSA: 2.1 m2  Height: 72 in  Weight: 185 lb  BP: 127/67 mmHg  ______________________________________________________________________________  Procedure  Limited Echo Adult.  ______________________________________________________________________________  Interpretation Summary     There is no pericardial effusion.  The left ventricle is normal in structure, function and size.  The visual ejection fraction is 60-65%.  The right ventricle is normal in structure, function and size.  Doppler interrogation does not demosntrate signficant stenosis or  insufficiency involving cardiac valves.     No signficant change since 12/19/2022  ______________________________________________________________________________  Left Ventricle  The left ventricle is normal in structure, function and size. There is normal  left ventricular wall thickness. The visual ejection fraction is 60-65%. Left  ventricular diastolic function is normal. No regional wall motion  abnormalities noted. There is no thrombus seen in the left ventricle.     Right Ventricle  The right ventricle is normal in structure, function and size. There is no  mass or thrombus in the right ventricle.     Atria  Normal left atrial size. Right atrial size is normal. There is no atrial shunt  seen. The left atrial appendage is not well visualized.     Mitral Valve  The mitral valve leaflets appear normal. There is no evidence of stenosis,  fluttering, or prolapse. There is no mitral regurgitation noted. There is no  mitral valve stenosis.     Tricuspid Valve  Normal tricuspid valve. No tricuspid regurgitation. Right ventricular systolic  pressure could not be approximated due to inadequate tricuspid regurgitation.  There is no tricuspid stenosis.     Aortic Valve  The aortic valve is trileaflet. No aortic regurgitation is present. No aortic  stenosis is present.     Pulmonic Valve  Normal pulmonic valve. There is no pulmonic valvular  regurgitation. There is  no pulmonic valvular stenosis.     Vessels  The aortic root is normal size. Normal size ascending aorta. The inferior vena  cava is normal. The pulmonary artery is normal size.     Pericardium  There is no pericardial effusion. There is no pleural effusion.     Rhythm  Sinus rhythm was noted.  ______________________________________________________________________________  MMode/2D Measurements & Calculations     IVSd: 0.79 cm  LVIDd: 4.7 cm  LVIDs: 3.2 cm  LVPWd: 0.85 cm  FS: 32.0 %  LV mass(C)d: 126.6 grams  LV mass(C)dI: 61.4 grams/m2  asc Aorta Diam: 3.1 cm  RWT: 0.36     Doppler Measurements & Calculations  MV E max onel: 52.4 cm/sec  MV A max onel: 47.0 cm/sec  MV E/A: 1.1  MV dec slope: 188.5 cm/sec2  MV dec time: 0.28 sec  Medial E/e': 5.9     ______________________________________________________________________________  Report approved by: Dr. Deni Ward 2023 03:29 PM         Echocardiogram Limited   Result Value    LVEF  55%    Narrative    [id number]  [id number]  [id number]  [id number]^NOEL^SUMMER     [facility]  Echocardiography Laboratory  [address] [place] [address]     Name: ASIYA PRECIADO  MRN: [id number]  : 1980  Study Date: 2022 09:03 AM  Age: 42 yrs  Gender: Female  Patient Location: [facility]  Reason For Study: Pericardial Effusion  Ordering Physician: SUMMER COHEN  Performed By: Jeanne Blount     BSA: 2.3 m2  Height: 72 in  Weight: 228 lb  HR: 69  BP: 126/88 mmHg  ______________________________________________________________________________  Procedure  Limited Portable Echo Adult.  ______________________________________________________________________________  Interpretation Summary     1. The left ventricle is normal in structure, function and size. The visual  ejection fraction is estimated at 55%.  2. The right ventricle is normal in structure, function and size.  3. No valve disease.  4. Trivial pericardial effusion      No changes when compared to echo from 22.  ______________________________________________________________________________  Left Ventricle  The left ventricle is normal in structure, function and size. The visual  ejection fraction is estimated at 55%. Septal motion is consistent with  conduction abnormality.     Right Ventricle  The right ventricle is normal in structure, function and size.     Mitral Valve  The mitral valve is normal in structure and function.     Tricuspid Valve  No tricuspid regurgitation. Right ventricular systolic pressure could not be  approximated due to inadequate tricuspid regurgitation.     Aortic Valve  The aortic valve is normal in structure and function.     Vessels  Normal ascending, transverse (arch), and descending aorta. The inferior vena  cava was normal in size with preserved respiratory variability.     Pericardium  Trivial pericardial effusion.     Rhythm  Sinus rhythm was noted.     ______________________________________________________________________________  Doppler Measurements & Calculations  MV E max onel: 58.6 cm/sec  MV A max onel: 44.2 cm/sec  MV E/A: 1.3  MV dec slope: 219.0 cm/sec2  MV dec time: 0.27 sec  E/E' av.8     Lateral E/e': 4.9  Medial E/e': 6.6     ______________________________________________________________________________  Report approved by: Merlene Meza 2022 10:49 AM         Echocardiogram Limited   Result Value    LVEF  55-60%    Narrative    [id number]  [id number]  [id number]^MOSHE^JENNIFFER^KAILYN     [facility]  Echocardiography Laboratory  [address] [place] [address]     Name: ASIYA PRECIADO  MRN: [id number]  : 1980  Study Date: 2022 01:13 PM  Age: 42 yrs  Gender: Female  Patient Location: [facility] [address]  Reason For Study: Pericardial Effusion  Ordering Physician: JENNIFFER COLLINS  Performed By: Renetta Collins RDCS     BSA: 2.3 m2  Height: 72 in  Weight: 232  lb  ______________________________________________________________________________  Procedure  Limited Portable Echo Adult. Optison (NDC #6953-8022) given intravenously.  ______________________________________________________________________________  Interpretation Summary     Small posterior pericardial effusion. Limited assessment, parameters for  constrictive physiology (ie tissue Doppler) not performed, however septal  bounce is present.  There is a small round 2.1 x 2. cm well circumscribed echodensity in the liver  on subcostal images, clinical correlation recommended.     Left ventricular size, global systolic function are normal, estimated LVEF 55-  60%.  Right ventricle is not well visualized, however global systolic function is  probably normal.     This study was compared to a TTE from 12/12/2022. The pericardial effusion is  smaller on this present study.  ______________________________________________________________________________  Left Ventricle  The left ventricle is normal in size. There is normal left ventricular wall  thickness. Left ventricular systolic function is normal. The visual ejection  fraction is 55-60%. Limited assessment, parameters for constrictive physiology  (ie tissue Doppler) not performed, however septal bounce is present.     Right Ventricle  The right ventricle is not well visualized. Right ventricle is not well  visualized, however global systolic function is probably normal.     Mitral Valve  The mitral valve is normal in structure and function.     Tricuspid Valve  The tricuspid valve is not well visualized, but is grossly normal. There is  trace tricuspid regurgitation.     Aortic Valve  The aortic valve is normal in structure and function.     Pericardium  Small posterior pericardial effusion. There is a small round 2.1 x 2. cm well  circumscribed echodensity in the liver on subcostal images, clinical  correlation  recommended.  ______________________________________________________________________________  Report approved by: Merlene Tapia 2022 04:30 PM     ______________________________________________________________________________      Echocardiogram Limited   Result Value    LVEF  55-60%    Narrative    [id number]  [id number]  [id number]  [id number]^SOWMYA^SERAFIN^COSTA     [facility]  Echocardiography Laboratory  [address] [place] [id number]     Name: ASIYA PRECIADO  MRN: [id number]  : 1980  Study Date: 2022 11:06 AM  Age: 42 yrs  Gender: Female  Patient Location: [facility]  Reason For Study: Pericardial Effusion  Ordering Physician: SERAFIN DENG  Performed By: Jeanne Blount     BSA: 2.3 m2  Height: 73 in  Weight: 235 lb  HR: 90  BP: 119/74 mmHg  ______________________________________________________________________________  Procedure  Limited Portable Echo Adult. Optison (NDC #[id number]) given intravenously.  ______________________________________________________________________________  Interpretation Summary     Left ventricular systolic function is normal.  The visual ejection fraction is 55-60%.  The right ventricle is normal in structure, function and size.  Small pericardial effusion adjacent to the anterolateral wall. Trivial  elsewhere. Drain is present anterior space.  Septal bounce is present with mild variation across TV and MV inflow velocity  profiles. Dilation of the inferior vena cava is present with abnormal  respiratory variation in diameter.  Findings suggestive of effusive constrictive physiology but no gentry  tamponade.     Compared to TTE dated 2022, focal effusion is present mostly within the  lateral pericardial space. There is still evidence of mild constrictive  physiology without evidence of RV or RA collapse.  ______________________________________________________________________________  Left Ventricle  The left ventricle is normal in  structure, function and size. Left ventricular  systolic function is normal. The visual ejection fraction is 55-60%. No  regional wall motion abnormalities noted.     Right Ventricle  The right ventricle is normal in structure, function and size.     Atria  Normal left atrial size. Right atrial size is normal.     Mitral Valve  The mitral valve is not well visualized.     Tricuspid Valve  The tricuspid valve is not well visualized. Right ventricular systolic  pressure could not be approximated due to inadequate tricuspid regurgitation.     Aortic Valve  The aortic valve is not well visualized.     Pulmonic Valve  The pulmonic valve is not well visualized.     Vessels  Dilation of the inferior vena cava is present with abnormal respiratory  variation in diameter.     Pericardium  Small pericardial effusion. Septal bounce present. The mitral valve inflow  Doppler reveals no significant respiratory variation. The tricuspid valve  inflow Doppler reveals no significant respiratory variation.     ______________________________________________________________________________  Report approved by: Merlene Frank 2022 12:54 PM     ______________________________________________________________________________      Echocardiogram Limited    Narrative    [id number]  [facility]  [id number]  [id number]^NOREEN^ELIUD^ELIDA     [facility]  Echocardiography Laboratory  [address] [place] [address]     Name: ASIYA PRECIADO  MRN: [id number]  : 1980  Study Date: 2022 12:32 PM  Age: 42 yrs  Gender: Female  [facility]  Reason For Study: Pericardial Effusion  Ordering Physician: ELIUD SORTO  Performed By: Ivan Rasmussen     BSA: 2.3 m2  Height: 73 in  Weight: 235 lb  BP: 119/74 mmHg  ______________________________________________________________________________  Procedure  Limited Portable Echo  Adult.  ______________________________________________________________________________  Interpretation Summary     Limited intra-procedural study.     Pre-procedure: Small-moderate circumferential effusion. See full TTE earlier  today for echocardiographic assessment of tamponade.     Post-procedure: 160 cc of straw-colored fluid removed. No detectable residual  effusion is seen.  ______________________________________________________________________________  ______________________________________________________________________________  Report approved by: MD Sourav Trujillo 2022 01:48 PM     ______________________________________________________________________________      Echocardiogram Limited   Result Value    LVEF  60-65%    Narrative    [id number]  [id number]  [id number]  [id number]^KIERRA^PORSPER     [facility]  Echocardiography Laboratory  [address] [place] [address]     Name: ASIYA PRECIADO  MRN: [id number]  : 1980  Study Date: 2022 09:52 AM  Age: 42 yrs  Gender: Female  Patient Location: [facility]  Reason For Study: Pericardial Effusion  Ordering Physician: PROSPER LOZADA  Performed By: Jacqueline Johnson RDCS     BSA: 2.3 m2  Height: 73 in  Weight: 235 lb  HR: 90  BP: 159/98 mmHg  ______________________________________________________________________________  Procedure  Limited Portable Echo Adult.  ______________________________________________________________________________  Interpretation Summary     1. Moderate circumferential pericardial effusion, up to 1.4 cm in thickness.  There is echocardiographic evidence of early tamponade. See findings for  further details.  2. Normal LV size with grossly normal LVEF and wall motion.  3. No significant valvular abnormalities.     Compared to the prior study from 11/3/2022, the pericardial effusion is new.     Evaluation for clinical evidence of cardiac tamponade is  indicated.  ______________________________________________________________________________  Left Ventricle  The left ventricle is normal in size. Left ventricular systolic function is  normal. The visual ejection fraction is 60-65%. Endomyocardial border is not  well seen. No obvious WMA seen, but cannot be ruled out with current image  quality. Echo contrast would have been helpful.     Right Ventricle  The right ventricle is mildly dilated. Right ventricular function cannot be  assessed due to poor image quality.     Mitral Valve  There is trace mitral regurgitation. There is no mitral valve stenosis.     Tricuspid Valve  There is trace tricuspid regurgitation. IVC diameter >2.1 cm collapsing <50%  with sniff suggests a high RA pressure estimated at 15 mmHg or greater.     Aortic Valve  Normal tricuspid aortic valve. No aortic regurgitation is present. No aortic  stenosis is present.     Pulmonic Valve  The pulmonic valve is not well visualized.     Pericardium  Moderate circumferential pericardial effusion, up to 1.4 cm in thickness. No  apparent loculations. There may be some fibrinous material within the effusion  overlying the RV. There is echocardiographic evidence of early tamponade.  Favoring tamponade, there is a dilated IVC with < 50% respiratory variation,  as well as > 25% variation of the mitral inflow with respiration. However,  tricuspid inflow variation with respiration is minimal, and there is no  evidence of significant diastolic RV collapse. RA is not sufficiently seen to  evaluate for RA collapse.  ______________________________________________________________________________  Doppler Measurements & Calculations  TR max onel: 251.5 cm/sec  TR max P.3 mmHg     ______________________________________________________________________________  Report approved by: MD Sourav Trujillo 2022 10:52 AM

## 2023-06-08 NOTE — LETTER
6/8/2023    Lillian Andrew PA-C  [address] [address]    RE: Leah Yanez       Dear Colleague,     I had the pleasure of seeing Leah Yanez in the [facility].      Cardiology Clinic Progress Note:    June 8, 2023   Patient Name: Leah Yanez  Patient MRN: [id number]     Consult indication: cardiac tamponade, pericarditis     HPI:    I had the opportunity to see patient Leah Yanez in cardiology clinic for a follow up visit. Patient is followed by our colleague Lillian Andrew PA-C with Primary Care.     Patient is a pleasant 43-year-old female with a past medical history significant for laparoscopic sleeve gastrectomy (10/25/2022), recent hospitalizations for pneumonia, esophageal dysphagia and recent dilatation of esophageal stricture (12/2/2022), Raynaud's, pericarditis complicated by pericardial effusion with early tamponade physiology status post pericardiocentesis (12/11/2022), who presents for follow-up.    I had initially met patient in hospital 12/11/2022 when she presented with hypoxic respiratory failure, found to have pericardial effusion with tamponade physiology.  Ultimately patient was transferred from [facility] to [facility] and underwent pericardiocentesis 12/11/2022.  She was admitted again 12/19/2022 with pericarditis.  Has been treated with NSAIDs and colchicine, initially did not tolerate colchicine due to GI upset/diarrhea, though she did tolerate colchicine at an attenuated dose.  She has been followed by my colleague RAIMUNDO Paige.  Cardiac MRI 2/16/2023 demonstrated normal biventricular function, no pericardial effusion, free breathing revealed no evidence of ventricular interdependence or constrictive physiology.  Follow-up TTE 6/2/2023 demonstrates normal biventricular function, normal valves, no pericardial effusion.  Since then, patient reports that overall she feels significantly improved.   She denies any chest pain, chest pressure, dyspnea, she does still have some GI discomfort.  No fevers, chills, night sweats.    Assessment and Plan/Recommendations:    # Pericarditis, pericardial effusion with cardiac tamponade s/p pericardiocentesis 12/2022, resolved with course of NSAID and colchicine therapy     -Overall patient is a stable cardiac health without symptoms concerning for recurrent pericarditis.  Recent TTE is reassuring, normal biventricular function, no pericardial effusion.  - Follow-up in cardiology clinic as needed    Thank you for allowing our team to participate in the care of Leah Yanez.  Please do not hesitate to call or page me with any questions or concerns.    Sincerely,     Moose Gomez MD, [facility]  Cardiology  Text Page   June 8, 2023    cc  Viviane Collins PA-C  [address]  [place] [address]    Voice recognition software utilized.     Total time spent on this encounter today: 31 minutes, providing care in this encounter including, but not limited to, reviewing prior medical records, laboratory data, imaging studies, diagnostic studies, procedure notes, formulating an assessment and plan, recommendations, discussion and counseling with patient face to face, dictation.    Past Medical History:   The ASCVD Risk score (Elsberry DK, et al., 2019) failed to calculate for the following reasons:    The patient has a prior MI or stroke diagnosis  Past Medical History:   Diagnosis Date    Anti-cardiolipin antibody positive     Griggs esophagus     Concussion 2016    Depressive disorder 2021    Depressive disorder, not elsewhere classified 03/01/2006    Resolved 8/07    Gastroesophageal reflux disease with esophagitis     Hiatal hernia     History of pulmonary embolism     Obesity     Raynaud's syndrome     past history of admission for gangrene of one finger        Past Surgical History:   Past Surgical History:   Procedure Laterality Date    COMBINED  ESOPHAGOSCOPY, GASTROSCOPY, DUODENOSCOPY (EGD), REMOVE ESOPHAGEAL STENT N/A 12/2/2022    Procedure: ESOPHAGOGASTRODUODENOSCOPY, WITH ESOPHAGEAL STENT REMOVAL and Balloon Dialation;  Surgeon: Daniel Aragon MD;  Location: [facility] OR    CV PERICARDIOCENTESIS N/A 12/11/2022    Procedure: Pericardiocentesis;  Surgeon: Sourav Sahu MD;  Location:  HEART CARDIAC CATH LAB    ESOPHAGOSCOPY, GASTROSCOPY, DUODENOSCOPY (EGD), COMBINED N/A 12/29/2021    Procedure: ESOPHAGOGASTRODUODENOSCOPY, WITH BIOPSY;  Surgeon: Esteban Rose DO;  [place] [facility]    ESOPHAGOSCOPY, GASTROSCOPY, DUODENOSCOPY (EGD), COMBINED N/A 11/14/2022    Procedure: Upper endoscopy; gastric stricture dilation, interpretation of fluoroscopy nasojejunal feeding tube;  Surgeon: Daniel Aragon MD;  Location: [facility] OR    ESOPHAGOSCOPY, GASTROSCOPY, DUODENOSCOPY (EGD), COMBINED N/A 11/23/2022    Procedure: ESOPHAGOGASTRODUODENOSCOPY (EGD);  Surgeon: Daniel Aragon MD;  Location:  GI    ESOPHAGOSCOPY, GASTROSCOPY, DUODENOSCOPY (EGD), COMBINED N/A 12/21/2022    Procedure: ESOPHAGOGASTRODUODENOSCOPY, WITH BIOPSY;  Surgeon: Leticia Sommer MD;  [facility]    LAPAROSCOPIC GASTRIC SLEEVE N/A 10/25/2022    Procedure: GASTRECTOMY, SLEEVE, LAPAROSCOPIC;  Surgeon: Daniel Aragon MD;  Location: [facility] OR    LAPAROSCOPIC HERNIORRHAPHY HIATAL N/A 10/25/2022    Procedure: HERNIORRHAPHY, HIATAL, LAPAROSCOPIC;  Surgeon: Daniel Aragon MD;  Location: [facility] OR    LAPAROSCOPY DIAGNOSTIC (GENERAL) N/A 11/4/2022    Procedure: LAPAROSCOPY, DIAGNOSTIC, BY GENERAL SURGERY, evacuation of abdominl hematoma;  Surgeon: Daniel Aragon MD;  Location: [facility] OR    PICC TRIPLE LUMEN PLACEMENT Left 11/06/2022    51cm (1cm external), Basilic vein    TONSILLECTOMY & ADENOIDECTOMY      ZZC NONSPECIFIC PROCEDURE      T&A as a child    ZZC NONSPECIFIC PROCEDURE      Tubes in ears bilaterally       Medications (outpatient):  Current Outpatient  "Medications   Medication Sig Dispense Refill    BIOTIN PO Take by mouth daily Using a patch      buPROPion (WELLBUTRIN SR) 150 MG 12 hr tablet Take 1 tablet (150 mg) by mouth 2 times daily 180 tablet 0    calcium carbonate (TUMS) 500 MG chewable tablet Take 1 chew tab by mouth as needed      Multiple Vitamins-Iron (MULTIVITAMIN/IRON PO) Take 1 tablet by mouth daily Using a patch      pantoprazole (PROTONIX) 40 MG EC tablet Take 1 tablet (40 mg) by mouth 2 times daily (before meals) ; twice daily for 2 months, then daily indefinitely. 60 tablet 3    vitamin B complex with vitamin C (VITAMIN  B COMPLEX) tablet Take 1 tablet by mouth daily Using a patch      hyoscyamine SL (LEVSIN/SL) 0.125 MG sublingual tablet Take 1 tablet (0.125 mg) by mouth 4 times daily (before meals and nightly) (Patient not taking: Reported on 6/8/2023) 50 tablet 1       Allergies:  Allergies   Allergen Reactions    Azithromycin     Erythromycin GI Disturbance     When \"very young\"       Social History:   History   Drug Use No      History   Smoking Status    Never   Smokeless Tobacco    Never     Social History    Substance and Sexual Activity      Alcohol use: Yes        Comment: 1 or 2 a month or less       Family History:  Family History   Problem Relation Age of Onset    Hypertension Mother         arthritis    Depression Mother     Anxiety Disorder Mother     Alzheimer Disease Mother     Rheumatoid Arthritis Mother     Heart Disease Father         MI, Lipids    Acute Myocardial Infarction Father     Cancer - colorectal Maternal Grandmother         arthritis    Rheumatoid Arthritis Maternal Grandmother     Hypertension Maternal Grandfather         arthritis, macular degeneration    Colon Cancer Paternal Grandmother     Alzheimer Disease Paternal Grandfather     Anesthesia Reaction No family hx of     Clotting Disorder No family hx of        Review of Systems:   A complete review of systems was negative except as mentioned in the History of " Present Illness.     Objective & Physical Exam:  /70   Pulse 68   Ht 1.829 m (6')   Wt 85.1 kg (187 lb 11.2 oz)   LMP 05/19/2023 (Approximate)   BMI 25.46 kg/m    Wt Readings from Last 2 Encounters:   06/08/23 85.1 kg (187 lb 11.2 oz)   05/25/23 86.6 kg (191 lb)     Body mass index is 25.46 kg/m .   Body surface area is 2.08 meters squared.    Constitutional: appears stated age, in no apparent distress, appears to be well nourished  Head: normocephalic, atraumatic  Neck: supple, trachea midline  Pulmonary: clear to auscultation bilaterally, no wheezes, no rales, no increased work of breathing  Cardiovascular: JVP normal, regular rate, regular rhythm, normal S1 and S2, no S3, S4, no murmur appreciated, no lower extremity edema  Gastrointestinal: no guarding, non-rigid   Neurologic: awake, alert, moves all extremities  Skin: no jaundice, warm on limited exam  Psychiatric: affect is normal, answers questions appropriately, oriented to self and place    Data reviewed:  Lab Results   Component Value Date    WBC 6.3 05/24/2023    WBC 5.0 06/26/2006    RBC 4.14 05/24/2023    RBC 5.30 (H) 06/26/2006    HGB 12.2 05/24/2023    HGB 14.9 06/26/2006    HCT 36.1 05/24/2023    HCT 44.6 06/26/2006    MCV 87 05/24/2023    MCV 84 06/26/2006    MCH 29.5 05/24/2023    MCH 28.0 06/26/2006    MCHC 33.8 05/24/2023    MCHC 33.4 06/26/2006    RDW 13.6 05/24/2023    RDW 12.7 06/26/2006     05/24/2023     06/26/2006     Sodium   Date Value Ref Range Status   05/24/2023 141 136 - 145 mmol/L Final   06/16/2006 140 133 - 144 mmol/L Final     Potassium   Date Value Ref Range Status   05/24/2023 3.3 (L) 3.4 - 5.3 mmol/L Final   12/23/2022 3.2 (L) 3.4 - 5.3 mmol/L Final   06/16/2006 3.5 3.4 - 5.3 mmol/L Final     Chloride   Date Value Ref Range Status   05/24/2023 105 98 - 107 mmol/L Final   12/23/2022 106 94 - 109 mmol/L Final   06/16/2006 107 94 - 109 mmol/L Final     Carbon Dioxide   Date Value Ref Range Status    2006 22 20 - 32 mmol/L Final     Carbon Dioxide (CO2)   Date Value Ref Range Status   2023 23 22 - 29 mmol/L Final   2022 24 20 - 32 mmol/L Final     Anion Gap   Date Value Ref Range Status   2023 13 7 - 15 mmol/L Final   2022 8 3 - 14 mmol/L Final   2006 11 6 - 17 mmol/L Final     Glucose   Date Value Ref Range Status   2023 85 70 - 99 mg/dL Final   2022 93 70 - 99 mg/dL Final   2006 87 60 - 110 mg/dL Final     Urea Nitrogen   Date Value Ref Range Status   2023 10.2 6.0 - 20.0 mg/dL Final   2022 7 7 - 30 mg/dL Final   2006 9 5 - 24 mg/dL Final     Creatinine   Date Value Ref Range Status   2023 0.96 (H) 0.51 - 0.95 mg/dL Final   2006 1.00 0.60 - 1.30 mg/dL Final     GFR Estimate   Date Value Ref Range Status   2023 75 >60 mL/min/1.73m2 Final     Comment:     eGFR calculated using  CKD-EPI equation.   2006 71 >60 mL/min/1.7m2 Final     Calcium   Date Value Ref Range Status   2023 9.5 8.6 - 10.0 mg/dL Final   2006 9.1 8.5 - 10.4 mg/dL Final     Bilirubin Total   Date Value Ref Range Status   2022 0.5 0.2 - 1.3 mg/dL Final     Alkaline Phosphatase   Date Value Ref Range Status   2022 142 40 - 150 U/L Final     ALT   Date Value Ref Range Status   2022 25 0 - 50 U/L Final     AST   Date Value Ref Range Status   2022 25 0 - 45 U/L Final     Recent Labs   Lab Test 22  1156   CHOL 186   HDL 37*   *   TRIG 216*      Lab Results   Component Value Date    A1C 5.5 10/25/2022    A1C 5.7 2022        Recent Results (from the past 4320 hour(s))   Echocardiogram Limited   Result Value    LVEF  60-65%    Narrative    [id number]  [id number]  [id number]  [id number]^MOSHE^JENNIFFER^[facility]  Echocardiography Laboratory  [address] [place] [address]     Name: ASIYA PRECIADO  MRN: [id number]  : 1980  Study Date: 2023 02:56 PM  Age: 43  yrs  Gender: Female  Patient Location: [facility]  Reason For Study: Pericardial effusion  Ordering Physician: JENNIFFER MESA  Referring Physician: JENNIFFER MESA  Performed By: Valerie Kohli     BSA: 2.1 m2  Height: 72 in  Weight: 185 lb  BP: 127/67 mmHg  ______________________________________________________________________________  Procedure  Limited Echo Adult.  ______________________________________________________________________________  Interpretation Summary     There is no pericardial effusion.  The left ventricle is normal in structure, function and size.  The visual ejection fraction is 60-65%.  The right ventricle is normal in structure, function and size.  Doppler interrogation does not demosntrate signficant stenosis or  insufficiency involving cardiac valves.     No signficant change since 12/19/2022  ______________________________________________________________________________  Left Ventricle  The left ventricle is normal in structure, function and size. There is normal  left ventricular wall thickness. The visual ejection fraction is 60-65%. Left  ventricular diastolic function is normal. No regional wall motion  abnormalities noted. There is no thrombus seen in the left ventricle.     Right Ventricle  The right ventricle is normal in structure, function and size. There is no  mass or thrombus in the right ventricle.     Atria  Normal left atrial size. Right atrial size is normal. There is no atrial shunt  seen. The left atrial appendage is not well visualized.     Mitral Valve  The mitral valve leaflets appear normal. There is no evidence of stenosis,  fluttering, or prolapse. There is no mitral regurgitation noted. There is no  mitral valve stenosis.     Tricuspid Valve  Normal tricuspid valve. No tricuspid regurgitation. Right ventricular systolic  pressure could not be approximated due to inadequate tricuspid regurgitation.  There is no tricuspid stenosis.     Aortic Valve  The aortic  valve is trileaflet. No aortic regurgitation is present. No aortic  stenosis is present.     Pulmonic Valve  Normal pulmonic valve. There is no pulmonic valvular regurgitation. There is  no pulmonic valvular stenosis.     Vessels  The aortic root is normal size. Normal size ascending aorta. The inferior vena  cava is normal. The pulmonary artery is normal size.     Pericardium  There is no pericardial effusion. There is no pleural effusion.     Rhythm  Sinus rhythm was noted.  ______________________________________________________________________________  MMode/2D Measurements & Calculations     IVSd: 0.79 cm  LVIDd: 4.7 cm  LVIDs: 3.2 cm  LVPWd: 0.85 cm  FS: 32.0 %  LV mass(C)d: 126.6 grams  LV mass(C)dI: 61.4 grams/m2  asc Aorta Diam: 3.1 cm  RWT: 0.36     Doppler Measurements & Calculations  MV E max onel: 52.4 cm/sec  MV A max onel: 47.0 cm/sec  MV E/A: 1.1  MV dec slope: 188.5 cm/sec2  MV dec time: 0.28 sec  Medial E/e': 5.9     ______________________________________________________________________________  Report approved by: Dr. Deni Ward 2023 03:29 PM         Echocardiogram Limited   Result Value    LVEF  55%    Narrative    [id number]  [id number]  [id number]  [id number]^NOEL^SUMMER     [facility]  Echocardiography Laboratory  [address] [place], [place] [address]     Name: ASIYA PRECIADO  MRN: [id number]  : 1980  Study Date: 2022 09:03 AM  Age: 42 yrs  Gender: Female  Patient Location: [facility]  Reason For Study: Pericardial Effusion  Ordering Physician: SUMMER COHEN  Performed By: Jeanne Blount     BSA: 2.3 m2  Height: 72 in  Weight: 228 lb  HR: 69  BP: 126/88 mmHg  ______________________________________________________________________________  Procedure  Limited Portable Echo Adult.  ______________________________________________________________________________  Interpretation Summary     1. The left ventricle is normal in structure, function and size. The  visual  ejection fraction is estimated at 55%.  2. The right ventricle is normal in structure, function and size.  3. No valve disease.  4. Trivial pericardial effusion     No changes when compared to echo from 22.  ______________________________________________________________________________  Left Ventricle  The left ventricle is normal in structure, function and size. The visual  ejection fraction is estimated at 55%. Septal motion is consistent with  conduction abnormality.     Right Ventricle  The right ventricle is normal in structure, function and size.     Mitral Valve  The mitral valve is normal in structure and function.     Tricuspid Valve  No tricuspid regurgitation. Right ventricular systolic pressure could not be  approximated due to inadequate tricuspid regurgitation.     Aortic Valve  The aortic valve is normal in structure and function.     Vessels  Normal ascending, transverse (arch), and descending aorta. The inferior vena  cava was normal in size with preserved respiratory variability.     Pericardium  Trivial pericardial effusion.     Rhythm  Sinus rhythm was noted.     ______________________________________________________________________________  Doppler Measurements & Calculations  MV E max onel: 58.6 cm/sec  MV A max onel: 44.2 cm/sec  MV E/A: 1.3  MV dec slope: 219.0 cm/sec2  MV dec time: 0.27 sec  E/E' av.8     Lateral E/e': 4.9  Medial E/e': 6.6     ______________________________________________________________________________  Report approved by: Merlene Meza 2022 10:49 AM         Echocardiogram Limited   Result Value    LVEF  55-60%    Narrative    [id number]  [id number]  [id number]  [id number]^MOSHE^JENNIFFER^KAILYN     [facility]  Echocardiography Laboratory  [address] [place], [place] [id number]     Name: ASIYA PRECIADO  MRN: [id number]  : 1980  Study Date: 2022 01:13 PM  Age: 42 yrs  Gender: Female  Patient Location: [facility]  Reason  For Study: Pericardial Effusion  Ordering Physician: JENNIFFER COLLINS  Performed By: Renetta Collins RDCS     BSA: 2.3 m2  Height: 72 in  Weight: 232 lb  ______________________________________________________________________________  Procedure  Limited Portable Echo Adult. Optison (NDC #7048-2358) given intravenously.  ______________________________________________________________________________  Interpretation Summary     Small posterior pericardial effusion. Limited assessment, parameters for  constrictive physiology (ie tissue Doppler) not performed, however septal  bounce is present.  There is a small round 2.1 x 2. cm well circumscribed echodensity in the liver  on subcostal images, clinical correlation recommended.     Left ventricular size, global systolic function are normal, estimated LVEF 55-  60%.  Right ventricle is not well visualized, however global systolic function is  probably normal.     This study was compared to a TTE from 12/12/2022. The pericardial effusion is  smaller on this present study.  ______________________________________________________________________________  Left Ventricle  The left ventricle is normal in size. There is normal left ventricular wall  thickness. Left ventricular systolic function is normal. The visual ejection  fraction is 55-60%. Limited assessment, parameters for constrictive physiology  (ie tissue Doppler) not performed, however septal bounce is present.     Right Ventricle  The right ventricle is not well visualized. Right ventricle is not well  visualized, however global systolic function is probably normal.     Mitral Valve  The mitral valve is normal in structure and function.     Tricuspid Valve  The tricuspid valve is not well visualized, but is grossly normal. There is  trace tricuspid regurgitation.     Aortic Valve  The aortic valve is normal in structure and function.     Pericardium  Small posterior pericardial effusion. There is a small round 2.1 x  2. cm well  circumscribed echodensity in the liver on subcostal images, clinical  correlation recommended.  ______________________________________________________________________________  Report approved by: Merlene Tapia 2022 04:30 PM     ______________________________________________________________________________      Echocardiogram Limited   Result Value    LVEF  55-60%    Narrative    [id number]  [id number]  [id number]  [id number]^SOWMYA^SERAFIN^COSTA     [facility]  Echocardiography Laboratory  [address] [place] [id number]     Name: ASIYA PRECIADO  MRN: [id number]  : 1980  Study Date: 2022 11:06 AM  Age: 42 yrs  Gender: Female  [facility]  Reason For Study: Pericardial Effusion  Ordering Physician: SERAFIN DENG  Performed By: Jeanne Blount     BSA: 2.3 m2  Height: 73 in  Weight: 235 lb  HR: 90  BP: 119/74 mmHg  ______________________________________________________________________________  Procedure  Limited Portable Echo Adult. Optison (NDC #[id number]) given intravenously.  ______________________________________________________________________________  Interpretation Summary     Left ventricular systolic function is normal.  The visual ejection fraction is 55-60%.  The right ventricle is normal in structure, function and size.  Small pericardial effusion adjacent to the anterolateral wall. Trivial  elsewhere. Drain is present anterior space.  Septal bounce is present with mild variation across TV and MV inflow velocity  profiles. Dilation of the inferior vena cava is present with abnormal  respiratory variation in diameter.  Findings suggestive of effusive constrictive physiology but no gentry  tamponade.     Compared to TTE dated 2022, focal effusion is present mostly within the  lateral pericardial space. There is still evidence of mild constrictive  physiology without evidence of RV or RA  collapse.  ______________________________________________________________________________  Left Ventricle  The left ventricle is normal in structure, function and size. Left ventricular  systolic function is normal. The visual ejection fraction is 55-60%. No  regional wall motion abnormalities noted.     Right Ventricle  The right ventricle is normal in structure, function and size.     Atria  Normal left atrial size. Right atrial size is normal.     Mitral Valve  The mitral valve is not well visualized.     Tricuspid Valve  The tricuspid valve is not well visualized. Right ventricular systolic  pressure could not be approximated due to inadequate tricuspid regurgitation.     Aortic Valve  The aortic valve is not well visualized.     Pulmonic Valve  The pulmonic valve is not well visualized.     Vessels  Dilation of the inferior vena cava is present with abnormal respiratory  variation in diameter.     Pericardium  Small pericardial effusion. Septal bounce present. The mitral valve inflow  Doppler reveals no significant respiratory variation. The tricuspid valve  inflow Doppler reveals no significant respiratory variation.     ______________________________________________________________________________  Report approved by: Merlene Frank 2022 12:54 PM     ______________________________________________________________________________      Echocardiogram Limited    Narrative    [id number]  [id number]  [id number]  [id number]^NOREEN^ELIUD^ELIDA     [facility]  Echocardiography Laboratory  [address] [place] [id number]     Name: ASIYA PRECIADO  MRN: [id number]  : 1980  Study Date: 2022 12:32 PM  Age: 42 yrs  Gender: Female  Patient Location: [facility]  Reason For Study: Pericardial Effusion  Ordering Physician: ELIUD SORTO  Performed By: Ivan Rasmussen     BSA: 2.3 m2  Height: 73 in  Weight: 235 lb  BP: 119/74  mmHg  ______________________________________________________________________________  Procedure  Limited Portable Echo Adult.  ______________________________________________________________________________  Interpretation Summary     Limited intra-procedural study.     Pre-procedure: Small-moderate circumferential effusion. See full TTE earlier  today for echocardiographic assessment of tamponade.     Post-procedure: 160 cc of straw-colored fluid removed. No detectable residual  effusion is seen.  ______________________________________________________________________________  ______________________________________________________________________________  Report approved by: MD Sourav Trujillo 2022 01:48 PM     ______________________________________________________________________________      Echocardiogram Limited   Result Value    LVEF  60-65%    Narrative    [id number]  [id number]  [id number]  [id number]^DEEPIKA     [facility]  Echocardiography Laboratory  [address]  [place] [address]     Name: ASIYA PRECIADO  MRN: [id number]  : 1980  Study Date: 2022 09:52 AM  Age: 42 yrs  Gender: Female  Patient Location: [facility]  Reason For Study: Pericardial Effusion  Ordering Physician: PROSPER LOZADA  Performed By: Jacqueline Johnson RDCS     BSA: 2.3 m2  Height: 73 in  Weight: 235 lb  HR: 90  BP: 159/98 mmHg  ______________________________________________________________________________  Procedure  Limited Portable Echo Adult.  ______________________________________________________________________________  Interpretation Summary     1. Moderate circumferential pericardial effusion, up to 1.4 cm in thickness.  There is echocardiographic evidence of early tamponade. See findings for  further details.  2. Normal LV size with grossly normal LVEF and wall motion.  3. No significant valvular abnormalities.     Compared to the prior study from 11/3/2022, the pericardial effusion is new.      Evaluation for clinical evidence of cardiac tamponade is indicated.  ______________________________________________________________________________  Left Ventricle  The left ventricle is normal in size. Left ventricular systolic function is  normal. The visual ejection fraction is 60-65%. Endomyocardial border is not  well seen. No obvious WMA seen, but cannot be ruled out with current image  quality. Echo contrast would have been helpful.     Right Ventricle  The right ventricle is mildly dilated. Right ventricular function cannot be  assessed due to poor image quality.     Mitral Valve  There is trace mitral regurgitation. There is no mitral valve stenosis.     Tricuspid Valve  There is trace tricuspid regurgitation. IVC diameter >2.1 cm collapsing <50%  with sniff suggests a high RA pressure estimated at 15 mmHg or greater.     Aortic Valve  Normal tricuspid aortic valve. No aortic regurgitation is present. No aortic  stenosis is present.     Pulmonic Valve  The pulmonic valve is not well visualized.     Pericardium  Moderate circumferential pericardial effusion, up to 1.4 cm in thickness. No  apparent loculations. There may be some fibrinous material within the effusion  overlying the RV. There is echocardiographic evidence of early tamponade.  Favoring tamponade, there is a dilated IVC with < 50% respiratory variation,  as well as > 25% variation of the mitral inflow with respiration. However,  tricuspid inflow variation with respiration is minimal, and there is no  evidence of significant diastolic RV collapse. RA is not sufficiently seen to  evaluate for RA collapse.  ______________________________________________________________________________  Doppler Measurements & Calculations  TR max onel: 251.5 cm/sec  TR max P.3 mmHg     ______________________________________________________________________________  Report approved by: MD Sourav Trujillo 2022 10:52 AM             Thank you for allowing me  to participate in the care of your patient.      Sincerely,     Moose Gomez MD     [facility]  cc:   Viviane Collins PA-C  [id number]  [place] [address]

## 2023-06-08 NOTE — PATIENT INSTRUCTIONS
June 8, 2023    Thank you for allowing our Cardiology team to participate in your care.     Please note the following changes to your heart treatment plan:     Medication changes:   - none    Tests to be done:  - none    Follow up:  - Follow up sooner as needed      For scheduling, please call [phone].      Please contact our team through [employer] or our Nurse Team Voicemail service [phone], or the General Clinic [phone] for any questions or concerns.     If you are having a medical emergency, please call [phone].     Sincerely,    Moose Gomez MD, [facility]  Cardiology    [facility]    
[Negative] : Heme/Lymph

## 2023-06-09 RX ORDER — BUPROPION HYDROCHLORIDE 150 MG/1
TABLET, EXTENDED RELEASE ORAL
Qty: 180 TABLET | Refills: 0 | Status: SHIPPED | OUTPATIENT
Start: 2023-06-09 | End: 2023-07-26

## 2023-06-09 NOTE — TELEPHONE ENCOUNTER
Routing refill request to provider for review/approval because:      1/25/2023     1:38 AM 3/10/2023     3:18 PM 5/24/2023     9:13 AM   PHQ   PHQ-9 Total Score 5 10 5   Q9: Thoughts of better off dead/self-harm past 2 weeks Not at all Not at all Not at all      Next 5 appointments (look out 90 days)      Jul 26, 2023 10:00 AM  (Arrive by 9:40 AM)  Adult Preventative Visit with Lillian Andrew PA-C  [facility] ([facility] ) [address] [facility] [address]  [phone]          Chapis Villalobos RN

## 2023-06-29 NOTE — PATIENT INSTRUCTIONS
[facility]  [facility]  [facility] [address], [address], [place] [address]  Main: [phone], Fax: [phone]    Leah,   It was a pleasure seeing you today.  Thank you for allowing us to be involved in your care.  Please let us know if there is anything else we can do for you, so that we can be sure you are leaving completely satisfied with your care experience. Below is the plan that we discussed.     Please notify our team when you have your procedure scheduled   Recommend prophylactic dose Lovenox (enoxaparin) 40mg once daily x 21 days after your procedure as long as there are no bleeding concerns    Return to clinic as needed.    Your nurse clinician is Verónica Layton,  MSN, RN, [phone].   If they are unavailable and you have immediate concerns, please call [phone] and ask for a nurse.     Kiana Adams, MPH, PA-C  [facility]      
chest pain

## 2023-07-26 ENCOUNTER — OFFICE VISIT (OUTPATIENT)
Dept: FAMILY MEDICINE | Facility: CLINIC | Age: 43
End: 2023-07-26
Payer: COMMERCIAL

## 2023-07-26 VITALS
WEIGHT: 179 LBS | BODY MASS INDEX: 24.24 KG/M2 | RESPIRATION RATE: 15 BRPM | DIASTOLIC BLOOD PRESSURE: 82 MMHG | HEART RATE: 74 BPM | SYSTOLIC BLOOD PRESSURE: 111 MMHG | TEMPERATURE: 98.5 F | HEIGHT: 72 IN | OXYGEN SATURATION: 97 %

## 2023-07-26 DIAGNOSIS — K92.9 GASTRIC ANASTOMOTIC STRICTURE: ICD-10-CM

## 2023-07-26 DIAGNOSIS — K31.89 GASTRIC ANASTOMOTIC STRICTURE: ICD-10-CM

## 2023-07-26 DIAGNOSIS — E87.6 LOW SERUM POTASSIUM: ICD-10-CM

## 2023-07-26 DIAGNOSIS — Z00.00 ROUTINE GENERAL MEDICAL EXAMINATION AT A HEALTH CARE FACILITY: Primary | ICD-10-CM

## 2023-07-26 DIAGNOSIS — R79.89 ELEVATED SERUM CREATININE: ICD-10-CM

## 2023-07-26 DIAGNOSIS — Z98.84 BARIATRIC SURGERY STATUS: ICD-10-CM

## 2023-07-26 DIAGNOSIS — F33.1 MODERATE EPISODE OF RECURRENT MAJOR DEPRESSIVE DISORDER (H): ICD-10-CM

## 2023-07-26 LAB
ANION GAP SERPL CALCULATED.3IONS-SCNC: 13 MMOL/L (ref 7–15)
BUN SERPL-MCNC: 9.7 MG/DL (ref 6–20)
CALCIUM SERPL-MCNC: 9.8 MG/DL (ref 8.6–10)
CHLORIDE SERPL-SCNC: 106 MMOL/L (ref 98–107)
CREAT SERPL-MCNC: 1.17 MG/DL (ref 0.51–0.95)
DEPRECATED HCO3 PLAS-SCNC: 24 MMOL/L (ref 22–29)
GFR SERPL CREATININE-BSD FRML MDRD: 59 ML/MIN/1.73M2
GLUCOSE SERPL-MCNC: 88 MG/DL (ref 70–99)
POTASSIUM SERPL-SCNC: 3.7 MMOL/L (ref 3.4–5.3)
SODIUM SERPL-SCNC: 143 MMOL/L (ref 136–145)

## 2023-07-26 PROCEDURE — 99396 PREV VISIT EST AGE 40-64: CPT | Performed by: PHYSICIAN ASSISTANT

## 2023-07-26 PROCEDURE — 99214 OFFICE O/P EST MOD 30 MIN: CPT | Mod: 25 | Performed by: PHYSICIAN ASSISTANT

## 2023-07-26 PROCEDURE — 80048 BASIC METABOLIC PNL TOTAL CA: CPT | Performed by: PHYSICIAN ASSISTANT

## 2023-07-26 PROCEDURE — 36415 COLL VENOUS BLD VENIPUNCTURE: CPT | Performed by: PHYSICIAN ASSISTANT

## 2023-07-26 RX ORDER — PANTOPRAZOLE SODIUM 40 MG/1
40 TABLET, DELAYED RELEASE ORAL DAILY
Qty: 90 TABLET | Refills: 3 | Status: SHIPPED | OUTPATIENT
Start: 2023-07-26

## 2023-07-26 RX ORDER — HYOSCYAMINE SULFATE 0.125 MG
0.12 TABLET ORAL 2 TIMES DAILY
COMMUNITY
Start: 2023-07-15 | End: 2023-12-18

## 2023-07-26 RX ORDER — BUPROPION HYDROCHLORIDE 150 MG/1
150 TABLET, EXTENDED RELEASE ORAL 2 TIMES DAILY
Qty: 180 TABLET | Refills: 1 | Status: SHIPPED | OUTPATIENT
Start: 2023-07-26 | End: 2024-03-07

## 2023-07-26 ASSESSMENT — ENCOUNTER SYMPTOMS
WEAKNESS: 0
SORE THROAT: 0
CHILLS: 0
PARESTHESIAS: 0
PALPITATIONS: 0
NERVOUS/ANXIOUS: 0
ABDOMINAL PAIN: 0
NAUSEA: 0
EYE PAIN: 0
DYSURIA: 0
FREQUENCY: 0
HEMATOCHEZIA: 0
BREAST MASS: 0
ARTHRALGIAS: 0
HEADACHES: 0
HEARTBURN: 0
SHORTNESS OF BREATH: 0
DIARRHEA: 0
MYALGIAS: 0
JOINT SWELLING: 0
DIZZINESS: 0
COUGH: 0
CONSTIPATION: 0
FEVER: 0
HEMATURIA: 0

## 2023-07-26 ASSESSMENT — PAIN SCALES - GENERAL: PAINLEVEL: NO PAIN (0)

## 2023-07-26 NOTE — LETTER
My Depression Action Plan  Name: Leah Yanez   Date of Birth 1980  Date: 7/27/2023    My doctor: Lillian Andrew   My clinic: [facility]  [address] [facility] [address] [phone]            GREEN    ZONE   Good Control    What it looks like:   Things are going generally well. You have normal ups and downs. You may even feel depressed from time to time, but bad moods usually last less than a day.   What you need to do:  Continue to care for yourself (see self care plan)  Check your depression survival kit and update it as needed  Follow your physician s recommendations including any medication.  Do not stop taking medication unless you consult with your physician first.             YELLOW         ZONE Getting Worse    What it looks like:   Depression is starting to interfere with your life.   It may be hard to get out of bed; you may be starting to isolate yourself from others.  Symptoms of depression are starting to last most all day and this has happened for several days.   You may have suicidal thoughts but they are not constant.   What you need to do:     Call your care team. Your response to treatment will improve if you keep your care team informed of your progress. Yellow periods are signs an adjustment may need to be made.     Continue your self-care.  Just get dressed and ready for the day.  Don't give yourself time to talk yourself out of it.    Talk to someone in your support network.    Open up your Depression Self-Care Plan/Wellness Kit.             RED    ZONE Medical Alert - Get Help    What it looks like:   Depression is seriously interfering with your life.   You may experience these or other symptoms: You can t get out of bed most days, can t work or engage in other necessary activities, you have trouble taking care of basic hygiene, or basic responsibilities, thoughts of suicide or death that will not go away, self-injurious  behavior.     What you need to do:  Call your care team and request a same-day appointment. If they are not available (weekends or after hours) call your local crisis line, emergency room or 911.          Depression Self-Care Plan / Wellness Kit    Many people find that medication and therapy are helpful treatments for managing depression. In addition, making small changes to your everyday life can help to boost your mood and improve your wellbeing. Below are some tips for you to consider. Be sure to talk with your medical provider and/or behavioral health consultant if your symptoms are worsening or not improving.     Sleep   Sleep hygiene  means all of the habits that support good, restful sleep. It includes maintaining a consistent bedtime and wake time, using your bedroom only for sleeping or sex, and keeping the bedroom dark and free of distractions like a computer, smartphone, or television.     Develop a Healthy Routine  Maintain good hygiene. Get out of bed in the morning, make your bed, brush your teeth, take a shower, and get dressed. Don t spend too much time viewing media that makes you feel stressed. Find time to relax each day.    Exercise  Get some form of exercise every day. This will help reduce pain and release endorphins, the  feel good  chemicals in your brain. It can be as simple as just going for a walk or doing some gardening, anything that will get you moving.      Diet  Strive to eat healthy foods, including fruits and vegetables. Drink plenty of water. Avoid excessive sugar, caffeine, alcohol, and other mood-altering substances.     Stay Connected with Others  Stay in touch with friends and family members.    Manage Your Mood  Try deep breathing, massage therapy, biofeedback, or meditation. Take part in fun activities when you can. Try to find something to smile about each day.     Psychotherapy  Be open to working with a therapist if your provider recommends it.     Medication  Be sure to  take your medication as prescribed. Most anti-depressants need to be taken every day. It usually takes several weeks for medications to work. Not all medicines work for all people. It is important to follow-up with your provider to make sure you have a treatment plan that is working for you. Do not stop your medication abruptly without first discussing it with your provider.    Crisis Resources   These hotlines are for both adults and children. They and are open 24 hours a day, 7 days a week unless noted otherwise.    National Suicide Prevention Lifeline   988 or 0-116-826-HROL (5482)    Crisis Text Line    www.crisistextline.org  Text HOME to 669952 from anywhere in the [place], anytime, about any type of crisis. A live, trained crisis counselor will receive the text and respond quickly.    [place] Lifeline for LGBTQ Youth  A national crisis intervention and suicide lifeline for LGBTQ youth under 25. Provides a safe place to talk without judgement. Call 1-951.189.1927; text START to 816295 or visit www.thetrevorproject.org to talk to a trained counselor.    For [facility] crisis numbers, visit the [facility] website at:  https://[place].gov/dhs/people-we-serve/adults/health-care/mental-health/resources/crisis-contacts.jsp

## 2023-07-26 NOTE — PROGRESS NOTES
SUBJECTIVE:   CC: Leah is an 43 year old who presents for preventive health visit.       7/26/2023     9:43 AM   Additional Questions   Roomed by Vanna SANCHEZ     Healthy Habits:     Getting at least 3 servings of Calcium per day:  Yes    Bi-annual eye exam:  Yes    Dental care twice a year:  Yes    Sleep apnea or symptoms of sleep apnea:  None    Diet:  Regular (no restrictions)    Frequency of exercise:  2-3 days/week    Duration of exercise:  30-45 minutes    Taking medications regularly:  Yes    Medication side effects:  Other    Additional concerns today:  Yes    Taking bupropion 150 mg BID, mood feels well controlled. Wants to continue with medication for now but would maybe want to think about stopping the medication in the future since mood is feeling better.      1/25/2023     1:38 AM 3/10/2023     3:18 PM 5/24/2023     9:13 AM   PHQ   PHQ-9 Total Score 5 10 5   Q9: Thoughts of better off dead/self-harm past 2 weeks Not at all Not at all Not at all         3/10/2023     3:32 PM   SAMMY-7 SCORE   Total Score 0     Stopped Levsin but no change in dry mouth symptoms. Ended up having about 10 days of stomach upset and nausea after stopping so started taking it again twice daily. She actually thinks she had a really early miscarriage and wonders if that was the cause of her symptoms. She will try stopping the Levsin again and monitor symptoms. If ongoing abdominal issues, would consider follow-up with GI or her bariatric team.    Recent UTI symptoms, has had a few episodes. Seen in urgent care with [facility] one time and through online prescriber other time. Had a little dysuria about 1 month ago and took azo but no antibiotic, symptoms resolved and no ongoing issues.    Many complications after bariatric surgery (sleeve gastrectomy) on 10/25/22.  She was admitted to the hospital between 11/1 and 11/16 for hospital acquired pneumonia and she had a laparoscopy performed on 11/4, a gastric stricture dilation performed  on 11/14, and an EGD performed on 11/21. She was admitted between 11/30 and 12/2 and had a stent placement at gastric stricture on 11/30 and stent removal on 12/2. Hospitalization for pericarditis with pericardial effusion requiring pericardiocentesis (hospitalized 12/11-12/15/2022). Hospitalized again 12/19/22 - 12/23/22 with pericarditis with pericardial effusion, gastric stenosis (underwent dilation 12/21/22), superficial gastric ulcers, cholelithiasis, GERD. She has history of Griggs's esophagus. Per GI, she is to continue on pantoprazole lifelong.    Have you ever done Advance Care Planning? (For example, a Health Directive, POLST, or a discussion with a medical provider or your loved ones about your wishes): No, advance care planning information given to patient to review.  Patient declined advance care planning discussion at this time.    Social History     Tobacco Use    Smoking status: Never    Smokeless tobacco: Never   Substance Use Topics    Alcohol use: Yes     Comment: 1 or 2 a month or less             7/26/2023     9:55 AM   Alcohol Use   Prescreen: >3 drinks/day or >7 drinks/week? No          No data to display              Reviewed orders with patient.  Reviewed health maintenance and updated orders accordingly - Yes  Lab work is in process  Labs reviewed in EPIC  BP Readings from Last 3 Encounters:   07/26/23 111/82   06/08/23 110/70   05/25/23 126/82    Wt Readings from Last 3 Encounters:   07/26/23 81.2 kg (179 lb)   06/08/23 85.1 kg (187 lb 11.2 oz)   05/25/23 86.6 kg (191 lb)                  Patient Active Problem List   Diagnosis    Esophageal dysphagia    History of pulmonary embolism    Anti-cardiolipin antibody positive    Raynaud's phenomenon    Hiatal hernia    Griggs's esophagus    Pericardial effusion    Status post coronary angiogram    Diaphragmatic hernia without obstruction or gangrene    Atypical squamous cells of undetermined significance (ASCUS) on Papanicolaou smear of  cervix    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)    Moderate episode of recurrent major depressive disorder (H)    Raised antibody titer    Premenopausal menorrhagia    Post concussion syndrome    Gastroesophageal reflux disease with esophagitis without hemorrhage    Dystrophic radiologic calcification    Bariatric surgery status     Past Surgical History:   Procedure Laterality Date    COMBINED ESOPHAGOSCOPY, GASTROSCOPY, DUODENOSCOPY (EGD), REMOVE ESOPHAGEAL STENT N/A 12/2/2022    Procedure: ESOPHAGOGASTRODUODENOSCOPY, WITH ESOPHAGEAL STENT REMOVAL and Balloon Dialation;  Surgeon: Daniel Aragon MD;  Location: [facility]    CV PERICARDIOCENTESIS N/A 12/11/2022    Procedure: Pericardiocentesis;  Surgeon: Sourav Sahu MD;  [facility]    ESOPHAGOSCOPY, GASTROSCOPY, DUODENOSCOPY (EGD), COMBINED N/A 12/29/2021    Procedure: ESOPHAGOGASTRODUODENOSCOPY, WITH BIOPSY;  Surgeon: Esteban Rose DO;  [place] [facility] [place]    ESOPHAGOSCOPY, GASTROSCOPY, DUODENOSCOPY (EGD), COMBINED N/A 11/14/2022    Procedure: Upper endoscopy; gastric stricture dilation, interpretation of fluoroscopy nasojejunal feeding tube;  Surgeon: Daniel Aragon MD;  Location: [facility]    ESOPHAGOSCOPY, GASTROSCOPY, DUODENOSCOPY (EGD), COMBINED N/A 11/23/2022    Procedure: ESOPHAGOGASTRODUODENOSCOPY (EGD);  Surgeon: Daniel Aragon MD;  Location:  GI    ESOPHAGOSCOPY, GASTROSCOPY, DUODENOSCOPY (EGD), COMBINED N/A 12/21/2022    Procedure: ESOPHAGOGASTRODUODENOSCOPY, WITH BIOPSY;  Surgeon: Leticia Sommer MD;  Location: [facility]    LAPAROSCOPIC GASTRIC SLEEVE N/A 10/25/2022    Procedure: GASTRECTOMY, SLEEVE, LAPAROSCOPIC;  Surgeon: Daniel Aragon MD;  [facility]    LAPAROSCOPIC HERNIORRHAPHY HIATAL N/A 10/25/2022    Procedure: HERNIORRHAPHY, HIATAL, LAPAROSCOPIC;  Surgeon: Daniel Aragon MD;  [facility]: [facility]    LAPAROSCOPY DIAGNOSTIC (GENERAL) N/A 11/4/2022    Procedure:  LAPAROSCOPY, DIAGNOSTIC, BY GENERAL SURGERY, evacuation of abdominl hematoma;  Surgeon: Daniel Aragon MD;  Location: [facility]    PICC TRIPLE LUMEN PLACEMENT Left 11/06/2022    51cm (1cm external), Basilic vein    TONSILLECTOMY & ADENOIDECTOMY      ZZC NONSPECIFIC PROCEDURE      T&A as a child    ZZC NONSPECIFIC PROCEDURE      Tubes in ears bilaterally       Social History     Tobacco Use    Smoking status: Never    Smokeless tobacco: Never   Substance Use Topics    Alcohol use: Yes     Comment: 1 or 2 a month or less     Family History   Problem Relation Age of Onset    Hypertension Mother         arthritis    Depression Mother     Anxiety Disorder Mother     Alzheimer Disease Mother     Rheumatoid Arthritis Mother     Heart Disease Father         MI, Lipids    Acute Myocardial Infarction Father     Cancer - colorectal Maternal Grandmother         arthritis    Rheumatoid Arthritis Maternal Grandmother     Hypertension Maternal Grandfather         arthritis, macular degeneration    Colon Cancer Paternal Grandmother     Alzheimer Disease Paternal Grandfather     Anesthesia Reaction No family hx of     Clotting Disorder No family hx of          Current Outpatient Medications   Medication Sig Dispense Refill    BIOTIN PO Take by mouth daily Using a patch      buPROPion (WELLBUTRIN SR) 150 MG 12 hr tablet Take 1 tablet (150 mg) by mouth 2 times daily 180 tablet 1    calcium carbonate (TUMS) 500 MG chewable tablet Take 1 chew tab by mouth as needed      hyoscyamine (LEVSIN) 0.125 MG tablet Take 0.125 mg by mouth 2 times daily      Multiple Vitamins-Iron (MULTIVITAMIN/IRON PO) Take 1 tablet by mouth daily Using a patch      pantoprazole (PROTONIX) 40 MG EC tablet Take 1 tablet (40 mg) by mouth daily If still having GERD symptoms, can increase to 40 mg twice daily 90 tablet 3    vitamin B complex with vitamin C (VITAMIN  B COMPLEX) tablet Take 1 tablet by mouth daily Using a patch       Allergies   Allergen Reactions  "   Azithromycin     Erythromycin GI Disturbance     When \"very young\"     Recent Labs   Lab Test 07/26/23  1047 05/24/23  1007 12/20/22  1012 12/19/22  0207 12/13/22  1903 12/13/22  0640 12/12/22  1459 12/12/22  0649 10/26/22  2103 10/25/22  0906 09/01/22  1156   A1C  --   --   --   --   --   --   --   --   --  5.5 5.7*   LDL  --   --   --   --   --   --   --   --   --   --  106*   HDL  --   --   --   --   --   --   --   --   --   --  37*   TRIG  --   --   --   --   --   --   --   --   --   --  216*   ALT  --   --   --  25  --  20  --  29   < >  --  24   CR 1.17* 0.96*   < > 1.24*   < > 1.01  --  0.85   < > 1.06* 0.86   GFRESTIMATED 59* 75   < > 55*   < > 71  --  87   < > 67 86   POTASSIUM 3.7 3.3*   < > 3.8   < > 2.9*   < > 2.9*   < >  --  3.8   TSH  --  1.78  --   --   --   --   --   --   --   --   --     < > = values in this interval not displayed.        Breast Cancer Screening:    FHS-7:        No data to display              Mammogram Screening - Offered annual screening and updated Health Maintenance for [place] plan based on risk factor consideration  Pertinent mammograms are reviewed under the imaging tab.    History of abnormal Pap smear: pap   03/2015 LSIL/HPV Negative  06/08/2017 ASCUS/HPV+  08/05/2017 [facility]: ASHLEY I   01/2018 HPV+  01/2018 [facility]: \"Negative\"  02/08/2019 ASCUS/HPV+  02/20/2019 [facility]: Benign  5/18/2021:  NIL/HPV Positive  7/7/21 [facility]:normal  8/29/2022: NILM, HPV negative  Plan: repeat cotest in 3 years (8/2025)       Reviewed and updated as needed this visit by clinical staff   Tobacco  Allergies  Meds  Problems  Med Hx  Surg Hx  Fam Hx          Reviewed and updated as needed this visit by Provider   Tobacco  Allergies  Meds  Problems  Med Hx  Surg Hx  Fam Hx         Past Medical History:   Diagnosis Date    Acute kidney failure with tubular necrosis (H) 12/14/2022    Anti-cardiolipin antibody positive     Griggs esophagus     Class 2 severe obesity with serious comorbidity and " body mass index (BMI) of 38.0 to 38.9 in adult (H) 06/20/2022    Concussion 2016    Depressive disorder 2021    Depressive disorder, not elsewhere classified 03/01/2006    Resolved 8/07    Food impaction of esophagus, initial encounter 11/23/2021    Gastroesophageal reflux disease with esophagitis     Hiatal hernia     History of pulmonary embolism     Morbid obesity (H) 10/25/2022    Obesity     Other pneumonia, unspecified organism 11/02/2022    Pleural effusion 12/11/2022    Pneumonia of both lower lobes due to infectious organism 11/01/2022    Raynaud's syndrome     past history of admission for gangrene of one finger      Past Surgical History:   Procedure Laterality Date    COMBINED ESOPHAGOSCOPY, GASTROSCOPY, DUODENOSCOPY (EGD), REMOVE ESOPHAGEAL STENT N/A 12/2/2022    Procedure: ESOPHAGOGASTRODUODENOSCOPY, WITH ESOPHAGEAL STENT REMOVAL and Balloon Dialation;  Surgeon: Daniel Aragon MD;  [facility]    CV PERICARDIOCENTESIS N/A 12/11/2022    Procedure: Pericardiocentesis;  Surgeon: Sourav Sahu MD;  Location: [facility] CARDIAC CATH LAB    ESOPHAGOSCOPY, GASTROSCOPY, DUODENOSCOPY (EGD), COMBINED N/A 12/29/2021    Procedure: ESOPHAGOGASTRODUODENOSCOPY, WITH BIOPSY;  Surgeon: Esteban Rose DO;  [place] [facility] [place]    ESOPHAGOSCOPY, GASTROSCOPY, DUODENOSCOPY (EGD), COMBINED N/A 11/14/2022    Procedure: Upper endoscopy; gastric stricture dilation, interpretation of fluoroscopy nasojejunal feeding tube;  Surgeon: Daniel Aragon MD;  [facility]    ESOPHAGOSCOPY, GASTROSCOPY, DUODENOSCOPY (EGD), COMBINED N/A 11/23/2022    Procedure: ESOPHAGOGASTRODUODENOSCOPY (EGD);  Surgeon: Daniel Aragon MD;  [facility]    ESOPHAGOSCOPY, GASTROSCOPY, DUODENOSCOPY (EGD), COMBINED N/A 12/21/2022    Procedure: ESOPHAGOGASTRODUODENOSCOPY, WITH BIOPSY;  Surgeon: Leticia Sommer MD;  Location: [facility]    LAPAROSCOPIC GASTRIC SLEEVE N/A 10/25/2022    Procedure: GASTRECTOMY, SLEEVE,  LAPAROSCOPIC;  Surgeon: Daniel Aragon MD;  [facility]    LAPAROSCOPIC HERNIORRHAPHY HIATAL N/A 10/25/2022    Procedure: HERNIORRHAPHY, HIATAL, LAPAROSCOPIC;  Surgeon: Daniel Aragon MD;  [facility]    LAPAROSCOPY DIAGNOSTIC (GENERAL) N/A 11/4/2022    Procedure: LAPAROSCOPY, DIAGNOSTIC, BY GENERAL SURGERY, evacuation of abdominl hematoma;  Surgeon: Daniel Aragon MD;  Location[facility]    PICC TRIPLE LUMEN PLACEMENT Left 11/06/2022    51cm (1cm external), Basilic vein    TONSILLECTOMY & ADENOIDECTOMY      [facility] NONSPECIFIC PROCEDURE      T&A as a child    [facility] NONSPECIFIC PROCEDURE      Tubes in ears bilaterally     OB History   No obstetric history on file.       Review of Systems   Constitutional:  Negative for chills and fever.   HENT:  Negative for congestion, ear pain, hearing loss and sore throat.    Eyes:  Negative for pain and visual disturbance.   Respiratory:  Negative for cough and shortness of breath.    Cardiovascular:  Negative for chest pain, palpitations and peripheral edema.   Gastrointestinal:  Negative for abdominal pain, constipation, diarrhea, heartburn, hematochezia and nausea.   Breasts:  Negative for tenderness, breast mass and discharge.   Genitourinary:  Positive for vaginal bleeding. Negative for dysuria, frequency, genital sores, hematuria, pelvic pain, urgency and vaginal discharge.   Musculoskeletal:  Negative for arthralgias, joint swelling and myalgias.   Skin:  Negative for rash.   Neurological:  Negative for dizziness, weakness, headaches and paresthesias.   Psychiatric/Behavioral:  Negative for mood changes. The patient is not nervous/anxious.         OBJECTIVE:   /82 (BP Location: Right arm, Patient Position: Sitting, Cuff Size: Adult Regular)   Pulse 74   Temp 98.5  F (36.9  C) (Oral)   Resp 15   Ht 1.829 m (6')   Wt 81.2 kg (179 lb)   LMP 07/24/2023 (Approximate)   SpO2 97%   BMI 24.28 kg/m    Physical Exam  GENERAL: healthy, alert and no  distress  EYES: Eyes grossly normal to inspection, PERRL and conjunctivae and sclerae normal  HENT: ear canals and TM's normal, nose and mouth without ulcers or lesions  NECK: no adenopathy, no asymmetry, masses, or scars and thyroid normal to palpation  RESP: lungs clear to auscultation - no rales, rhonchi or wheezes  CV: regular rate and rhythm, normal S1 S2, no S3 or S4, no murmur, click or rub, no peripheral edema and peripheral pulses strong  ABDOMEN: soft, nontender, no hepatosplenomegaly, no masses and bowel sounds normal  MS: no gross musculoskeletal defects noted, no edema  SKIN: no suspicious lesions or rashes  NEURO: Normal strength and tone, mentation intact and speech normal  PSYCH: mentation appears normal, affect normal/bright    Diagnostic Test Results:  Labs reviewed in Epic    ASSESSMENT/PLAN:   Routine general medical examination at a health care facility  Reviewed personal and family history. Reviewed age appropriate screenings. Reviewed healthy BP and BMI ranges. Counseled on lifestyle modifications for optimal mental and physical health.  Discussed age-appropriate health maintenance. Recommended any needed vaccinations. Continue to focus on well balanced diet and exercise. Declines COVID and pneumonia vaccines at this time.    Moderate episode of recurrent major depressive disorder (H)  Taking bupropion 150 mg BID, mood feels well controlled. Wants to continue with medication for now but would maybe want to think about stopping the medication in the future since mood is feeling better.  - buPROPion (WELLBUTRIN SR) 150 MG 12 hr tablet; Take 1 tablet (150 mg) by mouth 2 times daily    Gastric anastomotic stricture  Per GI, will need to be on lifelong  - pantoprazole (PROTONIX) 40 MG EC tablet; Take 1 tablet (40 mg) by mouth daily If still having GERD symptoms, can increase to 40 mg twice daily    Low serum potassium  Rechecking  - Basic metabolic panel  (Ca, Cl, CO2, Creat, Gluc, K, Na,  BUN)    Elevated serum creatinine  Has been on and off since 12/2022 when she had JEFFRY. Will continue to monitor.  - Basic metabolic panel  (Ca, Cl, CO2, Creat, Gluc, K, Na, BUN); Future    Bariatric surgery status  Has had significant complications since sleeve gastrectomy 10/2022. Stable for now.  Stopped Levsin but no change in dry mouth symptoms. Ended up having about 10 days of stomach upset and nausea after stopping so started taking it again twice daily. She actually thinks she had a really early miscarriage and wonders if that was the cause of her symptoms. She will try stopping the Levsin again and monitor symptoms. If ongoing abdominal issues, would consider follow-up with GI or her bariatric team.      COUNSELING:  Reviewed preventive health counseling, as reflected in patient instructions      BMI:   Estimated body mass index is 24.28 kg/m  as calculated from the following:    Height as of this encounter: 1.829 m (6').    Weight as of this encounter: 81.2 kg (179 lb).         She reports that she has never smoked. She has never used smokeless tobacco.          MICHEAL Veronica [facility]

## 2023-07-27 PROBLEM — E66.01 MORBID OBESITY (H): Status: RESOLVED | Noted: 2022-10-25 | Resolved: 2023-07-27

## 2023-07-27 PROBLEM — E86.0 DEHYDRATION: Status: RESOLVED | Noted: 2022-10-31 | Resolved: 2023-07-27

## 2023-07-27 PROBLEM — R07.9 CHEST PAIN, UNSPECIFIED TYPE: Status: RESOLVED | Noted: 2022-12-21 | Resolved: 2023-07-27

## 2023-07-27 PROBLEM — R87.610 ATYPICAL SQUAMOUS CELLS OF UNDETERMINED SIGNIFICANCE (ASCUS) ON PAPANICOLAOU SMEAR OF CERVIX: Status: ACTIVE | Noted: 2019-02-19
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
## 2023-07-27 PROBLEM — R09.02 HYPOXIA: Status: RESOLVED | Noted: 2022-11-01 | Resolved: 2023-07-27

## 2023-07-27 PROBLEM — Z11.52 ENCOUNTER FOR SCREENING LABORATORY TESTING FOR SEVERE ACUTE RESPIRATORY SYNDROME CORONAVIRUS 2 (SARS-COV-2): Status: RESOLVED | Noted: 2022-11-02 | Resolved: 2023-07-27

## 2023-07-27 PROBLEM — J90 PLEURAL EFFUSION: Status: RESOLVED | Noted: 2022-12-11 | Resolved: 2023-07-27

## 2023-07-27 PROBLEM — J18.9 PNEUMONIA DUE TO INFECTIOUS ORGANISM, UNSPECIFIED LATERALITY, UNSPECIFIED PART OF LUNG: Status: RESOLVED | Noted: 2022-12-11 | Resolved: 2023-07-27

## 2023-07-27 PROBLEM — J18.8 OTHER PNEUMONIA, UNSPECIFIED ORGANISM: Status: RESOLVED | Noted: 2022-11-02 | Resolved: 2023-07-27

## 2023-07-27 PROBLEM — J18.9 PNEUMONIA OF BOTH LOWER LOBES DUE TO INFECTIOUS ORGANISM: Status: RESOLVED | Noted: 2022-11-01 | Resolved: 2023-07-27

## 2023-07-27 PROBLEM — K21.00 GASTROESOPHAGEAL REFLUX DISEASE WITH ESOPHAGITIS: Status: RESOLVED | Noted: 2022-06-20 | Resolved: 2023-07-27

## 2023-07-27 PROBLEM — R12 HEARTBURN: Status: RESOLVED | Noted: 2021-11-23 | Resolved: 2023-07-27

## 2023-07-27 PROBLEM — E66.812 CLASS 2 SEVERE OBESITY WITH SERIOUS COMORBIDITY AND BODY MASS INDEX (BMI) OF 38.0 TO 38.9 IN ADULT (H): Status: RESOLVED | Noted: 2022-06-20 | Resolved: 2023-07-27

## 2023-07-27 PROBLEM — R76.8 OTHER SPECIFIED ABNORMAL IMMUNOLOGICAL FINDINGS IN SERUM: Status: RESOLVED | Noted: 2022-12-09 | Resolved: 2023-07-27

## 2023-07-27 PROBLEM — T18.128A FOOD IMPACTION OF ESOPHAGUS, INITIAL ENCOUNTER: Status: RESOLVED | Noted: 2021-11-23 | Resolved: 2023-07-27

## 2023-07-27 PROBLEM — R09.02 HYPOXEMIA: Status: RESOLVED | Noted: 2022-11-02 | Resolved: 2023-07-27

## 2023-07-27 PROBLEM — Z98.84 BARIATRIC SURGERY STATUS: Status: ACTIVE | Noted: 2023-07-27

## 2023-07-27 PROBLEM — N17.0 ACUTE KIDNEY FAILURE WITH TUBULAR NECROSIS (H): Status: RESOLVED | Noted: 2022-12-14 | Resolved: 2023-07-27

## 2023-07-27 PROBLEM — E66.01 CLASS 2 SEVERE OBESITY WITH SERIOUS COMORBIDITY AND BODY MASS INDEX (BMI) OF 38.0 TO 38.9 IN ADULT (H): Status: RESOLVED | Noted: 2022-06-20 | Resolved: 2023-07-27

## 2023-08-16 ENCOUNTER — ANCILLARY PROCEDURE (OUTPATIENT)
Dept: MAMMOGRAPHY | Facility: CLINIC | Age: 43
End: 2023-08-16
Payer: COMMERCIAL

## 2023-08-16 DIAGNOSIS — Z12.31 VISIT FOR SCREENING MAMMOGRAM: ICD-10-CM

## 2023-08-16 PROCEDURE — 77063 BREAST TOMOSYNTHESIS BI: CPT | Mod: TC | Performed by: RADIOLOGY

## 2023-08-16 PROCEDURE — 77067 SCR MAMMO BI INCL CAD: CPT | Mod: TC | Performed by: RADIOLOGY

## 2023-08-17 ENCOUNTER — LAB (OUTPATIENT)
Dept: LAB | Facility: CLINIC | Age: 43
End: 2023-08-17
Payer: COMMERCIAL

## 2023-08-17 DIAGNOSIS — R79.89 ELEVATED SERUM CREATININE: ICD-10-CM

## 2023-08-17 LAB
ANION GAP SERPL CALCULATED.3IONS-SCNC: 14 MMOL/L (ref 7–15)
BUN SERPL-MCNC: 8.7 MG/DL (ref 6–20)
CALCIUM SERPL-MCNC: 9.3 MG/DL (ref 8.6–10)
CHLORIDE SERPL-SCNC: 109 MMOL/L (ref 98–107)
CREAT SERPL-MCNC: 1.17 MG/DL (ref 0.51–0.95)
DEPRECATED HCO3 PLAS-SCNC: 20 MMOL/L (ref 22–29)
GFR SERPL CREATININE-BSD FRML MDRD: 59 ML/MIN/1.73M2
GLUCOSE SERPL-MCNC: 86 MG/DL (ref 70–99)
POTASSIUM SERPL-SCNC: 3.8 MMOL/L (ref 3.4–5.3)
SODIUM SERPL-SCNC: 143 MMOL/L (ref 136–145)

## 2023-08-17 PROCEDURE — 80048 BASIC METABOLIC PNL TOTAL CA: CPT

## 2023-08-17 PROCEDURE — 36415 COLL VENOUS BLD VENIPUNCTURE: CPT

## 2023-08-19 ENCOUNTER — OFFICE VISIT (OUTPATIENT)
Dept: URGENT CARE | Facility: URGENT CARE | Age: 43
End: 2023-08-19
Payer: COMMERCIAL

## 2023-08-19 VITALS
SYSTOLIC BLOOD PRESSURE: 110 MMHG | WEIGHT: 177 LBS | DIASTOLIC BLOOD PRESSURE: 72 MMHG | TEMPERATURE: 97.9 F | RESPIRATION RATE: 16 BRPM | BODY MASS INDEX: 24.01 KG/M2 | OXYGEN SATURATION: 98 % | HEART RATE: 97 BPM

## 2023-08-19 DIAGNOSIS — H92.02 OTALGIA, LEFT: Primary | ICD-10-CM

## 2023-08-19 PROCEDURE — 99213 OFFICE O/P EST LOW 20 MIN: CPT | Performed by: PHYSICIAN ASSISTANT

## 2023-08-19 ASSESSMENT — PAIN SCALES - GENERAL: PAINLEVEL: MODERATE PAIN (4)

## 2023-08-19 NOTE — PROGRESS NOTES
SUBJECTIVE:  Leah Yanez is a 43 year old female who comes with a 2-day history of left ear pain that began with swollen lymph nodes on the left.  States that she has no other associated symptoms.  Has not had any cough or cold symptoms.  There is no sore throat, fever GI symptoms or rashes.  She denies any trauma to the ear.  She does not swim or use ear buds very often.  Not taking any medications for this.  She is otherwise in normal state of health.    Past Medical History:   Diagnosis Date    Acute kidney failure with tubular necrosis (H) 12/14/2022    Anti-cardiolipin antibody positive     Griggs esophagus     Class 2 severe obesity with serious comorbidity and body mass index (BMI) of 38.0 to 38.9 in adult (H) 06/20/2022    Concussion 2016    Depressive disorder 2021    Depressive disorder, not elsewhere classified 03/01/2006    Resolved 8/07    Food impaction of esophagus, initial encounter 11/23/2021    Gastroesophageal reflux disease with esophagitis     Hiatal hernia     History of pulmonary embolism     Morbid obesity (H) 10/25/2022    Obesity     Other pneumonia, unspecified organism 11/02/2022    Pleural effusion 12/11/2022    Pneumonia of both lower lobes due to infectious organism 11/01/2022    Raynaud's syndrome     past history of admission for gangrene of one finger     Current Outpatient Medications   Medication    BIOTIN PO    buPROPion (WELLBUTRIN SR) 150 MG 12 hr tablet    calcium carbonate (TUMS) 500 MG chewable tablet    hyoscyamine (LEVSIN) 0.125 MG tablet    Multiple Vitamins-Iron (MULTIVITAMIN/IRON PO)    pantoprazole (PROTONIX) 40 MG EC tablet    vitamin B complex with vitamin C (VITAMIN  B COMPLEX) tablet     No current facility-administered medications for this visit.     Social History     Socioeconomic History    Marital status:      Spouse name: Not on file    Number of children: Not on file    Years of education: Not on file    Highest education level: Not on file    Occupational History    Not on file   Tobacco Use    Smoking status: Never    Smokeless tobacco: Never   Vaping Use    Vaping Use: Never used   Substance and Sexual Activity    Alcohol use: Yes     Comment: 1 or 2 a month or less    Drug use: No    Sexual activity: Yes     Partners: Male     Birth control/protection: None     Comment: Have never been able to get pregnant, not even with IVF.   Other Topics Concern    Parent/sibling w/ CABG, MI or angioplasty before 65F 55M? Yes     Comment: Father had heart attack due to the bend in his artery   Social History Narrative    Not on file     Social Determinants of Health     Financial Resource Strain: Not on file   Food Insecurity: Not on file   Transportation Needs: Not on file   Physical Activity: Not on file   Stress: Not on file   Social Connections: Not on file   Intimate Partner Violence: Not on file   Housing Stability: Not on file     ROS  negative other than stated above    Exam:  GENERAL APPEARANCE: healthy, alert and no distress  EYES: EOMI,  PERRL  HENT: TMs and canals clear bilaterally.  Oral mucosa moist with no erythema or exudate noted no nasal congestion or sinus pain or pressure.  No signs of dental infection.  NECK: Mild tenderness in the submandibular gland and few scattered swollen posterior cervical nodes noted.  No overlying erythema.  Mastoid nontender  RESP: lungs clear to auscultation - no rales, rhonchi or wheezes  CV: regular rates and rhythm, normal S1 S2, no S3 or S4 and no murmur, click or rub -  SKIN: no suspicious lesions or rashes    assessment/plan:  (H92.02) Otalgia, left  (primary encounter diagnosis)  Comment:   Plan:   Patient with 2-day history of left ear pain secondary to swelling of lymph nodes.  Her canal and TM are normal with no evidence for infection.  No signs of oral infection no sore throat to suggest strep.  Most likely referred pain from swollen and tender lymph nodes.  Most likely viral process.  We will continue  with supportive cares for now ibuprofen and lymphatic drainage.  Red flag signs were discussed we will follow-up with primary as needed\

## 2023-08-30 DIAGNOSIS — R79.89 ELEVATED SERUM CREATININE: Primary | ICD-10-CM

## 2023-09-14 ENCOUNTER — LAB (OUTPATIENT)
Dept: LAB | Facility: CLINIC | Age: 43
End: 2023-09-14
Payer: COMMERCIAL

## 2023-09-14 ENCOUNTER — OFFICE VISIT (OUTPATIENT)
Dept: RHEUMATOLOGY | Facility: CLINIC | Age: 43
End: 2023-09-14
Attending: PHYSICIAN ASSISTANT
Payer: COMMERCIAL

## 2023-09-14 VITALS
DIASTOLIC BLOOD PRESSURE: 68 MMHG | HEART RATE: 60 BPM | WEIGHT: 179.4 LBS | SYSTOLIC BLOOD PRESSURE: 104 MMHG | BODY MASS INDEX: 24.33 KG/M2

## 2023-09-14 DIAGNOSIS — I73.00 RAYNAUD'S PHENOMENON WITHOUT GANGRENE: ICD-10-CM

## 2023-09-14 DIAGNOSIS — R76.8 POSITIVE ANA (ANTINUCLEAR ANTIBODY): Primary | ICD-10-CM

## 2023-09-14 DIAGNOSIS — R68.2 DRY MOUTH: ICD-10-CM

## 2023-09-14 DIAGNOSIS — R79.89 ELEVATED SERUM CREATININE: ICD-10-CM

## 2023-09-14 DIAGNOSIS — R76.8 POSITIVE ANA (ANTINUCLEAR ANTIBODY): ICD-10-CM

## 2023-09-14 LAB
ALBUMIN MFR UR ELPH: 10.6 MG/DL
ALBUMIN SERPL BCG-MCNC: 4 G/DL (ref 3.5–5.2)
ALBUMIN UR-MCNC: NEGATIVE MG/DL
ALT SERPL W P-5'-P-CCNC: 9 U/L (ref 0–50)
APPEARANCE UR: CLEAR
BACTERIA #/AREA URNS HPF: ABNORMAL /HPF
BASOPHILS # BLD AUTO: 0 10E3/UL (ref 0–0.2)
BASOPHILS NFR BLD AUTO: 0 %
BILIRUB UR QL STRIP: NEGATIVE
CK SERPL-CCNC: 36 U/L (ref 26–192)
COLOR UR AUTO: YELLOW
CREAT SERPL-MCNC: 1.02 MG/DL (ref 0.51–0.95)
CREAT UR-MCNC: 173 MG/DL
EGFRCR SERPLBLD CKD-EPI 2021: 70 ML/MIN/1.73M2
EOSINOPHIL # BLD AUTO: 0.1 10E3/UL (ref 0–0.7)
EOSINOPHIL NFR BLD AUTO: 2 %
ERYTHROCYTE [DISTWIDTH] IN BLOOD BY AUTOMATED COUNT: 13.6 % (ref 10–15)
ERYTHROCYTE [SEDIMENTATION RATE] IN BLOOD BY WESTERGREN METHOD: 16 MM/HR (ref 0–20)
GLUCOSE UR STRIP-MCNC: NEGATIVE MG/DL
HCT VFR BLD AUTO: 39.7 % (ref 35–47)
HGB BLD-MCNC: 13.1 G/DL (ref 11.7–15.7)
HGB UR QL STRIP: ABNORMAL
IMM GRANULOCYTES # BLD: 0 10E3/UL
IMM GRANULOCYTES NFR BLD: 0 %
KETONES UR STRIP-MCNC: NEGATIVE MG/DL
LEUKOCYTE ESTERASE UR QL STRIP: NEGATIVE
LYMPHOCYTES # BLD AUTO: 1.4 10E3/UL (ref 0.8–5.3)
LYMPHOCYTES NFR BLD AUTO: 25 %
MCH RBC QN AUTO: 29.4 PG (ref 26.5–33)
MCHC RBC AUTO-ENTMCNC: 33 G/DL (ref 31.5–36.5)
MCV RBC AUTO: 89 FL (ref 78–100)
MONOCYTES # BLD AUTO: 0.3 10E3/UL (ref 0–1.3)
MONOCYTES NFR BLD AUTO: 5 %
MUCOUS THREADS #/AREA URNS LPF: PRESENT /LPF
NEUTROPHILS # BLD AUTO: 3.9 10E3/UL (ref 1.6–8.3)
NEUTROPHILS NFR BLD AUTO: 68 %
NITRATE UR QL: NEGATIVE
PH UR STRIP: 5.5 [PH] (ref 5–8)
PLATELET # BLD AUTO: 228 10E3/UL (ref 150–450)
PROT/CREAT 24H UR: 0.06 MG/MG CR (ref 0–0.2)
RBC # BLD AUTO: 4.45 10E6/UL (ref 3.8–5.2)
RBC #/AREA URNS AUTO: ABNORMAL /HPF
SP GR UR STRIP: 1.02 (ref 1–1.03)
SQUAMOUS #/AREA URNS AUTO: ABNORMAL /LPF
UROBILINOGEN UR STRIP-ACNC: 0.2 E.U./DL
WBC # BLD AUTO: 5.8 10E3/UL (ref 4–11)
WBC #/AREA URNS AUTO: ABNORMAL /HPF

## 2023-09-14 PROCEDURE — 85025 COMPLETE CBC W/AUTO DIFF WBC: CPT

## 2023-09-14 PROCEDURE — 82565 ASSAY OF CREATININE: CPT

## 2023-09-14 PROCEDURE — 86148 ANTI-PHOSPHOLIPID ANTIBODY: CPT | Mod: 90

## 2023-09-14 PROCEDURE — 84460 ALANINE AMINO (ALT) (SGPT): CPT

## 2023-09-14 PROCEDURE — 86803 HEPATITIS C AB TEST: CPT

## 2023-09-14 PROCEDURE — 81001 URINALYSIS AUTO W/SCOPE: CPT

## 2023-09-14 PROCEDURE — 86146 BETA-2 GLYCOPROTEIN ANTIBODY: CPT

## 2023-09-14 PROCEDURE — 85613 RUSSELL VIPER VENOM DILUTED: CPT

## 2023-09-14 PROCEDURE — 86225 DNA ANTIBODY NATIVE: CPT

## 2023-09-14 PROCEDURE — 86147 CARDIOLIPIN ANTIBODY EA IG: CPT

## 2023-09-14 PROCEDURE — 86200 CCP ANTIBODY: CPT

## 2023-09-14 PROCEDURE — 85652 RBC SED RATE AUTOMATED: CPT

## 2023-09-14 PROCEDURE — 85390 FIBRINOLYSINS SCREEN I&R: CPT | Performed by: PATHOLOGY

## 2023-09-14 PROCEDURE — 99204 OFFICE O/P NEW MOD 45 MIN: CPT | Performed by: INTERNAL MEDICINE

## 2023-09-14 PROCEDURE — 82040 ASSAY OF SERUM ALBUMIN: CPT

## 2023-09-14 PROCEDURE — 85730 THROMBOPLASTIN TIME PARTIAL: CPT

## 2023-09-14 PROCEDURE — 99000 SPECIMEN HANDLING OFFICE-LAB: CPT

## 2023-09-14 PROCEDURE — 82550 ASSAY OF CK (CPK): CPT

## 2023-09-14 PROCEDURE — 86235 NUCLEAR ANTIGEN ANTIBODY: CPT

## 2023-09-14 PROCEDURE — 86160 COMPLEMENT ANTIGEN: CPT

## 2023-09-14 PROCEDURE — 36415 COLL VENOUS BLD VENIPUNCTURE: CPT

## 2023-09-14 PROCEDURE — 84156 ASSAY OF PROTEIN URINE: CPT

## 2023-09-14 RX ORDER — PILOCARPINE HYDROCHLORIDE 5 MG/1
TABLET, FILM COATED ORAL
Qty: 90 TABLET | Refills: 3 | Status: SHIPPED | OUTPATIENT
Start: 2023-09-14 | End: 2024-02-14

## 2023-09-14 NOTE — PROGRESS NOTES
This document was created using a software with less than 100% fidelity, at times resulting in unintended, even erroneous syntax and grammar.  The reader is advised to keep this under consideration while reviewing, interpreting this note.      Rheumatology Consult Note      Leah Yanez     YOB: 1980 Age: 43 year old    Date of visit: 9/14/23    PCP: Lillian Andrew    Chief Complaint   Patient presents with:  Consult      Assessment and Plan     Leah was seen today for consult.    Diagnoses and all orders for this visit:    Positive LOLY (antinuclear antibody)  -     Adult Rheumatology  Referral  -     MARINA antibody panel; Future  -     Erythrocyte sedimentation rate auto; Future  -     Hepatitis C antibody; Future  -     Complement C3; Future  -     Complement C4; Future  -     Cyclic Citrullinated Peptide Antibody IgG; Future  -     DNA double stranded antibodies; Future  -     Centromere Antibody IgG; Future  -     Lupus Anticoagulant Panel; Future  -     Cardiolipin Sanjana IgG and IgM; Future  -     Beta 2 Glycoprotein Antibodies IGG IGM; Future  -     Phosphatidylserine Antibody IgG IgA IgM; Future  -     CK total; Future  -     CBC with Platelets & Differential; Future  -     Albumin level; Future  -     ALT; Future  -     Creatinine; Future  -     pilocarpine (SALAGEN) 5 MG tablet; PIlocarpine 5mg daily x7days, then 5mg twice daily x7days, then 5mg three times daily thereafter.    Dry mouth  -     Adult Rheumatology  Referral  -     MARINA antibody panel; Future  -     Erythrocyte sedimentation rate auto; Future  -     Hepatitis C antibody; Future  -     Complement C3; Future  -     Complement C4; Future  -     Cyclic Citrullinated Peptide Antibody IgG; Future  -     DNA double stranded antibodies; Future  -     Centromere Antibody IgG; Future  -     Lupus Anticoagulant Panel; Future  -     Cardiolipin Sanjana IgG and IgM; Future  -     Beta 2 Glycoprotein Antibodies IGG IGM;  Future  -     Phosphatidylserine Antibody IgG IgA IgM; Future  -     CK total; Future  -     CBC with Platelets & Differential; Future  -     Albumin level; Future  -     ALT; Future  -     Creatinine; Future  -     pilocarpine (SALAGEN) 5 MG tablet; PIlocarpine 5mg daily x7days, then 5mg twice daily x7days, then 5mg three times daily thereafter.    Raynaud's phenomenon without gangrene           This patient with sicca symptoms dating back at least 10 years, positive LOLY, has several signals to suggest Sjogren's despite negative SSA negative SSB antibodies when checked in the past.  She has Raynaud's.  It appears that elsewhere a anticardiolipin antibody was positive evidently tested only once in the context of her pulmonary embolism in 2015.  She is going to have further work-up today.  Once these data are available further course to be charted accordingly.  Today we discussed secretagogues such as pilocarpine, major side effects of which were reviewed.  With regards to Raynaud's the m-anagement principles were reviewed.  Will discuss further details along with the calcium channel blockers on her subsequent visit.  In part given starting a new medication today i.e. pilocarpine.          ILIR Yanez is a 43 year old female  is seen today for evaluation of positive LOLY, dryness of mouth and eyes, the stiffness in her joints.  This patient narrates a longstanding history of dryness of mouth and eyes dating back to 10 possibly longer years.  Over the time she has learned to keep good supply of water with her.  She has gone to the dentist regularly and each time she is gone that they have told her that she has dryness of mouth however there has been no significant unusual dental pathology unusual cavities for example.  She has been to the ophthalmology and various interventions have been tried currently on eyedrops.  In 2000 she was admitted to the hospital after having sustained a cut on her index finger  "that will not heal, at that point the diagnosis of Raynaud's was arrived at, she describes typical features of pallor and cyanosis and on exposure to cold weather.  In 2015 she ended up having pulmonary embolism that was in the left lung that was thought to be secondary to the birth control she was on at that time.  She has been pregnant twice and both occasions miscarried earlier on.  They have tried IVF however conception has not been successful.  In October 2022 she was hospitalized for surgery, having had Griggs's, reflux symptoms for a long time, PPIs, she underwent dilations x3 of the esophagus, finally the surgery was recommended also to help her lose weight \"sleeve\" was fashioned these were followed up by severe complications, there were she describes internal bleed then she developed a viral infection, she sounds like she developed pericarditis around that time, was reintubated, finally she made recovery to the current state of health that she considers close to normal.  Insofar as joint pains she is not sure that she can describe painful joints however first thing in the morning it feels as if her fingers and toes are swollen her rings are tight she does not have sufficient power.  During the day this gets better.  She is not a smoker alcohol rarely.  She works as a ..  She noted her mom's history of rheumatoid arthritis, she has passed.  She describes no personal family history of psoriasis ulcerative colitis or Crohn's disease as best as she knows.           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)    Moderate episode of recurrent major depressive disorder (H)    Raised antibody titer    Premenopausal menorrhagia    Post concussion syndrome    Gastroesophageal reflux disease with esophagitis without hemorrhage    Dystrophic radiologic calcification    Bariatric surgery status     Past Medical History     Past Medical History:   Diagnosis Date    Acute kidney failure with tubular necrosis (H) 12/14/2022    Anti-cardiolipin antibody positive     Griggs esophagus     Class 2 severe obesity with serious comorbidity and body mass index (BMI) of 38.0 to 38.9 in adult (H) 06/20/2022    Concussion 2016    Depressive disorder 2021    Depressive disorder, not elsewhere classified 03/01/2006    Resolved 8/07    Food impaction of esophagus, initial encounter 11/23/2021    Gastroesophageal reflux disease with esophagitis     Hiatal hernia     History of pulmonary embolism     Morbid obesity (H) 10/25/2022    Obesity     Other pneumonia, unspecified organism 11/02/2022    Pleural effusion 12/11/2022    Pneumonia of both lower lobes due to infectious organism 11/01/2022    Raynaud's syndrome     past history of admission for gangrene of one finger     Past Surgical History     Past Surgical History:   Procedure Laterality Date    COMBINED ESOPHAGOSCOPY, GASTROSCOPY, DUODENOSCOPY (EGD), REMOVE ESOPHAGEAL STENT N/A 12/2/2022    Procedure: ESOPHAGOGASTRODUODENOSCOPY, WITH ESOPHAGEAL STENT REMOVAL and Balloon Dialation;  Surgeon: Daniel Aragon MD;  Location: [facility] OR    CV PERICARDIOCENTESIS N/A 12/11/2022    Procedure: Pericardiocentesis;  Surgeon: Sourav Sahu MD;  Location:  [facility]    ESOPHAGOSCOPY, GASTROSCOPY, DUODENOSCOPY (EGD), COMBINED N/A 12/29/2021    Procedure: ESOPHAGOGASTRODUODENOSCOPY, WITH BIOPSY;  Surgeon: Esteban Rose DO;  Location: [facility] OR    ESOPHAGOSCOPY, GASTROSCOPY, DUODENOSCOPY (EGD), COMBINED N/A 11/14/2022    Procedure:  "Upper endoscopy; gastric stricture dilation, interpretation of fluoroscopy nasojejunal feeding tube;  Surgeon: Daniel Aragon MD;  Location: [facility] OR    ESOPHAGOSCOPY, GASTROSCOPY, DUODENOSCOPY (EGD), COMBINED N/A 11/23/2022    Procedure: ESOPHAGOGASTRODUODENOSCOPY (EGD);  Surgeon: Daniel Aragon MD;  Location: [facility] GI    ESOPHAGOSCOPY, GASTROSCOPY, DUODENOSCOPY (EGD), COMBINED N/A 12/21/2022    Procedure: ESOPHAGOGASTRODUODENOSCOPY, WITH BIOPSY;  Surgeon: Leticia Sommer MD;  Location: [facility]    LAPAROSCOPIC GASTRIC SLEEVE N/A 10/25/2022    Procedure: GASTRECTOMY, SLEEVE, LAPAROSCOPIC;  Surgeon: Daniel Aragon MD;  Location: [facility] OR    LAPAROSCOPIC HERNIORRHAPHY HIATAL N/A 10/25/2022    Procedure: HERNIORRHAPHY, HIATAL, LAPAROSCOPIC;  Surgeon: Daniel Aragon MD;  Location: [facility] OR    LAPAROSCOPY DIAGNOSTIC (GENERAL) N/A 11/4/2022    Procedure: LAPAROSCOPY, DIAGNOSTIC, BY GENERAL SURGERY, evacuation of abdominl hematoma;  Surgeon: Daniel Aragon MD;  Location: [facility] OR    PICC TRIPLE LUMEN PLACEMENT Left 11/06/2022    51cm (1cm external), Basilic vein    TONSILLECTOMY & ADENOIDECTOMY      Z NONSPECIFIC PROCEDURE      T&A as a child    ZZ NONSPECIFIC PROCEDURE      Tubes in ears bilaterally     Allergy     Allergies   Allergen Reactions    Azithromycin     Erythromycin GI Disturbance     When \"very young\"     Current Medication List     Current Outpatient Medications   Medication Sig    BIOTIN PO Take by mouth daily Using a patch    buPROPion (WELLBUTRIN SR) 150 MG 12 hr tablet Take 1 tablet (150 mg) by mouth 2 times daily    calcium carbonate (TUMS) 500 MG chewable tablet Take 1 chew tab by mouth as needed    Multiple Vitamins-Iron (MULTIVITAMIN/IRON PO) Take 1 tablet by mouth daily Using a patch    pantoprazole (PROTONIX) 40 MG EC tablet Take 1 tablet (40 mg) by mouth daily If still having GERD symptoms, can increase to 40 mg twice daily    vitamin B complex with vitamin C " (VITAMIN  B COMPLEX) tablet Take 1 tablet by mouth daily Using a patch    hyoscyamine (LEVSIN) 0.125 MG tablet Take 0.125 mg by mouth 2 times daily     No current facility-administered medications for this visit.            Family History     Family History   Problem Relation Age of Onset    Hypertension Mother         arthritis    Depression Mother     Anxiety Disorder Mother     Alzheimer Disease Mother     Rheumatoid Arthritis Mother     Heart Disease Father         MI, Lipids    Acute Myocardial Infarction Father     Cancer - colorectal Maternal Grandmother         arthritis    Rheumatoid Arthritis Maternal Grandmother     Hypertension Maternal Grandfather         arthritis, macular degeneration    Colon Cancer Paternal Grandmother     Alzheimer Disease Paternal Grandfather     Anesthesia Reaction No family hx of     Clotting Disorder No family hx of          Physical Exam     COMPREHENSIVE EXAMINATION:  Vitals:    09/14/23 1007   BP: 104/68   Pulse: 60   Weight: 81.4 kg (179 lb 6.4 oz)     A well appearing alert oriented female. Vital data as noted above. Her eyes examined for inflammation/scleromalacia. ENT examined for oral mucositis, moisture, thrush, nasal deformity, external ear redness, deformity. Her neck is examined for suppleness and lymphadenopathy.  Cardiopulmonary examination without dyspnea at rest, use of accessory muscles of breathing, wheezing, edema, peripheral or central cyanosis.  Abdomen examined for softness, tenderness, obvious organomegaly.  Skin examined for heliotrope, malar area eruption, lupus pernio, periungual erythema, sclerodactyly, papules, erythema nodosum, purpura, nail pitting, onycholysis, and obvious psoriasis lesion. Neurological examination done for alertness, speech, facial symmetry,  tone and power in upper and lower extremities, and gait. The joint examination is performed for swelling, tenderness, warmth, erythema, and range of motion in the following joints: DIPs,  PIPs, MCPs, wrists, first CMC's, elbows, shoulders, hips, knees, ankles, feet; spine for range of motion and paraspinal muscles for tenderness. The salient normal / abnormal findings are appended. There is no rash on her face there is no sclerodactyly periungual erythema.  She has minimal saliva formation in her oral cavity, minimal fluid under her tongue.  No ulceration however.  No features suggestive of thrush.  She has no synovitis of palpable joints.  She has a subcutaneous nodule on the right forearm close to the olecranon and the smaller one corresponding opposite side.  She has tattoos.  She does not have parotid area swelling.    Labs / Imaging (select studies)     Rheumatoid Factor   Date Value Ref Range Status   05/24/2023 <7 <12 IU/mL Final      Hemoglobin   Date Value Ref Range Status   05/24/2023 12.2 11.7 - 15.7 g/dL Final   01/25/2023 12.8 11.7 - 15.7 g/dL Final   12/23/2022 9.4 (L) 11.7 - 15.7 g/dL Final   06/26/2006 14.9 11.7 - 15.7 g/dL Final   06/16/2006 13.6 11.7 - 15.7 g/dL Final     Urea Nitrogen   Date Value Ref Range Status   08/17/2023 8.7 6.0 - 20.0 mg/dL Final   07/26/2023 9.7 6.0 - 20.0 mg/dL Final   05/24/2023 10.2 6.0 - 20.0 mg/dL Final   12/23/2022 7 7 - 30 mg/dL Final   12/21/2022 10 7 - 30 mg/dL Final   12/20/2022 11 7 - 30 mg/dL Final   06/16/2006 9 5 - 24 mg/dL Final   07/24/2003 14 5 - 24 mg/dL Final     Sed Rate   Date Value Ref Range Status   07/24/2003 9 0 - 20 mm/h Final     Erythrocyte Sedimentation Rate   Date Value Ref Range Status   05/24/2023 33 (H) 0 - 20 mm/hr Final   01/12/2023 38 (H) 0 - 20 mm/hr Final   12/19/2022 43 (H) 0 - 20 mm/hr Final     CRP Inflammation   Date Value Ref Range Status   12/21/2022 187.0 (H) 0.0 - 8.0 mg/L Final   12/20/2022 168.0 (H) 0.0 - 8.0 mg/L Final   12/19/2022 65.4 (H) 0.0 - 8.0 mg/L Final     AST   Date Value Ref Range Status   12/19/2022 25 0 - 45 U/L Final   12/13/2022 10 0 - 45 U/L Final   12/12/2022 10 0 - 45 U/L Final     Albumin    Date Value Ref Range Status   12/19/2022 2.9 (L) 3.4 - 5.0 g/dL Final   12/13/2022 1.7 (L) 3.4 - 5.0 g/dL Final   12/12/2022 2.0 (L) 3.4 - 5.0 g/dL Final     Alkaline Phosphatase   Date Value Ref Range Status   12/19/2022 142 40 - 150 U/L Final   12/13/2022 89 40 - 150 U/L Final   12/12/2022 107 40 - 150 U/L Final     ALT   Date Value Ref Range Status   12/19/2022 25 0 - 50 U/L Final   12/13/2022 20 0 - 50 U/L Final   12/12/2022 29 0 - 50 U/L Final     Rheumatoid Factor   Date Value Ref Range Status   05/24/2023 <7 <12 IU/mL Final          Immunization History     Immunization History   Administered Date(s) Administered    COVID-19 Monovalent 18+ (Moderna) 04/07/2021, 05/05/2021, 12/03/2021    DT (PEDS <7y) 07/11/1995    DTAP (<7y) 07/11/1995    HEPA 02/27/1998    HepA, Unspecified 02/27/1993, 11/18/1998    HepA-Peds, Unspecified 11/18/1998    HepB, Unspecified 10/16/1998, 11/18/1998    Hepatitis A (ADULT 19+) 11/18/1998    Hepatitis B (Peds <19Y) 10/16/1998, 11/18/1998    Hepatitis B, Adult 12/13/2016    MMR 07/24/1992    Polio, Unspecified  11/18/1998    Poliovirus, inactivated (IPV) 11/18/1998    TDAP (Adacel,Boostrix) 12/13/2016    Typhoid IM 02/27/1998, 12/13/2016    Typhoid, Unspecified Formulation 02/27/1998

## 2023-09-15 LAB
C3 SERPL-MCNC: 107 MG/DL (ref 81–157)
C4 SERPL-MCNC: 32 MG/DL (ref 13–39)
DRVVT SCREEN RATIO: 0.97
INR PPP: 1.02 (ref 0.85–1.15)
LA PPP-IMP: NEGATIVE
LUPUS INTERPRETATION: NORMAL
PTT RATIO: 1.05
THROMBIN TIME: 17.5 SECONDS (ref 13–19)

## 2023-09-16 LAB
B2 GLYCOPROT1 IGG SERPL IA-ACNC: <0.8 U/ML
B2 GLYCOPROT1 IGM SERPL IA-ACNC: <2.4 U/ML
CARDIOLIPIN IGG SER IA-ACNC: <2 GPL-U/ML
CARDIOLIPIN IGG SER IA-ACNC: NEGATIVE
CARDIOLIPIN IGM SER IA-ACNC: 2.6 MPL-U/ML
CARDIOLIPIN IGM SER IA-ACNC: NEGATIVE
CCP AB SER IA-ACNC: 0.8 U/ML
CENTROMERE B AB SER-ACNC: >240 U/ML
CENTROMERE B AB SER-ACNC: POSITIVE
DSDNA AB SER-ACNC: <0.6 IU/ML
ENA SM IGG SER IA-ACNC: <0.7 U/ML
ENA SM IGG SER IA-ACNC: NEGATIVE
ENA SS-A AB SER IA-ACNC: <0.5 U/ML
ENA SS-A AB SER IA-ACNC: NEGATIVE
ENA SS-B IGG SER IA-ACNC: <0.6 U/ML
ENA SS-B IGG SER IA-ACNC: NEGATIVE
PS IGA SER IA-ACNC: 0 APS
PS IGG SER IA-ACNC: 0 GPS
PS IGM SER IA-ACNC: 0 MPS
U1 SNRNP IGG SER IA-ACNC: 1.3 U/ML
U1 SNRNP IGG SER IA-ACNC: NEGATIVE

## 2023-09-18 LAB — HCV AB SERPL QL IA: NONREACTIVE

## 2023-11-02 ENCOUNTER — OFFICE VISIT (OUTPATIENT)
Dept: RHEUMATOLOGY | Facility: CLINIC | Age: 43
End: 2023-11-02
Payer: COMMERCIAL

## 2023-11-02 VITALS
DIASTOLIC BLOOD PRESSURE: 80 MMHG | SYSTOLIC BLOOD PRESSURE: 120 MMHG | HEART RATE: 68 BPM | BODY MASS INDEX: 24.51 KG/M2 | WEIGHT: 180.7 LBS

## 2023-11-02 DIAGNOSIS — I73.00 RAYNAUD'S PHENOMENON WITHOUT GANGRENE: Primary | ICD-10-CM

## 2023-11-02 DIAGNOSIS — L94.2 CALCINOSIS CUTIS: ICD-10-CM

## 2023-11-02 DIAGNOSIS — R76.8 ANTICENTROMERE ANTIBODIES PRESENT: ICD-10-CM

## 2023-11-02 DIAGNOSIS — R68.2 DRY MOUTH: ICD-10-CM

## 2023-11-02 DIAGNOSIS — R76.8 POSITIVE ANA (ANTINUCLEAR ANTIBODY): ICD-10-CM

## 2023-11-02 PROCEDURE — 99214 OFFICE O/P EST MOD 30 MIN: CPT | Performed by: INTERNAL MEDICINE

## 2023-11-02 RX ORDER — AMLODIPINE BESYLATE 5 MG/1
2.5 TABLET ORAL 2 TIMES DAILY
Qty: 30 TABLET | Refills: 3 | Status: SHIPPED | OUTPATIENT
Start: 2023-11-02 | End: 2024-02-14 | Stop reason: SINTOL

## 2023-11-02 NOTE — PROGRESS NOTES
Rheumatology follow-up visit note     Leah is a 43 year old female presents today for follow-up.    Leah was seen today for recheck.    Diagnoses and all orders for this visit:    Raynaud's phenomenon without gangrene  -     amLODIPine (NORVASC) 5 MG tablet; Take 0.5 tablets (2.5 mg) by mouth 2 times daily for 30 days    Dry mouth    Positive LOLY (antinuclear antibody)    Anticentromere antibodies present    Calcinosis cutis  -     amLODIPine (NORVASC) 5 MG tablet; Take 0.5 tablets (2.5 mg) by mouth 2 times daily for 30 days        This patient has multiplicity of symptoms in the background of autoimmunity including a positive LOLY anticentromere antibody.  While she has carry a diagnosis of Sjogren's and her predominant symptoms are indeed sicca, her SSA SSB were negative.  The combination of Raynaud's, calcinosis, esophageal symptoms, without telangiectasia, without sclerodactyly, we discussed how while there are well-defined syndromes of autoimmune conditions of connective tissue disease there are some patients who do not follow 1    Of clear syndromic morphology and can have variety of symptoms from some of these syndromes.  Various terminology such as overlap syndromes undifferentiated connective tissue disease used sometimes at this context.  In her case we discussed options.  I have recommended she considers Norvasc.  Major side effects outlined.  She is already on high dose of PPIs.  At this time there is insufficient evidence to make a diagnosis of full-blown CREST syndrome.  We will continue to call her conditions for now Sjogren's.  With Raynaud's.    Follow up in 3 months.    HPI    Leah Yanez is a 43 year old female is here for follow-up.  In the background of positive LOLY she has had longstanding dryness of mouth going back several years, polyarthralgias, Raynaud's.  She was recently found to have subcutaneous calcified lesions on the forearm.  She has positive anticentromere antibodies.   "She has reflux symptoms.  She wondered if she might have CR EST as she googled.  She has noted no skin thickening/tightening.  There is no shortness of breath.  She has noted no telangiectasias.  Her joint symptoms so far have not reached the level of inflammatory arthropathy.  She has morning stiffness of no more than 5 minutes.  She has found pilocarpine is of some help although she felt nauseated but it did provide her with increase lying to a point where her functional abilities are better.  She is outdoors doing photo photo shoot.  Despite keeping herself warm she still gets Raynaud's.    Following is the excerpt from a previous note:   This patient narrates a longstanding history of dryness of mouth and eyes dating back to 10 possibly longer years.  Over the time she has learned to keep good supply of water with her.  She has gone to the dentist regularly and each time she is gone that they have told her that she has dryness of mouth however there has been no significant unusual dental pathology unusual cavities for example.  She has been to the ophthalmology and various interventions have been tried currently on eyedrops.  In 2000 she was admitted to the hospital after having sustained a cut on her index finger that will not heal, at that point the diagnosis of Raynaud's was arrived at, she describes typical features of pallor and cyanosis and on exposure to cold weather.  In 2015 she ended up having pulmonary embolism that was in the left lung that was thought to be secondary to the birth control she was on at that time.  She has been pregnant twice and both occasions miscarried earlier on.  They have tried IVF however conception has not been successful.  In October 2022 she was hospitalized for surgery, having had Griggs's, reflux symptoms for a long time, PPIs, she underwent dilations x3 of the esophagus, finally the surgery was recommended also to help her lose weight \"sleeve\" was fashioned these were " followed up by severe complications, there were she describes internal bleed then she developed a viral infection, she sounds like she developed pericarditis around that time, was reintubated, finally she made recovery to the current state of health that she considers close to normal.  Insofar as joint pains she is not sure that she can describe painful joints however first thing in the morning it feels as if her fingers and toes are swollen her rings are tight she does not have sufficient power.  During the day this gets better.  She is not a smoker alcohol rarely.  She works as a ..  She noted her mom's history of rheumatoid arthritis, she has passed.  She describes no personal family history of psoriasis ulcerative colitis or Crohn's disease as best as she knows.       DETAILED EXAMINATION  11/02/23  :    Vitals:    11/02/23 0959   BP: 120/80   Pulse: 68   Weight: 82 kg (180 lb 11.2 oz)     Alert oriented. Head including the face is examined for malar rash, heliotropes, scarring, lupus pernio. Eyes examined for redness such as in episcleritis/scleritis, periorbital lesions.   Neck/ Face examined for parotid gland swelling, range of motion of neck.  Left upper and lower and right upper and lower extremities examined for tenderness, swelling, warmth of the appendicular joints, range of motion, edema, rash.  Some of the important findings included: she does not have evidence of synovitis in the palpable joints of the upper extremities.  No significant deformities of the digits.  no Heberden nodes.  Range of motion of the shoulders  show full abduction.  No JLT effusion or warmth of the knees.  she does not have dactylitis of the digits.  She does not have sclerodactyly periungual erythema there are no telangiectasias she has subcutaneous nodules close to the olecranon bilaterally.  X-rays were taken recently in the primary physician's office confirming that these are calcified areas.  Her oral mucosa is  "more moist than it was on her previous visit.     Patient Active Problem List    Diagnosis Date Noted    Bariatric surgery status 07/27/2023     Priority: Medium     Sleeve gastrectomy 10/2022      Dystrophic radiologic calcification 06/06/2023     Priority: Medium     Noted on right forearm x-ray 6/5/23.      Gastroesophageal reflux disease with esophagitis without hemorrhage 12/21/2022     Priority: Medium    Premenopausal menorrhagia 12/12/2022     Priority: Medium    Pericardial effusion 12/11/2022     Priority: Medium    Status post coronary angiogram 12/11/2022     Priority: Medium    Anti-cardiolipin antibody positive 06/20/2022     Priority: Medium    Hiatal hernia 06/20/2022     Priority: Medium    Griggs's esophagus 06/20/2022     Priority: Medium    Diaphragmatic hernia without obstruction or gangrene 06/20/2022     Priority: Medium    Raised antibody titer 06/20/2022     Priority: Medium    Moderate episode of recurrent major depressive disorder (H) 12/10/2021     Priority: Medium    Esophageal dysphagia 11/23/2021     Priority: Medium    Atypical squamous cells of undetermined significance (ASCUS) on Papanicolaou smear of cervix 02/19/2019     Priority: Medium     03/2015 LSIL/HPV Negative  06/08/2017 ASCUS/HPV+  08/05/2017 [place]: ASHLEY I   01/2018 HPV+  01/2018 [place]: \"Negative\"  02/08/2019 ASCUS/HPV+  02/20/2019 [place]: Benign  5/18/2021:  NIL/HPV Positive  7/7/21 [place]:normal  8/29/2022: NILM, HPV negative    Plan: repeat cotest in 3 years (8/2025)        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  Aug 2014, was on combined oral contraceptives      Raynaud's phenomenon 07/22/2014     Priority: Medium     Past Surgical History:   Procedure Laterality Date    COMBINED ESOPHAGOSCOPY, " GASTROSCOPY, DUODENOSCOPY (EGD), REMOVE ESOPHAGEAL STENT N/A 12/2/2022    Procedure: ESOPHAGOGASTRODUODENOSCOPY, WITH ESOPHAGEAL STENT REMOVAL and Balloon Dialation;  Surgeon: Daniel Aragon MD;  Location: [facility] OR    CV PERICARDIOCENTESIS N/A 12/11/2022    Procedure: Pericardiocentesis;  Surgeon: Sourav Sahu MD;  Location:  [facility]    ESOPHAGOSCOPY, GASTROSCOPY, DUODENOSCOPY (EGD), COMBINED N/A 12/29/2021    Procedure: ESOPHAGOGASTRODUODENOSCOPY, WITH BIOPSY;  Surgeon: Esteban Rose DO;  Location: [facility] [place]    ESOPHAGOSCOPY, GASTROSCOPY, DUODENOSCOPY (EGD), COMBINED N/A 11/14/2022    Procedure: Upper endoscopy; gastric stricture dilation, interpretation of fluoroscopy nasojejunal feeding tube;  Surgeon: Daniel Aragon MD;  Location: [facility] OR    ESOPHAGOSCOPY, GASTROSCOPY, DUODENOSCOPY (EGD), COMBINED N/A 11/23/2022    Procedure: ESOPHAGOGASTRODUODENOSCOPY (EGD);  Surgeon: Daniel Aragon MD;  Location:  GI    ESOPHAGOSCOPY, GASTROSCOPY, DUODENOSCOPY (EGD), COMBINED N/A 12/21/2022    Procedure: ESOPHAGOGASTRODUODENOSCOPY, WITH BIOPSY;  Surgeon: Leticia Sommer MD;  Location: [facility]    LAPAROSCOPIC GASTRIC SLEEVE N/A 10/25/2022    Procedure: GASTRECTOMY, SLEEVE, LAPAROSCOPIC;  Surgeon: Daniel Aragon MD;  Location: [facility] OR    LAPAROSCOPIC HERNIORRHAPHY HIATAL N/A 10/25/2022    Procedure: HERNIORRHAPHY, HIATAL, LAPAROSCOPIC;  Surgeon: Daniel Aragon MD;  Location: [facility] OR    LAPAROSCOPY DIAGNOSTIC (GENERAL) N/A 11/4/2022    Procedure: LAPAROSCOPY, DIAGNOSTIC, BY GENERAL SURGERY, evacuation of abdominl hematoma;  Surgeon: Daniel Aragon MD;  Location: [facility] OR    PICC TRIPLE LUMEN PLACEMENT Left 11/06/2022    51cm (1cm external), Basilic vein    TONSILLECTOMY & ADENOIDECTOMY      ZZC NONSPECIFIC PROCEDURE      T&A as a child    ZZC NONSPECIFIC PROCEDURE      Tubes in ears bilaterally      Past Medical History:   Diagnosis Date    Acute kidney failure  "with tubular necrosis (H) 12/14/2022    Anti-cardiolipin antibody positive     Griggs esophagus     Class 2 severe obesity with serious comorbidity and body mass index (BMI) of 38.0 to 38.9 in adult (H) 06/20/2022    Concussion 2016    Depressive disorder 2021    Depressive disorder, not elsewhere classified 03/01/2006    Resolved 8/07    Food impaction of esophagus, initial encounter 11/23/2021    Gastroesophageal reflux disease with esophagitis     Hiatal hernia     History of pulmonary embolism     Morbid obesity (H) 10/25/2022    Obesity     Other pneumonia, unspecified organism 11/02/2022    Pleural effusion 12/11/2022    Pneumonia of both lower lobes due to infectious organism 11/01/2022    Raynaud's syndrome     past history of admission for gangrene of one finger     Allergies   Allergen Reactions    Azithromycin     Erythromycin GI Disturbance     When \"very young\"     Current Outpatient Medications   Medication Sig Dispense Refill    BIOTIN PO Take by mouth daily Using a patch      buPROPion (WELLBUTRIN SR) 150 MG 12 hr tablet Take 1 tablet (150 mg) by mouth 2 times daily 180 tablet 1    calcium carbonate (TUMS) 500 MG chewable tablet Take 1 chew tab by mouth as needed      hyoscyamine (LEVSIN) 0.125 MG tablet Take 0.125 mg by mouth 2 times daily      Multiple Vitamins-Iron (MULTIVITAMIN/IRON PO) Take 1 tablet by mouth daily Using a patch      pantoprazole (PROTONIX) 40 MG EC tablet Take 1 tablet (40 mg) by mouth daily If still having GERD symptoms, can increase to 40 mg twice daily 90 tablet 3    pilocarpine (SALAGEN) 5 MG tablet PIlocarpine 5mg daily x7days, then 5mg twice daily x7days, then 5mg three times daily thereafter. 90 tablet 3    vitamin B complex with vitamin C (VITAMIN  B COMPLEX) tablet Take 1 tablet by mouth daily Using a patch       family history includes Acute Myocardial Infarction in her father; Alzheimer Disease in her mother and paternal grandfather; Anxiety Disorder in her mother; " Cancer - colorectal in her maternal grandmother; Colon Cancer in her paternal grandmother; Depression in her mother; Heart Disease in her father; Hypertension in her maternal grandfather and mother; Rheumatoid Arthritis in her maternal grandmother and mother.  Social Connections: Not on file          WBC   Date Value Ref Range Status   06/26/2006 5.0 4.0 - 11.0 10e9/L Final     WBC Count   Date Value Ref Range Status   09/14/2023 5.8 4.0 - 11.0 10e3/uL Final     RBC Count   Date Value Ref Range Status   09/14/2023 4.45 3.80 - 5.20 10e6/uL Final   06/26/2006 5.30 (H) 3.8 - 5.2 10e12/L Final     Hemoglobin   Date Value Ref Range Status   09/14/2023 13.1 11.7 - 15.7 g/dL Final   06/26/2006 14.9 11.7 - 15.7 g/dL Final     Hematocrit   Date Value Ref Range Status   09/14/2023 39.7 35.0 - 47.0 % Final   06/26/2006 44.6 35.0 - 47.0 % Final     MCV   Date Value Ref Range Status   09/14/2023 89 78 - 100 fL Final   06/26/2006 84 78 - 100 fl Final     MCH   Date Value Ref Range Status   09/14/2023 29.4 26.5 - 33.0 pg Final   06/26/2006 28.0 26.5 - 33.0 pg Final     Platelet Count   Date Value Ref Range Status   09/14/2023 228 150 - 450 10e3/uL Final   06/26/2006 190 150 - 450 10e9/L Final     % Lymphocytes   Date Value Ref Range Status   09/14/2023 25 % Final   06/26/2006 35 20 - 48 % Final     AST   Date Value Ref Range Status   12/19/2022 25 0 - 45 U/L Final     ALT   Date Value Ref Range Status   09/14/2023 9 0 - 50 U/L Final     Comment:     Reference intervals for this test were updated on 6/12/2023 to more accurately reflect our healthy population. There may be differences in the flagging of prior results with similar values performed with this method. Interpretation of those prior results can be made in the context of the updated reference intervals.       Albumin   Date Value Ref Range Status   09/14/2023 4.0 3.5 - 5.2 g/dL Final   12/19/2022 2.9 (L) 3.4 - 5.0 g/dL Final     Alkaline Phosphatase   Date Value Ref Range  Status   12/19/2022 142 40 - 150 U/L Final     Creatinine   Date Value Ref Range Status   09/14/2023 1.02 (H) 0.51 - 0.95 mg/dL Final   06/16/2006 1.00 0.60 - 1.30 mg/dL Final     GFR Estimate   Date Value Ref Range Status   09/14/2023 70 >60 mL/min/1.73m2 Final   06/16/2006 71 >60 mL/min/1.7m2 Final     GFR Estimate If Black   Date Value Ref Range Status   06/16/2006 86 >60 mL/min/1.7m2 Final     Creatinine Urine mg/dL   Date Value Ref Range Status   09/14/2023 173.0 mg/dL Final     Comment:     The reference ranges have not been established in urine creatinine. The results should be integrated into the clinical context for interpretation.     Sed Rate   Date Value Ref Range Status   07/24/2003 9 0 - 20 mm/h Final     Erythrocyte Sedimentation Rate   Date Value Ref Range Status   09/14/2023 16 0 - 20 mm/hr Final     CRP Inflammation   Date Value Ref Range Status   12/21/2022 187.0 (H) 0.0 - 8.0 mg/L Final     N terminal Pro BNP Inpatient   Date Value Ref Range Status   12/11/2022 392 0 - 450 pg/mL Final     Comment:     Reference range shown and results flagged as abnormal are suggested inpatient cut points for confirming diagnosis if CHF in an acute setting. Establishing a baseline value for each individual patient is useful for follow-up. An inpatient or emergency department NT-proPBNP <300 pg/mL effectively rules out acute CHF, with 99% negative predictive value.    The outpatient non-acute reference range for ruling out CHF is:  0-125 pg/mL (age 18 to less than 75)  0-450 pg/mL (age 75 yrs and older)

## 2023-12-17 ASSESSMENT — PATIENT HEALTH QUESTIONNAIRE - PHQ9
10. IF YOU CHECKED OFF ANY PROBLEMS, HOW DIFFICULT HAVE THESE PROBLEMS MADE IT FOR YOU TO DO YOUR WORK, TAKE CARE OF THINGS AT HOME, OR GET ALONG WITH OTHER PEOPLE: NOT DIFFICULT AT ALL
SUM OF ALL RESPONSES TO PHQ QUESTIONS 1-9: 6
SUM OF ALL RESPONSES TO PHQ QUESTIONS 1-9: 6

## 2023-12-18 ENCOUNTER — OFFICE VISIT (OUTPATIENT)
Dept: FAMILY MEDICINE | Facility: CLINIC | Age: 43
End: 2023-12-18
Payer: COMMERCIAL

## 2023-12-18 VITALS
WEIGHT: 176 LBS | DIASTOLIC BLOOD PRESSURE: 59 MMHG | HEART RATE: 80 BPM | RESPIRATION RATE: 13 BRPM | HEIGHT: 72 IN | TEMPERATURE: 97.5 F | SYSTOLIC BLOOD PRESSURE: 96 MMHG | BODY MASS INDEX: 23.84 KG/M2 | OXYGEN SATURATION: 99 %

## 2023-12-18 DIAGNOSIS — M35.00 SJOGREN'S SYNDROME, WITH UNSPECIFIED ORGAN INVOLVEMENT (H): ICD-10-CM

## 2023-12-18 DIAGNOSIS — L08.9 LOCAL INFECTION OF SKIN AND SUBCUTANEOUS TISSUE: ICD-10-CM

## 2023-12-18 DIAGNOSIS — L90.0 LICHEN SCLEROSUS: ICD-10-CM

## 2023-12-18 DIAGNOSIS — J34.89 NASAL LESION: ICD-10-CM

## 2023-12-18 DIAGNOSIS — I73.00 RAYNAUD'S DISEASE WITHOUT GANGRENE: Primary | ICD-10-CM

## 2023-12-18 PROCEDURE — 90471 IMMUNIZATION ADMIN: CPT | Performed by: PHYSICIAN ASSISTANT

## 2023-12-18 PROCEDURE — 90480 ADMN SARSCOV2 VAC 1/ONLY CMP: CPT | Performed by: PHYSICIAN ASSISTANT

## 2023-12-18 PROCEDURE — 99214 OFFICE O/P EST MOD 30 MIN: CPT | Mod: 25 | Performed by: PHYSICIAN ASSISTANT

## 2023-12-18 PROCEDURE — 91320 SARSCV2 VAC 30MCG TRS-SUC IM: CPT | Performed by: PHYSICIAN ASSISTANT

## 2023-12-18 PROCEDURE — 90686 IIV4 VACC NO PRSV 0.5 ML IM: CPT | Performed by: PHYSICIAN ASSISTANT

## 2023-12-18 RX ORDER — CLOBETASOL PROPIONATE 0.5 MG/G
OINTMENT TOPICAL
COMMUNITY
End: 2023-12-18

## 2023-12-18 RX ORDER — MUPIROCIN 20 MG/G
OINTMENT TOPICAL
Qty: 30 G | Refills: 0 | Status: SHIPPED | OUTPATIENT
Start: 2023-12-18 | End: 2024-01-23

## 2023-12-18 RX ORDER — CLOBETASOL PROPIONATE 0.5 MG/G
OINTMENT TOPICAL
Qty: 60 G | Refills: 0 | Status: SHIPPED | OUTPATIENT
Start: 2023-12-18 | End: 2024-01-23

## 2023-12-18 ASSESSMENT — PAIN SCALES - GENERAL: PAINLEVEL: NO PAIN (0)

## 2023-12-18 NOTE — PROGRESS NOTES
Assessment & Plan       Raynaud's disease without gangrene  Local infection of skin and subcutaneous tissue  Suspect related to Raynaud's, no sign of acute ischemia at this time. Will treat for possible infection with topical mupirocin. No signs of spreading infection. No gangrene at this time. She recently started taking amlodipine, following with rheumatology. Advised close monitoring, urgent follow-up if worsening pain, color changes, or signs of acute ischemia we discussed, or if signs of infection - spreading/streaking redness, warmth, tenderness, swelling, purulent drainage, fever.   - mupirocin (BACTROBAN) 2 % external ointment; Apply topically 3 times daily to affected area on finger    Lichen sclerosus  History of lichen sclerosus, was rx clobetasol cream by OB/GYN several years ago. She uses clobetasol a few times a month, good symptom control, requesting refill. She has not seen OB/GYN for several years. Discussed importance of regular monitoring of lichen sclerosus with OB/GYN and she will schedule visit.  - clobetasol (TEMOVATE) 0.05 % external ointment; Apply a thin layer to affected layer two to three times per week as needed for itching  - Ob/Gyn  Referral; Future    Nasal lesion  Unclear etiology but she reports intermittent swelling and hardening on left nostril near tip of nose for many years and overall feels the shape of her nose is changing - nose seems more prominent on the left and a little more deviated to the left than it used to be. Will start with ENT evaluation.  - Adult ENT  Referral; Future    Lillian Andrew PA-C  [facility]    Ifeoma Lynn is a 43 year old, presenting for the following health issues:  Derm Problem        12/18/2023     3:47 PM   Additional Questions   Roomed by Vanna SANCHEZ         History of Present Illness       Reason for visit:  Growth in nose, update perscription for clobetasol, infection in finger,  adhd    She eats 2-3 servings of fruits and vegetables daily.She consumes 0 sweetened beverage(s) daily.She exercises with enough effort to increase her heart rate 20 to 29 minutes per day.  She exercises with enough effort to increase her heart rate 3 or less days per week.   She is taking medications regularly.     Pt says her nose on the L side will swell and harden on the inside intermittently. This has been going on for many years, seems to flare every few months and she isn't sure why. She denies sores or pustules inside or outside of the nostril, says it feels like it is within the tissue of the nose and wonders if there is a change in the cartilage. She has noticed that her nose has changed, seems more prominent on the left and a little more deviated to the left than it used to be. She brought this up with her rheumatologist but he didn't think it was related to her rheumatologic issues.    2004 - cut on finger that wouldn't heal  Living in [place] at the time  Eventually ended up with gangrene  Didn't know she had Raynaud's at the time    Over past week, tip of right middle finger has been painful intermittently  Scab on tip of finger, thinks she developed a crack in the skin due to dry skin and it just isn't healing as well as she thinks it should. No drainage, redness, warmth. No pallor or cyanosis.  Finger has been swollen on and off, not currently swollen  No redness  She has normal color changes with Raynaud's but no lasting pallor  Recently saw rheumatology and started on amlodipine for Raynaud's - hasn't noticed a huge difference   Has follow-up with rheumatology scheduled in February    History of lichen sclerosus, was rx clobetasol cream by OB/GYN several years ago. She uses clobetasol a few times a month, good symptom control, requesting refill. She has not seen OB/GYN for several years.    Review of Systems   Constitutional, HEENT, cardiovascular, pulmonary, gi and gu systems are negative, except  as otherwise noted.      Objective    BP 96/59 (BP Location: Right arm, Patient Position: Sitting, Cuff Size: Adult Regular)   Pulse 80   Temp 97.5  F (36.4  C) (Oral)   Resp 13   Ht 1.829 m (6')   Wt 79.8 kg (176 lb)   LMP 10/16/2023 (Exact Date)   SpO2 99%   BMI 23.87 kg/m    Body mass index is 23.87 kg/m .  Physical Exam   GENERAL: healthy, alert and no distress  HENT: nose and mouth without ulcers or lesions  NECK: no adenopathy  RESP: lungs clear to auscultation - no rales, rhonchi or wheezes  CV: regular rate and rhythm, normal S1 S2, no S3 or S4, no murmur, click or rub  MS: small scab (~2-3 mm) with overlying honey colored crust tip of right middle finger with mild tenderness, no obvious swelling, warmth, redness, no drainage, no streaking redness. No pallor or cyanosis of the fingers. No ulceration or gangrene. Radial pulse intact. Full finger ROM.  PSYCH: mentation appears normal, affect normal/bright

## 2023-12-18 NOTE — PATIENT INSTRUCTIONS
You can try using topical antibiotic on your finger. Keep a close eye on symptoms and follow-up if signs of infection - spreading/streaking redness, warmth, tenderness, swelling, purulent drainage, fever.     You should have regular exams with OB/GYN to monitor lichen sclerosus    Start with ENT evaluation for nose concerns, we could consider having you see dermatology if needed pending ENT visit.

## 2024-01-03 NOTE — TELEPHONE ENCOUNTER
FUTURE VISIT INFORMATION      FUTURE VISIT INFORMATION:  Date: 3/8/24  Time: 2 PM  Location: [facility]  REFERRAL INFORMATION:  Referring provider:  Lillian Andrew PA-C  Referring providers clinic:  [facility]  Reason for visit/diagnosis:  Nasal lesion [J34.89], ref'd by Lillian Andrew PA-C, rec's in EPIC, pt made appt, [facility] location     RECORDS REQUESTED FROM      Clinic name Comments Records Status Imaging Status   [facility] 12/18/23 referral/ OV notes- Lillian Andrew PA-C Epic

## 2024-01-19 NOTE — PROGRESS NOTES
.Leah is a 43 year old No obstetric history on file. female who presents for annual exam.     Besides routine health maintenance, follow up to Lichen.  HPI:  Pt here for annual exam. Reports feeling improved health since medical issues last year.  Has a couple episodes of lichen flares, that are remedied by using clobetasol.  Her primary care provider just gave her a prescription.  Thinks that she might be perimenopausal as her periods have become very irregular and she has not had one since October.  She does not report any perimenopausal symptoms beyond irregular periods.  Menses are irregular   Patient's last menstrual period was 10/16/2023 (exact date)..   Using none for contraception.    She is not currently considering pregnancy.    REPRODUCTIVE/GYNECOLOGIC HISTORY:  Leah is sexually active with male partner(s) and is currently in monogamous relationship.   STI testing offered?  Declined  History of abnormal Pap smear:  Yes  SOCIAL HISTORY      She  reports that she has never smoked. She has never used smokeless tobacco.      Last PHQ-9 score on record =       1/23/2024    10:51 AM   PHQ-9 SCORE   PHQ-9 Total Score 6     Last GAD7 score on record =       1/23/2024    10:51 AM   SAMMY-7 SCORE   Total Score 1     Alcohol Score = occasional    HEALTH MAINTENANCE:          DEC 16   1998 IPV IMMUNIZATION (2 of 3 - Adult catch-up series)  Last completed: Nov 18, 1998     CHELLE 10   2024 DEPRESSION 12 MO INDEX REPEAT PHQ-9 (Every 2 Weeks)  Last completed: Dec 18, 2023       HEALTHY HABITS  Eating habits: eats regular meals, follows a balanced nutrition diet, and takes daily vitamins  Calcium/Vitamin D intake: source:  dairy Adequate?   Exercise: How often do you exercise? 3-5 times/week;Walking and Cardio  Have you had an eye exam in the last two years? YES     Do you routinely see the dentist once or twice yearly? YES         HISTORY:  OB History   No obstetric history on file.     Past Medical History:   Diagnosis  Date    Acute kidney failure with tubular necrosis (H24) 12/14/2022    Anti-cardiolipin antibody positive     Griggs esophagus     Class 2 severe obesity with serious comorbidity and body mass index (BMI) of 38.0 to 38.9 in adult (H) 06/20/2022    Concussion 2016    Depressive disorder 2021    Depressive disorder, not elsewhere classified 03/01/2006    Resolved 8/07    Food impaction of esophagus, initial encounter 11/23/2021    Gastroesophageal reflux disease with esophagitis     Hiatal hernia     History of pulmonary embolism     Morbid obesity (H) 10/25/2022    Obesity     Other pneumonia, unspecified organism 11/02/2022    Pleural effusion 12/11/2022    Pneumonia of both lower lobes due to infectious organism 11/01/2022    Raynaud's syndrome     past history of admission for gangrene of one finger    Status post coronary angiogram 12/11/2022     Past Surgical History:   Procedure Laterality Date    COMBINED ESOPHAGOSCOPY, GASTROSCOPY, DUODENOSCOPY (EGD), REMOVE ESOPHAGEAL STENT N/A 12/2/2022    Procedure: ESOPHAGOGASTRODUODENOSCOPY, WITH ESOPHAGEAL STENT REMOVAL and Balloon Dialation;  Surgeon: Daniel Aragon MD;  Location: [facility] OR    CV PERICARDIOCENTESIS N/A 12/11/2022    Procedure: Pericardiocentesis;  Surgeon: Sourav Sahu MD;  Location: [facility] CARDIAC CATH LAB    ESOPHAGOSCOPY, GASTROSCOPY, DUODENOSCOPY (EGD), COMBINED N/A 12/29/2021    Procedure: ESOPHAGOGASTRODUODENOSCOPY, WITH BIOPSY;  Surgeon: Esteban Rose DO;  Location: [facility] OR    ESOPHAGOSCOPY, GASTROSCOPY, DUODENOSCOPY (EGD), COMBINED N/A 11/14/2022    Procedure: Upper endoscopy; gastric stricture dilation, interpretation of fluoroscopy nasojejunal feeding tube;  Surgeon: Daniel Aragon MD;  Location: [facility] OR    ESOPHAGOSCOPY, GASTROSCOPY, DUODENOSCOPY (EGD), COMBINED N/A 11/23/2022    Procedure: ESOPHAGOGASTRODUODENOSCOPY (EGD);  Surgeon: Daniel Aragon MD;  Location:  GI    ESOPHAGOSCOPY, GASTROSCOPY, DUODENOSCOPY  (EGD), COMBINED N/A 12/21/2022    Procedure: ESOPHAGOGASTRODUODENOSCOPY, WITH BIOPSY;  Surgeon: Leticia Sommer MD;  Location: [facility]    LAPAROSCOPIC GASTRIC SLEEVE N/A 10/25/2022    Procedure: GASTRECTOMY, SLEEVE, LAPAROSCOPIC;  Surgeon: Daniel Aragon MD;  Location: [facility] OR    LAPAROSCOPIC HERNIORRHAPHY HIATAL N/A 10/25/2022    Procedure: HERNIORRHAPHY, HIATAL, LAPAROSCOPIC;  Surgeon: Daniel Aragon MD;  Location: [facility] OR    LAPAROSCOPY DIAGNOSTIC (GENERAL) N/A 11/4/2022    Procedure: LAPAROSCOPY, DIAGNOSTIC, BY GENERAL SURGERY, evacuation of abdominl hematoma;  Surgeon: Daniel Aragon MD;  Location: [facility] OR    PICC TRIPLE LUMEN PLACEMENT Left 11/06/2022    51cm (1cm external), Basilic vein    TONSILLECTOMY & ADENOIDECTOMY      ZZC NONSPECIFIC PROCEDURE      T&A as a child    ZZC NONSPECIFIC PROCEDURE      Tubes in ears bilaterally     Family History   Problem Relation Age of Onset    Hypertension Mother         arthritis    Depression Mother     Anxiety Disorder Mother     Alzheimer Disease Mother     Rheumatoid Arthritis Mother     Heart Disease Father         MI, Lipids    Acute Myocardial Infarction Father     Cancer - colorectal Maternal Grandmother         arthritis    Rheumatoid Arthritis Maternal Grandmother     Hypertension Maternal Grandfather         arthritis, macular degeneration    Colon Cancer Paternal Grandmother     Alzheimer Disease Paternal Grandfather     Anesthesia Reaction No family hx of     Clotting Disorder No family hx of      Social History     Socioeconomic History    Marital status:    Tobacco Use    Smoking status: Never    Smokeless tobacco: Never   Vaping Use    Vaping Use: Never used   Substance and Sexual Activity    Alcohol use: Yes     Comment: 1 or 2 a month or less    Drug use: No    Sexual activity: Yes     Partners: Male     Birth control/protection: None     Comment: Have never been able to get pregnant, not even with IVF.   Other Topics  Concern    Parent/sibling w/ CABG, MI or angioplasty before 65F 55M? Yes     Comment: Father had heart attack due to the bend in his artery     Social Determinants of Health     Financial Resource Strain: Low Risk  (12/17/2023)    Financial Resource Strain     Within the past 12 months, have you or your family members you live with been unable to get utilities (heat, electricity) when it was really needed?: No   Food Insecurity: Low Risk  (12/17/2023)    Food Insecurity     Within the past 12 months, did you worry that your food would run out before you got money to buy more?: No     Within the past 12 months, did the food you bought just not last and you didn t have money to get more?: No   Transportation Needs: Low Risk  (12/17/2023)    Transportation Needs     Within the past 12 months, has lack of transportation kept you from medical appointments, getting your medicines, non-medical meetings or appointments, work, or from getting things that you need?: No   Interpersonal Safety: Low Risk  (12/18/2023)    Interpersonal Safety     Do you feel physically and emotionally safe where you currently live?: Yes     Within the past 12 months, have you been hit, slapped, kicked or otherwise physically hurt by someone?: No     Within the past 12 months, have you been humiliated or emotionally abused in other ways by your partner or ex-partner?: No   Housing Stability: Low Risk  (12/17/2023)    Housing Stability     Do you have housing? : Yes     Are you worried about losing your housing?: No       Current Outpatient Medications:     amLODIPine (NORVASC) 5 MG tablet, Take 0.5 tablets (2.5 mg) by mouth 2 times daily for 30 days, Disp: 30 tablet, Rfl: 3    buPROPion (WELLBUTRIN SR) 150 MG 12 hr tablet, Take 1 tablet (150 mg) by mouth 2 times daily, Disp: 180 tablet, Rfl: 1    Multiple Vitamins-Iron (MULTIVITAMIN/IRON PO), Take 1 tablet by mouth daily Using a patch, Disp: , Rfl:     omega 3 1000 MG CAPS, , Disp: , Rfl:      "pantoprazole (PROTONIX) 40 MG EC tablet, Take 1 tablet (40 mg) by mouth daily If still having GERD symptoms, can increase to 40 mg twice daily, Disp: 90 tablet, Rfl: 3    pilocarpine (SALAGEN) 5 MG tablet, PIlocarpine 5mg daily x7days, then 5mg twice daily x7days, then 5mg three times daily thereafter., Disp: 90 tablet, Rfl: 3     Allergies   Allergen Reactions    Azithromycin     Erythromycin GI Disturbance     When \"very young\"       Past medical, surgical, social and family history were reviewed and updated in EPIC.    ROS:   12 point review of systems negative other than symptoms noted below or in the HPI.    PHYSICAL EXAM:  /68   Ht 1.854 m (6' 1\")   Wt 81.2 kg (179 lb)   LMP 10/16/2023 (Exact Date)   BMI 23.62 kg/m     BMI: Body mass index is 23.62 kg/m .  Constitutional: healthy, alert and no distress  Neck: symmetrical, thyroid normal size, no masses present, no lymphadenopathy present.   Cardiovascular: RRR, no murmurs present  Respiratory: breathing unlabored, lungs CTA bilaterally  Breast:normal without masses, tenderness or nipple discharge and no palpable axillary masses or adenopathy  Gastrointestinal: abdomen soft, non-tender, bowel sounds present  PELVIC EXAM:  deferred    ASSESSMENT/PLAN:    ICD-10-CM    1. Well woman exam (no gynecological exam)  Z00.00 Lipid Profile (Chol, Trig, HDL, LDL calc)     hCG Quantitative Pregnancy     Lipid Profile (Chol, Trig, HDL, LDL calc)     hCG Quantitative Pregnancy      2. Lichen sclerosus  L90.0 DISCONTINUED: clobetasol (TEMOVATE) 0.05 % external ointment        No results found for any visits on 01/23/24.      COUNSELING:   Reviewed preventive health counseling, as reflected in patient instructions  Special attention given to:        Regular exercise       Healthy diet/nutrition       (Elina)menopause management   reports that she has never smoked. She has never used smokeless tobacco.      ELISA Garcia CNM    "

## 2024-01-23 ENCOUNTER — OFFICE VISIT (OUTPATIENT)
Dept: MIDWIFE SERVICES | Facility: CLINIC | Age: 44
End: 2024-01-23
Payer: COMMERCIAL

## 2024-01-23 VITALS
HEIGHT: 72 IN | SYSTOLIC BLOOD PRESSURE: 112 MMHG | BODY MASS INDEX: 24.24 KG/M2 | DIASTOLIC BLOOD PRESSURE: 68 MMHG | WEIGHT: 179 LBS

## 2024-01-23 DIAGNOSIS — L90.0 LICHEN SCLEROSUS: ICD-10-CM

## 2024-01-23 DIAGNOSIS — Z00.00 WELL WOMAN EXAM (NO GYNECOLOGICAL EXAM): Primary | ICD-10-CM

## 2024-01-23 LAB
CHOLEST SERPL-MCNC: 193 MG/DL
FASTING STATUS PATIENT QL REPORTED: YES
HCG INTACT+B SERPL-ACNC: <1 MIU/ML
HDLC SERPL-MCNC: 48 MG/DL
LDLC SERPL CALC-MCNC: 121 MG/DL
NONHDLC SERPL-MCNC: 145 MG/DL
TRIGL SERPL-MCNC: 120 MG/DL

## 2024-01-23 PROCEDURE — 84702 CHORIONIC GONADOTROPIN TEST: CPT | Performed by: ADVANCED PRACTICE MIDWIFE

## 2024-01-23 PROCEDURE — 36415 COLL VENOUS BLD VENIPUNCTURE: CPT | Performed by: ADVANCED PRACTICE MIDWIFE

## 2024-01-23 PROCEDURE — 99386 PREV VISIT NEW AGE 40-64: CPT | Performed by: ADVANCED PRACTICE MIDWIFE

## 2024-01-23 PROCEDURE — 80061 LIPID PANEL: CPT | Performed by: ADVANCED PRACTICE MIDWIFE

## 2024-01-23 RX ORDER — CLOBETASOL PROPIONATE 0.5 MG/G
OINTMENT TOPICAL
Qty: 60 G | Refills: 0 | Status: SHIPPED | OUTPATIENT
Start: 2024-01-23 | End: 2024-01-23

## 2024-01-23 RX ORDER — OMEGA-3 FATTY ACIDS/FISH OIL 300-1000MG
CAPSULE ORAL
COMMUNITY

## 2024-01-23 ASSESSMENT — ANXIETY QUESTIONNAIRES
GAD7 TOTAL SCORE: 1
2. NOT BEING ABLE TO STOP OR CONTROL WORRYING: NOT AT ALL
3. WORRYING TOO MUCH ABOUT DIFFERENT THINGS: NOT AT ALL
5. BEING SO RESTLESS THAT IT IS HARD TO SIT STILL: NOT AT ALL
6. BECOMING EASILY ANNOYED OR IRRITABLE: SEVERAL DAYS
7. FEELING AFRAID AS IF SOMETHING AWFUL MIGHT HAPPEN: NOT AT ALL
IF YOU CHECKED OFF ANY PROBLEMS ON THIS QUESTIONNAIRE, HOW DIFFICULT HAVE THESE PROBLEMS MADE IT FOR YOU TO DO YOUR WORK, TAKE CARE OF THINGS AT HOME, OR GET ALONG WITH OTHER PEOPLE: NOT DIFFICULT AT ALL
GAD7 TOTAL SCORE: 1
1. FEELING NERVOUS, ANXIOUS, OR ON EDGE: NOT AT ALL

## 2024-01-23 ASSESSMENT — PATIENT HEALTH QUESTIONNAIRE - PHQ9
SUM OF ALL RESPONSES TO PHQ QUESTIONS 1-9: 6
5. POOR APPETITE OR OVEREATING: NOT AT ALL

## 2024-01-23 NOTE — NURSING NOTE
"Chief Complaint   Patient presents with    Physical     Lichen follow up       Initial /68   Ht 1.854 m (6' 1\")   Wt 81.2 kg (179 lb)   LMP 10/16/2023 (Exact Date)   BMI 23.62 kg/m   Estimated body mass index is 23.62 kg/m  as calculated from the following:    Height as of this encounter: 1.854 m (6' 1\").    Weight as of this encounter: 81.2 kg (179 lb).  BP completed using cuff size: regular    Questioned patient about current smoking habits.  Pt. has never smoked.      No obstetric history on file.    The following HM Due: NONE    Lichen does not flare up very often but when it does she uses clobetasol cream and is gone with 1-2 days   Allie Salmeron CMA on 1/23/2024 at 10:48 AM    "

## 2024-02-14 ENCOUNTER — OFFICE VISIT (OUTPATIENT)
Dept: RHEUMATOLOGY | Facility: CLINIC | Age: 44
End: 2024-02-14
Payer: COMMERCIAL

## 2024-02-14 VITALS
SYSTOLIC BLOOD PRESSURE: 102 MMHG | OXYGEN SATURATION: 98 % | BODY MASS INDEX: 23.54 KG/M2 | WEIGHT: 178.4 LBS | DIASTOLIC BLOOD PRESSURE: 64 MMHG | HEART RATE: 67 BPM

## 2024-02-14 DIAGNOSIS — R68.2 DRY MOUTH: ICD-10-CM

## 2024-02-14 DIAGNOSIS — L94.2 CALCINOSIS CUTIS: ICD-10-CM

## 2024-02-14 DIAGNOSIS — I73.00 RAYNAUD'S PHENOMENON WITHOUT GANGRENE: Primary | ICD-10-CM

## 2024-02-14 DIAGNOSIS — R76.8 POSITIVE ANA (ANTINUCLEAR ANTIBODY): ICD-10-CM

## 2024-02-14 DIAGNOSIS — R76.8 ANTICENTROMERE ANTIBODIES PRESENT: ICD-10-CM

## 2024-02-14 PROCEDURE — G2211 COMPLEX E/M VISIT ADD ON: HCPCS | Performed by: INTERNAL MEDICINE

## 2024-02-14 PROCEDURE — 99214 OFFICE O/P EST MOD 30 MIN: CPT | Performed by: INTERNAL MEDICINE

## 2024-02-14 RX ORDER — PILOCARPINE HYDROCHLORIDE 5 MG/1
5 TABLET, FILM COATED ORAL 3 TIMES DAILY
Qty: 270 TABLET | Refills: 0 | Status: SHIPPED | OUTPATIENT
Start: 2024-02-14 | End: 2024-05-14

## 2024-02-14 ASSESSMENT — PAIN SCALES - GENERAL: PAINLEVEL: NO PAIN (0)

## 2024-02-14 NOTE — PROGRESS NOTES
Rheumatology follow-up visit note     Leah is a 43 year old female presents today for follow-up.    Leah was seen today for recheck.    Diagnoses and all orders for this visit:    Raynaud's phenomenon without gangrene    Dry mouth  -     pilocarpine (SALAGEN) 5 MG tablet; Take 1 tablet (5 mg) by mouth 3 times daily for 90 days    Positive LOLY (antinuclear antibody)  -     pilocarpine (SALAGEN) 5 MG tablet; Take 1 tablet (5 mg) by mouth 3 times daily for 90 days    Calcinosis cutis  -     XR Hand Bilateral G/E 3 Views; Future    Anticentromere antibodies present        Her arthralgia, sicca symptoms, calcinosis cutis, Raynaud's, reflux, and with positive LOLY anticentromere antibodies without sclerodactyly telangiectasias will continue to be monitored.  For now there seems to be little indication for immunosuppression.  Amlodipine to be discontinued continue pilocarpine.    Follow up in 3 months.The longitudinal plan of care for the condition(s) below were addressed during this visit. Due to the added complexity in care, I will continue to support Leah in the subsequent management of this condition(s) and with the ongoing continuity of care of this condition(s).    Problem List Items Addressed This Visit as of 2/14/2024      Raynaud's phenomenon - Primary    Dry mouth    Positive LOLY (antinuclear antibody)    Anticentromere antibodies present    Calcinosis cutis        HPI    Leah Yanez is a 43 year old female is here for follow-up.  In the background of positive LOLY she has had longstanding dryness of mouth going back several years, polyarthralgias, Raynaud's for which her amlodipine was started and not only she did not notice any improvement but had quite a bit of dizziness almost to the point of blacking out cut back the dose of amlodipine.. She was recently found to have subcutaneous calcified lesions on the forearm. She has positive anticentromere antibodies. She has reflux symptoms for which she  "is on Protonix well-controlled . She wondered if she might have CR EST as she googled. She has noted no skin thickening/tightening. There is no shortness of breath. She has noted no telangiectasias. Her joint symptoms so far have not reached the level of inflammatory arthropathy. She has morning stiffness of no more than 5 minutes.  She found pilocarpine was very helpful for her xerostomia.  While she was in the usual and recently had a \"rashiness\" it had improved so much despite the ample sunlight there.  And of course her Raynaud's did not trouble her.        XR FOREARM RIGHT 2 VIEWS 6/5/2023 3:06 PM      HISTORY: US 5/31/23 showed ine terminate hypoechoic 1.5 cm structure  proximal posterior forearm, could be calcified or ossified structure  vs other lesion, further evaluate with radiograph; Localized  superficial swelling, mass, or lump     COMPARISON: 5/31/2023 ultrasound.                                                                      IMPRESSION: There is a geographic area of subcutaneous dystrophic  calcification in the dorsal and ulnar aspect of the proximal forearm  which corresponds with the ultrasound findings. The area of  calcification measures 2.1 x 2.1 x 1.4 cm in size. No fractures are  evident. No elbow joint effusion.      DETAILED EXAMINATION  02/14/24  :    Vitals:    02/14/24 1011   BP: 102/64   BP Location: Right arm   Patient Position: Sitting   Cuff Size: Adult Regular   Pulse: 67   SpO2: 98%   Weight: 80.9 kg (178 lb 6.4 oz)     Alert oriented. Head including the face is examined for malar rash, heliotropes, scarring, lupus pernio. Eyes examined for redness such as in episcleritis/scleritis, periorbital lesions.   Neck/ Face examined for parotid gland swelling, range of motion of neck.  Left upper and lower and right upper and lower extremities examined for tenderness, swelling, warmth of the appendicular joints, range of motion, edema, rash.  Some of the important findings included: " "she does not have evidence of synovitis in the palpable joints of the upper extremities.  No significant deformities of the digits.  no Heberden nodes.  Range of motion of the shoulders  show full abduction.  No JLT effusion or warmth of the knees.  she does not have dactylitis of the digits.  There is no rash on the face.  There is no sclerodactyly.  She has healed digital ulcers.  She has subcutaneous nodules couple of centimeters distal to the point of her elbow joint.     Patient Active Problem List    Diagnosis Date Noted    Sjogren's syndrome (H24) 12/18/2023     Priority: Medium     Follows with rheumatology      Lichen sclerosus 12/18/2023     Priority: Medium    Bariatric surgery status 07/27/2023     Priority: Medium     Sleeve gastrectomy 10/2022      Dystrophic radiologic calcification 06/06/2023     Priority: Medium     Noted on right forearm x-ray 6/5/23.      Gastroesophageal reflux disease with esophagitis without hemorrhage 12/21/2022     Priority: Medium    Premenopausal menorrhagia 12/12/2022     Priority: Medium    Pericardial effusion 12/11/2022     Priority: Medium    Status post coronary angiogram 12/11/2022     Priority: Medium    Anti-cardiolipin antibody positive 06/20/2022     Priority: Medium    Hiatal hernia 06/20/2022     Priority: Medium    Griggs's esophagus 06/20/2022     Priority: Medium    Diaphragmatic hernia without obstruction or gangrene 06/20/2022     Priority: Medium    Raised antibody titer 06/20/2022     Priority: Medium    Moderate episode of recurrent major depressive disorder (H) 12/10/2021     Priority: Medium    Esophageal dysphagia 11/23/2021     Priority: Medium    Atypical squamous cells of undetermined significance (ASCUS) on Papanicolaou smear of cervix 02/19/2019     Priority: Medium     03/2015 LSIL/HPV Negative  06/08/2017 ASCUS/HPV+  08/05/2017 [place]: ASHLEY I   01/2018 HPV+  01/2018 [place]: \"Negative\"  02/08/2019 ASCUS/HPV+  02/20/2019 [place]: Benign  5/18/2021:  " NIL/HPV Positive  7/7/21 [place]:normal  8/29/2022: NILM, HPV negative    Plan: repeat cotest in 3 years (8/2025)        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  Aug 2014, was on combined oral contraceptives      Raynaud's phenomenon 07/22/2014     Priority: Medium     Past Surgical History:   Procedure Laterality Date    COMBINED ESOPHAGOSCOPY, GASTROSCOPY, DUODENOSCOPY (EGD), REMOVE ESOPHAGEAL STENT N/A 12/2/2022    Procedure: ESOPHAGOGASTRODUODENOSCOPY, WITH ESOPHAGEAL STENT REMOVAL and Balloon Dialation;  Surgeon: Daniel Aragon MD;  Location: [facility] OR    CV PERICARDIOCENTESIS N/A 12/11/2022    Procedure: Pericardiocentesis;  Surgeon: Sourav Sahu MD;  Location:  [facility]    ESOPHAGOSCOPY, GASTROSCOPY, DUODENOSCOPY (EGD), COMBINED N/A 12/29/2021    Procedure: ESOPHAGOGASTRODUODENOSCOPY, WITH BIOPSY;  Surgeon: Esteban Rose DO;  Location: [facility]    ESOPHAGOSCOPY, GASTROSCOPY, DUODENOSCOPY (EGD), COMBINED N/A 11/14/2022    Procedure: Upper endoscopy; gastric stricture dilation, interpretation of fluoroscopy nasojejunal feeding tube;  Surgeon: Daniel Aragon MD;  Location: [facility] OR    ESOPHAGOSCOPY, GASTROSCOPY, DUODENOSCOPY (EGD), COMBINED N/A 11/23/2022    Procedure: ESOPHAGOGASTRODUODENOSCOPY (EGD);  Surgeon: Daniel Aragon MD;  Location:  GI    ESOPHAGOSCOPY, GASTROSCOPY, DUODENOSCOPY (EGD), COMBINED N/A 12/21/2022    Procedure: ESOPHAGOGASTRODUODENOSCOPY, WITH BIOPSY;  Surgeon: Leticia Sommer MD;  Location: [facility]    LAPAROSCOPIC GASTRIC SLEEVE N/A 10/25/2022    Procedure: GASTRECTOMY, SLEEVE, LAPAROSCOPIC;  Surgeon: Daniel Aragon MD;  Location: [facility] OR    LAPAROSCOPIC HERNIORRHAPHY HIATAL N/A 10/25/2022    Procedure:  "HERNIORRHAPHY, HIATAL, LAPAROSCOPIC;  Surgeon: Daniel Aragon MD;  Location: [facility] OR    LAPAROSCOPY DIAGNOSTIC (GENERAL) N/A 11/4/2022    Procedure: LAPAROSCOPY, DIAGNOSTIC, BY GENERAL SURGERY, evacuation of abdominl hematoma;  Surgeon: Danile Aragon MD;  Location: [facility] OR    PICC TRIPLE LUMEN PLACEMENT Left 11/06/2022    51cm (1cm external), Basilic vein    TONSILLECTOMY & ADENOIDECTOMY      ZZC NONSPECIFIC PROCEDURE      T&A as a child    ZZC NONSPECIFIC PROCEDURE      Tubes in ears bilaterally      Past Medical History:   Diagnosis Date    Acute kidney failure with tubular necrosis (H24) 12/14/2022    Anti-cardiolipin antibody positive     Griggs esophagus     Class 2 severe obesity with serious comorbidity and body mass index (BMI) of 38.0 to 38.9 in adult (H) 06/20/2022    Concussion 2016    Depressive disorder 2021    Depressive disorder, not elsewhere classified 03/01/2006    Resolved 8/07    Food impaction of esophagus, initial encounter 11/23/2021    Gastroesophageal reflux disease with esophagitis     Hiatal hernia     History of pulmonary embolism     Morbid obesity (H) 10/25/2022    Obesity     Other pneumonia, unspecified organism 11/02/2022    Pleural effusion 12/11/2022    Pneumonia of both lower lobes due to infectious organism 11/01/2022    Raynaud's syndrome     past history of admission for gangrene of one finger    Status post coronary angiogram 12/11/2022     Allergies   Allergen Reactions    Azithromycin     Erythromycin GI Disturbance     When \"very young\"     Current Outpatient Medications   Medication Sig Dispense Refill    amLODIPine (NORVASC) 5 MG tablet Take 0.5 tablets (2.5 mg) by mouth 2 times daily for 30 days 30 tablet 3    buPROPion (WELLBUTRIN SR) 150 MG 12 hr tablet Take 1 tablet (150 mg) by mouth 2 times daily 180 tablet 1    Multiple Vitamins-Iron (MULTIVITAMIN/IRON PO) Take 1 tablet by mouth daily Using a patch      omega 3 1000 MG CAPS       pantoprazole (PROTONIX) " 40 MG EC tablet Take 1 tablet (40 mg) by mouth daily If still having GERD symptoms, can increase to 40 mg twice daily 90 tablet 3    pilocarpine (SALAGEN) 5 MG tablet PIlocarpine 5mg daily x7days, then 5mg twice daily x7days, then 5mg three times daily thereafter. 90 tablet 3     family history includes Acute Myocardial Infarction in her father; Alzheimer Disease in her mother and paternal grandfather; Anxiety Disorder in her mother; Cancer - colorectal in her maternal grandmother; Colon Cancer in her paternal grandmother; Depression in her mother; Heart Disease in her father; Hypertension in her maternal grandfather and mother; Rheumatoid Arthritis in her maternal grandmother and mother.  Social Connections: Not on file          WBC   Date Value Ref Range Status   06/26/2006 5.0 4.0 - 11.0 10e9/L Final     WBC Count   Date Value Ref Range Status   09/14/2023 5.8 4.0 - 11.0 10e3/uL Final     RBC Count   Date Value Ref Range Status   09/14/2023 4.45 3.80 - 5.20 10e6/uL Final   06/26/2006 5.30 (H) 3.8 - 5.2 10e12/L Final     Hemoglobin   Date Value Ref Range Status   09/14/2023 13.1 11.7 - 15.7 g/dL Final   06/26/2006 14.9 11.7 - 15.7 g/dL Final     Hematocrit   Date Value Ref Range Status   09/14/2023 39.7 35.0 - 47.0 % Final   06/26/2006 44.6 35.0 - 47.0 % Final     MCV   Date Value Ref Range Status   09/14/2023 89 78 - 100 fL Final   06/26/2006 84 78 - 100 fl Final     MCH   Date Value Ref Range Status   09/14/2023 29.4 26.5 - 33.0 pg Final   06/26/2006 28.0 26.5 - 33.0 pg Final     Platelet Count   Date Value Ref Range Status   09/14/2023 228 150 - 450 10e3/uL Final   06/26/2006 190 150 - 450 10e9/L Final     % Lymphocytes   Date Value Ref Range Status   09/14/2023 25 % Final   06/26/2006 35 20 - 48 % Final     AST   Date Value Ref Range Status   12/19/2022 25 0 - 45 U/L Final     ALT   Date Value Ref Range Status   09/14/2023 9 0 - 50 U/L Final     Comment:     Reference intervals for this test were updated on  6/12/2023 to more accurately reflect our healthy population. There may be differences in the flagging of prior results with similar values performed with this method. Interpretation of those prior results can be made in the context of the updated reference intervals.       Albumin   Date Value Ref Range Status   09/14/2023 4.0 3.5 - 5.2 g/dL Final   12/19/2022 2.9 (L) 3.4 - 5.0 g/dL Final     Alkaline Phosphatase   Date Value Ref Range Status   12/19/2022 142 40 - 150 U/L Final     Creatinine   Date Value Ref Range Status   09/14/2023 1.02 (H) 0.51 - 0.95 mg/dL Final   06/16/2006 1.00 0.60 - 1.30 mg/dL Final     GFR Estimate   Date Value Ref Range Status   09/14/2023 70 >60 mL/min/1.73m2 Final   06/16/2006 71 >60 mL/min/1.7m2 Final     GFR Estimate If Black   Date Value Ref Range Status   06/16/2006 86 >60 mL/min/1.7m2 Final     Creatinine Urine mg/dL   Date Value Ref Range Status   09/14/2023 173.0 mg/dL Final     Comment:     The reference ranges have not been established in urine creatinine. The results should be integrated into the clinical context for interpretation.     Sed Rate   Date Value Ref Range Status   07/24/2003 9 0 - 20 mm/h Final     Erythrocyte Sedimentation Rate   Date Value Ref Range Status   09/14/2023 16 0 - 20 mm/hr Final     CRP Inflammation   Date Value Ref Range Status   12/21/2022 187.0 (H) 0.0 - 8.0 mg/L Final     N terminal Pro BNP Inpatient   Date Value Ref Range Status   12/11/2022 392 0 - 450 pg/mL Final     Comment:     Reference range shown and results flagged as abnormal are suggested inpatient cut points for confirming diagnosis if CHF in an acute setting. Establishing a baseline value for each individual patient is useful for follow-up. An inpatient or emergency department NT-proPBNP <300 pg/mL effectively rules out acute CHF, with 99% negative predictive value.    The outpatient non-acute reference range for ruling out CHF is:  0-125 pg/mL (age 18 to less than 75)  0-450 pg/mL  (age 75 yrs and older)

## 2024-02-23 DIAGNOSIS — R76.8 POSITIVE ANA (ANTINUCLEAR ANTIBODY): ICD-10-CM

## 2024-02-23 DIAGNOSIS — R68.2 DRY MOUTH: ICD-10-CM

## 2024-02-23 RX ORDER — PILOCARPINE HYDROCHLORIDE 5 MG/1
5 TABLET, FILM COATED ORAL 3 TIMES DAILY
Qty: 270 TABLET | Refills: 0 | OUTPATIENT
Start: 2024-02-23

## 2024-02-27 ENCOUNTER — VIRTUAL VISIT (OUTPATIENT)
Dept: INTERNAL MEDICINE | Facility: CLINIC | Age: 44
End: 2024-02-27
Payer: COMMERCIAL

## 2024-02-27 ENCOUNTER — LAB (OUTPATIENT)
Dept: LAB | Facility: CLINIC | Age: 44
End: 2024-02-27
Attending: INTERNAL MEDICINE
Payer: COMMERCIAL

## 2024-02-27 DIAGNOSIS — J06.9 ACUTE URI: Primary | ICD-10-CM

## 2024-02-27 DIAGNOSIS — J06.9 ACUTE URI: ICD-10-CM

## 2024-02-27 LAB
DEPRECATED S PYO AG THROAT QL EIA: NEGATIVE
FLUAV RNA SPEC QL NAA+PROBE: NEGATIVE
FLUBV RNA RESP QL NAA+PROBE: NEGATIVE
GROUP A STREP BY PCR: NOT DETECTED
RSV RNA SPEC NAA+PROBE: NEGATIVE
SARS-COV-2 RNA RESP QL NAA+PROBE: POSITIVE

## 2024-02-27 PROCEDURE — 87651 STREP A DNA AMP PROBE: CPT

## 2024-02-27 PROCEDURE — 87637 SARSCOV2&INF A&B&RSV AMP PRB: CPT

## 2024-02-27 PROCEDURE — 99213 OFFICE O/P EST LOW 20 MIN: CPT | Mod: 95 | Performed by: INTERNAL MEDICINE

## 2024-02-27 NOTE — PROGRESS NOTES
Leah is a 43 year old who is being evaluated via a billable video visit.      How would you like to obtain your AVS? ActiveSechart  If the video visit is dropped, the invitation should be resent by: Text to cell phone: [phone]  Will anyone else be joining your video visit? No    Assessment & Plan   Acute URI  Symptoms ongoing for ~2 days. Discussed most likely is viral URI, though Strep possible. Given prevalence of influenza in community right now, recommended further testing. She was in agreement with below testing. She'll make lab-only appt at her nearest [facility] facility to obtain and I will follow-up via [employer] with results/plan. Discussed supportive cares in interim.  - Symptomatic Influenza A/B, RSV, & SARS-CoV2 PCR (COVID-19); Future  - Streptococcus A Rapid Screen w/Reflex to PCR - Clinic Collect; Future    F/u with regular PCP at regular interval or sooner MARGARET Hylton MD, MPH  [facility]  Internal Medicine    Subjective   Leah is a 43 year old who presents for a same day acute care virtual video visit with chief concern of:  URI  This is the first time I have met Leah, who typically gets care at clinics closer to her residence.    URI    History of Present Illness       Reason for visit:  Possible sinus infection  Symptom onset:  1-3 days ago  Symptoms include:  Pressure in sinus, temperature, sore throat, stuffed up, runny nose, headache, achy, dizzy, weak, nauseous  Symptom intensity:  Moderate  Symptom progression:  Worsening  Had these symptoms before:  No  What makes it worse:  Being up and about  What makes it better:  Tylenol, slightly.    She eats 2-3 servings of fruits and vegetables daily.She consumes 0 sweetened beverage(s) daily.She exercises with enough effort to increase her heart rate 10 to 19 minutes per day.  She exercises with enough effort to increase her heart rate 4 days per week.   She is taking medications regularly.     Symptoms started 2  days ago.  also sick. Daughter was ill last week.      Objective       Vitals: No vitals were obtained today due to virtual visit.    Physical Exam   GENERAL: alert and no distress. Raspy voice.  EYES: Eyes grossly normal to inspection.  No discharge or erythema, or obvious scleral/conjunctival abnormalities.  RESP: No audible wheeze, cough, or visible cyanosis.    SKIN: Visible skin clear. No significant rash, abnormal pigmentation or lesions.  NEURO: Cranial nerves grossly intact.  Mentation and speech appropriate for age.  PSYCH: Appropriate affect, tone, and pace of words    Video-Visit Details  Type of service: Video Visit   Originating Location (pt. Location): Home  Distant Location (provider location):  On-site  Platform used for Video Visit: Gungroo

## 2024-03-01 ENCOUNTER — TRANSFERRED RECORDS (OUTPATIENT)
Dept: HEALTH INFORMATION MANAGEMENT | Facility: CLINIC | Age: 44
End: 2024-03-01
Payer: COMMERCIAL

## 2024-03-05 PROBLEM — K22.70 BARRETT'S ESOPHAGUS: Status: ACTIVE | Noted: 2022-06-20

## 2024-03-07 DIAGNOSIS — F33.1 MODERATE EPISODE OF RECURRENT MAJOR DEPRESSIVE DISORDER (H): ICD-10-CM

## 2024-03-07 RX ORDER — BUPROPION HYDROCHLORIDE 150 MG/1
150 TABLET, EXTENDED RELEASE ORAL 2 TIMES DAILY
Qty: 180 TABLET | Refills: 1 | Status: SHIPPED | OUTPATIENT
Start: 2024-03-07

## 2024-03-08 ENCOUNTER — OFFICE VISIT (OUTPATIENT)
Dept: OTOLARYNGOLOGY | Facility: CLINIC | Age: 44
End: 2024-03-08
Payer: COMMERCIAL

## 2024-03-08 ENCOUNTER — PRE VISIT (OUTPATIENT)
Dept: OTOLARYNGOLOGY | Facility: CLINIC | Age: 44
End: 2024-03-08

## 2024-03-08 VITALS
HEIGHT: 72 IN | BODY MASS INDEX: 23.32 KG/M2 | SYSTOLIC BLOOD PRESSURE: 129 MMHG | DIASTOLIC BLOOD PRESSURE: 79 MMHG | OXYGEN SATURATION: 90 % | HEART RATE: 96 BPM | WEIGHT: 172.2 LBS

## 2024-03-08 DIAGNOSIS — J34.89 NASAL LESION: Primary | ICD-10-CM

## 2024-03-08 DIAGNOSIS — J34.89 NASAL OBSTRUCTION: ICD-10-CM

## 2024-03-08 PROCEDURE — 31231 NASAL ENDOSCOPY DX: CPT | Performed by: STUDENT IN AN ORGANIZED HEALTH CARE EDUCATION/TRAINING PROGRAM

## 2024-03-08 PROCEDURE — 99203 OFFICE O/P NEW LOW 30 MIN: CPT | Mod: 25 | Performed by: STUDENT IN AN ORGANIZED HEALTH CARE EDUCATION/TRAINING PROGRAM

## 2024-03-08 RX ORDER — MUPIROCIN 20 MG/G
OINTMENT TOPICAL
Qty: 22 G | Refills: 0 | Status: SHIPPED | OUTPATIENT
Start: 2024-03-08 | End: 2024-03-18

## 2024-03-08 ASSESSMENT — PAIN SCALES - GENERAL: PAINLEVEL: NO PAIN (0)

## 2024-03-08 NOTE — LETTER
3/8/2024       RE: Leah Yanez  [address] [facility] [address]     Dear Colleague,    Thank you for referring your patient, Leah Yanez, to the [facility] EAR NOSE AND THROAT CLINIC [place] at [facility]. Please see a copy of my visit note below.    [facility] - Rhinology  New Patient Visit      Encounter date:   March 8, 2024    Referring Provider:  Lillian Andrew PA-C  [address] [place] [address]    Assessment, Decision Making, and Plan:  (J34.89) Nasal lesion  (primary encounter diagnosis)  Comment:   --nasal fissure at the junction of the medial and lateral crura/apex of the nostril on the left  --low concern for malignancy, not grossly infected  Plan: IMAGESTREAM RECORDING ORDER  --bactroban x 14 days, then aquaphor or AYR gel  --if persists may consider biopsy given her underlying AI issues, but would defer that at this point given possibility of open nasal surgery    (J34.89) Nasal obstruction  Comment:   --septum reasonable position, caudal edge midline  --tall septum with narrowing of the ULC/LLC  --dynamic collapse of the lateral crura of the LLC/caudal edge of ULC with inspiration  Plan:   --discussed that referral to [facility] for possible septorhinoplasty to correct these issues    Chief Complaint: nasal obstruction, nasal lesion    History of Present Illness:   Leah Yanez is a 43 year old female who presents for consultation regarding nasal issues.    Gets lesions along the left apex of the ala  Respond to hydrogen peroxide  Becomes red, tender, swollen  Happens once every 1-2 months, occ 3-4 months  Always on the left  No bleeding  No white head/release of pus when it happens  Being worked up for CREST syndrome - +Sjogrens    Has had skin boils, that she thinks are consistent with hidradenitis suppuritiva - underarm, groin    Hiatal hernia, sleeve gastrectomy 10/2022  Complicated course, had NG tube  placed  Unable to place on the left  Always had difficulty breathing through nose, can be either sides  Mouth breathing at baseline  Strong response to having breathe rite strips    Review of systems: A 14-point review of systems has been conducted and was negative for any notable symptoms, except as dictated in the history of present illness.     Medical History:  Past Medical History:   Diagnosis Date    Acute kidney failure with tubular necrosis (H24) 12/14/2022    Anti-cardiolipin antibody positive     Griggs esophagus     Class 2 severe obesity with serious comorbidity and body mass index (BMI) of 38.0 to 38.9 in adult (H) 06/20/2022    Concussion 2016    Depressive disorder 2021    Depressive disorder, not elsewhere classified 03/01/2006    Resolved 8/07    Food impaction of esophagus, initial encounter 11/23/2021    Gastroesophageal reflux disease with esophagitis     Hiatal hernia     History of pulmonary embolism     Morbid obesity (H) 10/25/2022    Obesity     Other pneumonia, unspecified organism 11/02/2022    Pleural effusion 12/11/2022    Pneumonia of both lower lobes due to infectious organism 11/01/2022    Raynaud's syndrome     past history of admission for gangrene of one finger    Status post coronary angiogram 12/11/2022        Surgical History:   Past Surgical History:   Procedure Laterality Date    COMBINED ESOPHAGOSCOPY, GASTROSCOPY, DUODENOSCOPY (EGD), REMOVE ESOPHAGEAL STENT N/A 12/2/2022    Procedure: ESOPHAGOGASTRODUODENOSCOPY, WITH ESOPHAGEAL STENT REMOVAL and Balloon Dialation;  Surgeon: Daniel Aragon MD;  Location: [facility] OR    CV PERICARDIOCENTESIS N/A 12/11/2022    Procedure: Pericardiocentesis;  Surgeon: Sourav Sahu MD;  Location: [facility] CARDIAC CATH LAB    ESOPHAGOSCOPY, GASTROSCOPY, DUODENOSCOPY (EGD), COMBINED N/A 12/29/2021    Procedure: ESOPHAGOGASTRODUODENOSCOPY, WITH BIOPSY;  Surgeon: Esteban Rose DO;  Location: [facility] OR    ESOPHAGOSCOPY, GASTROSCOPY,  DUODENOSCOPY (EGD), COMBINED N/A 11/14/2022    Procedure: Upper endoscopy; gastric stricture dilation, interpretation of fluoroscopy nasojejunal feeding tube;  Surgeon: Daniel Aragon MD;  Location: [facility] OR    ESOPHAGOSCOPY, GASTROSCOPY, DUODENOSCOPY (EGD), COMBINED N/A 11/23/2022    Procedure: ESOPHAGOGASTRODUODENOSCOPY (EGD);  Surgeon: Daniel Aragon MD;  Location: [facility] GI    ESOPHAGOSCOPY, GASTROSCOPY, DUODENOSCOPY (EGD), COMBINED N/A 12/21/2022    Procedure: ESOPHAGOGASTRODUODENOSCOPY, WITH BIOPSY;  Surgeon: Leticia Sommer MD;  Location:  GI    LAPAROSCOPIC GASTRIC SLEEVE N/A 10/25/2022    Procedure: GASTRECTOMY, SLEEVE, LAPAROSCOPIC;  Surgeon: Daniel Aragon MD;  Location: [facility] OR    LAPAROSCOPIC HERNIORRHAPHY HIATAL N/A 10/25/2022    Procedure: HERNIORRHAPHY, HIATAL, LAPAROSCOPIC;  Surgeon: Daniel Aragon MD;  Location: [facility] OR    LAPAROSCOPY DIAGNOSTIC (GENERAL) N/A 11/4/2022    Procedure: LAPAROSCOPY, DIAGNOSTIC, BY GENERAL SURGERY, evacuation of abdominl hematoma;  Surgeon: Daniel Aragon MD;  Location: [facility] OR    PICC TRIPLE LUMEN PLACEMENT Left 11/06/2022    51cm (1cm external), Basilic vein    TONSILLECTOMY & ADENOIDECTOMY      ZZC NONSPECIFIC PROCEDURE      T&A as a child    ZZC NONSPECIFIC PROCEDURE      Tubes in ears bilaterally        Family History:  Family History   Problem Relation Age of Onset    Hypertension Mother         arthritis    Depression Mother     Anxiety Disorder Mother     Alzheimer Disease Mother     Rheumatoid Arthritis Mother     Heart Disease Father         MI, Lipids    Acute Myocardial Infarction Father     Cancer - colorectal Maternal Grandmother         arthritis    Rheumatoid Arthritis Maternal Grandmother     Hypertension Maternal Grandfather         arthritis, macular degeneration    Colon Cancer Paternal Grandmother     Alzheimer Disease Paternal Grandfather     Anesthesia Reaction No family hx of     Clotting Disorder No family hx of          Social History:   Social History     Socioeconomic History    Marital status:    Tobacco Use    Smoking status: Never    Smokeless tobacco: Never   Vaping Use    Vaping Use: Never used   Substance and Sexual Activity    Alcohol use: Yes     Comment: 1 or 2 a month or less    Drug use: No    Sexual activity: Yes     Partners: Male     Birth control/protection: None     Comment: Have never been able to get pregnant, not even with IVF.   Other Topics Concern    Parent/sibling w/ CABG, MI or angioplasty before 65F 55M? Yes     Comment: Father had heart attack due to the bend in his artery     Social Determinants of Health     Financial Resource Strain: Low Risk  (12/17/2023)    Financial Resource Strain     Within the past 12 months, have you or your family members you live with been unable to get utilities (heat, electricity) when it was really needed?: No   Food Insecurity: Low Risk  (12/17/2023)    Food Insecurity     Within the past 12 months, did you worry that your food would run out before you got money to buy more?: No     Within the past 12 months, did the food you bought just not last and you didn t have money to get more?: No   Transportation Needs: Low Risk  (12/17/2023)    Transportation Needs     Within the past 12 months, has lack of transportation kept you from medical appointments, getting your medicines, non-medical meetings or appointments, work, or from getting things that you need?: No   Interpersonal Safety: Low Risk  (12/18/2023)    Interpersonal Safety     Do you feel physically and emotionally safe where you currently live?: Yes     Within the past 12 months, have you been hit, slapped, kicked or otherwise physically hurt by someone?: No     Within the past 12 months, have you been humiliated or emotionally abused in other ways by your partner or ex-partner?: No   Housing Stability: Low Risk  (12/17/2023)    Housing Stability     Do you have housing? : Yes     Are you worried  about losing your housing?: No        Physical Exam:  Vital signs: /79 (BP Location: Right arm, Patient Position: Sitting, Cuff Size: Adult Regular)   Pulse 96   Ht 1.829 m (6')   Wt 78.1 kg (172 lb 3.2 oz)   SpO2 90%   BMI 23.35 kg/m     General Appearance: No acute distress, appropriate demeanor, conversant  Eyes: moist conjunctivae; EOMI; pupils symmetric; visual acuity grossly intact; no proptosis  Head: normocephalic; overall symmetric appearance without deformity  Face: overall symmetric without deformity  Ears: Normal appearance of external ear; external meatus normal in appearance; TMs intact without perforation bilaterally;   Nose: No external deformity; septum (midline tall) ; inferior turbinates (non hypertrophic)    --septum reasonable position, caudal edge midline  --tall septum with narrowing of the ULC/LLC  --dynamic collapse of the lateral crura of the LLC/caudal edge of ULC with    Oral Cavity/oropharynx: Normal appearance of mucosa; tongue midline; no mass or lesions; oropharynx without obvious mucosal abnormality  Neck: no palpable lymphadenopathy; thyroid without palpable nodules  Lungs: symmetric chest rise; no wheezing  CV: Good distal perfusion; normal hear rate  Extremities: No deformity  Neurologic Exam: Cranial nerves II-XII are grossly intact; no focal deficit    Procedure Note  Procedure performed: Rigid nasal endoscopy  Indication: To evaluate for sinonasal pathology not visualized on routine anterior rhinoscopy  Anesthesia: 4% topical lidocaine with 0.05% oxymetazoline  Description of procedure: A 30 degree, 3 mm rigid endoscope was inserted into bilateral nasal cavities and the nasal valves, nasal cavity, middle meatus, sphenoethmoid recess, and nasopharynx were thoroughly evaluated for evidence of obstruction, edema, purulence, polyps and/or mass/lesion.     Findings:    MM SER NP clear    The patient tolerated the procedure well without complication.       Hunter Fraser,  MD    [facility]  Rhinology  Endoscopic Skull Base Surgery  [facility]  Department of Otolaryngology - Head & Neck Surgery

## 2024-03-08 NOTE — TELEPHONE ENCOUNTER
12/17/2023     6:28 PM 1/23/2024    10:51 AM 2/27/2024    10:41 AM   PHQ   PHQ-9 Total Score 6 6 5   Q9: Thoughts of better off dead/self-harm past 2 weeks Not at all Not at all Not at all     Visit scheduled 6/19/24    Lillian Andrew PA-C

## 2024-03-08 NOTE — NURSING NOTE
Chief Complaint   Patient presents with    Consult   Blood pressure 129/79, pulse 96, height 1.829 m (6'), weight 78.1 kg (172 lb 3.2 oz), SpO2 90%, not currently breastfeeding. Mauricio Sue, EMT

## 2024-03-08 NOTE — PROGRESS NOTES
[facility] - Rhinology  New Patient Visit      Encounter date:   March 8, 2024    Referring Provider:  Lillian Andrew PA-C  [id number] [id number]    Assessment, Decision Making, and Plan:  (J34.89) Nasal lesion  (primary encounter diagnosis)  Comment:   --nasal fissure at the junction of the medial and lateral crura/apex of the nostril on the left  --low concern for malignancy, not grossly infected  Plan: IMAGESTREAM RECORDING ORDER  --bactroban x 14 days, then aquaphor or AYR gel  --if persists may consider biopsy given her underlying AI issues, but would defer that at this point given possibility of open nasal surgery    (J34.89) Nasal obstruction  Comment:   --septum reasonable position, caudal edge midline  --tall septum with narrowing of the ULC/LLC  --dynamic collapse of the lateral crura of the LLC/caudal edge of ULC with inspiration  Plan:   --discussed that referral to [facility] for possible septorhinoplasty to correct these issues    Chief Complaint: nasal obstruction, nasal lesion    History of Present Illness:   Leah Yanez is a 43 year old female who presents for consultation regarding nasal issues.    Gets lesions along the left apex of the ala  Respond to hydrogen peroxide  Becomes red, tender, swollen  Happens once every 1-2 months, occ 3-4 months  Always on the left  No bleeding  No white head/release of pus when it happens  Being worked up for CREST syndrome - +Sjogrens    Has had skin boils, that she thinks are consistent with hidradenitis suppuritiva - underarm, groin    Hiatal hernia, sleeve gastrectomy 10/2022  Complicated course, had NG tube placed  Unable to place on the left  Always had difficulty breathing through nose, can be either sides  Mouth breathing at baseline  Strong response to having breathe rite strips    Review of systems: A 14-point review of systems has been conducted and was negative for any notable symptoms, except as dictated in the  history of present illness.     Medical History:  Past Medical History:   Diagnosis Date     Acute kidney failure with tubular necrosis (H24) 12/14/2022     Anti-cardiolipin antibody positive      Griggs esophagus      Class 2 severe obesity with serious comorbidity and body mass index (BMI) of 38.0 to 38.9 in adult (H) 06/20/2022     Concussion 2016     Depressive disorder 2021     Depressive disorder, not elsewhere classified 03/01/2006    Resolved 8/07     Food impaction of esophagus, initial encounter 11/23/2021     Gastroesophageal reflux disease with esophagitis      Hiatal hernia      History of pulmonary embolism      Morbid obesity (H) 10/25/2022     Obesity      Other pneumonia, unspecified organism 11/02/2022     Pleural effusion 12/11/2022     Pneumonia of both lower lobes due to infectious organism 11/01/2022     Raynaud's syndrome     past history of admission for gangrene of one finger     Status post coronary angiogram 12/11/2022        Surgical History:   Past Surgical History:   Procedure Laterality Date     COMBINED ESOPHAGOSCOPY, GASTROSCOPY, DUODENOSCOPY (EGD), REMOVE ESOPHAGEAL STENT N/A 12/2/2022    Procedure: ESOPHAGOGASTRODUODENOSCOPY, WITH ESOPHAGEAL STENT REMOVAL and Balloon Dialation;  Surgeon: Daniel Aragon MD;  Location: [facility] OR     CV PERICARDIOCENTESIS N/A 12/11/2022    Procedure: Pericardiocentesis;  Surgeon: Sourav Sahu MD;  Location: [facility] CARDIAC CATH LAB     ESOPHAGOSCOPY, GASTROSCOPY, DUODENOSCOPY (EGD), COMBINED N/A 12/29/2021    Procedure: ESOPHAGOGASTRODUODENOSCOPY, WITH BIOPSY;  Surgeon: Esteban Rose DO;  Location: [facility]     ESOPHAGOSCOPY, GASTROSCOPY, DUODENOSCOPY (EGD), COMBINED N/A 11/14/2022    Procedure: Upper endoscopy; gastric stricture dilation, interpretation of fluoroscopy nasojejunal feeding tube;  Surgeon: Daniel Aragon MD;  Location: [facility] OR     ESOPHAGOSCOPY, GASTROSCOPY, DUODENOSCOPY (EGD), COMBINED N/A 11/23/2022    Procedure:  ESOPHAGOGASTRODUODENOSCOPY (EGD);  Surgeon: Daniel Aragon MD;  Location: [facility] GI     ESOPHAGOSCOPY, GASTROSCOPY, DUODENOSCOPY (EGD), COMBINED N/A 12/21/2022    Procedure: ESOPHAGOGASTRODUODENOSCOPY, WITH BIOPSY;  Surgeon: Leticia Sommer MD;  Location: [facility] GI     LAPAROSCOPIC GASTRIC SLEEVE N/A 10/25/2022    Procedure: GASTRECTOMY, SLEEVE, LAPAROSCOPIC;  Surgeon: Daniel Aragon MD;  Location: [facility] OR     LAPAROSCOPIC HERNIORRHAPHY HIATAL N/A 10/25/2022    Procedure: HERNIORRHAPHY, HIATAL, LAPAROSCOPIC;  Surgeon: Daniel Aragon MD;  Location: [facility] OR     LAPAROSCOPY DIAGNOSTIC (GENERAL) N/A 11/4/2022    Procedure: LAPAROSCOPY, DIAGNOSTIC, BY GENERAL SURGERY, evacuation of abdominl hematoma;  Surgeon: Daniel Aragon MD;  Location: [facility] OR     PICC TRIPLE LUMEN PLACEMENT Left 11/06/2022    51cm (1cm external), Basilic vein     TONSILLECTOMY & ADENOIDECTOMY       ZZC NONSPECIFIC PROCEDURE      T&A as a child     ZZC NONSPECIFIC PROCEDURE      Tubes in ears bilaterally        Family History:  Family History   Problem Relation Age of Onset     Hypertension Mother         arthritis     Depression Mother      Anxiety Disorder Mother      Alzheimer Disease Mother      Rheumatoid Arthritis Mother      Heart Disease Father         MI, Lipids     Acute Myocardial Infarction Father      Cancer - colorectal Maternal Grandmother         arthritis     Rheumatoid Arthritis Maternal Grandmother      Hypertension Maternal Grandfather         arthritis, macular degeneration     Colon Cancer Paternal Grandmother      Alzheimer Disease Paternal Grandfather      Anesthesia Reaction No family hx of      Clotting Disorder No family hx of         Social History:   Social History     Socioeconomic History     Marital status:    Tobacco Use     Smoking status: Never     Smokeless tobacco: Never   Vaping Use     Vaping Use: Never used   Substance and Sexual Activity     Alcohol use: Yes     Comment: 1 or 2 a  month or less     Drug use: No     Sexual activity: Yes     Partners: Male     Birth control/protection: None     Comment: Have never been able to get pregnant, not even with IVF.   Other Topics Concern     Parent/sibling w/ CABG, MI or angioplasty before 65F 55M? Yes     Comment: Father had heart attack due to the bend in his artery     Social Determinants of Health     Financial Resource Strain: Low Risk  (12/17/2023)    Financial Resource Strain      Within the past 12 months, have you or your family members you live with been unable to get utilities (heat, electricity) when it was really needed?: No   Food Insecurity: Low Risk  (12/17/2023)    Food Insecurity      Within the past 12 months, did you worry that your food would run out before you got money to buy more?: No      Within the past 12 months, did the food you bought just not last and you didn t have money to get more?: No   Transportation Needs: Low Risk  (12/17/2023)    Transportation Needs      Within the past 12 months, has lack of transportation kept you from medical appointments, getting your medicines, non-medical meetings or appointments, work, or from getting things that you need?: No   Interpersonal Safety: Low Risk  (12/18/2023)    Interpersonal Safety      Do you feel physically and emotionally safe where you currently live?: Yes      Within the past 12 months, have you been hit, slapped, kicked or otherwise physically hurt by someone?: No      Within the past 12 months, have you been humiliated or emotionally abused in other ways by your partner or ex-partner?: No   Housing Stability: Low Risk  (12/17/2023)    Housing Stability      Do you have housing? : Yes      Are you worried about losing your housing?: No        Physical Exam:  Vital signs: /79 (BP Location: Right arm, Patient Position: Sitting, Cuff Size: Adult Regular)   Pulse 96   Ht 1.829 m (6')   Wt 78.1 kg (172 lb 3.2 oz)   SpO2 90%   BMI 23.35 kg/m     General  Appearance: No acute distress, appropriate demeanor, conversant  Eyes: moist conjunctivae; EOMI; pupils symmetric; visual acuity grossly intact; no proptosis  Head: normocephalic; overall symmetric appearance without deformity  Face: overall symmetric without deformity  Ears: Normal appearance of external ear; external meatus normal in appearance; TMs intact without perforation bilaterally;   Nose: No external deformity; septum (midline tall) ; inferior turbinates (non hypertrophic)    --septum reasonable position, caudal edge midline  --tall septum with narrowing of the ULC/LLC  --dynamic collapse of the lateral crura of the LLC/caudal edge of ULC with    Oral Cavity/oropharynx: Normal appearance of mucosa; tongue midline; no mass or lesions; oropharynx without obvious mucosal abnormality  Neck: no palpable lymphadenopathy; thyroid without palpable nodules  Lungs: symmetric chest rise; no wheezing  CV: Good distal perfusion; normal hear rate  Extremities: No deformity  Neurologic Exam: Cranial nerves II-XII are grossly intact; no focal deficit    Procedure Note  Procedure performed: Rigid nasal endoscopy  Indication: To evaluate for sinonasal pathology not visualized on routine anterior rhinoscopy  Anesthesia: 4% topical lidocaine with 0.05% oxymetazoline  Description of procedure: A 30 degree, 3 mm rigid endoscope was inserted into bilateral nasal cavities and the nasal valves, nasal cavity, middle meatus, sphenoethmoid recess, and nasopharynx were thoroughly evaluated for evidence of obstruction, edema, purulence, polyps and/or mass/lesion.     Findings:    MM SER NP clear    The patient tolerated the procedure well without complication.       Hunter Fraser MD    [facility]  Rhinology  Endoscopic Skull Base Surgery  [facility]  Department of Otolaryngology - Head & Neck Surgery

## 2024-03-08 NOTE — PATIENT INSTRUCTIONS
You were seen in the ENT Clinic today by Dr. Fraser. If you have any questions or concerns after your appointment, please contact us (see below)    The following has been recommended for you based upon your appointment today:  14 days of mupirocin twice a day  Then use AYR gel 3-4 times a day to the affected area to keep it moisturized  Facial plastics referral    Please return to clinic as needed    How to Contact Us:  Send a MobileTag message to your provider. Our team will respond to you via MobileTag. Occasionally, we will need to call you to get further information.  For urgent matters (Monday-Friday), call the ENT Clinic: [phone] and speak with a call center team member - they will route your call appropriately.   If you'd like to speak directly with a nurse, please find our contact information below. We do our best to check voicemail frequently throughout the day, and will work to call you back within 1-2 days. For urgent matters, please use the general clinic phone numbers listed above.    Carol LI RN, BSN   RN Care Coordinator, ENT Clinic  [facility]  Direct: [phone]  Brooke BARRIOS LPN  Direct: [phone]

## 2024-03-22 NOTE — PROGRESS NOTES
Facial Plastic and Reconstructive Surgery Consultation    Referring Provider:   Hunter Fraser MD  [address]  [place] [address]    HPI:   I had the pleasure of seeing Leah Yanez today in clinic for consultation for nasal obstruction. Leah Yanez is a 43 year old female. She was seen by my colleague, Dr. Fraser, and given his findings she was referred here for further evaluation. Also of note, she had a nasal lesion that he evaluated and started her on Bactroban for this.     She reports that the nasal lesion has improved some since using the Bactroban ointment.  As far as her breathing, she reports left greater than right nasal obstruction for the majority of her life.  She really notices this when working out or exerting herself.  She feels like she gets no airflow through her nose.  She has used Flonase for 3 months consistently without any improvement in breathing.  She feels like her nose runs a lot.  She never had nasal trauma or broken her nose.  She is never had surgery on her nose before.  She does not get epistaxis.  She does have Sjogren's and Raynaud's, with concern for crest syndrome by her rheumatologist.  She is not on any immunosuppressive medications, only conservative medications to help with the dry mouth.  She sometimes uses Breathe Right strips when working out or hiking and this really helps her breathing.          Review Of Systems  ROS: 10 point ROS neg other than the symptoms noted above in the HPI.    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)    Moderate  episode of recurrent major depressive disorder (H)    Raised antibody titer    Premenopausal menorrhagia    Post concussion syndrome    Gastroesophageal reflux disease with esophagitis without hemorrhage    Dystrophic radiologic calcification    Bariatric surgery status    Sjogren's syndrome (H24)    Lichen sclerosus    Dry mouth    Positive LOLY (antinuclear antibody)    Anticentromere antibodies present    Calcinosis cutis    Nasal lesion     Past Surgical History:   Procedure Laterality Date    COMBINED ESOPHAGOSCOPY, GASTROSCOPY, DUODENOSCOPY (EGD), REMOVE ESOPHAGEAL STENT N/A 12/2/2022    Procedure: ESOPHAGOGASTRODUODENOSCOPY, WITH ESOPHAGEAL STENT REMOVAL and Balloon Dialation;  Surgeon: Daniel Aragon MD;  Location: [facility] OR    CV PERICARDIOCENTESIS N/A 12/11/2022    Procedure: Pericardiocentesis;  Surgeon: Sourav Sahu MD;  Location:  [facility]    ESOPHAGOSCOPY, GASTROSCOPY, DUODENOSCOPY (EGD), COMBINED N/A 12/29/2021    Procedure: ESOPHAGOGASTRODUODENOSCOPY, WITH BIOPSY;  Surgeon: Esteban Rose DO;  Location: [facility]    ESOPHAGOSCOPY, GASTROSCOPY, DUODENOSCOPY (EGD), COMBINED N/A 11/14/2022    Procedure: Upper endoscopy; gastric stricture dilation, interpretation of fluoroscopy nasojejunal feeding tube;  Surgeon: Daniel Aragon MD;  Location: [facility] OR    ESOPHAGOSCOPY, GASTROSCOPY, DUODENOSCOPY (EGD), COMBINED N/A 11/23/2022    Procedure: ESOPHAGOGASTRODUODENOSCOPY (EGD);  Surgeon: Daniel Aragon MD;  Location: [facility] GI    ESOPHAGOSCOPY, GASTROSCOPY, DUODENOSCOPY (EGD), COMBINED N/A 12/21/2022    Procedure: ESOPHAGOGASTRODUODENOSCOPY, WITH BIOPSY;  Surgeon: Leticia Sommer MD;  Location:  GI    LAPAROSCOPIC GASTRIC SLEEVE N/A 10/25/2022    Procedure: GASTRECTOMY, SLEEVE, LAPAROSCOPIC;  Surgeon: Daniel Aragon MD;  Location: [facility] OR    LAPAROSCOPIC HERNIORRHAPHY HIATAL N/A 10/25/2022    Procedure: HERNIORRHAPHY, HIATAL, LAPAROSCOPIC;  Surgeon: Margo  Daniel Torres MD;  Location: [facility] OR    LAPAROSCOPY DIAGNOSTIC (GENERAL) N/A 11/4/2022    Procedure: LAPAROSCOPY, DIAGNOSTIC, BY GENERAL SURGERY, evacuation of abdominl hematoma;  Surgeon: Daniel Aragon MD;  Location: [facility] OR    PICC TRIPLE LUMEN PLACEMENT Left 11/06/2022    51cm (1cm external), Basilic vein    TONSILLECTOMY & ADENOIDECTOMY      ZZC NONSPECIFIC PROCEDURE      T&A as a child    ZZC NONSPECIFIC PROCEDURE      Tubes in ears bilaterally     Current Outpatient Medications   Medication Sig Dispense Refill    buPROPion (WELLBUTRIN SR) 150 MG 12 hr tablet TAKE 1 TABLET(150 MG) BY MOUTH TWICE DAILY 180 tablet 1    Multiple Vitamins-Iron (MULTIVITAMIN/IRON PO) Take 1 tablet by mouth daily      omega 3 1000 MG CAPS       pantoprazole (PROTONIX) 40 MG EC tablet Take 1 tablet (40 mg) by mouth daily If still having GERD symptoms, can increase to 40 mg twice daily 90 tablet 3    pilocarpine (SALAGEN) 5 MG tablet Take 1 tablet (5 mg) by mouth 3 times daily for 90 days 270 tablet 0     Azithromycin and Erythromycin  Social History     Socioeconomic History    Marital status:      Spouse name: Not on file    Number of children: Not on file    Years of education: Not on file    Highest education level: Not on file   Occupational History    Not on file   Tobacco Use    Smoking status: Never    Smokeless tobacco: Never   Vaping Use    Vaping Use: Never used   Substance and Sexual Activity    Alcohol use: Yes     Comment: 1 or 2 a month or less    Drug use: No    Sexual activity: Yes     Partners: Male     Birth control/protection: None     Comment: Have never been able to get pregnant, not even with IVF.   Other Topics Concern    Parent/sibling w/ CABG, MI or angioplasty before 65F 55M? Yes     Comment: Father had heart attack due to the bend in his artery   Social History Narrative    Not on file     Social Determinants of Health     Financial Resource Strain: Low Risk  (12/17/2023)    Financial Resource  Strain     Within the past 12 months, have you or your family members you live with been unable to get utilities (heat, electricity) when it was really needed?: No   Food Insecurity: Low Risk  (12/17/2023)    Food Insecurity     Within the past 12 months, did you worry that your food would run out before you got money to buy more?: No     Within the past 12 months, did the food you bought just not last and you didn t have money to get more?: No   Transportation Needs: Low Risk  (12/17/2023)    Transportation Needs     Within the past 12 months, has lack of transportation kept you from medical appointments, getting your medicines, non-medical meetings or appointments, work, or from getting things that you need?: No   Physical Activity: Not on file   Stress: Not on file   Social Connections: Not on file   Interpersonal Safety: Low Risk  (12/18/2023)    Interpersonal Safety     Do you feel physically and emotionally safe where you currently live?: Yes     Within the past 12 months, have you been hit, slapped, kicked or otherwise physically hurt by someone?: No     Within the past 12 months, have you been humiliated or emotionally abused in other ways by your partner or ex-partner?: No   Housing Stability: Low Risk  (12/17/2023)    Housing Stability     Do you have housing? : Yes     Are you worried about losing your housing?: No     Family History   Problem Relation Age of Onset    Hypertension Mother         arthritis    Depression Mother     Anxiety Disorder Mother     Alzheimer Disease Mother     Rheumatoid Arthritis Mother     Heart Disease Father         MI, Lipids    Acute Myocardial Infarction Father     Cancer - colorectal Maternal Grandmother         arthritis    Rheumatoid Arthritis Maternal Grandmother     Hypertension Maternal Grandfather         arthritis, macular degeneration    Colon Cancer Paternal Grandmother     Alzheimer Disease Paternal Grandfather     Anesthesia Reaction No family hx of      Clotting Disorder No family hx of        PE:  Alert and Oriented, Answering Questions Appropriately  Atraumatic, Normocephalic, Face Symmetric  Skin: Ernst 2  Facial Nerve Intact and facial movement symmetric  EOMI  Nasal Exam: Nasal bones are relatively straight.  She has short nasal bones.  She has obvious narrowing of her mid vault bilaterally.  She has both external and internal nasal valve collapse on inspiration.  She has significant improvement in breathing bilaterally with the modified Coosa maneuver.  Anterior rhinoscopy reveals pink and healthy mucosa.  See procedure note below.  Chin: Normal   Lips/Teeth/Toungue/Gums: Lips intact  Neck: Trachea midline  Chest: No wheezing, cyanosis, or stridor  Card: not diaphoretic  Neuro/Psych: CN's 2-12 intact, Moves all extremities, ambulation in intact, positive affect, no notable muscle weakness          PROCEDURE:Nasal endoscopy    Indication: Nasal Endoscopy is performed to provide a more thorough evaluation of the nasal airway than seen on standard nasal speculum exam.    Performed by: Dr. Laurie Acevedo MD  Written consent was obtained prior to procedure.     Findings are as follows:   Her septum is relatively straight.  She does have a wide maxillary crest versus septal spur bilaterally near the floor of her nose.  She has bilateral inferior turbinate hypertrophy.      IMPRESSION/PLAN: Leah Yanez is a 43 year old female presenting for evaluation of nasal obstruction.  On examination, she has mid vault narrowing bilaterally with bilateral inferior septal spurs, inferior turban hypertrophy, and both internal and external nasal valve collapse.  Septoplasty alone would not improve her breathing as this is not her main issue.  The factors above cause a near 100% obstruction on both sides.  In order to improve her breathing, she would need an open septorhinoplasty approach with removal of the inferior septal spurs, internal nasal valve repair with   grafts, and external nasal valve repair with lateral crural strut grafts.  I would use a caudal septal extension graft to resupported the nose.  We discussed that given her Sjogren's, I would like to limit her septal dissection.  I would limit the septal dissection to just removing the spurs and then I would use cadaver rib cartilage for grafting.    I discussed that the biggest risk for her has to do with her Sjogren's and the risk of a septal perforation or poor healing after the surgery.  I would like her to work on her nasal hygiene including adding in daily nasal saline spray and nasal saline gel.  This will optimize the lining for a possible surgery.  She is not sure that she wants to go forward with the surgery and is going to talk it over with her  and possibly bring him back in to discuss further.  I think this is reasonable.    Risks and benefits were discussed including but not limited to bleeding, infection, asymmetry, persistent or worsened breathing, septal perforation, numbness/tingling, need for additional procedures.     We discussed that if the lesion inside the left part of her nose does not improve with the Bactroban, we could certainly biopsy this in the future or at the time of surgery if she decides to go forward.      Photodocumentation was obtained.     I spent a total of 60 minutes in the care of patient Leah Yanez during today's office visit. This time includes reviewing the patient's chart and prior history, obtaining a history, performing an examination and evaluation and counseling the patient. This time also includes ordering mediations or tests necessary in addition to communication to other member's of the patient's health care team. Time spent in documentation and care coordination is included.       Open septorhinoplasty  Limited septoplasty  Bilateral lateral crural strut grafts  Bilateral  grafts   Inferior turbinate reduction  Cadaver rib cartilage    4 hours

## 2024-03-25 ENCOUNTER — OFFICE VISIT (OUTPATIENT)
Dept: OTOLARYNGOLOGY | Facility: CLINIC | Age: 44
End: 2024-03-25
Payer: COMMERCIAL

## 2024-03-25 DIAGNOSIS — J34.89 NASAL VALVE STENOSIS: ICD-10-CM

## 2024-03-25 DIAGNOSIS — J34.89 NASAL OBSTRUCTION: Primary | ICD-10-CM

## 2024-03-25 DIAGNOSIS — J34.89 NASAL LESION: ICD-10-CM

## 2024-03-25 DIAGNOSIS — J34.2 DEVIATED NASAL SEPTUM: ICD-10-CM

## 2024-03-25 DIAGNOSIS — J34.3 NASAL TURBINATE HYPERTROPHY: ICD-10-CM

## 2024-03-25 PROCEDURE — 99205 OFFICE O/P NEW HI 60 MIN: CPT | Mod: 25 | Performed by: STUDENT IN AN ORGANIZED HEALTH CARE EDUCATION/TRAINING PROGRAM

## 2024-03-25 PROCEDURE — 31231 NASAL ENDOSCOPY DX: CPT | Performed by: STUDENT IN AN ORGANIZED HEALTH CARE EDUCATION/TRAINING PROGRAM

## 2024-03-25 NOTE — PROGRESS NOTES
Updated photodocumentation obtained (see media tab).    Pt will be reaching out if she would like to proceed with surgery.    Nandini Celaya RN  3/25/2024 2:36 PM

## 2024-03-25 NOTE — NURSING NOTE
Leah Yanez's chief complaint for this visit includes:  Chief Complaint   Patient presents with    Consult     Nasal lesion, inside left nostril past year, on and off.   Nasal obstruction, runny nose Entire life. Left side worse. Flonase for about 6 months with no help.      PCP: Lillian Andrew    Referring Provider:  Hunter Fraser MD  [address] [address]    There were no vitals taken for this visit.

## 2024-04-11 ENCOUNTER — ANCILLARY PROCEDURE (OUTPATIENT)
Dept: GENERAL RADIOLOGY | Facility: CLINIC | Age: 44
End: 2024-04-11
Attending: INTERNAL MEDICINE
Payer: COMMERCIAL

## 2024-04-11 DIAGNOSIS — L94.2 CALCINOSIS CUTIS: ICD-10-CM

## 2024-04-11 PROCEDURE — 73130 X-RAY EXAM OF HAND: CPT | Mod: TC | Performed by: RADIOLOGY

## 2024-05-14 ENCOUNTER — OFFICE VISIT (OUTPATIENT)
Dept: RHEUMATOLOGY | Facility: CLINIC | Age: 44
End: 2024-05-14
Payer: COMMERCIAL

## 2024-05-14 VITALS — BODY MASS INDEX: 24.02 KG/M2 | SYSTOLIC BLOOD PRESSURE: 104 MMHG | DIASTOLIC BLOOD PRESSURE: 70 MMHG | WEIGHT: 177.1 LBS

## 2024-05-14 DIAGNOSIS — R76.8 ANTICENTROMERE ANTIBODIES PRESENT: ICD-10-CM

## 2024-05-14 DIAGNOSIS — M35.00 SJOGREN'S SYNDROME, WITH UNSPECIFIED ORGAN INVOLVEMENT (H): Primary | ICD-10-CM

## 2024-05-14 DIAGNOSIS — L94.2 CALCINOSIS CUTIS: ICD-10-CM

## 2024-05-14 DIAGNOSIS — R76.8 POSITIVE ANA (ANTINUCLEAR ANTIBODY): ICD-10-CM

## 2024-05-14 DIAGNOSIS — I73.00 RAYNAUD'S PHENOMENON WITHOUT GANGRENE: ICD-10-CM

## 2024-05-14 PROCEDURE — 99214 OFFICE O/P EST MOD 30 MIN: CPT | Performed by: INTERNAL MEDICINE

## 2024-05-14 PROCEDURE — G2211 COMPLEX E/M VISIT ADD ON: HCPCS | Performed by: INTERNAL MEDICINE

## 2024-05-14 RX ORDER — PILOCARPINE HYDROCHLORIDE 5 MG/1
5 TABLET, FILM COATED ORAL 3 TIMES DAILY
Qty: 270 TABLET | Refills: 1 | Status: SHIPPED | OUTPATIENT
Start: 2024-05-14 | End: 2024-08-12

## 2024-05-14 NOTE — PROGRESS NOTES
"      Rheumatology follow-up visit note     Leah is a 44 year old female presents today for follow-up.    Leah was seen today for recheck.    Diagnoses and all orders for this visit:    Sjogren's syndrome, with unspecified organ involvement (H24)  -     pilocarpine (SALAGEN) 5 MG tablet; Take 1 tablet (5 mg) by mouth 3 times daily for 90 days    Raynaud's phenomenon without gangrene    Calcinosis cutis    Positive LOLY (antinuclear antibody)    Anticentromere antibodies present        This patient with Sjogren's, and Raynaud's along with reflux symptoms calcinosis cutis anticentromere  antibody with little evidence to suggest pulmonary hypertension may well have an overlap syndrome.  She has had an episode of pericardial pleural effusion in the past 2022 which has not recurred since.  She has found pilocarpine to be very helpful.  We discussed hydroxychloroquine today.  Will continue to monitor.    Follow up in 3 months.    HPI    Leah Yanez is a 44 year old female  non-smoker is here for follow-up of Sjogren's manifested by sicca symptoms positive LOLY, she has had longstanding Raynaud's, she has calcinosis cutis, she has anticentromere antibody, there is been no evidence of pulmonary hypertension as she has followed up with cardiology and variety of workup undertaken when she had presented with \"viral\" pericarditis/[id number] nucleated cells 92% neutrophils,./Tamponade and pleural effusion, which may well have been brought of connective tissue disease.  On her previous visit she was started on pilocarpine and that has \"transformed\" her life.  Her Raynaud's continue to be bothersome intermittently even in the warmer weather when she is exposed to air conditioning.  Has not been able to tolerate amlodipine because of dizziness almost blacked out.  While the dryness of mouth improved significantly she had blurry vision.  She is taking pilocarpine twice daily.      Following is the excerpt from a " "previous note: She was recently found to have subcutaneous calcified lesions on the forearm. She has positive anticentromere antibodies. She has reflux symptoms for which she is on Protonix well-controlled . She wondered if she might have CR EST as she googled. She has noted no skin thickening/tightening. There is no shortness of breath. She has noted no telangiectasias. Her joint symptoms so far have not reached the level of inflammatory arthropathy. She has morning stiffness of no more than 5 minutes.  She found pilocarpine was very helpful for her xerostomia.  While she was in the usual and recently had a \"rashiness\" it had improved so much despite the ample sunlight there.  And of course her Raynaud's did not trouble her.    DETAILED EXAMINATION  05/14/24  :    Vitals:    05/14/24 0943   BP: 104/70   BP Location: Right arm   Patient Position: Sitting   Cuff Size: Adult Regular   Weight: 80.3 kg (177 lb 1.6 oz)     Alert oriented. Head including the face is examined for malar rash, heliotropes, scarring, lupus pernio. Eyes examined for redness such as in episcleritis/scleritis, periorbital lesions.   Neck/ Face examined for parotid gland swelling, range of motion of neck.  Left upper and lower and right upper and lower extremities examined for tenderness, swelling, warmth of the appendicular joints, range of motion, edema, rash.  Some of the important findings included: she does not have evidence of synovitis in the palpable joints of the upper extremities.  No significant deformities of the digits.  no Heberden nodes.  Range of motion of the shoulders  show full abduction.  No JLT effusion or warmth of the knees.  she does not have dactylitis of the digits.  There is no sclerodactyly.  There is no rash on her face.  She has subtle pitting of some of the digits and visit.     Patient Active Problem List    Diagnosis Date Noted    Nasal lesion 03/08/2024     Priority: Medium    Dry mouth 02/14/2024     Priority: " "Medium    Positive LOLY (antinuclear antibody) 02/14/2024     Priority: Medium    Anticentromere antibodies present 02/14/2024     Priority: Medium    Calcinosis cutis 02/14/2024     Priority: Medium    Sjogren's syndrome (H24) 12/18/2023     Priority: Medium     Follows with rheumatology      Lichen sclerosus 12/18/2023     Priority: Medium    Bariatric surgery status 07/27/2023     Priority: Medium     Sleeve gastrectomy 10/2022      Dystrophic radiologic calcification 06/06/2023     Priority: Medium     Noted on right forearm x-ray 6/5/23.      Gastroesophageal reflux disease with esophagitis without hemorrhage 12/21/2022     Priority: Medium    Premenopausal menorrhagia 12/12/2022     Priority: Medium    Pericardial effusion 12/11/2022     Priority: Medium    Status post coronary angiogram 12/11/2022     Priority: Medium    Anti-cardiolipin antibody positive 06/20/2022     Priority: Medium    Hiatal hernia 06/20/2022     Priority: Medium    Griggs's esophagus 06/20/2022     Priority: Medium     [facility] 3/2/24:  Long segment of Griggs's esophagus without dysplasia. Repeat EGD 2 years. Will stay on at least once daily PPI indefinitely.  Currently on pantoprazole 40 mg BID  EGD due 2/2026      Diaphragmatic hernia without obstruction or gangrene 06/20/2022     Priority: Medium    Raised antibody titer 06/20/2022     Priority: Medium    Moderate episode of recurrent major depressive disorder (H) 12/10/2021     Priority: Medium    Esophageal dysphagia 11/23/2021     Priority: Medium    Atypical squamous cells of undetermined significance (ASCUS) on Papanicolaou smear of cervix 02/19/2019     Priority: Medium     03/2015 LSIL/HPV Negative  06/08/2017 ASCUS/HPV+  08/05/2017 [place]: ASHLEY I   01/2018 HPV+  01/2018 [place]: \"Negative\"  02/08/2019 ASCUS/HPV+  02/20/2019 [place]: Benign  5/18/2021:  NIL/HPV Positive  7/7/21 [place]:normal  8/29/2022: NILM, HPV negative    Plan: repeat cotest in 3 years (8/2025)        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  Aug 2014, was on combined oral contraceptives      Raynaud's phenomenon 07/22/2014     Priority: Medium     Past Surgical History:   Procedure Laterality Date    COMBINED ESOPHAGOSCOPY, GASTROSCOPY, DUODENOSCOPY (EGD), REMOVE ESOPHAGEAL STENT N/A 12/2/2022    Procedure: ESOPHAGOGASTRODUODENOSCOPY, WITH ESOPHAGEAL STENT REMOVAL and Balloon Dialation;  Surgeon: Daniel Aragon MD;  Location: [facility] OR    CV PERICARDIOCENTESIS N/A 12/11/2022    Procedure: Pericardiocentesis;  Surgeon: Sourav Sahu MD;  Location:  [facility]    ESOPHAGOSCOPY, GASTROSCOPY, DUODENOSCOPY (EGD), COMBINED N/A 12/29/2021    Procedure: ESOPHAGOGASTRODUODENOSCOPY, WITH BIOPSY;  Surgeon: Esteban Rose DO;  [place] [facility] [place]    ESOPHAGOSCOPY, GASTROSCOPY, DUODENOSCOPY (EGD), COMBINED N/A 11/14/2022    Procedure: Upper endoscopy; gastric stricture dilation, interpretation of fluoroscopy nasojejunal feeding tube;  Surgeon: Daniel Aragon MD;  Location: [facility] OR    ESOPHAGOSCOPY, GASTROSCOPY, DUODENOSCOPY (EGD), COMBINED N/A 11/23/2022    Procedure: ESOPHAGOGASTRODUODENOSCOPY (EGD);  Surgeon: Daniel Aragon MD;  Location:  GI    ESOPHAGOSCOPY, GASTROSCOPY, DUODENOSCOPY (EGD), COMBINED N/A 12/21/2022    Procedure: ESOPHAGOGASTRODUODENOSCOPY, WITH BIOPSY;  Surgeon: Leticia Sommer MD;  Location: [facility]    LAPAROSCOPIC GASTRIC SLEEVE N/A 10/25/2022    Procedure: GASTRECTOMY, SLEEVE, LAPAROSCOPIC;  Surgeon: Daniel Aragon MD;  Location: [facility] OR    LAPAROSCOPIC HERNIORRHAPHY HIATAL N/A 10/25/2022    Procedure: HERNIORRHAPHY, HIATAL, LAPAROSCOPIC;  Surgeon: Daniel Aragon MD;  Location: [facility] OR    LAPAROSCOPY DIAGNOSTIC (GENERAL) N/A 11/4/2022     "Procedure: LAPAROSCOPY, DIAGNOSTIC, BY GENERAL SURGERY, evacuation of abdominl hematoma;  Surgeon: Daniel Aragon MD;  Location: [facility] OR    PICC TRIPLE LUMEN PLACEMENT Left 11/06/2022    51cm (1cm external), Basilic vein    TONSILLECTOMY & ADENOIDECTOMY      ZZ NONSPECIFIC PROCEDURE      T&A as a child    ZZ NONSPECIFIC PROCEDURE      Tubes in ears bilaterally      Past Medical History:   Diagnosis Date    Acute kidney failure with tubular necrosis (H24) 12/14/2022    Anti-cardiolipin antibody positive     Griggs esophagus     Class 2 severe obesity with serious comorbidity and body mass index (BMI) of 38.0 to 38.9 in adult (H) 06/20/2022    Concussion 2016    Depressive disorder 2021    Depressive disorder, not elsewhere classified 03/01/2006    Resolved 8/07    Food impaction of esophagus, initial encounter 11/23/2021    Gastroesophageal reflux disease with esophagitis     Hiatal hernia     History of pulmonary embolism     Morbid obesity (H) 10/25/2022    Obesity     Other pneumonia, unspecified organism 11/02/2022    Pleural effusion 12/11/2022    Pneumonia of both lower lobes due to infectious organism 11/01/2022    Raynaud's syndrome     past history of admission for gangrene of one finger    Status post coronary angiogram 12/11/2022     Allergies   Allergen Reactions    Azithromycin     Erythromycin GI Disturbance     When \"very young\"     Current Outpatient Medications   Medication Sig Dispense Refill    buPROPion (WELLBUTRIN SR) 150 MG 12 hr tablet TAKE 1 TABLET(150 MG) BY MOUTH TWICE DAILY 180 tablet 1    Multiple Vitamins-Iron (MULTIVITAMIN/IRON PO) Take 1 tablet by mouth daily      omega 3 1000 MG CAPS       pantoprazole (PROTONIX) 40 MG EC tablet Take 1 tablet (40 mg) by mouth daily If still having GERD symptoms, can increase to 40 mg twice daily 90 tablet 3    pilocarpine (SALAGEN) 5 MG tablet Take 1 tablet (5 mg) by mouth 3 times daily for 90 days 270 tablet 0     family history includes Acute " Myocardial Infarction in her father; Alzheimer Disease in her mother and paternal grandfather; Anxiety Disorder in her mother; Cancer - colorectal in her maternal grandmother; Colon Cancer in her paternal grandmother; Depression in her mother; Heart Disease in her father; Hypertension in her maternal grandfather and mother; Rheumatoid Arthritis in her maternal grandmother and mother.  Social Connections: Unknown (12/27/2021)    [email or web address]    Social Connections     Frequency of Communication with Friends and Family: Not on file          WBC   Date Value Ref Range Status   06/26/2006 5.0 4.0 - 11.0 10e9/L Final     WBC Count   Date Value Ref Range Status   09/14/2023 5.8 4.0 - 11.0 10e3/uL Final     RBC Count   Date Value Ref Range Status   09/14/2023 4.45 3.80 - 5.20 10e6/uL Final   06/26/2006 5.30 (H) 3.8 - 5.2 10e12/L Final     Hemoglobin   Date Value Ref Range Status   09/14/2023 13.1 11.7 - 15.7 g/dL Final   06/26/2006 14.9 11.7 - 15.7 g/dL Final     Hematocrit   Date Value Ref Range Status   09/14/2023 39.7 35.0 - 47.0 % Final   06/26/2006 44.6 35.0 - 47.0 % Final     MCV   Date Value Ref Range Status   09/14/2023 89 78 - 100 fL Final   06/26/2006 84 78 - 100 fl Final     MCH   Date Value Ref Range Status   09/14/2023 29.4 26.5 - 33.0 pg Final   06/26/2006 28.0 26.5 - 33.0 pg Final     Platelet Count   Date Value Ref Range Status   09/14/2023 228 150 - 450 10e3/uL Final   06/26/2006 190 150 - 450 10e9/L Final     % Lymphocytes   Date Value Ref Range Status   09/14/2023 25 % Final   06/26/2006 35 20 - 48 % Final     AST   Date Value Ref Range Status   12/19/2022 25 0 - 45 U/L Final     ALT   Date Value Ref Range Status   09/14/2023 9 0 - 50 U/L Final     Comment:     Reference intervals for this test were updated on 6/12/2023 to more accurately reflect our healthy population. There may be differences in the flagging of  prior results with similar values performed with this method. Interpretation of those prior results can be made in the context of the updated reference intervals.       Albumin   Date Value Ref Range Status   09/14/2023 4.0 3.5 - 5.2 g/dL Final   12/19/2022 2.9 (L) 3.4 - 5.0 g/dL Final     Alkaline Phosphatase   Date Value Ref Range Status   12/19/2022 142 40 - 150 U/L Final     Creatinine   Date Value Ref Range Status   09/14/2023 1.02 (H) 0.51 - 0.95 mg/dL Final   06/16/2006 1.00 0.60 - 1.30 mg/dL Final     GFR Estimate   Date Value Ref Range Status   09/14/2023 70 >60 mL/min/1.73m2 Final   06/16/2006 71 >60 mL/min/1.7m2 Final     GFR Estimate If Black   Date Value Ref Range Status   06/16/2006 86 >60 mL/min/1.7m2 Final     Creatinine Urine mg/dL   Date Value Ref Range Status   09/14/2023 173.0 mg/dL Final     Comment:     The reference ranges have not been established in urine creatinine. The results should be integrated into the clinical context for interpretation.     Sed Rate   Date Value Ref Range Status   07/24/2003 9 0 - 20 mm/h Final     Erythrocyte Sedimentation Rate   Date Value Ref Range Status   09/14/2023 16 0 - 20 mm/hr Final     CRP Inflammation   Date Value Ref Range Status   12/21/2022 187.0 (H) 0.0 - 8.0 mg/L Final     N terminal Pro BNP Inpatient   Date Value Ref Range Status   12/11/2022 392 0 - 450 pg/mL Final     Comment:     Reference range shown and results flagged as abnormal are suggested inpatient cut points for confirming diagnosis if CHF in an acute setting. Establishing a baseline value for each individual patient is useful for follow-up. An inpatient or emergency department NT-proPBNP <300 pg/mL effectively rules out acute CHF, with 99% negative predictive value.    The outpatient non-acute reference range for ruling out CHF is:  0-125 pg/mL (age 18 to less than 75)  0-450 pg/mL (age 75 yrs and older)

## 2024-06-20 ENCOUNTER — OFFICE VISIT (OUTPATIENT)
Dept: FAMILY MEDICINE | Facility: CLINIC | Age: 44
End: 2024-06-20
Payer: COMMERCIAL

## 2024-06-20 ENCOUNTER — TELEPHONE (OUTPATIENT)
Dept: FAMILY MEDICINE | Facility: CLINIC | Age: 44
End: 2024-06-20

## 2024-06-20 VITALS
HEART RATE: 64 BPM | RESPIRATION RATE: 12 BRPM | BODY MASS INDEX: 24.26 KG/M2 | TEMPERATURE: 97.5 F | SYSTOLIC BLOOD PRESSURE: 109 MMHG | WEIGHT: 178.9 LBS | DIASTOLIC BLOOD PRESSURE: 71 MMHG | OXYGEN SATURATION: 97 %

## 2024-06-20 DIAGNOSIS — L30.4 INTERTRIGO: Primary | ICD-10-CM

## 2024-06-20 DIAGNOSIS — K13.70 LESION OF MOUTH: ICD-10-CM

## 2024-06-20 PROCEDURE — 99214 OFFICE O/P EST MOD 30 MIN: CPT | Performed by: FAMILY MEDICINE

## 2024-06-20 RX ORDER — NYSTATIN 100000 [USP'U]/G
POWDER TOPICAL 2 TIMES DAILY
Qty: 60 G | Refills: 1 | Status: SHIPPED | OUTPATIENT
Start: 2024-06-20

## 2024-06-20 NOTE — CONFIDENTIAL NOTE
Patient Quality Outreach    Patient is due for the following:   IVD  -  LDL (Fasting)    Next Steps:   Schedule a office visit for STATIN    Type of outreach:    Sent [facility] message.      Questions for provider review:    None           Robert Guadalupe MA

## 2024-06-20 NOTE — PROGRESS NOTES
Assessment & Plan   Encounter Diagnoses   Name Primary?     Intertrigo Yes     Lesion of mouth     - I believe she is describing intertrigo underneath her breast.  She has treated this with a topical emollient and keeping the space dry.  If she has additional problems she can try nystatin powder.  - The lesion in her mouth is a bit less clear.  It is not an aphthous ulcer in appearance today is the tissue appears to be intact.  It is mildly erythematous and full.  There is no associated pain.  I am inclined to think that this is a transient lesion at this time and have recommended that she continue to monitor.  She does have conditions such as Sjogren's syndrome which I believe are noncontributory.  If she continues to have issues, she can take a picture and send it.  Will either consider referral to oral surgery/dentistry/ENT or reach out to rheumatology on her behalf.    Ifeoma Lynn is a 44 year old, presenting for the following health issues:  Mass (Lump in cheek x a couple of days)        6/20/2024     9:42 AM   Additional Questions   Roomed by xl     History of Present Illness       Reason for visit:  Lump in cheek/mouth  Symptom onset:  1-3 days ago    She eats 2-3 servings of fruits and vegetables daily.She consumes 0 sweetened beverage(s) daily.She exercises with enough effort to increase her heart rate 10 to 19 minutes per day.  She exercises with enough effort to increase her heart rate 4 days per week.   She is taking medications regularly.          Objective    /71 (BP Location: Left arm, Patient Position: Sitting, Cuff Size: Adult Regular)   Pulse 64   Temp 97.5  F (36.4  C) (Oral)   Resp 12   Wt 81.1 kg (178 lb 14.4 oz)   LMP  (LMP Unknown)   SpO2 97%   BMI 24.26 kg/m    Body mass index is 24.26 kg/m .  Physical Exam  Nursing note reviewed.   Constitutional:       General: She is not in acute distress.     Appearance: Normal appearance. She is not ill-appearing.   HENT:      Head:  Normocephalic and atraumatic.      Mouth/Throat:      Comments: There is an area of erythema and fullness but without ulceration or skin breakage in the right buccal mucosa/oral vestibule.  The gumline itself is spared.  No drainage.    Eyes:      Extraocular Movements: Extraocular movements intact.      Conjunctiva/sclera: Conjunctivae normal.   Pulmonary:      Effort: Pulmonary effort is normal.   Skin:     Comments: There are areas of what appears to be peeling skin and erythema in the inframammary folds bilaterally.  No drainage.  No odor.  Appears dry.   Neurological:      Mental Status: She is alert and oriented to person, place, and time.   Psychiatric:         Attention and Perception: Attention normal.         Mood and Affect: Mood normal.         Speech: Speech normal.         Thought Content: Thought content normal.                  Signed Electronically by: Elian Hernandez MD

## 2024-07-31 ENCOUNTER — OFFICE VISIT (OUTPATIENT)
Dept: FAMILY MEDICINE | Facility: CLINIC | Age: 44
End: 2024-07-31
Payer: COMMERCIAL

## 2024-07-31 VITALS
RESPIRATION RATE: 14 BRPM | TEMPERATURE: 97.7 F | BODY MASS INDEX: 23.83 KG/M2 | OXYGEN SATURATION: 97 % | HEART RATE: 72 BPM | WEIGHT: 175.9 LBS | SYSTOLIC BLOOD PRESSURE: 108 MMHG | DIASTOLIC BLOOD PRESSURE: 75 MMHG | HEIGHT: 72 IN

## 2024-07-31 DIAGNOSIS — F41.9 ANXIETY: Primary | ICD-10-CM

## 2024-07-31 DIAGNOSIS — R41.840 INATTENTION: ICD-10-CM

## 2024-07-31 DIAGNOSIS — F33.1 MODERATE EPISODE OF RECURRENT MAJOR DEPRESSIVE DISORDER (H): ICD-10-CM

## 2024-07-31 PROCEDURE — G2211 COMPLEX E/M VISIT ADD ON: HCPCS | Performed by: PHYSICIAN ASSISTANT

## 2024-07-31 PROCEDURE — 99214 OFFICE O/P EST MOD 30 MIN: CPT | Performed by: PHYSICIAN ASSISTANT

## 2024-07-31 RX ORDER — BUPROPION HYDROCHLORIDE 200 MG/1
200 TABLET, EXTENDED RELEASE ORAL 2 TIMES DAILY
Qty: 60 TABLET | Refills: 1 | Status: SHIPPED | OUTPATIENT
Start: 2024-07-31

## 2024-07-31 RX ORDER — HYDROXYZINE HYDROCHLORIDE 25 MG/1
25-50 TABLET, FILM COATED ORAL 3 TIMES DAILY PRN
Qty: 30 TABLET | Refills: 1 | Status: SHIPPED | OUTPATIENT
Start: 2024-07-31

## 2024-07-31 ASSESSMENT — ANXIETY QUESTIONNAIRES
2. NOT BEING ABLE TO STOP OR CONTROL WORRYING: SEVERAL DAYS
5. BEING SO RESTLESS THAT IT IS HARD TO SIT STILL: NOT AT ALL
1. FEELING NERVOUS, ANXIOUS, OR ON EDGE: SEVERAL DAYS
6. BECOMING EASILY ANNOYED OR IRRITABLE: NOT AT ALL
GAD7 TOTAL SCORE: 2
3. WORRYING TOO MUCH ABOUT DIFFERENT THINGS: NOT AT ALL
7. FEELING AFRAID AS IF SOMETHING AWFUL MIGHT HAPPEN: NOT AT ALL
IF YOU CHECKED OFF ANY PROBLEMS ON THIS QUESTIONNAIRE, HOW DIFFICULT HAVE THESE PROBLEMS MADE IT FOR YOU TO DO YOUR WORK, TAKE CARE OF THINGS AT HOME, OR GET ALONG WITH OTHER PEOPLE: SOMEWHAT DIFFICULT
GAD7 TOTAL SCORE: 2

## 2024-07-31 ASSESSMENT — PAIN SCALES - GENERAL: PAINLEVEL: NO PAIN (0)

## 2024-07-31 ASSESSMENT — PATIENT HEALTH QUESTIONNAIRE - PHQ9
5. POOR APPETITE OR OVEREATING: NOT AT ALL
SUM OF ALL RESPONSES TO PHQ QUESTIONS 1-9: 8

## 2024-07-31 NOTE — PATIENT INSTRUCTIONS
You can try taking hydroxyzine 1-2 tabs up to 3 times per day as needed for more acute anxiety/anxiety attacks. This can potentially cause a side effect of dry mouth so monitor this. Also can cause drowsiness so do not take while driving or with alcohol.    Plan for ADHD evaluation. While waiting for this evaluation, we will increase wellbutrin to 200 mg twice daily. This may help if there is also ADHD. Follow-up in 1 month for virtual visit med check, earlier if needed.

## 2024-07-31 NOTE — PROGRESS NOTES
Assessment & Plan     Anxiety  Moderate episode of recurrent major depressive disorder (H)  PHQ score mildly elevated at 8 today, SAMMY score low at 2. She feels lack of motivation. Feels more of her symptoms may be related to untreated ADHD instead of anxiety. Discussed options and will try increasing wellbutrin as this may also help if there is underlying ADHD. She will also follow-up for ADHD testing and we can discuss management of ADHD if diagnosed. She had significant health issues in the last few years and sometimes will have moments of panic/increased anxiety related to this. Will try hydroxyzine for this PRN. Discussed r/b/se - she has Sjogren's so will monitor for dry mouth as possible side effect. We will follow-up in 1 month for recheck.  - hydrOXYzine HCl (ATARAX) 25 MG tablet; Take 1-2 tablets (25-50 mg) by mouth 3 times daily as needed for anxiety  - buPROPion (WELLBUTRIN SR) 200 MG 12 hr tablet; Take 1 tablet (200 mg) by mouth 2 times daily    Inattention  Reports diagnosis and treatment of ADHD as a child but not for many years, no previous records. Referred for ADHD eval and we can discuss management of ADHD if diagnosed.  - Adult Mental Health  Referral; Future    The longitudinal plan of care for the diagnosis(es)/condition(s) as documented were addressed during this visit. Due to the added complexity in care, I will continue to support Leah in the subsequent management and with ongoing continuity of care.      Ifeoma Lynn is a 44 year old, presenting for the following health issues:  Follow Up and A.D.H.D (Consult)        7/31/2024     9:34 AM   Additional Questions   Roomed by MR   Accompanied by NA         7/31/2024     9:34 AM   Patient Reported Additional Medications   Patient reports taking the following new medications NA     History of Present Illness       Reason for visit:  Follow up on some conditions abd to talk about adhd  Symptom onset:  More than a month  Symptoms  include:  Adhd diagnosed as a child, but noticing symptoms now.  Symptom intensity:  Moderate  Symptom progression:  Worsening  Had these symptoms before:  Yes  Has tried/received treatment for these symptoms:  Yes  Previous treatment was successful:  Yes  Prior treatment description:  Ritalin as a child    She eats 2-3 servings of fruits and vegetables daily.She consumes 0 sweetened beverage(s) daily.She exercises with enough effort to increase her heart rate 10 to 19 minutes per day.  She exercises with enough effort to increase her heart rate 4 days per week.   She is taking medications regularly.     Depression   How are you doing with your depression since your last visit? Stable. She doesn't feel improved but don't feel worse at least.  She has been on this medication for a few years. Says she has improved mental health since starting it but become only stable.   Can feel overwhelmed at times    Are you having other symptoms that might be associated with depression? No  Have you had a significant life event?  No   Are you feeling anxious or having panic attacks?   Yes:  anxiety sometimes  Do you have any concerns with your use of alcohol or other drugs? No    Social History     Tobacco Use    Smoking status: Never    Smokeless tobacco: Never   Vaping Use    Vaping status: Never Used   Substance Use Topics    Alcohol use: Yes     Comment: 1 or 2 a month or less    Drug use: No         1/23/2024    10:51 AM 2/27/2024    10:41 AM 7/31/2024     2:00 PM   PHQ   PHQ-9 Total Score 6 5 8   Q9: Thoughts of better off dead/self-harm past 2 weeks Not at all Not at all Not at all         3/10/2023     3:32 PM 1/23/2024    10:51 AM 7/31/2024     2:00 PM   SAMMY-7 SCORE   Total Score 0 1 2       Concern - ADHD   Onset: Dx with ADHD as a kid. Experiencing symptoms as adult   Description: She was telling her therapist some symptoms and how hard it has been to manage life.     Symptoms include:  -Executive functioning   -Shame  "spiraling like \"Why can't you just do this\" thoughts  -Motivation is hard   -Little tasks are overwhelming  -Brain fog    She has voiced to her  these struggles and he has noticed the symptoms too    Therapies tried and outcome: Had ritalin as a kid which helped to a degree. Was on for 5-6 years and wasn't the right fit due to effect that came with it.   Ended up managing without after awhile with the decision of parents        Objective    /75 (BP Location: Right arm, Patient Position: Sitting, Cuff Size: Adult Regular)   Pulse 72   Temp 97.7  F (36.5  C) (Oral)   Resp 14   Ht 1.829 m (6')   Wt 79.8 kg (175 lb 14.4 oz)   LMP 07/15/2024 (Exact Date)   SpO2 97%   BMI 23.86 kg/m    Body mass index is 23.86 kg/m .  Physical Exam   GENERAL: alert and no distress  RESP: lungs clear to auscultation - no rales, rhonchi or wheezes  CV: regular rate and rhythm, normal S1 S2, no S3 or S4, no murmur, click or rub  NEURO: Normal strength and tone, mentation intact and speech normal  PSYCH: mentation appears normal, affect normal/bright, tearful          Signed Electronically by: Lillian Andrew PA-C    "

## 2024-08-15 ENCOUNTER — LAB (OUTPATIENT)
Dept: LAB | Facility: CLINIC | Age: 44
End: 2024-08-15
Payer: COMMERCIAL

## 2024-08-15 ENCOUNTER — OFFICE VISIT (OUTPATIENT)
Dept: RHEUMATOLOGY | Facility: CLINIC | Age: 44
End: 2024-08-15
Payer: COMMERCIAL

## 2024-08-15 VITALS
DIASTOLIC BLOOD PRESSURE: 57 MMHG | WEIGHT: 176.9 LBS | SYSTOLIC BLOOD PRESSURE: 90 MMHG | BODY MASS INDEX: 23.99 KG/M2 | OXYGEN SATURATION: 95 % | HEART RATE: 65 BPM

## 2024-08-15 DIAGNOSIS — R76.8 ANTICENTROMERE ANTIBODIES PRESENT: ICD-10-CM

## 2024-08-15 DIAGNOSIS — I73.00 RAYNAUD'S PHENOMENON WITHOUT GANGRENE: ICD-10-CM

## 2024-08-15 DIAGNOSIS — R76.8 POSITIVE ANA (ANTINUCLEAR ANTIBODY): ICD-10-CM

## 2024-08-15 DIAGNOSIS — M35.00 SJOGREN'S SYNDROME, WITH UNSPECIFIED ORGAN INVOLVEMENT (H): ICD-10-CM

## 2024-08-15 DIAGNOSIS — M35.00 SJOGREN'S SYNDROME, WITH UNSPECIFIED ORGAN INVOLVEMENT (H): Primary | ICD-10-CM

## 2024-08-15 DIAGNOSIS — K22.70 BARRETT'S ESOPHAGUS WITHOUT DYSPLASIA: ICD-10-CM

## 2024-08-15 DIAGNOSIS — L94.2 CALCINOSIS CUTIS: ICD-10-CM

## 2024-08-15 LAB
ALBUMIN SERPL BCG-MCNC: 4 G/DL (ref 3.5–5.2)
ALT SERPL W P-5'-P-CCNC: 5 U/L (ref 0–50)
CREAT SERPL-MCNC: 1.23 MG/DL (ref 0.51–0.95)
EGFRCR SERPLBLD CKD-EPI 2021: 55 ML/MIN/1.73M2
ERYTHROCYTE [DISTWIDTH] IN BLOOD BY AUTOMATED COUNT: 13.5 % (ref 10–15)
HCT VFR BLD AUTO: 38.9 % (ref 35–47)
HGB BLD-MCNC: 12.4 G/DL (ref 11.7–15.7)
MCH RBC QN AUTO: 28.2 PG (ref 26.5–33)
MCHC RBC AUTO-ENTMCNC: 31.9 G/DL (ref 31.5–36.5)
MCV RBC AUTO: 88 FL (ref 78–100)
PLATELET # BLD AUTO: 202 10E3/UL (ref 150–450)
RBC # BLD AUTO: 4.4 10E6/UL (ref 3.8–5.2)
WBC # BLD AUTO: 5.5 10E3/UL (ref 4–11)

## 2024-08-15 PROCEDURE — 82040 ASSAY OF SERUM ALBUMIN: CPT

## 2024-08-15 PROCEDURE — 36415 COLL VENOUS BLD VENIPUNCTURE: CPT

## 2024-08-15 PROCEDURE — 84460 ALANINE AMINO (ALT) (SGPT): CPT

## 2024-08-15 PROCEDURE — 99214 OFFICE O/P EST MOD 30 MIN: CPT | Performed by: INTERNAL MEDICINE

## 2024-08-15 PROCEDURE — G2211 COMPLEX E/M VISIT ADD ON: HCPCS | Performed by: INTERNAL MEDICINE

## 2024-08-15 PROCEDURE — 82565 ASSAY OF CREATININE: CPT

## 2024-08-15 PROCEDURE — 85027 COMPLETE CBC AUTOMATED: CPT

## 2024-08-15 RX ORDER — PILOCARPINE HYDROCHLORIDE 5 MG/1
5 TABLET, FILM COATED ORAL 2 TIMES DAILY
Qty: 180 TABLET | Refills: 0 | Status: SHIPPED | OUTPATIENT
Start: 2024-08-15 | End: 2024-11-13

## 2024-08-15 RX ORDER — HYDROXYCHLOROQUINE SULFATE 200 MG/1
200 TABLET, FILM COATED ORAL 2 TIMES DAILY
Qty: 60 TABLET | Refills: 3 | Status: SHIPPED | OUTPATIENT
Start: 2024-08-15 | End: 2024-09-14

## 2024-08-15 NOTE — PROGRESS NOTES
"      Rheumatology follow-up visit note     Leah is a 44 year old female presents today for follow-up.    Leah was seen today for recheck.    Diagnoses and all orders for this visit:    Sjogren's syndrome, with unspecified organ involvement (H24)  -     pilocarpine (SALAGEN) 5 MG tablet; Take 1 tablet (5 mg) by mouth 2 times daily for 90 days  -     hydroxychloroquine (PLAQUENIL) 200 MG tablet; Take 1 tablet (200 mg) by mouth 2 times daily for 30 days  -     ALT; Standing  -     Albumin level; Standing  -     CBC with platelets; Standing  -     Creatinine; Standing    Calcinosis cutis    Positive LOLY (antinuclear antibody)  -     hydroxychloroquine (PLAQUENIL) 200 MG tablet; Take 1 tablet (200 mg) by mouth 2 times daily for 30 days  -     ALT; Standing  -     Albumin level; Standing  -     CBC with platelets; Standing  -     Creatinine; Standing    Anticentromere antibodies present  -     ALT; Standing  -     Albumin level; Standing  -     CBC with platelets; Standing  -     Creatinine; Standing    Raynaud's phenomenon without gangrene    Griggs's esophagus without dysplasia        The longitudinal plan of care for the diagnosis(es)/condition(s) as documented were addressed during this visit. Due to the added complexity in care, I will continue to support Leah in the subsequent management and with ongoing continuity of care.      Follow up in 3 months.    HPI    Leah Yanez is a 44 year old female is here for follow-up of connective tissue disease with overlapping symptoms such as of Sjogren's manifested by sicca symptoms positive LOLY, she has had longstanding Raynaud's, she has calcinosis cutis, she has anticentromere antibody, she has not had telangiectasias, there is been no evidence of pulmonary hypertension as she has followed up with cardiology and variety of workup undertaken when she had presented with \"viral\" pericarditis/[id number] nucleated cells 92% neutrophils,./Tamponade and pleural " "effusion, which may well have been brought of connective tissue disease.  She has noted nasal ulceration on the left side that has troubled her intermittently for the past several years no mouth ulcers she has seen ENT.  They are trying to avoid doing a biopsy given the location.  We talked about how given some of her overlapping symptoms this may well be manifestation of connective tissue disease 1 option will be to take hydroxychloroquine for several months and see if she can get rid of this problem in a significant way she would like to consider that I have given her the literature to read.  The major side effects including ocular cutaneous GI were discussed.  On her previous visit she was started on pilocarpine and that has \"transformed\" her life.  Her Raynaud's continue to be bothersome intermittently even in the warmer weather when she is exposed to air conditioning.  Has not been able to tolerate amlodipine because of dizziness almost blacked out.  While the dryness of mouth improved significantly she had blurry vision.  She is taking pilocarpine twice daily.        DETAILED EXAMINATION  08/15/24  :    Vitals:    08/15/24 1018   BP: 90/57   BP Location: Right arm   Patient Position: Sitting   Cuff Size: Adult Large   Pulse: 65   SpO2: 95%   Weight: 80.2 kg (176 lb 14.4 oz)     Alert oriented. Head including the face is examined for malar rash, heliotropes, scarring, lupus pernio. Eyes examined for redness such as in episcleritis/scleritis, periorbital lesions.   Neck/ Face examined for parotid gland swelling, range of motion of neck.  Left upper and lower and right upper and lower extremities examined for tenderness, swelling, warmth of the appendicular joints, range of motion, edema, rash.  Some of the important findings included: she does not have evidence of synovitis in the palpable joints of the upper extremities.  No significant deformities of the digits.  no Heberden nodes.  Range of motion of the " shoulders  b show full abduction.  No JLT effusion or warmth of the knees.  she does not have dactylitis of the digits.  Minimal moisture in the oral mucosa no pool of saliva underneath the tongue.  There are no telangiectasias digits face lips or tongue or buccal surface not withstanding 1 area on her right hand dorsum.  There is no sclerodactyly.  There is no rash on the face.     Patient Active Problem List    Diagnosis Date Noted    Nasal lesion 03/08/2024     Priority: Medium    Dry mouth 02/14/2024     Priority: Medium    Positive LOLY (antinuclear antibody) 02/14/2024     Priority: Medium    Anticentromere antibodies present 02/14/2024     Priority: Medium    Calcinosis cutis 02/14/2024     Priority: Medium    Sjogren's syndrome (H24) 12/18/2023     Priority: Medium     Follows with rheumatology      Lichen sclerosus 12/18/2023     Priority: Medium    Bariatric surgery status 07/27/2023     Priority: Medium     Sleeve gastrectomy 10/2022      Dystrophic radiologic calcification 06/06/2023     Priority: Medium     Noted on right forearm x-ray 6/5/23.      Gastroesophageal reflux disease with esophagitis without hemorrhage 12/21/2022     Priority: Medium    Premenopausal menorrhagia 12/12/2022     Priority: Medium    Pericardial effusion 12/11/2022     Priority: Medium    Status post coronary angiogram 12/11/2022     Priority: Medium    Anti-cardiolipin antibody positive 06/20/2022     Priority: Medium    Hiatal hernia 06/20/2022     Priority: Medium    Griggs's esophagus 06/20/2022     Priority: Medium     [facility] 3/2/24:  Long segment of Griggs's esophagus without dysplasia. Repeat EGD 2 years. Will stay on at least once daily PPI indefinitely.  Currently on pantoprazole 40 mg BID  EGD due 2/2026      Diaphragmatic hernia without obstruction or gangrene 06/20/2022     Priority: Medium    Raised antibody titer 06/20/2022     Priority: Medium    Moderate episode of recurrent major depressive disorder (H)  "12/10/2021     Priority: Medium    Esophageal dysphagia 11/23/2021     Priority: Medium    Atypical squamous cells of undetermined significance (ASCUS) on Papanicolaou smear of cervix 02/19/2019     Priority: Medium     03/2015 LSIL/HPV Negative  06/08/2017 ASCUS/HPV+  08/05/2017 [place]: ASHLEY I   01/2018 HPV+  01/2018 [place]: \"Negative\"  02/08/2019 ASCUS/HPV+  02/20/2019 [place]: Benign  5/18/2021:  NIL/HPV Positive  7/7/21 [place]:normal  8/29/2022: NILM, HPV negative    Plan: repeat cotest in 3 years (8/2025)        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  Aug 2014, was on combined oral contraceptives      Raynaud's phenomenon 07/22/2014     Priority: Medium     Past Surgical History:   Procedure Laterality Date    COMBINED ESOPHAGOSCOPY, GASTROSCOPY, DUODENOSCOPY (EGD), REMOVE ESOPHAGEAL STENT N/A 12/2/2022    Procedure: ESOPHAGOGASTRODUODENOSCOPY, WITH ESOPHAGEAL STENT REMOVAL and Balloon Dialation;  Surgeon: Daniel Aragon MD;  Location: [facility] OR    CV PERICARDIOCENTESIS N/A 12/11/2022    Procedure: Pericardiocentesis;  Surgeon: Sourav Sahu MD;  Location:  [facility]    ESOPHAGOSCOPY, GASTROSCOPY, DUODENOSCOPY (EGD), COMBINED N/A 12/29/2021    Procedure: ESOPHAGOGASTRODUODENOSCOPY, WITH BIOPSY;  Surgeon: Esteban Rose DO;  Location: [facility] OR    ESOPHAGOSCOPY, GASTROSCOPY, DUODENOSCOPY (EGD), COMBINED N/A 11/14/2022    Procedure: Upper endoscopy; gastric stricture dilation, interpretation of fluoroscopy nasojejunal feeding tube;  Surgeon: Daniel Aragon MD;  Location: [facility] OR    ESOPHAGOSCOPY, GASTROSCOPY, DUODENOSCOPY (EGD), COMBINED N/A 11/23/2022    Procedure: ESOPHAGOGASTRODUODENOSCOPY (EGD);  Surgeon: Daniel Aragon MD;  Location: [facility]    " "ESOPHAGOSCOPY, GASTROSCOPY, DUODENOSCOPY (EGD), COMBINED N/A 12/21/2022    Procedure: ESOPHAGOGASTRODUODENOSCOPY, WITH BIOPSY;  Surgeon: Leticia Sommer MD;  Location: [facility]    LAPAROSCOPIC GASTRIC SLEEVE N/A 10/25/2022    Procedure: GASTRECTOMY, SLEEVE, LAPAROSCOPIC;  Surgeon: Daniel Aragon MD;  Location: [facility] OR    LAPAROSCOPIC HERNIORRHAPHY HIATAL N/A 10/25/2022    Procedure: HERNIORRHAPHY, HIATAL, LAPAROSCOPIC;  Surgeon: Daniel Aragon MD;  Location: [facility] OR    LAPAROSCOPY DIAGNOSTIC (GENERAL) N/A 11/4/2022    Procedure: LAPAROSCOPY, DIAGNOSTIC, BY GENERAL SURGERY, evacuation of abdominl hematoma;  Surgeon: Daniel Aragon MD;  Location: [facility] OR    PICC TRIPLE LUMEN PLACEMENT Left 11/06/2022    51cm (1cm external), Basilic vein    TONSILLECTOMY & ADENOIDECTOMY      ZZC NONSPECIFIC PROCEDURE      T&A as a child    ZZC NONSPECIFIC PROCEDURE      Tubes in ears bilaterally      Past Medical History:   Diagnosis Date    Acute kidney failure with tubular necrosis (H24) 12/14/2022    Anti-cardiolipin antibody positive     Griggs esophagus     Class 2 severe obesity with serious comorbidity and body mass index (BMI) of 38.0 to 38.9 in adult (H) 06/20/2022    Concussion 2016    Depressive disorder 2021    Depressive disorder, not elsewhere classified 03/01/2006    Resolved 8/07    Food impaction of esophagus, initial encounter 11/23/2021    Gastroesophageal reflux disease with esophagitis     Hiatal hernia     History of pulmonary embolism     Morbid obesity (H) 10/25/2022    Obesity     Other pneumonia, unspecified organism 11/02/2022    Pleural effusion 12/11/2022    Pneumonia of both lower lobes due to infectious organism 11/01/2022    Raynaud's syndrome     past history of admission for gangrene of one finger    Status post coronary angiogram 12/11/2022     Allergies   Allergen Reactions    Azithromycin     Erythromycin GI Disturbance     When \"very young\"     Current Outpatient Medications "   Medication Sig Dispense Refill    buPROPion (WELLBUTRIN SR) 150 MG 12 hr tablet TAKE 1 TABLET(150 MG) BY MOUTH TWICE DAILY 180 tablet 1    buPROPion (WELLBUTRIN SR) 200 MG 12 hr tablet Take 1 tablet (200 mg) by mouth 2 times daily 60 tablet 1    hydrOXYzine HCl (ATARAX) 25 MG tablet Take 1-2 tablets (25-50 mg) by mouth 3 times daily as needed for anxiety 30 tablet 1    Multiple Vitamins-Iron (MULTIVITAMIN/IRON PO) Take 1 tablet by mouth daily      nystatin (MYCOSTATIN) 920748 UNIT/GM external powder Apply topically 2 times daily (Patient not taking: Reported on 7/31/2024) 60 g 1    omega 3 1000 MG CAPS       pantoprazole (PROTONIX) 40 MG EC tablet Take 1 tablet (40 mg) by mouth daily If still having GERD symptoms, can increase to 40 mg twice daily 90 tablet 3     family history includes Acute Myocardial Infarction in her father; Alzheimer Disease in her mother and paternal grandfather; Anxiety Disorder in her mother; Cancer - colorectal in her maternal grandmother; Colon Cancer in her paternal grandmother; Depression in her mother; Heart Disease in her father; Hypertension in her maternal grandfather and mother; Rheumatoid Arthritis in her maternal grandmother and mother.  Social Connections: Unknown (12/27/2021)    [facility]    Social Connections     Frequency of Communication with Friends and Family: Not on file          WBC   Date Value Ref Range Status   06/26/2006 5.0 4.0 - 11.0 10e9/L Final     WBC Count   Date Value Ref Range Status   09/14/2023 5.8 4.0 - 11.0 10e3/uL Final     RBC Count   Date Value Ref Range Status   09/14/2023 4.45 3.80 - 5.20 10e6/uL Final   06/26/2006 5.30 (H) 3.8 - 5.2 10e12/L Final     Hemoglobin   Date Value Ref Range Status   09/14/2023 13.1 11.7 - 15.7 g/dL Final   06/26/2006 14.9 11.7 - 15.7 g/dL Final     Hematocrit   Date Value Ref Range Status   09/14/2023 39.7 35.0 - 47.0 % Final    06/26/2006 44.6 35.0 - 47.0 % Final     MCV   Date Value Ref Range Status   09/14/2023 89 78 - 100 fL Final   06/26/2006 84 78 - 100 fl Final     MCH   Date Value Ref Range Status   09/14/2023 29.4 26.5 - 33.0 pg Final   06/26/2006 28.0 26.5 - 33.0 pg Final     Platelet Count   Date Value Ref Range Status   09/14/2023 228 150 - 450 10e3/uL Final   06/26/2006 190 150 - 450 10e9/L Final     % Lymphocytes   Date Value Ref Range Status   09/14/2023 25 % Final   06/26/2006 35 20 - 48 % Final     AST   Date Value Ref Range Status   12/19/2022 25 0 - 45 U/L Final     ALT   Date Value Ref Range Status   09/14/2023 9 0 - 50 U/L Final     Comment:     Reference intervals for this test were updated on 6/12/2023 to more accurately reflect our healthy population. There may be differences in the flagging of prior results with similar values performed with this method. Interpretation of those prior results can be made in the context of the updated reference intervals.       Albumin   Date Value Ref Range Status   09/14/2023 4.0 3.5 - 5.2 g/dL Final   12/19/2022 2.9 (L) 3.4 - 5.0 g/dL Final     Alkaline Phosphatase   Date Value Ref Range Status   12/19/2022 142 40 - 150 U/L Final     Creatinine   Date Value Ref Range Status   09/14/2023 1.02 (H) 0.51 - 0.95 mg/dL Final   06/16/2006 1.00 0.60 - 1.30 mg/dL Final     GFR Estimate   Date Value Ref Range Status   09/14/2023 70 >60 mL/min/1.73m2 Final   06/16/2006 71 >60 mL/min/1.7m2 Final     GFR Estimate If Black   Date Value Ref Range Status   06/16/2006 86 >60 mL/min/1.7m2 Final     Creatinine Urine mg/dL   Date Value Ref Range Status   09/14/2023 173.0 mg/dL Final     Comment:     The reference ranges have not been established in urine creatinine. The results should be integrated into the clinical context for interpretation.     Sed Rate   Date Value Ref Range Status   07/24/2003 9 0 - 20 mm/h Final     Erythrocyte Sedimentation Rate   Date Value Ref Range Status   09/14/2023 16 0  - 20 mm/hr Final     CRP Inflammation   Date Value Ref Range Status   12/21/2022 187.0 (H) 0.0 - 8.0 mg/L Final     N terminal Pro BNP Inpatient   Date Value Ref Range Status   12/11/2022 392 0 - 450 pg/mL Final     Comment:     Reference range shown and results flagged as abnormal are suggested inpatient cut points for confirming diagnosis if CHF in an acute setting. Establishing a baseline value for each individual patient is useful for follow-up. An inpatient or emergency department NT-proPBNP <300 pg/mL effectively rules out acute CHF, with 99% negative predictive value.    The outpatient non-acute reference range for ruling out CHF is:  0-125 pg/mL (age 18 to less than 75)  0-450 pg/mL (age 75 yrs and older)

## 2024-08-20 ENCOUNTER — NURSE TRIAGE (OUTPATIENT)
Dept: FAMILY MEDICINE | Facility: CLINIC | Age: 44
End: 2024-08-20
Payer: COMMERCIAL

## 2024-08-20 NOTE — TELEPHONE ENCOUNTER
Pt notified of message from provider. She states she has been able to eat and drink some today. She is declining UC. She is planning to decrease her Wellbutrin back down to 150mg daily as she believes this is the cause of her symptoms.    Anu High RN on 8/20/2024 at 3:11 PM

## 2024-08-20 NOTE — TELEPHONE ENCOUNTER
If she is feeling the same as before medication increase then symptoms are probably not related to wellbutrin. Some of the symptoms could be possible side effects of pilocarpine and she could discuss with her rheumatologist.     Possible side effects of pilocarpine are listed below:    Cardiovascular: Flushing (8% to 13%)  Dermatologic: Diaphoresis (29% to 68%)  Gastrointestinal: Nausea (6% to 15%)  Genitourinary: Urinary frequency (9% to 12%)  Nervous system: Asthenia (6% to 12%), chills (3% to 15%), dizziness (5% to 12%), headache (11%)  Respiratory: Rhinitis (14%)    If she is not able to eat/drink, she should be seen today in urgent care for evaluation of symptoms and possible dehydration.    Lillian Andrew PA-C

## 2024-08-20 NOTE — TELEPHONE ENCOUNTER
Called pt to relay Lillian's message below, LMTCB. Also sent her a MCM to call us back     ELDA BuschN, RN     [facility]    08/20/2024 at 2:38 PM

## 2024-08-20 NOTE — TELEPHONE ENCOUNTER
"Provider -   Patient asked if how she is feeling is related to increasing the dose of Wellbutrin at July 31 visit?     Situation:  Patient reports she has been feeling \"extra down\" for a while, nauseated, dizzy, experiencing headaches, fatigue. She states she feels worse in the morning. She does not feel worse than at visit. She feels safe at this time.     Background:  She increased dose of Wellbutrin at office visit 7/31/24. She has been taking Wellbutrin 1 tablet in the morning and 1 tablet in the evening. She has not taken hydroxyzine yet. Plan from the office visit on 7/31/24 was:    Assessment & Plan  Anxiety  Moderate episode of recurrent major depressive disorder (H)  PHQ score mildly elevated at 8 today, SAMMY score low at 2. She feels lack of motivation. Feels more of her symptoms may be related to untreated ADHD instead of anxiety. Discussed options and will try increasing wellbutrin as this may also help if there is underlying ADHD. She will also follow-up for ADHD testing and we can discuss management of ADHD if diagnosed. She had significant health issues in the last few years and sometimes will have moments of panic/increased anxiety related to this. Will try hydroxyzine for this PRN. Discussed r/b/se - she has Sjogren's so will monitor for dry mouth as possible side effect. We will follow-up in 1 month for recheck.  - hydrOXYzine HCl (ATARAX) 25 MG tablet; Take 1-2 tablets (25-50 mg) by mouth 3 times daily as needed for anxiety  - buPROPion (WELLBUTRIN SR) 200 MG 12 hr tablet; Take 1 tablet (200 mg) by mouth 2 times daily     Assessment:  Laying down feels like the only thing that helps her feel better.    She also reports she has thrown up at times, last time was on the phone. Had diarrhea today. Has had it intermittently. Generally just does not feel well or great for some time.     She states the dizziness feels like she may \"throw up or pass out\". Like she is lightheaded. She has not passed out or " "fallen. She denies the room feeling like it is spinning.     She gets intermittent headaches that she rates as moderate (6/10). They are in the front of her head. She tries not to take tylenol or ibuprofen.     She also gets pain in her back \"under ribs\", on the right. This is not new - pain probably started a couple months ago. She said it feels like her gallbladder.    She states she \"feels like she can't function\". She feels like she is going to throw up \"all the time\". Feel like have to go lay down. She feels worse when she eats.     She states she did not eat this morning. Had noodles and chicken for lunch.     Denies suicidal ideation, thoughts of self harm or harming others, difficulty breathing, chest pain, confusion, jitteriness, abdominal pain, fever, blurred vision, cough, runny nose, blood in vomit or stool, dehydration.     RN advised a message will be sent to her provider to review and advise and that she should probably be seen sooner than the 8/28/24 visit she has scheduled.     RN provided 911 and ED precautions. She verbalized understanding.     Elizabeth Everett RN, BSN, PHN  [facility]     Future Appointments 8/20/2024 - 2/16/2025        Date Visit Type Length Department Provider     8/28/2024  3:00 PM [facility] OFFICE VISIT  30 min RM Lillian Reilly PA-C    Location Instructions:     [facility] is located at [address] [address], near [address]. This is a half mile west of [address]. If traveling west on [address], turn south onto [address], then west onto [address] to reach the parking lot. If traveling east on [address], turn south directly onto [address].              11/18/2024 10:30 AM RETURN RHEUMATOLOGY 15 min [facility]HE[facility] Geovany Gordon MBBS    Location Instructions:     We are located in the [facility] at [address], " [address], [place], [place].&nbsp; Our building is located across the street from [facility] and offers free parking in our on-site lot. Please take the stairs or elevator to the second floor of the [facility] and check in at the  located in the Specialty Clinic, [address].                       Reason for Disposition   Caller has NON-URGENT medicine question about med that PCP or specialist prescribed and triager unable to answer question    Additional Information   Negative: Intentional drug overdose and suicidal thoughts or ideas   Negative: Drug overdose and triager unable to answer question   Negative: Caller requesting a renewal or refill of a medicine patient is currently taking   Negative: Caller requesting information unrelated to medicine   Negative: Caller requesting information about COVID-19 Vaccine   Negative: Caller requesting information about Emergency Contraception   Negative: Caller requesting information about Combined Birth Control Pills   Negative: Caller requesting information about Progestin Birth Control Pills   Negative: Caller requesting information about Post-Op pain or medicines   Negative: Caller requesting a prescription antibiotic (such as penicillin) for Strep throat and has a positive culture result   Negative: Caller requesting a prescription anti-viral med (such as Tamiflu) and has influenza (flu) symptoms   Negative: Immunization reaction suspected   Negative: Rash while taking a medicine or within 3 days of stopping it   Negative: Asthma and having symptoms of asthma (cough, wheezing, etc.)   Negative: Symptom of illness (e.g., headache, abdominal pain, earache, vomiting) that are more than mild   Negative: Breastfeeding questions about mother's medicines and diet   Negative: MORE THAN A DOUBLE DOSE of a prescription or over-the-counter (OTC) drug   Negative: DOUBLE DOSE (an extra dose or lesser amount) of prescription drug and any  symptoms (e.g., dizziness, nausea, pain, sleepiness)   Negative: DOUBLE DOSE (an extra dose or lesser amount) of over-the-counter (OTC) drug and any symptoms (e.g., dizziness, nausea, pain, sleepiness)   Negative: Took another person's prescription drug   Negative: DOUBLE DOSE (an extra dose or lesser amount) of prescription drug and NO symptoms  (Exception: A double dose of antibiotics.)   Negative: Diabetes drug error or overdose (e.g., took wrong type of insulin or took extra dose)   Negative: Caller has medication question about med NOT prescribed by PCP and triager unable to answer question (e.g., compatibility with other med, storage)   Negative: Prescription not at pharmacy and was prescribed by PCP recently  (Exception: triager has access to EMR and prescription is recorded there. Go to Home Care and confirm for pharmacy.)   Negative: Pharmacy calling with prescription question and triager unable to answer question   Negative: Caller has URGENT medicine question about med that PCP or specialist prescribed and triager unable to answer question    Protocols used: Medication Question Call-A-OH

## 2024-11-18 ENCOUNTER — OFFICE VISIT (OUTPATIENT)
Dept: RHEUMATOLOGY | Facility: CLINIC | Age: 44
End: 2024-11-18
Payer: COMMERCIAL

## 2024-11-18 VITALS
OXYGEN SATURATION: 98 % | WEIGHT: 181.1 LBS | BODY MASS INDEX: 24.56 KG/M2 | SYSTOLIC BLOOD PRESSURE: 94 MMHG | HEART RATE: 60 BPM | DIASTOLIC BLOOD PRESSURE: 58 MMHG

## 2024-11-18 DIAGNOSIS — I73.00 RAYNAUD'S PHENOMENON WITHOUT GANGRENE: ICD-10-CM

## 2024-11-18 DIAGNOSIS — M35.00 SJOGREN'S SYNDROME, WITH UNSPECIFIED ORGAN INVOLVEMENT (H): Primary | ICD-10-CM

## 2024-11-18 DIAGNOSIS — R76.8 POSITIVE ANA (ANTINUCLEAR ANTIBODY): ICD-10-CM

## 2024-11-18 DIAGNOSIS — L94.2 CALCINOSIS CUTIS: ICD-10-CM

## 2024-11-18 DIAGNOSIS — R76.8 ANTICENTROMERE ANTIBODIES PRESENT: ICD-10-CM

## 2024-11-18 PROCEDURE — G2211 COMPLEX E/M VISIT ADD ON: HCPCS | Performed by: INTERNAL MEDICINE

## 2024-11-18 PROCEDURE — 99214 OFFICE O/P EST MOD 30 MIN: CPT | Performed by: INTERNAL MEDICINE

## 2024-11-18 RX ORDER — MUPIROCIN 20 MG/G
OINTMENT TOPICAL PRN
COMMUNITY
Start: 2024-03-08

## 2024-11-18 RX ORDER — PILOCARPINE HYDROCHLORIDE 5 MG/1
5 TABLET, FILM COATED ORAL 3 TIMES DAILY
Qty: 270 TABLET | Refills: 1 | Status: SHIPPED | OUTPATIENT
Start: 2024-11-18 | End: 2025-02-16

## 2024-11-18 NOTE — PROGRESS NOTES
"      Rheumatology follow-up visit note     Leah is a 44 year old female presents today for follow-up.    Leah was seen today for recheck.    Diagnoses and all orders for this visit:    Sjogren's syndrome, with unspecified organ involvement (H)    Positive LOLY (antinuclear antibody)    Calcinosis cutis    Anticentromere antibodies present    Raynaud's phenomenon without gangrene        The longitudinal plan of care for the diagnosis(es)/condition(s) as documented were addressed during this visit. Due to the added complexity in care, I will continue to support Leah in the subsequent management and with ongoing continuity of care.      Follow up in 4 months.    HPI    Leah Yanez is a 44 year old female  is here for follow-up of connective tissue disease with overlapping symptoms such as of Sjogren's manifested by sicca symptoms positive LOLY, she has had longstanding Raynaud's, she has calcinosis cutis, she has anticentromere antibody, she has not had telangiectasias, there is been no evidence of pulmonary hypertension as she has followed up with cardiology and variety of workup undertaken when she had presented with \"viral\" pericarditis/[id number] nucleated cells 92% neutrophils,./Tamponade and pleural effusion, which may well have been brought of connective tissue disease.  She has noted nasal ulceration on the left side that has troubled her intermittently for the past several years no mouth ulcers she has seen ENT.  They are trying to avoid doing a biopsy given the location.  We talked about how given some of her overlapping symptoms this may well be manifestation of connective tissue disease 1 option will be to take hydroxychloroquine for several months and see if she can get rid of this problem in a significant way she would like to consider that I have given her the literature to read.  The major side effects including ocular cutaneous GI were discussed.  This is prescribed her previous " "visit however she opted to not take it at least for now.  She feels her joint pains a bit better.  On her previous visit she was started on pilocarpine and that has \"transformed\" her life.  Her Raynaud's continue to be bothersome intermittently even in the warmer weather when she is exposed to air conditioning.  Has not been able to tolerate amlodipine because of dizziness almost blacked out.  While the dryness of mouth improved significantly she had blurry vision.  She is taking pilocarpine twice daily.   She has had at least 3 miscarriages, IVF did not work,  DETAILED EXAMINATION  11/18/24  :    Vitals:    11/18/24 1039   BP: 94/58   BP Location: Right arm   Patient Position: Sitting   Cuff Size: Adult Large   Pulse: 60   SpO2: 98%   Weight: 82.1 kg (181 lb 1.6 oz)     Alert oriented. Head including the face is examined for malar rash, heliotropes, scarring, lupus pernio. Eyes examined for redness such as in episcleritis/scleritis, periorbital lesions.   Neck/ Face examined for parotid gland swelling, range of motion of neck.  Left upper and lower and right upper and lower extremities examined for tenderness, swelling, warmth of the appendicular joints, range of motion, edema, rash.  Some of the important findings included: she does not have evidence of synovitis in the palpable joints of the upper extremities.  No significant deformities of the digits.  no Heberden nodes.  Range of motion of the shoulders  show full abduction.  No JLT effusion or warmth of the knees.  she does not have dactylitis of the digits.  Scant oral mucosal moisture, absence of saliva pool the bottom of the oral cavity.  There are no telangiectasia there is no sclerodactyly there is no loss of finger tip pulp however there is healed pitting at the index especially the right..     Patient Active Problem List    Diagnosis Date Noted    Nasal lesion 03/08/2024     Priority: Medium    Dry mouth 02/14/2024     Priority: Medium    Positive LOLY " "(antinuclear antibody) 02/14/2024     Priority: Medium    Anticentromere antibodies present 02/14/2024     Priority: Medium    Calcinosis cutis 02/14/2024     Priority: Medium    Sjogren's syndrome (H) 12/18/2023     Priority: Medium     Follows with rheumatology      Lichen sclerosus 12/18/2023     Priority: Medium    Bariatric surgery status 07/27/2023     Priority: Medium     Sleeve gastrectomy 10/2022      Dystrophic radiologic calcification 06/06/2023     Priority: Medium     Noted on right forearm x-ray 6/5/23.      Gastroesophageal reflux disease with esophagitis without hemorrhage 12/21/2022     Priority: Medium    Premenopausal menorrhagia 12/12/2022     Priority: Medium    Pericardial effusion 12/11/2022     Priority: Medium    Status post coronary angiogram 12/11/2022     Priority: Medium    Anti-cardiolipin antibody positive 06/20/2022     Priority: Medium    Hiatal hernia 06/20/2022     Priority: Medium    Griggs's esophagus 06/20/2022     Priority: Medium     [facility] 3/2/24:  Long segment of Griggs's esophagus without dysplasia. Repeat EGD 2 years. Will stay on at least once daily PPI indefinitely.  Currently on pantoprazole 40 mg BID  EGD due 2/2026      Diaphragmatic hernia without obstruction or gangrene 06/20/2022     Priority: Medium    Raised antibody titer 06/20/2022     Priority: Medium    Moderate episode of recurrent major depressive disorder (H) 12/10/2021     Priority: Medium    Esophageal dysphagia 11/23/2021     Priority: Medium    Atypical squamous cells of undetermined significance (ASCUS) on Papanicolaou smear of cervix 02/19/2019     Priority: Medium     03/2015 LSIL/HPV Negative  06/08/2017 ASCUS/HPV+  08/05/2017 [facility]: ASHLEY I   01/2018 HPV+  01/2018 [facility]: \"Negative\"  02/08/2019 ASCUS/HPV+  02/20/2019 [facility]: Benign  5/18/2021:  NIL/HPV Positive  7/7/21 [facility]:normal  8/29/2022: NILM, HPV negative    Plan: repeat cotest in 3 years (8/2025)        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  Aug 2014, was on combined oral contraceptives      Raynaud's phenomenon 07/22/2014     Priority: Medium     Past Surgical History:   Procedure Laterality Date    COMBINED ESOPHAGOSCOPY, GASTROSCOPY, DUODENOSCOPY (EGD), REMOVE ESOPHAGEAL STENT N/A 12/2/2022    Procedure: ESOPHAGOGASTRODUODENOSCOPY, WITH ESOPHAGEAL STENT REMOVAL and Balloon Dialation;  Surgeon: Daniel Aragon MD;  Location: [facility] OR    CV PERICARDIOCENTESIS N/A 12/11/2022    Procedure: Pericardiocentesis;  Surgeon: Sourav Sahu MD;  Location:  [facility]    ESOPHAGOSCOPY, GASTROSCOPY, DUODENOSCOPY (EGD), COMBINED N/A 12/29/2021    Procedure: ESOPHAGOGASTRODUODENOSCOPY, WITH BIOPSY;  Surgeon: Esteban Rose DO;  Location: [facility] OR    ESOPHAGOSCOPY, GASTROSCOPY, DUODENOSCOPY (EGD), COMBINED N/A 11/14/2022    Procedure: Upper endoscopy; gastric stricture dilation, interpretation of fluoroscopy nasojejunal feeding tube;  Surgeon: Daniel Aragon MD;  Location: [facility] OR    ESOPHAGOSCOPY, GASTROSCOPY, DUODENOSCOPY (EGD), COMBINED N/A 11/23/2022    Procedure: ESOPHAGOGASTRODUODENOSCOPY (EGD);  Surgeon: Daniel Aragon MD;  [facility]    ESOPHAGOSCOPY, GASTROSCOPY, DUODENOSCOPY (EGD), COMBINED N/A 12/21/2022    Procedure: ESOPHAGOGASTRODUODENOSCOPY, WITH BIOPSY;  Surgeon: Leticia Sommer MD;  Location: [facility]    LAPAROSCOPIC GASTRIC SLEEVE N/A 10/25/2022    Procedure: GASTRECTOMY, SLEEVE, LAPAROSCOPIC;  Surgeon: Daniel Aragon MD;  Location: [facility] OR    LAPAROSCOPIC HERNIORRHAPHY HIATAL N/A 10/25/2022    Procedure: HERNIORRHAPHY, HIATAL, LAPAROSCOPIC;  Surgeon: Daniel Aragon MD;  Location: [facility] OR    LAPAROSCOPY DIAGNOSTIC (GENERAL) N/A 11/4/2022    Procedure: LAPAROSCOPY,  "DIAGNOSTIC, BY GENERAL SURGERY, evacuation of abdominl hematoma;  Surgeon: Daniel Aragon MD;  Location: [facility] OR    PICC TRIPLE LUMEN PLACEMENT Left 11/06/2022    51cm (1cm external), Basilic vein    TONSILLECTOMY & ADENOIDECTOMY      ZZ NONSPECIFIC PROCEDURE      T&A as a child    ZZ NONSPECIFIC PROCEDURE      Tubes in ears bilaterally      Past Medical History:   Diagnosis Date    Acute kidney failure with tubular necrosis (H) 12/14/2022    Anti-cardiolipin antibody positive     Griggs esophagus     Class 2 severe obesity with serious comorbidity and body mass index (BMI) of 38.0 to 38.9 in adult (H) 06/20/2022    Concussion 2016    Depressive disorder 2021    Depressive disorder, not elsewhere classified 03/01/2006    Resolved 8/07    Food impaction of esophagus, initial encounter 11/23/2021    Gastroesophageal reflux disease with esophagitis     Hiatal hernia     History of pulmonary embolism     Morbid obesity (H) 10/25/2022    Obesity     Other pneumonia, unspecified organism 11/02/2022    Pleural effusion 12/11/2022    Pneumonia of both lower lobes due to infectious organism 11/01/2022    Raynaud's syndrome     past history of admission for gangrene of one finger    Status post coronary angiogram 12/11/2022     Allergies   Allergen Reactions    Azithromycin     Erythromycin GI Disturbance     When \"very young\"     Current Outpatient Medications   Medication Sig Dispense Refill    buPROPion (WELLBUTRIN SR) 150 MG 12 hr tablet TAKE 1 TABLET(150 MG) BY MOUTH TWICE DAILY 180 tablet 1    buPROPion (WELLBUTRIN SR) 200 MG 12 hr tablet Take 1 tablet (200 mg) by mouth 2 times daily 60 tablet 1    hydrOXYzine HCl (ATARAX) 25 MG tablet Take 1-2 tablets (25-50 mg) by mouth 3 times daily as needed for anxiety 30 tablet 1    Multiple Vitamins-Iron (MULTIVITAMIN/IRON PO) Take 1 tablet by mouth daily      nystatin (MYCOSTATIN) 198016 UNIT/GM external powder Apply topically 2 times daily (Patient not taking: Reported " on 7/31/2024) 60 g 1    omega 3 1000 MG CAPS       pantoprazole (PROTONIX) 40 MG EC tablet Take 1 tablet (40 mg) by mouth daily If still having GERD symptoms, can increase to 40 mg twice daily 90 tablet 3     family history includes Acute Myocardial Infarction in her father; Alzheimer Disease in her mother and paternal grandfather; Anxiety Disorder in her mother; Cancer - colorectal in her maternal grandmother; Colon Cancer in her paternal grandmother; Depression in her mother; Heart Disease in her father; Hypertension in her maternal grandfather and mother; Rheumatoid Arthritis in her maternal grandmother and mother.  Social Connections: Socially Integrated (9/12/2024)    Received from [facility]    Social Connections     Do you often feel lonely or isolated from those around you?: 0          WBC   Date Value Ref Range Status   06/26/2006 5.0 4.0 - 11.0 10e9/L Final     WBC Count   Date Value Ref Range Status   08/15/2024 5.5 4.0 - 11.0 10e3/uL Final     RBC Count   Date Value Ref Range Status   08/15/2024 4.40 3.80 - 5.20 10e6/uL Final   06/26/2006 5.30 (H) 3.8 - 5.2 10e12/L Final     Hemoglobin   Date Value Ref Range Status   08/15/2024 12.4 11.7 - 15.7 g/dL Final   06/26/2006 14.9 11.7 - 15.7 g/dL Final     Hematocrit   Date Value Ref Range Status   08/15/2024 38.9 35.0 - 47.0 % Final   06/26/2006 44.6 35.0 - 47.0 % Final     MCV   Date Value Ref Range Status   08/15/2024 88 78 - 100 fL Final   06/26/2006 84 78 - 100 fl Final     MCH   Date Value Ref Range Status   08/15/2024 28.2 26.5 - 33.0 pg Final   06/26/2006 28.0 26.5 - 33.0 pg Final     Platelet Count   Date Value Ref Range Status   08/15/2024 202 150 - 450 10e3/uL Final   06/26/2006 190 150 - 450 10e9/L Final     % Lymphocytes   Date Value Ref Range Status   09/14/2023 25 % Final   06/26/2006 35 20 - 48 % Final     AST   Date Value Ref Range Status   12/19/2022 25 0 - 45 U/L Final     ALT   Date Value Ref Range Status    08/15/2024 5 0 - 50 U/L Final     Albumin   Date Value Ref Range Status   08/15/2024 4.0 3.5 - 5.2 g/dL Final   12/19/2022 2.9 (L) 3.4 - 5.0 g/dL Final     Alkaline Phosphatase   Date Value Ref Range Status   12/19/2022 142 40 - 150 U/L Final     Creatinine   Date Value Ref Range Status   08/15/2024 1.23 (H) 0.51 - 0.95 mg/dL Final   06/16/2006 1.00 0.60 - 1.30 mg/dL Final     GFR Estimate   Date Value Ref Range Status   08/15/2024 55 (L) >60 mL/min/1.73m2 Final     Comment:     eGFR calculated using 2021 CKD-EPI equation.   06/16/2006 71 >60 mL/min/1.7m2 Final     GFR Estimate If Black   Date Value Ref Range Status   06/16/2006 86 >60 mL/min/1.7m2 Final     Creatinine Urine mg/dL   Date Value Ref Range Status   09/14/2023 173.0 mg/dL Final     Comment:     The reference ranges have not been established in urine creatinine. The results should be integrated into the clinical context for interpretation.     Sed Rate   Date Value Ref Range Status   07/24/2003 9 0 - 20 mm/h Final     Erythrocyte Sedimentation Rate   Date Value Ref Range Status   09/14/2023 16 0 - 20 mm/hr Final     CRP Inflammation   Date Value Ref Range Status   12/21/2022 187.0 (H) 0.0 - 8.0 mg/L Final     N terminal Pro BNP Inpatient   Date Value Ref Range Status   12/11/2022 392 0 - 450 pg/mL Final     Comment:     Reference range shown and results flagged as abnormal are suggested inpatient cut points for confirming diagnosis if CHF in an acute setting. Establishing a baseline value for each individual patient is useful for follow-up. An inpatient or emergency department NT-proPBNP <300 pg/mL effectively rules out acute CHF, with 99% negative predictive value.    The outpatient non-acute reference range for ruling out CHF is:  0-125 pg/mL (age 18 to less than 75)  0-450 pg/mL (age 75 yrs and older)

## 2024-12-07 NOTE — ED NOTES
Bed: ED33  Expected date:   Expected time:   Means of arrival:   Comments:  Hold for ED 10   Hospital Medicine Daily Progress Note    Date of Service  12/7/2024    Chief Complaint  Yenifer Beasley is a 73 y.o. female admitted 11/23/2024 with shock    Hospital Course  74yo PMHx AFib, mechanical AVR on warfarin, CKD, Femur Fx from [facility] on 11/15 now s/p ORIF, DC'd to rehab.  Came back with hypotension and Hgb of 6.3  Found to have a retroperitoneal hematoma    Interval Problem Update  Pt c/o pain with mobility in bed primarily in her back.  No other complaints    HR 60s    On RA  AFebrile    I have discussed this patient's plan of care and discharge plan at IDT rounds today with Case Management, Nursing, Nursing leadership, and other members of the IDT team.    Consultants/Specialty      Code Status  DNAR/DNI    Disposition  The patient is medically cleared for discharge to home or a post-acute facility.      I have placed the appropriate orders for post-discharge needs.    Review of Systems  Review of Systems   Constitutional:  Negative for chills and fever.   Respiratory:  Negative for cough and shortness of breath.    Cardiovascular:  Negative for chest pain, palpitations and leg swelling.   Gastrointestinal:  Positive for abdominal pain. Negative for diarrhea, nausea and vomiting.   Genitourinary:  Negative for dysuria and urgency.   Musculoskeletal:  Positive for back pain.   Skin:  Negative for rash.   Neurological:  Negative for dizziness, loss of consciousness and headaches.        Physical Exam  Temp:  [36 °C (96.8 °F)-36.4 °C (97.6 °F)] 36.3 °C (97.4 °F)  Pulse:  [59-63] 61  Resp:  [16-18] 16  BP: (121-141)/(51-58) 141/58  SpO2:  [90 %-97 %] 90 %    Physical Exam  Constitutional:       General: She is not in acute distress.     Appearance: Normal appearance. She is well-developed. She is obese. She is not diaphoretic.   HENT:      Head: Normocephalic and atraumatic.   Neck:      Vascular: No JVD.   Cardiovascular:      Rate and Rhythm: Normal rate and regular rhythm.      Heart sounds:  Murmur heard.   Pulmonary:      Effort: Pulmonary effort is normal. No respiratory distress.      Breath sounds: No stridor. No wheezing or rales.   Abdominal:      Palpations: Abdomen is soft.      Tenderness: There is no abdominal tenderness. There is no guarding or rebound.   Musculoskeletal:      Right lower leg: Edema present.      Left lower leg: Edema present.   Skin:     General: Skin is warm and dry.      Coloration: Skin is pale.      Findings: No rash.   Neurological:      Mental Status: She is oriented to person, place, and time.   Psychiatric:         Mood and Affect: Mood normal.         Behavior: Behavior normal.         Thought Content: Thought content normal.         Fluids    Intake/Output Summary (Last 24 hours) at 12/7/2024 0702  Last data filed at 12/7/2024 0324  Gross per 24 hour   Intake 240 ml   Output 1150 ml   Net -910 ml        Laboratory  Recent Labs     12/05/24 0249 12/05/24  1730 12/06/24  0326 12/07/24  0456   WBC 17.3*  --  16.3* 15.8*   RBC 2.12*  --  2.36* 2.34*   HEMOGLOBIN 6.5* 7.7* 7.1* 7.1*   HEMATOCRIT 20.6* 23.9* 21.8* 21.6*   MCV 97.2  --  92.4 92.3   MCH 30.7  --  30.1 30.3   MCHC 31.6*  --  32.6 32.9   RDW 58.6*  --  61.1* 58.9*   PLATELETCT 216  --  222 258   MPV 9.3  --  8.9* 8.9*     Recent Labs     12/05/24 0249 12/06/24  0326 12/07/24  0456   SODIUM 128* 126* 128*   POTASSIUM 4.4 4.6 4.7   CHLORIDE 95* 94* 95*   CO2 23 23 23   GLUCOSE 140* 116* 126*   BUN 60* 68* 66*   CREATININE 1.53* 1.94* 1.71*   CALCIUM 8.2* 8.1* 8.2*     Recent Labs     12/05/24  0249 12/06/24  0326 12/07/24  0456   INR 1.68* 2.03* 2.42*               Imaging  DX-KNEE 3 VIEWS RIGHT   Final Result         Redemonstration of fracture of the lateral femoral condyle with plate and screw fixation. No definite healing change seen.         DX-FEMUR-2+ RIGHT   Final Result         Redemonstration of fracture of the lateral femoral condyle with plate and screw fixation. No definite healing change seen.       DB-WUOALFR-6 VIEW   Final Result      Slight interval decrease in marked colonic gaseous distention with the cecal diameter measuring 14.2 cm, previously 14.6 cm. Findings could be related to ileus or distal colonic obstruction.      TC-[id number]-7 VIEW   Final Result         Diffuse gaseous distention of the small bowel and colon, especially the cecum, could relate to ileus.      DX-CHEST-LIMITED (1 VIEW)   Final Result      1.  Dense opacification of the left lower lung. This may represent a combination of consolidation and pleural effusion.   2.  Right base atelectasis.   3.  Support apparatus as above. No pneumothorax seen.      US-EXTREMITY VENOUS LOWER BILAT   Final Result      EC-ECHOCARDIOGRAM COMPLETE W/ CONT   Final Result      CT-RENAL COLIC EVALUATION(A/P W/O)   Final Result   Impression:      1. Large 17 x 10.7 x 9.5 cm hematoma type mixed density fluid collection in the left retroperitoneum and flank abdominal wall. This displaces the left kidney anteriorly. No significant hydronephrosis.      2. Moderate colonic distention similar to 2007 suggests ileus. No obstruction suggestion. Prior gastric surgery.      3. Heavy vascular calcification. No aneurysm. Sternal wires. Aortic valve prosthesis.      4. No kidney stone or hydronephrosis. Bull catheter in empty bladder.      5. Prior cholecystectomy.                        DX-CHEST-PORTABLE (1 VIEW)   Final Result   Impression:      1. Shallow. Mild left base atelectasis.      2. Cardiomegaly. Sternal wires. Cardiac valve prosthesis.                          Assessment/Plan  * Retroperitoneal hemorrhage- (present on admission)  Assessment & Plan  A CT revealed a 17 cm x 10.7 cm x 9.5 cm retroperitoneal hematoma  Spontaneous hemorrhage in the setting of coumadin use  INR 2.69 on admission was reversed and red blood cells were transfused  Has been stable on heparin gtts and now therapeutic on coumadin  DC heparin gtts  Cont to monitor  Hgb    Leukocytosis  Assessment & Plan  Increasing some  Clinically improving, suspect from hematoma  Will continue to follow and monitor  Cbc in am     Ileus (HCC)  Assessment & Plan  Resolved  Continue bowel regimen    Hyperkalemia- (present on admission)  Assessment & Plan  resolved    Hemorrhagic shock (HCC)- (present on admission)  Assessment & Plan  Due to retroperitoneal bleed  Requiring emergent blood transfusions  Due to history of metallic aortic valve replacement will help to start Coumadin since patient received Kcentra will help to bridge patient with IV heparin since patient might be pro-coagulative at the beginning of Coumadin treatment.  Continue close monitoring   Continue serial h/h and transfuse if needed    ABLA (acute blood loss anemia)- (present on admission)  Assessment & Plan  Secondary to retroperitoneal hemorrhage requiring blood transfusions.  Continue monitoring, cbc and inr in am   Hemoglobin in stable range, hematoma clinically improving  Coumadin day 8, inr finally at 2, if remains 2 or higher in the am can stop iv heparin   Cbc and inr in am     Nondisplaced unspecified condyle fracture of lower end of right femur, initial encounter for closed fracture ([facility])- (present on admission)  Assessment & Plan  S/p surgery by Dr. Camargo on 11/8  PT/OT  She will require post-acute rehab.    Class 3 severe obesity due to excess calories with serious comorbidity and body mass index (BMI) of 40.0 to 44.9 in adult ([facility])- (present on admission)  Assessment & Plan  BMI 46    Chronic kidney disease (CKD) stage G3b/A1, moderately decreased glomerular filtration rate (GFR) between 30-44 mL/min/1.73 square meter and albuminuria creatinine ratio less than 30 mg/g- (present on admission)  Assessment & Plan  Fluctuating  Trial lasix for hypervolemic hypoNa      Paroxysmal atrial fibrillation (HCC)- (present on admission)  Assessment & Plan  Hx of  Now therapeutic on warfarin: daily INR and titrate to goal  of 2.5-3.5    Hx of mechanical aortic valve replacement [V43.3]- (present on admission)  Assessment & Plan  2007 by Dr. Awan  Warfarin per pharmacy protocol  Daily INR  Goal 2.5-3.5    Hyponatremia- (present on admission)  Assessment & Plan  Hypervolemic on exam  U Osm on this admission 160s  U Na<20  Trial lasix IV      Essential hypertension- (present on admission)  Assessment & Plan  Monitoring    HILDA (obstructive sleep apnea)- (present on admission)  Assessment & Plan  Continue nocturna CPAP    CAD (coronary artery disease)- (present on admission)  Assessment & Plan  No chest pain, continue close monitoring    Hypothyroidism- (present on admission)  Assessment & Plan  Continue supplement         VTE prophylaxis: VTE Selection    I have performed a physical exam and reviewed and updated ROS and Plan today (12/7/2024). In review of yesterday's note (12/6/2024), there are no changes except as documented above.

## 2025-01-07 NOTE — TELEPHONE ENCOUNTER
Attempted to contact patient regarding upcoming EGD procedure on 12.29.2021 for pre assessment questions. No answer.     Left message to return call to [phone] #2    Covid test scheduled: 12.27.2021    Arrival time: 1350    Facility location: [facility]    Sedation type: MAC    Indication for procedure: dysphagia    Rosey Vivas RN      
Pre assessment questions completed for upcoming EGD procedure scheduled on 12.29.2021    COVID test scheduled 12.27.2021    Reviewed procedural arrival time 1350 and facility location [facility].    Designated  policy reviewed. Instructed to have someone stay 24 hours post procedure.     Anticoagulation/blood thinners? no    Electronic implanted devices? no    Reviewed EGD prep instructions with patient.     Patient verbalized understanding and had no questions or concerns at this time.    Rosey Vivas RN    
Pt BIBA from urgent care for dizziness. Pt has a h/o Vertigo and states that he woke up dizzy. He took meclizine with no relief. Went to urgent care and was given Zofran en route here.

## 2025-01-16 ENCOUNTER — APPOINTMENT (OUTPATIENT)
Dept: CT IMAGING | Facility: CLINIC | Age: 45
End: 2025-01-16
Attending: EMERGENCY MEDICINE
Payer: COMMERCIAL

## 2025-01-16 ENCOUNTER — HOSPITAL ENCOUNTER (EMERGENCY)
Facility: CLINIC | Age: 45
Discharge: HOME | End: 2025-01-16
Attending: EMERGENCY MEDICINE
Payer: COMMERCIAL

## 2025-01-16 VITALS
HEART RATE: 81 BPM | SYSTOLIC BLOOD PRESSURE: 103 MMHG | RESPIRATION RATE: 18 BRPM | BODY MASS INDEX: 22.75 KG/M2 | TEMPERATURE: 97.8 F | DIASTOLIC BLOOD PRESSURE: 73 MMHG | WEIGHT: 168 LBS | OXYGEN SATURATION: 97 % | HEIGHT: 72 IN

## 2025-01-16 DIAGNOSIS — E86.0 DEHYDRATION: ICD-10-CM

## 2025-01-16 DIAGNOSIS — K80.20 CALCULUS OF GALLBLADDER WITHOUT CHOLECYSTITIS WITHOUT OBSTRUCTION: ICD-10-CM

## 2025-01-16 DIAGNOSIS — R11.2 NAUSEA AND VOMITING, UNSPECIFIED VOMITING TYPE: ICD-10-CM

## 2025-01-16 LAB
ALBUMIN SERPL BCG-MCNC: 4.9 G/DL (ref 3.5–5.2)
ALBUMIN UR-MCNC: NEGATIVE MG/DL
ALP SERPL-CCNC: 92 U/L (ref 40–150)
ALT SERPL W P-5'-P-CCNC: 11 U/L (ref 0–50)
ANION GAP SERPL CALCULATED.3IONS-SCNC: 11 MMOL/L (ref 7–15)
APPEARANCE UR: ABNORMAL
AST SERPL W P-5'-P-CCNC: 19 U/L (ref 0–45)
BASOPHILS # BLD AUTO: 0 10E3/UL (ref 0–0.2)
BASOPHILS NFR BLD AUTO: 1 %
BILIRUB SERPL-MCNC: 0.5 MG/DL
BILIRUB UR QL STRIP: NEGATIVE
BUN SERPL-MCNC: 14.5 MG/DL (ref 6–20)
CALCIUM SERPL-MCNC: 10.1 MG/DL (ref 8.8–10.4)
CHLORIDE SERPL-SCNC: 103 MMOL/L (ref 98–107)
COLOR UR AUTO: YELLOW
CREAT SERPL-MCNC: 1.36 MG/DL (ref 0.51–0.95)
EGFRCR SERPLBLD CKD-EPI 2021: 49 ML/MIN/1.73M2
EOSINOPHIL # BLD AUTO: 0.2 10E3/UL (ref 0–0.7)
EOSINOPHIL NFR BLD AUTO: 3 %
ERYTHROCYTE [DISTWIDTH] IN BLOOD BY AUTOMATED COUNT: 14.6 % (ref 10–15)
FLUAV RNA SPEC QL NAA+PROBE: NEGATIVE
FLUBV RNA RESP QL NAA+PROBE: NEGATIVE
GLUCOSE SERPL-MCNC: 91 MG/DL (ref 70–99)
GLUCOSE UR STRIP-MCNC: NEGATIVE MG/DL
HCG SERPL QL: NEGATIVE
HCO3 SERPL-SCNC: 25 MMOL/L (ref 22–29)
HCT VFR BLD AUTO: 49.1 % (ref 35–47)
HGB BLD-MCNC: 16 G/DL (ref 11.7–15.7)
HGB UR QL STRIP: ABNORMAL
HOLD SPECIMEN: NORMAL
HOLD SPECIMEN: NORMAL
IMM GRANULOCYTES # BLD: 0 10E3/UL
IMM GRANULOCYTES NFR BLD: 0 %
KETONES UR STRIP-MCNC: 10 MG/DL
LEUKOCYTE ESTERASE UR QL STRIP: ABNORMAL
LYMPHOCYTES # BLD AUTO: 1.5 10E3/UL (ref 0.8–5.3)
LYMPHOCYTES NFR BLD AUTO: 27 %
MCH RBC QN AUTO: 27.9 PG (ref 26.5–33)
MCHC RBC AUTO-ENTMCNC: 32.6 G/DL (ref 31.5–36.5)
MCV RBC AUTO: 86 FL (ref 78–100)
MONOCYTES # BLD AUTO: 0.3 10E3/UL (ref 0–1.3)
MONOCYTES NFR BLD AUTO: 5 %
MUCOUS THREADS #/AREA URNS LPF: PRESENT /LPF
NEUTROPHILS # BLD AUTO: 3.5 10E3/UL (ref 1.6–8.3)
NEUTROPHILS NFR BLD AUTO: 64 %
NITRATE UR QL: NEGATIVE
NRBC # BLD AUTO: 0 10E3/UL
NRBC BLD AUTO-RTO: 0 /100
PH UR STRIP: 5 [PH] (ref 5–7)
PLATELET # BLD AUTO: 262 10E3/UL (ref 150–450)
POTASSIUM SERPL-SCNC: 4 MMOL/L (ref 3.4–5.3)
PROT SERPL-MCNC: 8 G/DL (ref 6.4–8.3)
RBC # BLD AUTO: 5.74 10E6/UL (ref 3.8–5.2)
RBC URINE: 2 /HPF
RSV RNA SPEC NAA+PROBE: NEGATIVE
SARS-COV-2 RNA RESP QL NAA+PROBE: NEGATIVE
SODIUM SERPL-SCNC: 139 MMOL/L (ref 135–145)
SP GR UR STRIP: 1.03 (ref 1–1.03)
SQUAMOUS EPITHELIAL: 27 /HPF
UROBILINOGEN UR STRIP-MCNC: NORMAL MG/DL
WBC # BLD AUTO: 5.4 10E3/UL (ref 4–11)
WBC URINE: 26 /HPF

## 2025-01-16 PROCEDURE — 87637 SARSCOV2&INF A&B&RSV AMP PRB: CPT | Performed by: EMERGENCY MEDICINE

## 2025-01-16 PROCEDURE — 81001 URINALYSIS AUTO W/SCOPE: CPT | Performed by: EMERGENCY MEDICINE

## 2025-01-16 PROCEDURE — 85025 COMPLETE CBC W/AUTO DIFF WBC: CPT | Performed by: EMERGENCY MEDICINE

## 2025-01-16 PROCEDURE — 85041 AUTOMATED RBC COUNT: CPT | Performed by: EMERGENCY MEDICINE

## 2025-01-16 PROCEDURE — 74176 CT ABD & PELVIS W/O CONTRAST: CPT

## 2025-01-16 PROCEDURE — 99285 EMERGENCY DEPT VISIT HI MDM: CPT | Mod: 25

## 2025-01-16 PROCEDURE — 82565 ASSAY OF CREATININE: CPT | Performed by: EMERGENCY MEDICINE

## 2025-01-16 PROCEDURE — 96361 HYDRATE IV INFUSION ADD-ON: CPT

## 2025-01-16 PROCEDURE — 250N000011 HC RX IP 250 OP 636: Performed by: EMERGENCY MEDICINE

## 2025-01-16 PROCEDURE — 84703 CHORIONIC GONADOTROPIN ASSAY: CPT | Performed by: EMERGENCY MEDICINE

## 2025-01-16 PROCEDURE — 96374 THER/PROPH/DIAG INJ IV PUSH: CPT

## 2025-01-16 PROCEDURE — 85004 AUTOMATED DIFF WBC COUNT: CPT | Performed by: EMERGENCY MEDICINE

## 2025-01-16 PROCEDURE — 36415 COLL VENOUS BLD VENIPUNCTURE: CPT | Performed by: EMERGENCY MEDICINE

## 2025-01-16 PROCEDURE — 82310 ASSAY OF CALCIUM: CPT | Performed by: EMERGENCY MEDICINE

## 2025-01-16 PROCEDURE — 87086 URINE CULTURE/COLONY COUNT: CPT | Performed by: EMERGENCY MEDICINE

## 2025-01-16 PROCEDURE — 258N000003 HC RX IP 258 OP 636: Performed by: EMERGENCY MEDICINE

## 2025-01-16 RX ORDER — PROMETHAZINE HYDROCHLORIDE 25 MG/1
25 TABLET ORAL EVERY 6 HOURS PRN
Qty: 15 TABLET | Refills: 0 | Status: SHIPPED | OUTPATIENT
Start: 2025-01-16

## 2025-01-16 RX ORDER — ONDANSETRON 2 MG/ML
4 INJECTION INTRAMUSCULAR; INTRAVENOUS ONCE
Status: COMPLETED | OUTPATIENT
Start: 2025-01-16 | End: 2025-01-16

## 2025-01-16 RX ADMIN — SODIUM CHLORIDE 1000 ML: 9 INJECTION, SOLUTION INTRAVENOUS at 12:51

## 2025-01-16 RX ADMIN — ONDANSETRON 4 MG: 2 INJECTION, SOLUTION INTRAMUSCULAR; INTRAVENOUS at 12:53

## 2025-01-16 ASSESSMENT — ACTIVITIES OF DAILY LIVING (ADL)
ADLS_ACUITY_SCORE: 54

## 2025-01-16 ASSESSMENT — COLUMBIA-SUICIDE SEVERITY RATING SCALE - C-SSRS
6. HAVE YOU EVER DONE ANYTHING, STARTED TO DO ANYTHING, OR PREPARED TO DO ANYTHING TO END YOUR LIFE?: NO
1. IN THE PAST MONTH, HAVE YOU WISHED YOU WERE DEAD OR WISHED YOU COULD GO TO SLEEP AND NOT WAKE UP?: NO
2. HAVE YOU ACTUALLY HAD ANY THOUGHTS OF KILLING YOURSELF IN THE PAST MONTH?: NO

## 2025-01-16 NOTE — ED PROVIDER NOTES
"  Emergency Department Note      History of Present Illness     Chief Complaint   Nausea & Vomiting      HPI   Leah Yanez is a 44 year old female with history of stage 3 kidney disease who presents to the ED for evaluation of nausea and vomiting. Patient reports nausea and \"violent\" vomiting for the past week and has been unable to keep any food down for the past week. She is able to tolerate small amounts of water.  Last week she was seen at Urgent Care for UTI symptoms (dysuria, urinary frequency) which has been ongoing for the past three months. She was originally on Keflex, at  was prescribed a different antibiotic, to be taken morning and night. Four days ago, she only took the morning dose of the antibiotic because her nausea was worsening. Since stopping the antibiotic, her nausea and vomiting has increased. She went to PCP yesterday and was given Zofran with no relief in symptoms. Today she also experiences fatigue, muscle aches, headache, and lightheadedness. Patient notes history of nausea as this is not too atypical due to history of gastric sleeve surgery and hiatal hernia repair.     Independent Historian   None    Review of External Notes   Reviewed family medicine visit at [facility] from yesterday. Patient was seen for 10 days of nausea, dizziness, vomiting, and headaches     Past Medical History     Medical History and Problem List   Acute kidney failure with tubular necrosis  Griggs esophagus  Obesity  Concussion  Depression  Food impaction of esophagus  GERD  Hiatal hernia  Pulmonary embolism   Pneumonia  Pleural effusion  Raynaud's   Hypoxemia  Hypoxia  Acid reflux  Sjogren's syndrome  Stage 3 CKD  Digital gangrene  SAMMY     Medications   Bupropion  Pilocarpine  Omeprazole  Pantoprazole  Amlodipine  Cyanocobalamin  Hydroxyzine  Hydroxychloroquine  Pilocarpine   Zofran     Surgical History:    CV Pericardiocentesis  Laparoscpopic gastric sleeve  Laparoscopic herniorrhaphy  PICC triple lumen " replacement  T&A    Bilateral tubes in ear  Tympanostomy  Hiatal hernia repair  Sleeve gastroplasty      Physical Exam     Patient Vitals for the past 24 hrs:   BP Temp Temp src Pulse Resp SpO2 Height Weight   01/16/25 1227 103/73 97.8  F (36.6  C) Oral 81 18 97 % 1.829 m (6') 76.2 kg (168 lb)     Physical Exam  General: Alert, interactive   Head:  Scalp is atraumatic  Eyes:  The pupils are equal, round, and reactive to light    EOM's intact    No scleral icterus  ENT:      Nose:  The external nose is normal  Ears:  External ears are normal  Mouth/Throat: Mucus membranes are dry      Neck:  Normal range of motion.      There is no rigidity.    Trachea is in the midline         CV:  Regular rate and rhythm    No murmur   Resp:  Breath sounds are clear bilaterally    Non-labored, no retractions or accessory muscle use      GI:  Abdomen is soft, no distension, no tenderness. No CVA tenderness      MS:  Normal strength in all 4 extremities  Skin:  Warm and dry, No rash or lesions noted.  Neuro:   Strength 5/5 x4.       GCS: 15  Psych: Awake. Alert.  Normal affect.      Appropriate interactions.      Diagnostics     Lab Results   Labs Ordered and Resulted from Time of ED Arrival to Time of ED Departure   COMPREHENSIVE METABOLIC PANEL - Abnormal       Result Value    Sodium 139      Potassium 4.0      Carbon Dioxide (CO2) 25      Anion Gap 11      Urea Nitrogen 14.5      Creatinine 1.36 (*)     GFR Estimate 49 (*)     Calcium 10.1      Chloride 103      Glucose 91      Alkaline Phosphatase 92      AST 19      ALT 11      Protein Total 8.0      Albumin 4.9      Bilirubin Total 0.5     CBC WITH PLATELETS AND DIFFERENTIAL - Abnormal    WBC Count 5.4      RBC Count 5.74 (*)     Hemoglobin 16.0 (*)     Hematocrit 49.1 (*)     MCV 86      MCH 27.9      MCHC 32.6      RDW 14.6      Platelet Count 262      % Neutrophils 64      % Lymphocytes 27      % Monocytes 5      % Eosinophils 3      % Basophils 1      % Immature  Granulocytes 0      NRBCs per 100 WBC 0      Absolute Neutrophils 3.5      Absolute Lymphocytes 1.5      Absolute Monocytes 0.3      Absolute Eosinophils 0.2      Absolute Basophils 0.0      Absolute Immature Granulocytes 0.0      Absolute NRBCs 0.0     ROUTINE UA WITH MICROSCOPIC REFLEX TO CULTURE - Abnormal    Color Urine Yellow      Appearance Urine Slightly Cloudy (*)     Glucose Urine Negative      Bilirubin Urine Negative      Ketones Urine 10 (*)     Specific Gravity Urine 1.026      Blood Urine Small (*)     pH Urine 5.0      Protein Albumin Urine Negative      Urobilinogen Urine Normal      Nitrite Urine Negative      Leukocyte Esterase Urine Large (*)     Mucus Urine Present (*)     RBC Urine 2      WBC Urine 26 (*)     Squamous Epithelials Urine 27 (*)    INFLUENZA A/B, RSV AND SARS-COV2 PCR - Normal    Influenza A PCR Negative      Influenza B PCR Negative      RSV PCR Negative      SARS CoV2 PCR Negative     HCG QUALITATIVE PREGNANCY - Normal    hCG Serum Qualitative Negative     URINE CULTURE       Imaging   CT Abdomen Pelvis w/o Contrast   Final Result   IMPRESSION:    1.  No acute abnormality in the abdomen or pelvis.   2.  Cholelithiasis.   3.  Small hiatal hernia.             EKG     Independent Interpretation   None    ED Course      Medications Administered   Medications   sodium chloride 0.9% BOLUS 1,000 mL (0 mLs Intravenous Stopped 1/16/25 1342)   ondansetron (ZOFRAN) injection 4 mg (4 mg Intravenous $Given 1/16/25 1253)       Procedures   Procedures     Discussion of Management   None    ED Course   ED Course as of 01/16/25 1848   Thu Jan 16, 2025   1332 I obtained history and examined the patient as noted above.    1534 I rechecked and updated the patient.        Additional Documentation  None    Medical Decision Making / Diagnosis     CMS Diagnoses: None    MIPS       None    [facility]   Leah Yanez is a 44 year old female presenting with nausea and vomiting, the above workup was undertaken.   Does demonstrate signs of dehydration with slightly worsened creatinine and signs of hemoconcentration.  She received IV fluids as well as antiemetics and was feeling much improved.  Urinalysis demonstrates contamination with epithelial cells, she denies any dysuria or significant flank pain to suggest UTI or pyelonephritis, she feels comfortable holding off and waiting for urine culture to return.  I will discharge her with the medications below and have recommended follow-up with her primary care provider return to the emergency department if new symptoms develop.  There is no signs of an acute intra-abdominal catastrophe or more concerning illness.    Disposition   The patient was discharged.     Diagnosis     ICD-10-CM    1. Nausea and vomiting, unspecified vomiting type  R11.2       2. Dehydration  E86.0       3. Calculus of gallbladder without cholecystitis without obstruction  K80.20            Discharge Medications   Discharge Medication List as of 1/16/2025  3:39 PM        START taking these medications    Details   promethazine (PHENERGAN) 25 MG tablet Take 1 tablet (25 mg) by mouth every 6 hours as needed for nausea., Disp-15 tablet, R-0, E-Prescribe               Scribe Disclosure:  I, Marni Mtz, am serving as a scribe at 4:53 PM on 1/16/2025 to document services personally performed by Yogesh Summers found based on my observations and the provider's statements to me.   I, Madeleine Davis, am serving as a scribe at 4:53 PM on 1/16/2025 to document services personally performed by Yogesh Summers  found based on my observations and the provider's statements to me. Yogesh Summers,*        Yogesh Summers MD  01/16/25 4018

## 2025-01-16 NOTE — ED TRIAGE NOTES
Patient presents to the ER for complaints of generalized weakness, fatigue, muscle aches and headache. Patient states that for one week has been unable to tolerate PO intake. Went to PCP yesterday and was given PO zofran with no relief.      Triage Assessment (Adult)       Row Name 01/16/25 1229          Triage Assessment    Airway WDL WDL        Respiratory WDL    Respiratory WDL WDL        Skin Circulation/Temperature WDL    Skin Circulation/Temperature WDL WDL        Cardiac WDL    Cardiac WDL WDL        Peripheral/Neurovascular WDL    Peripheral Neurovascular WDL WDL        Cognitive/Neuro/Behavioral WDL    Cognitive/Neuro/Behavioral WDL WDL

## 2025-01-17 LAB — BACTERIA UR CULT: NORMAL

## 2025-01-31 ENCOUNTER — MYC REFILL (OUTPATIENT)
Dept: RHEUMATOLOGY | Facility: CLINIC | Age: 45
End: 2025-01-31
Payer: COMMERCIAL

## 2025-01-31 DIAGNOSIS — M35.00 SJOGREN'S SYNDROME, WITH UNSPECIFIED ORGAN INVOLVEMENT: ICD-10-CM

## 2025-02-03 ENCOUNTER — TELEPHONE (OUTPATIENT)
Dept: RHEUMATOLOGY | Facility: CLINIC | Age: 45
End: 2025-02-03
Payer: COMMERCIAL

## 2025-02-03 RX ORDER — PILOCARPINE HYDROCHLORIDE 5 MG/1
5 TABLET, FILM COATED ORAL 3 TIMES DAILY
Qty: 270 TABLET | Refills: 1 | OUTPATIENT
Start: 2025-02-03

## 2025-02-03 NOTE — TELEPHONE ENCOUNTER
[facility] Call Center    Phone Message    May a detailed message be left on voicemail: yes     Reason for Call: Medication Question or concern regarding medication   Prescription Clarification  Name of Medication: pilocarpine (SALAGEN) 5 MG tablet   Prescribing Provider: jareth   Pharmacy: [id number] - [place], [place] - [address] AT [address]    What on the order needs clarification? Patient went to refill her medication and said it was not able to be filled and pharmacy told her the clinic denied the refill so patient was wanting medication refilled and wondering why it was denied. Has 4 days left of medication.       Action Taken: Message routed to:  Other: rheum    Travel Screening: Not Applicable     Date of Service: 02/03/25

## 2025-02-04 NOTE — TELEPHONE ENCOUNTER
I called LM on the pt's personal VM that she has refills at the pharmacy and to contact the pharmacy to process.

## 2025-03-22 ENCOUNTER — HEALTH MAINTENANCE LETTER (OUTPATIENT)
Age: 45
End: 2025-03-22

## 2025-07-09 DIAGNOSIS — F33.1 MODERATE EPISODE OF RECURRENT MAJOR DEPRESSIVE DISORDER (H): ICD-10-CM

## 2025-07-09 DIAGNOSIS — F41.9 ANXIETY: ICD-10-CM

## 2025-07-09 RX ORDER — BUPROPION HYDROCHLORIDE 200 MG/1
200 TABLET, EXTENDED RELEASE ORAL 2 TIMES DAILY
Qty: 60 TABLET | Refills: 1 | OUTPATIENT
Start: 2025-07-09

## 2025-07-09 NOTE — TELEPHONE ENCOUNTER
Per chart review pt is being seen through [facility]. Did attempt to call and leave voicemail but writer will cancel medication. Pharmacy likely requested it by mistake.    If pt calls back, please let her know future fills should go through [facility] PCP. Will also send [employer] message with this information.    Vanna Lopez RN on 7/9/2025 at 10:50 AM

## 2025-07-09 NOTE — TELEPHONE ENCOUNTER
Clinic RN: Please investigate patient's chart or contact patient if the information cannot be found because patient should have run out of this medication on 09/2024. Confirm patient is taking this medication as prescribed. Document findings and route refill encounter to provider for approval or denial.    Ruddy Gómez RN on 7/9/2025 at 8:43 AM

## 2025-07-10 NOTE — TELEPHONE ENCOUNTER
Patient returned call. Reviewed refill request. She verbalized understanding and will contact [facility] for the refill.     Mer Sales RN   [facility]

## (undated) DEVICE — ENDO TROCAR FIRST ENTRY KII FIOS ADV FIX 12X100MM CFF73

## (undated) DEVICE — INFLATION DEVICE BIG 60 ENDO-AN6012

## (undated) DEVICE — SYR 30ML LL W/O NDL 302832

## (undated) DEVICE — CUP AND LID 2PK 2OZ STERILE  SSK9006A

## (undated) DEVICE — TUBING SUCTION 12"X1/4" N612

## (undated) DEVICE — ENDO BITE BLOCK ADULT OMNI-BLOC

## (undated) DEVICE — FASTENER CATH STAYFIX 685ME

## (undated) DEVICE — ENDO CAP AND TUBING STERILE FOR ENDOGATOR  100130

## (undated) DEVICE — SYR 30ML SLIP TIP W/O NDL 302833

## (undated) DEVICE — LINEN TOWEL PACK X6 WHITE 5487

## (undated) DEVICE — BASIN SET SINGLE STERILE 13752-624

## (undated) DEVICE — TUBING SUCTION 10'X3/16" N510

## (undated) DEVICE — SPONGE RAY-TEC 4X8" 7318

## (undated) DEVICE — DRSG TELFA 3"X8" NON25720

## (undated) DEVICE — ENDO TROCAR FIRST ENTRY KII FIOS ADV FIX 05X100MM CFF03

## (undated) DEVICE — GOWN IMPERVIOUS 2XL BLUE

## (undated) DEVICE — KIT ENDO FIRST STEP DISINFECTANT 200ML W/POUCH EP-4

## (undated) DEVICE — ENDO TROCAR SLEEVE KII Z-THREADED 05X100MM CTS02

## (undated) DEVICE — LINEN TOWEL PACK X30 5481

## (undated) DEVICE — JELLY LUBRICATING SURGILUBE 2OZ TUBE

## (undated) DEVICE — KIT CONNECTOR FOR OLYMPUS ENDOSCOPES DEFENDO 100310

## (undated) DEVICE — COVER CAMERA IN-LIGHT DISP LT-C02

## (undated) DEVICE — Device

## (undated) DEVICE — SUCTION MANIFOLD NEPTUNE 2 SYS 4 PORT 0702-020-000

## (undated) DEVICE — TOTE ANGIO CORP PC15AT SAN32CC83O

## (undated) DEVICE — LIGHT HANDLE X1 31140133

## (undated) DEVICE — SUCTION CATH AIRLIFE TRI-FLO W/CONTROL PORT 14FR  T60C

## (undated) DEVICE — ENDO TROCAR FIRST ENTRY KII FIOS Z-THRD 12X100MM CTF73

## (undated) DEVICE — DECANTER BAG 2002S

## (undated) DEVICE — ENDO TUBING CO2 SMARTCAP STERILE DISP 100145CO2EXT

## (undated) DEVICE — PITCHER STERILE 1000ML  SSK9004A

## (undated) DEVICE — GLOVE EXAM NITRILE LG PF LATEX FREE 5064

## (undated) DEVICE — ANTIFOG SOLUTION W/FOAM PAD 31142527

## (undated) DEVICE — ESU ENDO SCISSORS 5MM CVD 5DCS

## (undated) DEVICE — DRSG PRIMAPORE 02X3" 7133

## (undated) DEVICE — KIT ENDO TURNOVER/PROCEDURE CARRY-ON 101822

## (undated) DEVICE — TUBING SUCTION MEDI-VAC SOFT 3/16"X20' N520A

## (undated) DEVICE — DRSG TEGADERM 2 3/8X2 3/4" 1624W

## (undated) DEVICE — INTRODUCER CATH VASC 5FRX10CM  MPIS-501-NT-U-SST

## (undated) DEVICE — COVER CAMERA IN-LIGHT LENS LT-C02-P

## (undated) DEVICE — ENDO FCP GRASPING ROTATABLE 7.2MMX2.6MM FG-244NR

## (undated) DEVICE — SUCTION MANIFOLD NEPTUNE 2 SYS 1 PORT 702-025-000

## (undated) DEVICE — ENDO TROCAR OPTICAL ACCESS KII Z-THRD 15X100MM C0R37

## (undated) DEVICE — SOL WATER IRRIG 1000ML BOTTLE 2F7114

## (undated) DEVICE — SPECIMEN CONTAINER 3OZ W/FORMALIN 59901

## (undated) DEVICE — SYR ANGIOGRAPHY MULTIUSE KIT ACIST 014612

## (undated) DEVICE — SU ETHILON 3-0 PS-1 18" 1663H

## (undated) DEVICE — SOL WATER IRRIG 500ML BOTTLE 2F7113

## (undated) DEVICE — DEVICE ENDO STITCH APPLIER 10MM 173016

## (undated) DEVICE — SU ENDO STITCH SURGIDAC 2-0 ES-9 TAPER 48"  170044

## (undated) DEVICE — ESU LIGASURE MARYLAND VESSEL LAP 44CM XLONG LF1944

## (undated) DEVICE — PREP CHLORAPREP 26ML TINTED HI-LITE ORANGE 930815

## (undated) DEVICE — MANIFOLD KIT ANGIO AUTOMATED 014613

## (undated) DEVICE — SYR 10ML LL W/O NDL 302995

## (undated) DEVICE — ENDO SYSTEM WATER BOTTLE & TUBING W/CO2 FILTER 00711549

## (undated) DEVICE — GLOVE PROTEXIS POWDER FREE SMT 7.5  2D72PT75X

## (undated) DEVICE — ENDO FORCEP ENDOJAW BIOPSY 2.8MMX160CM FB-220K

## (undated) DEVICE — LABEL MEDICATION SYSTEM 3303-P

## (undated) DEVICE — WIRE GUIDE 0.025"X270CM STR VISIGLIDE G-240-2527S

## (undated) DEVICE — STPL SKIN 35W ROTATING HEAD PRW35

## (undated) DEVICE — CLIP APPLIER ENDO 5MM M/L LIGAMAX EL5ML

## (undated) DEVICE — BALLOON ELATION 240CML 17-18-19-20-21MM 5.5CM EPCX18

## (undated) DEVICE — INTRO GLIDESHEATH SLENDER 6FR 10X45CM 60-1060

## (undated) DEVICE — KIT HAND CONTROL ANGIOTOUCH ACIST 65CM AT-P65

## (undated) DEVICE — ENDO TROCAR FIRST ENTRY KII FIOS Z-THRD 05X100MM CTF03

## (undated) DEVICE — DRSG DRAIN 2X2" 7087

## (undated) DEVICE — SOL NACL 0.9% INJ 1000ML BAG 2B1324X

## (undated) DEVICE — STPL POWERED ECHELON LONG 60MM PLEE60A

## (undated) DEVICE — NDL SPINAL 22GA 3.5" QUINCKE 405181

## (undated) DEVICE — ENDO TROCAR SLEEVE KII ADV FIXATION 05X100MM CFS02

## (undated) DEVICE — SUCTION IRR STRYKERFLOW II W/TIP 250-070-520

## (undated) DEVICE — DRAPE SHEET REV FOLD 3/4 9349

## (undated) DEVICE — KIT DRAIN 6FR CATHETER PIGTAIL PERICARDIOCENTESIS PC101/A

## (undated) DEVICE — DRAIN JACKSON PRATT ROUND SIL 19FR W/TROCAR LF JP-2232

## (undated) DEVICE — NDL INSUFFLATION 13GA 150MM C2202

## (undated) DEVICE — BALLOON ELATION 240CML 11-12-13.5-15-16MM 5.5CM EPCX12

## (undated) DEVICE — ENDO SCOPE WARMER SEAL  C3101

## (undated) DEVICE — DRSG TEGADERM 4X4 3/4" 1626W

## (undated) DEVICE — TUBING SMOKE EVAC PNEUMOCLEAR HIGH FLOW 0620050250

## (undated) DEVICE — ESU GROUND PAD ADULT W/CORD E7507

## (undated) DEVICE — ENDO POUCH UNIVERSAL RETRIEVAL SYSTEM INZII 12/15MM CD004

## (undated) DEVICE — DEFIB PRO-PADZ LVP LQD GEL ADULT 8900-2105-01

## (undated) DEVICE — DRAPE SHEET MED 44X70" 9355

## (undated) DEVICE — ENDO FORCEP BX CAPTURA PRO SPIKE G50696

## (undated) DEVICE — STPL RELOAD REG TISSUE ECHELON 60 X 3.6MM BLUE GST60B

## (undated) DEVICE — GUIDEWIRE AMPLATZ SUPER STIFF 0.035"X260CM M001465260

## (undated) DEVICE — DRAIN JACKSON PRATT RESERVOIR 100ML SU130-1305

## (undated) DEVICE — SYR 10ML SLIP TIP W/O NDL 303134

## (undated) RX ORDER — HYDROMORPHONE HCL IN WATER/PF 6 MG/30 ML
PATIENT CONTROLLED ANALGESIA SYRINGE INTRAVENOUS
Status: DISPENSED
Start: 2022-12-01

## (undated) RX ORDER — LIDOCAINE HYDROCHLORIDE 10 MG/ML
INJECTION, SOLUTION EPIDURAL; INFILTRATION; INTRACAUDAL; PERINEURAL
Status: DISPENSED
Start: 2022-12-11

## (undated) RX ORDER — FENTANYL CITRATE 50 UG/ML
INJECTION, SOLUTION INTRAMUSCULAR; INTRAVENOUS
Status: DISPENSED
Start: 2022-12-02

## (undated) RX ORDER — ONDANSETRON 2 MG/ML
INJECTION INTRAMUSCULAR; INTRAVENOUS
Status: DISPENSED
Start: 2022-12-01

## (undated) RX ORDER — DEXAMETHASONE SODIUM PHOSPHATE 4 MG/ML
INJECTION, SOLUTION INTRA-ARTICULAR; INTRALESIONAL; INTRAMUSCULAR; INTRAVENOUS; SOFT TISSUE
Status: DISPENSED
Start: 2022-11-14

## (undated) RX ORDER — HALOPERIDOL 5 MG/ML
INJECTION INTRAMUSCULAR
Status: DISPENSED
Start: 2022-11-30

## (undated) RX ORDER — DEXAMETHASONE SODIUM PHOSPHATE 4 MG/ML
INJECTION, SOLUTION INTRA-ARTICULAR; INTRALESIONAL; INTRAMUSCULAR; INTRAVENOUS; SOFT TISSUE
Status: DISPENSED
Start: 2022-10-25

## (undated) RX ORDER — HYDROXYZINE HYDROCHLORIDE 25 MG/1
TABLET, FILM COATED ORAL
Status: DISPENSED
Start: 2022-12-01

## (undated) RX ORDER — ONDANSETRON 2 MG/ML
INJECTION INTRAMUSCULAR; INTRAVENOUS
Status: DISPENSED
Start: 2022-10-25

## (undated) RX ORDER — CEFAZOLIN SODIUM/WATER 3 G/30 ML
SYRINGE (ML) INTRAVENOUS
Status: DISPENSED
Start: 2022-11-04

## (undated) RX ORDER — SODIUM CHLORIDE, SODIUM LACTATE, POTASSIUM CHLORIDE, CALCIUM CHLORIDE 600; 310; 30; 20 MG/100ML; MG/100ML; MG/100ML; MG/100ML
INJECTION, SOLUTION INTRAVENOUS
Status: DISPENSED
Start: 2022-11-04

## (undated) RX ORDER — FENTANYL CITRATE 50 UG/ML
INJECTION, SOLUTION INTRAMUSCULAR; INTRAVENOUS
Status: DISPENSED
Start: 2022-10-25

## (undated) RX ORDER — ONDANSETRON 2 MG/ML
INJECTION INTRAMUSCULAR; INTRAVENOUS
Status: DISPENSED
Start: 2022-11-30

## (undated) RX ORDER — FENTANYL CITRATE 50 UG/ML
INJECTION, SOLUTION INTRAMUSCULAR; INTRAVENOUS
Status: DISPENSED
Start: 2022-11-30

## (undated) RX ORDER — HEPARIN SODIUM 1000 [USP'U]/ML
INJECTION, SOLUTION INTRAVENOUS; SUBCUTANEOUS
Status: DISPENSED
Start: 2022-12-11

## (undated) RX ORDER — FENTANYL CITRATE-0.9 % NACL/PF 10 MCG/ML
PLASTIC BAG, INJECTION (ML) INTRAVENOUS
Status: DISPENSED
Start: 2022-12-02

## (undated) RX ORDER — CEFAZOLIN SODIUM/WATER 3 G/30 ML
SYRINGE (ML) INTRAVENOUS
Status: DISPENSED
Start: 2022-11-14

## (undated) RX ORDER — PROPOFOL 10 MG/ML
INJECTION, EMULSION INTRAVENOUS
Status: DISPENSED
Start: 2022-11-14

## (undated) RX ORDER — DIPHENHYDRAMINE HYDROCHLORIDE 50 MG/ML
INJECTION INTRAMUSCULAR; INTRAVENOUS
Status: DISPENSED
Start: 2022-11-23

## (undated) RX ORDER — HYDROMORPHONE HYDROCHLORIDE 1 MG/ML
INJECTION, SOLUTION INTRAMUSCULAR; INTRAVENOUS; SUBCUTANEOUS
Status: DISPENSED
Start: 2022-11-04

## (undated) RX ORDER — FENTANYL CITRATE 50 UG/ML
INJECTION, SOLUTION INTRAMUSCULAR; INTRAVENOUS
Status: DISPENSED
Start: 2022-11-14

## (undated) RX ORDER — CEFAZOLIN SODIUM/WATER 3 G/30 ML
SYRINGE (ML) INTRAVENOUS
Status: DISPENSED
Start: 2022-10-25

## (undated) RX ORDER — ONDANSETRON 2 MG/ML
INJECTION INTRAMUSCULAR; INTRAVENOUS
Status: DISPENSED
Start: 2022-11-23

## (undated) RX ORDER — FENTANYL CITRATE-0.9 % NACL/PF 10 MCG/ML
PLASTIC BAG, INJECTION (ML) INTRAVENOUS
Status: DISPENSED
Start: 2022-11-30

## (undated) RX ORDER — ONDANSETRON 2 MG/ML
INJECTION INTRAMUSCULAR; INTRAVENOUS
Status: DISPENSED
Start: 2022-12-02

## (undated) RX ORDER — FENTANYL CITRATE 50 UG/ML
INJECTION, SOLUTION INTRAMUSCULAR; INTRAVENOUS
Status: DISPENSED
Start: 2022-12-11

## (undated) RX ORDER — HYDROMORPHONE HCL IN WATER/PF 6 MG/30 ML
PATIENT CONTROLLED ANALGESIA SYRINGE INTRAVENOUS
Status: DISPENSED
Start: 2022-10-25

## (undated) RX ORDER — BUPIVACAINE HYDROCHLORIDE 2.5 MG/ML
INJECTION, SOLUTION EPIDURAL; INFILTRATION; INTRACAUDAL
Status: DISPENSED
Start: 2022-11-04

## (undated) RX ORDER — SODIUM CHLORIDE, SODIUM LACTATE, POTASSIUM CHLORIDE, CALCIUM CHLORIDE 600; 310; 30; 20 MG/100ML; MG/100ML; MG/100ML; MG/100ML
INJECTION, SOLUTION INTRAVENOUS
Status: DISPENSED
Start: 2022-12-01

## (undated) RX ORDER — HYDROMORPHONE HYDROCHLORIDE 1 MG/ML
INJECTION, SOLUTION INTRAMUSCULAR; INTRAVENOUS; SUBCUTANEOUS
Status: DISPENSED
Start: 2022-12-01

## (undated) RX ORDER — FENTANYL CITRATE 50 UG/ML
INJECTION, SOLUTION INTRAMUSCULAR; INTRAVENOUS
Status: DISPENSED
Start: 2022-11-23

## (undated) RX ORDER — ACETAMINOPHEN 325 MG/1
TABLET ORAL
Status: DISPENSED
Start: 2022-10-25

## (undated) RX ORDER — HYDROMORPHONE HYDROCHLORIDE 1 MG/ML
INJECTION, SOLUTION INTRAMUSCULAR; INTRAVENOUS; SUBCUTANEOUS
Status: DISPENSED
Start: 2022-10-25

## (undated) RX ORDER — LORAZEPAM 0.5 MG/1
TABLET ORAL
Status: DISPENSED
Start: 2022-12-01

## (undated) RX ORDER — SODIUM CHLORIDE, SODIUM LACTATE, POTASSIUM CHLORIDE, CALCIUM CHLORIDE 600; 310; 30; 20 MG/100ML; MG/100ML; MG/100ML; MG/100ML
INJECTION, SOLUTION INTRAVENOUS
Status: DISPENSED
Start: 2022-11-30

## (undated) RX ORDER — PROPOFOL 10 MG/ML
INJECTION, EMULSION INTRAVENOUS
Status: DISPENSED
Start: 2022-11-30

## (undated) RX ORDER — DEXAMETHASONE SODIUM PHOSPHATE 4 MG/ML
INJECTION, SOLUTION INTRA-ARTICULAR; INTRALESIONAL; INTRAMUSCULAR; INTRAVENOUS; SOFT TISSUE
Status: DISPENSED
Start: 2022-12-02

## (undated) RX ORDER — PROPOFOL 10 MG/ML
INJECTION, EMULSION INTRAVENOUS
Status: DISPENSED
Start: 2022-12-02

## (undated) RX ORDER — TRAMADOL HYDROCHLORIDE 50 MG/1
TABLET ORAL
Status: DISPENSED
Start: 2022-11-30

## (undated) RX ORDER — ENOXAPARIN SODIUM 100 MG/ML
INJECTION SUBCUTANEOUS
Status: DISPENSED
Start: 2022-10-25

## (undated) RX ORDER — LIDOCAINE HYDROCHLORIDE 20 MG/ML
INJECTION, SOLUTION EPIDURAL; INFILTRATION; INTRACAUDAL; PERINEURAL
Status: DISPENSED
Start: 2022-11-14

## (undated) RX ORDER — TRAMADOL HYDROCHLORIDE 50 MG/1
TABLET ORAL
Status: DISPENSED
Start: 2022-12-01

## (undated) RX ORDER — HYDROMORPHONE HCL IN WATER/PF 6 MG/30 ML
PATIENT CONTROLLED ANALGESIA SYRINGE INTRAVENOUS
Status: DISPENSED
Start: 2022-11-30

## (undated) RX ORDER — FENTANYL CITRATE 50 UG/ML
INJECTION, SOLUTION INTRAMUSCULAR; INTRAVENOUS
Status: DISPENSED
Start: 2022-11-04

## (undated) RX ORDER — ACETAMINOPHEN 325 MG/1
TABLET ORAL
Status: DISPENSED
Start: 2022-12-01

## (undated) RX ORDER — CEFAZOLIN SODIUM/WATER 2 G/20 ML
SYRINGE (ML) INTRAVENOUS
Status: DISPENSED
Start: 2022-11-30

## (undated) RX ORDER — SCOLOPAMINE TRANSDERMAL SYSTEM 1 MG/1
PATCH, EXTENDED RELEASE TRANSDERMAL
Status: DISPENSED
Start: 2022-12-01

## (undated) RX ORDER — LIDOCAINE HYDROCHLORIDE 20 MG/ML
INJECTION, SOLUTION EPIDURAL; INFILTRATION; INTRACAUDAL; PERINEURAL
Status: DISPENSED
Start: 2022-10-25

## (undated) RX ORDER — HEPARIN SODIUM 200 [USP'U]/100ML
INJECTION, SOLUTION INTRAVENOUS
Status: DISPENSED
Start: 2022-12-11

## (undated) RX ORDER — BUPIVACAINE HYDROCHLORIDE 2.5 MG/ML
INJECTION, SOLUTION EPIDURAL; INFILTRATION; INTRACAUDAL
Status: DISPENSED
Start: 2022-10-25

## (undated) RX ORDER — PROPOFOL 10 MG/ML
INJECTION, EMULSION INTRAVENOUS
Status: DISPENSED
Start: 2022-10-25

## (undated) RX ORDER — METHOCARBAMOL 500 MG/1
TABLET, FILM COATED ORAL
Status: DISPENSED
Start: 2022-12-01